# Patient Record
Sex: MALE | Race: OTHER | HISPANIC OR LATINO | Employment: FULL TIME | URBAN - METROPOLITAN AREA
[De-identification: names, ages, dates, MRNs, and addresses within clinical notes are randomized per-mention and may not be internally consistent; named-entity substitution may affect disease eponyms.]

---

## 2017-02-01 ENCOUNTER — LAB REQUISITION (OUTPATIENT)
Dept: LAB | Facility: HOSPITAL | Age: 47
End: 2017-02-01
Payer: COMMERCIAL

## 2017-02-01 DIAGNOSIS — H60.399 OTHER INFECTIVE OTITIS EXTERNA, UNSPECIFIED EAR: ICD-10-CM

## 2017-02-01 PROCEDURE — 87070 CULTURE OTHR SPECIMN AEROBIC: CPT | Performed by: PHYSICIAN ASSISTANT

## 2017-02-01 PROCEDURE — 87102 FUNGUS ISOLATION CULTURE: CPT | Performed by: PHYSICIAN ASSISTANT

## 2017-02-01 PROCEDURE — 87205 SMEAR GRAM STAIN: CPT | Performed by: PHYSICIAN ASSISTANT

## 2017-02-04 LAB
BACTERIA WND AEROBE CULT: NO GROWTH
GRAM STN SPEC: NORMAL

## 2017-03-06 LAB — FUNGUS SPEC CULT: NORMAL

## 2017-03-29 ENCOUNTER — ALLSCRIPTS OFFICE VISIT (OUTPATIENT)
Dept: OTHER | Facility: OTHER | Age: 47
End: 2017-03-29

## 2017-03-29 DIAGNOSIS — N50.819 PAIN IN TESTICLE: ICD-10-CM

## 2017-06-12 ENCOUNTER — HOSPITAL ENCOUNTER (OUTPATIENT)
Dept: RADIOLOGY | Age: 47
Discharge: HOME | End: 2017-06-12
Payer: COMMERCIAL

## 2017-06-12 DIAGNOSIS — N50.819 PAIN IN TESTICLE: ICD-10-CM

## 2017-06-12 PROCEDURE — 76870 US EXAM SCROTUM: CPT

## 2017-06-21 ENCOUNTER — ALLSCRIPTS OFFICE VISIT (OUTPATIENT)
Dept: OTHER | Facility: OTHER | Age: 47
End: 2017-06-21

## 2017-06-21 LAB
CLARITY UR: NORMAL
COLOR UR: YELLOW
GLUCOSE (HISTORICAL): NORMAL
HGB UR QL STRIP.AUTO: NORMAL
KETONES UR STRIP-MCNC: NORMAL MG/DL
LEUKOCYTE ESTERASE UR QL STRIP: NORMAL
NITRITE UR QL STRIP: NORMAL
PH UR STRIP.AUTO: 5 [PH]
PROT UR STRIP-MCNC: NORMAL MG/DL
SP GR UR STRIP.AUTO: 1.03

## 2018-01-13 VITALS
DIASTOLIC BLOOD PRESSURE: 86 MMHG | BODY MASS INDEX: 38.17 KG/M2 | SYSTOLIC BLOOD PRESSURE: 142 MMHG | HEIGHT: 73 IN | WEIGHT: 288 LBS | HEART RATE: 80 BPM

## 2018-01-14 VITALS
HEART RATE: 80 BPM | SYSTOLIC BLOOD PRESSURE: 134 MMHG | HEIGHT: 73 IN | DIASTOLIC BLOOD PRESSURE: 78 MMHG | BODY MASS INDEX: 38.3 KG/M2 | WEIGHT: 289 LBS

## 2019-08-01 ENCOUNTER — OFFICE VISIT (OUTPATIENT)
Dept: URGENT CARE | Age: 49
End: 2019-08-01

## 2019-08-01 VITALS
TEMPERATURE: 98 F | BODY MASS INDEX: 36.45 KG/M2 | WEIGHT: 275 LBS | HEIGHT: 73 IN | SYSTOLIC BLOOD PRESSURE: 180 MMHG | OXYGEN SATURATION: 99 % | DIASTOLIC BLOOD PRESSURE: 90 MMHG | RESPIRATION RATE: 22 BRPM | HEART RATE: 80 BPM

## 2019-08-01 DIAGNOSIS — R10.31 SEVERE RIGHT GROIN PAIN: Primary | ICD-10-CM

## 2019-08-01 DIAGNOSIS — R10.9 RIGHT FLANK PAIN: ICD-10-CM

## 2019-08-01 DIAGNOSIS — R10.31 RLQ ABDOMINAL PAIN: ICD-10-CM

## 2019-08-01 RX ORDER — LISINOPRIL 20 MG/1
20 TABLET ORAL DAILY
COMMUNITY
Start: 2019-05-01 | End: 2021-08-09 | Stop reason: SDUPTHER

## 2019-08-01 RX ORDER — METHOCARBAMOL 750 MG/1
750 TABLET, FILM COATED ORAL 4 TIMES DAILY PRN
COMMUNITY
Start: 2019-07-31 | End: 2020-01-03 | Stop reason: ALTCHOICE

## 2019-08-01 RX ORDER — LIDOCAINE 50 MG/G
1 PATCH TOPICAL EVERY 12 HOURS
COMMUNITY
Start: 2019-07-31 | End: 2020-01-03 | Stop reason: ALTCHOICE

## 2019-08-01 RX ORDER — SIMVASTATIN 20 MG
20 TABLET ORAL DAILY
COMMUNITY
Start: 2019-05-01 | End: 2021-11-29 | Stop reason: SDUPTHER

## 2019-08-01 RX ORDER — SILDENAFIL 50 MG/1
50 TABLET, FILM COATED ORAL AS NEEDED
COMMUNITY
Start: 2019-07-02 | End: 2021-10-07 | Stop reason: SDUPTHER

## 2019-08-01 NOTE — PATIENT INSTRUCTIONS
Patient would like to go via private vehicle to [facility] emergency department per his choice  Please go to the [facility] Emergency Department now for further evaluation and treatment- hospital address verified with the patient  Patient agreed to go immediately to the ED

## 2019-08-01 NOTE — PROGRESS NOTES
[address]        NAME: Samantha Jean-Baptiste is a 51 y o  male  : 1970    MRN: [id number]  DATE: 2019  TIME: 11:28 AM    Assessment and Plan   Severe right groin pain [R10 30]  1  Severe right groin pain  Transfer to other facility   2  RLQ abdominal pain  Transfer to other facility   3  Right flank pain  Transfer to other facility     Worsening right-sided back/flank/right lower quadrant pain radiating down into his groin/scrotal area since yesterday  Was seen at ER yesterday, has been taking muscle relaxers and Motrin with no relief  CT scan done yesterday but no ultrasound  Patient in severe pain and states he needs pain control    Patient Instructions     Patient would like to go via private vehicle to [facility] emergency department per his choice  Please go to the [facility] Emergency Department now for further evaluation and treatment- hospital address verified with the patient  Patient agreed to go immediately to the ED  Chief Complaint     Chief Complaint   Patient presents with    Hip Pain     was seen at [facility] yesterday was was giving pain medication and a ct scan was done  Pt was told it was a muscle strain  pt is here today because he is having little relief  from pain medication that was given in the e r  Pt is requesting a work note for today  History of Present Illness       50-year-old male presents with severe right-sided back, flank pain radiating to his right lower quadrant into his groin/scrotal area since yesterday  Patient states 2 days ago he was lifting something and he felt a pop in his right lower quadrant/hip area  States yesterday he went to [facility] emergency department as the pain started moving to his back and down into his groin/scrotal area  Patient states he did blood work, urine and a CT scan and told him it was likely a muscle    Patient states he has been taking the muscle relaxers and naproxen as prescribed  Patient states it is getting worse he does not feel like it is a muscle at this time as it is not improving with the medications  Patient states the pain continues to radiate down into his scrotal/groin area but denies any specific testicular pain  He denies any urinary complaints  States yesterday had some intermittent nausea but denies any other GI/ symptoms  States he is moving his bowels  Denies any numbness, tingling, weakness, bowel/bladder dysfunction, fevers, chest pain, shortness of breath or other complaints  Denies any other injuries or trauma  Review of Systems   Review of Systems   Constitutional: Negative for fatigue and fever  Respiratory: Negative for cough and shortness of breath  Cardiovascular: Negative for chest pain  Gastrointestinal: Positive for abdominal pain and nausea  Negative for diarrhea and vomiting  Genitourinary: Positive for flank pain and scrotal swelling  Negative for dysuria, hematuria, penile pain, penile swelling and testicular pain  Musculoskeletal: Positive for arthralgias (right hip pain), back pain and joint swelling  Skin: Negative for rash and wound  Neurological: Negative for syncope, weakness, numbness and headaches  All other systems reviewed and are negative          Current Medications       Current Outpatient Medications:     lidocaine (LIDODERM) 5 %, Place 1 patch on the skin every 12 (twelve) hours, Disp: , Rfl:     lisinopril (ZESTRIL) 20 mg tablet, , Disp: , Rfl:     metFORMIN (GLUCOPHAGE) 500 mg tablet, , Disp: , Rfl:     methocarbamol (ROBAXIN-750) 750 mg tablet, Take 750 mg by mouth 4 (four) times a day as needed, Disp: , Rfl:     sildenafil (VIAGRA) 50 MG tablet, , Disp: , Rfl:     simvastatin (ZOCOR) 20 mg tablet, , Disp: , Rfl:     Current Allergies     Allergies as of 08/01/2019    (No Known Allergies)            The following portions of the patient's history were reviewed and updated as appropriate: allergies, current medications, past family history, past medical history, past social history, past surgical history and problem list      Past Medical History:   Diagnosis Date    Diabetes mellitus ([facility] [address] )     Hypertension        History reviewed  No pertinent surgical history  No family history on file  Medications have been verified  Objective   BP (!) 180/90 (BP Location: Left arm, Patient Position: Sitting, Cuff Size: Large)   Pulse 80   Temp 98 °F (36 7 °C)   Resp 22   Ht 6' 1" (1 854 m)   Wt 125 kg (275 lb)   SpO2 99%   BMI 36 28 kg/m²        Physical Exam     Physical Exam   Constitutional: He is oriented to person, place, and time  He appears well-developed and well-nourished  No distress  Nontoxic, no apparent distress but patient states he is in 10/10 severe pain   Neck: Normal range of motion  Neck supple  No spinous process tenderness present  Normal range of motion present  Cardiovascular: Normal rate, regular rhythm and normal heart sounds  Pulmonary/Chest: Effort normal and breath sounds normal  He has no wheezes  Abdominal: There is tenderness (With mild swelling to the right lower quadrant and groin)  There is no guarding  Genitourinary:   Genitourinary Comments: Chaperone was present- slightly swollen in the right groin region, no obvious bulges or masses palpated  Right testicle does seem to be more retracted/higher up compared to the left  No specific testicular tenderness to palpation however mild right scrotal tenderness to palpation   Musculoskeletal:        Right hip: He exhibits decreased range of motion (in all directions due to pain) and tenderness  He exhibits normal strength  Cervical back: Normal  He exhibits normal range of motion, no tenderness and no bony tenderness  Thoracic back: Normal  He exhibits normal range of motion, no tenderness and no bony tenderness          Lumbar back: He exhibits decreased range of motion (in all directions due to pain) and tenderness (Mild, diffuse nonspecific tenderness to the right lateral lower lumbar region through the right flank and into the right lower quadrant of abdomen)  He exhibits no bony tenderness  Negative straight leg raise   Neurological: He is alert and oriented to person, place, and time  He has normal reflexes  No sensory deficit  NV intact with sensation and strong peripheral pulses  5/5 strength of LE bilaterally   Skin: Skin is warm and dry  Psychiatric: He has a normal mood and affect  His behavior is normal    Nursing note and vitals reviewed

## 2019-08-19 ENCOUNTER — TRANSCRIBE ORDERS (OUTPATIENT)
Dept: ADMINISTRATIVE | Facility: HOSPITAL | Age: 49
End: 2019-08-19

## 2019-08-19 DIAGNOSIS — I71.4 ABDOMINAL AORTIC ANEURYSM (AAA) WITHOUT RUPTURE (HCC): Primary | ICD-10-CM

## 2019-08-21 ENCOUNTER — HOSPITAL ENCOUNTER (OUTPATIENT)
Dept: NON INVASIVE DIAGNOSTICS | Facility: HOSPITAL | Age: 49
Discharge: HOME | End: 2019-08-21
Attending: INTERNAL MEDICINE
Payer: COMMERCIAL

## 2019-08-21 DIAGNOSIS — I71.4 ABDOMINAL AORTIC ANEURYSM (AAA) WITHOUT RUPTURE (HCC): ICD-10-CM

## 2019-08-21 PROCEDURE — 93306 TTE W/DOPPLER COMPLETE: CPT | Performed by: INTERNAL MEDICINE

## 2019-08-21 PROCEDURE — 93306 TTE W/DOPPLER COMPLETE: CPT

## 2019-09-12 ENCOUNTER — TRANSCRIBE ORDERS (OUTPATIENT)
Dept: ADMINISTRATIVE | Age: 49
End: 2019-09-12

## 2019-09-12 ENCOUNTER — APPOINTMENT (OUTPATIENT)
Dept: RADIOLOGY | Age: 49
End: 2019-09-12
Payer: COMMERCIAL

## 2019-09-12 DIAGNOSIS — R07.9 RIGHT-SIDED CHEST PAIN: ICD-10-CM

## 2019-09-12 DIAGNOSIS — R07.9 RIGHT-SIDED CHEST PAIN: Primary | ICD-10-CM

## 2019-09-12 PROCEDURE — 72072 X-RAY EXAM THORAC SPINE 3VWS: CPT

## 2019-09-30 ENCOUNTER — APPOINTMENT (EMERGENCY)
Dept: RADIOLOGY | Facility: HOSPITAL | Age: 49
End: 2019-09-30
Payer: COMMERCIAL

## 2019-09-30 ENCOUNTER — HOSPITAL ENCOUNTER (EMERGENCY)
Facility: HOSPITAL | Age: 49
Discharge: HOME | End: 2019-09-30
Attending: EMERGENCY MEDICINE | Admitting: EMERGENCY MEDICINE
Payer: COMMERCIAL

## 2019-09-30 VITALS
HEIGHT: 73 IN | DIASTOLIC BLOOD PRESSURE: 72 MMHG | SYSTOLIC BLOOD PRESSURE: 157 MMHG | WEIGHT: 272 LBS | RESPIRATION RATE: 19 BRPM | BODY MASS INDEX: 36.05 KG/M2 | HEART RATE: 55 BPM | OXYGEN SATURATION: 96 %

## 2019-09-30 DIAGNOSIS — R07.9 CHEST PAIN, UNSPECIFIED: Primary | ICD-10-CM

## 2019-09-30 LAB
ALBUMIN SERPL BCP-MCNC: 4.1 G/DL (ref 3.5–5)
ALP SERPL-CCNC: 75 U/L (ref 46–116)
ALT SERPL W P-5'-P-CCNC: 37 U/L (ref 12–78)
ANION GAP SERPL CALCULATED.3IONS-SCNC: 7 MMOL/L (ref 4–13)
AST SERPL W P-5'-P-CCNC: 23 U/L (ref 5–45)
ATRIAL RATE: 60 BPM
BASOPHILS # BLD AUTO: 0.04 THOUSANDS/ΜL (ref 0–0.1)
BASOPHILS NFR BLD AUTO: 1 % (ref 0–1)
BILIRUB SERPL-MCNC: 0.33 MG/DL (ref 0.2–1)
BUN SERPL-MCNC: 11 MG/DL (ref 5–25)
CALCIUM SERPL-MCNC: 9.1 MG/DL (ref 8.3–10.1)
CHLORIDE SERPL-SCNC: 104 MMOL/L (ref 100–108)
CO2 SERPL-SCNC: 25 MMOL/L (ref 21–32)
CREAT SERPL-MCNC: 0.79 MG/DL (ref 0.6–1.3)
EOSINOPHIL # BLD AUTO: 0.12 THOUSAND/ΜL (ref 0–0.61)
EOSINOPHIL NFR BLD AUTO: 2 % (ref 0–6)
ERYTHROCYTE [DISTWIDTH] IN BLOOD BY AUTOMATED COUNT: 11.9 % (ref 11.6–15.1)
GFR SERPL CREATININE-BSD FRML MDRD: 105 ML/MIN/1.73SQ M
GLUCOSE SERPL-MCNC: 105 MG/DL (ref 65–140)
HCT VFR BLD AUTO: 42.5 % (ref 36.5–49.3)
HGB BLD-MCNC: 13.8 G/DL (ref 12–17)
IMM GRANULOCYTES # BLD AUTO: 0.05 THOUSAND/UL (ref 0–0.2)
IMM GRANULOCYTES NFR BLD AUTO: 1 % (ref 0–2)
LIPASE SERPL-CCNC: 121 U/L (ref 73–393)
LYMPHOCYTES # BLD AUTO: 2.33 THOUSANDS/ΜL (ref 0.6–4.47)
LYMPHOCYTES NFR BLD AUTO: 31 % (ref 14–44)
MCH RBC QN AUTO: 28.9 PG (ref 26.8–34.3)
MCHC RBC AUTO-ENTMCNC: 32.5 G/DL (ref 31.4–37.4)
MCV RBC AUTO: 89 FL (ref 82–98)
MONOCYTES # BLD AUTO: 0.68 THOUSAND/ΜL (ref 0.17–1.22)
MONOCYTES NFR BLD AUTO: 9 % (ref 4–12)
NEUTROPHILS # BLD AUTO: 4.35 THOUSANDS/ΜL (ref 1.85–7.62)
NEUTS SEG NFR BLD AUTO: 56 % (ref 43–75)
NRBC BLD AUTO-RTO: 0 /100 WBCS
P AXIS: 31 DEGREES
PLATELET # BLD AUTO: 218 THOUSANDS/UL (ref 149–390)
PMV BLD AUTO: 10.5 FL (ref 8.9–12.7)
POTASSIUM SERPL-SCNC: 3.8 MMOL/L (ref 3.5–5.3)
PR INTERVAL: 184 MS
PROT SERPL-MCNC: 7.5 G/DL (ref 6.4–8.2)
QRS AXIS: 50 DEGREES
QRSD INTERVAL: 96 MS
QT INTERVAL: 426 MS
QTC INTERVAL: 426 MS
RBC # BLD AUTO: 4.78 MILLION/UL (ref 3.88–5.62)
SODIUM SERPL-SCNC: 136 MMOL/L (ref 136–145)
T WAVE AXIS: 56 DEGREES
TROPONIN I SERPL-MCNC: <0.02 NG/ML
TROPONIN I SERPL-MCNC: <0.02 NG/ML
VENTRICULAR RATE: 60 BPM
WBC # BLD AUTO: 7.57 THOUSAND/UL (ref 4.31–10.16)

## 2019-09-30 PROCEDURE — 71275 CT ANGIOGRAPHY CHEST: CPT

## 2019-09-30 PROCEDURE — 93005 ELECTROCARDIOGRAM TRACING: CPT

## 2019-09-30 PROCEDURE — 93010 ELECTROCARDIOGRAM REPORT: CPT | Performed by: INTERNAL MEDICINE

## 2019-09-30 PROCEDURE — 99285 EMERGENCY DEPT VISIT HI MDM: CPT

## 2019-09-30 PROCEDURE — 36415 COLL VENOUS BLD VENIPUNCTURE: CPT | Performed by: EMERGENCY MEDICINE

## 2019-09-30 PROCEDURE — 80053 COMPREHEN METABOLIC PANEL: CPT | Performed by: EMERGENCY MEDICINE

## 2019-09-30 PROCEDURE — 84484 ASSAY OF TROPONIN QUANT: CPT | Performed by: EMERGENCY MEDICINE

## 2019-09-30 PROCEDURE — 99285 EMERGENCY DEPT VISIT HI MDM: CPT | Performed by: EMERGENCY MEDICINE

## 2019-09-30 PROCEDURE — 83690 ASSAY OF LIPASE: CPT | Performed by: EMERGENCY MEDICINE

## 2019-09-30 PROCEDURE — 71046 X-RAY EXAM CHEST 2 VIEWS: CPT

## 2019-09-30 PROCEDURE — 96374 THER/PROPH/DIAG INJ IV PUSH: CPT

## 2019-09-30 PROCEDURE — 96375 TX/PRO/DX INJ NEW DRUG ADDON: CPT

## 2019-09-30 PROCEDURE — 74174 CTA ABD&PLVS W/CONTRAST: CPT

## 2019-09-30 PROCEDURE — 85025 COMPLETE CBC W/AUTO DIFF WBC: CPT | Performed by: EMERGENCY MEDICINE

## 2019-09-30 RX ORDER — ACETAMINOPHEN 325 MG/1
975 TABLET ORAL ONCE
Status: COMPLETED | OUTPATIENT
Start: 2019-09-30 | End: 2019-09-30

## 2019-09-30 RX ORDER — KETOROLAC TROMETHAMINE 30 MG/ML
15 INJECTION, SOLUTION INTRAMUSCULAR; INTRAVENOUS ONCE
Status: COMPLETED | OUTPATIENT
Start: 2019-09-30 | End: 2019-09-30

## 2019-09-30 RX ORDER — HYDROMORPHONE HCL/PF 1 MG/ML
1 SYRINGE (ML) INJECTION ONCE
Status: COMPLETED | OUTPATIENT
Start: 2019-09-30 | End: 2019-09-30

## 2019-09-30 RX ADMIN — IOHEXOL 100 ML: 350 INJECTION, SOLUTION INTRAVENOUS at 16:24

## 2019-09-30 RX ADMIN — HYDROMORPHONE HYDROCHLORIDE 1 MG: 1 INJECTION, SOLUTION INTRAMUSCULAR; INTRAVENOUS; SUBCUTANEOUS at 18:00

## 2019-09-30 RX ADMIN — ACETAMINOPHEN 975 MG: 325 TABLET ORAL at 16:14

## 2019-09-30 RX ADMIN — KETOROLAC TROMETHAMINE 15 MG: 30 INJECTION, SOLUTION INTRAMUSCULAR at 16:14

## 2019-09-30 NOTE — ED PROVIDER NOTES
History  Chief Complaint   Patient presents with    Chest Pain     c/o right sided chest pain raditing down right arm and into right shoulder blade, intermittent, sharp, c/o nausea     HPI     51yo male presents for evaluation of chest pain  Patient says he has had one week of right sided, dull chest pain that has been constant 3/10 but at times worst in intensity  He says today the pain was the worst it has been and that he felt nauseous and lightheaded intermittently  Pain radiates up his right neck, right shoulder, right scapula and down his right arm  Patient took naproxen without relief  Patient says he has had right shoulder pain/RUQ pain for the past month and has been evaluated by his PCP with recent echo and gallbladder evaluation? Patient also notes he has an aneurysm but is unsure of where it is  Patient denies recent headache, fever, chills, shortness of breath, diarrhea, dysuria, extremity weakness or numbness/tingling  Prior to Admission Medications   Prescriptions Last Dose Informant Patient Reported? Taking?   lidocaine (LIDODERM) 5 %   Yes No   Sig: Place 1 patch on the skin every 12 (twelve) hours   lisinopril (ZESTRIL) 20 mg tablet   Yes No   metFORMIN (GLUCOPHAGE) 500 mg tablet   Yes No   methocarbamol (ROBAXIN-750) 750 mg tablet   Yes No   Sig: Take 750 mg by mouth 4 (four) times a day as needed   sildenafil (VIAGRA) 50 MG tablet   Yes No   simvastatin (ZOCOR) 20 mg tablet   Yes No   Si mg       Facility-Administered Medications: None       Past Medical History:   Diagnosis Date    Diabetes mellitus ([facility] [address] )     Hyperlipidemia     Hypertension        Past Surgical History:   Procedure Laterality Date    APPENDECTOMY      MENISCECTOMY      MYOTOMY HELLER LAPAROSCOPIC         History reviewed  No pertinent family history  I have reviewed and agree with the history as documented      Social History     Tobacco Use    Smoking status: Never Smoker    Smokeless tobacco: Never Used Substance Use Topics    Alcohol use: Not Currently    Drug use: Never        Review of Systems   Constitutional: Negative for chills, diaphoresis, fatigue and fever  HENT: Negative for congestion, rhinorrhea and sore throat  Eyes: Negative for photophobia and visual disturbance  Respiratory: Negative for cough, chest tightness and shortness of breath  Cardiovascular: Positive for chest pain  Negative for palpitations  Gastrointestinal: Positive for nausea  Negative for abdominal pain, blood in stool, constipation, diarrhea and vomiting  Genitourinary: Negative for decreased urine volume, dysuria, frequency and hematuria  Musculoskeletal: Positive for back pain  Negative for gait problem, myalgias, neck pain and neck stiffness  Skin: Negative for pallor and rash  Neurological: Positive for light-headedness  Negative for weakness, numbness and headaches  Hematological: Negative for adenopathy  Does not bruise/bleed easily  All other systems reviewed and are negative  Physical Exam  ED Triage Vitals [09/30/19 1449]   Temp Pulse Respirations Blood Pressure SpO2   -- 60 18 168/82 97 %      Temp src Heart Rate Source Patient Position - Orthostatic VS BP Location FiO2 (%)   -- Monitor Lying Right arm --      Pain Score       8             Orthostatic Vital Signs  Vitals:    09/30/19 1449 09/30/19 1735   BP: 168/82 157/72   Pulse: 60 55   Patient Position - Orthostatic VS: Lying Sitting       Physical Exam   Constitutional: He is oriented to person, place, and time  He appears well-developed and well-nourished  Non-toxic appearance  He does not appear ill  No distress  Patient alert and oriented, appears well and non-toxic, in no acute distress    HENT:   Head: Normocephalic and atraumatic  Eyes: Pupils are equal, round, and reactive to light  Conjunctivae and EOM are normal    Neck: Normal range of motion  Neck supple     Cardiovascular: Normal rate, regular rhythm, normal heart sounds, intact distal pulses and normal pulses  No murmur heard  Pulmonary/Chest: Effort normal and breath sounds normal  No respiratory distress  Abdominal: Soft  Bowel sounds are normal  He exhibits no distension  There is tenderness in the right upper quadrant and epigastric area  There is no rigidity, no guarding and no tenderness at McBurney's point  Musculoskeletal: Normal range of motion  Right lower leg: He exhibits no edema  Left lower leg: He exhibits no edema  Lymphadenopathy:     He has no cervical adenopathy  Neurological: He is alert and oriented to person, place, and time  No facial asymmetry noted, CN 2-12 intact, full ROM of upper and lower extremities, muscle strength 5/5 throughout   Skin: Skin is warm and dry  Capillary refill takes less than 2 seconds  No rash noted  He is not diaphoretic  No erythema  No pallor  Psychiatric: He has a normal mood and affect  His behavior is normal  Judgment and thought content normal    Nursing note and vitals reviewed        ED Medications  Medications   acetaminophen (TYLENOL) tablet 975 mg (975 mg Oral Given 9/30/19 1614)   ketorolac (TORADOL) injection 15 mg (15 mg Intravenous Given 9/30/19 1614)   iohexol (OMNIPAQUE) 350 MG/ML injection (MULTI-DOSE) 100 mL (100 mL Intravenous Given 9/30/19 1624)   HYDROmorphone (DILAUDID) injection 1 mg (1 mg Intravenous Given 9/30/19 1800)       Diagnostic Studies  Results Reviewed     Procedure Component Value Units Date/Time    Troponin I [[id number]]  (Normal) Collected:  09/30/19 1836    Lab Status:  Final result Specimen:  Blood from Arm, Left Updated:  09/30/19 1906     Troponin I <0 02 ng/mL     Comprehensive metabolic panel [[id number]] Collected:  09/30/19 1537    Lab Status:  Final result Specimen:  Blood from Arm, Left Updated:  09/30/19 1607     Sodium 136 mmol/L      Potassium 3 8 mmol/L      Chloride 104 mmol/L      CO2 25 mmol/L      ANION GAP 7 mmol/L      BUN 11 mg/dL      Creatinine 0 79 mg/dL      Glucose 105 mg/dL      Calcium 9 1 mg/dL      AST 23 U/L      ALT 37 U/L      Alkaline Phosphatase 75 U/L      Total Protein 7 5 g/dL      Albumin 4 1 g/dL      Total Bilirubin 0 33 mg/dL      eGFR 105 ml/min/1 73sq m     Narrative:       National Kidney Disease Foundation guidelines for Chronic Kidney Disease (CKD):     Stage 1 with normal or high GFR (GFR > 90 mL/min/1 73 square meters)    Stage 2 Mild CKD (GFR = 60-89 mL/min/1 73 square meters)    Stage 3A Moderate CKD (GFR = 45-59 mL/min/1 73 square meters)    Stage 3B Moderate CKD (GFR = 30-44 mL/min/1 73 square meters)    Stage 4 Severe CKD (GFR = 15-29 mL/min/1 73 square meters)    Stage 5 End Stage CKD (GFR <15 mL/min/1 73 square meters)  Note: GFR calculation is accurate only with a steady state creatinine    Lipase [[id number]]  (Normal) Collected:  09/30/19 1537    Lab Status:  Final result Specimen:  Blood from Arm, Left Updated:  09/30/19 1607     Lipase 121 u/L     Troponin I [[id number]]  (Normal) Collected:  09/30/19 1537    Lab Status:  Final result Specimen:  Blood from Arm, Left Updated:  09/30/19 1607     Troponin I <0 02 ng/mL     CBC and differential [[id number]] Collected:  09/30/19 1537    Lab Status:  Final result Specimen:  Blood from Arm, Left Updated:  09/30/19 1600     WBC 7 57 Thousand/uL      RBC 4 78 Million/uL      Hemoglobin 13 8 g/dL      Hematocrit 42 5 %      MCV 89 fL      MCH 28 9 pg      MCHC 32 5 g/dL      RDW 11 9 %      MPV 10 5 fL      Platelets 366 Thousands/uL      nRBC 0 /100 WBCs      Neutrophils Relative 56 %      Immat GRANS % 1 %      Lymphocytes Relative 31 %      Monocytes Relative 9 %      Eosinophils Relative 2 %      Basophils Relative 1 %      Neutrophils Absolute 4 35 Thousands/µL      Immature Grans Absolute 0 05 Thousand/uL      Lymphocytes Absolute 2 33 Thousands/µL      Monocytes Absolute 0 68 Thousand/µL      Eosinophils Absolute 0 12 Thousand/µL      Basophils Absolute 0 04 Thousands/µL CTA dissection protocol chest abdomen pelvis w wo contrast   Final Result by Mónica Abbott MD (09/30 1700)      41 mm ascending aortic aneurysm  Workstation performed: [id number]         XR chest 2 views   Final Result by Zamzam Tejada MD (09/30 1640)      No acute cardiopulmonary disease              Workstation performed: [id number]               Procedures  ECG 12 Lead Documentation Only  Date/Time: 9/30/2019 7:25 PM  Performed by: Marlene Contreras MD  Authorized by: Marlene Contreras MD     Indications / Diagnosis:   chest pain  ECG reviewed by me, the ED Provider: yes    Patient location:  ED and bedside  Previous ECG:     Previous ECG:  Compared to current    Similarity:  Changes noted    Comparison to cardiac monitor: Yes    Interpretation:     Interpretation: normal    Rate:     ECG rate:  60    ECG rate assessment: normal    Rhythm:     Rhythm: sinus rhythm    Ectopy:     Ectopy: none    QRS:     QRS axis:  Normal    QRS intervals:  Normal  Conduction:     Conduction: normal    ST segments:     ST segments:  Normal  T waves:     T waves: normal              ED Course  ED Course as of Sep 30 1951   Mon Sep 30, 2019   1806 Will obtain delta trop    Troponin I: <0 02   1833 Bedside GB u/s: no pericholecystic free fluid, GB wall thickening, no GB dilation, no Austin's sign            HEART Risk Score      Most Recent Value   History  0 Filed at: 09/30/2019 1806   ECG  0 Filed at: 09/30/2019 1806   Age  1 Filed at: 09/30/2019 1806   Risk Factors  2 Filed at: 09/30/2019 1806   Troponin  0 Filed at: 09/30/2019 1806   Heart Score Risk Calculator   History  0 Filed at: 09/30/2019 1806   ECG  0 Filed at: 09/30/2019 1806   Age  1 Filed at: 09/30/2019 1806   Risk Factors  2 Filed at: 09/30/2019 1806   Troponin  0 Filed at: 09/30/2019 1806   HEART Score  3 Filed at: 09/30/2019 1806   HEART Score  3 Filed at: 09/30/2019 1806                            MDM  Number of Diagnoses or Management Options  Chest pain, unspecified:   Diagnosis management comments: 69-year-old male presents for evaluation chest, back and abdominal pain  Patient is hypertensive but appears clinically well and is hemodynamically stable  Will obtain a CBC, CMP, lipase, troponin, EKG, and dissection study  Disposition  Final diagnoses:   Chest pain, unspecified     Time reflects when diagnosis was documented in both MDM as applicable and the Disposition within this note     Time User Action Codes Description Comment    9/30/2019  7:23 PM Charmayne Pitman Add [R07 9] Chest pain, unspecified       ED Disposition     ED Disposition Condition Date/Time Comment    Discharge Stable Mon Sep 30, 2019  7:23 PM Katerin Ej discharge to home/self care  Follow-up Information     Follow up With Specialties Details Why [address], DO Internal Medicine Go to  As needed, If symptoms worsen [address]  [address] [facility]            Discharge Medication List as of 9/30/2019  7:23 PM      CONTINUE these medications which have NOT CHANGED    Details   lidocaine (LIDODERM) 5 % Place 1 patch on the skin every 12 (twelve) hours, Starting Wed 7/31/2019, Until Wed 8/7/2019, Historical Med      lisinopril (ZESTRIL) 20 mg tablet Starting Wed 5/1/2019, Historical Med      metFORMIN (GLUCOPHAGE) 500 mg tablet Starting Mon 7/15/2019, Historical Med      methocarbamol (ROBAXIN-750) 750 mg tablet Take 750 mg by mouth 4 (four) times a day as needed, Starting Wed 7/31/2019, Until Sat 8/10/2019, Historical Med      sildenafil (VIAGRA) 50 MG tablet Starting Tue 7/2/2019, Historical Med      simvastatin (ZOCOR) 20 mg tablet 20 mg , Starting Wed 5/1/2019, Historical Med           No discharge procedures on file  ED Provider  Attending physically available and evaluated Katerin Ej  I managed the patient along with the ED Attending      Electronically Signed by         Yuri Troy Emily Eastman MD  09/30/19 3769

## 2019-09-30 NOTE — ED ATTENDING ATTESTATION
9/30/2019  I, Margot Teague MD, saw and evaluated the patient  I have discussed the patient with the resident/non-physician practitioner and agree with the resident's/non-physician practitioner's findings, Plan of Care, and MDM as documented in the resident's/non-physician practitioner's note, except where noted  All available labs and Radiology studies were reviewed  I was present for key portions of any procedure(s) performed by the resident/non-physician practitioner and I was immediately available to provide assistance  At this point I agree with the current assessment done in the Emergency Department  I have conducted an independent evaluation of this patient a history and physical is as follows:    ED Course         Critical Care Time  Procedures     50 yo male with right chest pain with radiation to right arm and scapula  Pt with nausea, mild sob  Pt with intermittent pain for one week but more constant today  Pt has had echo and workup for pain  pmh thoracic aneurysm 4 1 cm, dm, htn, hld  Vss, afebrile, lungs cta, rrr, abdomen soft ruq and epigastric tender, no reproducible tenderness  Cardiac workup, cta chest/abdomen, lipase

## 2019-10-01 LAB
ATRIAL RATE: 67 BPM
P AXIS: 42 DEGREES
PR INTERVAL: 182 MS
QRS AXIS: 54 DEGREES
QRSD INTERVAL: 96 MS
QT INTERVAL: 404 MS
QTC INTERVAL: 426 MS
T WAVE AXIS: 61 DEGREES
VENTRICULAR RATE: 67 BPM

## 2019-10-01 PROCEDURE — 93010 ELECTROCARDIOGRAM REPORT: CPT | Performed by: INTERNAL MEDICINE

## 2019-10-13 ENCOUNTER — HOSPITAL ENCOUNTER (OUTPATIENT)
Dept: RADIOLOGY | Facility: HOSPITAL | Age: 49
Discharge: HOME | End: 2019-10-13
Payer: COMMERCIAL

## 2019-10-13 DIAGNOSIS — R07.9 RIGHT-SIDED CHEST PAIN: ICD-10-CM

## 2019-10-13 PROCEDURE — A9585 GADOBUTROL INJECTION: HCPCS | Performed by: NURSE PRACTITIONER

## 2019-10-13 PROCEDURE — 72156 MRI NECK SPINE W/O & W/DYE: CPT

## 2019-10-13 RX ADMIN — GADOBUTROL 12 ML: 604.72 INJECTION INTRAVENOUS at 14:18

## 2019-10-15 ENCOUNTER — TRANSCRIBE ORDERS (OUTPATIENT)
Dept: ADMINISTRATIVE | Facility: HOSPITAL | Age: 49
End: 2019-10-15

## 2019-10-15 DIAGNOSIS — M79.601 RIGHT ARM PAIN: Primary | ICD-10-CM

## 2019-10-15 DIAGNOSIS — R07.9 CHEST PAIN, UNSPECIFIED TYPE: ICD-10-CM

## 2019-10-30 ENCOUNTER — HOSPITAL ENCOUNTER (OUTPATIENT)
Dept: RADIOLOGY | Facility: HOSPITAL | Age: 49
Discharge: HOME | End: 2019-10-30
Payer: COMMERCIAL

## 2019-10-30 DIAGNOSIS — M79.601 RIGHT ARM PAIN: ICD-10-CM

## 2019-10-30 DIAGNOSIS — R07.9 CHEST PAIN, UNSPECIFIED TYPE: ICD-10-CM

## 2019-10-30 PROCEDURE — 72157 MRI CHEST SPINE W/O & W/DYE: CPT

## 2019-10-30 PROCEDURE — A9585 GADOBUTROL INJECTION: HCPCS | Performed by: NURSE PRACTITIONER

## 2019-10-30 RX ADMIN — GADOBUTROL 12 ML: 604.72 INJECTION INTRAVENOUS at 20:50

## 2019-11-18 ENCOUNTER — OFFICE VISIT (OUTPATIENT)
Dept: URGENT CARE | Age: 49
End: 2019-11-18
Payer: COMMERCIAL

## 2019-11-18 VITALS
SYSTOLIC BLOOD PRESSURE: 163 MMHG | DIASTOLIC BLOOD PRESSURE: 72 MMHG | BODY MASS INDEX: 36.05 KG/M2 | OXYGEN SATURATION: 97 % | TEMPERATURE: 97.7 F | WEIGHT: 272 LBS | HEIGHT: 73 IN | RESPIRATION RATE: 16 BRPM | HEART RATE: 78 BPM

## 2019-11-18 DIAGNOSIS — J02.9 SORE THROAT: Primary | ICD-10-CM

## 2019-11-18 DIAGNOSIS — R05.9 COUGH: ICD-10-CM

## 2019-11-18 LAB — S PYO AG THROAT QL: NEGATIVE

## 2019-11-18 PROCEDURE — S9083 URGENT CARE CENTER GLOBAL: HCPCS | Performed by: NURSE PRACTITIONER

## 2019-11-18 PROCEDURE — G0382 LEV 3 HOSP TYPE B ED VISIT: HCPCS | Performed by: NURSE PRACTITIONER

## 2019-11-18 PROCEDURE — 87880 STREP A ASSAY W/OPTIC: CPT | Performed by: NURSE PRACTITIONER

## 2019-11-18 RX ORDER — AMPICILLIN TRIHYDRATE 250 MG
2 CAPSULE ORAL DAILY
COMMUNITY
End: 2021-12-22 | Stop reason: ALTCHOICE

## 2019-11-18 RX ORDER — BENZONATATE 200 MG/1
200 CAPSULE ORAL 3 TIMES DAILY PRN
Qty: 20 CAPSULE | Refills: 0 | Status: SHIPPED | OUTPATIENT
Start: 2019-11-18 | End: 2020-03-09 | Stop reason: SDUPTHER

## 2019-11-18 NOTE — PROGRESS NOTES
[address] [facility]        NAME: Raymonde Rinne is a 51 y o  male  : 1970    MRN: [id number]  DATE: 2019  TIME: 11:40 AM    Assessment and Plan   Sore throat [J02 9]  1  Sore throat  POCT rapid strepA   2  Cough  benzonatate (TESSALON) 200 MG capsule         Patient Instructions     Rapid strep is negative  Take meds as directed  Motrin OTC prn for sore throat  Follow up with PCP in 3-5 days  Proceed to  ER if symptoms worsen  Chief Complaint     Chief Complaint   Patient presents with    Cold Like Symptoms     Pt complaining of cough, sore throat and post nasal drip x3 days  Has tried dayquil/nyquil and musinex dm         History of Present Illness       HPI   Reports cold symptoms x 3 days  Says wife was sick and he is not sure what she was diagnosed with and what treatment she had  He has been coughing, with sore throat  Sore throat getting better, today  Review of Systems   Review of Systems   Constitutional: Negative for chills and fever  HENT: Positive for congestion, rhinorrhea and sore throat  Negative for trouble swallowing  Respiratory: Positive for cough  Negative for choking, shortness of breath and wheezing  Gastrointestinal: Negative for diarrhea and nausea  Musculoskeletal: Positive for myalgias (generalized aches)  Neurological: Negative for dizziness and headaches           Current Medications       Current Outpatient Medications:     Cinnamon 500 MG capsule, Take 2 tablets by mouth daily, Disp: , Rfl:     lisinopril (ZESTRIL) 20 mg tablet, , Disp: , Rfl:     metFORMIN (GLUCOPHAGE) 500 mg tablet, , Disp: , Rfl:     sildenafil (VIAGRA) 50 MG tablet, , Disp: , Rfl:     simvastatin (ZOCOR) 20 mg tablet, 20 mg , Disp: , Rfl:     benzonatate (TESSALON) 200 MG capsule, Take 1 capsule (200 mg total) by mouth 3 (three) times a day as needed for cough, Disp: 20 capsule, Rfl: 0    lidocaine (LIDODERM) 5 %, Place 1 patch on the skin every 12 (twelve) hours, Disp: , Rfl:     methocarbamol (ROBAXIN-750) 750 mg tablet, Take 750 mg by mouth 4 (four) times a day as needed, Disp: , Rfl:     Current Allergies     Allergies as of 11/18/2019    (No Known Allergies)            The following portions of the patient's history were reviewed and updated as appropriate: allergies, current medications, past family history, past medical history, past social history, past surgical history and problem list      Past Medical History:   Diagnosis Date    Diabetes mellitus ([address] )     Hyperlipidemia     Hypertension        Past Surgical History:   Procedure Laterality Date    APPENDECTOMY      MENISCECTOMY      MYOTOMY HELLER LAPAROSCOPIC         Family History   Problem Relation Age of Onset    No Known Problems Mother     No Known Problems Father          Medications have been verified  Objective   /72 (BP Location: Right arm, Patient Position: Sitting)   Pulse 78   Temp 97 7 °F (36 5 °C) (Temporal)   Resp 16   Ht 6' 1" (1 854 m)   Wt 123 kg (272 lb)   SpO2 97%   BMI 35 89 kg/m²        Physical Exam     Physical Exam   Constitutional: He appears well-developed  No distress  HENT:   Right Ear: External ear normal    Left Ear: External ear normal    Nasal discharge, minimal post nasal drip, mild erythema of the general throat area   Cardiovascular: Normal rate and regular rhythm  Pulmonary/Chest: Effort normal and breath sounds normal  He has no wheezes  He exhibits no tenderness  Lymphadenopathy:     He has no cervical adenopathy

## 2019-11-18 NOTE — PATIENT INSTRUCTIONS

## 2019-12-16 ENCOUNTER — TELEPHONE (OUTPATIENT)
Dept: OBGYN CLINIC | Facility: HOSPITAL | Age: 49
End: 2019-12-16

## 2019-12-16 NOTE — TELEPHONE ENCOUNTER
Patient's pcp called to schedule the patient from mri results in patient's chart  Per the mri, patient had a fusion of C6-C7 & they were advised that the patient would need to have office visit notes, op notes & all past testing with reports on discs  PCP was not sure that the patient had sx & will ask patient about the records & they will fax the information to Dr Rosalind Barraza once they have it  PCP was also advised that the patient has also previously seen out Spine & Pain department & they will determine who the patient will need to schedule with

## 2019-12-17 ENCOUNTER — TRANSCRIBE ORDERS (OUTPATIENT)
Dept: NEUROSURGERY | Facility: CLINIC | Age: 49
End: 2019-12-17

## 2019-12-17 DIAGNOSIS — M54.6 THORACIC SPINE PAIN: Primary | ICD-10-CM

## 2020-01-03 ENCOUNTER — CONSULT (OUTPATIENT)
Dept: NEUROSURGERY | Facility: CLINIC | Age: 50
End: 2020-01-03
Payer: COMMERCIAL

## 2020-01-03 VITALS
HEIGHT: 73 IN | BODY MASS INDEX: 37.37 KG/M2 | SYSTOLIC BLOOD PRESSURE: 140 MMHG | TEMPERATURE: 98.6 F | WEIGHT: 282 LBS | DIASTOLIC BLOOD PRESSURE: 98 MMHG | HEART RATE: 65 BPM

## 2020-01-03 DIAGNOSIS — M54.12 CERVICAL RADICULOPATHY: Primary | ICD-10-CM

## 2020-01-03 DIAGNOSIS — Z98.1 S/P CERVICAL SPINAL FUSION: ICD-10-CM

## 2020-01-03 DIAGNOSIS — M51.24 HNP (HERNIATED NUCLEUS PULPOSUS), THORACIC: ICD-10-CM

## 2020-01-03 DIAGNOSIS — M48.02 CERVICAL STENOSIS OF SPINAL CANAL: ICD-10-CM

## 2020-01-03 PROCEDURE — 99244 OFF/OP CNSLTJ NEW/EST MOD 40: CPT | Performed by: PHYSICIAN ASSISTANT

## 2020-01-03 RX ORDER — BACLOFEN 20 MG/1
20 TABLET ORAL 3 TIMES DAILY
Qty: 90 TABLET | Refills: 2 | Status: SHIPPED | OUTPATIENT
Start: 2020-01-03 | End: 2020-11-16 | Stop reason: SDUPTHER

## 2020-01-03 RX ORDER — IBUPROFEN 800 MG/1
800 TABLET ORAL EVERY 8 HOURS SCHEDULED
Qty: 90 TABLET | Refills: 2 | Status: SHIPPED | OUTPATIENT
Start: 2020-01-03 | End: 2021-08-09

## 2020-01-03 NOTE — PROGRESS NOTES
Neurosurgery Office Note  Billie Ricks 47 y o  male MRN: [id number]      Assessment/Plan     Cervical radiculopathy  Neck pain and R>L arm pain to all fingers since July 2019  · Intermittent left hand numbness  · Difficulty with fine motor skills right hand  · H/O C6-7 ACDF 1/2018 Dr Davida Lau for LUE radiculopathy with 50% relief; patient did not continue follow up    Imaging:  · MRI thoracic spine, 10/30/19: Right central disc herniation at T5-T6 with mild right ventral indentation of the thecal sac but no cord deformity or signal abnormality  · MRI cervical spine, 10/30/19: Spondylotic degenerative changes are noted resulting in mild central stenosis at C4-5 and C5-6  No cord compression or cord signal abnormality  The cord is mildly flattened on the right without cord signal abnormality  Plan:  · Xray cervical spine with flexion extension views  · Discontinue Aleve and robaxin  · Ibuprofen 800mg TID scheduled  · Baclofen 20mg TID prn muscle spasms  · Physical therapy   · Return in 4 weeks for appointment with surgeon       Diagnoses and all orders for this visit:    Cervical radiculopathy  -     XR spine cervical complete 4 or 5 vw non injury; Future  -     baclofen 20 mg tablet; Take 1 tablet (20 mg total) by mouth 3 (three) times a day  -     Ambulatory referral to Physical Therapy; Future  -     ibuprofen (MOTRIN) 800 mg tablet; Take 1 tablet (800 mg total) by mouth every 8 (eight) hours    S/P cervical spinal fusion  -     XR spine cervical complete 4 or 5 vw non injury; Future  -     baclofen 20 mg tablet; Take 1 tablet (20 mg total) by mouth 3 (three) times a day  -     Ambulatory referral to Physical Therapy; Future  -     ibuprofen (MOTRIN) 800 mg tablet; Take 1 tablet (800 mg total) by mouth every 8 (eight) hours    Cervical stenosis of spinal canal  -     XR spine cervical complete 4 or 5 vw non injury; Future  -     baclofen 20 mg tablet;  Take 1 tablet (20 mg total) by mouth 3 (three) times a day  -     Ambulatory referral to Physical Therapy; Future  -     ibuprofen (MOTRIN) 800 mg tablet; Take 1 tablet (800 mg total) by mouth every 8 (eight) hours    HNP (herniated nucleus pulposus), thoracic  -     baclofen 20 mg tablet; Take 1 tablet (20 mg total) by mouth 3 (three) times a day  -     Ambulatory referral to Physical Therapy; Future  -     ibuprofen (MOTRIN) 800 mg tablet; Take 1 tablet (800 mg total) by mouth every 8 (eight) hours    Other orders  -     PROAIR  (90 Base) MCG/ACT inhaler            CHIEF COMPLAINT    Chief Complaint   Patient presents with    Consult       HISTORY      This is a 47year old male who is here today for consultation of right-sided chest and upper extremity pain and weakness  He is referred here by his PCP  He states that he began having right-sided chest pain in July of 2019  He was evaluated in the emergency department with a full cardiac workup which was negative  He also underwent a full GI workup which was negative  He had another episode of right-sided chest pain with neck pain and right arm pain to his fingers in September or October after we had lacking  Again, cardiac and GI workup was negative  He has a history of C6 to 7 ACDF in January 2018 by Dr Mary Mazariegos and [facility] for left-sided radiculopathy with 50% improvement of his symptoms  He has not had follow-up since that time  He states that he has a headache every day  His neck pain is worsened arm pain, he has bilateral arm pain but the right side is worse than left side  He rates the 7 8/10  It is keeping him from sleeping  He describes as sharp and stabbing  He also has pain in his right chest that wraps around to his back  Nothing makes it better  Looking up and down makes the symptoms worse  He has tried oral steroids which did not work  He uses Aleve and Tylenol which provided minimal relief  He uses Robaxin which does not help    He has not tried physical therapy recently  He has not seen a pain management physician recently  In addition his intermittent numbness of his left hand in his 4th and 5th fingers  He does describe mild right upper extremity weakness and has been dropping things with his right hand  He states he has difficulty with diapers and buttons with his right hand  He denies any problems with his balance, gait, or urinary incontinence  He denies any falls  He works as a  at [employer] in [employer] and has to look up a lot  He states that his pain is affecting his ability to work well  He had MRI of his cervical and thoracic spine which showed multiple disc protrusions on the right side above the level of his cervical fusion, as well as a disc protrusion on the right side at T 5 in 6  He also has bilateral spinal stenosis in the cervical spine  I would like him to get cervical x-rays with flexion-extension views to look at the level above his fusion to determine if there is any instability  I would also like to change his medication around improvement scheduled ibuprofen to help alleviate the pain  Head like him to try and a course of physical therapy  We will see him back in 1 month, where he will have a consultation with a spine surgeon  REVIEW OF SYSTEMS    Review of Systems   Constitutional: Negative  HENT: Negative  Eyes: Negative  Respiratory: Negative  Cardiovascular: Negative  Gastrointestinal: Negative  Endocrine: Negative  Genitourinary: Negative  Musculoskeletal: Positive for back pain (right thoracic pain into right shoulder and arm) and neck pain  Skin: Negative  Allergic/Immunologic: Negative  Neurological: Positive for weakness (b/l arms), numbness (b/l arms) and headaches (everyday, takes excedrin 60% relief)  Hematological: Negative  Psychiatric/Behavioral: Positive for sleep disturbance (due to pain  Only sleeps about 4hrs a night)     All other systems reviewed and are negative  ROS was personally reviewed and changes made as needed     Meds/Allergies     Current Outpatient Medications   Medication Sig Dispense Refill    benzonatate (TESSALON) 200 MG capsule Take 1 capsule (200 mg total) by mouth 3 (three) times a day as needed for cough 20 capsule 0    Cinnamon 500 MG capsule Take 2 tablets by mouth daily      lidocaine (LIDODERM) 5 % Place 1 patch on the skin every 12 (twelve) hours      lisinopril (ZESTRIL) 20 mg tablet       metFORMIN (GLUCOPHAGE) 500 mg tablet       methocarbamol (ROBAXIN-750) 750 mg tablet Take 750 mg by mouth 4 (four) times a day as needed      sildenafil (VIAGRA) 50 MG tablet       simvastatin (ZOCOR) 20 mg tablet 20 mg        No current facility-administered medications for this visit  No Known Allergies    PAST HISTORY    Past Medical History:   Diagnosis Date    Diabetes mellitus ([address] )     Hyperlipidemia     Hypertension        Past Surgical History:   Procedure Laterality Date    APPENDECTOMY      MENISCECTOMY      MYOTOMY HELLER LAPAROSCOPIC         Social History     Tobacco Use    Smoking status: Never Smoker    Smokeless tobacco: Never Used   Substance Use Topics    Alcohol use: Not Currently    Drug use: Never       Family History   Problem Relation Age of Onset    No Known Problems Mother     No Known Problems Father          Above history personally reviewed  EXAM    Vitals:Blood pressure 140/98, pulse 65, temperature 98 6 °F (37 °C), height 6' 1" (1 854 m), weight 128 kg (282 lb)  ,Body mass index is 37 21 kg/m²  Physical Exam   Constitutional: He is oriented to person, place, and time  He appears well-developed and well-nourished  HENT:   Head: Normocephalic and atraumatic  Eyes: Pupils are equal, round, and reactive to light  EOM are normal    Neck: Normal range of motion  Cardiovascular: Normal rate  Pulmonary/Chest: Effort normal  No respiratory distress  Abdominal: Soft  Musculoskeletal: Normal range of motion  Neurological: He is alert and oriented to person, place, and time  He has normal strength  He has a normal Finger-Nose-Finger Test  Gait normal    Reflex Scores:       Tricep reflexes are 2+ on the right side and 2+ on the left side  Bicep reflexes are 2+ on the right side and 2+ on the left side  Brachioradialis reflexes are 2+ on the right side and 2+ on the left side  Patellar reflexes are 2+ on the right side and 2+ on the left side  Achilles reflexes are 2+ on the right side and 2+ on the left side  Skin: Skin is warm and dry  Psychiatric: He has a normal mood and affect  His speech is normal and behavior is normal  Judgment and thought content normal    Nursing note and vitals reviewed  Neurologic Exam     Mental Status   Oriented to person, place, and time  Recall at 5 minutes: recalls 3 of 3 objects  Follows 2 step commands  Attention: normal  Concentration: normal    Speech: speech is normal   Level of consciousness: alert  Knowledge: good  Able to perform simple calculations  Able to name object  Able to repeat  Normal comprehension  Cranial Nerves   Cranial nerves II through XII intact  CN III, IV, VI   Pupils are equal, round, and reactive to light  Extraocular motions are normal      Motor Exam   Muscle bulk: normal  Overall muscle tone: normal  Right arm pronator drift: absent  Left arm pronator drift: absent    Strength   Strength 5/5 throughout       Sensory Exam   Light touch normal    Pinprick normal    DST and JPS intact bilaterally  Increased sensation right medial forearm      Gait, Coordination, and Reflexes     Gait  Gait: normal    Coordination   Finger to nose coordination: normal    Tremor   Resting tremor: absent    Reflexes   Right brachioradialis: 2+  Left brachioradialis: 2+  Right biceps: 2+  Left biceps: 2+  Right triceps: 2+  Left triceps: 2+  Right patellar: 2+  Left patellar: 2+  Right achilles: 2+  Left achilles: 2+  Right : 2+  Left : 2+  Right Tolbert: absent  Left Tolbert: absent  Right ankle clonus: absent  Left ankle clonus: absent        MEDICAL DECISION MAKING    Imaging Studies:     MRI thoracic spine w/wo, 10/30/19:  Impression:  Right central disc herniation at T5-T6 with mild right ventral indentation of the thecal sac but no cord deformity or signal abnormality  Postoperative changes of anterior cervical discectomy and fusion at C6-C7  Mild distention of the mid and distal thoracic esophagus with retained fluid, correlate for reflux and/or dysphagia  MRI cervical spine w/wo, 10/30/19:   Impression:  Spondylotic degenerative changes are noted resulting in mild central stenosis at C4-5 and C5-6  No cord compression or cord signal abnormality  The cord is mildly flattened on the right without cord signal abnormality  Mild degenerative changes elsewhere as described  I have personally reviewed pertinent reports     and I have personally reviewed pertinent films in PACS

## 2020-01-03 NOTE — PATIENT INSTRUCTIONS
Obtain cervical xrays    Stop aleve and robaxin    Ibuprofen 800mg three times daily around the clock  Baclofen 20mg up to three times daily as needed for muscle spasm    Physical therapy    Return for appointment with surgeon

## 2020-01-03 NOTE — ASSESSMENT & PLAN NOTE
Neck pain and R>L arm pain to all fingers since July 2019  · Intermittent left hand numbness  · Difficulty with fine motor skills right hand  · H/O C6-7 ACDF 1/2018 Dr Jose Casanova for LUE radiculopathy with 50% relief; patient did not continue follow up    Imaging:  · MRI thoracic spine, 10/30/19: Right central disc herniation at T5-T6 with mild right ventral indentation of the thecal sac but no cord deformity or signal abnormality  · MRI cervical spine, 10/30/19: Spondylotic degenerative changes are noted resulting in mild central stenosis at C4-5 and C5-6  No cord compression or cord signal abnormality  The cord is mildly flattened on the right without cord signal abnormality      Plan:  · Xray cervical spine with flexion extension views  · Discontinue Aleve and robaxin  · Ibuprofen 800mg TID scheduled  · Baclofen 20mg TID prn muscle spasms  · Physical therapy   · Return in 4 weeks for appointment with surgeon

## 2020-01-17 ENCOUNTER — APPOINTMENT (OUTPATIENT)
Dept: RADIOLOGY | Age: 50
End: 2020-01-17
Payer: COMMERCIAL

## 2020-01-17 DIAGNOSIS — M54.12 CERVICAL RADICULOPATHY: ICD-10-CM

## 2020-01-17 DIAGNOSIS — Z98.1 S/P CERVICAL SPINAL FUSION: ICD-10-CM

## 2020-01-17 DIAGNOSIS — M48.02 CERVICAL STENOSIS OF SPINAL CANAL: ICD-10-CM

## 2020-01-17 PROCEDURE — 72050 X-RAY EXAM NECK SPINE 4/5VWS: CPT

## 2020-01-23 ENCOUNTER — EVALUATION (OUTPATIENT)
Dept: PHYSICAL THERAPY | Age: 50
End: 2020-01-23
Payer: COMMERCIAL

## 2020-01-23 DIAGNOSIS — M54.12 CERVICAL RADICULOPATHY: Primary | ICD-10-CM

## 2020-01-23 PROCEDURE — 97110 THERAPEUTIC EXERCISES: CPT | Performed by: PHYSICAL THERAPIST

## 2020-01-23 PROCEDURE — 97161 PT EVAL LOW COMPLEX 20 MIN: CPT | Performed by: PHYSICAL THERAPIST

## 2020-01-24 NOTE — PROGRESS NOTES
PT Evaluation     Today's date: 2020  Patient name: Archana Sanford  : 1970  MRN: [id number]  Referring provider: Mulugeta Perez PA-C  Dx:   Encounter Diagnosis     ICD-10-CM    1  Cervical radiculopathy M54 12                   Assessment  Assessment details: Patient presents with cervical radic - decreased AROM, PROM, postural dysfunction, and pain  Patient is a good candidate for PT intervention  Impairments: abnormal or restricted ROM, impaired physical strength and pain with function  Understanding of Dx/Px/POC: good   Prognosis: good    Goals  Short Term goals - 4 weeks  1  Patient will be independent HEP  2   Patient will report a 50% decrease in pain complaints  3   Increase strength 1/2 grade  4   Increase ROM 5-10 degrees  Long Term goals - 8 weeks  1  Patient will report elimination of pain complaints  2   Patient will return to all work related activities without restriction  3   Patient will return to all recreational activities without restriction  4   ROM WFL  5   Strength 5/5  Plan  Planned therapy interventions: joint mobilization, manual therapy, strengthening and stretching  Frequency: 2x week  Duration in weeks: 6        Subjective Evaluation    History of Present Illness  Mechanism of injury: Patient reports a insidious onset of anterior chest, post shld, and R UE pain  Sx's intermittent  Sx's resolved and then re-started in Sept   In Dec - patient had cervical and thoracic MRI performed  Patient with history of C5-6 fusion at [facility]  Patient is employed and performs sitting and standing duties with some lifting involved      Quality of life: good    Pain  Current pain rating: 3  At best pain ratin  At worst pain ratin  Quality: sharp, radiating and dull ache  Progression: worsening    Patient Goals  Patient goals for therapy: decreased pain, increased strength, independence with ADLs/IADLs and increased motion          Objective     Active Range of Motion   Cervical/Thoracic Spine       Cervical    Flexion: 20 degrees  Restriction level: moderate  Extension: 10 degrees     Restriction level: maximal  Left lateral flexion: 10 degrees     with pain Restriction level: moderate  Right lateral flexion: 10 degrees     with pain Restriction level maximal  Left rotation: 45 degrees with pain Restriction level: moderate  Right rotation: 45 degrees    with pain Restriction level: moderate    Strength/Myotome Testing     Left Shoulder     Planes of Motion   Flexion: 5   Abduction: 5   External rotation at 0°: 5   Internal rotation at 0°: 5     Right Shoulder     Planes of Motion   Flexion: 5   Abduction: 5   External rotation at 0°: 5   Internal rotation at 0°: 5     Left Elbow   Flexion: 5  Extension: 5    Right Elbow   Flexion: 5  Extension: 5    Tests   Cervical   Negative repeated extension, repeated flexion, vertical compression, lumbar distraction, alar ligament test and Sharp-Jake test      Left   Negative Spurling's Test A and Spurling's Test B  Right   Negative Spurling's Test A and Spurling's Test B  Left Shoulder   Negative ULTT1, ULTT2, ULTT3 and ULTT4  Right Shoulder   Negative ULTT1, ULTT2, ULTT3 and ULTT4  Lumbar   Negative vertical compression                Precautions: None      Manual                           Nerve glides             CROM             Mechanical traction                              Exercise Diary                                        Cervical retraction             Prone T             Cervical isometrics                                                                                                                                                                                                                    Modalities

## 2020-01-27 ENCOUNTER — OFFICE VISIT (OUTPATIENT)
Dept: PHYSICAL THERAPY | Age: 50
End: 2020-01-27
Payer: COMMERCIAL

## 2020-01-27 DIAGNOSIS — M54.12 CERVICAL RADICULOPATHY: Primary | ICD-10-CM

## 2020-01-27 PROCEDURE — 97110 THERAPEUTIC EXERCISES: CPT | Performed by: PHYSICAL THERAPIST

## 2020-01-27 PROCEDURE — 97140 MANUAL THERAPY 1/> REGIONS: CPT | Performed by: PHYSICAL THERAPIST

## 2020-01-27 PROCEDURE — 97012 MECHANICAL TRACTION THERAPY: CPT | Performed by: PHYSICAL THERAPIST

## 2020-01-27 NOTE — PROGRESS NOTES
Daily Note     Today's date: 2020  Patient name: Janell Ware  : 1970  MRN: [id number]  Referring provider: Kerline Treadwell PA-C  Dx:   Encounter Diagnosis     ICD-10-CM    1  Cervical radiculopathy M54 12                   Subjective: Pt reports muscle soreness in area of R trap       Objective: See treatment diary below      Assessment: Tolerated treatment well  Patient demonstrated fatigue post treatment, would benefit from continued PT and pt had less pain following manual therapy  Pt had decreased muscular pain in UT following session  Plan: Continue per plan of care  Progress treatment as tolerated         Precautions: None      Manual                           Nerve glides LARKIN            CROM LARKIN            Mechanical traction np            SLJ muscle energy LARKIN            UT stretch LARKIN                Exercise Diary              Arm bike 5'                         Cervical retraction np            Prone T np            Cervical isometrics np                                                                                                                                                                                                                   Modalities              Traction 10'

## 2020-01-29 ENCOUNTER — APPOINTMENT (OUTPATIENT)
Dept: PHYSICAL THERAPY | Age: 50
End: 2020-01-29
Payer: COMMERCIAL

## 2020-01-31 ENCOUNTER — OFFICE VISIT (OUTPATIENT)
Dept: NEUROSURGERY | Facility: CLINIC | Age: 50
End: 2020-01-31
Payer: COMMERCIAL

## 2020-01-31 ENCOUNTER — AMB VIDEO VISIT (OUTPATIENT)
Dept: OTHER | Facility: HOSPITAL | Age: 50
End: 2020-01-31

## 2020-01-31 VITALS
DIASTOLIC BLOOD PRESSURE: 78 MMHG | BODY MASS INDEX: 36.87 KG/M2 | WEIGHT: 278.2 LBS | RESPIRATION RATE: 16 BRPM | TEMPERATURE: 98.1 F | HEIGHT: 73 IN | HEART RATE: 62 BPM | SYSTOLIC BLOOD PRESSURE: 124 MMHG

## 2020-01-31 DIAGNOSIS — M54.12 CERVICAL RADICULOPATHY: Primary | ICD-10-CM

## 2020-01-31 DIAGNOSIS — Z98.1 S/P CERVICAL SPINAL FUSION: ICD-10-CM

## 2020-01-31 DIAGNOSIS — M48.02 CERVICAL STENOSIS OF SPINAL CANAL: ICD-10-CM

## 2020-01-31 PROCEDURE — 99213 OFFICE O/P EST LOW 20 MIN: CPT | Performed by: PHYSICIAN ASSISTANT

## 2020-01-31 RX ORDER — ONDANSETRON HYDROCHLORIDE 24 MG/1
24 TABLET, FILM COATED ORAL EVERY 6 HOURS PRN
COMMUNITY
End: 2020-02-11 | Stop reason: ALTCHOICE

## 2020-01-31 NOTE — PROGRESS NOTES
Assessment/Plan:    Cervical radiculopathy  Neck pain and R>L arm pain to all fingers since July 2019  · Intermittent left hand numbness  · Difficulty with fine motor skills right hand  · H/O C6-7 ACDF 1/2018 Dr Laura Alejandro for LUE radiculopathy with 50% relief; patient did not continue follow up  · No improvement in symptoms with PT and medication change    Imaging:  · MRI thoracic spine, 10/30/19: Right central disc herniation at T5-T6 with mild right ventral indentation of the thecal sac but no cord deformity or signal abnormality  · MRI cervical spine, 10/30/19: Spondylotic degenerative changes are noted resulting in mild central stenosis at C4-5 and C5-6  No cord compression or cord signal abnormality  The cord is mildly flattened on the right without cord signal abnormality  · Xray cervical spine with flex/ex: 1/17/20: No instability above or below level of fusion with flexion and extension  Intact fusion C6-7  Plan:  · Ibuprofen 800mg TID scheduled - continue  · Baclofen 20mg TID prn muscle spasms  · Physical therapy - continue  · Referral to pain management for consideration of cervical ROSAURA  · Follow up with Dr John Lozano to discuss possible surgical intervention       Diagnoses and all orders for this visit:    Cervical radiculopathy  -     Ambulatory referral to Pain Management; Future    Cervical stenosis of spinal canal  -     Ambulatory referral to Pain Management; Future    S/P cervical spinal fusion  -     Ambulatory referral to Pain Management; Future    Other orders  -     ondansetron (ZOFRAN) 24 MG tablet; Take 24 mg by mouth every 6 (six) hours as needed for nausea or vomiting  -     Loperamide HCl (IMODIUM PO); Take by mouth every 6 (six) hours as needed          Subjective:      Patient ID: Nichelle Snyder is a 47 y o  male who is here today for a 1 month follow up of neck pain with RUE pain and weakness   He states his symptoms have not changed, but worsened slightly from starting physical therapy  He is taken ibuprofen and baclofen which take the edge off the pain  He continues to get right sided chest wall pain with cervical flexion that takes his breath away  His pain is worse at night  He has tried 4 different pillows with no relief  He is s/p ACDF C6-7 by Dr Beverly Meza at [facility] in 2018 with 50% relief of symptoms  His current symptoms are similar in intensity to pre op  Looking up and down make the pain worse  He drops things with his right hand and complains of difficulty with buttons and zippers  He has no difficulty with a knife and a fork, or with fall/balance/coordination  He denies UI/BI  The following portions of the patient's history were reviewed and updated as appropriate: allergies, current medications, past family history, past medical history, past social history, past surgical history and problem list     Review of Systems   Constitutional: Negative  HENT: Negative  Eyes: Negative  Respiratory: Negative  Cardiovascular: Negative  Gastrointestinal:        GI issue right now    Endocrine: Negative  Genitourinary: Negative  Musculoskeletal: Positive for neck pain  Skin: Negative  Allergic/Immunologic: Negative  Neurological: Positive for headaches  Hematological: Negative  Psychiatric/Behavioral: Negative  All other systems reviewed and are negative  ROS was personally reviewed and changes made as needed     Objective:      /78 (BP Location: Left arm, Patient Position: Sitting, Cuff Size: Large)   Pulse 62   Temp 98 1 °F (36 7 °C) (Tympanic)   Resp 16   Ht 6' 1" (1 854 m)   Wt 126 kg (278 lb 3 2 oz)   BMI 36 70 kg/m²          Physical Exam   Constitutional: He is oriented to person, place, and time  He appears well-developed and well-nourished  HENT:   Head: Normocephalic and atraumatic  Eyes: Pupils are equal, round, and reactive to light  EOM are normal    Neck: Normal range of motion  Cardiovascular: Normal rate  Pulmonary/Chest: Effort normal  No respiratory distress  Abdominal: Soft  Musculoskeletal: Normal range of motion  Neurological: He is alert and oriented to person, place, and time  Formal exam deferred 2/2 patient illness/request   Skin: Skin is warm and dry  Psychiatric: He has a normal mood and affect  His behavior is normal  Judgment and thought content normal    Nursing note and vitals reviewed

## 2020-01-31 NOTE — CARE ANYWHERE EVISITS
Visit Summary for [facility]   Cyndee Hua - Gender: Male - Date of Birth: [id number]  Date: [id number] - Duration: 8 minutes  Patient: [facility]   Cyndee Hua  Provider: Floresita Mojica    Patient Contact Information  Address  [address] [place] [address]; [place] [address]      Visit Topics    Sick Slip  Reason [ILLNESS]  Remarks [SEAN IZAGUIRRE HAD A MEDICAL  EVALUATION ON 1-30-20  HE  WAS  ADVISED OFF  WORK  MEDICALLY  FOR  1-30  AND  1-31-20     Joseph Zapata MD  NPI  [id number]   [employer]  PHONE CONTACT  [phone]     [facility]  ANYWHERE    [address]  Conversation Transcripts  [0A][0A] [Notification] Charlee Rhodes [address] [facility], will help you prepare for your visit  She is assisting [facility] [0A][Milvia Jiménez] [place], and thank you for connecting  While you are waiting for the doctor, are there any questions I   can answer for you about our service? Please contact customer service if you have questions about billing, insurance, or technical issues  Visits work best with a stable WiFi connection, so please make sure you are connected before we   begin [0A][Notification] Ariel Giang has left the room  [0A][Notification] You are connected with [facility] [0A][Notification] Tino Colin is located in [place]  [0A][Notification] Tino Colin has shared health   history  Yangevert Gideon  [0A]    Diagnosis  Infectious gastroenteritis and colitis, unspecified    Procedures  Value: 76209 Code: CPT-4 UNLISTED E&M SERVICE    Medications Prescribed    Zofran (as hydrochloride)      Frequency :   Patient Instructions : 2  tab   at  once  then one  tablet in  4  hr  then  1  tab  every 6  hrs  for  nausea/ vomiting  /   abdominal  cramps  Refills : 1  Instructions to the Pharmacist : Substitutions allowed      Provider Notes  [0A][0A] Mode of Communication:  mobile[0A]History: The patient has had a nausea and vomiting for1 day  The emesis is described as no, Stools are described as   Recent changes include     Current nausea scale (1-10): Carlyon Burows The patient has been able to   eat/drink   [0A][0A]The patient is also experiencing diarrheea  watery  normal  color  which has been present for   days  [0A][0A]History of headache or trauma: no  [0A][0A]Additional pertinent positives: charli  had  gi  viral  this  week  admitted    hospital  as  dehydration [0A][0A]Pertinent negatives: no  fever  has  mid  epigastric  pain  gurgle  fluid  motion  type  squelch  [0A]Past medical history:  spine  surgery  and   appendix[0A][0A]Medications:  ibuprofen  baclofen  for  neck    [0A][0A]Allergies:  n[0A]Exam: [0A][0A]Vitals signs (if available): no  fever [0A][0A]Weight:  average+[0A][0A]General: mod  disrtress  with    mid  abdominal  pain   squeezing  gas   crampy[0A][0A]Eyes:  no  jaundice[0A][0A]Nose/sinuses:   [0A][0A]Neck:suppple[0A][0A]Respiratory:  clear [0A][0A]Other:  hold   off on ibuprofen  and  use  extrs  strength  tyelenools  this  weekend [0A]Assessment:  Gastroenteritis, [0A][0A]Diagnosis code:   A08 39 Other viral enteritis    [0A]    [0A]Plan:    [0A]    Medications:  [0A]    zofran    imodium  fluids   [0A]    Home care:  [0A]    Increase fluids, frequent small sips of a liquid like Gatorade [0A]    Advance diet as tolerated to bland solids, then normal diet [0A]    Referral or follow up:  [0A]      In person follow up as needed for any worsening or if no improvement in 2-3 days [0A]    Medical decision making: prescription med level   zofran [0A]Additional recommendations: [0A]    If you received a prescription at this visit and you have a   question or problem please call [phone] for prescription assistance [0A]    Please print a copy of this note and send it to your regular doctor, or take it to your next visit so it may be included in your medical record [0A]    Please see your   primary care provider on an annual basis or more frequently if directed [0A]The patient voiced understanding and agreement with plan [0A]    Electronically signed by: Dannie Hendrickson(NPI [id number])

## 2020-01-31 NOTE — PATIENT INSTRUCTIONS
Follow up with pain management for cervical injections    Continue current medication regimen    Follow up if injections don't work with Dr Ina Scott

## 2020-01-31 NOTE — ASSESSMENT & PLAN NOTE
Neck pain and R>L arm pain to all fingers since July 2019  · Intermittent left hand numbness  · Difficulty with fine motor skills right hand  · H/O C6-7 ACDF 1/2018 Dr Jazzy Sung for LUE radiculopathy with 50% relief; patient did not continue follow up  · No improvement in symptoms with PT and medication change    Imaging:  · MRI thoracic spine, 10/30/19: Right central disc herniation at T5-T6 with mild right ventral indentation of the thecal sac but no cord deformity or signal abnormality  · MRI cervical spine, 10/30/19: Spondylotic degenerative changes are noted resulting in mild central stenosis at C4-5 and C5-6  No cord compression or cord signal abnormality  The cord is mildly flattened on the right without cord signal abnormality  · Xray cervical spine with flex/ex: 1/17/20: No instability above or below level of fusion with flexion and extension  Intact fusion C6-7      Plan:  · Ibuprofen 800mg TID scheduled - continue  · Baclofen 20mg TID prn muscle spasms  · Physical therapy - continue  · Referral to pain management for consideration of cervical ROSAURA  · Follow up with Dr Howard Ruvalcaba to discuss possible surgical intervention

## 2020-02-05 ENCOUNTER — OFFICE VISIT (OUTPATIENT)
Dept: PHYSICAL THERAPY | Age: 50
End: 2020-02-05
Payer: COMMERCIAL

## 2020-02-05 DIAGNOSIS — M54.12 CERVICAL RADICULOPATHY: Primary | ICD-10-CM

## 2020-02-05 PROCEDURE — 97110 THERAPEUTIC EXERCISES: CPT | Performed by: PHYSICAL THERAPIST

## 2020-02-05 PROCEDURE — 97012 MECHANICAL TRACTION THERAPY: CPT | Performed by: PHYSICAL THERAPIST

## 2020-02-06 ENCOUNTER — OFFICE VISIT (OUTPATIENT)
Dept: PHYSICAL THERAPY | Age: 50
End: 2020-02-06
Payer: COMMERCIAL

## 2020-02-06 DIAGNOSIS — M54.12 CERVICAL RADICULOPATHY: Primary | ICD-10-CM

## 2020-02-06 PROCEDURE — 97110 THERAPEUTIC EXERCISES: CPT | Performed by: PHYSICAL THERAPIST

## 2020-02-06 PROCEDURE — 97140 MANUAL THERAPY 1/> REGIONS: CPT | Performed by: PHYSICAL THERAPIST

## 2020-02-06 NOTE — PROGRESS NOTES
Daily Note     Today's date: 2020  Patient name: Enrique Sun  : 1970  MRN: [id number]  Referring provider: Posey Sacks, PA-C  Dx:   Encounter Diagnosis     ICD-10-CM    1  Cervical radiculopathy M54 12                   Subjective: Increase in sx's after last visit      Objective: See treatment diary below      Assessment: Tolerated treatment well  Patient would benefit from continued PT  Trial of traction and AROM to see if traction was aggravating factor      Plan: Continue per plan of care        Precautions: None      Manual   2                        Nerve glides LARKIN nt           CROM LARKIN nt           Mechanical traction np 19#/5# - 15 minutes intermittent           SLJ muscle energy LARKIN nt           UT stretch LARKIN nt               Exercise Diary              Arm bike 5' 8 minutes                        Cervical retraction np nt           Prone T np 3x10           Cervical isometrics np At home                                                                                                                                                                                                                  Modalities              Traction 10'

## 2020-02-07 NOTE — PROGRESS NOTES
Daily Note     Today's date: 2020  Patient name: Juan Jose Leon  : 1970  MRN: [id number]  Referring provider: Autumn Albarran PA-C  Dx:   Encounter Diagnosis     ICD-10-CM    1  Cervical radiculopathy M54 12                   Subjective: Increased pain after traction for the next day or two      Objective: See treatment diary below      Assessment: Tolerated treatment poor  Patient would benefit from continued PT  Held traction  Just postural and posterior strengthening exercises today  Patient with sharper c/o pain with gentle cervical PROM  Cervical retraction relieves sx's      Plan: Continue per plan of care  Re-start nerve glides and stay with postural and strengthening exercises       Precautions: None      Manual   2                       Nerve glides LARKIN nt  *         CROM LARKIN nt x10 minutes          Mechanical traction np 19#/5# - 15 minutes intermittent nt          SLJ muscle energy LARKIN nt  nt                           Exercise Diary              Arm bike 5' 8 minutes 8 minutes                       Cervical retraction np nt 2x10          Prone T np 3x10 3x10 with elbows bent          Cervical isometrics np At home At home                                                                                                                                                                                                                 Modalities              Traction 10'

## 2020-02-10 ENCOUNTER — OFFICE VISIT (OUTPATIENT)
Dept: PHYSICAL THERAPY | Age: 50
End: 2020-02-10
Payer: COMMERCIAL

## 2020-02-10 DIAGNOSIS — M54.12 CERVICAL RADICULOPATHY: Primary | ICD-10-CM

## 2020-02-10 PROCEDURE — 97112 NEUROMUSCULAR REEDUCATION: CPT

## 2020-02-10 PROCEDURE — 97110 THERAPEUTIC EXERCISES: CPT

## 2020-02-10 PROCEDURE — 97140 MANUAL THERAPY 1/> REGIONS: CPT

## 2020-02-10 NOTE — PROGRESS NOTES
Daily Note     Today's date: 2/10/2020  Patient name: Lowell Denise  : 1970  MRN: [id number]  Referring provider: Favio Castañeda PA-C  Dx:   Encounter Diagnosis     ICD-10-CM    1  Cervical radiculopathy M54 12                   Subjective: Pt states his pain is really bad today  Pt states he has trouble moving his head d/t pain  Pt state she had symptoms down  in to both arms on Saturday  Objective: See treatment diary below      Assessment: Restarted nerve glides today  Pt was very tender and did not allow for much movment with manual therapy  Pt tolerated cervical retraction with extension poor as he had pain with coming back to neutral  Pt ended with heat today to help manage pain  Pt had increased ROM as he turned his head after session today  Pt would continue to benefit from PT  Plan: Continue per plan of care        Precautions: None      Manual  1/27 2/5 2/7 2/10                      Nerve glides LARKIN nt  CF         CROM LARKIN nt x10 minutes 10 mins          Mechanical traction np 19#/5# - 15 minutes intermittent nt np         SLJ muscle energy LARKIN nt  nt np         Cervical retraction with extension     X 2              Exercise Diary  1/27   2/10         Arm bike 5' 8 minutes 8 minutes 8 mins                      Cervical retraction np nt 2x10 supine 2 x10          Prone T np 3x10 3x10 with elbows bent nt         Cervical isometrics np At home At home nt                                                                                                                                                                                                                Modalities  1/27 2/10           Traction 10'            Moist heat   10 mins

## 2020-02-11 ENCOUNTER — CONSULT (OUTPATIENT)
Dept: PAIN MEDICINE | Facility: MEDICAL CENTER | Age: 50
End: 2020-02-11
Payer: COMMERCIAL

## 2020-02-11 VITALS
HEART RATE: 59 BPM | DIASTOLIC BLOOD PRESSURE: 98 MMHG | HEIGHT: 73 IN | BODY MASS INDEX: 36.98 KG/M2 | SYSTOLIC BLOOD PRESSURE: 164 MMHG | RESPIRATION RATE: 14 BRPM | WEIGHT: 279 LBS

## 2020-02-11 DIAGNOSIS — M47.812 CERVICAL SPONDYLOSIS: Primary | ICD-10-CM

## 2020-02-11 DIAGNOSIS — Z98.1 S/P CERVICAL SPINAL FUSION: ICD-10-CM

## 2020-02-11 DIAGNOSIS — M54.12 CERVICAL RADICULOPATHY: ICD-10-CM

## 2020-02-11 DIAGNOSIS — M48.02 CERVICAL STENOSIS OF SPINAL CANAL: ICD-10-CM

## 2020-02-11 PROCEDURE — 99244 OFF/OP CNSLTJ NEW/EST MOD 40: CPT | Performed by: PHYSICAL MEDICINE & REHABILITATION

## 2020-02-11 RX ORDER — AMOXICILLIN 500 MG/1
CAPSULE ORAL
COMMUNITY
Start: 2020-02-03 | End: 2020-03-09 | Stop reason: ALTCHOICE

## 2020-02-11 RX ORDER — GABAPENTIN 300 MG/1
300 CAPSULE ORAL 3 TIMES DAILY
Qty: 90 CAPSULE | Refills: 1 | Status: SHIPPED | OUTPATIENT
Start: 2020-02-11 | End: 2020-11-16 | Stop reason: SDUPTHER

## 2020-02-11 NOTE — PROGRESS NOTES
Assessment  1  Cervical spondylosis    2  Cervical radiculopathy    3  Cervical stenosis of spinal canal    4  S/P cervical spinal fusion        Plan  1  Schedule for cervical epidural steroid injection  2  Initiate gabapentin  3  Discontinue NSAIDs given history of esophageal ulcers  4  May continue baclofen  5  We will schedule the patient for right C4-6 medial branch nerve blocks with intention of moving forward towards radiofrequency ablation if there is an appropriate diagnostic response  The initial blocks will be performed with 2% lidocaine and if an appropriate response is obtained upon review of the patient's pain diary, a confirmatory block will be scheduled with 0 5% bupivacaine  In the office today, we reviewed the nature of facet joint pathology in depth using a spine model  We discussed the approach we would use for the injections and provided literature for home review  The patient understands the risks associated with the procedure including bleeding, infection, tissue injury, and allergic reaction and provided verbal informed consent in the office today  6  We will offer similar treatment on the left side if warranted  7  Follow-up with Dr Adonis Carter for surgical options if failing conservative measures       My impressions and treatment recommendations were discussed in detail with the patient who verbalized understanding and had no further questions  Discharge instructions were provided  I personally saw and examined the patient and I agree with the above discussed plan of care  Orders Placed This Encounter   Procedures    FL spine and pain procedure     Standing Status:   Future     Standing Expiration Date:   2/11/2021     Order Specific Question:   Reason for Exam:     Answer:   JERRY     Order Specific Question:   Anticoagulant hold needed?      Answer:   no    FL spine and pain procedure     Standing Status:   Future     Standing Expiration Date:   2/11/2021     Order Specific Question: Reason for Exam:     Answer:   (R) C4-6 MBB#1     Order Specific Question:   Anticoagulant hold needed? Answer:   no     New Medications Ordered This Visit   Medications    amoxicillin (AMOXIL) 500 mg capsule     Sig: TK ONE C PO  TID FOR 7 DAYS    gabapentin (NEURONTIN) 300 mg capsule     Sig: Take 1 capsule (300 mg total) by mouth 3 (three) times a day     Dispense:  90 capsule     Refill:  1       History of Present Illness    Meagan Salguero is a 47 y o  male [facility] the [facility] and radiating arm pain complaints  The patient does have prior history of C6-7 ACDF with Dr Mauricio Nyhan from [place] [address]  He has been experiencing worsening of symptoms over the past 6 months without any inciting event or trauma  Currently rates his pain as a 4/10 which is nearly constant and worse at nighttime  He describes pain that is shooting, dull, aching in nature  He does have radicular pain into the arms but also complaints significantly of axial neck pain especially with cervical range of motion  He is also noticing some weakness in his hands and dropping objects occasionally  Aggravating factors include lying down  He also notes that cervical flexion extension and rotation exacerbate his pain  He is unable to determine any significant alleviating factors  Diagnostic studies include MRI of the cervical spine  Please see report for full details  I did review the images today  He has tried physical therapy and cervical traction without relief  Currently using ibuprofen and Baclofen with some relief  Not currently taking any neuropathic pain medications  I have personally reviewed and/or updated the patient's past medical history, past surgical history, family history, social history, current medications, allergies, and vital signs today  Review of Systems   Constitutional: Negative for fever and unexpected weight change     HENT: Negative for trouble swallowing  Eyes: Negative for visual disturbance  Respiratory: Negative for shortness of breath and wheezing  Cardiovascular: Negative for chest pain and palpitations  Gastrointestinal: Negative for constipation, diarrhea, nausea and vomiting  Endocrine: Negative for cold intolerance, heat intolerance and polydipsia  Genitourinary: Negative for difficulty urinating and frequency  Musculoskeletal: Positive for myalgias, neck pain (B/L posterior radiating to the shoulders and upper arms) and neck stiffness  Negative for arthralgias, gait problem and joint swelling  Skin: Negative for rash  Neurological: Negative for dizziness, seizures, syncope, weakness and headaches  Hematological: Does not bruise/bleed easily  Psychiatric/Behavioral: Negative for dysphoric mood  All other systems reviewed and are negative        Patient Active Problem List   Diagnosis    S/P cervical spinal fusion    Cervical radiculopathy    Cervical stenosis of spinal canal    HNP (herniated nucleus pulposus), thoracic       Past Medical History:   Diagnosis Date    Achalasia     [facility]neurys[facility] [address] [facility]     Diabetes mellitus ([facility] [address] [facility]     Esophageal ulcer     Hyperlipidemia     Hypertension        Past Surgical History:   Procedure Laterality Date    ANTERIOR CERVICAL DISCECTOMY W/ FUSION      APPENDECTOMY      BICEPS TENDON REPAIR      MENISCECTOMY      MYOTOMY HELLER LAPAROSCOPIC      ORTHOPEDIC SURGERY         Family History   Problem Relation Age of Onset    No Known Problems Mother     No Known Problems Father        Social History     Occupational History    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   Tobacco Use    Smoking status: Never Smoker    Smokeless tobacco: Never Used   Substance and Sexual Activity    Alcohol use: Not Currently    Drug use: Never    Sexual activity: Yes     Partners: Female       Current Outpatient Medications on File Prior to Visit   Medication Sig    amoxicillin (AMOXIL) 500 mg capsule TK ONE C PO  TID FOR 7 DAYS    baclofen 20 mg tablet Take 1 tablet (20 mg total) by mouth 3 (three) times a day    benzonatate (TESSALON) 200 MG capsule Take 1 capsule (200 mg total) by mouth 3 (three) times a day as needed for cough (Patient taking differently: Take 200 mg by mouth as needed for cough )    Cinnamon 500 MG capsule Take 2 tablets by mouth daily    ibuprofen (MOTRIN) 800 mg tablet Take 1 tablet (800 mg total) by mouth every 8 (eight) hours    lisinopril (ZESTRIL) 20 mg tablet     metFORMIN (GLUCOPHAGE) 500 mg tablet     PROAIR  (90 Base) MCG/ACT inhaler     sildenafil (VIAGRA) 50 MG tablet     simvastatin (ZOCOR) 20 mg tablet 20 mg     [DISCONTINUED] Loperamide HCl (IMODIUM PO) Take by mouth every 6 (six) hours as needed    [DISCONTINUED] ondansetron (ZOFRAN) 24 MG tablet Take 24 mg by mouth every 6 (six) hours as needed for nausea or vomiting     No current facility-administered medications on file prior to visit  No Known Allergies    Physical Exam    /98   Pulse 59   Resp 14   Ht 6' 1" (1 854 m)   Wt 127 kg (279 lb)   BMI 36 81 kg/m²     CERVICAL  General: Well-developed, well-nourished individual in no acute distress  Mental: Appropriate mood and affect  Grossly oriented with coherent speech and thought processing   Neuro:  Cranial nerves: Cranial nerve function is grossly intact bilaterally   Strength: Bilateral upper extremity strength is normal and symmetric   No atrophy or tone abnormalities noted   Reflexes: Bilateral upper extremity muscle stretch reflexes are physiologic and symmetric   No Tolbert sign   Sensation: No loss of sensation is noted except for some alteration and pinprick sensation along the right 5th digit      Gait:  Gait/gross motor: Gait is normal  Station is normal      Musculoskeletal:  Palpation: Inspection and palpation of the spine and extremities are unremarkable except for tenderness to palpation along the cervical facet joints reproducing his pain complaint  Spine:  Significantly limited flexion and extension secondary to pain, he also has limited side-bending bilaterally, rotation is relatively normal      No gross axial skeletal deformities   Skin: Skin inspection grossly negative for erythema, breakdown, or concerning lesions in affected area   Lymph: No lymphadenopathy is appreciated in the involved extremity   Vessels: No lower extremity edema   Lungs: Breathing is comfortable and regular  No dyspnea noted during examination   Eyes: Visual field grossly intact to confrontation  No redness appreciated  ENT: No craniofacial deformities or asymmetry  No neck masses appreciated  Imaging  Study Result     MRI CERVICAL SPINE WITH AND WITHOUT CONTRAST     INDICATION: Arm and chest pain        COMPARISON:  None      TECHNIQUE:  Sagittal T1, sagittal T2, sagittal inversion recovery, axial 2D merge and axial T2  Sagittal T1 and axial T1 postcontrast     IV Contrast:  12 mL of gadobutrol injection (MULTI-DOSE)      IMAGE QUALITY:  Diagnostic      FINDINGS:     ALIGNMENT:  Straightening of the normal cervical lordosis  Status post ACDF C6-7      MARROW SIGNAL:  Normal marrow signal is identified within the visualized bony structures  No discrete marrow lesion      CERVICAL AND VISUALIZED UPPER THORACIC CORD:  Normal signal within the visualized cord      PREVERTEBRAL AND PARASPINAL SOFT TISSUES:  Normal      VISUALIZED POSTERIOR FOSSA:  The visualized posterior fossa demonstrates no abnormal signal      CERVICAL DISC SPACES:     C2-C3:  Normal      C3-C4:  Small left paramedian protrusion type disc herniation  No significant central or foraminal narrowing      C4-C5:  Degenerative disc osteophyte complex with marginal osteophytes results in moderate bilateral foraminal narrowing left greater than right and mild central stenosis    Bony bar ridge contacts the ventral cord on the right slightly flattened      C5-C6:  Right paramedian protrusion type disc herniation contacts ventral cord  Mild central stenosis  Foramina patent      C6-C7:  This level has been fused  No significant central or foraminal narrowing      C7-T1:  No central or foraminal narrowing      UPPER THORACIC DISC SPACES:  Normal      POSTCONTRAST IMAGING:  Normal      IMPRESSION:     Spondylotic degenerative changes are noted resulting in mild central stenosis at C4-5 and C5-6  No cord compression or cord signal abnormality  The cord is mildly flattened on the right without cord signal abnormality      Mild degenerative changes elsewhere as described         Workstation performed: [id number]        Study Result     CERVICAL SPINE     INDICATION:   Z98 1: Arthrodesis status  M54 12: Radiculopathy, cervical region  M48 02: Spinal stenosis, cervical region    Bilateral radiating arm pain     COMPARISON:  Cervical spine MR from 10/13/2019      VIEWS:  XR SPINE CERVICAL COMPLETE 4 OR 5 VW NON INJURY         FINDINGS:     No evidence of fracture or subluxation      Mild retrolisthesis of C4 on C5      No evidence of dynamic instability seen on flexion or extension      Intact ACDF C6-C7      Moderate degenerative disc space narrowing at C4-C5 and C5-C6      The prevertebral soft tissues are within normal limits      IMPRESSION:     No plain film evidence of instability      Degenerative changes as above      Intact ACDF C6-C7         Workstation performed: [id number]

## 2020-02-12 ENCOUNTER — APPOINTMENT (OUTPATIENT)
Dept: PHYSICAL THERAPY | Age: 50
End: 2020-02-12
Payer: COMMERCIAL

## 2020-02-17 ENCOUNTER — APPOINTMENT (OUTPATIENT)
Dept: PHYSICAL THERAPY | Age: 50
End: 2020-02-17
Payer: COMMERCIAL

## 2020-02-19 ENCOUNTER — HOSPITAL ENCOUNTER (EMERGENCY)
Facility: HOSPITAL | Age: 50
Discharge: HOME | End: 2020-02-20
Attending: EMERGENCY MEDICINE | Admitting: EMERGENCY MEDICINE
Payer: COMMERCIAL

## 2020-02-19 ENCOUNTER — OFFICE VISIT (OUTPATIENT)
Dept: PHYSICAL THERAPY | Age: 50
End: 2020-02-19
Payer: COMMERCIAL

## 2020-02-19 DIAGNOSIS — M54.12 CERVICAL RADICULOPATHY: Primary | ICD-10-CM

## 2020-02-19 DIAGNOSIS — R07.9 CHEST PAIN: Primary | ICD-10-CM

## 2020-02-19 DIAGNOSIS — R51.9 HEADACHE: ICD-10-CM

## 2020-02-19 PROCEDURE — 80053 COMPREHEN METABOLIC PANEL: CPT | Performed by: EMERGENCY MEDICINE

## 2020-02-19 PROCEDURE — 97140 MANUAL THERAPY 1/> REGIONS: CPT | Performed by: PHYSICAL THERAPIST

## 2020-02-19 PROCEDURE — 99285 EMERGENCY DEPT VISIT HI MDM: CPT

## 2020-02-19 PROCEDURE — 97110 THERAPEUTIC EXERCISES: CPT | Performed by: PHYSICAL THERAPIST

## 2020-02-19 PROCEDURE — 85025 COMPLETE CBC W/AUTO DIFF WBC: CPT | Performed by: EMERGENCY MEDICINE

## 2020-02-19 PROCEDURE — 84484 ASSAY OF TROPONIN QUANT: CPT | Performed by: EMERGENCY MEDICINE

## 2020-02-19 PROCEDURE — 93005 ELECTROCARDIOGRAM TRACING: CPT

## 2020-02-19 PROCEDURE — 36415 COLL VENOUS BLD VENIPUNCTURE: CPT

## 2020-02-20 ENCOUNTER — APPOINTMENT (OUTPATIENT)
Dept: PHYSICAL THERAPY | Age: 50
End: 2020-02-20
Payer: COMMERCIAL

## 2020-02-20 ENCOUNTER — APPOINTMENT (EMERGENCY)
Dept: RADIOLOGY | Facility: HOSPITAL | Age: 50
End: 2020-02-20
Payer: COMMERCIAL

## 2020-02-20 VITALS
DIASTOLIC BLOOD PRESSURE: 76 MMHG | HEIGHT: 73 IN | TEMPERATURE: 98 F | WEIGHT: 278 LBS | OXYGEN SATURATION: 96 % | BODY MASS INDEX: 36.84 KG/M2 | SYSTOLIC BLOOD PRESSURE: 164 MMHG | HEART RATE: 71 BPM | RESPIRATION RATE: 17 BRPM

## 2020-02-20 LAB
ALBUMIN SERPL BCP-MCNC: 4 G/DL (ref 3.5–5)
ALP SERPL-CCNC: 93 U/L (ref 46–116)
ALT SERPL W P-5'-P-CCNC: 39 U/L (ref 12–78)
ANION GAP SERPL CALCULATED.3IONS-SCNC: 5 MMOL/L (ref 4–13)
AST SERPL W P-5'-P-CCNC: 21 U/L (ref 5–45)
ATRIAL RATE: 74 BPM
ATRIAL RATE: 76 BPM
BASOPHILS # BLD AUTO: 0.04 THOUSANDS/ΜL (ref 0–0.1)
BASOPHILS NFR BLD AUTO: 0 % (ref 0–1)
BILIRUB SERPL-MCNC: 0.21 MG/DL (ref 0.2–1)
BUN SERPL-MCNC: 11 MG/DL (ref 5–25)
CALCIUM SERPL-MCNC: 9.3 MG/DL (ref 8.3–10.1)
CHLORIDE SERPL-SCNC: 105 MMOL/L (ref 100–108)
CO2 SERPL-SCNC: 29 MMOL/L (ref 21–32)
CREAT SERPL-MCNC: 0.83 MG/DL (ref 0.6–1.3)
EOSINOPHIL # BLD AUTO: 0.19 THOUSAND/ΜL (ref 0–0.61)
EOSINOPHIL NFR BLD AUTO: 2 % (ref 0–6)
ERYTHROCYTE [DISTWIDTH] IN BLOOD BY AUTOMATED COUNT: 11.9 % (ref 11.6–15.1)
GFR SERPL CREATININE-BSD FRML MDRD: 103 ML/MIN/1.73SQ M
GLUCOSE SERPL-MCNC: 157 MG/DL (ref 65–140)
HCT VFR BLD AUTO: 44 % (ref 36.5–49.3)
HGB BLD-MCNC: 14.7 G/DL (ref 12–17)
IMM GRANULOCYTES # BLD AUTO: 0.05 THOUSAND/UL (ref 0–0.2)
IMM GRANULOCYTES NFR BLD AUTO: 1 % (ref 0–2)
LYMPHOCYTES # BLD AUTO: 2.85 THOUSANDS/ΜL (ref 0.6–4.47)
LYMPHOCYTES NFR BLD AUTO: 31 % (ref 14–44)
MCH RBC QN AUTO: 29.7 PG (ref 26.8–34.3)
MCHC RBC AUTO-ENTMCNC: 33.4 G/DL (ref 31.4–37.4)
MCV RBC AUTO: 89 FL (ref 82–98)
MONOCYTES # BLD AUTO: 0.96 THOUSAND/ΜL (ref 0.17–1.22)
MONOCYTES NFR BLD AUTO: 11 % (ref 4–12)
NEUTROPHILS # BLD AUTO: 5 THOUSANDS/ΜL (ref 1.85–7.62)
NEUTS SEG NFR BLD AUTO: 55 % (ref 43–75)
NRBC BLD AUTO-RTO: 0 /100 WBCS
P AXIS: 47 DEGREES
P AXIS: 52 DEGREES
PLATELET # BLD AUTO: 226 THOUSANDS/UL (ref 149–390)
PMV BLD AUTO: 10.9 FL (ref 8.9–12.7)
POTASSIUM SERPL-SCNC: 4 MMOL/L (ref 3.5–5.3)
PR INTERVAL: 170 MS
PR INTERVAL: 178 MS
PROT SERPL-MCNC: 8 G/DL (ref 6.4–8.2)
QRS AXIS: -69 DEGREES
QRS AXIS: 130 DEGREES
QRSD INTERVAL: 102 MS
QRSD INTERVAL: 102 MS
QT INTERVAL: 396 MS
QT INTERVAL: 410 MS
QTC INTERVAL: 445 MS
QTC INTERVAL: 455 MS
RBC # BLD AUTO: 4.95 MILLION/UL (ref 3.88–5.62)
SODIUM SERPL-SCNC: 139 MMOL/L (ref 136–145)
T WAVE AXIS: 68 DEGREES
T WAVE AXIS: 70 DEGREES
TROPONIN I SERPL-MCNC: <0.02 NG/ML
TROPONIN I SERPL-MCNC: <0.02 NG/ML
VENTRICULAR RATE: 74 BPM
VENTRICULAR RATE: 76 BPM
WBC # BLD AUTO: 9.09 THOUSAND/UL (ref 4.31–10.16)

## 2020-02-20 PROCEDURE — 93010 ELECTROCARDIOGRAM REPORT: CPT | Performed by: INTERNAL MEDICINE

## 2020-02-20 PROCEDURE — 96374 THER/PROPH/DIAG INJ IV PUSH: CPT

## 2020-02-20 PROCEDURE — 36415 COLL VENOUS BLD VENIPUNCTURE: CPT | Performed by: EMERGENCY MEDICINE

## 2020-02-20 PROCEDURE — 84484 ASSAY OF TROPONIN QUANT: CPT | Performed by: EMERGENCY MEDICINE

## 2020-02-20 PROCEDURE — 71046 X-RAY EXAM CHEST 2 VIEWS: CPT

## 2020-02-20 PROCEDURE — 93005 ELECTROCARDIOGRAM TRACING: CPT

## 2020-02-20 PROCEDURE — 99284 EMERGENCY DEPT VISIT MOD MDM: CPT | Performed by: EMERGENCY MEDICINE

## 2020-02-20 RX ORDER — KETOROLAC TROMETHAMINE 30 MG/ML
15 INJECTION, SOLUTION INTRAMUSCULAR; INTRAVENOUS ONCE
Status: COMPLETED | OUTPATIENT
Start: 2020-02-20 | End: 2020-02-20

## 2020-02-20 RX ADMIN — KETOROLAC TROMETHAMINE 15 MG: 30 INJECTION, SOLUTION INTRAMUSCULAR at 01:08

## 2020-02-20 NOTE — ED PROVIDER NOTES
History  Chief Complaint   Patient presents with    Chest Pain     Pt started with a cough earlier today then this evening pt states he started with sharp pain in his left chest that radiated to his neck  pt denies previously having this type of pain in his chest   Pt states he was unsure if the cough and pain were related but got worried when the pain wasn't going away  Patient is a 50-year-old male presenting for chest pain  Patient has a history of hypertension, hyperlipidemia, diabetes, ascending aortic aneurysm last measured on 09/30/2019 at 4 1 cm  Patient states he developed a cough earlier this afternoon and shortly thereafter he began his experience onset of chest pain  Chest pain feels sharp in nature and has been intermittent but never resolved completely, patient states that dinner time he felt to be less than what it was before, after dinner pain seem to ramp up again  After dinner patient started to notice pain radiating into his left arm, this has been ongoing since then  Patient states he has never felt pain like this before  Patient does not associate shortness of breath, vomiting episodes, weakness in any extremity  Patient does endorse 1 episode of nausea earlier today that has remitted  Cough for patient is dry, patient expresses feeling of a tickle in the back of his throat  Patient states he has multiple coworkers who are sick  He denies fevers or chills, body aches, ear pain, rhinorrhea or congestion  Patient denies family history of cardiac events, history of PE or DVT, lower extremity swelling or pain  He denies smoking, drinking, drug use  Prior to Admission Medications   Prescriptions Last Dose Informant Patient Reported? Taking?    Cinnamon 500 MG capsule 2/20/2020 at Unknown time  Yes Yes   Sig: Take 2 tablets by mouth daily   PROAIR  (90 Base) MCG/ACT inhaler 2/20/2020 at Unknown time  Yes Yes   amoxicillin (AMOXIL) 500 mg capsule   Yes No   Sig: TK ONE C PO  TID FOR 7 DAYS   baclofen 20 mg tablet Not Taking at Unknown time  No No   Sig: Take 1 tablet (20 mg total) by mouth 3 (three) times a day   Patient not taking: Reported on 2020   benzonatate (TESSALON) 200 MG capsule 2020 at Unknown time  No Yes   Sig: Take 1 capsule (200 mg total) by mouth 3 (three) times a day as needed for cough   Patient taking differently: Take 200 mg by mouth as needed for cough    gabapentin (NEURONTIN) 300 mg capsule 2020 at Unknown time  No Yes   Sig: Take 1 capsule (300 mg total) by mouth 3 (three) times a day   ibuprofen (MOTRIN) 800 mg tablet Past Week at Unknown time  No Yes   Sig: Take 1 tablet (800 mg total) by mouth every 8 (eight) hours   lisinopril (ZESTRIL) 20 mg tablet 2020 at Unknown time  Yes Yes   metFORMIN (GLUCOPHAGE) 500 mg tablet 2020 at Unknown time  Yes Yes   sildenafil (VIAGRA) 50 MG tablet Past Month at Unknown time  Yes Yes   simvastatin (ZOCOR) 20 mg tablet 2020 at Unknown time  Yes Yes   Si mg       Facility-Administered Medications: None       Past Medical History:   Diagnosis Date    Achalasia     [facility]     [facility]     Esophageal ulcer     Hyperlipidemia     Hypertension        Past Surgical History:   Procedure Laterality Date    ANTERIOR CERVICAL DISCECTOMY W/ FUSION      APPENDECTOMY      BICEPS TENDON REPAIR      MENISCECTOMY      MYOTOMY HELLER LAPAROSCOPIC      ORTHOPEDIC SURGERY         Family History   Problem Relation Age of Onset    No Known Problems Mother     No Known Problems Father      I have reviewed and agree with the history as documented  Social History     Tobacco Use    Smoking status: Never Smoker    Smokeless tobacco: Never Used   Substance Use Topics    Alcohol use: Not Currently    Drug use: Never        Review of Systems   Constitutional: Negative for chills and fever  HENT: Negative for congestion, ear pain, rhinorrhea and sore throat  Respiratory: Positive for cough  Negative for shortness of breath  Cardiovascular: Positive for chest pain  Negative for leg swelling  Gastrointestinal: Negative for abdominal pain, nausea and vomiting  Musculoskeletal: Positive for neck pain (chronic)  Negative for back pain  Neurological: Positive for headaches  Negative for weakness, light-headedness and numbness  All other systems reviewed and are negative  Physical Exam  ED Triage Vitals [02/19/20 2050]   Temperature Pulse Respirations Blood Pressure SpO2   98 °F (36 7 °C) 74 18 (!) 188/86 96 %      Temp Source Heart Rate Source Patient Position - Orthostatic VS BP Location FiO2 (%)   Oral Monitor Sitting Right arm --      Pain Score       6             Orthostatic Vital Signs  Vitals:    02/19/20 2050 02/19/20 2340 02/20/20 0200   BP: (!) 188/86 (!) 178/82 164/76   Pulse: 74 97 71   Patient Position - Orthostatic VS: Sitting Lying Lying       Physical Exam   Constitutional: He appears well-developed and well-nourished  Non-toxic appearance  He does not appear ill  No distress  HENT:   Head: Normocephalic and atraumatic  Right Ear: External ear normal    Left Ear: External ear normal    Mouth/Throat: No oropharyngeal exudate  Eyes: Pupils are equal, round, and reactive to light  Conjunctivae and EOM are normal  Right eye exhibits no discharge  Left eye exhibits no discharge  Neck: Neck supple  Cardiovascular: Normal rate, regular rhythm, intact distal pulses and normal pulses  Pulses:       Radial pulses are 2+ on the right side, and 2+ on the left side  Dorsalis pedis pulses are 2+ on the right side, and 2+ on the left side  Pulmonary/Chest: Effort normal  No accessory muscle usage  No respiratory distress  He has no decreased breath sounds  He has no wheezes  He has no rhonchi  He has no rales  Abdominal: Soft  He exhibits no distension  There is no tenderness  There is no guarding     Musculoskeletal: He exhibits no edema, tenderness or deformity  Lymphadenopathy:     He has no cervical adenopathy  Neurological: He is alert  No cranial nerve deficit or sensory deficit  He exhibits normal muscle tone  Skin: Skin is warm  No rash noted  No pallor  Vitals reviewed        ED Medications  Medications   ketorolac (TORADOL) injection 15 mg (15 mg Intravenous Given 2/20/20 0108)       Diagnostic Studies  Results Reviewed     Procedure Component Value Units Date/Time    Troponin I [[id number]]  (Normal) Collected:  02/20/20 0235    Lab Status:  Final result Specimen:  Blood from Arm, Left Updated:  02/20/20 0309     Troponin I <0 02 ng/mL     Troponin I [[id number]]  (Normal) Collected:  02/19/20 2334    Lab Status:  Final result Specimen:  Blood from Arm, Left Updated:  02/20/20 0016     Troponin I <0 02 ng/mL     Comprehensive metabolic panel [[id number]]  (Abnormal) Collected:  02/19/20 2334    Lab Status:  Final result Specimen:  Blood from Arm, Left Updated:  02/20/20 0015     Sodium 139 mmol/L      Potassium 4 0 mmol/L      Chloride 105 mmol/L      CO2 29 mmol/L      ANION GAP 5 mmol/L      BUN 11 mg/dL      Creatinine 0 83 mg/dL      Glucose 157 mg/dL      Calcium 9 3 mg/dL      AST 21 U/L      ALT 39 U/L      Alkaline Phosphatase 93 U/L      Total Protein 8 0 g/dL      Albumin 4 0 g/dL      Total Bilirubin 0 21 mg/dL      eGFR 103 ml/min/1 73sq m     Narrative:       [place] guidelines for Chronic Kidney Disease (CKD):     Stage 1 with normal or high GFR (GFR > 90 mL/min/1 73 square meters)    Stage 2 Mild CKD (GFR = 60-89 mL/min/1 73 square meters)    Stage 3A Moderate CKD (GFR = 45-59 mL/min/1 73 square meters)    Stage 3B Moderate CKD (GFR = 30-44 mL/min/1 73 square meters)    Stage 4 Severe CKD (GFR = 15-29 mL/min/1 73 square meters)    Stage 5 End Stage CKD (GFR <15 mL/min/1 73 square meters)  Note: GFR calculation is accurate only with a steady state creatinine    CBC and differential [[id number]] Collected:  02/19/20 2334    Lab Status:  Final result Specimen:  Blood from Arm, Left Updated:  02/20/20 0000     WBC 9 09 Thousand/uL      RBC 4 95 Million/uL      Hemoglobin 14 7 g/dL      Hematocrit 44 0 %      MCV 89 fL      MCH 29 7 pg      MCHC 33 4 g/dL      RDW 11 9 %      MPV 10 9 fL      Platelets 023 Thousands/uL      nRBC 0 /100 WBCs      Neutrophils Relative 55 %      Immat GRANS % 1 %      Lymphocytes Relative 31 %      Monocytes Relative 11 %      Eosinophils Relative 2 %      Basophils Relative 0 %      Neutrophils Absolute 5 00 Thousands/µL      Immature Grans Absolute 0 05 Thousand/uL      Lymphocytes Absolute 2 85 Thousands/µL      Monocytes Absolute 0 96 Thousand/µL      Eosinophils Absolute 0 19 Thousand/µL      Basophils Absolute 0 04 Thousands/µL                  XR chest 2 views    (Results Pending)         Procedures  ECG 12 Lead Documentation Only  Date/Time: 2/20/2020 5:14 AM  Performed by: Daisha Avelar DO  Authorized by: Daisha Avelar DO     Indications / Diagnosis:  Chest pain  ECG reviewed by me, the ED Provider: yes    Patient location:  ED  Previous ECG:     Previous ECG:  Unavailable  Interpretation:     Interpretation: normal    Rate:     ECG rate:  76    ECG rate assessment: normal    Rhythm:     Rhythm: sinus rhythm    Ectopy:     Ectopy: none    QRS:     QRS axis:  Normal    QRS intervals:  Normal  Conduction:     Conduction: normal    ST segments:     ST segments:  Normal          ED Course  ED Course as of Feb 20 0521   Thu Feb 20, 2020   0033 Troponin I: <0 02         HEART Risk Score      Most Recent Value   History  0 Filed at: 02/20/2020 0114   ECG  0 Filed at: 02/20/2020 0114   Age  1 Filed at: 02/20/2020 0114   Risk Factors  2 Filed at: 02/20/2020 0114   Troponin  0 Filed at: 02/20/2020 0114   Heart Score Risk Calculator   History  0 Filed at: 02/20/2020 0114   ECG  0 Filed at: 02/20/2020 0114   Age  1 Filed at: 02/20/2020 0114   Risk Factors  2 Filed at: 02/20/2020 0114   Troponin  0 Filed at: 02/20/2020 0114   HEART Score  3 Filed at: 02/20/2020 0114   HEART Score  3 Filed at: 02/20/2020 0114                            MDM  Number of Diagnoses or Management Options  Chest pain:   Headache:   Diagnosis management comments: 58-year-old male presenting for chest pain onset several hours prior to arrival   Patient does have history of hypertension, hyperlipidemia, diabetes, ascending aortic aneurysm  Concern for ACS given patient's medical history, he was evaluated with delta troponin is until to EKGs which were negative and showed no ischemic changes  Patient was given Toradol for headache that he developed well in the emergency department, his chest pain dissipated after this  Patient has history of ascending aortic aneurysm, last measured was 4 1 cm and unlikely the source of the patient's pain  Patient was discharged and advised to follow-up with his PCP  Disposition  Final diagnoses:   Chest pain   Headache     Time reflects when diagnosis was documented in both MDM as applicable and the Disposition within this note     Time User Action Codes Description Comment    2/20/2020  3:12 AM John Pro Add [R07 9] Chest pain     2/20/2020  3:12 AM John Pro Add [R51] Headache       ED Disposition     ED Disposition Condition Date/Time Comment    Discharge Stable u Feb 20, 2020  3:12 AM [place] discharge to home/self care  Follow-up Information     Follow up With Specialties Details Why Contact Info Additional Information    Corbin Montgomery DO Internal Medicine, Family Medicine  Follow-up with your PCP regarding your symptoms today  [address]    [address] [facility] Emergency Department Emergency Medicine  If symptoms worsen [address] [id number] [phone] [address] ED, [address] [address] Discharge Medication List as of 2/20/2020  3:14 AM      CONTINUE these medications which have NOT CHANGED    Details   benzonatate (TESSALON) 200 MG capsule Take 1 capsule (200 mg total) by mouth 3 (three) times a day as needed for cough, Starting Mon 11/18/2019, Normal      Cinnamon 500 MG capsule Take 2 tablets by mouth daily, Historical Med      gabapentin (NEURONTIN) 300 mg capsule Take 1 capsule (300 mg total) by mouth 3 (three) times a day, Starting Tue 2/11/2020, Until Thu 3/12/2020, Normal      ibuprofen (MOTRIN) 800 mg tablet Take 1 tablet (800 mg total) by mouth every 8 (eight) hours, Starting Fri 1/3/2020, Normal      lisinopril (ZESTRIL) 20 mg tablet Starting Wed 5/1/2019, Historical Med      metFORMIN (GLUCOPHAGE) 500 mg tablet Starting Mon 7/15/2019, Historical Med      PROAIR  (90 Base) MCG/ACT inhaler Starting Thu 12/26/2019, Historical Med      sildenafil (VIAGRA) 50 MG tablet Starting Tue 7/2/2019, Historical Med      simvastatin (ZOCOR) 20 mg tablet 20 mg , Starting Wed 5/1/2019, Historical Med      amoxicillin (AMOXIL) 500 mg capsule TK ONE C PO  TID FOR 7 DAYS, Historical Med      baclofen 20 mg tablet Take 1 tablet (20 mg total) by mouth 3 (three) times a day, Starting Fri 1/3/2020, Normal           No discharge procedures on file  ED Provider  Attending physically available and evaluated Selina Mayersotero SOLIS managed the patient along with the ED Attending      Electronically Signed by         Norman Camargo DO  02/20/20 0101

## 2020-02-20 NOTE — ED ATTENDING ATTESTATION
2/19/2020  IAshley MD, saw and evaluated the patient  I have discussed the patient with the resident/non-physician practitioner and agree with the resident's/non-physician practitioner's findings, Plan of Care, and MDM as documented in the resident's/non-physician practitioner's note, except where noted  All available labs and Radiology studies were reviewed  I was present for key portions of any procedure(s) performed by the resident/non-physician practitioner and I was immediately available to provide assistance  At this point I agree with the current assessment done in the Emergency Department  I have conducted an independent evaluation of this patient a history and physical is as follows:    ED Course     Emergency Department Note- Cony Delarosa 47 y o  male MRN: [id number]    Unit/Bed#: ED 22 Encounter: [id number]    Cony Delarosa is a 47 y o  male who presents with   Chief Complaint   Patient presents with    Chest Pain     Pt started with a cough earlier today then this evening pt states he started with sharp pain in his left chest that radiated to his neck  pt denies previously having this type of pain in his chest   Pt states he was unsure if the cough and pain were related but got worried when the pain wasn't going away  History of Present Illness   HPI:  Cony Delarosa is a 47 y o  male who presents for evaluation of:  Cough earlier today then CP at 5 pm  Patient's cough continued  The CP was constant then radiated to left arm  Coughing provoked the CP  Review of Systems   Constitutional: Negative for chills and fever  Gastrointestinal: Positive for nausea (earlier)  Negative for vomiting  Musculoskeletal: Positive for neck pain (chronic)  Negative for back pain  Neurological: Positive for headaches  Negative for light-headedness         Historical Information   Past Medical History:   Diagnosis Date    Achalasia     [facility]     [facility]  Esophageal ulcer     Hyperlipidemia     Hypertension      Past Surgical History:   Procedure Laterality Date    ANTERIOR CERVICAL DISCECTOMY W/ FUSION      APPENDECTOMY      BICEPS TENDON REPAIR      MENISCECTOMY      MYOTOMY HELLER LAPAROSCOPIC      ORTHOPEDIC SURGERY       Social History   Social History     Substance and Sexual Activity   Alcohol Use Not Currently     Social History     Substance and Sexual Activity   Drug Use Never     Social History     Tobacco Use   Smoking Status Never Smoker   Smokeless Tobacco Never Used     Family History: non-contributory    Meds/Allergies   all medications and allergies reviewed  No Known Allergies    Objective   First Vitals:   Blood Pressure: (!) 188/86 (02/19/20 2050)  Pulse: 74 (02/19/20 2050)  Temperature: 98 °F (36 7 °C) (02/19/20 2050)  Temp Source: Oral (02/19/20 2050)  Respirations: 18 (02/19/20 2050)  Height: 6' 1" (185 4 cm) (02/19/20 2050)  Weight - Scale: 126 kg (278 lb) (02/19/20 2050)  SpO2: 96 % (02/19/20 2050)    Current Vitals:   Blood Pressure: (!) 178/82 (02/19/20 2340)  Pulse: 97 (02/19/20 2340)  Temperature: 98 °F (36 7 °C) (02/19/20 2050)  Temp Source: Oral (02/19/20 2050)  Respirations: 17 (02/19/20 2340)  Height: 6' 1" (185 4 cm) (02/19/20 2050)  Weight - Scale: 126 kg (278 lb) (02/19/20 2050)  SpO2: 98 % (02/19/20 2340)    No intake or output data in the 24 hours ending 02/20/20 0117    Invasive Devices     Peripheral Intravenous Line            Peripheral IV 02/19/20 Left Antecubital less than 1 day                Physical Exam   Constitutional: He is oriented to person, place, and time  He appears well-developed and well-nourished  HENT:   Head: Normocephalic and atraumatic  Cardiovascular: Normal rate and regular rhythm  Pulmonary/Chest: Effort normal and breath sounds normal    Musculoskeletal:        Right lower leg: Normal         Left lower leg: Normal    Neurological: He is alert and oriented to person, place, and time  Skin: Skin is warm and dry  Capillary refill takes less than 2 seconds  Psychiatric: He has a normal mood and affect  His behavior is normal    Nursing note and vitals reviewed  Male in NAD    Medical Decision Makin   Chest pain non specific: ECG and Tn    Recent Results (from the past 36 hour(s))   CBC and differential    Collection Time: 20 11:34 PM   Result Value Ref Range    WBC 9 09 4 31 - 10 16 Thousand/uL    RBC 4 95 3 88 - 5 62 Million/uL    Hemoglobin 14 7 12 0 - 17 0 g/dL    Hematocrit 44 0 36 5 - 49 3 %    MCV 89 82 - 98 fL    MCH 29 7 26 8 - 34 3 pg    MCHC 33 4 31 4 - 37 4 g/dL    RDW 11 9 11 6 - 15 1 %    MPV 10 9 8 9 - 12 7 fL    Platelets 923 009 - 397 Thousands/uL    nRBC 0 /100 WBCs    Neutrophils Relative 55 43 - 75 %    Immat GRANS % 1 0 - 2 %    Lymphocytes Relative 31 14 - 44 %    Monocytes Relative 11 4 - 12 %    Eosinophils Relative 2 0 - 6 %    Basophils Relative 0 0 - 1 %    Neutrophils Absolute 5 00 1 85 - 7 62 Thousands/µL    Immature Grans Absolute 0 05 0 00 - 0 20 Thousand/uL    Lymphocytes Absolute 2 85 0 60 - 4 47 Thousands/µL    Monocytes Absolute 0 96 0 17 - 1 22 Thousand/µL    Eosinophils Absolute 0 19 0 00 - 0 61 Thousand/µL    Basophils Absolute 0 04 0 00 - 0 10 Thousands/µL   Comprehensive metabolic panel    Collection Time: 20 11:34 PM   Result Value Ref Range    Sodium 139 136 - 145 mmol/L    Potassium 4 0 3 5 - 5 3 mmol/L    Chloride 105 100 - 108 mmol/L    CO2 29 21 - 32 mmol/L    ANION GAP 5 4 - 13 mmol/L    BUN 11 5 - 25 mg/dL    Creatinine 0 83 0 60 - 1 30 mg/dL    Glucose 157 (H) 65 - 140 mg/dL    Calcium 9 3 8 3 - 10 1 mg/dL    AST 21 5 - 45 U/L    ALT 39 12 - 78 U/L    Alkaline Phosphatase 93 46 - 116 U/L    Total Protein 8 0 6 4 - 8 2 g/dL    Albumin 4 0 3 5 - 5 0 g/dL    Total Bilirubin 0 21 0 20 - 1 00 mg/dL    eGFR 103 ml/min/1 73sq m   Troponin I    Collection Time: 20 11:34 PM   Result Value Ref Range    Troponin I <0 02 <=0 04 ng/mL XR chest 2 views    (Results Pending)         Portions of the record may have been created with voice recognition software  Occasional wrong word or "sound a like" substitutions may have occurred due to the inherent limitations of voice recognition software  Read the chart carefully and recognize, using context, where substitutions have occurred          Critical Care Time  Procedures

## 2020-02-20 NOTE — PROGRESS NOTES
Daily Note     Today's date: 2020  Patient name: Raymonde Rinne  : 1970  MRN: [id number]  Referring provider: Rios Davison PA-C  Dx:   Encounter Diagnosis     ICD-10-CM    1  Cervical radiculopathy M54 12                   Subjective: L shoulder and UE and along with 1st and 2nd numbness noted  Objective: See treatment diary below      Assessment: Tolerated treatment well  Patient would benefit from continued PT      Plan: Continue per plan of care  Precautions: None      Manual   2 2/7 2/10 2/19                     Nerve glides LARKIN nt  CF completed        CROM LARKIN nt x10 minutes 10 mins  Gentle to R only        Mechanical traction np 19#/5# - 15 minutes intermittent nt np nt        SLJ muscle energy LARKIN nt  nt np nt        Cervical retraction with extension     X 2  nt            Exercise Diary  1/27   2/10         Arm bike 5' 8 minutes 8 minutes 8 mins nt                     Cervical retraction np nt 2x10 supine 2 x10  2x10        Prone T np 3x10 3x10 with elbows bent nt         Cervical isometrics np At home At home nt              Supine cane flexion - x10 hold 10 sec                                                                                                                                                                                                    Modalities  1/27 2/10           Traction 10'            Moist heat   10 mins

## 2020-02-24 ENCOUNTER — APPOINTMENT (OUTPATIENT)
Dept: PHYSICAL THERAPY | Age: 50
End: 2020-02-24
Payer: COMMERCIAL

## 2020-02-26 ENCOUNTER — APPOINTMENT (OUTPATIENT)
Dept: PHYSICAL THERAPY | Age: 50
End: 2020-02-26
Payer: COMMERCIAL

## 2020-03-09 ENCOUNTER — HOSPITAL ENCOUNTER (OUTPATIENT)
Dept: RADIOLOGY | Facility: MEDICAL CENTER | Age: 50
Discharge: HOME | End: 2020-03-09
Attending: PHYSICAL MEDICINE & REHABILITATION | Admitting: PHYSICAL MEDICINE & REHABILITATION
Payer: COMMERCIAL

## 2020-03-09 VITALS
DIASTOLIC BLOOD PRESSURE: 87 MMHG | SYSTOLIC BLOOD PRESSURE: 141 MMHG | TEMPERATURE: 97.5 F | RESPIRATION RATE: 18 BRPM | HEART RATE: 74 BPM | OXYGEN SATURATION: 97 %

## 2020-03-09 DIAGNOSIS — M54.12 CERVICAL RADICULOPATHY: ICD-10-CM

## 2020-03-09 DIAGNOSIS — M48.02 CERVICAL STENOSIS OF SPINAL CANAL: ICD-10-CM

## 2020-03-09 PROCEDURE — 62321 NJX INTERLAMINAR CRV/THRC: CPT | Performed by: PHYSICAL MEDICINE & REHABILITATION

## 2020-03-09 RX ORDER — METHYLPREDNISOLONE ACETATE 80 MG/ML
80 INJECTION, SUSPENSION INTRA-ARTICULAR; INTRALESIONAL; INTRAMUSCULAR; PARENTERAL; SOFT TISSUE ONCE
Status: COMPLETED | OUTPATIENT
Start: 2020-03-09 | End: 2020-03-09

## 2020-03-09 RX ORDER — BENZONATATE 100 MG/1
CAPSULE ORAL
COMMUNITY
Start: 2020-02-21 | End: 2021-09-20 | Stop reason: ALTCHOICE

## 2020-03-09 RX ORDER — LIDOCAINE HYDROCHLORIDE 10 MG/ML
5 INJECTION, SOLUTION EPIDURAL; INFILTRATION; INTRACAUDAL; PERINEURAL ONCE
Status: COMPLETED | OUTPATIENT
Start: 2020-03-09 | End: 2020-03-09

## 2020-03-09 RX ADMIN — IOHEXOL 1 ML: 300 INJECTION, SOLUTION INTRAVENOUS at 15:00

## 2020-03-09 RX ADMIN — LIDOCAINE HYDROCHLORIDE 3.5 ML: 10 INJECTION, SOLUTION EPIDURAL; INFILTRATION; INTRACAUDAL; PERINEURAL at 14:57

## 2020-03-09 RX ADMIN — METHYLPREDNISOLONE ACETATE 80 MG: 80 INJECTION, SUSPENSION INTRA-ARTICULAR; INTRALESIONAL; INTRAMUSCULAR; PARENTERAL; SOFT TISSUE at 15:01

## 2020-03-09 NOTE — DISCHARGE INSTRUCTIONS
Epidural Steroid Injection   WHAT YOU NEED TO KNOW:   An epidural steroid injection (ROSAURA) is a procedure to inject steroid medicine into the epidural space  The epidural space is between your spinal cord and vertebrae  Steroids reduce inflammation and fluid buildup in your spine that may be causing pain  You may be given pain medicine along with the steroids  ACTIVITY  · Do not drive or operate machinery today  · No strenuous activity today - bending, lifting, etc   · You may resume normal activites starting tomorrow - start slowly and as tolerated  · You may shower today, but no tub baths or hot tubs  · You may have numbness for several hours from the local anesthetic  Please use caution and common sense, especially with weight-bearing activities  CARE OF THE INJECTION SITE  · If you have soreness or pain, apply ice to the area today (20 minutes on/20 minutes off)  · Starting tomorrow, you may use warm, moist heat or ice if needed  · You may have an increase or change in your discomfort for 36-48 hours after your treatment  · Apply ice and continue with any pain medication you have been prescribed  · Notify the [facility] if you have any of the following: redness, drainage, swelling, headache, stiff neck or fever above 100°F     SPECIAL INSTRUCTIONS  · Our office will contact you in approximately 7 days for a progress report  MEDICATIONS  · Continue to take all routine medications  · Our office may have instructed you to hold some medications  Resume ibuprofen tomorrow 3/10/2020  If you have a problem specifically related to your procedure, please call our office at [phone]  Problems not related to your procedure should be directed to your primary care physician

## 2020-03-09 NOTE — H&P
History of Present Illness:  The patient is a 47 y o  male who presents with complaints of neck and radiating arm pain    Patient Active Problem List   Diagnosis    S/P cervical spinal fusion    Cervical radiculopathy    Cervical stenosis of spinal canal    HNP (herniated nucleus pulposus), thoracic       Past Medical History:   Diagnosis Date    Achalasia     [facility] [address] [facility]     Diabetes mellitus [facility] [address] [facility]     Esophageal ulcer     Hyperlipidemia     Hypertension        Past Surgical History:   Procedure Laterality Date    ANTERIOR CERVICAL DISCECTOMY W/ FUSION      APPENDECTOMY      BICEPS TENDON REPAIR      MENISCECTOMY      MYOTOMY HELLER LAPAROSCOPIC      ORTHOPEDIC SURGERY           Current Outpatient Medications:     baclofen 20 mg tablet, Take 1 tablet (20 mg total) by mouth 3 (three) times a day, Disp: 90 tablet, Rfl: 2    benzonatate (TESSALON PERLES) 100 mg capsule, TK 1 C PO Q 8 H PRN, Disp: , Rfl:     Cinnamon 500 MG capsule, Take 2 tablets by mouth daily, Disp: , Rfl:     ibuprofen (MOTRIN) 800 mg tablet, Take 1 tablet (800 mg total) by mouth every 8 (eight) hours, Disp: 90 tablet, Rfl: 2    lisinopril (ZESTRIL) 20 mg tablet, , Disp: , Rfl:     metFORMIN (GLUCOPHAGE) 500 mg tablet, , Disp: , Rfl:     PROAIR  (90 Base) MCG/ACT inhaler, , Disp: , Rfl:     sildenafil (VIAGRA) 50 MG tablet, , Disp: , Rfl:     simvastatin (ZOCOR) 20 mg tablet, 20 mg , Disp: , Rfl:     gabapentin (NEURONTIN) 300 mg capsule, Take 1 capsule (300 mg total) by mouth 3 (three) times a day, Disp: 90 capsule, Rfl: 1    Current Facility-Administered Medications:     iohexol (OMNIPAQUE) 300 mg/mL injection 50 mL, 50 mL, Epidural, Once, Darcus Douse, DO    lidocaine (PF) (XYLOCAINE-MPF) 1 % injection 5 mL, 5 mL, Infiltration, Once, Darcus Douse, DO    methylPREDNISolone acetate (DEPO-MEDROL) injection 80 mg, 80 mg, Epidural, Once, Darcus Douse, DO    No Known Allergies    Physical Exam:   Vitals: 03/09/20 1445   BP: 149/88   Pulse: 70   Resp: 18   Temp: 97 5 °F (36 4 °C)   SpO2: 96%     General: Awake, Alert, Oriented x 3, Mood and affect appropriate  Respiratory: Respirations even and unlabored  Cardiovascular: Peripheral pulses intact; no edema  Musculoskeletal Exam:  Neck and radiating arm pain    ASA Score:  2  Patient/Chart Verification  Patient ID Verified: Verbal  Consents Confirmed: Procedural, To be obtained in the Pre-Procedure area  H&P( within 30 days) Verified: To be obtained in the Pre-Procedure area  Allergies Reviewed: Yes  Anticoag/NSAID held?: Yes(Ibuprofen last dose 2 weeks ago)  Currently on antibiotics?: No  Pregnancy denied?: NA    Assessment:   1  Cervical radiculopathy    2   Cervical stenosis of spinal canal        Plan: JERRY

## 2020-03-16 ENCOUNTER — TELEPHONE (OUTPATIENT)
Dept: PAIN MEDICINE | Facility: CLINIC | Age: 50
End: 2020-03-16

## 2020-05-07 ENCOUNTER — HOSPITAL ENCOUNTER (EMERGENCY)
Facility: HOSPITAL | Age: 50
Discharge: HOME | End: 2020-05-07
Attending: EMERGENCY MEDICINE | Admitting: EMERGENCY MEDICINE
Payer: COMMERCIAL

## 2020-05-07 VITALS
WEIGHT: 279.54 LBS | RESPIRATION RATE: 18 BRPM | HEART RATE: 80 BPM | TEMPERATURE: 97.9 F | DIASTOLIC BLOOD PRESSURE: 80 MMHG | BODY MASS INDEX: 36.88 KG/M2 | OXYGEN SATURATION: 98 % | SYSTOLIC BLOOD PRESSURE: 162 MMHG

## 2020-05-07 DIAGNOSIS — R51.9 HEADACHE: Primary | ICD-10-CM

## 2020-05-07 PROCEDURE — 99284 EMERGENCY DEPT VISIT MOD MDM: CPT | Performed by: EMERGENCY MEDICINE

## 2020-05-07 PROCEDURE — 99283 EMERGENCY DEPT VISIT LOW MDM: CPT

## 2020-05-07 PROCEDURE — 96361 HYDRATE IV INFUSION ADD-ON: CPT

## 2020-05-07 PROCEDURE — 96375 TX/PRO/DX INJ NEW DRUG ADDON: CPT

## 2020-05-07 PROCEDURE — 96374 THER/PROPH/DIAG INJ IV PUSH: CPT

## 2020-05-07 RX ORDER — KETOROLAC TROMETHAMINE 30 MG/ML
15 INJECTION, SOLUTION INTRAMUSCULAR; INTRAVENOUS ONCE
Status: COMPLETED | OUTPATIENT
Start: 2020-05-07 | End: 2020-05-07

## 2020-05-07 RX ORDER — TETRACAINE HYDROCHLORIDE 5 MG/ML
1 SOLUTION OPHTHALMIC ONCE
Status: COMPLETED | OUTPATIENT
Start: 2020-05-07 | End: 2020-05-07

## 2020-05-07 RX ORDER — ACETAMINOPHEN 325 MG/1
975 TABLET ORAL ONCE
Status: COMPLETED | OUTPATIENT
Start: 2020-05-07 | End: 2020-05-07

## 2020-05-07 RX ORDER — METOCLOPRAMIDE HYDROCHLORIDE 5 MG/ML
10 INJECTION INTRAMUSCULAR; INTRAVENOUS ONCE
Status: COMPLETED | OUTPATIENT
Start: 2020-05-07 | End: 2020-05-07

## 2020-05-07 RX ADMIN — METOCLOPRAMIDE 10 MG: 5 INJECTION, SOLUTION INTRAMUSCULAR; INTRAVENOUS at 12:06

## 2020-05-07 RX ADMIN — FLUORESCEIN SODIUM 1 STRIP: 1 STRIP OPHTHALMIC at 12:07

## 2020-05-07 RX ADMIN — TETRACAINE HYDROCHLORIDE 1 DROP: 5 SOLUTION OPHTHALMIC at 12:07

## 2020-05-07 RX ADMIN — SODIUM CHLORIDE 1000 ML: 0.9 INJECTION, SOLUTION INTRAVENOUS at 12:06

## 2020-05-07 RX ADMIN — KETOROLAC TROMETHAMINE 15 MG: 30 INJECTION, SOLUTION INTRAMUSCULAR at 12:06

## 2020-05-07 RX ADMIN — ACETAMINOPHEN 975 MG: 325 TABLET ORAL at 12:07

## 2020-06-29 ENCOUNTER — TELEPHONE (OUTPATIENT)
Dept: RADIOLOGY | Facility: MEDICAL CENTER | Age: 50
End: 2020-06-29

## 2020-11-16 ENCOUNTER — OFFICE VISIT (OUTPATIENT)
Dept: PAIN MEDICINE | Facility: MEDICAL CENTER | Age: 50
End: 2020-11-16
Payer: COMMERCIAL

## 2020-11-16 VITALS — TEMPERATURE: 97.4 F | HEIGHT: 73 IN | WEIGHT: 279 LBS | BODY MASS INDEX: 36.98 KG/M2

## 2020-11-16 DIAGNOSIS — M54.12 CERVICAL RADICULOPATHY: Primary | ICD-10-CM

## 2020-11-16 DIAGNOSIS — M48.02 CERVICAL STENOSIS OF SPINAL CANAL: ICD-10-CM

## 2020-11-16 DIAGNOSIS — M51.24 HNP (HERNIATED NUCLEUS PULPOSUS), THORACIC: ICD-10-CM

## 2020-11-16 DIAGNOSIS — Z98.1 S/P CERVICAL SPINAL FUSION: ICD-10-CM

## 2020-11-16 DIAGNOSIS — M47.812 CERVICAL SPONDYLOSIS: ICD-10-CM

## 2020-11-16 PROCEDURE — 99214 OFFICE O/P EST MOD 30 MIN: CPT | Performed by: NURSE PRACTITIONER

## 2020-11-16 RX ORDER — BACLOFEN 20 MG/1
20 TABLET ORAL 3 TIMES DAILY
Qty: 90 TABLET | Refills: 2 | Status: SHIPPED | OUTPATIENT
Start: 2020-11-16 | End: 2021-09-13 | Stop reason: SDUPTHER

## 2020-11-16 RX ORDER — GABAPENTIN 300 MG/1
300 CAPSULE ORAL 3 TIMES DAILY
Qty: 90 CAPSULE | Refills: 1 | Status: SHIPPED | OUTPATIENT
Start: 2020-11-16 | End: 2021-11-19 | Stop reason: HOSPADM

## 2020-12-13 ENCOUNTER — OFFICE VISIT (OUTPATIENT)
Dept: URGENT CARE | Age: 50
End: 2020-12-13
Payer: COMMERCIAL

## 2020-12-13 ENCOUNTER — APPOINTMENT (EMERGENCY)
Dept: RADIOLOGY | Facility: HOSPITAL | Age: 50
End: 2020-12-13
Payer: COMMERCIAL

## 2020-12-13 ENCOUNTER — HOSPITAL ENCOUNTER (EMERGENCY)
Facility: HOSPITAL | Age: 50
Discharge: HOME | End: 2020-12-13
Attending: EMERGENCY MEDICINE | Admitting: EMERGENCY MEDICINE
Payer: COMMERCIAL

## 2020-12-13 VITALS
RESPIRATION RATE: 18 BRPM | BODY MASS INDEX: 35.78 KG/M2 | WEIGHT: 270 LBS | HEART RATE: 78 BPM | DIASTOLIC BLOOD PRESSURE: 78 MMHG | OXYGEN SATURATION: 98 % | SYSTOLIC BLOOD PRESSURE: 130 MMHG | HEIGHT: 73 IN | TEMPERATURE: 97.6 F

## 2020-12-13 VITALS
TEMPERATURE: 97.8 F | HEART RATE: 58 BPM | SYSTOLIC BLOOD PRESSURE: 158 MMHG | DIASTOLIC BLOOD PRESSURE: 86 MMHG | RESPIRATION RATE: 18 BRPM | OXYGEN SATURATION: 99 %

## 2020-12-13 DIAGNOSIS — R07.9 CHEST PAIN, UNSPECIFIED TYPE: Primary | ICD-10-CM

## 2020-12-13 DIAGNOSIS — R07.9 CHEST PAIN: Primary | ICD-10-CM

## 2020-12-13 DIAGNOSIS — J06.9 VIRAL URI WITH COUGH: ICD-10-CM

## 2020-12-13 LAB
ALBUMIN SERPL BCP-MCNC: 3.9 G/DL (ref 3.5–5)
ALP SERPL-CCNC: 83 U/L (ref 46–116)
ALT SERPL W P-5'-P-CCNC: 33 U/L (ref 12–78)
ANION GAP SERPL CALCULATED.3IONS-SCNC: 7 MMOL/L (ref 4–13)
AST SERPL W P-5'-P-CCNC: 23 U/L (ref 5–45)
ATRIAL RATE: 64 BPM
ATRIAL RATE: 67 BPM
BASOPHILS # BLD AUTO: 0.05 THOUSANDS/ΜL (ref 0–0.1)
BASOPHILS NFR BLD AUTO: 1 % (ref 0–1)
BILIRUB SERPL-MCNC: 0.5 MG/DL (ref 0.2–1)
BUN SERPL-MCNC: 10 MG/DL (ref 5–25)
CALCIUM SERPL-MCNC: 8.9 MG/DL (ref 8.3–10.1)
CHLORIDE SERPL-SCNC: 108 MMOL/L (ref 100–108)
CO2 SERPL-SCNC: 26 MMOL/L (ref 21–32)
CREAT SERPL-MCNC: 0.77 MG/DL (ref 0.6–1.3)
EOSINOPHIL # BLD AUTO: 0.1 THOUSAND/ΜL (ref 0–0.61)
EOSINOPHIL NFR BLD AUTO: 2 % (ref 0–6)
ERYTHROCYTE [DISTWIDTH] IN BLOOD BY AUTOMATED COUNT: 12 % (ref 11.6–15.1)
GFR SERPL CREATININE-BSD FRML MDRD: 105 ML/MIN/1.73SQ M
GLUCOSE SERPL-MCNC: 123 MG/DL (ref 65–140)
HCT VFR BLD AUTO: 44.3 % (ref 36.5–49.3)
HGB BLD-MCNC: 14.2 G/DL (ref 12–17)
IMM GRANULOCYTES # BLD AUTO: 0.03 THOUSAND/UL (ref 0–0.2)
IMM GRANULOCYTES NFR BLD AUTO: 1 % (ref 0–2)
LYMPHOCYTES # BLD AUTO: 2.28 THOUSANDS/ΜL (ref 0.6–4.47)
LYMPHOCYTES NFR BLD AUTO: 38 % (ref 14–44)
MCH RBC QN AUTO: 28.8 PG (ref 26.8–34.3)
MCHC RBC AUTO-ENTMCNC: 32.1 G/DL (ref 31.4–37.4)
MCV RBC AUTO: 90 FL (ref 82–98)
MONOCYTES # BLD AUTO: 0.66 THOUSAND/ΜL (ref 0.17–1.22)
MONOCYTES NFR BLD AUTO: 11 % (ref 4–12)
NEUTROPHILS # BLD AUTO: 2.84 THOUSANDS/ΜL (ref 1.85–7.62)
NEUTS SEG NFR BLD AUTO: 47 % (ref 43–75)
NRBC BLD AUTO-RTO: 0 /100 WBCS
P AXIS: 37 DEGREES
P AXIS: 38 DEGREES
PLATELET # BLD AUTO: 221 THOUSANDS/UL (ref 149–390)
PMV BLD AUTO: 10.5 FL (ref 8.9–12.7)
POTASSIUM SERPL-SCNC: 4.6 MMOL/L (ref 3.5–5.3)
PR INTERVAL: 164 MS
PR INTERVAL: 174 MS
PROT SERPL-MCNC: 7.6 G/DL (ref 6.4–8.2)
QRS AXIS: 35 DEGREES
QRS AXIS: 39 DEGREES
QRSD INTERVAL: 96 MS
QRSD INTERVAL: 96 MS
QT INTERVAL: 416 MS
QT INTERVAL: 418 MS
QTC INTERVAL: 429 MS
QTC INTERVAL: 441 MS
RBC # BLD AUTO: 4.93 MILLION/UL (ref 3.88–5.62)
SODIUM SERPL-SCNC: 141 MMOL/L (ref 136–145)
T WAVE AXIS: 52 DEGREES
T WAVE AXIS: 57 DEGREES
TROPONIN I SERPL-MCNC: <0.02 NG/ML
VENTRICULAR RATE: 64 BPM
VENTRICULAR RATE: 67 BPM
WBC # BLD AUTO: 5.96 THOUSAND/UL (ref 4.31–10.16)

## 2020-12-13 PROCEDURE — 99213 OFFICE O/P EST LOW 20 MIN: CPT | Performed by: PHYSICIAN ASSISTANT

## 2020-12-13 PROCEDURE — 96374 THER/PROPH/DIAG INJ IV PUSH: CPT

## 2020-12-13 PROCEDURE — 93005 ELECTROCARDIOGRAM TRACING: CPT | Performed by: PHYSICIAN ASSISTANT

## 2020-12-13 PROCEDURE — 99284 EMERGENCY DEPT VISIT MOD MDM: CPT

## 2020-12-13 PROCEDURE — 85025 COMPLETE CBC W/AUTO DIFF WBC: CPT | Performed by: EMERGENCY MEDICINE

## 2020-12-13 PROCEDURE — 71045 X-RAY EXAM CHEST 1 VIEW: CPT

## 2020-12-13 PROCEDURE — 80053 COMPREHEN METABOLIC PANEL: CPT | Performed by: EMERGENCY MEDICINE

## 2020-12-13 PROCEDURE — 93010 ELECTROCARDIOGRAM REPORT: CPT | Performed by: PHYSICIAN ASSISTANT

## 2020-12-13 PROCEDURE — 84484 ASSAY OF TROPONIN QUANT: CPT | Performed by: EMERGENCY MEDICINE

## 2020-12-13 PROCEDURE — 93005 ELECTROCARDIOGRAM TRACING: CPT

## 2020-12-13 PROCEDURE — U0003 INFECTIOUS AGENT DETECTION BY NUCLEIC ACID (DNA OR RNA); SEVERE ACUTE RESPIRATORY SYNDROME CORONAVIRUS 2 (SARS-COV-2) (CORONAVIRUS DISEASE [COVID-19]), AMPLIFIED PROBE TECHNIQUE, MAKING USE OF HIGH THROUGHPUT TECHNOLOGIES AS DESCRIBED BY CMS-2020-01-R: HCPCS | Performed by: EMERGENCY MEDICINE

## 2020-12-13 PROCEDURE — 36415 COLL VENOUS BLD VENIPUNCTURE: CPT

## 2020-12-13 PROCEDURE — 99285 EMERGENCY DEPT VISIT HI MDM: CPT | Performed by: EMERGENCY MEDICINE

## 2020-12-13 RX ORDER — KETOROLAC TROMETHAMINE 30 MG/ML
15 INJECTION, SOLUTION INTRAMUSCULAR; INTRAVENOUS ONCE
Status: COMPLETED | OUTPATIENT
Start: 2020-12-13 | End: 2020-12-13

## 2020-12-13 RX ORDER — ACETAMINOPHEN 325 MG/1
975 TABLET ORAL ONCE
Status: COMPLETED | OUTPATIENT
Start: 2020-12-13 | End: 2020-12-13

## 2020-12-13 RX ADMIN — ACETAMINOPHEN 975 MG: 325 TABLET, FILM COATED ORAL at 12:49

## 2020-12-13 RX ADMIN — KETOROLAC TROMETHAMINE 15 MG: 30 INJECTION, SOLUTION INTRAMUSCULAR at 12:49

## 2020-12-14 ENCOUNTER — TELEPHONE (OUTPATIENT)
Dept: PAIN MEDICINE | Facility: MEDICAL CENTER | Age: 50
End: 2020-12-14

## 2020-12-14 LAB
ATRIAL RATE: 60 BPM
P AXIS: 38 DEGREES
PR INTERVAL: 170 MS
QRS AXIS: -21 DEGREES
QRSD INTERVAL: 96 MS
QT INTERVAL: 408 MS
QTC INTERVAL: 408 MS
SARS-COV-2 RNA SPEC QL NAA+PROBE: NOT DETECTED
T WAVE AXIS: 49 DEGREES
VENTRICULAR RATE: 60 BPM

## 2020-12-14 PROCEDURE — 93010 ELECTROCARDIOGRAM REPORT: CPT | Performed by: INTERNAL MEDICINE

## 2020-12-14 NOTE — TELEPHONE ENCOUNTER
Pt has to cancel and reschedule procedure for tomorrow 12/15/20 @3:30   Pt has bronchitis, and was tested for covid and was negative  Pt wants to reschedule as soon as possible    Pt # [phone]

## 2020-12-23 ENCOUNTER — HOSPITAL ENCOUNTER (OUTPATIENT)
Dept: RADIOLOGY | Facility: MEDICAL CENTER | Age: 50
Discharge: HOME | End: 2020-12-23
Attending: PHYSICAL MEDICINE & REHABILITATION | Admitting: PHYSICAL MEDICINE & REHABILITATION
Payer: COMMERCIAL

## 2020-12-23 VITALS
RESPIRATION RATE: 20 BRPM | HEART RATE: 60 BPM | OXYGEN SATURATION: 97 % | TEMPERATURE: 97.6 F | DIASTOLIC BLOOD PRESSURE: 87 MMHG | SYSTOLIC BLOOD PRESSURE: 148 MMHG

## 2020-12-23 DIAGNOSIS — Z98.1 S/P CERVICAL SPINAL FUSION: ICD-10-CM

## 2020-12-23 DIAGNOSIS — M47.812 CERVICAL SPONDYLOSIS: ICD-10-CM

## 2020-12-23 DIAGNOSIS — M48.02 CERVICAL STENOSIS OF SPINAL CANAL: ICD-10-CM

## 2020-12-23 DIAGNOSIS — M54.12 CERVICAL RADICULOPATHY: ICD-10-CM

## 2020-12-23 PROCEDURE — 62321 NJX INTERLAMINAR CRV/THRC: CPT | Performed by: PHYSICAL MEDICINE & REHABILITATION

## 2020-12-23 RX ORDER — LIDOCAINE HYDROCHLORIDE 10 MG/ML
5 INJECTION, SOLUTION EPIDURAL; INFILTRATION; INTRACAUDAL; PERINEURAL ONCE
Status: DISCONTINUED | OUTPATIENT
Start: 2020-12-23 | End: 2020-12-23

## 2020-12-23 RX ORDER — METHYLPREDNISOLONE ACETATE 40 MG/ML
40 INJECTION, SUSPENSION INTRA-ARTICULAR; INTRALESIONAL; INTRAMUSCULAR; PARENTERAL; SOFT TISSUE ONCE
Status: COMPLETED | OUTPATIENT
Start: 2020-12-23 | End: 2020-12-23

## 2020-12-23 RX ORDER — METHYLPREDNISOLONE ACETATE 80 MG/ML
80 INJECTION, SUSPENSION INTRA-ARTICULAR; INTRALESIONAL; INTRAMUSCULAR; PARENTERAL; SOFT TISSUE ONCE
Status: DISCONTINUED | OUTPATIENT
Start: 2020-12-23 | End: 2020-12-23

## 2020-12-23 RX ORDER — LIDOCAINE HYDROCHLORIDE 10 MG/ML
5 INJECTION, SOLUTION EPIDURAL; INFILTRATION; INTRACAUDAL; PERINEURAL ONCE
Status: COMPLETED | OUTPATIENT
Start: 2020-12-23 | End: 2020-12-23

## 2020-12-23 RX ADMIN — IOHEXOL 2 ML: 300 INJECTION, SOLUTION INTRAVENOUS at 09:20

## 2020-12-23 RX ADMIN — METHYLPREDNISOLONE ACETATE 40 MG: 40 INJECTION, SUSPENSION INTRA-ARTICULAR; INTRALESIONAL; INTRAMUSCULAR; PARENTERAL; SOFT TISSUE at 09:21

## 2020-12-23 RX ADMIN — LIDOCAINE HYDROCHLORIDE 2 ML: 10 INJECTION, SOLUTION EPIDURAL; INFILTRATION; INTRACAUDAL; PERINEURAL at 09:19

## 2020-12-30 ENCOUNTER — TELEPHONE (OUTPATIENT)
Dept: PAIN MEDICINE | Facility: CLINIC | Age: 50
End: 2020-12-30

## 2021-01-04 ENCOUNTER — TELEPHONE (OUTPATIENT)
Dept: RADIOLOGY | Facility: MEDICAL CENTER | Age: 51
End: 2021-01-04

## 2021-01-04 NOTE — TELEPHONE ENCOUNTER
Called to confirm proc for 1/5/21 with Dr José Miguel Anne  Patient [facility]  I asked the patient will he be staying in the car and he said he will be getting out the car but interaction with anyone  Please advise if okay to come in for procedure

## 2021-01-04 NOTE — TELEPHONE ENCOUNTER
--STEVE--    S/W pt  Pt stated he went to [place] to  a package from a mailbox today  He had no interaction with anyone and is back already  Advised pt will see him for his appt tomorrow

## 2021-01-05 ENCOUNTER — HOSPITAL ENCOUNTER (OUTPATIENT)
Dept: RADIOLOGY | Facility: MEDICAL CENTER | Age: 51
Discharge: HOME | End: 2021-01-05
Attending: PHYSICAL MEDICINE & REHABILITATION
Payer: COMMERCIAL

## 2021-01-05 VITALS
HEART RATE: 64 BPM | SYSTOLIC BLOOD PRESSURE: 137 MMHG | DIASTOLIC BLOOD PRESSURE: 85 MMHG | OXYGEN SATURATION: 97 % | RESPIRATION RATE: 20 BRPM | TEMPERATURE: 97.8 F

## 2021-01-05 DIAGNOSIS — M47.812 CERVICAL SPONDYLOSIS WITHOUT MYELOPATHY: ICD-10-CM

## 2021-01-05 PROCEDURE — 64491 INJ PARAVERT F JNT C/T 2 LEV: CPT | Performed by: PHYSICAL MEDICINE & REHABILITATION

## 2021-01-05 PROCEDURE — 64490 INJ PARAVERT F JNT C/T 1 LEV: CPT | Performed by: PHYSICAL MEDICINE & REHABILITATION

## 2021-01-05 RX ORDER — BUPIVACAINE HYDROCHLORIDE 5 MG/ML
10 INJECTION, SOLUTION EPIDURAL; INTRACAUDAL ONCE
Status: DISCONTINUED | OUTPATIENT
Start: 2021-01-05 | End: 2021-01-05

## 2021-01-05 RX ADMIN — Medication 1.5 ML: at 09:11

## 2021-01-05 NOTE — DISCHARGE INSTRUCTIONS

## 2021-01-05 NOTE — H&P
History of Present Illness:  The patient is a 45 y o  male who presents with complaints of Right-sided neck pain    Patient Active Problem List   Diagnosis    S/P cervical spinal fusion    Cervical radiculopathy    Cervical stenosis of spinal canal    HNP (herniated nucleus pulposus), thoracic    Cervical spondylosis       Past Medical History:   Diagnosis Date    Achalasia     [facility] [address]     [facility] mellitus [facility] [address]     Esophageal ulcer     Hyperlipidemia     Hypertension        Past Surgical History:   Procedure Laterality Date    ANTERIOR CERVICAL DISCECTOMY W/ FUSION      APPENDECTOMY      BICEPS TENDON REPAIR      MENISCECTOMY      MYOTOMY HELLER LAPAROSCOPIC      ORTHOPEDIC SURGERY           Current Outpatient Medications:     baclofen 20 mg tablet, Take 1 tablet (20 mg total) by mouth 3 (three) times a day, Disp: 90 tablet, Rfl: 2    benzonatate (TESSALON PERLES) 100 mg capsule, TK 1 C PO Q 8 H PRN, Disp: , Rfl:     Cinnamon 500 MG capsule, Take 2 tablets by mouth daily, Disp: , Rfl:     gabapentin (NEURONTIN) 300 mg capsule, Take 1 capsule (300 mg total) by mouth 3 (three) times a day, Disp: 90 capsule, Rfl: 1    ibuprofen (MOTRIN) 800 mg tablet, Take 1 tablet (800 mg total) by mouth every 8 (eight) hours, Disp: 90 tablet, Rfl: 2    lisinopril (ZESTRIL) 20 mg tablet, , Disp: , Rfl:     metFORMIN (GLUCOPHAGE) 500 mg tablet, , Disp: , Rfl:     PROAIR  (90 Base) MCG/ACT inhaler, , Disp: , Rfl:     sildenafil (VIAGRA) 50 MG tablet, , Disp: , Rfl:     simvastatin (ZOCOR) 20 mg tablet, 20 mg , Disp: , Rfl:     Current Facility-Administered Medications:     bupivacaine (PF) (MARCAINE) 0 5 % injection 10 mL, 10 mL, Injection, Once, Farrukh Durham, DO    No Known Allergies    Physical Exam:   Vitals:    01/05/21 0900   BP: 134/83   Pulse: 61   Resp: 20   Temp: 97 8 °F (36 6 °C)   SpO2: 96%     General: Awake, Alert, Oriented x 3, Mood and affect appropriate  Respiratory: Respirations even and unlabored  Cardiovascular: Peripheral pulses intact; no edema  Musculoskeletal Exam:  Right-sided neck pain    ASA Score:  2    Patient/Chart Verification  Patient ID Verified: Verbal  ID Band Applied: No  Consents Confirmed: Procedural, To be obtained in the Pre-Procedure area  H&P( within 30 days) Verified: To be obtained in the Pre-Procedure area  Interval H&P(within 24 hr) Complete (required for Outpatients and Surgery Admit only): To be obtained in the Pre-Procedure area  Allergies Reviewed: Yes  Anticoag/NSAID held?: NA  Currently on antibiotics?: No    Assessment:   1   Cervical spondylosis without myelopathy        Plan: RIGHT C4-6 MBB#1

## 2021-01-18 ENCOUNTER — TELEPHONE (OUTPATIENT)
Dept: RADIOLOGY | Facility: MEDICAL CENTER | Age: 51
End: 2021-01-18

## 2021-01-18 NOTE — TELEPHONE ENCOUNTER
Pain diary reviewed by Dr Makayla Miller; proceed with MBB #2; I will call and coordinate       Right C4-6

## 2021-01-18 NOTE — TELEPHONE ENCOUNTER
Called pt to schedule MBB#2 and pt stated that the pain got worse after the first MBB  Please advise

## 2021-01-19 NOTE — TELEPHONE ENCOUNTER
RN s/w pt  RN offered a procedure day of 2/3 at 1300  Pt states he will call us back, he needs to check with his son regarding transportation

## 2021-01-19 NOTE — TELEPHONE ENCOUNTER
RN s/w pt  Pt states he is not sure if he should move on to  MBB #2  Per pt he started with pain in both sides but worse on the Left  Pt states he had relief after MBB #1 for about 4-5 hours then his pain became more intense than it was prior to the MBB  Pt verbalized understanding of the procedure and knew that it was temporary relief and his pain would return  Per pt his pain is finally starting to calm down ion the Right side  Pt is asking if he should proceed with MBB #2?     Please advise-

## 2021-01-20 NOTE — TELEPHONE ENCOUNTER
Called and reviewed instructions:     Reviewed instructions: , NPO 1 hour prior, loose-fitting/comfortable clothes, if ill/fever/infx/abx to call and reschedule  Also pain level at leat 5/10 and refrain from PRN, as-needed pain meds 6h prior  Patient stated verbal understanding

## 2021-02-03 ENCOUNTER — HOSPITAL ENCOUNTER (OUTPATIENT)
Dept: RADIOLOGY | Facility: MEDICAL CENTER | Age: 51
Discharge: HOME | End: 2021-02-03
Attending: PHYSICAL MEDICINE & REHABILITATION | Admitting: PHYSICAL MEDICINE & REHABILITATION
Payer: COMMERCIAL

## 2021-02-03 VITALS
OXYGEN SATURATION: 98 % | RESPIRATION RATE: 20 BRPM | HEART RATE: 64 BPM | DIASTOLIC BLOOD PRESSURE: 82 MMHG | SYSTOLIC BLOOD PRESSURE: 142 MMHG | TEMPERATURE: 97.4 F

## 2021-02-03 DIAGNOSIS — M47.812 CERVICAL SPONDYLOSIS WITHOUT MYELOPATHY: ICD-10-CM

## 2021-02-03 DIAGNOSIS — M54.2 NECK PAIN: ICD-10-CM

## 2021-02-03 PROCEDURE — 64490 INJ PARAVERT F JNT C/T 1 LEV: CPT | Performed by: PHYSICAL MEDICINE & REHABILITATION

## 2021-02-03 PROCEDURE — 64491 INJ PARAVERT F JNT C/T 2 LEV: CPT | Performed by: PHYSICAL MEDICINE & REHABILITATION

## 2021-02-03 RX ORDER — BUPIVACAINE HYDROCHLORIDE 5 MG/ML
10 INJECTION, SOLUTION EPIDURAL; INTRACAUDAL ONCE
Status: COMPLETED | OUTPATIENT
Start: 2021-02-03 | End: 2021-02-03

## 2021-02-03 RX ADMIN — BUPIVACAINE HYDROCHLORIDE 1.5 ML: 5 INJECTION, SOLUTION EPIDURAL; INTRACAUDAL at 13:12

## 2021-02-03 NOTE — H&P
History of Present Illness:  The patient is a 45 y o  male who presents with complaints of right neck pain    Patient Active Problem List   Diagnosis    S/P cervical spinal fusion    Cervical radiculopathy    Cervical stenosis of spinal canal    HNP (herniated nucleus pulposus), thoracic    Cervical spondylosis without myelopathy       Past Medical History:   Diagnosis Date    Achalasia     [facility] [address]     Diabetes mellitus [facility] [address] [facility]     Esophageal ulcer     Hyperlipidemia     Hypertension        Past Surgical History:   Procedure Laterality Date    ANTERIOR CERVICAL DISCECTOMY W/ FUSION      APPENDECTOMY      BICEPS TENDON REPAIR      MENISCECTOMY      MYOTOMY HELLER LAPAROSCOPIC      ORTHOPEDIC SURGERY           Current Outpatient Medications:     baclofen 20 mg tablet, Take 1 tablet (20 mg total) by mouth 3 (three) times a day, Disp: 90 tablet, Rfl: 2    benzonatate (TESSALON PERLES) 100 mg capsule, TK 1 C PO Q 8 H PRN, Disp: , Rfl:     Cinnamon 500 MG capsule, Take 2 tablets by mouth daily, Disp: , Rfl:     gabapentin (NEURONTIN) 300 mg capsule, Take 1 capsule (300 mg total) by mouth 3 (three) times a day, Disp: 90 capsule, Rfl: 1    ibuprofen (MOTRIN) 800 mg tablet, Take 1 tablet (800 mg total) by mouth every 8 (eight) hours (Patient not taking: Reported on 2/3/2021), Disp: 90 tablet, Rfl: 2    lisinopril (ZESTRIL) 20 mg tablet, , Disp: , Rfl:     metFORMIN (GLUCOPHAGE) 500 mg tablet, , Disp: , Rfl:     PROAIR  (90 Base) MCG/ACT inhaler, , Disp: , Rfl:     sildenafil (VIAGRA) 50 MG tablet, , Disp: , Rfl:     simvastatin (ZOCOR) 20 mg tablet, 20 mg , Disp: , Rfl:     Current Facility-Administered Medications:     bupivacaine (PF) (MARCAINE) 0 5 % injection 10 mL, 10 mL, Injection, Once, [facility], DO    No Known Allergies    Physical Exam:   Vitals:    02/03/21 1258   BP: 135/83   Pulse: 65   Resp: 20   Temp: (!) 97 4 °F (36 3 °C)   SpO2: 96%     General: Awake, Alert, Oriented x 3, Mood and affect appropriate  Respiratory: Respirations even and unlabored  Cardiovascular: Peripheral pulses intact; no edema  Musculoskeletal Exam: right neck pain    ASA Score: 2    Patient/Chart Verification  Patient ID Verified: Verbal  ID Band Applied: No  Consents Confirmed: Procedural  H&P( within 30 days) Verified: To be obtained in the Pre-Procedure area  Interval H&P(within 24 hr) Complete (required for Outpatients and Surgery Admit only): To be obtained in the Pre-Procedure area  Allergies Reviewed: Yes  Anticoag/NSAID held?: NA  Currently on antibiotics?: No    Assessment:   1  Cervical spondylosis without myelopathy    2   Neck pain        Plan: RT C4-6 MBB #2

## 2021-02-03 NOTE — DISCHARGE INSTRUCTIONS

## 2021-02-12 ENCOUNTER — LAB (OUTPATIENT)
Dept: LAB | Facility: CLINIC | Age: 51
End: 2021-02-12
Payer: COMMERCIAL

## 2021-02-12 ENCOUNTER — TRANSCRIBE ORDERS (OUTPATIENT)
Dept: LAB | Facility: CLINIC | Age: 51
End: 2021-02-12

## 2021-02-12 DIAGNOSIS — I10 ESSENTIAL HYPERTENSION, MALIGNANT: ICD-10-CM

## 2021-02-12 DIAGNOSIS — M19.90 SENILE ARTHRITIS: ICD-10-CM

## 2021-02-12 DIAGNOSIS — D64.9 ANEMIA, UNSPECIFIED TYPE: Primary | ICD-10-CM

## 2021-02-12 DIAGNOSIS — E11.9 TYPE 2 DIABETES MELLITUS WITHOUT COMPLICATION, UNSPECIFIED WHETHER LONG TERM INSULIN USE (HCC): ICD-10-CM

## 2021-02-12 DIAGNOSIS — D64.9 ANEMIA, UNSPECIFIED TYPE: ICD-10-CM

## 2021-02-12 DIAGNOSIS — R35.0 URINARY FREQUENCY: ICD-10-CM

## 2021-02-12 DIAGNOSIS — N40.0 BENIGN PROSTATIC HYPERPLASIA, UNSPECIFIED WHETHER LOWER URINARY TRACT SYMPTOMS PRESENT: ICD-10-CM

## 2021-02-12 DIAGNOSIS — E03.9 HYPOTHYROIDISM, UNSPECIFIED TYPE: ICD-10-CM

## 2021-02-12 LAB
ALBUMIN SERPL BCP-MCNC: 3.9 G/DL (ref 3.5–5)
ALP SERPL-CCNC: 67 U/L (ref 46–116)
ALT SERPL W P-5'-P-CCNC: 31 U/L (ref 12–78)
ANION GAP SERPL CALCULATED.3IONS-SCNC: 3 MMOL/L (ref 4–13)
AST SERPL W P-5'-P-CCNC: 15 U/L (ref 5–45)
BACTERIA UR QL AUTO: NORMAL /HPF
BASOPHILS # BLD AUTO: 0.05 THOUSANDS/ΜL (ref 0–0.1)
BASOPHILS NFR BLD AUTO: 1 % (ref 0–1)
BILIRUB SERPL-MCNC: 0.43 MG/DL (ref 0.2–1)
BILIRUB UR QL STRIP: NEGATIVE
BUN SERPL-MCNC: 13 MG/DL (ref 5–25)
CALCIUM SERPL-MCNC: 9.5 MG/DL (ref 8.3–10.1)
CHLORIDE SERPL-SCNC: 107 MMOL/L (ref 100–108)
CHOLEST SERPL-MCNC: 162 MG/DL (ref 50–200)
CLARITY UR: CLEAR
CO2 SERPL-SCNC: 29 MMOL/L (ref 21–32)
COLOR UR: YELLOW
CREAT SERPL-MCNC: 0.83 MG/DL (ref 0.6–1.3)
EOSINOPHIL # BLD AUTO: 0.11 THOUSAND/ΜL (ref 0–0.61)
EOSINOPHIL NFR BLD AUTO: 2 % (ref 0–6)
ERYTHROCYTE [DISTWIDTH] IN BLOOD BY AUTOMATED COUNT: 12.2 % (ref 11.6–15.1)
EST. AVERAGE GLUCOSE BLD GHB EST-MCNC: 140 MG/DL
GFR SERPL CREATININE-BSD FRML MDRD: 102 ML/MIN/1.73SQ M
GLUCOSE P FAST SERPL-MCNC: 128 MG/DL (ref 65–99)
GLUCOSE UR STRIP-MCNC: NEGATIVE MG/DL
HBA1C MFR BLD: 6.5 %
HCT VFR BLD AUTO: 45.7 % (ref 36.5–49.3)
HDLC SERPL-MCNC: 42 MG/DL
HGB BLD-MCNC: 14.6 G/DL (ref 12–17)
HGB UR QL STRIP.AUTO: NEGATIVE
HYALINE CASTS #/AREA URNS LPF: NORMAL /LPF
IMM GRANULOCYTES # BLD AUTO: 0.04 THOUSAND/UL (ref 0–0.2)
IMM GRANULOCYTES NFR BLD AUTO: 1 % (ref 0–2)
KETONES UR STRIP-MCNC: NEGATIVE MG/DL
LDLC SERPL CALC-MCNC: 91 MG/DL (ref 0–100)
LEUKOCYTE ESTERASE UR QL STRIP: NEGATIVE
LYMPHOCYTES # BLD AUTO: 2.08 THOUSANDS/ΜL (ref 0.6–4.47)
LYMPHOCYTES NFR BLD AUTO: 32 % (ref 14–44)
MCH RBC QN AUTO: 29.2 PG (ref 26.8–34.3)
MCHC RBC AUTO-ENTMCNC: 31.9 G/DL (ref 31.4–37.4)
MCV RBC AUTO: 91 FL (ref 82–98)
MONOCYTES # BLD AUTO: 0.7 THOUSAND/ΜL (ref 0.17–1.22)
MONOCYTES NFR BLD AUTO: 11 % (ref 4–12)
NEUTROPHILS # BLD AUTO: 3.43 THOUSANDS/ΜL (ref 1.85–7.62)
NEUTS SEG NFR BLD AUTO: 53 % (ref 43–75)
NITRITE UR QL STRIP: NEGATIVE
NON-SQ EPI CELLS URNS QL MICRO: NORMAL /HPF
NRBC BLD AUTO-RTO: 0 /100 WBCS
PH UR STRIP.AUTO: 6 [PH]
PLATELET # BLD AUTO: 230 THOUSANDS/UL (ref 149–390)
PMV BLD AUTO: 10.5 FL (ref 8.9–12.7)
POTASSIUM SERPL-SCNC: 4.7 MMOL/L (ref 3.5–5.3)
PROT SERPL-MCNC: 7.6 G/DL (ref 6.4–8.2)
PROT UR STRIP-MCNC: NEGATIVE MG/DL
RBC # BLD AUTO: 5 MILLION/UL (ref 3.88–5.62)
RBC #/AREA URNS AUTO: NORMAL /HPF
SODIUM SERPL-SCNC: 139 MMOL/L (ref 136–145)
SP GR UR STRIP.AUTO: 1.02 (ref 1–1.03)
TRIGL SERPL-MCNC: 147 MG/DL
TSH SERPL DL<=0.05 MIU/L-ACNC: 0.84 UIU/ML (ref 0.36–3.74)
UROBILINOGEN UR QL STRIP.AUTO: 0.2 E.U./DL
WBC # BLD AUTO: 6.41 THOUSAND/UL (ref 4.31–10.16)
WBC #/AREA URNS AUTO: NORMAL /HPF

## 2021-02-12 PROCEDURE — 83036 HEMOGLOBIN GLYCOSYLATED A1C: CPT

## 2021-02-12 PROCEDURE — 84443 ASSAY THYROID STIM HORMONE: CPT

## 2021-02-12 PROCEDURE — 84153 ASSAY OF PSA TOTAL: CPT

## 2021-02-12 PROCEDURE — 36415 COLL VENOUS BLD VENIPUNCTURE: CPT

## 2021-02-12 PROCEDURE — 80053 COMPREHEN METABOLIC PANEL: CPT

## 2021-02-12 PROCEDURE — 85025 COMPLETE CBC W/AUTO DIFF WBC: CPT

## 2021-02-12 PROCEDURE — 81001 URINALYSIS AUTO W/SCOPE: CPT

## 2021-02-12 PROCEDURE — 80061 LIPID PANEL: CPT

## 2021-02-12 PROCEDURE — 84154 ASSAY OF PSA FREE: CPT

## 2021-02-14 LAB
PSA FREE MFR SERPL: 40 %
PSA FREE SERPL-MCNC: 0.12 NG/ML
PSA SERPL-MCNC: 0.3 NG/ML (ref 0–4)

## 2021-03-10 DIAGNOSIS — Z23 ENCOUNTER FOR IMMUNIZATION: ICD-10-CM

## 2021-03-19 ENCOUNTER — IMMUNIZATIONS (OUTPATIENT)
Dept: FAMILY MEDICINE CLINIC | Facility: HOSPITAL | Age: 51
End: 2021-03-19

## 2021-03-19 DIAGNOSIS — Z23 ENCOUNTER FOR IMMUNIZATION: Primary | ICD-10-CM

## 2021-03-19 PROCEDURE — 91301 SARS-COV-2 / COVID-19 MRNA VACCINE (MODERNA) 100 MCG: CPT

## 2021-03-19 PROCEDURE — 0011A SARS-COV-2 / COVID-19 MRNA VACCINE (MODERNA) 100 MCG: CPT

## 2021-03-24 DIAGNOSIS — Z11.59 SPECIAL SCREENING EXAMINATION FOR UNSPECIFIED VIRAL DISEASE: ICD-10-CM

## 2021-03-24 PROCEDURE — U0003 INFECTIOUS AGENT DETECTION BY NUCLEIC ACID (DNA OR RNA); SEVERE ACUTE RESPIRATORY SYNDROME CORONAVIRUS 2 (SARS-COV-2) (CORONAVIRUS DISEASE [COVID-19]), AMPLIFIED PROBE TECHNIQUE, MAKING USE OF HIGH THROUGHPUT TECHNOLOGIES AS DESCRIBED BY CMS-2020-01-R: HCPCS | Performed by: NURSE PRACTITIONER

## 2021-03-24 PROCEDURE — U0005 INFEC AGEN DETEC AMPLI PROBE: HCPCS | Performed by: NURSE PRACTITIONER

## 2021-03-25 LAB — SARS-COV-2 RNA RESP QL NAA+PROBE: NEGATIVE

## 2021-04-13 LAB
LEFT EYE DIABETIC RETINOPATHY: NORMAL
RIGHT EYE DIABETIC RETINOPATHY: NORMAL
SEVERITY (EYE EXAM): NORMAL

## 2021-04-19 ENCOUNTER — IMMUNIZATIONS (OUTPATIENT)
Dept: FAMILY MEDICINE CLINIC | Facility: HOSPITAL | Age: 51
End: 2021-04-19

## 2021-04-19 DIAGNOSIS — Z23 ENCOUNTER FOR IMMUNIZATION: Primary | ICD-10-CM

## 2021-04-19 PROCEDURE — 91301 SARS-COV-2 / COVID-19 MRNA VACCINE (MODERNA) 100 MCG: CPT

## 2021-04-19 PROCEDURE — 0012A SARS-COV-2 / COVID-19 MRNA VACCINE (MODERNA) 100 MCG: CPT

## 2021-04-20 ENCOUNTER — OFFICE VISIT (OUTPATIENT)
Dept: PHYSICAL THERAPY | Facility: OTHER | Age: 51
End: 2021-04-20
Payer: COMMERCIAL

## 2021-04-20 DIAGNOSIS — M47.812 CERVICAL SPONDYLOSIS WITHOUT MYELOPATHY: ICD-10-CM

## 2021-04-20 DIAGNOSIS — M51.24 HNP (HERNIATED NUCLEUS PULPOSUS), THORACIC: ICD-10-CM

## 2021-04-20 DIAGNOSIS — M54.12 CERVICAL RADICULOPATHY: ICD-10-CM

## 2021-04-20 DIAGNOSIS — M48.02 CERVICAL STENOSIS OF SPINAL CANAL: ICD-10-CM

## 2021-04-20 DIAGNOSIS — Z98.1 S/P CERVICAL SPINAL FUSION: ICD-10-CM

## 2021-04-20 DIAGNOSIS — M54.2 NECK PAIN: Primary | ICD-10-CM

## 2021-04-20 PROCEDURE — 97163 PT EVAL HIGH COMPLEX 45 MIN: CPT | Performed by: PHYSICAL THERAPIST

## 2021-04-20 PROCEDURE — 97110 THERAPEUTIC EXERCISES: CPT | Performed by: PHYSICAL THERAPIST

## 2021-04-20 PROCEDURE — 97112 NEUROMUSCULAR REEDUCATION: CPT | Performed by: PHYSICAL THERAPIST

## 2021-04-20 NOTE — PROGRESS NOTES
PT Evaluation     Today's date: 2021  Patient name: Wendy Pardo  : 1970  MRN: [id number]  Referring provider: Fritzi Gitelman, PT  Dx:   Encounter Diagnosis     ICD-10-CM    1  Neck pain  M54 2    2  Cervical spondylosis without myelopathy  M47 812    3  S/P cervical spinal fusion  Z98 1    4  Cervical radiculopathy  M54 12    5  Cervical stenosis of spinal canal  M48 02    6  HNP (herniated nucleus pulposus), thoracic  M51 24        Start Time: 1400  Stop Time: 1500  Total time in clinic (min): 60 minutes    Assessment  Assessment details: Wendy Pardo is a 45 y o  male who presents with complaints of  Neck pain  (primary encounter diagnosis)  No further referral appears necessary at this time based upon examination results  Patient presents to PT with impaired strength, impaired ROM, decreased flexibility and impaired ability to complete IADLs  Prognosis is good given HEP compliance and PT 2-3x/wk  Positive prognostic indicators include positive attitude toward recovery  Please contact me if you have any questions or recommendations  Thank you for the opportunity to share in  [facility]         Impairments: abnormal coordination, abnormal muscle firing, abnormal or restricted ROM, activity intolerance, impaired physical strength, lacks appropriate home exercise program, pain with function and poor body mechanics    Symptom irritability: moderateBarriers to therapy: Complex PMH which includes neck surgery and cervical radiculopathy   Understanding of Dx/Px/POC: good   Prognosis: good    Goals  Short Term:  Pt will report decreased levels of pain by at least 2 subjective ratings in 4 weeks  Pt will demonstrate improved ROM by at least 10 degrees in 4 weeks  Pt will demonstrate improved strength by 1/2 grade  Long Term:   Pt will be independent in their HEP in 8 weeks  Pt will be be pain free with IADL's  Pt will demonstrate improved FOTO, > 80         Plan  Plan details: Prognosis above is given PT services 2x/week tapering to 1x/week over the next 3 months and home program adherence  Patient would benefit from: skilled physical therapy  Planned modality interventions: thermotherapy: hydrocollator packs, cryotherapy, electrical stimulation/Russian stimulation and low level laser therapy  Planned therapy interventions: activity modification, joint mobilization, manual therapy, motor coordination training, neuromuscular re-education, patient education, self care, therapeutic activities, therapeutic exercise, graded activity, home exercise program, abdominal trunk stabilization, balance, flexibility, functional ROM exercises, strengthening and stretching  Frequency: 2x week  Duration in weeks: 4  Plan of Care beginning date: 4/20/2021  Plan of Care expiration date: 5/20/2021  Treatment plan discussed with: patient        Subjective Evaluation    History of Present Illness  Mechanism of injury: Patient reports he has had neck issues for a while  Patient reports he has been dealing with cervical radiculopathy as a result  Patient went into extensive detail about his pain  Numbers were not given in regards to his pain    Patient reports the following complaints:   -chest tightness  -dull ache which radiates into his chest from his back (can be sharp occasionally)   -looking down causes him discomfort in the neck         Objective     General Comments:      Cervical/Thoracic Comments  UQS: WNL     Observation: pec minor tightness noted     Palpation: tenderness noted in the bilateral pec minors    ROM   Cervical AROM  Flexion limited 50%   Rotation limited 50% bilaterally   Lateral Flexion limited 50% bilaterally   Extension limited 50%     Segmental Mobility Testing   Cervical Not Evaluated Yet   Thoracic hypomobile PA throughout   Ribs hypomobile at T4 on R     Precautions: complex PMH     Daily Treatment Log   Manuals 4/20            GISTM              FR              T/S G5 mobs Neuro Re-Ed 4/20            Chin Tucks             3 Finger Rot                                                                               Ther Ex 4/20            T/S Ext              Rib Self Mob                                                                                           Ther Activity                                       Gait Training                                       Modalities

## 2021-04-28 ENCOUNTER — APPOINTMENT (OUTPATIENT)
Dept: PHYSICAL THERAPY | Facility: OTHER | Age: 51
End: 2021-04-28
Payer: COMMERCIAL

## 2021-04-29 ENCOUNTER — OFFICE VISIT (OUTPATIENT)
Dept: PHYSICAL THERAPY | Facility: OTHER | Age: 51
End: 2021-04-29
Payer: COMMERCIAL

## 2021-04-29 DIAGNOSIS — M51.24 HNP (HERNIATED NUCLEUS PULPOSUS), THORACIC: ICD-10-CM

## 2021-04-29 DIAGNOSIS — M54.2 NECK PAIN: Primary | ICD-10-CM

## 2021-04-29 DIAGNOSIS — M54.12 CERVICAL RADICULOPATHY: ICD-10-CM

## 2021-04-29 DIAGNOSIS — M48.02 CERVICAL STENOSIS OF SPINAL CANAL: ICD-10-CM

## 2021-04-29 DIAGNOSIS — M47.812 CERVICAL SPONDYLOSIS WITHOUT MYELOPATHY: ICD-10-CM

## 2021-04-29 DIAGNOSIS — Z98.1 S/P CERVICAL SPINAL FUSION: ICD-10-CM

## 2021-04-29 PROCEDURE — 97110 THERAPEUTIC EXERCISES: CPT | Performed by: PHYSICAL THERAPIST

## 2021-04-29 PROCEDURE — 97112 NEUROMUSCULAR REEDUCATION: CPT | Performed by: PHYSICAL THERAPIST

## 2021-04-29 PROCEDURE — 97140 MANUAL THERAPY 1/> REGIONS: CPT | Performed by: PHYSICAL THERAPIST

## 2021-05-01 NOTE — PROGRESS NOTES
Daily Note     Today's date: 2021  Patient name: Surinder Demarco  : 1970  MRN: [id number]  Referring provider: Rebeca Petty PT  Dx:   Encounter Diagnosis     ICD-10-CM    1  Neck pain  M54 2    2  Cervical spondylosis without myelopathy  M47 812    3  S/P cervical spinal fusion  Z98 1    4  Cervical radiculopathy  M54 12    5  Cervical stenosis of spinal canal  M48 02    6   HNP (herniated nucleus pulposus), thoracic  M51 24      IEP [id number]  1 on 1 [id number]  Start Time: 1630  Stop Time: 1730  Total time in clinic (min): 60 minutes    Subjective:

## 2021-05-04 NOTE — PROGRESS NOTES
Daily Note     Today's date: 2021  Patient name: Nellie Villareal  : 1970  MRN: [id number]  Referring provider: Kim Cmapos, PT  Dx:   Encounter Diagnosis     ICD-10-CM    1  Neck pain  M54 2    2  Cervical spondylosis without myelopathy  M47 812    3  S/P cervical spinal fusion  Z98 1    4  Cervical radiculopathy  M54 12    5  Cervical stenosis of spinal canal  M48 02    6  HNP (herniated nucleus pulposus), thoracic  M51 24      IEP [id number]  1 on 1 [id number]  Start Time: 1630  Stop Time: 1730  Total time in clinic (min): 60 minutes    Subjective: Patient reports no relief after last PT session  PT tried to modify several exercises but to no avail  Continue to progress patient per tolerance  Objective: See treatment diary below    Assessment: Tolerated treatment well  Patient demonstrated fatigue post treatment, exhibited good technique with therapeutic exercises and would benefit from continued PT    Plan: Continue per plan of care        Precautions: None    Daily Treatment Log  Manuals        Gentle Cervical Glides [facility]       [facility]       [facility]               Neuro Re-Ed         Chin Tucks 2 x 10       3 Finger Rotation  2 x 10 ea                                                Ther Ex        Mod FR protocol 7 x 1' ea  (1/2 foam roll)       T/S Ext  15 x 5"                                                        Ther Activity                        Gait Training                        Modalities

## 2021-05-05 ENCOUNTER — OFFICE VISIT (OUTPATIENT)
Dept: PHYSICAL THERAPY | Facility: OTHER | Age: 51
End: 2021-05-05
Payer: COMMERCIAL

## 2021-05-05 DIAGNOSIS — M48.02 CERVICAL STENOSIS OF SPINAL CANAL: ICD-10-CM

## 2021-05-05 DIAGNOSIS — M54.12 CERVICAL RADICULOPATHY: ICD-10-CM

## 2021-05-05 DIAGNOSIS — Z98.1 S/P CERVICAL SPINAL FUSION: ICD-10-CM

## 2021-05-05 DIAGNOSIS — M51.24 HNP (HERNIATED NUCLEUS PULPOSUS), THORACIC: ICD-10-CM

## 2021-05-05 DIAGNOSIS — M47.812 CERVICAL SPONDYLOSIS WITHOUT MYELOPATHY: ICD-10-CM

## 2021-05-05 DIAGNOSIS — M54.2 NECK PAIN: Primary | ICD-10-CM

## 2021-05-05 PROCEDURE — 97112 NEUROMUSCULAR REEDUCATION: CPT | Performed by: PHYSICAL THERAPIST

## 2021-05-05 PROCEDURE — 97140 MANUAL THERAPY 1/> REGIONS: CPT | Performed by: PHYSICAL THERAPIST

## 2021-05-05 NOTE — PROGRESS NOTES
Daily Note     Today's date: 21  Patient name: Randi He  : 1970  MRN: [id number]  Referring provider: Yemi Mclain, PT  Dx:   Encounter Diagnosis     ICD-10-CM    1  Neck pain  M54 2    2  Cervical spondylosis without myelopathy  M47 812    3  S/P cervical spinal fusion  Z98 1    4  Cervical radiculopathy  M54 12    5  Cervical stenosis of spinal canal  M48 02    6  HNP (herniated nucleus pulposus), thoracic  M51 24      P [id number]  1 on 1 [id number]  Start Time: 1600  Stop Time: 1701  Total time in clinic (min): 61 minutes    Subjective: Patient reports no relief after last PT session  PT tried traction today and patient responded favorably  He said that the manual techniques hurt but they also provided him with relief  Objective: See treatment diary below    Assessment: Tolerated treatment well  Patient demonstrated fatigue post treatment, exhibited good technique with therapeutic exercises and would benefit from continued PT    Plan: Continue per plan of care        Precautions: None    Daily Treatment Log  Manuals       Gentle Cervical Glides [facility]       SOR [facility]       GISTM  [facility]       Traction  [facility] 15'                               Neuro Re-Ed         Chin Tucks 2 x 10       3 Finger Rotation  2 x 10 ea        Pec Minor Release   5 x 5" [facility]       UBE w/ Traction  10' (1' on/off)                              Ther Ex  5      Mod FR protocol 7 x 1' ea  (1/2 foam roll)       T/S Ext  15 x 5"        Chin Tucks   On Doubled Pillow 1 x 15       C/S Iso  Flex/Ext/SB  5 x 5" ea                                      Ther Activity                        Gait Training                        Modalities

## 2021-05-12 ENCOUNTER — OFFICE VISIT (OUTPATIENT)
Dept: PHYSICAL THERAPY | Facility: OTHER | Age: 51
End: 2021-05-12
Payer: COMMERCIAL

## 2021-05-12 DIAGNOSIS — M48.02 CERVICAL STENOSIS OF SPINAL CANAL: ICD-10-CM

## 2021-05-12 DIAGNOSIS — M54.12 CERVICAL RADICULOPATHY: ICD-10-CM

## 2021-05-12 DIAGNOSIS — M54.2 NECK PAIN: Primary | ICD-10-CM

## 2021-05-12 DIAGNOSIS — M51.24 HNP (HERNIATED NUCLEUS PULPOSUS), THORACIC: ICD-10-CM

## 2021-05-12 DIAGNOSIS — M47.812 CERVICAL SPONDYLOSIS WITHOUT MYELOPATHY: ICD-10-CM

## 2021-05-12 DIAGNOSIS — Z98.1 S/P CERVICAL SPINAL FUSION: ICD-10-CM

## 2021-05-12 PROCEDURE — 97140 MANUAL THERAPY 1/> REGIONS: CPT | Performed by: PHYSICAL THERAPIST

## 2021-05-12 PROCEDURE — 97110 THERAPEUTIC EXERCISES: CPT | Performed by: PHYSICAL THERAPIST

## 2021-05-12 PROCEDURE — 97112 NEUROMUSCULAR REEDUCATION: CPT | Performed by: PHYSICAL THERAPIST

## 2021-05-13 NOTE — PROGRESS NOTES
Daily Note     Today's date: 21  Patient name: Allan Barker  : 1970  MRN: [id number]  Referring provider: Mirta Perla, PT  Dx:   Encounter Diagnosis     ICD-10-CM    1  Neck pain  M54 2    2  Cervical spondylosis without myelopathy  M47 812    3  S/P cervical spinal fusion  Z98 1    4  Cervical radiculopathy  M54 12    5  Cervical stenosis of spinal canal  M48 02    6  HNP (herniated nucleus pulposus), thoracic  M51 24      [facility] [id number]  1 on 1 [id number]  Start Time: 1630  Stop Time: 1730  Total time in clinic (min): 60 minutes    Subjective: Patient reports short term relief after PT session  Objective: See treatment diary below    Assessment: Tolerated treatment well  Patient demonstrated fatigue post treatment, exhibited good technique with therapeutic exercises and would benefit from continued PT    Plan: Continue per plan of care        Precautions: None    Daily Treatment Log  Manuals      Gentle Cervical Glides [facility]       SOR [facility]       GISTM  [facility]       Traction  [facility] 15'  [facility] 20'      Pec Minor Release   [facility] 5'                      Neuro Re-Ed       Chin Tucks 2 x 10       3 Finger Rotation  2 x 10 ea        Pec Minor Release   5 x 5" [facility]       UBE w/ Traction  10' (1' on/off) NV     UT Wall Slides   15 x 5"      LT Wall Slides   15 x 5"             Ther Ex      Mod FR protocol 7 x 1' ea  (1/2 foam roll)       T/S Ext  15 x 5"        Chin Tucks   On Doubled Pillow 1 x 15       C/S Iso  Flex/Ext/SB  5 x 5" ea      Rhomboid Str   10 x 10"      Pec Minor Stretch    10 x 10"                      Ther Activity                        Gait Training                        Modalities        [facility]    10' to begin

## 2021-05-19 ENCOUNTER — APPOINTMENT (OUTPATIENT)
Dept: PHYSICAL THERAPY | Facility: OTHER | Age: 51
End: 2021-05-19
Payer: COMMERCIAL

## 2021-05-24 ENCOUNTER — OFFICE VISIT (OUTPATIENT)
Dept: PHYSICAL THERAPY | Facility: OTHER | Age: 51
End: 2021-05-24
Payer: COMMERCIAL

## 2021-05-24 DIAGNOSIS — M48.02 CERVICAL STENOSIS OF SPINAL CANAL: ICD-10-CM

## 2021-05-24 DIAGNOSIS — M54.12 CERVICAL RADICULOPATHY: ICD-10-CM

## 2021-05-24 DIAGNOSIS — M47.812 CERVICAL SPONDYLOSIS WITHOUT MYELOPATHY: Primary | ICD-10-CM

## 2021-05-24 DIAGNOSIS — M51.24 HNP (HERNIATED NUCLEUS PULPOSUS), THORACIC: ICD-10-CM

## 2021-05-24 DIAGNOSIS — M54.2 NECK PAIN: ICD-10-CM

## 2021-05-24 DIAGNOSIS — Z98.1 S/P CERVICAL SPINAL FUSION: ICD-10-CM

## 2021-05-24 PROCEDURE — 97140 MANUAL THERAPY 1/> REGIONS: CPT | Performed by: PHYSICAL THERAPIST

## 2021-05-24 PROCEDURE — 97112 NEUROMUSCULAR REEDUCATION: CPT | Performed by: PHYSICAL THERAPIST

## 2021-05-24 PROCEDURE — 97110 THERAPEUTIC EXERCISES: CPT | Performed by: PHYSICAL THERAPIST

## 2021-05-24 NOTE — PROGRESS NOTES
Daily Note     Today's date: 21  Patient name: Flory Santos  : 1970  MRN: [id number]  Referring provider: Cipriano Cogan, PT  Dx:   Encounter Diagnosis     ICD-10-CM    1  Cervical spondylosis without myelopathy  M47 812    2  S/P cervical spinal fusion  Z98 1    3  Neck pain  M54 2    4  Cervical radiculopathy  M54 12    5  Cervical stenosis of spinal canal  M48 02    6  HNP (herniated nucleus pulposus), thoracic  M51 24        Start Time: 1630  Stop Time: 1730  Total time in clinic (min): 60 minutes    Subjective: Patient reports short term relief after PT session  He said that his relief lasts for a few days but it tends to come back  PT will send information about [facility] to him via e-mail  Objective: See treatment diary below    Assessment: Tolerated treatment well  Patient demonstrated fatigue post treatment, exhibited good technique with therapeutic exercises and would benefit from continued PT    Plan: Continue per plan of care        Precautions: None    Daily Treatment Log  Manuals     Gentle Cervical Glides [facility]       SOR [facility]       GISTM  [facility]       Traction  [facility] 15'  [facility] 20'  [facility] 20'     Pec Minor Release   [facility] 5'                      Neuro Re-Ed      Chin Tucks 2 x 10       3 Finger Rotation  2 x 10 ea        Pec Minor Release   5 x 5" [facility]       UBE w/ Traction  10' (1' on/off) NV     UT Wall Slides   15 x 5"  20 x 5"     LT Wall Slides   15 x 5" 20 x 5"             Ther Ex     Mod FR protocol 7 x 1' ea  (1/2 foam roll)       T/S Ext  15 x 5"        Chin Tucks   On Doubled Pillow 1 x 15       C/S Iso  Flex/Ext/SB  5 x 5" ea      Rhomboid Str   10 x 10"  10 x 10"     Pec Minor Stretch    10 x 10"  10 x 10"                     Ther Activity                        Gait Training                        Modalities        [facility]    10' to begin

## 2021-05-26 ENCOUNTER — APPOINTMENT (OUTPATIENT)
Dept: PHYSICAL THERAPY | Facility: OTHER | Age: 51
End: 2021-05-26
Payer: COMMERCIAL

## 2021-08-09 ENCOUNTER — OFFICE VISIT (OUTPATIENT)
Dept: INTERNAL MEDICINE CLINIC | Facility: CLINIC | Age: 51
End: 2021-08-09
Payer: COMMERCIAL

## 2021-08-09 VITALS
SYSTOLIC BLOOD PRESSURE: 150 MMHG | OXYGEN SATURATION: 98 % | WEIGHT: 268.6 LBS | HEART RATE: 68 BPM | TEMPERATURE: 98.2 F | DIASTOLIC BLOOD PRESSURE: 90 MMHG | HEIGHT: 73 IN | BODY MASS INDEX: 35.6 KG/M2

## 2021-08-09 DIAGNOSIS — Z12.11 ENCOUNTER FOR COLORECTAL CANCER SCREENING: Primary | ICD-10-CM

## 2021-08-09 DIAGNOSIS — R51.9 ACUTE NONINTRACTABLE HEADACHE, UNSPECIFIED HEADACHE TYPE: ICD-10-CM

## 2021-08-09 DIAGNOSIS — Z12.12 ENCOUNTER FOR COLORECTAL CANCER SCREENING: Primary | ICD-10-CM

## 2021-08-09 DIAGNOSIS — I10 HYPERTENSION, ESSENTIAL: ICD-10-CM

## 2021-08-09 DIAGNOSIS — M54.12 CERVICAL RADICULOPATHY: ICD-10-CM

## 2021-08-09 DIAGNOSIS — R25.1 SHAKY: ICD-10-CM

## 2021-08-09 DIAGNOSIS — M47.812 CERVICAL SPONDYLOSIS WITHOUT MYELOPATHY: ICD-10-CM

## 2021-08-09 DIAGNOSIS — E11.9 TYPE 2 DIABETES MELLITUS WITHOUT COMPLICATION, WITHOUT LONG-TERM CURRENT USE OF INSULIN (HCC): ICD-10-CM

## 2021-08-09 PROBLEM — E78.49 OTHER HYPERLIPIDEMIA: Status: ACTIVE | Noted: 2021-08-09

## 2021-08-09 PROCEDURE — 99214 OFFICE O/P EST MOD 30 MIN: CPT | Performed by: NURSE PRACTITIONER

## 2021-08-09 RX ORDER — ACETAMINOPHEN,DIPHENHYDRAMINE HCL 500; 25 MG/1; MG/1
1 TABLET, FILM COATED ORAL
COMMUNITY

## 2021-08-09 RX ORDER — LISINOPRIL 30 MG/1
30 TABLET ORAL DAILY
Qty: 30 TABLET | Refills: 0 | Status: SHIPPED | OUTPATIENT
Start: 2021-08-09 | End: 2021-08-25 | Stop reason: SDUPTHER

## 2021-08-09 NOTE — ASSESSMENT & PLAN NOTE
Concern for focal seziure  Will obtain EEG and CT head  Will get fasting blood work  Follow up in two weeks or sooner

## 2021-08-09 NOTE — ASSESSMENT & PLAN NOTE
Lab Results   Component Value Date    HGBA1C 6 5 (H) 02/12/2021   Will increase metoprolol to a 1500 mg in the morning and 1000 mg in the evening  Patient is to continue to work on diet and exercise  Limit sugars and carbohydrate intake  Avoid soda, juice, sweets, cookies, desserts, pasta, bread    Eat more whole grains, exercised 30 min of cardio at least 3 times a week  Also recommended daily foot exams to check for sores, and recommended yearly eye exams

## 2021-08-09 NOTE — ASSESSMENT & PLAN NOTE
Increase lisinopril to 30 mg tablet daily  Continue to monitor blood pressure at home  Goal BP is < 130/80  Contact our office for consistent elevations  Recommend low sodium diet  Exercise 30 minutes 5 times a week as tolerated    Recommend yearly eye exam

## 2021-08-09 NOTE — PROGRESS NOTES
Assessment/Plan:    Laurie  Concern for focal seziure  Will obtain EEG and CT head  Will get fasting blood work  Follow up in two weeks or sooner  Hypertension, essential  Increase lisinopril to 30 mg tablet daily  Continue to monitor blood pressure at home  Goal BP is < 130/80  Contact our office for consistent elevations  Recommend low sodium diet  Exercise 30 minutes 5 times a week as tolerated  Recommend yearly eye exam       Diabetes mellitus ([facility] )    Lab Results   Component Value Date    HGBA1C 6 5 (H) 02/12/2021   Will increase metoprolol to a 1500 mg in the morning and 1000 mg in the evening  Patient is to continue to work on diet and exercise  Limit sugars and carbohydrate intake  Avoid soda, juice, sweets, cookies, desserts, pasta, bread    Eat more whole grains, exercised 30 min of cardio at least 3 times a week  Also recommended daily foot exams to check for sores, and recommended yearly eye exams  Cervical radiculopathy  Patient currently follows pain management, will give referral back to Neurosurgery  Diagnoses and all orders for this visit:    Encounter for colorectal cancer screening  -     Ambulatory referral to Gastroenterology; Future    Type 2 diabetes mellitus without complication, without long-term current use of insulin (HCC)  -     Comprehensive metabolic panel; Future  -     CBC and differential  -     Hemoglobin A1C  -     TSH, 3rd generation with Free T4 reflex  -     metFORMIN (GLUCOPHAGE) 500 mg tablet; Pa take a 1500 mg (3 tablets) by mouth in the morning, and a 1000 mg (2 tablets) in the evening    Hypertension, essential  -     Lipid panel  -     lisinopril (ZESTRIL) 30 mg tablet; Take 1 tablet (30 mg total) by mouth daily    Laurie  -     TSH, 3rd generation with Free T4 reflex  -     CT head w contrast; Future  -     EEG Awake and asleep; Future  -     Ambulatory referral to Neurosurgery;  Future    Acute nonintractable headache, unspecified headache type  -     CT head w contrast; Future  -     EEG Awake and asleep; Future  -     Ambulatory referral to Neurosurgery; Future    Cervical radiculopathy  -     Ambulatory referral to Neurosurgery; Future    Cervical spondylosis without myelopathy  -     Ambulatory referral to Neurosurgery; Future    Other orders  -     diphenhydrAMINE-acetaminophen (TYLENOL PM)  MG TABS; Take 1 tablet by mouth daily at bedtime as needed for sleep          Subjective:      Patient ID: Joe Carter is a 45 y o  male  Patient presents today for on and off shakiness to right hand and right lower extremity  He reports over the past month he has had 2 episodes of my legs jumping and hand jumping , he states that these episodes lasted approximately 30 minutes long, he was driving at that time and had to stop driving  He reports that he did not feel like myself after these episodes"  Denies history of seizure  He also reports increased headaches to the left occipital lobe and pressure behind his left eye for approximately 1 month  He reports that his blood sugars have been around 147-166  Patient reports that in 2018 he had neck surgery and at baseline has a mild tremor to his right hand  The following portions of the patient's history were reviewed and updated as appropriate: allergies, current medications, past family history, past medical history, past social history, past surgical history and problem list     Review of Systems   Constitutional: Negative for activity change, appetite change, chills, diaphoresis and fever  HENT: Negative for congestion, ear discharge, ear pain, postnasal drip, rhinorrhea, sinus pressure, sinus pain and sore throat  Eyes: Negative for pain, discharge, itching and visual disturbance  Respiratory: Negative for cough, chest tightness, shortness of breath and wheezing  Cardiovascular: Negative for chest pain, palpitations and leg swelling     Gastrointestinal: Negative for abdominal pain, constipation, diarrhea, nausea and vomiting  Endocrine: Negative for polydipsia, polyphagia and polyuria  Genitourinary: Negative for difficulty urinating, dysuria and urgency  Musculoskeletal: Positive for arthralgias and neck pain  Negative for back pain  Skin: Negative for rash and wound  Neurological: Positive for headaches (on and off left side of his head, for about 10 months )  Negative for dizziness, weakness and numbness           Past Medical History:   Diagnosis Date    Achalasia     Aneurysm ([facility] )     Diabetes mellitus ([facility] )     Esophageal ulcer     Hyperlipidemia     Hypertension          Current Outpatient Medications:     baclofen 20 mg tablet, Take 1 tablet (20 mg total) by mouth 3 (three) times a day (Patient taking differently: Take 20 mg by mouth daily as needed ), Disp: 90 tablet, Rfl: 2    Cinnamon 500 MG capsule, Take 2 tablets by mouth daily, Disp: , Rfl:     diphenhydrAMINE-acetaminophen (TYLENOL PM)  MG TABS, Take 1 tablet by mouth daily at bedtime as needed for sleep, Disp: , Rfl:     lisinopril (ZESTRIL) 30 mg tablet, Take 1 tablet (30 mg total) by mouth daily, Disp: 30 tablet, Rfl: 0    metFORMIN (GLUCOPHAGE) 500 mg tablet, Pa take a 1500 mg (3 tablets) by mouth in the morning, and a 1000 mg (2 tablets) in the evening, Disp: , Rfl:     sildenafil (VIAGRA) 50 MG tablet, Take 50 mg by mouth as needed , Disp: , Rfl:     simvastatin (ZOCOR) 20 mg tablet, Take 20 mg by mouth daily , Disp: , Rfl:     benzonatate (TESSALON PERLES) 100 mg capsule, TK 1 C PO Q 8 H PRN (Patient not taking: Reported on 8/9/2021), Disp: , Rfl:     gabapentin (NEURONTIN) 300 mg capsule, Take 1 capsule (300 mg total) by mouth 3 (three) times a day (Patient not taking: Reported on 8/9/2021), Disp: 90 capsule, Rfl: 1    No Known Allergies    Social History   Past Surgical History:   Procedure Laterality Date    ANTERIOR CERVICAL DISCECTOMY W/ FUSION      APPENDECTOMY      BICEPS TENDON REPAIR      MENISCECTOMY      MYOTOMY ALLA LAPAROSCOPIC      ORTHOPEDIC SURGERY       Family History   Problem Relation Age of Onset    No Known Problems Mother     No Known Problems Father        Objective:  /90 (BP Location: Left arm, Patient Position: Sitting, Cuff Size: Large)   Pulse 68   Temp 98 2 °F (36 8 °C) (Tympanic)   Ht 6' 1" (1 854 m)   Wt 122 kg (268 lb 9 6 oz)   SpO2 98% Comment: room air  BMI 35 44 kg/m²     No results found for this or any previous visit (from the past 1344 hour(s))  Physical Exam  Constitutional:       General: He is not in acute distress  Appearance: He is well-developed  He is not diaphoretic  HENT:      Head: Normocephalic and atraumatic  Right Ear: External ear normal       Left Ear: External ear normal       Nose: Nose normal       Mouth/Throat:      Pharynx: No oropharyngeal exudate  Eyes:      General:         Right eye: No discharge  Left eye: No discharge  Conjunctiva/sclera: Conjunctivae normal       Pupils: Pupils are equal, round, and reactive to light  Neck:      Thyroid: No thyromegaly  Cardiovascular:      Rate and Rhythm: Normal rate and regular rhythm  Heart sounds: Normal heart sounds  No murmur heard  No friction rub  No gallop  Pulmonary:      Effort: Pulmonary effort is normal  No respiratory distress  Breath sounds: Normal breath sounds  No stridor  No wheezing or rales  Abdominal:      General: Bowel sounds are normal  There is no distension  Palpations: Abdomen is soft  Tenderness: There is no abdominal tenderness  Musculoskeletal:      Cervical back: Normal range of motion and neck supple  Lymphadenopathy:      Cervical: No cervical adenopathy  Skin:     General: Skin is warm and dry  Findings: No erythema or rash  Neurological:      Mental Status: He is alert and oriented to person, place, and time     Psychiatric: Behavior: Behavior normal          Thought Content:  Thought content normal          Judgment: Judgment normal

## 2021-08-13 ENCOUNTER — HOSPITAL ENCOUNTER (OUTPATIENT)
Dept: NEUROLOGY | Facility: HOSPITAL | Age: 51
Discharge: HOME | End: 2021-08-13
Payer: COMMERCIAL

## 2021-08-13 ENCOUNTER — TELEPHONE (OUTPATIENT)
Dept: INTERNAL MEDICINE CLINIC | Facility: CLINIC | Age: 51
End: 2021-08-13

## 2021-08-13 DIAGNOSIS — R51.9 ACUTE NONINTRACTABLE HEADACHE, UNSPECIFIED HEADACHE TYPE: ICD-10-CM

## 2021-08-13 DIAGNOSIS — R25.1 SHAKY: ICD-10-CM

## 2021-08-13 PROCEDURE — 95819 EEG AWAKE AND ASLEEP: CPT

## 2021-08-13 PROCEDURE — 95812 EEG 41-60 MINUTES: CPT | Performed by: PSYCHIATRY & NEUROLOGY

## 2021-08-13 NOTE — TELEPHONE ENCOUNTER
EEG order faxed to [facility] [address]      CT scan of head scheduled 8/20/21 at [facility]

## 2021-08-13 NOTE — TELEPHONE ENCOUNTER
Patient had an EEG done today and they need a script faxed to [address]    CT scan of head was not done and will need a prior authorization through his insurance

## 2021-08-18 ENCOUNTER — RA CDI HCC (OUTPATIENT)
Dept: OTHER | Facility: HOSPITAL | Age: 51
End: 2021-08-18

## 2021-08-18 NOTE — PROGRESS NOTES
[facility]  coding opportunities       Chart reviewed, no opportunity found: CHART REVIEWED, NO OPPORTUNITY FOUND                        Patients insurance company: [employer] (Medicare Advantage and Commercial)

## 2021-08-20 ENCOUNTER — HOSPITAL ENCOUNTER (OUTPATIENT)
Dept: RADIOLOGY | Age: 51
Discharge: HOME | End: 2021-08-20
Payer: COMMERCIAL

## 2021-08-20 DIAGNOSIS — R25.1 SHAKY: ICD-10-CM

## 2021-08-20 DIAGNOSIS — R51.9 ACUTE NONINTRACTABLE HEADACHE, UNSPECIFIED HEADACHE TYPE: ICD-10-CM

## 2021-08-20 PROCEDURE — 70450 CT HEAD/BRAIN W/O DYE: CPT

## 2021-08-20 PROCEDURE — G1004 CDSM NDSC: HCPCS

## 2021-08-24 ENCOUNTER — TELEPHONE (OUTPATIENT)
Dept: INTERNAL MEDICINE CLINIC | Facility: CLINIC | Age: 51
End: 2021-08-24

## 2021-08-24 NOTE — TELEPHONE ENCOUNTER
We will discuss further tomorrow, but based off results I think it is safe to say he is not having seizures, but would like to discuss with him further tomorrow

## 2021-08-24 NOTE — TELEPHONE ENCOUNTER
Patient has an appt tomorrow at the [facility] which he will be coming to  He is currently out of work not driving and is required to work from home because he had spoken with his manager and both agreed it was best to work from home with everything going on health wise  He wants to know after having bw, CT, and eeg that if there is any concern for a seizure or not  He just wanted verbal clarification before his appt tomorrow, not to go over results of any kind  Can this be provided for him?

## 2021-08-25 ENCOUNTER — OFFICE VISIT (OUTPATIENT)
Dept: INTERNAL MEDICINE CLINIC | Facility: CLINIC | Age: 51
End: 2021-08-25
Payer: COMMERCIAL

## 2021-08-25 VITALS
BODY MASS INDEX: 35.43 KG/M2 | WEIGHT: 261.6 LBS | HEIGHT: 72 IN | HEART RATE: 67 BPM | TEMPERATURE: 98.6 F | DIASTOLIC BLOOD PRESSURE: 80 MMHG | SYSTOLIC BLOOD PRESSURE: 140 MMHG | OXYGEN SATURATION: 98 %

## 2021-08-25 DIAGNOSIS — C61 PROSTATE CANCER (HCC): ICD-10-CM

## 2021-08-25 DIAGNOSIS — R25.1 SHAKY: ICD-10-CM

## 2021-08-25 DIAGNOSIS — I10 HYPERTENSION, ESSENTIAL: ICD-10-CM

## 2021-08-25 DIAGNOSIS — M54.12 CERVICAL RADICULOPATHY: ICD-10-CM

## 2021-08-25 DIAGNOSIS — E11.9 TYPE 2 DIABETES MELLITUS WITHOUT COMPLICATION, WITHOUT LONG-TERM CURRENT USE OF INSULIN (HCC): Primary | ICD-10-CM

## 2021-08-25 PROCEDURE — 4010F ACE/ARB THERAPY RXD/TAKEN: CPT | Performed by: FAMILY MEDICINE

## 2021-08-25 PROCEDURE — 3725F SCREEN DEPRESSION PERFORMED: CPT | Performed by: NURSE PRACTITIONER

## 2021-08-25 PROCEDURE — 99214 OFFICE O/P EST MOD 30 MIN: CPT | Performed by: NURSE PRACTITIONER

## 2021-08-25 RX ORDER — LISINOPRIL 40 MG/1
40 TABLET ORAL DAILY
Qty: 90 TABLET | Refills: 1 | Status: SHIPPED | OUTPATIENT
Start: 2021-08-25 | End: 2021-09-10 | Stop reason: SDUPTHER

## 2021-08-25 RX ORDER — MULTIVIT WITH MINERALS/LUTEIN
1000 TABLET ORAL DAILY
COMMUNITY

## 2021-08-25 NOTE — ASSESSMENT & PLAN NOTE
Increase lisinopril to 40 mg tablet daily  Continue to monitor blood pressure at home  Goal BP is < 130/80  Contact our office for consistent elevations  Recommend low sodium diet  Exercise 30 minutes 5 times a week as tolerated    Recommend yearly eye exam

## 2021-08-25 NOTE — ASSESSMENT & PLAN NOTE
Lab Results   Component Value Date    HGBA1C 7 3 08/10/2021   Will increase metformin  to a 1500 mg in the morning and 1000 mg in the evening  Patient is to continue to work on diet and exercise  Limit sugars and carbohydrate intake  Avoid soda, juice, sweets, cookies, desserts, pasta, bread    Eat more whole grains, exercised 30 min of cardio at least 3 times a week  Also recommended daily foot exams to check for sores, and recommended yearly eye exams

## 2021-08-25 NOTE — PROGRESS NOTES
Assessment/Plan:    Diabetes mellitus ([facility] )    Lab Results   Component Value Date    HGBA1C 7 3 08/10/2021   Will increase metformin  to a 1500 mg in the morning and 1000 mg in the evening  Patient is to continue to work on diet and exercise  Limit sugars and carbohydrate intake  Avoid soda, juice, sweets, cookies, desserts, pasta, bread    Eat more whole grains, exercised 30 min of cardio at least 3 times a week  Also recommended daily foot exams to check for sores, and recommended yearly eye exams  Hypertension, essential  Increase lisinopril to 40 mg tablet daily  Continue to monitor blood pressure at home  Goal BP is < 130/80  Contact our office for consistent elevations  Recommend low sodium diet  Exercise 30 minutes 5 times a week as tolerated  Recommend yearly eye exam       Cervical radiculopathy  Patient currently follows pain management, will give referral back to Neurosurgery  Laurie  Reviewed EEG and CT head  Reviewed blood work, referral given for neurosurgery  Diagnoses and all orders for this visit:    Type 2 diabetes mellitus without complication, without long-term current use of insulin ([facility] )  -     Cancel: Ambulatory referral to Ophthalmology; Future  -     Ambulatory referral to Ophthalmology; Future    Hypertension, essential  -     lisinopril (ZESTRIL) 40 mg tablet; Take 1 tablet (40 mg total) by mouth daily  -     Comprehensive metabolic panel; Future  -     CBC and differential  -     Hemoglobin A1C  -     Lipid panel    Prostate cancer (HCC)  -     PSA, Total Screen; Future  -     Ambulatory referral to Urology; Future    Cervical radiculopathy    Laurie    Other orders  -     Ascorbic Acid (vitamin C) 1000 MG tablet; Take 1,000 mg by mouth daily          Subjective:      Patient ID: Ruth Hathaway is a 45 y o  male  Patient presents today to follow up on blood work and Imaging  CT head showed no intercranial abnormalities    EEG:This is a normal 41 minutes awake, drowsy, and asleep EEG  BS 140s-130s, lowest        Reviewed blood work HBA1C 7 3, all other blood work within normal limits  Note from last office visit:  Patient presents today for on and off shakiness to right hand and right lower extremity  He reports over the past month he has had 2 episodes of "my legs jumping and hand jumping ", he states that these episodes lasted approximately 30 minutes long, he was driving at that time and had to stop driving  He reports that he "did not feel like myself after these episodes"  Denies history of seizure  He also reports increased headaches to the left occipital lobe and pressure behind his left eye for approximately 1 month  He reports that his blood sugars have been around 147-166  Patient reports that in 2018 he had neck surgery and at baseline has a mild tremor to his right hand  The following portions of the patient's history were reviewed and updated as appropriate: allergies, current medications, past family history, past medical history, past social history, past surgical history and problem list     Review of Systems   Constitutional: Negative for activity change, appetite change, chills, diaphoresis and fever  HENT: Negative for congestion, ear discharge, ear pain, postnasal drip, rhinorrhea, sinus pressure, sinus pain and sore throat  Eyes: Negative for pain, discharge, itching and visual disturbance  Respiratory: Negative for cough, chest tightness, shortness of breath and wheezing  Cardiovascular: Negative for chest pain, palpitations and leg swelling  Gastrointestinal: Negative for abdominal pain, constipation, diarrhea, nausea and vomiting  Endocrine: Negative for polydipsia, polyphagia and polyuria  Genitourinary: Negative for difficulty urinating, dysuria and urgency  Musculoskeletal: Negative for arthralgias, back pain and neck pain  Skin: Negative for rash and wound     Neurological: Negative for dizziness, weakness, numbness and headaches           Past Medical History:   Diagnosis Date    Achalasia     [facility]     [facility] mellitus ([facility] )     Esophageal ulcer     Hyperlipidemia     Hypertension          Current Outpatient Medications:     Ascorbic Acid (vitamin C) 1000 MG tablet, Take 1,000 mg by mouth daily, Disp: , Rfl:     baclofen 20 mg tablet, Take 1 tablet (20 mg total) by mouth 3 (three) times a day (Patient taking differently: Take 20 mg by mouth daily as needed ), Disp: 90 tablet, Rfl: 2    Cinnamon 500 MG capsule, Take 2 tablets by mouth daily, Disp: , Rfl:     diphenhydrAMINE-acetaminophen (TYLENOL PM)  MG TABS, Take 1 tablet by mouth daily at bedtime as needed for sleep, Disp: , Rfl:     lisinopril (ZESTRIL) 40 mg tablet, Take 1 tablet (40 mg total) by mouth daily, Disp: 90 tablet, Rfl: 1    metFORMIN (GLUCOPHAGE) 500 mg tablet, Pa take a 1500 mg (3 tablets) by mouth in the morning, and a 1000 mg (2 tablets) in the evening (Patient taking differently: Take 1,000 mg by mouth 2 (two) times a day with meals Pa take a 1500 mg (3 tablets) by mouth in the morning, and a 1000 mg (2 tablets) in the evening), Disp: , Rfl:     sildenafil (VIAGRA) 50 MG tablet, Take 50 mg by mouth as needed , Disp: , Rfl:     simvastatin (ZOCOR) 20 mg tablet, Take 20 mg by mouth daily , Disp: , Rfl:     benzonatate (TESSALON PERLES) 100 mg capsule, TK 1 C PO Q 8 H PRN (Patient not taking: Reported on 8/9/2021), Disp: , Rfl:     gabapentin (NEURONTIN) 300 mg capsule, Take 1 capsule (300 mg total) by mouth 3 (three) times a day (Patient not taking: Reported on 8/9/2021), Disp: 90 capsule, Rfl: 1    No Known Allergies    Social History   Past Surgical History:   Procedure Laterality Date    ANTERIOR CERVICAL DISCECTOMY W/ FUSION      APPENDECTOMY      BICEPS TENDON REPAIR      MENISCECTOMY      MYOTOMY HELLER LAPAROSCOPIC      ORTHOPEDIC SURGERY       Family History   Problem Relation Age of Onset    No Known Problems Mother     No Known Problems Father        Objective:  /80   Pulse 67   Temp 98 6 °F (37 °C) (Tympanic)   Ht 6' 0 17" (1 833 m)   Wt 119 kg (261 lb 9 6 oz)   SpO2 98%   BMI 35 32 kg/m²     Recent Results (from the past 1344 hour(s))   Hemoglobin A1C    Collection Time: 08/10/21 12:00 AM   Result Value Ref Range    Hemoglobin A1C 7 3             Physical Exam  Constitutional:       General: He is not in acute distress  Appearance: He is well-developed  He is not diaphoretic  HENT:      Head: Normocephalic and atraumatic  Right Ear: External ear normal       Left Ear: External ear normal       Nose: Nose normal       Mouth/Throat:      Pharynx: No oropharyngeal exudate  Eyes:      General:         Right eye: No discharge  Left eye: No discharge  Conjunctiva/sclera: Conjunctivae normal       Pupils: Pupils are equal, round, and reactive to light  Neck:      Thyroid: No thyromegaly  Cardiovascular:      Rate and Rhythm: Normal rate and regular rhythm  Heart sounds: Normal heart sounds  No murmur heard  No friction rub  No gallop  Pulmonary:      Effort: Pulmonary effort is normal  No respiratory distress  Breath sounds: Normal breath sounds  No stridor  No wheezing or rales  Abdominal:      General: Bowel sounds are normal  There is no distension  Palpations: Abdomen is soft  Tenderness: There is no abdominal tenderness  Musculoskeletal:      Cervical back: Normal range of motion and neck supple  Lymphadenopathy:      Cervical: No cervical adenopathy  Skin:     General: Skin is warm and dry  Findings: No erythema or rash  Neurological:      Mental Status: He is alert and oriented to person, place, and time  Psychiatric:         Behavior: Behavior normal          Thought Content:  Thought content normal          Judgment: Judgment normal

## 2021-08-25 NOTE — PROGRESS NOTES
Diabetic Foot Exam    Patient's shoes and socks removed  Right Foot/Ankle   Right Foot Inspection  Skin Exam: skin normal and skin intact no dry skin, no warmth, no callus, no erythema, no maceration, no abnormal color, no pre-ulcer, no ulcer and no callus                          Toe Exam: ROM and strength within normal limits  Sensory       Monofilament testing: intact  Vascular  Capillary refills: < 3 seconds  The right DP pulse is 2+  The right PT pulse is 2+  Left Foot/Ankle  Left Foot Inspection  Skin Exam: skin normal and skin intactno dry skin, no warmth, no erythema, no maceration, normal color, no pre-ulcer, no ulcer and no callus                         Toe Exam: ROM and strength within normal limits                   Sensory       Monofilament: intact  Vascular  Capillary refills: < 3 seconds  The left DP pulse is 2+  The left PT pulse is 2+  Assign Risk Category:  No deformity present; No loss of protective sensation; No weak pulses       Risk: 0  BMI Counseling: Body mass index is 35 32 kg/m²  The BMI is above normal  Nutrition recommendations include decreasing portion sizes, encouraging healthy choices of fruits and vegetables, decreasing fast food intake, consuming healthier snacks, limiting drinks that contain sugar, moderation in carbohydrate intake, increasing intake of lean protein, reducing intake of saturated and trans fat and reducing intake of cholesterol  Exercise recommendations include moderate physical activity 150 minutes/week and exercising 3-5 times per week

## 2021-09-01 ENCOUNTER — TELEPHONE (OUTPATIENT)
Dept: ADMINISTRATIVE | Facility: OTHER | Age: 51
End: 2021-09-01

## 2021-09-01 ENCOUNTER — TELEPHONE (OUTPATIENT)
Dept: GASTROENTEROLOGY | Facility: CLINIC | Age: 51
End: 2021-09-01

## 2021-09-01 NOTE — TELEPHONE ENCOUNTER
----- Message from Oziel Lance sent at 8/31/2021  6:24 PM EDT -----  Regarding: diabetic eye exam - [facility]  08/31/21 6:24 PM    Hello, our patient No patient name on file  has had Diabetic Eye Exam completed/performed  Please assist in updating the patient chart by pulling the document from the Media Tab  The date of service is 04/13/2021       Thank you,  Oziel Lance  [facility]

## 2021-09-01 NOTE — TELEPHONE ENCOUNTER
Upon review of the In Basket request we were able to locate, review, and update the patient chart as requested for Diabetic Eye Exam     Any additional questions or concerns should be emailed to the Practice Liaisons via [email or web address] email, please do not reply via In Basket      Thank you  Breann Bell

## 2021-09-01 NOTE — TELEPHONE ENCOUNTER
Passed oa  Colonoscopy scheduled 09/20/21 with Dr Del Snyder  Dulcolax/miralax instructions given to pt verbally/mailed  09/01/21  Screened by: Melany Montgomery    Referring Provider Aimee Clarke    Pre- Screening: Body mass index is 35 32 kg/m²  Has patient been referred for a routine screening Colonoscopy? yes  Is the patient between 39-70 years old? yes      Previous Colonoscopy yes   If yes:    Date: 2011    Facility:     Reason:       SCHEDULING STAFF: If the patient is between 45yrs-49yrs, please advise patient to confirm benefits/coverage with their insurance company for a routine screening colonoscopy, some insurance carriers will only cover at [facility] or older  If the patient is over 66years old, please schedule an office visit  Does the patient want to see a Gastroenterologist prior to their procedure OR are they having any GI symptoms? no    Has the patient been hospitalized or had abdominal surgery in the past 6 months? no    Does the patient use supplemental oxygen? no    Does the patient take Coumadin, Lovenox, Plavix, Elliquis, Xarelto, or other blood thinning medication? no    Has the patient had a stroke, cardiac event, or stent placed in the past year? no    SCHEDULING STAFF: If patient answers NO to above questions, then schedule procedure  If patient answers YES to above questions, then schedule office appointment  If patient is between 45yrs - 49yrs, please advise patient that we will have to confirm benefits & coverage with their insurance company for a routine screening colonoscopy

## 2021-09-03 ENCOUNTER — APPOINTMENT (OUTPATIENT)
Dept: RADIOLOGY | Facility: OTHER | Age: 51
End: 2021-09-03
Payer: COMMERCIAL

## 2021-09-03 ENCOUNTER — OFFICE VISIT (OUTPATIENT)
Dept: OBGYN CLINIC | Facility: OTHER | Age: 51
End: 2021-09-03
Payer: COMMERCIAL

## 2021-09-03 VITALS
SYSTOLIC BLOOD PRESSURE: 137 MMHG | HEART RATE: 58 BPM | HEIGHT: 73 IN | WEIGHT: 261 LBS | DIASTOLIC BLOOD PRESSURE: 81 MMHG | BODY MASS INDEX: 34.59 KG/M2

## 2021-09-03 DIAGNOSIS — S69.92XA INJURY OF LEFT WRIST, INITIAL ENCOUNTER: Primary | ICD-10-CM

## 2021-09-03 DIAGNOSIS — S66.912A WRIST STRAIN, LEFT, INITIAL ENCOUNTER: ICD-10-CM

## 2021-09-03 DIAGNOSIS — M79.641 RIGHT HAND PAIN: ICD-10-CM

## 2021-09-03 PROCEDURE — 99203 OFFICE O/P NEW LOW 30 MIN: CPT | Performed by: FAMILY MEDICINE

## 2021-09-03 PROCEDURE — 3075F SYST BP GE 130 - 139MM HG: CPT | Performed by: FAMILY MEDICINE

## 2021-09-03 PROCEDURE — 3008F BODY MASS INDEX DOCD: CPT | Performed by: FAMILY MEDICINE

## 2021-09-03 PROCEDURE — 1036F TOBACCO NON-USER: CPT | Performed by: FAMILY MEDICINE

## 2021-09-03 PROCEDURE — 3079F DIAST BP 80-89 MM HG: CPT | Performed by: FAMILY MEDICINE

## 2021-09-03 PROCEDURE — 73130 X-RAY EXAM OF HAND: CPT

## 2021-09-03 NOTE — PROGRESS NOTES
1  Injury of left wrist, initial encounter  XR hand 3+ vw left   2  Wrist strain, left, initial encounter       Orders Placed This Encounter   Procedures    XR hand 3+ vw left        Imaging Studies (I personally reviewed images in PACS and report):  Xray 9/3/21 left hand:  No acute abnormality    IMPRESSION:  Left ECU strain    DDX  TFCC tear    Repeat X-ray next visit: None    Return in about 6 weeks (around 10/15/2021)  Patient Instructions   Recommend wrist brace during activity  Start physical therapy including home range of motion exercises            CHIEF COMPLAINT:  Left wrist injury    HPI:  Jah Rinaldi is a 45 y o  male  who presents for       Visit 9/3/2021 :  Occurred on 8/12/21 when patient was attempting to fix hot water heater and twisted his wrist while wrenching  Points to ulnar aspect of wrist as source of pain  Seen at urgent care where had xray told was normal and placed in a brace  Today, states no improvement in pain and has sharp pain when moving or twisting his wrist ulnar deviation with moderate pain  Review of Systems   Constitutional: Negative for chills, fever and unexpected weight change  HENT: Negative for hearing loss, nosebleeds and sore throat  Eyes: Negative for pain, redness and visual disturbance  Respiratory: Negative for cough, shortness of breath and wheezing  Cardiovascular: Negative for chest pain, palpitations and leg swelling  Gastrointestinal: Negative for abdominal distention, nausea and vomiting  Endocrine: Negative for polydipsia and polyuria  Genitourinary: Negative for dysuria and hematuria  Skin: Negative for rash and wound  Neurological: Negative for dizziness, numbness and headaches  Psychiatric/Behavioral: Negative for decreased concentration and suicidal ideas           Following history reviewed and update:    Past Medical History:   Diagnosis Date    Achalasia     [facility] )     [facility] mellitus ([facility] )     Esophageal ulcer     Hyperlipidemia     Hypertension      Past Surgical History:   Procedure Laterality Date    ANTERIOR CERVICAL DISCECTOMY W/ FUSION      APPENDECTOMY      BICEPS TENDON REPAIR      MENISCECTOMY      MYOTOMY HELLER LAPAROSCOPIC      ORTHOPEDIC SURGERY       Social History   Social History     Substance and Sexual Activity   Alcohol Use Not Currently     Social History     Substance and Sexual Activity   Drug Use Never     Social History     Tobacco Use   Smoking Status Never Smoker   Smokeless Tobacco Never Used     Family History   Problem Relation Age of Onset    No Known Problems Mother     No Known Problems Father      No Known Allergies       Physical Exam  /81 (BP Location: Right arm, Patient Position: Sitting, Cuff Size: Adult)   Pulse 58   Ht 6' 1" (1 854 m)   Wt 118 kg (261 lb)   BMI 34 43 kg/m²     Constitutional:  see vital signs  Gen: well-developed, normocephalic/atraumatic, well-groomed  Eyes: No inflammation or discharge of conjunctiva or lids; sclera clear   Pharynx: no inflammation, lesion, or mass of lips  Neck: supple, no masses, non-distended  MSK: no inflammation, lesion, mass, or clubbing of nails and digits except for other than mentioned below  SKIN: no visible rashes or skin lesions  Pulmonary/Chest: Effort normal  No respiratory distress     NEURO: cranial nerves grossly intact  PSYCH:  Alert and oriented to person, place, and time; recent and remote memory intact; mood normal, no depression, anxiety, or agitation, judgment and insight good and intact     Ortho Exam    Left Elbow:  no swelling, erythema, or increased warmth  rom full  nontender  no laxity of joint  Cubital tunnel Tinel's test:  Distal Biceps Hook test:    Left Wrist  no swelling, erythema, or increased warmth  rom full  +tenderness ECU reproduces chief complaint of pain  +pain with resisted ECU isometric contraction  no laxity of joint; druj stable  Carpal tunnel compression test:  Phalen's test:  Tinel's carpal tunnel test:    Left Hand  no erythema  swelling:  tenderness:  rom fingers mcp, pip, dip intact without pain  No digital scissoring or deviation of fingers  no extensor lag  no rotation of fingers  no joint laxity  strenght flexion and extension mcp, pip, dip 5/5  sensation intact  capillary refill intact   Froment sign:  normal  OK sign:  Normal  Thumb extension:  5/5    Procedures

## 2021-09-03 NOTE — PATIENT INSTRUCTIONS
Recommend wrist brace during activity  Start physical therapy including home range of motion exercises

## 2021-09-06 ENCOUNTER — NURSE TRIAGE (OUTPATIENT)
Dept: OTHER | Facility: OTHER | Age: 51
End: 2021-09-06

## 2021-09-06 DIAGNOSIS — Z20.828 SARS-ASSOCIATED CORONAVIRUS EXPOSURE: Primary | ICD-10-CM

## 2021-09-06 NOTE — TELEPHONE ENCOUNTER
Regarding: Covid/ Direct exposure/ muscle aches  ----- Message from Celesta Opitz, [place] sent at 9/6/2021  2:15 PM EDT -----  "My son tested positive for covid and I have been around him   This morning I started to get muscle aches, sore throat and headache "

## 2021-09-07 PROCEDURE — U0005 INFEC AGEN DETEC AMPLI PROBE: HCPCS | Performed by: NURSE PRACTITIONER

## 2021-09-07 PROCEDURE — U0003 INFECTIOUS AGENT DETECTION BY NUCLEIC ACID (DNA OR RNA); SEVERE ACUTE RESPIRATORY SYNDROME CORONAVIRUS 2 (SARS-COV-2) (CORONAVIRUS DISEASE [COVID-19]), AMPLIFIED PROBE TECHNIQUE, MAKING USE OF HIGH THROUGHPUT TECHNOLOGIES AS DESCRIBED BY CMS-2020-01-R: HCPCS | Performed by: NURSE PRACTITIONER

## 2021-09-07 NOTE — TELEPHONE ENCOUNTER
Reason for Disposition   [1] COVID-19 infection suspected by caller or triager AND [2] mild symptoms (cough, fever, or others) AND [4] no complications or SOB    Answer Assessment - Initial Assessment Questions  1  Were you within 6 feet or less, for up to 15 minutes or more with a person that has a confirmed COVID-19 test?   Yes     2  What was the date of your exposure? 9/3    3  Are you experiencing any symptoms attributed to the virus?  (Assess for SOB, cough, fever, difficulty breathing)   Sore throat, stuffy nose, muscle aches, headache     4   HIGH RISK: Do you have any history heart or lung conditions, weakened immune system, diabetes, Asthma, CHF, HIV, COPD, Chemo, renal failure, sickle cell, etc?   DM    Protocols used: CORONAVIRUS (COVID-19) DIAGNOSED OR SUSPECTED-ADULT-

## 2021-09-08 ENCOUNTER — TELEMEDICINE (OUTPATIENT)
Dept: INTERNAL MEDICINE CLINIC | Facility: CLINIC | Age: 51
End: 2021-09-08
Payer: COMMERCIAL

## 2021-09-08 ENCOUNTER — TELEPHONE (OUTPATIENT)
Dept: OTHER | Facility: OTHER | Age: 51
End: 2021-09-08

## 2021-09-08 VITALS
SYSTOLIC BLOOD PRESSURE: 159 MMHG | BODY MASS INDEX: 34.59 KG/M2 | HEART RATE: 68 BPM | HEIGHT: 73 IN | OXYGEN SATURATION: 97 % | DIASTOLIC BLOOD PRESSURE: 89 MMHG | TEMPERATURE: 96.9 F | WEIGHT: 261 LBS

## 2021-09-08 DIAGNOSIS — J06.9 VIRAL UPPER RESPIRATORY TRACT INFECTION: Primary | ICD-10-CM

## 2021-09-08 PROCEDURE — 3077F SYST BP >= 140 MM HG: CPT | Performed by: STUDENT IN AN ORGANIZED HEALTH CARE EDUCATION/TRAINING PROGRAM

## 2021-09-08 PROCEDURE — 99213 OFFICE O/P EST LOW 20 MIN: CPT | Performed by: STUDENT IN AN ORGANIZED HEALTH CARE EDUCATION/TRAINING PROGRAM

## 2021-09-08 PROCEDURE — 3079F DIAST BP 80-89 MM HG: CPT | Performed by: STUDENT IN AN ORGANIZED HEALTH CARE EDUCATION/TRAINING PROGRAM

## 2021-09-08 NOTE — TELEPHONE ENCOUNTER
Patient called [facility] to schedule a follow up visit for his negative covid 19 test result  No virtual appointments are available today  Patient agreeable to call back when office is open today to schedule a follow up appointment

## 2021-09-08 NOTE — LETTER
September 8, 2021     Patient: Chacho Pradhan   YOB: 1970   Date of Visit: 9/8/2021       To Whom it May Concern:    Chacho Pradhan is under my professional care  He was seen in my office on 9/8/2021  He tested negative for COVID-19 on 09/07/2021  He is cleared to return to work without restrictions  If you have any questions or concerns, please don't hesitate to call           Sincerely,          Jonas Almeida MD        CC: No Recipients

## 2021-09-08 NOTE — PROGRESS NOTES
Virtual Regular Visit    Verification of patient location:    Patient is located in the following state in which I hold an active license [place]      Assessment/Plan:    Problem List Items Addressed This Visit     None      Visit Diagnoses     Viral upper respiratory tract infection    -  Primary  Upper respiratory symptoms started on 09/03 including sore throat, stuffy nose, occasional cough and muscle aches  Tested negative for COVID-19 on 9/7  Continued to experience throat discomfort, no issues with swallowing, mild occasional cough and sputum production in the morning  Has been taking Mucinex  Needs letter to go back to work  Reason for visit is   Chief Complaint   Patient presents with    Cold Like Symptoms     pt tested neg for covid yesterday  son test came back positive monday  pt having sore throat  stuufy nose and coughing  no fever    health Screening     colonoscopy ordered on 9/20    Virtual Regular Visit        Encounter provider Samira Stauffer MD    Provider located at [address] [id number]      Recent Visits  No visits were found meeting these conditions  Showing recent visits within past 7 days and meeting all other requirements  Today's Visits  Date Type Provider Dept   09/08/21 Telemedicine Samira Stauffer, [address] today's visits and meeting all other requirements  Future Appointments  No visits were found meeting these conditions  Showing future appointments within next 150 days and meeting all other requirements       The patient was identified by name and date of birth  Tato Ortiz was informed that this is a telemedicine visit and that the visit is being conducted through [address] [place] [address] [facility] and patient was informed that this is a secure, HIPAA-compliant platform  He agrees to proceed     My office door was closed   No one else was in the room  He acknowledged consent and understanding of privacy and security of the video platform  The patient has agreed to participate and understands they can discontinue the visit at any time  Patient is aware this is a billable service  Subjective  Dylan Tamayo is a 45 y o  male   HPI     Patient evaluated today for upper respiratory symptoms  His symptoms started on 09/03  This includes sore throat, muscle aches, cough and stuffy nose  No fevers, chills or shortness of breath  He tested negative for COVID-19 on 09/07  Majority of symptoms has much improved  He continues to have occasional mild cough and production of sputum in the morning when he wakes up  Reported that his throat is mildly sore and red  No difficulty of swallowing  Continued to experience no fever or shortness of breath  He works as a  for [employer]  He has been working from home  Needs letter to go back to work      Past Medical History:   Diagnosis Date    Achalasia     [facility]     Diabetes mellitus [facility] )     Esophageal ulcer     Hyperlipidemia     Hypertension        Past Surgical History:   Procedure Laterality Date    ANTERIOR CERVICAL DISCECTOMY W/ FUSION      APPENDECTOMY      BICEPS TENDON REPAIR      MENISCECTOMY      MYOTOMY HELLER LAPAROSCOPIC      ORTHOPEDIC SURGERY         Current Outpatient Medications   Medication Sig Dispense Refill    Ascorbic Acid (vitamin C) 1000 MG tablet Take 1,000 mg by mouth daily      baclofen 20 mg tablet Take 1 tablet (20 mg total) by mouth 3 (three) times a day (Patient taking differently: Take 20 mg by mouth daily as needed ) 90 tablet 2    diphenhydrAMINE-acetaminophen (TYLENOL PM)  MG TABS Take 1 tablet by mouth daily at bedtime as needed for sleep      lisinopril (ZESTRIL) 40 mg tablet Take 1 tablet (40 mg total) by mouth daily 90 tablet 1    metFORMIN (GLUCOPHAGE) 500 mg tablet Pa take a 1500 mg (3 tablets) by mouth in the morning, and a 1000 mg (2 tablets) in the evening (Patient taking differently: Take 1,000 mg by mouth 2 (two) times a day with meals Pa take a 1500 mg (3 tablets) by mouth in the morning, and a 1000 mg (2 tablets) in the evening)      sildenafil (VIAGRA) 50 MG tablet Take 50 mg by mouth as needed       simvastatin (ZOCOR) 20 mg tablet Take 20 mg by mouth daily       benzonatate (TESSALON PERLES) 100 mg capsule TK 1 C PO Q 8 H PRN (Patient not taking: Reported on 9/8/2021)      Cinnamon 500 MG capsule Take 2 tablets by mouth daily (Patient not taking: Reported on 9/8/2021)      gabapentin (NEURONTIN) 300 mg capsule Take 1 capsule (300 mg total) by mouth 3 (three) times a day (Patient not taking: Reported on 8/9/2021) 90 capsule 1     No current facility-administered medications for this visit  No Known Allergies    Review of Systems   HENT: Positive for congestion and sore throat  Negative for trouble swallowing  Respiratory: Positive for cough  Negative for shortness of breath and wheezing  Musculoskeletal: Negative for arthralgias and myalgias  Video Exam    Vitals:    09/08/21 1241   BP: 159/89   BP Location: Left arm   Patient Position: Sitting   Cuff Size: Large   Pulse: 68   Temp: (!) 96 9 °F (36 1 °C)   TempSrc: Temporal   SpO2: 97%   Weight: 118 kg (261 lb)   Height: 6' 1" (1 854 m)       Physical Exam  Constitutional:       General: He is not in acute distress  Appearance: Normal appearance  He is not ill-appearing  Pulmonary:      Effort: No respiratory distress  Neurological:      Mental Status: He is alert and oriented to person, place, and time  I spent 14 minutes directly with the patient during this visit    VIRTUAL VISIT DISCLAIMER      Cristiane Carlson verbally agrees to participate in [facility]   Pt is aware that [facility] could be limited without vital signs or the ability to perform a full hands-on physical Hector Duvall understands he or the provider may request at any time to terminate the video visit and request the patient to seek care or treatment in person

## 2021-09-10 PROBLEM — J06.9 VIRAL UPPER RESPIRATORY TRACT INFECTION: Status: ACTIVE | Noted: 2021-09-10

## 2021-09-10 PROCEDURE — 4010F ACE/ARB THERAPY RXD/TAKEN: CPT | Performed by: PHYSICIAN ASSISTANT

## 2021-09-10 NOTE — ASSESSMENT & PLAN NOTE
Upper respiratory symptoms started on 09/03 including sore throat, stuffy nose, occasional cough and muscle aches  Tested negative for COVID-19 on 9/7  Continued to experience throat discomfort, no issues with swallowing, mild occasional cough and sputum production in the morning  Has been taking Mucinex  Needs letter to go back to work

## 2021-09-15 ENCOUNTER — CONSULT (OUTPATIENT)
Dept: NEUROSURGERY | Facility: CLINIC | Age: 51
End: 2021-09-15
Payer: COMMERCIAL

## 2021-09-15 VITALS
RESPIRATION RATE: 16 BRPM | BODY MASS INDEX: 34.59 KG/M2 | HEIGHT: 73 IN | WEIGHT: 261 LBS | DIASTOLIC BLOOD PRESSURE: 78 MMHG | SYSTOLIC BLOOD PRESSURE: 126 MMHG | TEMPERATURE: 98.1 F | HEART RATE: 67 BPM

## 2021-09-15 DIAGNOSIS — M47.812 CERVICAL SPONDYLOSIS WITHOUT MYELOPATHY: ICD-10-CM

## 2021-09-15 DIAGNOSIS — M54.12 CERVICAL RADICULOPATHY: ICD-10-CM

## 2021-09-15 DIAGNOSIS — R51.9 ACUTE NONINTRACTABLE HEADACHE, UNSPECIFIED HEADACHE TYPE: ICD-10-CM

## 2021-09-15 DIAGNOSIS — R25.1 SHAKY: ICD-10-CM

## 2021-09-15 PROCEDURE — 3008F BODY MASS INDEX DOCD: CPT | Performed by: PHYSICIAN ASSISTANT

## 2021-09-15 PROCEDURE — 1036F TOBACCO NON-USER: CPT | Performed by: PHYSICIAN ASSISTANT

## 2021-09-15 PROCEDURE — 99213 OFFICE O/P EST LOW 20 MIN: CPT | Performed by: PHYSICIAN ASSISTANT

## 2021-09-15 NOTE — PROGRESS NOTES
Office Note - Neurosurgery   Lily Saenz 45 y o  male MRN: [id number]      Assessment:  Patient is a 45years old gentleman with known to our service for his continues neck pain with right arm radiculopathy  His status post C6-7 ACDF by Dr Tay Ramirez at [facility] in 2018  The surgery was done because of neck pain with left arm radiculopathy  Noticed improvement with his left arm radicular symptoms, but his he started having shaky/tremor in the right upper extremity after the procedure and posterior neck pain on the right side down to the his right upper extremity  Subjective numbness and paresthesia along the dorsal ring and pinky finger on the right side  He noticed some weakness with dropping objects  Had also of fine motor dysfunction with difficulty opening a jar and buttoning his shirts  Difficulty going and writing  Occasional wobbling gait otherwise denies history of tendency to fall  Patient also noticed shaky right lower extremity, and sometimes should up to pull over the vehicle when symptoms starts  Has family history of Parkinson's disease, but denies history of multiple sclerosis  Denies bowel/bladder dysfunction  History of diabetes mellitus with A1c of 7 5%  Patient tried epidural steroid injections but without any improvement  Exam-Patient alert and oriented x3  Communicates well  Moves all extremities  Right  4+/5  Elbow flexion-extension 5/5 bilaterally  Sensation to light touch intact throughout  DTR 2+ without clonus bilaterally  Positive Spurling's test on the right side  Positive Tolbert's test bilaterally  Stable gait on heel-to-toe walking  Tenderness on palpation of right posterolateral  cervical spine on palpation  MRI of cervical spine in 2019 demonstrates spondylitic degenerative changes are noted resulting in mild central canal stenosis at L2 C4-5 and C5-6  No cord compression or cord signal abnormality    The cord mildly flattened on the right without cord signal abnormality  CT done on 08/20/2021 demonstrates no acute intracranial abnormality  EEG done on 08/09/2021 demonstrates normal 41 minute awake, drowsy, and asleep EEG  However, the lack of epileptiform discharges on routine EEG does not preclude the diagnosis of seizure or epilepsy  Plan:  1  MRI of cervical spine with and without contrast  2  MRI of brain with and without contrast  3  Flexion-extension cervical spine x-rays  4  Follow-up after images results  5  Fall precaution avoid excessive flexion or extension of the neck      Subjective/Objective     C/C; " neck pain with right arm radiculopathy"/progressive over 1-2 yrs        HPI  Patient is a 45years old gentleman with known to our service for his continuous neck pain with right arm radiculopathy  He is  status post C6-7 ACDF by Dr Michele Benavides at [facility] in 2018  The surgery was done because of neck pain with left arm radiculopathy  Noticed improvement with his left arm radicular symptoms, but his he started having shaky/tremor in the right upper extremity after the procedure and posterior neck pain on the right side down to the his right upper extremity  Subjective numbness and paresthesia along the dorsal ring and pinky finger on the right side  He noticed some weakness with dropping objects  Had also fine motor dysfunction with difficulty opening a jar and buttoning his shirts  Difficulty drawing and writing  Occasional wobbling gait, otherwise denies falls  Patient also noticed shaky right lower extremity,sometimes forcing him to pull over and stop when driving a vehicle  Has family history of Parkinson's disease, but denies history of multiple sclerosis  Denies bowel/bladder dysfunction  History of ongoing headache but denies fever, chills, rigors, cough or chest pain      Patient has History of diabetes mellitus with A1c of 7 5%, hypertension, headache  Patient tried epidural steroid injections but without any improvement  Denies history of congestive heart failure, stroke, seizure, bleeding disorder or taking anticoagulant medications  History of smoking cigarettes or drinking alcohol  ROS   Review of system personally reviewed and updated  Review of Systems   Constitutional: Negative  HENT: Negative  Eyes: Negative  Respiratory: Negative  Cardiovascular: Negative  Gastrointestinal: Negative  Endocrine: Negative  Genitourinary: Negative  Musculoskeletal: Positive for neck pain (and right arm pain)  Negative for gait problem  Prior neck surgery with Dr Brannon Acosta Jan 2018 for neck and left arm pain  PT/ROSAURA: YES    Increased pain since last year  Started seeing PM Dr Scotty Rizzo last [address] on March with no relief, caused increased       Skin: Negative  Allergic/Immunologic: Negative  Neurological: Positive for tremors (b/l hands shakes, when writing  x2 incidence of shaking in right arm and leg), numbness (intermittent numbness in right hand) and headaches (left side of headach started in July which comes and goes)  Hematological: Negative  Psychiatric/Behavioral: Negative          Family History    Family History   Problem Relation Age of Onset    No Known Problems Mother     No Known Problems Father        Social History    Social History     Socioeconomic History    Marital status: /Civil Union     Spouse name: Not on file    Number of children: Not on file    Years of education: Not on file    Highest education level: Not on file   Occupational History    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   Tobacco Use    Smoking status: Never Smoker    Smokeless tobacco: Never Used   Vaping Use    Vaping Use: Never used   Substance and Sexual Activity    Alcohol use: Not Currently    Drug use: Never    Sexual activity: Yes     Partners: Female   Other Topics Concern    Not on file   Social History Narrative    Lives with spouse     Social Determinants of Health     Financial Resource Strain:     Difficulty of Paying Living Expenses:    Food Insecurity:     Worried About Running Out of Food in the Last Year:     [address] in the Last Year:    Transportation Needs:     Lack of Transportation (Medical):      Lack of Transportation (Non-Medical):    Physical Activity:     Days of Exercise per Week:     Minutes of Exercise per Session:    Stress:     Feeling of Stress :    Social Connections:     Frequency of Communication with Friends and Family:     Frequency of Social Gatherings with Friends and Family:     Attends Oriental orthodox Services:     Active Member of Clubs or Organizations:     Attends Club or Organization Meetings:     Marital Status:    Intimate Partner Violence:     Fear of Current or Ex-Partner:     Emotionally Abused:     Physically Abused:     Sexually Abused:        Past Medical History    Past Medical History:   Diagnosis Date    Achalasia     [facility]m ([facility] )     Diabetes mellitus ([facility] )     Esophageal ulcer     Hyperlipidemia     Hypertension        Surgical History    Past Surgical History:   Procedure Laterality Date    ANTERIOR CERVICAL DISCECTOMY W/ FUSION      APPENDECTOMY      BICEPS TENDON REPAIR      MENISCECTOMY      MYOTOMY HELLER LAPAROSCOPIC      ORTHOPEDIC SURGERY         Medications      Current Outpatient Medications:     Ascorbic Acid (vitamin C) 1000 MG tablet, Take 1,000 mg by mouth daily, Disp: , Rfl:     baclofen 20 mg tablet, Take 1 tablet (20 mg total) by mouth 3 (three) times a day, Disp: 90 tablet, Rfl: 2    benzonatate (TESSALON PERLES) 100 mg capsule, TK 1 C PO Q 8 H PRN (Patient not taking: Reported on 9/8/2021), Disp: , Rfl:     Cinnamon 500 MG capsule, Take 2 tablets by mouth daily (Patient not taking: Reported on 9/8/2021), Disp: , Rfl:     diphenhydrAMINE-acetaminophen (TYLENOL PM)  MG TABS, Take 1 tablet by mouth daily at bedtime as needed for sleep, Disp: , Rfl:     gabapentin (NEURONTIN) 300 mg capsule, Take 1 capsule (300 mg total) by mouth 3 (three) times a day (Patient not taking: Reported on 8/9/2021), Disp: 90 capsule, Rfl: 1    lisinopril (ZESTRIL) 40 mg tablet, Take 1 tablet (40 mg total) by mouth daily, Disp: 90 tablet, Rfl: 1    metFORMIN (GLUCOPHAGE) 500 mg tablet, Pt takes 1500 mg (3 tablets) by mouth in the morning, and a 1000 mg (2 tablets) in the evening, Disp: 450 tablet, Rfl: 1    sildenafil (VIAGRA) 50 MG tablet, Take 50 mg by mouth as needed , Disp: , Rfl:     simvastatin (ZOCOR) 20 mg tablet, Take 20 mg by mouth daily , Disp: , Rfl:     Allergies    No Known Allergies    The following portions of the patient's history were reviewed and updated as appropriate: allergies, current medications, past family history, past medical history, past social history, past surgical history and problem list     Investigations    No current images reviewed    Physical Exam    There were no vitals filed for this visit  Physical Exam  Constitutional:       Appearance: Normal appearance  HENT:      Head: Normocephalic and atraumatic  Eyes:      Extraocular Movements: Extraocular movements intact  Cardiovascular:      Rate and Rhythm: Normal rate and regular rhythm  Pulses: Normal pulses  Pulmonary:      Effort: Pulmonary effort is normal    Musculoskeletal:         General: Tenderness present  Cervical back: Normal range of motion  Neurological:      General: No focal deficit present  Mental Status: He is alert and oriented to person, place, and time  GCS: GCS eye subscore is 4  GCS verbal subscore is 5  GCS motor subscore is 6  Cranial Nerves: Cranial nerves are intact  Motor: Weakness present  Deep Tendon Reflexes: Reflexes are normal and symmetric  Reflex Scores:       Tricep reflexes are 2+ on the right side and 2+ on the left side  Bicep reflexes are 2+ on the right side and 2+ on the left side         Brachioradialis reflexes are 2+ on the right side and 2+ on the left side  Patellar reflexes are 2+ on the right side and 2+ on the left side  Achilles reflexes are 2+ on the right side and 2+ on the left side  Comments: Right  weakness 4+/5  Positive Spurling's test on the right and also positive Tolbert's test bilat   Psychiatric:         Speech: Speech normal        Neurologic Exam     Mental Status   Oriented to person, place, and time  Speech: speech is normal   Level of consciousness: alert    Cranial Nerves     CN III, IV, VI   Right pupil: Size: 2 mm  Left pupil: Size: 2 mm       CN XI   CN XI normal      Motor Exam   Muscle bulk: normal  Overall muscle tone: normal  Right arm tone: normal  Left arm tone: normal  Right arm pronator drift: absent  Left arm pronator drift: absent  Right leg tone: normal  Left leg tone: normal    Sensory Exam   Light touch normal      Gait, Coordination, and Reflexes     Reflexes   Right brachioradialis: 2+  Left brachioradialis: 2+  Right biceps: 2+  Left biceps: 2+  Right triceps: 2+  Left triceps: 2+  Right patellar: 2+  Left patellar: 2+  Right achilles: 2+  Left achilles: 2+  Right : 2+  Left : 2+  Right Tolbert: absent  Left Tolbert: absent  Right ankle clonus: absent  Left pendular knee jerk: absent

## 2021-09-17 ENCOUNTER — TELEPHONE (OUTPATIENT)
Dept: GASTROENTEROLOGY | Facility: AMBULARY SURGERY CENTER | Age: 51
End: 2021-09-17

## 2021-09-19 ENCOUNTER — APPOINTMENT (OUTPATIENT)
Dept: RADIOLOGY | Age: 51
End: 2021-09-19
Payer: COMMERCIAL

## 2021-09-19 DIAGNOSIS — R25.1 SHAKY: ICD-10-CM

## 2021-09-19 DIAGNOSIS — M47.812 CERVICAL SPONDYLOSIS WITHOUT MYELOPATHY: ICD-10-CM

## 2021-09-19 DIAGNOSIS — M54.12 CERVICAL RADICULOPATHY: ICD-10-CM

## 2021-09-19 DIAGNOSIS — R51.9 ACUTE NONINTRACTABLE HEADACHE, UNSPECIFIED HEADACHE TYPE: ICD-10-CM

## 2021-09-19 PROCEDURE — 72052 X-RAY EXAM NECK SPINE 6/>VWS: CPT

## 2021-09-20 ENCOUNTER — HOSPITAL ENCOUNTER (OUTPATIENT)
Dept: GASTROENTEROLOGY | Facility: AMBULARY SURGERY CENTER | Age: 51
Setting detail: OUTPATIENT SURGERY
Discharge: HOME | End: 2021-09-20
Attending: INTERNAL MEDICINE | Admitting: INTERNAL MEDICINE
Payer: COMMERCIAL

## 2021-09-20 ENCOUNTER — ANESTHESIA (OUTPATIENT)
Dept: GASTROENTEROLOGY | Facility: AMBULARY SURGERY CENTER | Age: 51
End: 2021-09-20

## 2021-09-20 ENCOUNTER — ANESTHESIA EVENT (OUTPATIENT)
Dept: GASTROENTEROLOGY | Facility: AMBULARY SURGERY CENTER | Age: 51
End: 2021-09-20

## 2021-09-20 VITALS
SYSTOLIC BLOOD PRESSURE: 124 MMHG | OXYGEN SATURATION: 97 % | RESPIRATION RATE: 18 BRPM | DIASTOLIC BLOOD PRESSURE: 78 MMHG | HEIGHT: 73 IN | BODY MASS INDEX: 33.53 KG/M2 | HEART RATE: 59 BPM | WEIGHT: 253 LBS | TEMPERATURE: 96.5 F

## 2021-09-20 DIAGNOSIS — Z12.11 SPECIAL SCREENING FOR MALIGNANT NEOPLASMS, COLON: ICD-10-CM

## 2021-09-20 LAB — GLUCOSE SERPL-MCNC: 134 MG/DL (ref 65–140)

## 2021-09-20 PROCEDURE — 88305 TISSUE EXAM BY PATHOLOGIST: CPT | Performed by: PATHOLOGY

## 2021-09-20 PROCEDURE — 45385 COLONOSCOPY W/LESION REMOVAL: CPT | Performed by: INTERNAL MEDICINE

## 2021-09-20 PROCEDURE — 82948 REAGENT STRIP/BLOOD GLUCOSE: CPT

## 2021-09-20 RX ORDER — SODIUM CHLORIDE, SODIUM LACTATE, POTASSIUM CHLORIDE, CALCIUM CHLORIDE 600; 310; 30; 20 MG/100ML; MG/100ML; MG/100ML; MG/100ML
50 INJECTION, SOLUTION INTRAVENOUS CONTINUOUS
Status: CANCELLED | OUTPATIENT
Start: 2021-09-20

## 2021-09-20 RX ORDER — SODIUM CHLORIDE, SODIUM LACTATE, POTASSIUM CHLORIDE, CALCIUM CHLORIDE 600; 310; 30; 20 MG/100ML; MG/100ML; MG/100ML; MG/100ML
INJECTION, SOLUTION INTRAVENOUS CONTINUOUS PRN
Status: DISCONTINUED | OUTPATIENT
Start: 2021-09-20 | End: 2021-09-20

## 2021-09-20 RX ORDER — LIDOCAINE HYDROCHLORIDE 10 MG/ML
INJECTION, SOLUTION EPIDURAL; INFILTRATION; INTRACAUDAL; PERINEURAL AS NEEDED
Status: DISCONTINUED | OUTPATIENT
Start: 2021-09-20 | End: 2021-09-20

## 2021-09-20 RX ORDER — PROPOFOL 10 MG/ML
INJECTION, EMULSION INTRAVENOUS AS NEEDED
Status: DISCONTINUED | OUTPATIENT
Start: 2021-09-20 | End: 2021-09-20

## 2021-09-20 RX ADMIN — PROPOFOL 50 MG: 10 INJECTION, EMULSION INTRAVENOUS at 09:18

## 2021-09-20 RX ADMIN — SODIUM CHLORIDE, SODIUM LACTATE, POTASSIUM CHLORIDE, AND CALCIUM CHLORIDE: .6; .31; .03; .02 INJECTION, SOLUTION INTRAVENOUS at 09:23

## 2021-09-20 RX ADMIN — PROPOFOL 100 MG: 10 INJECTION, EMULSION INTRAVENOUS at 09:10

## 2021-09-20 RX ADMIN — PROPOFOL 50 MG: 10 INJECTION, EMULSION INTRAVENOUS at 09:12

## 2021-09-20 RX ADMIN — PROPOFOL 50 MG: 10 INJECTION, EMULSION INTRAVENOUS at 09:16

## 2021-09-20 RX ADMIN — PROPOFOL 50 MG: 10 INJECTION, EMULSION INTRAVENOUS at 09:22

## 2021-09-20 RX ADMIN — LIDOCAINE HYDROCHLORIDE 50 MG: 10 INJECTION, SOLUTION EPIDURAL; INFILTRATION; INTRACAUDAL at 09:10

## 2021-09-20 RX ADMIN — SODIUM CHLORIDE, SODIUM LACTATE, POTASSIUM CHLORIDE, AND CALCIUM CHLORIDE: .6; .31; .03; .02 INJECTION, SOLUTION INTRAVENOUS at 08:33

## 2021-09-20 NOTE — H&P
History and Physical -  [facility]  Caren Blair 45 y o  male MRN: [id number]                  HPI: Caren Blair is a 45y o  year old male who presents for colonoscopy for CRC screening  No family history of CRC  Denies GI complaints      REVIEW OF SYSTEMS: Per the HPI, and otherwise unremarkable      Historical Information   Past Medical History:   Diagnosis Date    Achalasia     Aneurysm ([facility] [address] )     Diabetes mellitus ([facility] )     Esophageal ulcer     Hyperlipidemia     Hypertension      Past Surgical History:   Procedure Laterality Date    ANTERIOR CERVICAL DISCECTOMY W/ FUSION      APPENDECTOMY      BICEPS TENDON REPAIR      MENISCECTOMY      MYOTOMY HELLER LAPAROSCOPIC      ORTHOPEDIC SURGERY       Social History   Social History     Substance and Sexual Activity   Alcohol Use Not Currently     Social History     Substance and Sexual Activity   Drug Use Never     Social History     Tobacco Use   Smoking Status Never Smoker   Smokeless Tobacco Never Used     Family History   Problem Relation Age of Onset    No Known Problems Mother     No Known Problems Father        Meds/Allergies       Current Outpatient Medications:     Ascorbic Acid (vitamin C) 1000 MG tablet    baclofen 20 mg tablet    diphenhydrAMINE-acetaminophen (TYLENOL PM)  MG TABS    lisinopril (ZESTRIL) 40 mg tablet    metFORMIN (GLUCOPHAGE) 500 mg tablet    simvastatin (ZOCOR) 20 mg tablet    Cinnamon 500 MG capsule    gabapentin (NEURONTIN) 300 mg capsule    sildenafil (VIAGRA) 50 MG tablet    No Known Allergies    Objective     /85   Pulse 62   Temp (!) 97 °F (36 1 °C) (Temporal)   Resp 18   Ht 6' 1" (1 854 m)   Wt 115 kg (253 lb)   SpO2 98%   BMI 33 38 kg/m²       PHYSICAL EXAM    Gen: NAD  Head: NCAT  CV: RRR  CHEST: Clear  ABD: soft, NT/ND  EXT: no edema      ASSESSMENT/PLAN:  This is a 45y o  year old male here for colonoscopy for CRC screening, and he is stable and optimized for his procedure    Proceed with colonoscopy

## 2021-09-20 NOTE — ANESTHESIA POSTPROCEDURE EVALUATION
Post-Op Assessment Note    CV Status:  Stable  Pain Score: 0    Pain management: adequate     Mental Status:  Alert and awake   Hydration Status:  Euvolemic   PONV Controlled:  Controlled   Airway Patency:  Patent      Post Op Vitals Reviewed: Yes      Staff: CRNA         No complications documented      BP   114/66   Temp     Pulse 59   Resp 16   SpO2 99

## 2021-09-20 NOTE — ANESTHESIA PREPROCEDURE EVALUATION
Procedure:  COLONOSCOPY    Relevant Problems   CARDIO   (+) Hypertension, essential   (+) Other hyperlipidemia      MUSCULOSKELETAL   (+) Cervical spondylosis without myelopathy      NEURO/PSYCH   (+) Acute nonintractable headache      PULMONARY   (+) Viral upper respiratory tract infection      Other   (+) Cervical radiculopathy   (+) Cervical stenosis of spinal canal   (+) Diabetes mellitus (HCC)   (+) S/P cervical spinal fusion      Adequately NPO for procedure  Good functional capacity  No recent illnesses  Hx of cervical radiculopathy and C6-7 fusion, good residual extension  Current headache  Hx of awareness with a previous EGD in the distant past        Physical Exam    Airway  Comment: Mildly limited extension  Mallampati score: III  TM Distance: >3 FB  Neck ROM: limited     Dental   No notable dental hx     Cardiovascular  Rhythm: regular, Rate: normal,     Pulmonary  Pulmonary exam normal     Other Findings        Anesthesia Plan  ASA Score- 2     Anesthesia Type- IV sedation with anesthesia with ASA Monitors  Additional Monitors:   Airway Plan:     Comment: Spontaneous with supplemental O2  Plan Factors-Exercise tolerance (METS): >4 METS  Chart reviewed  EKG reviewed  Existing labs reviewed  Patient summary reviewed  Induction- intravenous  Postoperative Plan-     Informed Consent- Anesthetic plan and risks discussed with patient  I personally reviewed this patient with the CRNA  Discussed and agreed on the Anesthesia Plan with the CRNA  Katelynn Martinez

## 2021-09-23 ENCOUNTER — TELEPHONE (OUTPATIENT)
Dept: GASTROENTEROLOGY | Facility: CLINIC | Age: 51
End: 2021-09-23

## 2021-09-23 NOTE — TELEPHONE ENCOUNTER
Patients GI provider:  Dr Della Hollingsworth    Number to return call: [phone]    Reason for call: Pt calling stating since his colonoscopy on 9/20 he has been experiencing inflammation, itching and pain when wiping      Scheduled procedure/appointment date if applicable: N/A

## 2021-09-24 NOTE — TELEPHONE ENCOUNTER
STEVE  PMH: DM  Last seen Provider: Dr Anne Sanchez   Last office visit: none/OA colon  Procedure:colonoscopy date:9/20  Symptoms: Since colonoscopy, patient has been complaining of rectal itching, pain, irritation, and occasional spots of blood on toilet paper when wiping  Also complaining of loose but not liquid stools since colonoscopy, however per patient they are slowing down  He reports they were about 3-4 times per day but he has not had to go yet today  Recommendations: Advised patient this is likely due to hemorrhoids, and he can use preparation H and/or witch hazel  Advised him to avoid straining with bowel movements and ensure enough fiber in diet  Advised patient to call if symptoms persist or worsen   Patient verbalized understanding

## 2021-09-27 ENCOUNTER — TELEPHONE (OUTPATIENT)
Dept: UROLOGY | Facility: AMBULATORY SURGERY CENTER | Age: 51
End: 2021-09-27

## 2021-09-27 NOTE — TELEPHONE ENCOUNTER
----- Message from MIKHAIL Bello sent at 9/27/2021  2:46 PM EDT -----  Patient last seen in our office in 2017 with testicular pain  He is scheduled for appointment tomorrow with me as new patient with documented reason of prostate cancer  Unsure if this was diagnosed by an outside urologist   Willie Lanes find any documentation    If so, please reschedule patient with MD

## 2021-09-28 NOTE — TELEPHONE ENCOUNTER
Spoke with patient who confirms he does not have prostate cancer  He scheduled appt with urology due to L testicular pain  He was seen by urology in the past for this reason  He was advised to f/u every 3 years, and patient is overdue  He confirms he is still experiencing L testicular pain  No recent imaging

## 2021-10-07 ENCOUNTER — OFFICE VISIT (OUTPATIENT)
Dept: UROLOGY | Facility: AMBULATORY SURGERY CENTER | Age: 51
End: 2021-10-07
Payer: COMMERCIAL

## 2021-10-07 VITALS
DIASTOLIC BLOOD PRESSURE: 80 MMHG | WEIGHT: 257 LBS | HEART RATE: 72 BPM | SYSTOLIC BLOOD PRESSURE: 148 MMHG | HEIGHT: 73 IN | BODY MASS INDEX: 34.06 KG/M2

## 2021-10-07 DIAGNOSIS — R97.20 ELEVATED PSA: ICD-10-CM

## 2021-10-07 DIAGNOSIS — N50.812 PAIN IN LEFT TESTICLE: Primary | ICD-10-CM

## 2021-10-07 DIAGNOSIS — N52.9 ERECTILE DYSFUNCTION, UNSPECIFIED ERECTILE DYSFUNCTION TYPE: ICD-10-CM

## 2021-10-07 LAB
SL AMB  POCT GLUCOSE, UA: NORMAL
SL AMB LEUKOCYTE ESTERASE,UA: NORMAL
SL AMB POCT BILIRUBIN,UA: NORMAL
SL AMB POCT BLOOD,UA: NORMAL
SL AMB POCT CLARITY,UA: CLEAR
SL AMB POCT COLOR,UA: YELLOW
SL AMB POCT KETONES,UA: 5
SL AMB POCT NITRITE,UA: NORMAL
SL AMB POCT PH,UA: 5
SL AMB POCT SPECIFIC GRAVITY,UA: 1.02
SL AMB POCT URINE PROTEIN: NORMAL
SL AMB POCT UROBILINOGEN: 0.2

## 2021-10-07 PROCEDURE — 3061F NEG MICROALBUMINURIA REV: CPT | Performed by: PHYSICIAN ASSISTANT

## 2021-10-07 PROCEDURE — 81002 URINALYSIS NONAUTO W/O SCOPE: CPT | Performed by: NURSE PRACTITIONER

## 2021-10-07 PROCEDURE — 3079F DIAST BP 80-89 MM HG: CPT | Performed by: NURSE PRACTITIONER

## 2021-10-07 PROCEDURE — 3077F SYST BP >= 140 MM HG: CPT | Performed by: NURSE PRACTITIONER

## 2021-10-07 PROCEDURE — 99244 OFF/OP CNSLTJ NEW/EST MOD 40: CPT | Performed by: NURSE PRACTITIONER

## 2021-10-07 RX ORDER — SILDENAFIL 50 MG/1
100 TABLET, FILM COATED ORAL AS NEEDED
Qty: 10 TABLET | Refills: 6 | Status: SHIPPED | OUTPATIENT
Start: 2021-10-07 | End: 2021-12-22 | Stop reason: ALTCHOICE

## 2021-10-10 ENCOUNTER — HOSPITAL ENCOUNTER (OUTPATIENT)
Dept: RADIOLOGY | Facility: HOSPITAL | Age: 51
Discharge: HOME | End: 2021-10-10
Payer: COMMERCIAL

## 2021-10-10 DIAGNOSIS — R25.1 SHAKY: ICD-10-CM

## 2021-10-10 DIAGNOSIS — M54.12 CERVICAL RADICULOPATHY: ICD-10-CM

## 2021-10-10 DIAGNOSIS — R51.9 ACUTE NONINTRACTABLE HEADACHE, UNSPECIFIED HEADACHE TYPE: ICD-10-CM

## 2021-10-10 DIAGNOSIS — M47.812 CERVICAL SPONDYLOSIS WITHOUT MYELOPATHY: ICD-10-CM

## 2021-10-10 PROCEDURE — 70553 MRI BRAIN STEM W/O & W/DYE: CPT

## 2021-10-10 PROCEDURE — 72156 MRI NECK SPINE W/O & W/DYE: CPT

## 2021-10-10 PROCEDURE — A9585 GADOBUTROL INJECTION: HCPCS | Performed by: PHYSICIAN ASSISTANT

## 2021-10-10 RX ADMIN — GADOBUTROL 12 ML: 604.72 INJECTION INTRAVENOUS at 13:05

## 2021-10-13 ENCOUNTER — HOSPITAL ENCOUNTER (OUTPATIENT)
Dept: RADIOLOGY | Age: 51
Discharge: HOME | End: 2021-10-13
Payer: COMMERCIAL

## 2021-10-13 DIAGNOSIS — N50.812 PAIN IN LEFT TESTICLE: ICD-10-CM

## 2021-10-13 PROCEDURE — 76870 US EXAM SCROTUM: CPT

## 2021-10-15 ENCOUNTER — OFFICE VISIT (OUTPATIENT)
Dept: OBGYN CLINIC | Facility: OTHER | Age: 51
End: 2021-10-15
Payer: COMMERCIAL

## 2021-10-15 VITALS
HEART RATE: 77 BPM | BODY MASS INDEX: 34.06 KG/M2 | HEIGHT: 73 IN | WEIGHT: 257 LBS | DIASTOLIC BLOOD PRESSURE: 84 MMHG | SYSTOLIC BLOOD PRESSURE: 136 MMHG

## 2021-10-15 DIAGNOSIS — S69.92XA INJURY OF LEFT WRIST, INITIAL ENCOUNTER: Primary | ICD-10-CM

## 2021-10-15 PROCEDURE — 99213 OFFICE O/P EST LOW 20 MIN: CPT | Performed by: FAMILY MEDICINE

## 2021-10-19 ENCOUNTER — TELEPHONE (OUTPATIENT)
Dept: UROLOGY | Facility: HOSPITAL | Age: 51
End: 2021-10-19

## 2021-10-22 ENCOUNTER — OFFICE VISIT (OUTPATIENT)
Dept: NEUROSURGERY | Facility: CLINIC | Age: 51
End: 2021-10-22
Payer: COMMERCIAL

## 2021-10-22 ENCOUNTER — TELEPHONE (OUTPATIENT)
Dept: NEUROLOGY | Facility: CLINIC | Age: 51
End: 2021-10-22

## 2021-10-22 VITALS
SYSTOLIC BLOOD PRESSURE: 130 MMHG | WEIGHT: 257 LBS | HEIGHT: 73 IN | DIASTOLIC BLOOD PRESSURE: 76 MMHG | RESPIRATION RATE: 18 BRPM | TEMPERATURE: 98 F | BODY MASS INDEX: 34.06 KG/M2 | HEART RATE: 68 BPM

## 2021-10-22 DIAGNOSIS — M79.2 RADICULAR PAIN IN RIGHT ARM: ICD-10-CM

## 2021-10-22 DIAGNOSIS — R25.1 TREMOR: Primary | ICD-10-CM

## 2021-10-22 DIAGNOSIS — R27.0 ATAXIA: ICD-10-CM

## 2021-10-22 PROCEDURE — 3008F BODY MASS INDEX DOCD: CPT | Performed by: PHYSICIAN ASSISTANT

## 2021-10-22 PROCEDURE — 99213 OFFICE O/P EST LOW 20 MIN: CPT | Performed by: PHYSICIAN ASSISTANT

## 2021-10-22 PROCEDURE — 1036F TOBACCO NON-USER: CPT | Performed by: PHYSICIAN ASSISTANT

## 2021-11-01 DIAGNOSIS — E11.9 TYPE 2 DIABETES MELLITUS WITHOUT COMPLICATION, WITHOUT LONG-TERM CURRENT USE OF INSULIN (HCC): ICD-10-CM

## 2021-11-09 ENCOUNTER — HOSPITAL ENCOUNTER (OUTPATIENT)
Dept: RADIOLOGY | Facility: HOSPITAL | Age: 51
Discharge: HOME | End: 2021-11-09
Attending: FAMILY MEDICINE
Payer: COMMERCIAL

## 2021-11-09 DIAGNOSIS — S69.92XA INJURY OF LEFT WRIST, INITIAL ENCOUNTER: ICD-10-CM

## 2021-11-09 PROCEDURE — A9585 GADOBUTROL INJECTION: HCPCS | Performed by: FAMILY MEDICINE

## 2021-11-09 PROCEDURE — 25246 INJECTION FOR WRIST X-RAY: CPT

## 2021-11-09 PROCEDURE — 77002 NEEDLE LOCALIZATION BY XRAY: CPT

## 2021-11-09 PROCEDURE — 73222 MRI JOINT UPR EXTREM W/DYE: CPT

## 2021-11-09 RX ORDER — SODIUM CHLORIDE 9 MG/ML
50 INJECTION INTRAVENOUS
Status: COMPLETED | OUTPATIENT
Start: 2021-11-09 | End: 2021-11-09

## 2021-11-09 RX ORDER — LIDOCAINE HYDROCHLORIDE 10 MG/ML
10 INJECTION, SOLUTION EPIDURAL; INFILTRATION; INTRACAUDAL; PERINEURAL
Status: COMPLETED | OUTPATIENT
Start: 2021-11-09 | End: 2021-11-09

## 2021-11-09 RX ADMIN — IOHEXOL 10 ML: 300 INJECTION, SOLUTION INTRAVENOUS at 13:20

## 2021-11-09 RX ADMIN — LIDOCAINE HYDROCHLORIDE 7 ML: 10 INJECTION, SOLUTION EPIDURAL; INFILTRATION; INTRACAUDAL; PERINEURAL at 13:20

## 2021-11-09 RX ADMIN — GADOBUTROL 2 ML: 604.72 INJECTION INTRAVENOUS at 13:20

## 2021-11-09 RX ADMIN — SODIUM CHLORIDE 10 ML: 9 INJECTION, SOLUTION INTRAMUSCULAR; INTRAVENOUS; SUBCUTANEOUS at 13:20

## 2021-11-15 ENCOUNTER — TELEPHONE (OUTPATIENT)
Dept: OBGYN CLINIC | Facility: OTHER | Age: 51
End: 2021-11-15

## 2021-11-19 ENCOUNTER — OFFICE VISIT (OUTPATIENT)
Dept: OBGYN CLINIC | Facility: OTHER | Age: 51
End: 2021-11-19
Payer: COMMERCIAL

## 2021-11-19 VITALS
WEIGHT: 265 LBS | DIASTOLIC BLOOD PRESSURE: 99 MMHG | SYSTOLIC BLOOD PRESSURE: 155 MMHG | HEART RATE: 55 BPM | BODY MASS INDEX: 35.12 KG/M2 | HEIGHT: 73 IN

## 2021-11-19 DIAGNOSIS — S63.592A COMPLEX TEAR OF TRIANGULAR FIBROCARTILAGE OF LEFT WRIST, INITIAL ENCOUNTER: Primary | ICD-10-CM

## 2021-11-19 PROCEDURE — 3080F DIAST BP >= 90 MM HG: CPT | Performed by: FAMILY MEDICINE

## 2021-11-19 PROCEDURE — 99213 OFFICE O/P EST LOW 20 MIN: CPT | Performed by: FAMILY MEDICINE

## 2021-11-19 PROCEDURE — 3008F BODY MASS INDEX DOCD: CPT | Performed by: FAMILY MEDICINE

## 2021-11-19 PROCEDURE — 3077F SYST BP >= 140 MM HG: CPT | Performed by: FAMILY MEDICINE

## 2021-11-19 PROCEDURE — 1036F TOBACCO NON-USER: CPT | Performed by: FAMILY MEDICINE

## 2021-11-29 DIAGNOSIS — E78.49 OTHER HYPERLIPIDEMIA: Primary | ICD-10-CM

## 2021-11-30 RX ORDER — SIMVASTATIN 20 MG
20 TABLET ORAL DAILY
Qty: 90 TABLET | Refills: 1 | Status: SHIPPED | OUTPATIENT
Start: 2021-11-30 | End: 2022-02-21 | Stop reason: SDUPTHER

## 2021-12-13 ENCOUNTER — OFFICE VISIT (OUTPATIENT)
Dept: INTERNAL MEDICINE CLINIC | Age: 51
End: 2021-12-13
Payer: COMMERCIAL

## 2021-12-13 ENCOUNTER — TELEPHONE (OUTPATIENT)
Dept: INTERNAL MEDICINE CLINIC | Age: 51
End: 2021-12-13

## 2021-12-13 ENCOUNTER — HOSPITAL ENCOUNTER (OUTPATIENT)
Dept: RADIOLOGY | Age: 51
Discharge: HOME | End: 2021-12-13
Payer: COMMERCIAL

## 2021-12-13 ENCOUNTER — APPOINTMENT (OUTPATIENT)
Dept: LAB | Facility: HOSPITAL | Age: 51
End: 2021-12-13
Attending: INTERNAL MEDICINE
Payer: COMMERCIAL

## 2021-12-13 VITALS
OXYGEN SATURATION: 96 % | TEMPERATURE: 98.3 F | BODY MASS INDEX: 35.78 KG/M2 | WEIGHT: 270 LBS | DIASTOLIC BLOOD PRESSURE: 78 MMHG | SYSTOLIC BLOOD PRESSURE: 136 MMHG | HEIGHT: 73 IN | HEART RATE: 75 BPM

## 2021-12-13 DIAGNOSIS — R10.9 RIGHT FLANK PAIN: ICD-10-CM

## 2021-12-13 DIAGNOSIS — M48.02 CERVICAL STENOSIS OF SPINAL CANAL: ICD-10-CM

## 2021-12-13 DIAGNOSIS — R10.32 LEFT LOWER QUADRANT ABDOMINAL PAIN: ICD-10-CM

## 2021-12-13 DIAGNOSIS — R31.0 GROSS HEMATURIA: ICD-10-CM

## 2021-12-13 DIAGNOSIS — N48.89 PENILE BLEEDING: ICD-10-CM

## 2021-12-13 DIAGNOSIS — N48.89 PENILE BLEEDING: Primary | ICD-10-CM

## 2021-12-13 DIAGNOSIS — N50.82 SCROTAL PAIN: ICD-10-CM

## 2021-12-13 LAB
ALBUMIN SERPL BCP-MCNC: 4.1 G/DL (ref 3.5–5)
ALP SERPL-CCNC: 75 U/L (ref 46–116)
ALT SERPL W P-5'-P-CCNC: 32 U/L (ref 12–78)
ANION GAP SERPL CALCULATED.3IONS-SCNC: 4 MMOL/L (ref 4–13)
AST SERPL W P-5'-P-CCNC: 17 U/L (ref 5–45)
BACTERIA UR QL AUTO: NORMAL /HPF
BASOPHILS # BLD AUTO: 0.05 THOUSANDS/ΜL (ref 0–0.1)
BASOPHILS NFR BLD AUTO: 1 % (ref 0–1)
BILIRUB SERPL-MCNC: 0.29 MG/DL (ref 0.2–1)
BILIRUB UR QL STRIP: NEGATIVE
BUN SERPL-MCNC: 12 MG/DL (ref 5–25)
CALCIUM SERPL-MCNC: 9.4 MG/DL (ref 8.3–10.1)
CHLORIDE SERPL-SCNC: 104 MMOL/L (ref 100–108)
CLARITY UR: CLEAR
CO2 SERPL-SCNC: 28 MMOL/L (ref 21–32)
COLOR UR: YELLOW
CREAT SERPL-MCNC: 0.8 MG/DL (ref 0.6–1.3)
EOSINOPHIL # BLD AUTO: 0.16 THOUSAND/ΜL (ref 0–0.61)
EOSINOPHIL NFR BLD AUTO: 2 % (ref 0–6)
ERYTHROCYTE [DISTWIDTH] IN BLOOD BY AUTOMATED COUNT: 12 % (ref 11.6–15.1)
GFR SERPL CREATININE-BSD FRML MDRD: 103 ML/MIN/1.73SQ M
GLUCOSE SERPL-MCNC: 98 MG/DL (ref 65–140)
GLUCOSE UR STRIP-MCNC: ABNORMAL MG/DL
HCT VFR BLD AUTO: 44.1 % (ref 36.5–49.3)
HGB BLD-MCNC: 14.2 G/DL (ref 12–17)
HGB UR QL STRIP.AUTO: NEGATIVE
IMM GRANULOCYTES # BLD AUTO: 0.03 THOUSAND/UL (ref 0–0.2)
IMM GRANULOCYTES NFR BLD AUTO: 0 % (ref 0–2)
KETONES UR STRIP-MCNC: ABNORMAL MG/DL
LEUKOCYTE ESTERASE UR QL STRIP: NEGATIVE
LYMPHOCYTES # BLD AUTO: 2.56 THOUSANDS/ΜL (ref 0.6–4.47)
LYMPHOCYTES NFR BLD AUTO: 35 % (ref 14–44)
MCH RBC QN AUTO: 29.5 PG (ref 26.8–34.3)
MCHC RBC AUTO-ENTMCNC: 32.2 G/DL (ref 31.4–37.4)
MCV RBC AUTO: 92 FL (ref 82–98)
MONOCYTES # BLD AUTO: 0.86 THOUSAND/ΜL (ref 0.17–1.22)
MONOCYTES NFR BLD AUTO: 12 % (ref 4–12)
NEUTROPHILS # BLD AUTO: 3.66 THOUSANDS/ΜL (ref 1.85–7.62)
NEUTS SEG NFR BLD AUTO: 50 % (ref 43–75)
NITRITE UR QL STRIP: NEGATIVE
NON-SQ EPI CELLS URNS QL MICRO: NORMAL /HPF
NRBC BLD AUTO-RTO: 0 /100 WBCS
PH UR STRIP.AUTO: 8 [PH]
PLATELET # BLD AUTO: 238 THOUSANDS/UL (ref 149–390)
PMV BLD AUTO: 10.3 FL (ref 8.9–12.7)
POTASSIUM SERPL-SCNC: 4.3 MMOL/L (ref 3.5–5.3)
PROT SERPL-MCNC: 7.8 G/DL (ref 6.4–8.2)
PROT UR STRIP-MCNC: ABNORMAL MG/DL
RBC # BLD AUTO: 4.81 MILLION/UL (ref 3.88–5.62)
RBC #/AREA URNS AUTO: NORMAL /HPF
SL AMB  POCT GLUCOSE, UA: ABNORMAL
SL AMB LEUKOCYTE ESTERASE,UA: ABNORMAL
SL AMB POCT BILIRUBIN,UA: ABNORMAL
SL AMB POCT BLOOD,UA: ABNORMAL
SL AMB POCT CLARITY,UA: CLEAR
SL AMB POCT COLOR,UA: YELLOW
SL AMB POCT KETONES,UA: ABNORMAL
SL AMB POCT NITRITE,UA: ABNORMAL
SL AMB POCT PH,UA: 8.5
SL AMB POCT SPECIFIC GRAVITY,UA: 1.01
SL AMB POCT URINE PROTEIN: 30
SL AMB POCT UROBILINOGEN: 0.2
SODIUM SERPL-SCNC: 136 MMOL/L (ref 136–145)
SP GR UR STRIP.AUTO: 1.02 (ref 1–1.03)
UROBILINOGEN UR QL STRIP.AUTO: 1 E.U./DL
WBC # BLD AUTO: 7.32 THOUSAND/UL (ref 4.31–10.16)
WBC #/AREA URNS AUTO: NORMAL /HPF

## 2021-12-13 PROCEDURE — 81003 URINALYSIS AUTO W/O SCOPE: CPT | Performed by: INTERNAL MEDICINE

## 2021-12-13 PROCEDURE — 99214 OFFICE O/P EST MOD 30 MIN: CPT | Performed by: INTERNAL MEDICINE

## 2021-12-13 PROCEDURE — 80053 COMPREHEN METABOLIC PANEL: CPT

## 2021-12-13 PROCEDURE — 81001 URINALYSIS AUTO W/SCOPE: CPT | Performed by: INTERNAL MEDICINE

## 2021-12-13 PROCEDURE — 74178 CT ABD&PLV WO CNTR FLWD CNTR: CPT

## 2021-12-13 PROCEDURE — 36415 COLL VENOUS BLD VENIPUNCTURE: CPT

## 2021-12-13 PROCEDURE — 3061F NEG MICROALBUMINURIA REV: CPT | Performed by: NURSE PRACTITIONER

## 2021-12-13 PROCEDURE — G1004 CDSM NDSC: HCPCS

## 2021-12-13 PROCEDURE — 85025 COMPLETE CBC W/AUTO DIFF WBC: CPT

## 2021-12-13 RX ADMIN — IOHEXOL 100 ML: 350 INJECTION, SOLUTION INTRAVENOUS at 16:06

## 2021-12-13 RX ADMIN — IOHEXOL 50 ML: 240 INJECTION, SOLUTION INTRATHECAL; INTRAVASCULAR; INTRAVENOUS; ORAL at 16:05

## 2021-12-14 ENCOUNTER — TELEPHONE (OUTPATIENT)
Dept: INTERNAL MEDICINE CLINIC | Age: 51
End: 2021-12-14

## 2021-12-14 ENCOUNTER — TELEPHONE (OUTPATIENT)
Dept: UROLOGY | Facility: CLINIC | Age: 51
End: 2021-12-14

## 2021-12-15 ENCOUNTER — HOSPITAL ENCOUNTER (OUTPATIENT)
Dept: RADIOLOGY | Age: 51
Discharge: HOME | End: 2021-12-15
Payer: COMMERCIAL

## 2021-12-15 DIAGNOSIS — N50.82 SCROTAL PAIN: ICD-10-CM

## 2021-12-15 DIAGNOSIS — N48.89 PENILE BLEEDING: ICD-10-CM

## 2021-12-15 PROCEDURE — 76870 US EXAM SCROTUM: CPT

## 2021-12-22 ENCOUNTER — OFFICE VISIT (OUTPATIENT)
Dept: UROLOGY | Facility: AMBULATORY SURGERY CENTER | Age: 51
End: 2021-12-22
Payer: COMMERCIAL

## 2021-12-22 VITALS
WEIGHT: 255 LBS | DIASTOLIC BLOOD PRESSURE: 78 MMHG | HEIGHT: 73 IN | BODY MASS INDEX: 33.8 KG/M2 | HEART RATE: 71 BPM | SYSTOLIC BLOOD PRESSURE: 128 MMHG

## 2021-12-22 DIAGNOSIS — N52.9 ERECTILE DYSFUNCTION, UNSPECIFIED ERECTILE DYSFUNCTION TYPE: ICD-10-CM

## 2021-12-22 DIAGNOSIS — Z12.5 SCREENING FOR PROSTATE CANCER: Primary | ICD-10-CM

## 2021-12-22 DIAGNOSIS — N48.6 PEYRONIE'S SYNDROME: ICD-10-CM

## 2021-12-22 DIAGNOSIS — R35.0 BENIGN PROSTATIC HYPERPLASIA WITH URINARY FREQUENCY: ICD-10-CM

## 2021-12-22 DIAGNOSIS — N40.1 BENIGN PROSTATIC HYPERPLASIA WITH URINARY FREQUENCY: ICD-10-CM

## 2021-12-22 PROCEDURE — 3078F DIAST BP <80 MM HG: CPT | Performed by: NURSE PRACTITIONER

## 2021-12-22 PROCEDURE — 1036F TOBACCO NON-USER: CPT | Performed by: NURSE PRACTITIONER

## 2021-12-22 PROCEDURE — 3008F BODY MASS INDEX DOCD: CPT | Performed by: NURSE PRACTITIONER

## 2021-12-22 PROCEDURE — 99214 OFFICE O/P EST MOD 30 MIN: CPT | Performed by: NURSE PRACTITIONER

## 2021-12-22 PROCEDURE — 3074F SYST BP LT 130 MM HG: CPT | Performed by: NURSE PRACTITIONER

## 2021-12-22 RX ORDER — TAMSULOSIN HYDROCHLORIDE 0.4 MG/1
0.4 CAPSULE ORAL
Qty: 30 CAPSULE | Refills: 3 | Status: SHIPPED | OUTPATIENT
Start: 2021-12-22 | End: 2022-03-24 | Stop reason: ALTCHOICE

## 2021-12-22 RX ORDER — TADALAFIL 20 MG/1
20 TABLET ORAL AS NEEDED
Qty: 10 TABLET | Refills: 0 | Status: SHIPPED | OUTPATIENT
Start: 2021-12-22 | End: 2022-06-08 | Stop reason: SDUPTHER

## 2022-01-10 ENCOUNTER — AMB VIDEO VISIT (OUTPATIENT)
Dept: OTHER | Facility: HOSPITAL | Age: 52
End: 2022-01-10

## 2022-01-10 ENCOUNTER — TELEPHONE (OUTPATIENT)
Dept: OBGYN CLINIC | Facility: HOSPITAL | Age: 52
End: 2022-01-10

## 2022-01-10 DIAGNOSIS — R06.2 WHEEZING: ICD-10-CM

## 2022-01-10 DIAGNOSIS — J01.90 ACUTE SINUSITIS, RECURRENCE NOT SPECIFIED, UNSPECIFIED LOCATION: Primary | ICD-10-CM

## 2022-01-10 DIAGNOSIS — U07.1 COVID: ICD-10-CM

## 2022-01-10 PROCEDURE — ECARE PR SL URGENT CARE VIRTUAL VISIT: Performed by: NURSE PRACTITIONER

## 2022-01-10 RX ORDER — AMOXICILLIN AND CLAVULANATE POTASSIUM 875; 125 MG/1; MG/1
1 TABLET, FILM COATED ORAL EVERY 12 HOURS SCHEDULED
Qty: 20 TABLET | Refills: 0 | Status: SHIPPED | OUTPATIENT
Start: 2022-01-10 | End: 2022-01-20

## 2022-01-10 RX ORDER — ALBUTEROL SULFATE 90 UG/1
2 AEROSOL, METERED RESPIRATORY (INHALATION) EVERY 6 HOURS PRN
Qty: 8.5 G | Refills: 0 | Status: SHIPPED | OUTPATIENT
Start: 2022-01-10 | End: 2022-01-17

## 2022-01-10 NOTE — PATIENT INSTRUCTIONS
Symptoms seem to be related to sinuses  Will start antibiotic for a sinus infection  Will also given inhaler to see if that help with wheezing  You state you feel well enough to stay home and try treatment at home  As we discussed if you develop any chest pain, shortness breath or difficulty breathing you need to go directly to the emergency room  If you have any worsening or persistent symptoms I would recommend follow-up your family doctor go to the nearest urgent care center  If cough is persistent I would recommend you have a chest x-ray done  Again at this time you prefer to try antibiotic and inhaler to see if there is improvement  You need to continue to isolate until symptoms are improving and fever free for 24 hours  Will give you a note to return on Thursday if symptoms are improving and fever free  You verbalized understanding of all these instructions  You will go to the ER if he develops any worsening symptoms, chest pain, shortness of breath, difficulty breathing, lethargy, increased low back pain or any worsening symptoms  You continue to isolate into your feeling better  Also recommended Mucinex and Flonase to see if this helps with congestion  COVID-19 (Coronavirus Disease 2019)   WHAT YOU NEED TO KNOW:   Coronavirus disease 2019 (COVID-19) is the disease caused by a coronavirus first discovered in December 2019  Coronaviruses generally cause upper respiratory (nose, throat, and lung) infections, such as a cold  The new virus spreads quickly and easily  The virus can be spread starting 2 days before symptoms even begin  The virus has also changed into several new forms (called variants) since it was discovered  The variants may be more contagious (easily spread) than the original form  Some may also cause more severe illness than others  It is important to follow local, national, and worldwide measures to protect yourself and others from infection    DISCHARGE INSTRUCTIONS:   If you think you or someone you know may be infected:  Do the following to protect others:  · If emergency care is needed,  tell the  about the possible infection, or call ahead and tell the emergency department  · Call a healthcare provider  for instructions if symptoms are mild  Anyone who may be infected should not  arrive without calling first  The provider will need to protect staff members and other patients  · The person who may be infected needs to wear a face covering  while getting medical care  This will help lower the risk of infecting others  Coverings are not used for anyone who is younger than 2 years, has breathing problems, or cannot remove it  The provider can give you instructions for anyone who cannot wear a covering  Call your local emergency number (911 in the [address] [facility]) or an emergency department if:   · You have trouble breathing or shortness of breath at rest     · You have chest pain or pressure that lasts longer than 5 minutes  · You become confused or hard to wake  · Your lips or face are blue  · You have a fever of 104°F (40°C) or higher  Call your doctor if:   · You do not  have symptoms of COVID-19 but had close physical contact within 14 days with someone who tested positive  · You have questions or concerns about your condition or care  Medicines: You may need any of the following for mild symptoms:  · Decongestants  help reduce nasal congestion and help you breathe more easily  If you take decongestant pills, they may make you feel restless or cause problems with your sleep  Do not use decongestant sprays for more than a few days  · Cough suppressants  help reduce coughing  Ask your healthcare provider which type of cough medicine is best for you  · Acetaminophen  decreases pain and fever  It is available without a doctor's order  Ask how much to take and how often to take it  Follow directions   Read the labels of all other medicines you are using to see if they also contain acetaminophen, or ask your doctor or pharmacist  Acetaminophen can cause liver damage if not taken correctly  Do not use more than 4 grams (4,000 milligrams) total of acetaminophen in one day  · NSAIDs , such as ibuprofen, help decrease swelling, pain, and fever  NSAIDs can cause stomach bleeding or kidney problems in certain people  If you take blood thinner medicine, always ask your healthcare provider if NSAIDs are safe for you  Always read the medicine label and follow directions  · Take your medicine as directed  Contact your healthcare provider if you think your medicine is not helping or if you have side effects  Tell him or her if you are allergic to any medicine  Keep a list of the medicines, vitamins, and herbs you take  Include the amounts, and when and why you take them  Bring the list or the pill bottles to follow-up visits  Carry your medicine list with you in case of an emergency  What you need to know about COVID-19 vaccines:  Get a vaccine even if you already had COVID-19  · COVID-19 vaccines are given as a shot in 1 or 2 doses  Some vaccines have emergency use authorization (EUA)  An EUA means the vaccine is not approved but is given because the benefits outweigh the risks  A 2-dose vaccine is fully approved for use in those 16 years or older  This vaccine also has an EUA for adolescents 12 to 15 years  Your healthcare provider can help you understand the benefits and risks  · A third dose is recommended for adults with a weakened immune system who get a 2-dose vaccine  The third dose is given at least 28 days after the second  · Even after you get the vaccine, continue social distancing and other measures  Experts are still learning how well the vaccines work to prevent infection, transmission, and severe illness  Although rare, you can become infected after you get the vaccine   You may also be able to pass the virus to others without knowing you are infected  · After you get the vaccine, check local, national, and international travel rules  Check to see if you need to be tested before you travel  You may also need to quarantine after you return  Some countries require proof of a negative test before you leave  You should also be tested 3 to 5 days after you return from another country  How the 2019 coronavirus spreads: The following are ways the virus is thought to spread, but more information may be coming:  · Droplets are the main way all coronaviruses spread  The virus travels in droplets that form when a person talks, coughs, or sneezes  The droplets can also float in the air for minutes or hours  Infection happens when you breathe in the droplets or get them in your eyes or nose  Close personal contact with an infected person increases your risk for infection  This means being within 6 feet (2 meters) of the person for at least 15 minutes over 24 hours  · Person-to-person contact can spread the virus  For example, a person with the virus on his or her hands can spread it by shaking hands with someone  · The virus can stay on objects and surfaces for a short time  You may become infected by touching the object or surface and then touching your eyes or mouth  · An infected animal may be able to infect a person who touches it  This may happen at live markets or on a farm  Help lower the risk for COVID-19:  The best way to prevent infection is to avoid anyone who is infected, but this can be hard to do  An infected person can spread the virus before signs or symptoms begin, or even if signs or symptoms never develop  The following can help lower the risk for infection:      · Wash your hands often throughout the day  Use soap and water  Rub your soapy hands together, lacing your fingers, for at least 20 seconds  Rinse with warm, running water  Dry your hands with a clean towel or paper towel   Use hand  that contains alcohol if soap and water are not available  Teach children how to wash their hands and use hand   · Cover sneezes and coughs  Turn your face away and cover your mouth and nose with a tissue  Throw the tissue away  Use the bend of your arm if a tissue is not available  Then wash your hands well with soap and water or use hand   Teach children how to cover a cough or sneeze  · Wear a face covering (mask) around anyone who does not live in your home  Use a cloth covering with at least 2 layers  You can also create layers by putting a cloth covering over a disposable non-medical mask  Cover your mouth and your nose  The covering should fit snugly against the bridge of your nose  Securely fasten it under your chin and on the sides of your face  Do not  wear a plastic face shield instead of a covering  Continue social distancing and washing your hands often  A face covering is not a substitute for social distancing safety measures  · Follow worldwide, national, and local social distancing guidelines  Keep at least 6 feet (2 meters) between you and others  Also keep this distance from anyone who comes to your home, such as someone making a delivery  Wear a face covering while you are around others  You will need to wear a covering in restaurants, stores, and other public buildings  You will also need a covering on mass transit, such as a bus, subway, or airplane  Remember to use a covering made from thick material or wear 2 coverings together  · Make a habit of not touching your face  If you get the virus on your hands, you can transfer it to your eyes, nose, or mouth and become infected  You can also transfer it to objects, surfaces, or people  Do not touch your eyes, nose, or mouth without washing your hands first     · Clean and disinfect high-touch surfaces and objects often  Use disinfecting wipes, or make a solution of 4 teaspoons of bleach in 1 quart (4 cups) of water   Clean and disinfect even if you think no one living in or coming to your home is infected with the virus  · Ask about other vaccines you may need  Get the influenza (flu) vaccine as soon as recommended each year, usually starting in September or October  Get the pneumonia vaccine if recommended  Your healthcare provider can tell you if you should also get other vaccines, and when to get them  Follow social distancing guidelines:  National and local social distancing rules vary  Rules may change over time as restrictions are lifted  Restrictions may return if an outbreak happens where you live  It is important to know and follow all current social distancing rules in your area  The following are general guidelines:  · Stay home if you are sick or think you may have COVID-19  It is important to stay home if you are waiting for a testing appointment or for test results  Even if you do not have symptoms, you can pass the virus to others  · Limit trips out of your home  Have food, medicines, and other supplies delivered and left at your door or other area, if possible  Plan trips out of your home so you make the fewest stops possible to limit close personal contact  Keep track of places you go  This will help contact tracers notify others if you become infected  · Avoid close physical contact with anyone who does not live in your home  Do not shake hands with, hug, or kiss a person as a greeting  If you must use public transportation (such as a bus or subway), try to sit or stand away from others  Only allow necessary people into your home  Wear your face covering, and remind others to wear a face covering  Remind them to wash their hands when they arrive and before they leave  Do not  let someone into your home or go to someone's home just to visit  Even if you both do not feel sick, the virus can pass from one of you to the other  · Avoid in-person gatherings and crowds    Gatherings or crowds of 10 or more individuals can cause the virus to spread  Avoid places such as escobar, beaches, sporting events, and tourist attractions  For events such as parties, holiday meals, Scientologist services, and conferences, attend virtually (on a computer), if possible  · Ask your healthcare provider for other ways to have appointments  Some providers offer phone, video, or other types of appointments  You may also be able to get prescriptions for a few months of your medicines at a time  · Stay safe if you must go out to work  Keep physical distance between you and other workers as much as possible  Follow your employer's rules so everyone stays safe  If you have COVID-19 and are recovering at home,  healthcare providers will give you specific instructions to follow  The following are general guidelines to remind you how to keep others safe until you are well:  · Wash your hands often  Use soap and water as much as possible  Use hand  that contains alcohol if soap and water are not available  Dry your hands with a clean towel or paper towel  Do not share towels with anyone  If you use paper towels, throw them away in a lined trash can kept in your room or area  Use a covered trash can, if possible  · Do not go out of your home unless it is necessary  Ask someone who is not infected to go out for groceries or supplies, or have them delivered  Do not go to your healthcare provider's office without an appointment  · Only have close physical contact with a person giving direct care, or a baby or child you must care for  Family members and friends should not visit you  If possible, stay in a separate area or room of your home if you live with others  No one should go into the area or room except to give you care  You can visit with others by phone, video chat, e-mail, or similar systems  · Wear a face covering while others are near you    This can help prevent droplets from spreading the virus when you talk, sneeze, or cough  Put the covering on before anyone comes into your room or area  Remind the person to cover his or her nose and mouth before coming in to provide care for you  · Do not share items  Do not share dishes, towels, or other items with anyone  Items need to be washed after you use them  · Protect your baby  Some newborns have tested positive for the virus  It is not known if they became infected before or after birth  The highest risk is when a  has close contact with an infected person  If you are pregnant or breastfeeding, talk to your healthcare provider or obstetrician about any concerns you have  He or she will tell you when to bring your baby in for check-ups and vaccines  He or she will also tell you what to do if you think your baby was infected with the coronavirus  Wash your hands and put on a clean face covering before you breastfeed or care for your baby  · Do not handle live animals unless it is necessary  Some animals, including pets, have been infected with the new coronavirus  Ask someone who is not infected to take care of your pet until you are well  If you must care for a pet, wear a face covering  Wash your hands before and after you give care  Talk to your healthcare provider about how to keep a service animal safe, if needed  · Follow directions from your healthcare provider for being around others after you recover  It is not known if or for how long a recovered person can pass the virus to others  Your provider may give you instructions, such as continuing social distancing and wearing a face covering  He or she will tell you when it is okay to be around others again  This may be 10 to 20 days after symptoms started or you had a positive test  Most symptoms will also need to be gone  Your provider will give you more information  Follow up with your doctor as directed:  Write down your questions so you remember to ask them during your visits    For more information:   · Centers for Disease Control and Prevention  [address] , [address]  Phone: 7- 014 - 687-7744  Web Address: [employer]    © [facility] 2021 Information is for End User's use only and may not be sold, redistributed or otherwise used for commercial purposes  All illustrations and images included in CareNotes® are the copyrighted property of A D A M , Inc  or [facility]   The above information is an  only  It is not intended as medical advice for individual conditions or treatments  Talk to your doctor, nurse or pharmacist before following any medical regimen to see if it is safe and effective for you

## 2022-01-10 NOTE — LETTER
January 10, 2022    Patient: Randi He  YOB: 1970  Date of Last Encounter: Visit date not found      To whom it may concern:     Randi He has tested positive for COVID-19 (Coronavirus)  He may return to work on 01/13/2022, which is 10 days from illness onset (provided symptoms are improving) and 24 hours without fever without taking any fever reducing medications       Sincerely,         [address] [facility], CRNP

## 2022-01-10 NOTE — TELEPHONE ENCOUNTER
Patient needs to cancel the appointment 01- for an USG injection due to being positive for COVID      Please call to reschedule      [phone]

## 2022-01-10 NOTE — PROGRESS NOTES
Video Visit - Noah Dc 45 y o  male MRN: [id number]    REQUIRED DOCUMENTATION:         1  [facility]  2  Provider located at [address] [place] [id number]  3  [facility] provider: MIKHAIL Alston  4  Identify all parties in room with patient during [facility] visit:  Patient   5  After connecting through [employer], patient was identified by name and date of birth  Patient was then informed that this was a Telemedicine visit and that the exam was being conducted confidentially over secure lines  My office door was closed  No one else was in the room  Patient acknowledged consent and understanding of privacy and security of the Telemedicine visit  I informed the patient that I have reviewed their record in Epic and presented the opportunity for them to ask any questions regarding the visit today  The patient agreed to participate  This is a 54-year-old male here today with complaints COVID  He is here today for video visit  He states symptoms started on 01/02  He was then tested on 01/04 while in [place]  He states symptoms started with cough, congestion, sore throat headache  He states he has had mild fevers  He denies any loss of taste or smell  He states he was getting better but now feels as though symptoms are returning  He is having increased sinus pressure  He states sinus pressure is primarily on the left side  He states he did have some blood-tinged nasal discharge  He denies any yellow or greenish color  He denies any shortness of breath or chest pain  He does note that he has a slight wheeze  He has not been checking his blood sugars  He has COVID-19 vaccinated  He is also requesting a note back to work  Physical Exam  Vitals and nursing note reviewed  Constitutional:       Appearance: Normal appearance  He is ill-appearing (Mild)     Pulmonary:      Effort: Pulmonary effort is normal       Comments: Slight wheezing cough  No distress on exam  Respiratory rate is normal    Neurological:      Mental Status: He is alert and oriented to person, place, and time  Psychiatric:         Mood and Affect: Mood normal          Behavior: Behavior normal          Thought Content: Thought content normal          Judgment: Judgment normal        Diagnoses and all orders for this visit:    Acute sinusitis, recurrence not specified, unspecified location  -     amoxicillin-clavulanate (Augmentin) 875-125 mg per tablet; Take 1 tablet by mouth every 12 (twelve) hours for 10 days    Wheezing  -     albuterol (ProAir HFA) 90 mcg/act inhaler; Inhale 2 puffs every 6 (six) hours as needed for wheezing for up to 7 days    COVID      Patient Instructions     Symptoms seem to be related to sinuses  Will start antibiotic for a sinus infection  Will also given inhaler to see if that help with wheezing  You state you feel well enough to stay home and try treatment at home  As we discussed if you develop any chest pain, shortness breath or difficulty breathing you need to go directly to the emergency room  If you have any worsening or persistent symptoms I would recommend follow-up your family doctor go to the nearest urgent care center  If cough is persistent I would recommend you have a chest x-ray done  Again at this time you prefer to try antibiotic and inhaler to see if there is improvement  You need to continue to isolate until symptoms are improving and fever free for 24 hours  Will give you a note to return on Thursday if symptoms are improving and fever free  You verbalized understanding of all these instructions  You will go to the ER if he develops any worsening symptoms, chest pain, shortness of breath, difficulty breathing, lethargy, increased low back pain or any worsening symptoms  You continue to isolate into your feeling better  Also recommended Mucinex and Flonase to see if this helps with congestion  COVID-19 (Coronavirus Disease 2019)   WHAT YOU NEED TO KNOW:   Coronavirus disease 2019 (COVID-19) is the disease caused by a coronavirus first discovered in December 2019  Coronaviruses generally cause upper respiratory (nose, throat, and lung) infections, such as a cold  The new virus spreads quickly and easily  The virus can be spread starting 2 days before symptoms even begin  The virus has also changed into several new forms (called variants) since it was discovered  The variants may be more contagious (easily spread) than the original form  Some may also cause more severe illness than others  It is important to follow local, national, and worldwide measures to protect yourself and others from infection  DISCHARGE INSTRUCTIONS:   If you think you or someone you know may be infected:  Do the following to protect others:  · If emergency care is needed,  tell the  about the possible infection, or call ahead and tell the emergency department  · Call a healthcare provider  for instructions if symptoms are mild  Anyone who may be infected should not  arrive without calling first  The provider will need to protect staff members and other patients  · The person who may be infected needs to wear a face covering  while getting medical care  This will help lower the risk of infecting others  Coverings are not used for anyone who is younger than 2 years, has breathing problems, or cannot remove it  The provider can give you instructions for anyone who cannot wear a covering  Call your local emergency number (911 in the [address] [place] [address],[address]) or an emergency department if:   · You have trouble breathing or shortness of breath at rest     · You have chest pain or pressure that lasts longer than 5 minutes  · You become confused or hard to wake  · Your lips or face are blue  · You have a fever of 104°F (40°C) or higher      Call your doctor if:   · You do not  have symptoms of COVID-19 but had close physical contact within 14 days with someone who tested positive  · You have questions or concerns about your condition or care  Medicines: You may need any of the following for mild symptoms:  · Decongestants  help reduce nasal congestion and help you breathe more easily  If you take decongestant pills, they may make you feel restless or cause problems with your sleep  Do not use decongestant sprays for more than a few days  · Cough suppressants  help reduce coughing  Ask your healthcare provider which type of cough medicine is best for you  · Acetaminophen  decreases pain and fever  It is available without a doctor's order  Ask how much to take and how often to take it  Follow directions  Read the labels of all other medicines you are using to see if they also contain acetaminophen, or ask your doctor or pharmacist  Acetaminophen can cause liver damage if not taken correctly  Do not use more than 4 grams (4,000 milligrams) total of acetaminophen in one day  · NSAIDs , such as ibuprofen, help decrease swelling, pain, and fever  NSAIDs can cause stomach bleeding or kidney problems in certain people  If you take blood thinner medicine, always ask your healthcare provider if NSAIDs are safe for you  Always read the medicine label and follow directions  · Take your medicine as directed  Contact your healthcare provider if you think your medicine is not helping or if you have side effects  Tell him or her if you are allergic to any medicine  Keep a list of the medicines, vitamins, and herbs you take  Include the amounts, and when and why you take them  Bring the list or the pill bottles to follow-up visits  Carry your medicine list with you in case of an emergency  What you need to know about COVID-19 vaccines:  Get a vaccine even if you already had COVID-19  · COVID-19 vaccines are given as a shot in 1 or 2 doses  Some vaccines have emergency use authorization (EUA)   An EUA means the vaccine is not approved but is given because the benefits outweigh the risks  A 2-dose vaccine is fully approved for use in those 16 years or older  This vaccine also has an EUA for adolescents 12 to 15 years  Your healthcare provider can help you understand the benefits and risks  · A third dose is recommended for adults with a weakened immune system who get a 2-dose vaccine  The third dose is given at least 28 days after the second  · Even after you get the vaccine, continue social distancing and other measures  Experts are still learning how well the vaccines work to prevent infection, transmission, and severe illness  Although rare, you can become infected after you get the vaccine  You may also be able to pass the virus to others without knowing you are infected  · After you get the vaccine, check local, national, and international travel rules  Check to see if you need to be tested before you travel  You may also need to quarantine after you return  Some countries require proof of a negative test before you leave  You should also be tested 3 to 5 days after you return from another country  How the 2019 coronavirus spreads: The following are ways the virus is thought to spread, but more information may be coming:  · Droplets are the main way all coronaviruses spread  The virus travels in droplets that form when a person talks, coughs, or sneezes  The droplets can also float in the air for minutes or hours  Infection happens when you breathe in the droplets or get them in your eyes or nose  Close personal contact with an infected person increases your risk for infection  This means being within 6 feet (2 meters) of the person for at least 15 minutes over 24 hours  · Person-to-person contact can spread the virus  For example, a person with the virus on his or her hands can spread it by shaking hands with someone  · The virus can stay on objects and surfaces for a short time    You may become infected by touching the object or surface and then touching your eyes or mouth  · An infected animal may be able to infect a person who touches it  This may happen at live markets or on a farm  Help lower the risk for COVID-19:  The best way to prevent infection is to avoid anyone who is infected, but this can be hard to do  An infected person can spread the virus before signs or symptoms begin, or even if signs or symptoms never develop  The following can help lower the risk for infection:      · Wash your hands often throughout the day  Use soap and water  Rub your soapy hands together, lacing your fingers, for at least 20 seconds  Rinse with warm, running water  Dry your hands with a clean towel or paper towel  Use hand  that contains alcohol if soap and water are not available  Teach children how to wash their hands and use hand   · Cover sneezes and coughs  Turn your face away and cover your mouth and nose with a tissue  Throw the tissue away  Use the bend of your arm if a tissue is not available  Then wash your hands well with soap and water or use hand   Teach children how to cover a cough or sneeze  · Wear a face covering (mask) around anyone who does not live in your home  Use a cloth covering with at least 2 layers  You can also create layers by putting a cloth covering over a disposable non-medical mask  Cover your mouth and your nose  The covering should fit snugly against the bridge of your nose  Securely fasten it under your chin and on the sides of your face  Do not  wear a plastic face shield instead of a covering  Continue social distancing and washing your hands often  A face covering is not a substitute for social distancing safety measures  · Follow worldwide, national, and local social distancing guidelines  Keep at least 6 feet (2 meters) between you and others  Also keep this distance from anyone who comes to your home, such as someone making a delivery  Wear a face covering while you are around others  You will need to wear a covering in restaurants, stores, and other public buildings  You will also need a covering on mass transit, such as a bus, subway, or airplane  Remember to use a covering made from thick material or wear 2 coverings together  · Make a habit of not touching your face  If you get the virus on your hands, you can transfer it to your eyes, nose, or mouth and become infected  You can also transfer it to objects, surfaces, or people  Do not touch your eyes, nose, or mouth without washing your hands first     · Clean and disinfect high-touch surfaces and objects often  Use disinfecting wipes, or make a solution of 4 teaspoons of bleach in 1 quart (4 cups) of water  Clean and disinfect even if you think no one living in or coming to your home is infected with the virus  · Ask about other vaccines you may need  Get the influenza (flu) vaccine as soon as recommended each year, usually starting in September or October  Get the pneumonia vaccine if recommended  Your healthcare provider can tell you if you should also get other vaccines, and when to get them  Follow social distancing guidelines:  National and local social distancing rules vary  Rules may change over time as restrictions are lifted  Restrictions may return if an outbreak happens where you live  It is important to know and follow all current social distancing rules in your area  The following are general guidelines:  · Stay home if you are sick or think you may have COVID-19  It is important to stay home if you are waiting for a testing appointment or for test results  Even if you do not have symptoms, you can pass the virus to others  · Limit trips out of your home  Have food, medicines, and other supplies delivered and left at your door or other area, if possible  Plan trips out of your home so you make the fewest stops possible to limit close personal contact   Keep track of places you go  This will help contact tracers notify others if you become infected  · Avoid close physical contact with anyone who does not live in your home  Do not shake hands with, hug, or kiss a person as a greeting  If you must use public transportation (such as a bus or subway), try to sit or stand away from others  Only allow necessary people into your home  Wear your face covering, and remind others to wear a face covering  Remind them to wash their hands when they arrive and before they leave  Do not  let someone into your home or go to someone's home just to visit  Even if you both do not feel sick, the virus can pass from one of you to the other  · Avoid in-person gatherings and crowds  Gatherings or crowds of 10 or more individuals can cause the virus to spread  Avoid places such as escobar, beaches, sporting events, and tourist attractions  For events such as parties, holiday meals, Buddhist services, and conferences, attend virtually (on a computer), if possible  · Ask your healthcare provider for other ways to have appointments  Some providers offer phone, video, or other types of appointments  You may also be able to get prescriptions for a few months of your medicines at a time  · Stay safe if you must go out to work  Keep physical distance between you and other workers as much as possible  Follow your employer's rules so everyone stays safe  If you have COVID-19 and are recovering at home,  healthcare providers will give you specific instructions to follow  The following are general guidelines to remind you how to keep others safe until you are well:  · Wash your hands often  Use soap and water as much as possible  Use hand  that contains alcohol if soap and water are not available  Dry your hands with a clean towel or paper towel  Do not share towels with anyone  If you use paper towels, throw them away in a lined trash can kept in your room or area   Use a covered trash can, if possible  · Do not go out of your home unless it is necessary  Ask someone who is not infected to go out for groceries or supplies, or have them delivered  Do not go to your healthcare provider's office without an appointment  · Only have close physical contact with a person giving direct care, or a baby or child you must care for  Family members and friends should not visit you  If possible, stay in a separate area or room of your home if you live with others  No one should go into the area or room except to give you care  You can visit with others by phone, video chat, e-mail, or similar systems  · Wear a face covering while others are near you  This can help prevent droplets from spreading the virus when you talk, sneeze, or cough  Put the covering on before anyone comes into your room or area  Remind the person to cover his or her nose and mouth before coming in to provide care for you  · Do not share items  Do not share dishes, towels, or other items with anyone  Items need to be washed after you use them  · Protect your baby  Some newborns have tested positive for the virus  It is not known if they became infected before or after birth  The highest risk is when a  has close contact with an infected person  If you are pregnant or breastfeeding, talk to your healthcare provider or obstetrician about any concerns you have  He or she will tell you when to bring your baby in for check-ups and vaccines  He or she will also tell you what to do if you think your baby was infected with the coronavirus  Wash your hands and put on a clean face covering before you breastfeed or care for your baby  · Do not handle live animals unless it is necessary  Some animals, including pets, have been infected with the new coronavirus  Ask someone who is not infected to take care of your pet until you are well  If you must care for a pet, wear a face covering   Wash your hands before and after you give care  Talk to your healthcare provider about how to keep a service animal safe, if needed  · Follow directions from your healthcare provider for being around others after you recover  It is not known if or for how long a recovered person can pass the virus to others  Your provider may give you instructions, such as continuing social distancing and wearing a face covering  He or she will tell you when it is okay to be around others again  This may be 10 to 20 days after symptoms started or you had a positive test  Most symptoms will also need to be gone  Your provider will give you more information  Follow up with your doctor as directed:  Write down your questions so you remember to ask them during your visits  For more information:   · Centers for Disease Control and Prevention  [address] [place] , [address]  Phone: 6- 924 - 663-2466  Web Address: [employer]    © [address] [place] [address] 2021 Information is for End User's use only and may not be sold, redistributed or otherwise used for commercial purposes  All illustrations and images included in CareNotes® are the copyrighted property of [employer]  or [address] [place], [place] [address]  The above information is an  only  It is not intended as medical advice for individual conditions or treatments  Talk to your doctor, nurse or pharmacist before following any medical regimen to see if it is safe and effective for you  Follow up with PCP if not improved, if symptoms are worse, go to the ER

## 2022-01-10 NOTE — CARE ANYWHERE EVISITS
Visit Summary for [facility]   Boone Vu - Gender: Male - Date of Birth: [id number]  Date: [id number] - Duration: 13 minutes  Patient: [facility]   Boone Vu  Provider: Lizet ELIZONDO    Patient Contact Information  Address  [address] [place] [address]  [place]; [place] [address]      Visit Topics  Marylene Silos been sick January 2  I tested positive for COVID January 4  I was told that I should feel better by the 5th or 6th day  I am still feeling Iâll and symptoms getting stronger  [Added By: Self - 2022-01-10]    Triage Questions   What is your current physical address in the event of a medical emergency? Answer []  Are you allergic to any medications? Answer []  Are you now or could you be pregnant? Answer []  Do you have any immune system compromise or chronic lung   disease? Answer []  Do you have any vulnerable family members in the home (infant, pregnant, cancer, elderly)? Answer []     Conversation Transcripts  [0A][0A] [Notification] You are connected with Hellen Mosqueda, Urgent Care Specialist [0A][Notification] Shirley Chavez is located in [place]  [0A][Notification] Shirley Chavez has shared health history  [facility]  [0A]    Diagnosis  COVID-19  Acute sinusitis, unspecified    Procedures  Value: [id number] Code: CPT-4 UNLISTED E&M SERVICE    Medications Prescribed    No prescriptions ordered    Electronically signed by: Hellen Gomez(NPI [id number])

## 2022-01-11 ENCOUNTER — TELEPHONE (OUTPATIENT)
Dept: INTERNAL MEDICINE CLINIC | Facility: OTHER | Age: 52
End: 2022-01-11

## 2022-01-11 ENCOUNTER — APPOINTMENT (OUTPATIENT)
Dept: RADIOLOGY | Age: 52
End: 2022-01-11
Payer: COMMERCIAL

## 2022-01-11 ENCOUNTER — TELEMEDICINE (OUTPATIENT)
Dept: INTERNAL MEDICINE CLINIC | Facility: OTHER | Age: 52
End: 2022-01-11
Payer: COMMERCIAL

## 2022-01-11 VITALS — OXYGEN SATURATION: 95 % | BODY MASS INDEX: 33.8 KG/M2 | HEIGHT: 73 IN | WEIGHT: 255 LBS

## 2022-01-11 DIAGNOSIS — J20.9 ACUTE BRONCHITIS, UNSPECIFIED ORGANISM: Primary | ICD-10-CM

## 2022-01-11 DIAGNOSIS — U07.1 COVID-19 VIRUS INFECTION: ICD-10-CM

## 2022-01-11 DIAGNOSIS — J20.9 ACUTE BRONCHITIS, UNSPECIFIED ORGANISM: ICD-10-CM

## 2022-01-11 PROCEDURE — 3725F SCREEN DEPRESSION PERFORMED: CPT | Performed by: INTERNAL MEDICINE

## 2022-01-11 PROCEDURE — 99213 OFFICE O/P EST LOW 20 MIN: CPT | Performed by: INTERNAL MEDICINE

## 2022-01-11 PROCEDURE — 71046 X-RAY EXAM CHEST 2 VIEWS: CPT

## 2022-01-11 RX ORDER — DEXTROMETHORPHAN HYDROBROMIDE AND PROMETHAZINE HYDROCHLORIDE 15; 6.25 MG/5ML; MG/5ML
5 SOLUTION ORAL 4 TIMES DAILY PRN
Qty: 180 ML | Refills: 0 | Status: SHIPPED | OUTPATIENT
Start: 2022-01-11 | End: 2022-03-11 | Stop reason: ALTCHOICE

## 2022-01-11 NOTE — TELEPHONE ENCOUNTER
Called and spoke to patient    Patient has been scheduled     Tuesday Feb 8th @ 7:30am   Patient aware of date, time and location    Patient was rescheduled from 01/11/2022 due to being  COVID+

## 2022-01-11 NOTE — PROGRESS NOTES
Virtual Regular Visit    Verification of patient location:    Patient is located in the following state in which I hold an active license [place]      Assessment/Plan:  1  Acute bronchitis rule out pneumonia in COVID-19 infection patient  Will request stat chest x-ray  Continue with the Augmentin and albuterol inhaler  Will also prescribe promethazine with dextromethorphan  Continue with other supportive care    Follow-up visit on January 14, 2022 and will decide at that time when he can return to work  Problem List Items Addressed This Visit        Respiratory    Acute bronchitis - Primary    Relevant Medications    Promethazine-DM (PHENERGAN-DM) 6 25-15 mg/5 mL oral syrup    Other Relevant Orders    XR chest pa & lateral       Other    COVID-19 virus infection    Relevant Orders    XR chest pa & lateral               Reason for visit is   Chief Complaint   Patient presents with    COVID-19     text [id number]---[place] with covid-- back pain in [place] area- right side, coughing, congestion , runny nose, headach        Encounter provider Pawel Crouch MD    Provider located at [address] [place] [address] Road [id number]      Recent Visits  No visits were found meeting these conditions  Showing recent visits within past 7 days and meeting all other requirements  Today's Visits  Date Type Provider Dept   01/11/22 Telemedicine Pawel Crouch MD [facility]   Showing today's visits and meeting all other requirements  Future Appointments  No visits were found meeting these conditions  Showing future appointments within next 150 days and meeting all other requirements       The patient was identified by name and date of birth   Panda Garber was informed that this is a telemedicine visit and that the visit is being conducted through [facility] and patient was informed this is a secure, HIPAA-complaint platform  He agrees to proceed     My office door was closed  No one else was in the room  He acknowledged consent and understanding of privacy and security of the video platform  The patient has agreed to participate and understands they can discontinue the visit at any time  Patient is aware this is a billable service  Subjective  Fordland Rica is a 45 y o  male complaining of cough and right flanks/upper chest pain increase with cough  Also complaining of generalized fatigue and sinus congestion  He was test is positive for COVID-19 infection on January 3, 2022 his symptoms started a day before  He was tested while he was in [place]  Yesterday he will virtual visit with Care everywhere and was started on Augmentin and albuterol inhaler         Still complaining of cough and also noticed right flank/right lower chest pain increased with breathing and cough  Denied any fever  A poor appetite  He is fully vaccinated with COVID-19 vaccine and also vaccinated for flu         Past Medical History:   Diagnosis Date    Achalasia     [facility]     Diabetes mellitus ([facility] )     Esophageal ulcer     Hyperlipidemia     Hypertension        Past Surgical History:   Procedure Laterality Date    ANTERIOR CERVICAL DISCECTOMY W/ FUSION      APPENDECTOMY      BICEPS TENDON REPAIR      FL INJECTION LEFT WRIST (ARTHROGRAM)  11/9/2021    MENISCECTOMY      MYOTOMY HELLER LAPAROSCOPIC      ORTHOPEDIC SURGERY         Current Outpatient Medications   Medication Sig Dispense Refill    albuterol (ProAir HFA) 90 mcg/act inhaler Inhale 2 puffs every 6 (six) hours as needed for wheezing for up to 7 days 8 5 g 0    amoxicillin-clavulanate (Augmentin) 875-125 mg per tablet Take 1 tablet by mouth every 12 (twelve) hours for 10 days 20 tablet 0    Ascorbic Acid (vitamin C) 1000 MG tablet Take 1,000 mg by mouth daily        baclofen 20 mg tablet Take 1 tablet (20 mg total) by mouth 3 (three) times a day 90 tablet 2    diphenhydrAMINE-acetaminophen (TYLENOL PM)  MG TABS Take 1 tablet by mouth daily at bedtime as needed for sleep       lisinopril (ZESTRIL) 40 mg tablet Take 1 tablet (40 mg total) by mouth daily 90 tablet 1    metFORMIN (GLUCOPHAGE) 500 mg tablet Pt takes 1500 mg (3 tablets) by mouth in the morning, and a 1000 mg (2 tablets) in the evening 450 tablet 1    simvastatin (ZOCOR) 20 mg tablet Take 1 tablet (20 mg total) by mouth daily 90 tablet 1    tadalafil (CIALIS) 20 MG tablet Take 1 tablet (20 mg total) by mouth as needed for erectile dysfunction 10 tablet 0    tamsulosin (FLOMAX) 0 4 mg Take 1 capsule (0 4 mg total) by mouth daily with dinner 30 capsule 3    Promethazine-DM (PHENERGAN-DM) 6 25-15 mg/5 mL oral syrup Take 5 mL by mouth 4 (four) times a day as needed for cough 180 mL 0     No current facility-administered medications for this visit  No Known Allergies    Review of Systems   Constitutional: Negative for fatigue and fever  HENT: Positive for congestion, postnasal drip and sinus pressure  Negative for ear discharge, ear pain, sore throat, tinnitus and trouble swallowing  Eyes: Negative for discharge, itching and visual disturbance  Respiratory: Positive for cough  Negative for shortness of breath  Cardiovascular: Negative for chest pain and palpitations  Right flank  And right lower chest pain  Increase with cough and deep breath   Gastrointestinal: Negative for abdominal pain, diarrhea, nausea and vomiting  Endocrine: Negative for cold intolerance and polyuria  Genitourinary: Negative for difficulty urinating, dysuria and urgency  Musculoskeletal: Negative for arthralgias and neck pain  Skin: Negative for rash  Allergic/Immunologic: Negative for environmental allergies  Neurological: Negative for dizziness, weakness and headaches  Psychiatric/Behavioral: Negative for behavioral problems  The patient is not nervous/anxious  Video Exam    Vitals:    01/11/22 1425   SpO2: 95%   Weight: 116 kg (255 lb)   Height: 6' 1" (1 854 m)       Physical Exam  Constitutional:       General: He is not in acute distress  Appearance: He is obese  He is ill-appearing  He is not diaphoretic  HENT:      Right Ear: External ear normal       Left Ear: External ear normal       Nose: Congestion present  No rhinorrhea  Mouth/Throat:      Pharynx: Posterior oropharyngeal erythema present  No oropharyngeal exudate  Eyes:      Conjunctiva/sclera: Conjunctivae normal    Pulmonary:      Effort: Pulmonary effort is normal  No respiratory distress  Abdominal:      General: There is no distension  Musculoskeletal:      Right lower leg: No edema  Left lower leg: No edema  Skin:     Findings: No lesion or rash  Neurological:      Mental Status: He is oriented to person, place, and time  Psychiatric:         Mood and Affect: Mood normal          Behavior: Behavior normal           I spent 15 minutes directly with the patient during this visit    VIRTUAL VISIT DISCLAIMER      Ollie Perez verbally agrees to participate in [employer]  Pt is aware that [employer] could be limited without vital signs or the ability to perform a full hands-on physical Pawel Burns understands he or the provider may request at any time to terminate the video visit and request the patient to seek care or treatment in person

## 2022-01-13 ENCOUNTER — RA CDI HCC (OUTPATIENT)
Dept: OTHER | Facility: HOSPITAL | Age: 52
End: 2022-01-13

## 2022-01-14 ENCOUNTER — TELEMEDICINE (OUTPATIENT)
Dept: INTERNAL MEDICINE CLINIC | Facility: OTHER | Age: 52
End: 2022-01-14
Payer: COMMERCIAL

## 2022-01-14 VITALS — BODY MASS INDEX: 33.8 KG/M2 | WEIGHT: 255 LBS | HEIGHT: 73 IN

## 2022-01-14 DIAGNOSIS — J20.8 ACUTE BRONCHITIS DUE TO OTHER SPECIFIED ORGANISMS: ICD-10-CM

## 2022-01-14 DIAGNOSIS — U07.1 COVID-19 VIRUS INFECTION: Primary | ICD-10-CM

## 2022-01-14 DIAGNOSIS — E11.9 TYPE 2 DIABETES MELLITUS WITHOUT COMPLICATION, WITHOUT LONG-TERM CURRENT USE OF INSULIN (HCC): ICD-10-CM

## 2022-01-14 DIAGNOSIS — I10 HYPERTENSION, ESSENTIAL: ICD-10-CM

## 2022-01-14 PROCEDURE — 99213 OFFICE O/P EST LOW 20 MIN: CPT | Performed by: INTERNAL MEDICINE

## 2022-01-14 PROCEDURE — 3008F BODY MASS INDEX DOCD: CPT | Performed by: NURSE PRACTITIONER

## 2022-01-14 RX ORDER — PREDNISONE 20 MG/1
40 TABLET ORAL DAILY
Qty: 10 TABLET | Refills: 0 | Status: SHIPPED | OUTPATIENT
Start: 2022-01-14 | End: 2022-01-19

## 2022-01-14 NOTE — PROGRESS NOTES
Virtual Regular Visit    Verification of patient location:    Patient is located in the following state in which I hold an active license [place]      Assessment/Plan:  1  Acute bronchitis secondary to COVID-19 infection  Will add prednisone 40 mg daily for 5 days  Continue with present cough medicine and albuterol inhaler  Medically not stable for return to work yet  Will be re-evaluated on January 17, 2022     2  Type 2 diabetes mellitus  Continue with present regimen    3  Essential hypertension  Continue with lisinopril 40 mg daily    Problem List Items Addressed This Visit        Endocrine    Diabetes mellitus ([facility] )       Respiratory    Acute bronchitis    Relevant Medications    predniSONE 20 mg tablet       Cardiovascular and Mediastinum    Hypertension, essential       Other    COVID-19 virus infection - Primary               Reason for visit is   Chief Complaint   Patient presents with    Follow-up    Diabetes    Cough     still giving pain         Encounter provider Ange Belcher MD    Provider located at [address] [place] [id number]      Recent Visits  Date Type Provider Dept   01/11/22 Telephone Candi Aragon MA [facility]   01/11/22 [address] [place] [address] recent visits within past 7 days and meeting all other requirements  Today's Visits  Date Type Provider Dept   01/14/22 Telemedicine [facility]   Showing today's visits and meeting all other requirements  Future Appointments  No visits were found meeting these conditions  Showing future appointments within next 150 days and meeting all other requirements       The patient was identified by name and date of birth   Latoya Arana was informed that this is a telemedicine visit and that the visit is being conducted through [facility] and patient was informed this is a secure, HIPAA-complaint platform  He agrees to proceed     My office door was closed  No one else was in the room  He acknowledged consent and understanding of privacy and security of the video platform  The patient has agreed to participate and understands they can discontinue the visit at any time  Patient is aware this is a billable service  Subjective  Charles Copeland is a 45 y o  male still complaining of cough generalized fatigue and wheezing  But better than previous visit         Still complaining of cough and occasional wheezing and chest tightness  No fever chills  Still with fatigue         Past Medical History:   Diagnosis Date    Achalasia     [facility]     Diabetes mellitus ([facility]     Esophageal ulcer     Hyperlipidemia     Hypertension        Past Surgical History:   Procedure Laterality Date    ANTERIOR CERVICAL DISCECTOMY W/ FUSION      APPENDECTOMY      BICEPS TENDON REPAIR      FL INJECTION LEFT WRIST (ARTHROGRAM)  11/9/2021    MENISCECTOMY      MYOTOMY HELLER LAPAROSCOPIC      ORTHOPEDIC SURGERY         Current Outpatient Medications   Medication Sig Dispense Refill    albuterol (ProAir HFA) 90 mcg/act inhaler Inhale 2 puffs every 6 (six) hours as needed for wheezing for up to 7 days 8 5 g 0    amoxicillin-clavulanate (Augmentin) 875-125 mg per tablet Take 1 tablet by mouth every 12 (twelve) hours for 10 days 20 tablet 0    Ascorbic Acid (vitamin C) 1000 MG tablet Take 1,000 mg by mouth daily        baclofen 20 mg tablet Take 1 tablet (20 mg total) by mouth 3 (three) times a day 90 tablet 2    diphenhydrAMINE-acetaminophen (TYLENOL PM)  MG TABS Take 1 tablet by mouth daily at bedtime as needed for sleep       lisinopril (ZESTRIL) 40 mg tablet Take 1 tablet (40 mg total) by mouth daily 90 tablet 1    metFORMIN (GLUCOPHAGE) 500 mg tablet Pt takes 1500 mg (3 tablets) by mouth in the morning, and a 1000 mg (2 tablets) in the evening 450 tablet 1    Promethazine-DM (PHENERGAN-DM) 6 25-15 mg/5 mL oral syrup Take 5 mL by mouth 4 (four) times a day as needed for cough 180 mL 0    simvastatin (ZOCOR) 20 mg tablet Take 1 tablet (20 mg total) by mouth daily 90 tablet 1    tadalafil (CIALIS) 20 MG tablet Take 1 tablet (20 mg total) by mouth as needed for erectile dysfunction 10 tablet 0    tamsulosin (FLOMAX) 0 4 mg Take 1 capsule (0 4 mg total) by mouth daily with dinner 30 capsule 3    predniSONE 20 mg tablet Take 2 tablets (40 mg total) by mouth daily for 5 days 10 tablet 0     No current facility-administered medications for this visit  No Known Allergies    Review of Systems   Constitutional: Positive for fatigue  Negative for fever  HENT: Positive for congestion  Negative for ear discharge, ear pain, postnasal drip, sinus pressure, sore throat, tinnitus and trouble swallowing  Eyes: Negative for discharge, itching and visual disturbance  Respiratory: Positive for cough  Negative for shortness of breath  Cardiovascular: Negative for chest pain and palpitations  Gastrointestinal: Negative for abdominal pain, diarrhea, nausea and vomiting  Endocrine: Negative for cold intolerance and polyuria  Genitourinary: Negative for difficulty urinating, dysuria and urgency  Musculoskeletal: Negative for arthralgias and neck pain  Skin: Negative for rash  Allergic/Immunologic: Negative for environmental allergies  Neurological: Negative for dizziness, weakness and headaches  Psychiatric/Behavioral: The patient is not nervous/anxious  Video Exam    Vitals:    01/14/22 1324   Weight: 116 kg (255 lb)   Height: 6' 1" (1 854 m)       Physical Exam  Nursing note reviewed  Constitutional:       Appearance: He is obese  He is ill-appearing  HENT:      Right Ear: External ear normal       Left Ear: External ear normal       Nose: Congestion present  No rhinorrhea  Mouth/Throat:      Pharynx: No posterior oropharyngeal erythema  Eyes:      Conjunctiva/sclera: Conjunctivae normal    Pulmonary:      Effort: Pulmonary effort is normal  No respiratory distress  Abdominal:      General: There is no distension  Musculoskeletal:      Cervical back: Normal range of motion  Right lower leg: No edema  Left lower leg: No edema  Neurological:      Mental Status: He is oriented to person, place, and time  Psychiatric:         Mood and Affect: Mood normal          Behavior: Behavior normal           I spent 15 minutes directly with the patient during this visit    VIRTUAL VISIT DISCLAIMER      Cheikh Poon verbally agrees to participate in [employer]  Pt is aware that [employer] could be limited without vital signs or the ability to perform a full hands-on physical Alden Beau understands he or the provider may request at any time to terminate the video visit and request the patient to seek care or treatment in person

## 2022-01-17 ENCOUNTER — TELEMEDICINE (OUTPATIENT)
Dept: INTERNAL MEDICINE CLINIC | Facility: CLINIC | Age: 52
End: 2022-01-17
Payer: COMMERCIAL

## 2022-01-17 VITALS — OXYGEN SATURATION: 95 % | HEART RATE: 81 BPM | TEMPERATURE: 97.5 F

## 2022-01-17 DIAGNOSIS — U07.1 COVID-19 VIRUS INFECTION: Primary | ICD-10-CM

## 2022-01-17 PROCEDURE — 99213 OFFICE O/P EST LOW 20 MIN: CPT | Performed by: NURSE PRACTITIONER

## 2022-01-17 PROCEDURE — 1036F TOBACCO NON-USER: CPT | Performed by: NURSE PRACTITIONER

## 2022-01-17 NOTE — PROGRESS NOTES
Virtual Regular Visit    Verification of patient location:    Patient is located in the following state in which I hold an active license [place]      Assessment/Plan:    Problem List Items Addressed This Visit        Other    COVID-19 virus infection - Primary     Patient may return to work on 1/19/22, recommend that he works virtually for the rest of this week, and then return to work on 1/24/22 without restrictions  Advised him to continue with prednisone, vitamin C, Zinc and Vitamin D                      Reason for visit is   Chief Complaint   Patient presents with    Virtual Regular Visit     f/u from last week pt tested positive for covid on jan 4th  Pt still coughing  no fevers    Health Screening     [facility] in [place]        Encounter provider MIKHAIL Hernández    Provider located at [place] [address]  [address] [place] [id number]      Recent Visits  No visits were found meeting these conditions  Showing recent visits within past 7 days and meeting all other requirements  Today's Visits  Date Type Provider Dept   01/17/22 Maria Victoria Bob [address]  MIKHAIL [facility]   Showing today's visits and meeting all other requirements  Future Appointments  No visits were found meeting these conditions  Showing future appointments within next 150 days and meeting all other requirements       The patient was identified by name and date of birth  Fairy Spurling was informed that this is a telemedicine visit and that the visit is being conducted through Telephone  My office door was closed  No one else was in the room  He acknowledged consent and understanding of privacy and security of the video platform  The patient has agreed to participate and understands they can discontinue the visit at any time  Patient is aware this is a billable service  Subjective  Mary Ugarte is a 45 y o  male   1/11/21 had appointment with Dr Aime Booth  "He was test is positive for COVID-19 infection on January 3, 2022 his symptoms started a day before  He was tested while he was in [place]  Yesterday he will virtual visit with Care everywhere and was started on Augmentin and albuterol inhaler "    He was advised to get chest x-ray at that time due to ongoing symptoms  Chest x-ray showed no acute cardiopulmonary disease  He was advised to follow up on 1/14/21, for return to work  1/14/21:  Had follow up with Dr Aime Booth  It was determined at that time patient was not ready to return to work based off his symptoms and he was started on prednisone  Patient has follow up today to determine if he may return to work  He reports since starting the prednisone, how cough has improved, however he feels he is not ready to return to work yet          Past Medical History:   Diagnosis Date    Achalasia     [facility]     Diabetes [facility] ([facility]     Esophageal ulcer     Hyperlipidemia     Hypertension        Past Surgical History:   Procedure Laterality Date    ANTERIOR CERVICAL DISCECTOMY W/ FUSION      APPENDECTOMY      BICEPS TENDON REPAIR      FL INJECTION LEFT WRIST (ARTHROGRAM)  11/9/2021    MENISCECTOMY      MYOTOMY HELLER LAPAROSCOPIC      ORTHOPEDIC SURGERY         Current Outpatient Medications   Medication Sig Dispense Refill    albuterol (ProAir HFA) 90 mcg/act inhaler Inhale 2 puffs every 6 (six) hours as needed for wheezing for up to 7 days 8 5 g 0    amoxicillin-clavulanate (Augmentin) 875-125 mg per tablet Take 1 tablet by mouth every 12 (twelve) hours for 10 days 20 tablet 0    Ascorbic Acid (vitamin C) 1000 MG tablet Take 1,000 mg by mouth daily        baclofen 20 mg tablet Take 1 tablet (20 mg total) by mouth 3 (three) times a day 90 tablet 2    diphenhydrAMINE-acetaminophen (TYLENOL PM)  MG TABS Take 1 tablet by mouth daily at bedtime as needed for sleep       lisinopril (ZESTRIL) 40 mg tablet Take 1 tablet (40 mg total) by mouth daily 90 tablet 1    metFORMIN (GLUCOPHAGE) 500 mg tablet Pt takes 1500 mg (3 tablets) by mouth in the morning, and a 1000 mg (2 tablets) in the evening 450 tablet 1    predniSONE 20 mg tablet Take 2 tablets (40 mg total) by mouth daily for 5 days 10 tablet 0    Promethazine-DM (PHENERGAN-DM) 6 25-15 mg/5 mL oral syrup Take 5 mL by mouth 4 (four) times a day as needed for cough 180 mL 0    simvastatin (ZOCOR) 20 mg tablet Take 1 tablet (20 mg total) by mouth daily 90 tablet 1    tadalafil (CIALIS) 20 MG tablet Take 1 tablet (20 mg total) by mouth as needed for erectile dysfunction 10 tablet 0    tamsulosin (FLOMAX) 0 4 mg Take 1 capsule (0 4 mg total) by mouth daily with dinner 30 capsule 3     No current facility-administered medications for this visit  No Known Allergies    Review of Systems   Constitutional: Positive for fatigue  Negative for activity change, appetite change, chills, diaphoresis and fever  HENT: Negative for congestion, ear discharge, ear pain, postnasal drip, rhinorrhea, sinus pressure, sinus pain and sore throat  Eyes: Negative for pain, discharge, itching and visual disturbance  Respiratory: Positive for cough  Negative for chest tightness, shortness of breath and wheezing  Cardiovascular: Negative for chest pain, palpitations and leg swelling  Gastrointestinal: Negative for abdominal pain, constipation, diarrhea, nausea and vomiting  Endocrine: Negative for polydipsia, polyphagia and polyuria  Genitourinary: Negative for difficulty urinating, dysuria and urgency  Musculoskeletal: Negative for arthralgias, back pain and neck pain  Skin: Negative for rash and wound  Neurological: Negative for dizziness, weakness, numbness and headaches         Video Exam    Vitals:    01/17/22 1146   Pulse: 81   Temp: 97 5 °F (36 4 °C)   SpO2: 95%       Physical Exam  Vitals reviewed  HENT:      Head: Normocephalic and atraumatic  Eyes:      General: No scleral icterus  Pulmonary:      Effort: No respiratory distress  Neurological:      Mental Status: He is oriented to person, place, and time  Psychiatric:         Mood and Affect: Mood normal          Behavior: Behavior normal          Thought Content: Thought content normal          Judgment: Judgment normal           I spent 15 minutes directly with the patient during this visit    VIRTUAL VISIT DISCLAIMER      Piedad Serge verbally agrees to participate in [employer]  Pt is aware that [employer] could be limited without vital signs or the ability to perform a full hands-on physical Clephillip Hernandes understands he or the provider may request at any time to terminate the video visit and request the patient to seek care or treatment in person

## 2022-01-17 NOTE — ASSESSMENT & PLAN NOTE
Patient may return to work on 1/19/22, recommend that he works virtually for the rest of this week, and then return to work on 1/24/22 without restrictions   Advised him to continue with prednisone, vitamin C, Zinc and Vitamin D

## 2022-01-17 NOTE — LETTER
January 17, 2022     Patient: Cheikh Poon   YOB: 1970   Date of Visit: 1/17/2022       To Whom it May Concern:    Cheikh Poon is under my professional care  He was seen in my office on 1/17/2022  He may return to work on 1/19/21  I would recommend that he works virtually until 1/24/21 at which time he can return to work with no restrictions  If you have any questions or concerns, please don't hesitate to call           Sincerely,          MIKHAIL Ross        CC: No Recipients

## 2022-02-08 ENCOUNTER — APPOINTMENT (OUTPATIENT)
Dept: LAB | Facility: CLINIC | Age: 52
End: 2022-02-08
Payer: COMMERCIAL

## 2022-02-08 ENCOUNTER — OFFICE VISIT (OUTPATIENT)
Dept: OBGYN CLINIC | Facility: OTHER | Age: 52
End: 2022-02-08
Payer: COMMERCIAL

## 2022-02-08 VITALS
WEIGHT: 255 LBS | DIASTOLIC BLOOD PRESSURE: 87 MMHG | HEIGHT: 73 IN | SYSTOLIC BLOOD PRESSURE: 135 MMHG | BODY MASS INDEX: 33.8 KG/M2 | HEART RATE: 64 BPM

## 2022-02-08 DIAGNOSIS — C61 PROSTATE CANCER (HCC): ICD-10-CM

## 2022-02-08 DIAGNOSIS — I10 HYPERTENSION, ESSENTIAL: ICD-10-CM

## 2022-02-08 DIAGNOSIS — S69.92XD INJURY OF LEFT WRIST, SUBSEQUENT ENCOUNTER: ICD-10-CM

## 2022-02-08 DIAGNOSIS — S66.912A WRIST STRAIN, LEFT, INITIAL ENCOUNTER: ICD-10-CM

## 2022-02-08 DIAGNOSIS — S63.592D COMPLEX TEAR OF TRIANGULAR FIBROCARTILAGE OF LEFT WRIST, SUBSEQUENT ENCOUNTER: Primary | ICD-10-CM

## 2022-02-08 DIAGNOSIS — Z12.5 SCREENING FOR PROSTATE CANCER: ICD-10-CM

## 2022-02-08 DIAGNOSIS — E11.9 TYPE 2 DIABETES MELLITUS WITHOUT COMPLICATION, WITHOUT LONG-TERM CURRENT USE OF INSULIN (HCC): ICD-10-CM

## 2022-02-08 LAB
ALBUMIN SERPL BCP-MCNC: 4 G/DL (ref 3.5–5)
ALP SERPL-CCNC: 67 U/L (ref 46–116)
ALT SERPL W P-5'-P-CCNC: 35 U/L (ref 12–78)
ANION GAP SERPL CALCULATED.3IONS-SCNC: 3 MMOL/L (ref 4–13)
AST SERPL W P-5'-P-CCNC: 19 U/L (ref 5–45)
BASOPHILS # BLD AUTO: 0.04 THOUSANDS/ΜL (ref 0–0.1)
BASOPHILS NFR BLD AUTO: 1 % (ref 0–1)
BILIRUB SERPL-MCNC: 0.55 MG/DL (ref 0.2–1)
BUN SERPL-MCNC: 13 MG/DL (ref 5–25)
CALCIUM SERPL-MCNC: 9.2 MG/DL (ref 8.3–10.1)
CHLORIDE SERPL-SCNC: 106 MMOL/L (ref 100–108)
CHOLEST SERPL-MCNC: 157 MG/DL
CO2 SERPL-SCNC: 27 MMOL/L (ref 21–32)
CREAT SERPL-MCNC: 0.85 MG/DL (ref 0.6–1.3)
EOSINOPHIL # BLD AUTO: 0.11 THOUSAND/ΜL (ref 0–0.61)
EOSINOPHIL NFR BLD AUTO: 2 % (ref 0–6)
ERYTHROCYTE [DISTWIDTH] IN BLOOD BY AUTOMATED COUNT: 12 % (ref 11.6–15.1)
GFR SERPL CREATININE-BSD FRML MDRD: 100 ML/MIN/1.73SQ M
GLUCOSE P FAST SERPL-MCNC: 132 MG/DL (ref 65–99)
HCT VFR BLD AUTO: 43.6 % (ref 36.5–49.3)
HDLC SERPL-MCNC: 42 MG/DL
HGB BLD-MCNC: 14.4 G/DL (ref 12–17)
IMM GRANULOCYTES # BLD AUTO: 0.04 THOUSAND/UL (ref 0–0.2)
IMM GRANULOCYTES NFR BLD AUTO: 1 % (ref 0–2)
LDLC SERPL CALC-MCNC: 90 MG/DL (ref 0–100)
LYMPHOCYTES # BLD AUTO: 1.98 THOUSANDS/ΜL (ref 0.6–4.47)
LYMPHOCYTES NFR BLD AUTO: 33 % (ref 14–44)
MCH RBC QN AUTO: 29.4 PG (ref 26.8–34.3)
MCHC RBC AUTO-ENTMCNC: 33 G/DL (ref 31.4–37.4)
MCV RBC AUTO: 89 FL (ref 82–98)
MONOCYTES # BLD AUTO: 0.7 THOUSAND/ΜL (ref 0.17–1.22)
MONOCYTES NFR BLD AUTO: 12 % (ref 4–12)
NEUTROPHILS # BLD AUTO: 3.15 THOUSANDS/ΜL (ref 1.85–7.62)
NEUTS SEG NFR BLD AUTO: 51 % (ref 43–75)
NONHDLC SERPL-MCNC: 115 MG/DL
NRBC BLD AUTO-RTO: 0 /100 WBCS
PLATELET # BLD AUTO: 223 THOUSANDS/UL (ref 149–390)
PMV BLD AUTO: 10.1 FL (ref 8.9–12.7)
POTASSIUM SERPL-SCNC: 4.2 MMOL/L (ref 3.5–5.3)
PROT SERPL-MCNC: 7.6 G/DL (ref 6.4–8.2)
PSA SERPL-MCNC: 0.4 NG/ML (ref 0–4)
RBC # BLD AUTO: 4.89 MILLION/UL (ref 3.88–5.62)
SODIUM SERPL-SCNC: 136 MMOL/L (ref 136–145)
TRIGL SERPL-MCNC: 124 MG/DL
WBC # BLD AUTO: 6.02 THOUSAND/UL (ref 4.31–10.16)

## 2022-02-08 PROCEDURE — 83036 HEMOGLOBIN GLYCOSYLATED A1C: CPT | Performed by: NURSE PRACTITIONER

## 2022-02-08 PROCEDURE — 99213 OFFICE O/P EST LOW 20 MIN: CPT | Performed by: STUDENT IN AN ORGANIZED HEALTH CARE EDUCATION/TRAINING PROGRAM

## 2022-02-08 PROCEDURE — 80061 LIPID PANEL: CPT | Performed by: NURSE PRACTITIONER

## 2022-02-08 PROCEDURE — G0103 PSA SCREENING: HCPCS

## 2022-02-08 PROCEDURE — 85025 COMPLETE CBC W/AUTO DIFF WBC: CPT | Performed by: NURSE PRACTITIONER

## 2022-02-08 PROCEDURE — 20611 DRAIN/INJ JOINT/BURSA W/US: CPT | Performed by: STUDENT IN AN ORGANIZED HEALTH CARE EDUCATION/TRAINING PROGRAM

## 2022-02-08 PROCEDURE — 3079F DIAST BP 80-89 MM HG: CPT | Performed by: STUDENT IN AN ORGANIZED HEALTH CARE EDUCATION/TRAINING PROGRAM

## 2022-02-08 PROCEDURE — 1036F TOBACCO NON-USER: CPT | Performed by: STUDENT IN AN ORGANIZED HEALTH CARE EDUCATION/TRAINING PROGRAM

## 2022-02-08 PROCEDURE — 80053 COMPREHEN METABOLIC PANEL: CPT

## 2022-02-08 PROCEDURE — 3075F SYST BP GE 130 - 139MM HG: CPT | Performed by: STUDENT IN AN ORGANIZED HEALTH CARE EDUCATION/TRAINING PROGRAM

## 2022-02-08 PROCEDURE — 36415 COLL VENOUS BLD VENIPUNCTURE: CPT | Performed by: NURSE PRACTITIONER

## 2022-02-08 PROCEDURE — 3008F BODY MASS INDEX DOCD: CPT | Performed by: STUDENT IN AN ORGANIZED HEALTH CARE EDUCATION/TRAINING PROGRAM

## 2022-02-08 RX ADMIN — TRIAMCINOLONE ACETONIDE 40 MG: 40 INJECTION, SUSPENSION INTRA-ARTICULAR; INTRAMUSCULAR at 08:34

## 2022-02-08 RX ADMIN — LIDOCAINE HYDROCHLORIDE 2 ML: 10 INJECTION, SOLUTION INFILTRATION; PERINEURAL at 08:34

## 2022-02-08 NOTE — PROGRESS NOTES
1  Complex tear of triangular fibrocartilage of left wrist, subsequent encounter  Small joint arthrocentesis   2  Injury of left wrist, subsequent encounter  Small joint arthrocentesis   3  Wrist strain, left, initial encounter       Orders Placed This Encounter   Procedures    Small joint arthrocentesis        Imaging Studies (I personally reviewed images in PACS and report):    MRI arthrogram left wrist 11/9/2021:Small tear/perforation of central disc of TFCC, with extension of administered intra-articular contrast into the DRUJ  IMPRESSION:  Left wrist TFCC tear  DOI: August 2021  PMH Diabetes Mellitus last A1c 08/2021 7 3   Last POC glucose 135 2/7/2022    Repeat X-ray next visit: None    Return if symptoms worsen or fail to improve  Patient Instructions   red flags and risks of injection include but are not limited to infection <0 072% as referenced in some sources, nerve or artery penetration, and if steroids are used-skin dimpling <1%, hypo-pigmentation <1%  Recommended no submerging underwater in a tub, pool, ocean, lake, jacuzzi, hot tub, or any other body of water for 1 week until needle wound closes due to risk of infection  May take showers  Clean needle site with soap and water and keep covered at all times with sterile bandage such as a band-aid until fully healed  Educated if any symptoms including fevers, chills, swelling, or worsening symptoms occur then to call office or go to hospital for immediate care if physician unavailable due to possible infection or other complication which is a serious medical problem  Patient expressed understanding and agreed to proceed with procedure  CHIEF COMPLAINT:  Follow up left wrist inury    HPI:  Bonney Landau is a 45 y o  male  who presents for       Visit 2/8/2022 :  Patient here for follow up for left wrist injury and US guided cortisone injection to TFCC  He was last examined on 11/9/2021    Today he reports still having pain to his left wrist   He has not had much improvement since last visit  He denies any numbness and tingling down to his fingers  He has had no new injury  Review of Systems   Constitutional: Negative for chills and fever  HENT: Negative for ear pain and sore throat  Eyes: Negative for pain and visual disturbance  Respiratory: Negative for cough and shortness of breath  Cardiovascular: Negative for chest pain and palpitations  Gastrointestinal: Negative for abdominal pain and vomiting  Genitourinary: Negative for dysuria and hematuria  Musculoskeletal: Negative for arthralgias and back pain  Skin: Negative for color change and rash  Neurological: Negative for seizures and syncope  All other systems reviewed and are negative          Following history reviewed and update:    Past Medical History:   Diagnosis Date    Achalasia     [facility]m ([facility] )     Diabetes mellitus ([facility] )     Esophageal ulcer     Hyperlipidemia     Hypertension      Past Surgical History:   Procedure Laterality Date    ANTERIOR CERVICAL DISCECTOMY W/ FUSION      APPENDECTOMY      BICEPS TENDON REPAIR      FL INJECTION LEFT WRIST (ARTHROGRAM)  11/9/2021    MENISCECTOMY      MYOTOMY HELLER LAPAROSCOPIC      ORTHOPEDIC SURGERY       Social History   Social History     Substance and Sexual Activity   Alcohol Use Not Currently     Social History     Substance and Sexual Activity   Drug Use Never     Social History     Tobacco Use   Smoking Status Never Smoker   Smokeless Tobacco Never Used     Family History   Problem Relation Age of Onset    No Known Problems Mother     No Known Problems Father      No Known Allergies       Physical Exam  /87 (BP Location: Left arm, Patient Position: Sitting, Cuff Size: Adult)   Pulse 64   Ht 6' 1" (1 854 m)   Wt 116 kg (255 lb)   BMI 33 64 kg/m²     Constitutional:  see vital signs  Gen: well-developed, normocephalic/atraumatic, well-groomed  Eyes: No inflammation or discharge of conjunctiva or lids; sclera clear   Pharynx: no inflammation, lesion, or mass of lips  Neck: supple, no masses, non-distended  MSK: no inflammation, lesion, mass, or clubbing of nails and digits except for other than mentioned below  SKIN: no visible rashes or skin lesions  Pulmonary/Chest: Effort normal  No respiratory distress  NEURO: cranial nerves grossly intact  PSYCH:  Alert and oriented to person, place, and time; recent and remote memory intact; mood normal, no depression, anxiety, or agitation, judgment and insight good and intact     Ortho Exam    Left Elbow:  no swelling, erythema, or increased warmth  rom full  nontender  no laxity of joint  Cubital tunnel Tinel's test:  Distal Biceps Hook test:    Left Wrist  no swelling, erythema, or increased warmth  rom full  Tenderness at TFCC  no laxity of joint; druj stable  Carpal tunnel compression test:  Phalen's test:  Tinel's carpal tunnel test:  TFCC grind and load: Positive    Left Hand  no erythema  swelling:  tenderness:  rom fingers mcp, pip, dip intact without pain  No digital scissoring or deviation of fingers  no extensor lag  no rotation of fingers  no joint laxity  strenght flexion and extension mcp, pip, dip 5/5  sensation intact  capillary refill intact   Froment sign:  normal  OK sign:  Normal  Thumb extension:  5/5  Small joint arthrocentesis  Universal Protocol:  Procedure performed by:  Consent: Verbal consent obtained  Risks and benefits: risks, benefits and alternatives were discussed  Time out: Immediately prior to procedure a "time out" was called to verify the correct patient, procedure, equipment, support staff and site/side marked as required  Patient understanding: patient states understanding of the procedure being performed  Patient consent: the patient's understanding of the procedure matches consent given  Site marked: the operative site was marked  Radiology Images displayed and confirmed   If images not available, report reviewed: imaging studies available  Required items: required blood products, implants, devices, and special equipment available  Patient identity confirmed: verbally with patient    Supporting Documentation  Indications: pain   Procedure Details  Location: left TFCC    Preparation: Patient was prepped and draped in the usual sterile fashion  Needle size: 22 G  Ultrasound guidance: yes  Approach: Dorsal ulnar   Medications administered: 2 mL lidocaine 1 %; 40 mg triamcinolone acetonide 40 mg/mL    Patient tolerance: patient tolerated the procedure well with no immediate complications  Dressing:  Sterile dressing applied

## 2022-02-09 LAB
EST. AVERAGE GLUCOSE BLD GHB EST-MCNC: 146 MG/DL
HBA1C MFR BLD: 6.7 %

## 2022-02-09 PROCEDURE — 3044F HG A1C LEVEL LT 7.0%: CPT | Performed by: STUDENT IN AN ORGANIZED HEALTH CARE EDUCATION/TRAINING PROGRAM

## 2022-02-09 RX ORDER — TRIAMCINOLONE ACETONIDE 40 MG/ML
40 INJECTION, SUSPENSION INTRA-ARTICULAR; INTRAMUSCULAR
Status: COMPLETED | OUTPATIENT
Start: 2022-02-08 | End: 2022-02-08

## 2022-02-09 RX ORDER — LIDOCAINE HYDROCHLORIDE 10 MG/ML
2 INJECTION, SOLUTION INFILTRATION; PERINEURAL
Status: COMPLETED | OUTPATIENT
Start: 2022-02-08 | End: 2022-02-08

## 2022-02-15 ENCOUNTER — RA CDI HCC (OUTPATIENT)
Dept: OTHER | Facility: HOSPITAL | Age: 52
End: 2022-02-15

## 2022-02-15 NOTE — PROGRESS NOTES
[facility]  coding opportunities       Chart reviewed, no opportunity found: CHART REVIEWED, NO OPPORTUNITY FOUND                        Patients insurance company: [employer] (Medicare Advantage and Commercial)

## 2022-02-21 ENCOUNTER — TELEPHONE (OUTPATIENT)
Dept: INTERNAL MEDICINE CLINIC | Facility: CLINIC | Age: 52
End: 2022-02-21

## 2022-02-21 DIAGNOSIS — E78.49 OTHER HYPERLIPIDEMIA: ICD-10-CM

## 2022-02-21 RX ORDER — SIMVASTATIN 20 MG
20 TABLET ORAL DAILY
Qty: 90 TABLET | Refills: 1 | Status: SHIPPED | OUTPATIENT
Start: 2022-02-21 | End: 2022-03-11 | Stop reason: SDUPTHER

## 2022-03-07 ENCOUNTER — RA CDI HCC (OUTPATIENT)
Dept: OTHER | Facility: HOSPITAL | Age: 52
End: 2022-03-07

## 2022-03-07 NOTE — PROGRESS NOTES
[facility] 75  coding opportunities       Chart reviewed, no opportunity found: CHART REVIEWED, NO OPPORTUNITY FOUND                        Patients insurance company: [employer] (Medicare Advantage and Commercial)

## 2022-03-11 ENCOUNTER — OFFICE VISIT (OUTPATIENT)
Dept: INTERNAL MEDICINE CLINIC | Facility: CLINIC | Age: 52
End: 2022-03-11
Payer: COMMERCIAL

## 2022-03-11 VITALS
TEMPERATURE: 97.4 F | HEIGHT: 72 IN | BODY MASS INDEX: 36.16 KG/M2 | WEIGHT: 267 LBS | HEART RATE: 76 BPM | SYSTOLIC BLOOD PRESSURE: 124 MMHG | DIASTOLIC BLOOD PRESSURE: 84 MMHG | OXYGEN SATURATION: 98 %

## 2022-03-11 DIAGNOSIS — I10 HYPERTENSION, ESSENTIAL: ICD-10-CM

## 2022-03-11 DIAGNOSIS — E78.49 OTHER HYPERLIPIDEMIA: ICD-10-CM

## 2022-03-11 DIAGNOSIS — M54.12 CERVICAL RADICULOPATHY: Primary | ICD-10-CM

## 2022-03-11 DIAGNOSIS — E11.9 TYPE 2 DIABETES MELLITUS WITHOUT COMPLICATION, WITHOUT LONG-TERM CURRENT USE OF INSULIN (HCC): ICD-10-CM

## 2022-03-11 PROBLEM — J06.9 VIRAL UPPER RESPIRATORY TRACT INFECTION: Status: RESOLVED | Noted: 2021-09-10 | Resolved: 2022-03-11

## 2022-03-11 PROBLEM — J20.9 ACUTE BRONCHITIS: Status: RESOLVED | Noted: 2022-01-11 | Resolved: 2022-03-11

## 2022-03-11 PROCEDURE — 99214 OFFICE O/P EST MOD 30 MIN: CPT | Performed by: NURSE PRACTITIONER

## 2022-03-11 RX ORDER — SIMVASTATIN 20 MG
20 TABLET ORAL DAILY
Qty: 90 TABLET | Refills: 1 | Status: SHIPPED | OUTPATIENT
Start: 2022-03-11

## 2022-03-11 RX ORDER — LISINOPRIL 40 MG/1
40 TABLET ORAL DAILY
Qty: 90 TABLET | Refills: 1 | Status: SHIPPED | OUTPATIENT
Start: 2022-03-11 | End: 2022-03-16 | Stop reason: SDUPTHER

## 2022-03-11 NOTE — ASSESSMENT & PLAN NOTE
Well controlled on lisinopril 40 mg tablet daily  Continue current regimen -   Continue to monitor blood pressure at home  Goal BP is < 130/80  Contact our office for consistent elevations  Recommend low sodium diet  Exercise 30 minutes 5 times a week as tolerated    Recommend yearly eye exam

## 2022-03-11 NOTE — PROGRESS NOTES
Diabetic Foot Exam    Patient's shoes and socks removed  Right Foot/Ankle   Right Foot Inspection  Skin Exam: skin normal and skin intact  No dry skin, no warmth, no callus, no erythema, no maceration, no abnormal color, no pre-ulcer, no ulcer and no callus  Sensory   Vibration: intact      Vascular  Capillary refills: < 3 seconds  The right DP pulse is 2+  The right PT pulse is 2+  Left Foot/Ankle  Left Foot Inspection  Skin Exam: skin normal and skin intact  No dry skin, no warmth, no erythema, no maceration, normal color, no pre-ulcer, no ulcer and no callus  Sensory   Vibration: intact      Vascular  Capillary refills: < 3 seconds  The left DP pulse is 2+  The left PT pulse is 2+       Assign Risk Category  No deformity present  No loss of protective sensation  No weak pulses  Risk: 0

## 2022-03-11 NOTE — ASSESSMENT & PLAN NOTE
Improvement of ASCVD, ASCVD score 7 7%, continue on statin, recommended ASA, patient would like to take fish oil instead  Recommend healthy lifestyle choices for your cholesterol  Low fat/low cholesterol diet  Limit/avoid red meat  Eat more lean meat - chicken breast, ground turkey, fish  Exercise 30 mins at least 5 times a week as tolerated

## 2022-03-11 NOTE — PROGRESS NOTES
Assessment/Plan:    Diabetes mellitus ([facility] )    Lab Results   Component Value Date    HGBA1C 6 7 (H) 02/08/2022   Improved from last office visit, continue on metformin  to a 1500 mg in the morning and 1000 mg in the evening  Patient is to continue to work on diet and exercise  Limit sugars and carbohydrate intake  Avoid soda, juice, sweets, cookies, desserts, pasta, bread    Eat more whole grains, exercised 30 min of cardio at least 3 times a week  Also recommended daily foot exams to check for sores, and recommended yearly eye exams  Hypertension, essential  Well controlled on lisinopril 40 mg tablet daily  Continue current regimen -   Continue to monitor blood pressure at home  Goal BP is < 130/80  Contact our office for consistent elevations  Recommend low sodium diet  Exercise 30 minutes 5 times a week as tolerated  Recommend yearly eye exam       Cervical radiculopathy  Continue to follow with Pain Management and Neurosurgery  Other hyperlipidemia  Improvement of ASCVD, ASCVD score 7 7%, continue on statin, recommended ASA, patient would like to take fish oil instead  Recommend healthy lifestyle choices for your cholesterol  Low fat/low cholesterol diet  Limit/avoid red meat  Eat more lean meat - chicken breast, ground turkey, fish  Exercise 30 mins at least 5 times a week as tolerated  BMI Counseling: Body mass index is 36 21 kg/m²  The BMI is above normal  Nutrition recommendations include decreasing portion sizes, encouraging healthy choices of fruits and vegetables, decreasing fast food intake, consuming healthier snacks, limiting drinks that contain sugar, increasing intake of lean protein, reducing intake of saturated and trans fat and reducing intake of cholesterol  Exercise recommendations include moderate physical activity 150 minutes/week and exercising 3-5 times per week  Rationale for BMI follow-up plan is due to patient being overweight or obese              Diagnoses and all orders for this visit:    Cervical radiculopathy    Type 2 diabetes mellitus without complication, without long-term current use of insulin (HCC)  -     metFORMIN (GLUCOPHAGE) 500 mg tablet; Pt takes 1500 mg (3 tablets) by mouth in the morning, and a 1000 mg (2 tablets) in the evening  -     CBC and differential  -     Comprehensive metabolic panel; Future  -     Hemoglobin A1C    Other hyperlipidemia  -     simvastatin (ZOCOR) 20 mg tablet; Take 1 tablet (20 mg total) by mouth daily  -     Lipid panel    Hypertension, essential  -     lisinopril (ZESTRIL) 40 mg tablet; Take 1 tablet (40 mg total) by mouth daily          Subjective:      Patient ID: Sapphire Bello is a 45 y o  male  Patient presents today for follow up and to review blood work:    S/P cervical spinal fusion- currently follows neurosurgery     Diabetes mellitus ([facility] )- HBA1C improved, HBA1C 6 7, continues on metformin without side effects, denies hypo or hyperglycemic events       Hypertension, essential-currently well controlled on lisinopril, lisinopril increased at last office visit, denies lightheadedness or dizziness, denies side effects with this medication    Hyperlipidemia- controlled on statin, denies side effects with medication   The 10-year ASCVD risk score (Tima Fenton et al , 2013) is: 7 7%    Values used to calculate the score:      Age: 45 years      Sex: Male      Is Non- : No      Diabetic: Yes      Tobacco smoker: No      Systolic Blood Pressure: 838 mmHg      Is BP treated: Yes      HDL Cholesterol: 42 mg/dL      Total Cholesterol: 157 mg/dL           The following portions of the patient's history were reviewed and updated as appropriate: allergies, current medications, past family history, past medical history, past social history, past surgical history and problem list     Review of Systems   Constitutional: Negative for activity change, appetite change, chills, diaphoresis and fever     HENT: Negative for congestion, ear discharge, ear pain, postnasal drip, rhinorrhea, sinus pressure, sinus pain and sore throat  Eyes: Negative for pain, discharge, itching and visual disturbance  Respiratory: Negative for cough, chest tightness, shortness of breath and wheezing  Cardiovascular: Negative for chest pain, palpitations and leg swelling  Gastrointestinal: Negative for abdominal pain, constipation, diarrhea, nausea and vomiting  Endocrine: Negative for polydipsia, polyphagia and polyuria  Genitourinary: Negative for difficulty urinating, dysuria and urgency  Musculoskeletal: Negative for arthralgias, back pain and neck pain  Skin: Negative for rash and wound  Neurological: Negative for dizziness, weakness, numbness and headaches           Past Medical History:   Diagnosis Date    Achalasia     [facility]neurysm ([facility] )     Diabetes mellitus ([place] [address] )     Esophageal ulcer     Hyperlipidemia     Hypertension          Current Outpatient Medications:     Ascorbic Acid (vitamin C) 1000 MG tablet, Take 1,000 mg by mouth daily  , Disp: , Rfl:     baclofen 20 mg tablet, Take 1 tablet (20 mg total) by mouth 3 (three) times a day, Disp: 90 tablet, Rfl: 2    diphenhydrAMINE-acetaminophen (TYLENOL PM)  MG TABS, Take 1 tablet by mouth daily at bedtime as needed for sleep , Disp: , Rfl:     lisinopril (ZESTRIL) 40 mg tablet, Take 1 tablet (40 mg total) by mouth daily, Disp: 90 tablet, Rfl: 1    metFORMIN (GLUCOPHAGE) 500 mg tablet, Pt takes 1500 mg (3 tablets) by mouth in the morning, and a 1000 mg (2 tablets) in the evening, Disp: 450 tablet, Rfl: 1    simvastatin (ZOCOR) 20 mg tablet, Take 1 tablet (20 mg total) by mouth daily, Disp: 90 tablet, Rfl: 1    tadalafil (CIALIS) 20 MG tablet, Take 1 tablet (20 mg total) by mouth as needed for erectile dysfunction, Disp: 10 tablet, Rfl: 0    tamsulosin (FLOMAX) 0 4 mg, Take 1 capsule (0 4 mg total) by mouth daily with dinner, Disp: 30 capsule, Rfl: 3    No Known Allergies    Social History   Past Surgical History:   Procedure Laterality Date    ANTERIOR CERVICAL DISCECTOMY W/ FUSION      APPENDECTOMY      BICEPS TENDON REPAIR      FL INJECTION LEFT WRIST (ARTHROGRAM)  11/9/2021    MENISCECTOMY      MYOTOMY HELLER LAPAROSCOPIC      ORTHOPEDIC SURGERY       Family History   Problem Relation Age of Onset    No Known Problems Mother     No Known Problems Father        Objective:  /84 (BP Location: Left arm, Patient Position: Sitting, Cuff Size: Large)   Pulse 76   Temp (!) 97 4 °F (36 3 °C) (Tympanic)   Ht 6' (1 829 m)   Wt 121 kg (267 lb)   SpO2 98% Comment: room air  BMI 36 21 kg/m²     Recent Results (from the past 1344 hour(s))   CBC and differential    Collection Time: 02/08/22  9:12 AM   Result Value Ref Range    WBC 6 02 4 31 - 10 16 Thousand/uL    RBC 4 89 3 88 - 5 62 Million/uL    Hemoglobin 14 4 12 0 - 17 0 g/dL    Hematocrit 43 6 36 5 - 49 3 %    MCV 89 82 - 98 fL    MCH 29 4 26 8 - 34 3 pg    MCHC 33 0 31 4 - 37 4 g/dL    RDW 12 0 11 6 - 15 1 %    MPV 10 1 8 9 - 12 7 fL    Platelets 084 337 - 033 Thousands/uL    nRBC 0 /100 WBCs    Neutrophils Relative 51 43 - 75 %    Immat GRANS % 1 0 - 2 %    Lymphocytes Relative 33 14 - 44 %    Monocytes Relative 12 4 - 12 %    Eosinophils Relative 2 0 - 6 %    Basophils Relative 1 0 - 1 %    Neutrophils Absolute 3 15 1 85 - 7 62 Thousands/µL    Immature Grans Absolute 0 04 0 00 - 0 20 Thousand/uL    Lymphocytes Absolute 1 98 0 60 - 4 47 Thousands/µL    Monocytes Absolute 0 70 0 17 - 1 22 Thousand/µL    Eosinophils Absolute 0 11 0 00 - 0 61 Thousand/µL    Basophils Absolute 0 04 0 00 - 0 10 Thousands/µL   Hemoglobin A1C    Collection Time: 02/08/22  9:12 AM   Result Value Ref Range    Hemoglobin A1C 6 7 (H) Normal 3 8-5 6%; PreDiabetic 5 7-6 4%;  Diabetic >=6 5%; Glycemic control for adults with diabetes <7 0% %     mg/dl   Lipid panel    Collection Time: 02/08/22  9:12 AM   Result Value Ref Range Cholesterol 157 See Comment mg/dL    Triglycerides 124 See Comment mg/dL    HDL, Direct 42 >=40 mg/dL    LDL Calculated 90 0 - 100 mg/dL    Non-HDL-Chol (CHOL-HDL) 115 mg/dl   Comprehensive metabolic panel    Collection Time: 02/08/22  9:12 AM   Result Value Ref Range    Sodium 136 136 - 145 mmol/L    Potassium 4 2 3 5 - 5 3 mmol/L    Chloride 106 100 - 108 mmol/L    CO2 27 21 - 32 mmol/L    ANION GAP 3 (L) 4 - 13 mmol/L    BUN 13 5 - 25 mg/dL    Creatinine 0 85 0 60 - 1 30 mg/dL    Glucose, Fasting 132 (H) 65 - 99 mg/dL    Calcium 9 2 8 3 - 10 1 mg/dL    AST 19 5 - 45 U/L    ALT 35 12 - 78 U/L    Alkaline Phosphatase 67 46 - 116 U/L    Total Protein 7 6 6 4 - 8 2 g/dL    Albumin 4 0 3 5 - 5 0 g/dL    Total Bilirubin 0 55 0 20 - 1 00 mg/dL    eGFR 100 ml/min/1 73sq m   PSA, Total Screen    Collection Time: 02/08/22  9:12 AM   Result Value Ref Range    PSA 0 4 0 0 - 4 0 ng/mL            Physical Exam  Constitutional:       General: He is not in acute distress  Appearance: He is well-developed  He is not diaphoretic  HENT:      Head: Normocephalic and atraumatic  Right Ear: External ear normal       Left Ear: External ear normal       Nose: Nose normal       Mouth/Throat:      Pharynx: No oropharyngeal exudate  Eyes:      General:         Right eye: No discharge  Left eye: No discharge  Conjunctiva/sclera: Conjunctivae normal       Pupils: Pupils are equal, round, and reactive to light  Neck:      Thyroid: No thyromegaly  Cardiovascular:      Rate and Rhythm: Normal rate and regular rhythm  Heart sounds: Normal heart sounds  No murmur heard  No friction rub  No gallop  Pulmonary:      Effort: Pulmonary effort is normal  No respiratory distress  Breath sounds: Normal breath sounds  No stridor  No wheezing or rales  Abdominal:      General: Bowel sounds are normal  There is no distension  Palpations: Abdomen is soft  Tenderness: There is no abdominal tenderness  Musculoskeletal:      Cervical back: Normal range of motion and neck supple  Lymphadenopathy:      Cervical: No cervical adenopathy  Skin:     General: Skin is warm and dry  Findings: No erythema or rash  Neurological:      Mental Status: He is alert and oriented to person, place, and time  Psychiatric:         Behavior: Behavior normal          Thought Content:  Thought content normal          Judgment: Judgment normal

## 2022-03-11 NOTE — ASSESSMENT & PLAN NOTE
Lab Results   Component Value Date    HGBA1C 6 7 (H) 02/08/2022   Improved from last office visit, continue on metformin  to a 1500 mg in the morning and 1000 mg in the evening  Patient is to continue to work on diet and exercise  Limit sugars and carbohydrate intake  Avoid soda, juice, sweets, cookies, desserts, pasta, bread    Eat more whole grains, exercised 30 min of cardio at least 3 times a week  Also recommended daily foot exams to check for sores, and recommended yearly eye exams

## 2022-03-14 ENCOUNTER — APPOINTMENT (EMERGENCY)
Dept: RADIOLOGY | Facility: HOSPITAL | Age: 52
End: 2022-03-14
Payer: COMMERCIAL

## 2022-03-14 ENCOUNTER — HOSPITAL ENCOUNTER (EMERGENCY)
Facility: HOSPITAL | Age: 52
Discharge: HOME | End: 2022-03-14
Attending: EMERGENCY MEDICINE | Admitting: EMERGENCY MEDICINE
Payer: COMMERCIAL

## 2022-03-14 VITALS
SYSTOLIC BLOOD PRESSURE: 151 MMHG | OXYGEN SATURATION: 99 % | DIASTOLIC BLOOD PRESSURE: 74 MMHG | WEIGHT: 268.4 LBS | HEART RATE: 64 BPM | RESPIRATION RATE: 18 BRPM | BODY MASS INDEX: 36.4 KG/M2 | TEMPERATURE: 98 F

## 2022-03-14 DIAGNOSIS — R07.9 CHEST PAIN: Primary | ICD-10-CM

## 2022-03-14 DIAGNOSIS — M79.603 ARM PAIN: ICD-10-CM

## 2022-03-14 LAB
2HR DELTA HS TROPONIN: -1 NG/L
ALBUMIN SERPL BCP-MCNC: 3.7 G/DL (ref 3.5–5)
ALP SERPL-CCNC: 65 U/L (ref 46–116)
ALT SERPL W P-5'-P-CCNC: 34 U/L (ref 12–78)
ANION GAP SERPL CALCULATED.3IONS-SCNC: 6 MMOL/L (ref 4–13)
AST SERPL W P-5'-P-CCNC: 16 U/L (ref 5–45)
ATRIAL RATE: 62 BPM
ATRIAL RATE: 64 BPM
ATRIAL RATE: 65 BPM
BASOPHILS # BLD AUTO: 0.06 THOUSANDS/ΜL (ref 0–0.1)
BASOPHILS NFR BLD AUTO: 1 % (ref 0–1)
BILIRUB SERPL-MCNC: 0.44 MG/DL (ref 0.2–1)
BUN SERPL-MCNC: 13 MG/DL (ref 5–25)
CALCIUM SERPL-MCNC: 9.1 MG/DL (ref 8.3–10.1)
CARDIAC TROPONIN I PNL SERPL HS: 4 NG/L
CARDIAC TROPONIN I PNL SERPL HS: 5 NG/L
CHLORIDE SERPL-SCNC: 106 MMOL/L (ref 100–108)
CO2 SERPL-SCNC: 24 MMOL/L (ref 21–32)
CREAT SERPL-MCNC: 0.8 MG/DL (ref 0.6–1.3)
EOSINOPHIL # BLD AUTO: 0.16 THOUSAND/ΜL (ref 0–0.61)
EOSINOPHIL NFR BLD AUTO: 2 % (ref 0–6)
ERYTHROCYTE [DISTWIDTH] IN BLOOD BY AUTOMATED COUNT: 12.3 % (ref 11.6–15.1)
GFR SERPL CREATININE-BSD FRML MDRD: 102 ML/MIN/1.73SQ M
GLUCOSE SERPL-MCNC: 128 MG/DL (ref 65–140)
HCT VFR BLD AUTO: 44.6 % (ref 36.5–49.3)
HGB BLD-MCNC: 14.3 G/DL (ref 12–17)
IMM GRANULOCYTES # BLD AUTO: 0.09 THOUSAND/UL (ref 0–0.2)
IMM GRANULOCYTES NFR BLD AUTO: 1 % (ref 0–2)
LYMPHOCYTES # BLD AUTO: 2.22 THOUSANDS/ΜL (ref 0.6–4.47)
LYMPHOCYTES NFR BLD AUTO: 23 % (ref 14–44)
MCH RBC QN AUTO: 29.5 PG (ref 26.8–34.3)
MCHC RBC AUTO-ENTMCNC: 32.1 G/DL (ref 31.4–37.4)
MCV RBC AUTO: 92 FL (ref 82–98)
MONOCYTES # BLD AUTO: 1.01 THOUSAND/ΜL (ref 0.17–1.22)
MONOCYTES NFR BLD AUTO: 11 % (ref 4–12)
NEUTROPHILS # BLD AUTO: 5.98 THOUSANDS/ΜL (ref 1.85–7.62)
NEUTS SEG NFR BLD AUTO: 62 % (ref 43–75)
NRBC BLD AUTO-RTO: 0 /100 WBCS
P AXIS: 0 DEGREES
P AXIS: 20 DEGREES
P AXIS: 55 DEGREES
PLATELET # BLD AUTO: 231 THOUSANDS/UL (ref 149–390)
PMV BLD AUTO: 9.9 FL (ref 8.9–12.7)
POTASSIUM SERPL-SCNC: 4.1 MMOL/L (ref 3.5–5.3)
PR INTERVAL: 164 MS
PR INTERVAL: 168 MS
PR INTERVAL: 192 MS
PROT SERPL-MCNC: 7.2 G/DL (ref 6.4–8.2)
QRS AXIS: -13 DEGREES
QRS AXIS: -15 DEGREES
QRS AXIS: -7 DEGREES
QRSD INTERVAL: 96 MS
QRSD INTERVAL: 98 MS
QRSD INTERVAL: 98 MS
QT INTERVAL: 410 MS
QT INTERVAL: 410 MS
QT INTERVAL: 422 MS
QTC INTERVAL: 416 MS
QTC INTERVAL: 426 MS
QTC INTERVAL: 435 MS
RBC # BLD AUTO: 4.84 MILLION/UL (ref 3.88–5.62)
SODIUM SERPL-SCNC: 136 MMOL/L (ref 136–145)
T WAVE AXIS: 51 DEGREES
T WAVE AXIS: 53 DEGREES
T WAVE AXIS: 64 DEGREES
VENTRICULAR RATE: 62 BPM
VENTRICULAR RATE: 64 BPM
VENTRICULAR RATE: 65 BPM
WBC # BLD AUTO: 9.52 THOUSAND/UL (ref 4.31–10.16)

## 2022-03-14 PROCEDURE — 96375 TX/PRO/DX INJ NEW DRUG ADDON: CPT

## 2022-03-14 PROCEDURE — 74174 CTA ABD&PLVS W/CONTRAST: CPT

## 2022-03-14 PROCEDURE — 71275 CT ANGIOGRAPHY CHEST: CPT

## 2022-03-14 PROCEDURE — 99285 EMERGENCY DEPT VISIT HI MDM: CPT

## 2022-03-14 PROCEDURE — 84484 ASSAY OF TROPONIN QUANT: CPT | Performed by: EMERGENCY MEDICINE

## 2022-03-14 PROCEDURE — G1004 CDSM NDSC: HCPCS

## 2022-03-14 PROCEDURE — 36415 COLL VENOUS BLD VENIPUNCTURE: CPT | Performed by: EMERGENCY MEDICINE

## 2022-03-14 PROCEDURE — 73030 X-RAY EXAM OF SHOULDER: CPT

## 2022-03-14 PROCEDURE — 93005 ELECTROCARDIOGRAM TRACING: CPT

## 2022-03-14 PROCEDURE — 93010 ELECTROCARDIOGRAM REPORT: CPT | Performed by: INTERNAL MEDICINE

## 2022-03-14 PROCEDURE — 85025 COMPLETE CBC W/AUTO DIFF WBC: CPT | Performed by: EMERGENCY MEDICINE

## 2022-03-14 PROCEDURE — 99285 EMERGENCY DEPT VISIT HI MDM: CPT | Performed by: EMERGENCY MEDICINE

## 2022-03-14 PROCEDURE — 96374 THER/PROPH/DIAG INJ IV PUSH: CPT

## 2022-03-14 PROCEDURE — 80053 COMPREHEN METABOLIC PANEL: CPT | Performed by: EMERGENCY MEDICINE

## 2022-03-14 PROCEDURE — 76706 US ABDL AORTA SCREEN AAA: CPT | Performed by: EMERGENCY MEDICINE

## 2022-03-14 RX ORDER — ONDANSETRON 2 MG/ML
4 INJECTION INTRAMUSCULAR; INTRAVENOUS ONCE
Status: COMPLETED | OUTPATIENT
Start: 2022-03-14 | End: 2022-03-14

## 2022-03-14 RX ORDER — KETOROLAC TROMETHAMINE 30 MG/ML
15 INJECTION, SOLUTION INTRAMUSCULAR; INTRAVENOUS ONCE
Status: COMPLETED | OUTPATIENT
Start: 2022-03-14 | End: 2022-03-14

## 2022-03-14 RX ORDER — FENTANYL CITRATE 50 UG/ML
100 INJECTION, SOLUTION INTRAMUSCULAR; INTRAVENOUS ONCE
Status: COMPLETED | OUTPATIENT
Start: 2022-03-14 | End: 2022-03-14

## 2022-03-14 RX ORDER — MAGNESIUM HYDROXIDE/ALUMINUM HYDROXICE/SIMETHICONE 120; 1200; 1200 MG/30ML; MG/30ML; MG/30ML
30 SUSPENSION ORAL ONCE
Status: COMPLETED | OUTPATIENT
Start: 2022-03-14 | End: 2022-03-14

## 2022-03-14 RX ADMIN — KETOROLAC TROMETHAMINE 15 MG: 30 INJECTION, SOLUTION INTRAMUSCULAR at 10:01

## 2022-03-14 RX ADMIN — FENTANYL CITRATE 100 MCG: 50 INJECTION INTRAMUSCULAR; INTRAVENOUS at 07:55

## 2022-03-14 RX ADMIN — ONDANSETRON 4 MG: 2 INJECTION INTRAMUSCULAR; INTRAVENOUS at 08:01

## 2022-03-14 RX ADMIN — ALUMINA, MAGNESIA, AND SIMETHICONE ORAL SUSPENSION REGULAR STRENGTH 30 ML: 1200; 1200; 120 SUSPENSION ORAL at 10:01

## 2022-03-14 RX ADMIN — IOHEXOL 100 ML: 350 INJECTION, SOLUTION INTRAVENOUS at 09:01

## 2022-03-14 NOTE — DISCHARGE INSTRUCTIONS
Please schedule an outpatient appointment with cardiology  You may continue to use tylenol as recommended for pain  You were provided contact information for orthopedics as well if your shoulder pain does not improve  CT Findings: No aortic dissection  Stable ectasia of the ascending thoracic aorta measuring 4 0 cm  Severe thickening of the esophagus in the mid to distal portions  Evaluate for possible esophagitis  Stable 8 mm nodule in the right lower lobe can be considered benign  Fatty liver

## 2022-03-14 NOTE — ED PROVIDER NOTES
History  Chief Complaint   Patient presents with    Chest Pain     Pt c/o left sided stabbing chest pain that radiates to left shoulder and neck since 2300 last night  Pain is worse with movement, denies numbness and tingling in arms  Rt sided HA     58-year-old male history of ascending aortic aneurysm, hypertension, diabetes, cervical radiculopathy status post ACDF in 2016, presenting due to chest pain and arm pain  Patient states he has severe 9/10 chest pain which he says left-sided stabbing sensation that radiates to his back as well as left arm  Says the pain started at 11:00 p m  Last night and prevented him from sleeping  No obvious aggravating or alleviating factors  Denies any similar sensation in the past   Denies any fever, chills, syncope, neck pain, dyspnea, nausea vomiting, abdominal pain, swelling numbness or tingling in extremities  Prior to Admission Medications   Prescriptions Last Dose Informant Patient Reported? Taking?    Ascorbic Acid (vitamin C) 1000 MG tablet  Self Yes No   Sig: Take 1,000 mg by mouth daily     baclofen 20 mg tablet  Self No No   Sig: Take 1 tablet (20 mg total) by mouth 3 (three) times a day   diphenhydrAMINE-acetaminophen (TYLENOL PM)  MG TABS  Self Yes No   Sig: Take 1 tablet by mouth daily at bedtime as needed for sleep    lisinopril (ZESTRIL) 40 mg tablet   No No   Sig: Take 1 tablet (40 mg total) by mouth daily   metFORMIN (GLUCOPHAGE) 500 mg tablet   No No   Sig: Pt takes 1500 mg (3 tablets) by mouth in the morning, and a 1000 mg (2 tablets) in the evening   simvastatin (ZOCOR) 20 mg tablet   No No   Sig: Take 1 tablet (20 mg total) by mouth daily   tadalafil (CIALIS) 20 MG tablet  Self No No   Sig: Take 1 tablet (20 mg total) by mouth as needed for erectile dysfunction   tamsulosin (FLOMAX) 0 4 mg  Self No No   Sig: Take 1 capsule (0 4 mg total) by mouth daily with dinner      Facility-Administered Medications: None       Past Medical History: Diagnosis Date    Achalasia     [facility]     [facility]     Esophageal ulcer     Hyperlipidemia     Hypertension        Past Surgical History:   Procedure Laterality Date    ANTERIOR CERVICAL DISCECTOMY W/ FUSION      APPENDECTOMY      BICEPS TENDON REPAIR      FL INJECTION LEFT WRIST (ARTHROGRAM)  11/9/2021    MENISCECTOMY      MYOTOMY HELLER LAPAROSCOPIC      ORTHOPEDIC SURGERY         Family History   Problem Relation Age of Onset    No Known Problems Mother     No Known Problems Father      I have reviewed and agree with the history as documented  E-Cigarette/Vaping    E-Cigarette Use Never User      E-Cigarette/Vaping Substances    Nicotine No     THC No     CBD No     Flavoring No     Other No     Unknown No      Social History     Tobacco Use    Smoking status: Never Smoker    Smokeless tobacco: Never Used   Vaping Use    Vaping Use: Never used   Substance Use Topics    Alcohol use: Not Currently    Drug use: Never        Review of Systems   Constitutional: Negative for fatigue and fever  HENT: Negative for congestion and trouble swallowing  Eyes: Negative for visual disturbance  Respiratory: Negative for cough, chest tightness and shortness of breath  Cardiovascular: Positive for chest pain  Gastrointestinal: Negative for abdominal pain, diarrhea, nausea and vomiting  Genitourinary: Negative for flank pain  Musculoskeletal: Negative for back pain and gait problem  Skin: Negative for rash and wound  Neurological: Negative for syncope and headaches  Psychiatric/Behavioral: Negative for agitation  All other systems reviewed and are negative        Physical Exam  ED Triage Vitals [03/14/22 0732]   Temperature Pulse Respirations Blood Pressure SpO2   98 °F (36 7 °C) 78 18 (!) 187/87 100 %      Temp Source Heart Rate Source Patient Position - Orthostatic VS BP Location FiO2 (%)   Oral Monitor Lying Right arm --      Pain Score       10 - Worst Possible Pain             Orthostatic Vital Signs  Vitals:    03/14/22 0734 03/14/22 0800 03/14/22 0915 03/14/22 0930   BP: (!) 199/103 (!) 173/84 169/78 151/74   Pulse:  64 60 64   Patient Position - Orthostatic VS: Lying Lying Lying Lying       Physical Exam  Vitals and nursing note reviewed  Constitutional:       Appearance: He is well-developed  He is not diaphoretic  HENT:      Head: Normocephalic and atraumatic  Right Ear: External ear normal       Left Ear: External ear normal    Eyes:      General:         Right eye: No discharge  Left eye: No discharge  Conjunctiva/sclera: Conjunctivae normal    Neck:      Vascular: No JVD  Trachea: No tracheal deviation  Cardiovascular:      Rate and Rhythm: Normal rate and regular rhythm  Heart sounds: Normal heart sounds  Pulmonary:      Effort: Pulmonary effort is normal       Breath sounds: Normal breath sounds  No wheezing  Abdominal:      General: There is no distension  Musculoskeletal:         General: Normal range of motion  Cervical back: Normal range of motion  Skin:     General: Skin is warm and dry  Neurological:      Mental Status: He is alert and oriented to person, place, and time     Psychiatric:         Mood and Affect: Mood normal          Speech: Speech normal          Behavior: Behavior normal          ED Medications  Medications   fentanyl citrate (PF) 100 MCG/2ML 100 mcg (100 mcg Intravenous Given 3/14/22 0755)   ondansetron (ZOFRAN) injection 4 mg (4 mg Intravenous Given 3/14/22 0801)   iohexol (OMNIPAQUE) 350 MG/ML injection (SINGLE-DOSE) 100 mL (100 mL Intravenous Given 3/14/22 0901)   ketorolac (TORADOL) injection 15 mg (15 mg Intravenous Given 3/14/22 1001)   aluminum-magnesium hydroxide-simethicone (MYLANTA) oral suspension 30 mL (30 mL Oral Given 3/14/22 1001)       Diagnostic Studies  Results Reviewed     Procedure Component Value Units Date/Time    HS Troponin I 2hr [[id number]]  (Normal) Collected: 03/14/22 0929    Lab Status: Final result Specimen: Blood from Arm, Right Updated: 03/14/22 1015     hs TnI 2hr 4 ng/L      Delta 2hr hsTnI -1 ng/L     HS Troponin 0hr (reflex protocol) [[id number]]  (Normal) Collected: 03/14/22 0746    Lab Status: Final result Specimen: Blood from Arm, Right Updated: 03/14/22 0839     hs TnI 0hr 5 ng/L     Comprehensive metabolic panel [[id number]] Collected: 03/14/22 0746    Lab Status: Final result Specimen: Blood from Arm, Right Updated: 03/14/22 0827     Sodium 136 mmol/L      Potassium 4 1 mmol/L      Chloride 106 mmol/L      CO2 24 mmol/L      ANION GAP 6 mmol/L      BUN 13 mg/dL      Creatinine 0 80 mg/dL      Glucose 128 mg/dL      Calcium 9 1 mg/dL      AST 16 U/L      ALT 34 U/L      Alkaline Phosphatase 65 U/L      Total Protein 7 2 g/dL      Albumin 3 7 g/dL      Total Bilirubin 0 44 mg/dL      eGFR 102 ml/min/1 73sq m     Narrative:      [place] guidelines for Chronic Kidney Disease (CKD):     Stage 1 with normal or high GFR (GFR > 90 mL/min/1 73 square meters)    Stage 2 Mild CKD (GFR = 60-89 mL/min/1 73 square meters)    Stage 3A Moderate CKD (GFR = 45-59 mL/min/1 73 square meters)    Stage 3B Moderate CKD (GFR = 30-44 mL/min/1 73 square meters)    Stage 4 Severe CKD (GFR = 15-29 mL/min/1 73 square meters)    Stage 5 End Stage CKD (GFR <15 mL/min/1 73 square meters)  Note: GFR calculation is accurate only with a steady state creatinine    CBC and differential [[id number]] Collected: 03/14/22 0746    Lab Status: Final result Specimen: Blood from Arm, Right Updated: 03/14/22 0809     WBC 9 52 Thousand/uL      RBC 4 84 Million/uL      Hemoglobin 14 3 g/dL      Hematocrit 44 6 %      MCV 92 fL      MCH 29 5 pg      MCHC 32 1 g/dL      RDW 12 3 %      MPV 9 9 fL      Platelets 691 Thousands/uL      nRBC 0 /100 WBCs      Neutrophils Relative 62 %      Immat GRANS % 1 %      Lymphocytes Relative 23 %      Monocytes Relative 11 % Eosinophils Relative 2 %      Basophils Relative 1 %      Neutrophils Absolute 5 98 Thousands/µL      Immature Grans Absolute 0 09 Thousand/uL      Lymphocytes Absolute 2 22 Thousands/µL      Monocytes Absolute 1 01 Thousand/µL      Eosinophils Absolute 0 16 Thousand/µL      Basophils Absolute 0 06 Thousands/µL                  CTA dissection protocol chest/abdomen/pelvis   Final Result by Willy Osorio MD (03/14 3189)      No aortic dissection  Stable ectasia of the ascending thoracic aorta measuring 4 0 cm  Severe thickening of the esophagus in the mid to distal portions  Evaluate for possible esophagitis  Stable 8 mm nodule in the right lower lobe can be considered benign  Fatty liver  Workstation performed: [id number]         XR shoulder 2+ views LEFT   ED Interpretation by Jennifer Shaffer DO (03/14 0908)   Left shoulder x-ray interpreted me shows no fracture, no dislocation, no acute pathology      Final Result by Shalonda Woodall MD (03/14 1012)      No acute osseous abnormality  Workstation performed: [id number]               Procedures  POC AAA US    Date/Time: 3/14/2022 7:53 AM  Performed by: Haylie Wolfe DO  Authorized by: Haylie Wolfe DO     Patient location:  ED  Performing Provider:  Resident  Other Assisting Provider: No    Procedure details:     Exam Type:  Diagnostic    Indications: chest pain      Views Obtained:  Transverse proximal, transverse mid view, transverse distal view and sagittal (longitudinal) view    Image quality: limited diagnostic      Image availability:  Images available in PACS  Findings:     Abdominal Aorta Findings: normal      Intra-abdominal fluid: not identified    Interpretation:     Aortic ultrasound impression: aorta normal    Comments:      No sign of dissection or aneurysm in descending aorta          ED Course      This EKG was interpreted by me   The EKG demonstrates Normal sinus rhythm, normal intervals and axis, normal QRS, non specific acute ST-T changes        HEART Risk Score      Most Recent Value   Heart Score Risk Calculator    History 1 Filed at: 03/14/2022 0914   ECG 0 Filed at: 03/14/2022 0914   Age 1 Filed at: 03/14/2022 0914   Risk Factors 2 Filed at: 03/14/2022 0914   Troponin 0 Filed at: 03/14/2022 0722   HEART Score 4 Filed at: 03/14/2022 1446                      SBIRT 22yo+      Most Recent Value   SBIRT (23 yo +)    In order to provide better care to our patients, we are screening all of our patients for alcohol and drug use  Would it be okay to ask you these screening questions? Unable to answer at this time Filed at: 03/14/2022 0759                MDM  Number of Diagnoses or Management Options  Arm pain  Chest pain  Diagnosis management comments: No acute finding also dissection study, ACS workup  Pain was reproducible  Patient will follow-up with orthopedics as well as Cardiology as outpatient discharged with return precautions      Disposition  Final diagnoses:   Chest pain   Arm pain     Time reflects when diagnosis was documented in both MDM as applicable and the Disposition within this note     Time User Action Codes Description Comment    3/14/2022 10:18 AM Yue Dougherty Add [R07 9] Chest pain     3/14/2022 10:19 AM Yue Dougherty Add [M79 603] Arm pain       ED Disposition     ED Disposition Condition Date/Time Comment    Discharge Stable Mon Mar 14, 2022 10:20 AM Latoya Arana discharge to home/self care              Follow-up Information     Follow up With Specialties Details Why Contact Info Additional [address] [facility], [address] [place] [address], Nurse Practitioner   [place] [address]  [address]  [place] [address] [facility]       [address] [facility]   [address] [facility] [address] [facility] [address] [facility] [place], [address], [place], [place], [place] [address]    [facility] [facility]   [address] [facility],[address] Floor [id number] [address], [address] [place], [place], [address]  [facility]          Discharge Medication List as of 3/14/2022 10:20 AM      CONTINUE these medications which have NOT CHANGED    Details   Ascorbic Acid (vitamin C) 1000 MG tablet Take 1,000 mg by mouth daily  , Historical Med      baclofen 20 mg tablet Take 1 tablet (20 mg total) by mouth 3 (three) times a day, Starting Mon 9/13/2021, Normal      diphenhydrAMINE-acetaminophen (TYLENOL PM)  MG TABS Take 1 tablet by mouth daily at bedtime as needed for sleep , Historical Med      lisinopril (ZESTRIL) 40 mg tablet Take 1 tablet (40 mg total) by mouth daily, Starting Fri 3/11/2022, Normal      metFORMIN (GLUCOPHAGE) 500 mg tablet Pt takes 1500 mg (3 tablets) by mouth in the morning, and a 1000 mg (2 tablets) in the evening, Normal      simvastatin (ZOCOR) 20 mg tablet Take 1 tablet (20 mg total) by mouth daily, Starting Fri 3/11/2022, Normal      tadalafil (CIALIS) 20 MG tablet Take 1 tablet (20 mg total) by mouth as needed for erectile dysfunction, Starting Wed 12/22/2021, Normal      tamsulosin (FLOMAX) 0 4 mg Take 1 capsule (0 4 mg total) by mouth daily with dinner, Starting Wed 12/22/2021, Normal           No discharge procedures on file  PDMP Review     None           ED Provider  Attending physically available and evaluated Charles Batannie SOLIS managed the patient along with the ED Attending      Electronically Signed by         Clayton Mario DO  03/14/22 0322

## 2022-03-14 NOTE — Clinical Note
Piedad Patel was seen and treated in our emergency department on 3/14/2022  Diagnosis:     Marnie Hale  is off the rest of the shift today  He may return on this date: If you have any questions or concerns, please don't hesitate to call        Polo Hashimoto,     ______________________________           _______________          _______________  Hospital Representative                              Date                                Time

## 2022-03-15 ENCOUNTER — TELEPHONE (OUTPATIENT)
Dept: GASTROENTEROLOGY | Facility: CLINIC | Age: 52
End: 2022-03-15

## 2022-03-15 ENCOUNTER — TELEPHONE (OUTPATIENT)
Dept: OTHER | Facility: OTHER | Age: 52
End: 2022-03-15

## 2022-03-15 ENCOUNTER — OFFICE VISIT (OUTPATIENT)
Dept: INTERNAL MEDICINE CLINIC | Facility: CLINIC | Age: 52
End: 2022-03-15
Payer: COMMERCIAL

## 2022-03-15 ENCOUNTER — OFFICE VISIT (OUTPATIENT)
Dept: OBGYN CLINIC | Facility: HOSPITAL | Age: 52
End: 2022-03-15
Payer: COMMERCIAL

## 2022-03-15 VITALS
HEIGHT: 72 IN | SYSTOLIC BLOOD PRESSURE: 156 MMHG | HEART RATE: 91 BPM | DIASTOLIC BLOOD PRESSURE: 92 MMHG | WEIGHT: 268 LBS | BODY MASS INDEX: 36.3 KG/M2

## 2022-03-15 VITALS
DIASTOLIC BLOOD PRESSURE: 60 MMHG | BODY MASS INDEX: 35.89 KG/M2 | HEART RATE: 75 BPM | OXYGEN SATURATION: 98 % | HEIGHT: 72 IN | SYSTOLIC BLOOD PRESSURE: 120 MMHG | WEIGHT: 265 LBS | TEMPERATURE: 97.9 F

## 2022-03-15 DIAGNOSIS — K20.90 ESOPHAGITIS: ICD-10-CM

## 2022-03-15 DIAGNOSIS — K20.90 ESOPHAGITIS: Primary | ICD-10-CM

## 2022-03-15 DIAGNOSIS — M94.0 COSTOCHONDRITIS: Primary | ICD-10-CM

## 2022-03-15 LAB
ATRIAL RATE: 87 BPM
QRS AXIS: -69 DEGREES
QRSD INTERVAL: 154 MS
QT INTERVAL: 408 MS
QTC INTERVAL: 504 MS
T WAVE AXIS: 77 DEGREES
VENTRICULAR RATE: 92 BPM

## 2022-03-15 PROCEDURE — 99214 OFFICE O/P EST MOD 30 MIN: CPT

## 2022-03-15 PROCEDURE — 99205 OFFICE O/P NEW HI 60 MIN: CPT | Performed by: PHYSICIAN ASSISTANT

## 2022-03-15 PROCEDURE — 93010 ELECTROCARDIOGRAM REPORT: CPT | Performed by: INTERNAL MEDICINE

## 2022-03-15 RX ORDER — METHYLPREDNISOLONE 4 MG/1
TABLET ORAL
Qty: 21 TABLET | Refills: 0 | Status: SHIPPED | OUTPATIENT
Start: 2022-03-15 | End: 2022-03-24 | Stop reason: ALTCHOICE

## 2022-03-15 RX ORDER — LIDOCAINE HYDROCHLORIDE 20 MG/ML
15 SOLUTION OROPHARYNGEAL 4 TIMES DAILY PRN
Qty: 100 ML | Refills: 0 | Status: SHIPPED | OUTPATIENT
Start: 2022-03-15 | End: 2022-04-01

## 2022-03-15 RX ORDER — ALUMINA, MAGNESIA, AND SIMETHICONE 2400; 2400; 240 MG/30ML; MG/30ML; MG/30ML
10 SUSPENSION ORAL EVERY 6 HOURS PRN
Qty: 355 ML | Refills: 0 | Status: SHIPPED | OUTPATIENT
Start: 2022-03-15 | End: 2022-04-01 | Stop reason: SDUPTHER

## 2022-03-15 RX ORDER — FAMOTIDINE 20 MG/1
20 TABLET, FILM COATED ORAL 2 TIMES DAILY
Qty: 30 TABLET | Refills: 1 | Status: SHIPPED | OUTPATIENT
Start: 2022-03-15 | End: 2022-05-06 | Stop reason: SDUPTHER

## 2022-03-15 RX ORDER — PANTOPRAZOLE SODIUM 40 MG/1
40 TABLET, DELAYED RELEASE ORAL DAILY
Qty: 30 TABLET | Refills: 1 | Status: SHIPPED | OUTPATIENT
Start: 2022-03-15 | End: 2022-03-16 | Stop reason: SDUPTHER

## 2022-03-15 NOTE — PATIENT INSTRUCTIONS
Please give patient note for work excusing him from Monday , 3/14 through Sunday this week    Okay to return to work on Monday ,3/ 21

## 2022-03-15 NOTE — PROGRESS NOTES
Assessment/Plan:    Esophagitis  Patient complains of pain in his left chest area  The patient had a CTA in the ED yesterday that demonstrated severe thickening of the mid to distal esophagus consistent with esophagitis  The patient was prescribed Protonix, Pepcid, viscous lidocaine and Maalox  He is scheduled for follow up in 2 weeks  He was instructed to not take his methylprednisolone pack because that can exacerbate esophagitis  Problem List Items Addressed This Visit        Digestive    Esophagitis - Primary     Patient complains of pain in his left chest area  The patient had a CTA in the ED yesterday that demonstrated severe thickening of the mid to distal esophagus consistent with esophagitis  The patient was prescribed Protonix, Pepcid, viscous lidocaine and Maalox  He is scheduled for follow up in 2 weeks  He was instructed to not take his methylprednisolone pack because that can exacerbate esophagitis  Relevant Medications    aluminum-magnesium hydroxide-simethicone (MAALOX MAX) 400-400-40 MG/5ML suspension    pantoprazole (PROTONIX) 40 mg tablet    famotidine (PEPCID) 20 mg tablet    Lidocaine Viscous HCl (XYLOCAINE) 2 % mucosal solution            Subjective:   Chief Complaint   Patient presents with    Follow-up     pt was in er yesterday for chest maged, neck and arm feels needles and pins in left thumb Started on prednisone pack  Needs note for work    Health Screening     dm eye scheduled in April with [facility]      Patient ID: Sofie Rinaldi is a 45 y o  male  Patient comes into the office after seeing orthopedics this morning  Orthopedics does not believe the pain stems from his neck they believe it is esophagitis  The physician assistant started the patient on a methylprednisolone pack for esophagitis and recommended he get TUMS  He comes in today for ER follow up  He states the pain started Sunday night   He has similar episodes to this in the past but never this sever as this was an 8/10 pain that is constant and stabbing in nature  It is made worse when deep inspiration, cough and laying flat  He has a history of achalasia in the past and had myometry to help with that, and that resolve his GI tract issues in the past  The patient mentions eating rice and chicken last night and noticing a burning sensation when the food went down  He also noticed when he had breakfast this morning and orange juice there was a burning sensation present  The following portions of the patient's history were reviewed and updated as appropriate: allergies, current medications, past family history, past medical history, past social history, past surgical history and problem list     Review of Systems   Constitutional: Positive for chills  Negative for appetite change, fever and unexpected weight change  HENT: Negative for ear pain, sore throat, trouble swallowing and voice change  Eyes: Negative for pain and visual disturbance  Respiratory: Positive for cough  Negative for choking and shortness of breath  Cardiovascular: Positive for chest pain  Negative for palpitations  Gastrointestinal: Positive for abdominal pain, constipation and nausea  Negative for vomiting  Genitourinary: Negative for dysuria and hematuria  Musculoskeletal: Negative for arthralgias and back pain  Skin: Negative for color change and rash  Neurological: Negative for seizures and syncope  All other systems reviewed and are negative  Objective:  /60 (BP Location: Right arm, Patient Position: Sitting, Cuff Size: Large)   Pulse 75   Temp 97 9 °F (36 6 °C) (Tympanic)   Ht 6' (1 829 m)   Wt 120 kg (265 lb)   SpO2 98% Comment: room air  BMI 35 94 kg/m²          Physical Exam  Constitutional:       General: He is not in acute distress  Appearance: He is obese  He is not ill-appearing        Comments: Visibly uncomfortably     HENT:      Head: Normocephalic and atraumatic  Mouth/Throat:      Mouth: Mucous membranes are moist    Eyes:      Extraocular Movements: Extraocular movements intact  Pupils: Pupils are equal, round, and reactive to light  Cardiovascular:      Rate and Rhythm: Normal rate and regular rhythm  Pulses: Normal pulses  Heart sounds: Normal heart sounds  Pulmonary:      Effort: Pulmonary effort is normal       Breath sounds: Normal breath sounds  Abdominal:      General: Bowel sounds are normal  There is no distension  Tenderness: There is no abdominal tenderness  There is no guarding  Musculoskeletal:         General: Tenderness (upper left chest) present  Skin:     General: Skin is warm and dry  Neurological:      Mental Status: He is oriented to person, place, and time

## 2022-03-15 NOTE — TELEPHONE ENCOUNTER
Patient was in the emergency room for a swollen esophagus and he needs an appointment with the office as soon as possible

## 2022-03-15 NOTE — ASSESSMENT & PLAN NOTE
Patient complains of pain in his left chest area  The patient had a CTA in the ED yesterday that demonstrated severe thickening of the mid to distal esophagus consistent with esophagitis  The patient was prescribed Protonix, Pepcid, viscous lidocaine and Maalox  He is scheduled for follow up in 2 weeks  He was instructed to not take his methylprednisolone pack because that can exacerbate esophagitis

## 2022-03-15 NOTE — PROGRESS NOTES
Assessment:    Complex picture of left chest wall burning pain, worse with swallowing that came on without injury 2 days ago  ACS was ruled out in the ER however his CTA scan did show severe esophagitis which I believe  is contributing to his symptoms based on history  There is also component of costochondritis as his left upper rib cage is TTP  Left Cervical radiculopathy is in the differential diagnosis given his history of ACDF, however I do not think this is the predominating contributing factor      Plan:    Recommend obtaining OTC Tums for his esophagitis and call PCP for consideration of PPI  Will Rx Medrol dose pack to help symptoms of costochondritis/cervical radiculopathy  Can follow-up with ortho PRN, call with any issues  All questions answered          Problem List Items Addressed This Visit        Digestive    Esophagitis       Musculoskeletal and Integument    Costochondritis - Primary    Relevant Medications    methylPREDNISolone 4 MG tablet therapy pack                   Subjective:     Patient ID:  Ollie Perez is a 45 y o  male    HPI    48-year-old male presenting for evaluation of his left chest wall  According to the patient, about two days ago he noticed severe sharp burning pain located left side of his chest wall with radiation to the rib cage/shoulder region  He states his symptoms are made worse when he swallows and when he ate rice yesterday, he had a severe burning sensation  Occasionally the pain goes down to his thumb and feels tingling however this is not his primary concern  He did present to the ER yesterday where he had a full workup for ACS which was negative  CT scan did reveal severe esophagitis  He was referred to orthopedics for evaluation  He does have a history of ACDF at C6-7 done by Dr Jesus Hyatt in 2018 and has done fairly well with this but he has chronic pain which is usually posterior neck with radiation to the scapula    He denies having no symptoms today   He denies any lucita weakness of his upper extremities, no bowel or bladder incontinence, no saddle anesthesia  No gait instability  He denies any recent fever or chills, no recent illness  The following portions of the patient's history were reviewed and updated as appropriate: allergies, current medications, past family history, past medical history, past social history, past surgical history and problem list     Review of Systems     Objective:    Imaging:  CTA Dissection 3/14  IMPRESSION:     No aortic dissection  Stable ectasia of the ascending thoracic aorta measuring 4 0 cm      Severe thickening of the esophagus in the mid to distal portions  Evaluate for possible esophagitis      Stable 8 mm nodule in the right lower lobe can be considered benign      Fatty liver  Left shoulder x-rays 3/14/2022 no acute findings      Vitals:    03/15/22 0858   BP: 156/92   Pulse: 91           Physical Exam     Orthopedic Examination:  Left shoulder   Cervical spine    Inspection:  No open wounds or erythema  No effusion  Prior anterior cervical incision is well healed      Palpation:  There is tenderness to palpation left chest wall/rib cage consistent with costochondritis    Range-of-motion:  Normal range of motion of the left shoulder joint in all planes    Strength:  5/5 gross strength C5-T1 bilaterally    Sensation:  Intact C5-T1    Special Tests:  Negative Spurling's to the left   Negative Tolbert's   Absent Babinski   No sustained clonus   No hyperreflexia  Negative Riggs/Neer's impingement

## 2022-03-15 NOTE — TELEPHONE ENCOUNTER
Called pt to make an appt because he was seen in the ER for a swollen esophagus  I was able to make an appt for 3/16/2022 to see Dr Yesenia Carias

## 2022-03-16 ENCOUNTER — OFFICE VISIT (OUTPATIENT)
Dept: GASTROENTEROLOGY | Facility: CLINIC | Age: 52
End: 2022-03-16
Payer: COMMERCIAL

## 2022-03-16 ENCOUNTER — TELEPHONE (OUTPATIENT)
Dept: BARIATRICS | Facility: CLINIC | Age: 52
End: 2022-03-16

## 2022-03-16 VITALS
HEART RATE: 71 BPM | OXYGEN SATURATION: 99 % | SYSTOLIC BLOOD PRESSURE: 137 MMHG | HEIGHT: 72 IN | BODY MASS INDEX: 34.13 KG/M2 | TEMPERATURE: 98.9 F | DIASTOLIC BLOOD PRESSURE: 87 MMHG | WEIGHT: 252 LBS

## 2022-03-16 DIAGNOSIS — K22.0 ACHALASIA: Primary | ICD-10-CM

## 2022-03-16 DIAGNOSIS — K20.90 ESOPHAGITIS: ICD-10-CM

## 2022-03-16 DIAGNOSIS — I10 HYPERTENSION, ESSENTIAL: ICD-10-CM

## 2022-03-16 PROCEDURE — 4010F ACE/ARB THERAPY RXD/TAKEN: CPT | Performed by: UROLOGY

## 2022-03-16 PROCEDURE — 3079F DIAST BP 80-89 MM HG: CPT | Performed by: INTERNAL MEDICINE

## 2022-03-16 PROCEDURE — 3075F SYST BP GE 130 - 139MM HG: CPT | Performed by: INTERNAL MEDICINE

## 2022-03-16 PROCEDURE — 99204 OFFICE O/P NEW MOD 45 MIN: CPT | Performed by: INTERNAL MEDICINE

## 2022-03-16 RX ORDER — LISINOPRIL 40 MG/1
40 TABLET ORAL DAILY
Qty: 90 TABLET | Refills: 1 | Status: SHIPPED | OUTPATIENT
Start: 2022-03-16

## 2022-03-16 RX ORDER — PANTOPRAZOLE SODIUM 40 MG/1
40 TABLET, DELAYED RELEASE ORAL DAILY
Qty: 30 TABLET | Refills: 3 | Status: SHIPPED | OUTPATIENT
Start: 2022-03-16

## 2022-03-16 NOTE — TELEPHONE ENCOUNTER
Referral received  Spoke w/pt and he doesn't need surgical  He may be interested on the non surgical side  I transferred the call to them  He wants to see a provider and a nutritionist  He h as lost a lot of weight on his own

## 2022-03-16 NOTE — H&P (VIEW-ONLY)
[facility]  Wendy Pardo 45 y o  male MRN: [id number]  Encounter: [id number]    ASSESSMENT AND PLAN:    Wendy Pardo is a 45 y o  old male with PMH: ascending aortic aneurysm, HTN, DM, cervical radiculopathy status post ACDF in 2016, achalasia s/p Heller myotomy in 2011 who presents for:    #Severe Esophagitis  #Hx of Achalsia s/p Heller Myotomy in 2011  Patient presenting with signs suggestive severe esophagitis likely in the setting of reflux mediated processes  Given the patient had previous Heller myotomy it is possible he could have had a slipped wrap or progression of reflux symptoms over time  Esophagitis likely is in the setting of increased acid load and inability to clear appropriately given history of achalasia  Will need to evaluate with endoscopy given it has been many years since he has had 1 as well as barium esophagram to get better architecture of the esophagus  Plan:  -Barium esophagram ordered  -EGD ordered   -please get biopsies  -continue protonix 40mg qdaily  -continue famotidine 20mg prior to bedtime  -GERD precautions  -bariatric surgery referral    #Chest Pain  Given patient presented with chest discomfort and pain radiating down his left arm, cardiac workup should be initiated  -close follow-up with cardiology, appt set up for this Friday    #Colon cancer screening  Had 3 tubular adenomas removed in September 2021     ______________________________________________________________________    HPI:    Patient reffered by MIKHAIL Brady  Patient presenting with chest pain and left-sided arm pain  Was seen in the emergency department 2 days ago with 9 and 10 chest pain which was stabbing in nature and radiating to his back as well as down his left arm  No obvious aggravating or relieving factors      During workup in the ED underwent a CTA dissection protocol and was noted to have severe thickening of the esophagus in the mid and distal portions  Of note, Hx of achalasia dx years ago s/p Heller myotomy Dr Conrad Mckinney  Patient states prior to his Heller myotomy he was having significant regurgitation and dysphagia symptoms with and inability to tolerate even thick liquids  States since he underwent the Heller myotomy his symptoms had improved significantly  More recently he had endorse burning sensation  States that he did not have significant reflux symptoms but did notice that after drinking orange juice he would have some reflux symptoms  Denies any a significant alarm symptoms at this time  No significant weight loss endorse  CMP and hemoglobin levels are stable on check in the ED  Family history:  Distant family member with esophageal cancer    Last EGD: Many years ago, prior to [facility]  Last colonoscopy: 3 polyps removed in Sep 2021    REVIEW OF SYSTEMS:    CONSTITUTIONAL: Denies any fever, chills, rigors, and weight loss  HEENT: No earache or tinnitus  Denies hearing loss or visual disturbances  CARDIOVASCULAR: No chest pain or palpitations  RESPIRATORY: Denies any cough, hemoptysis, shortness of breath or dyspnea on exertion  GASTROINTESTINAL: As noted in the History of Present Illness  GENITOURINARY: No problems with urination  Denies any hematuria or dysuria  NEUROLOGIC: No dizziness or vertigo, denies headaches  MUSCULOSKELETAL: Denies any muscle or joint pain  SKIN: Denies skin rashes or itching  ENDOCRINE: Denies excessive thirst  Denies intolerance to heat or cold  PSYCHOSOCIAL: Denies depression or anxiety  Denies any recent memory loss       Historical Information   Past Medical History:   Diagnosis Date    Achalasia     [facility]     [facility]     Esophageal ulcer     Hyperlipidemia     Hypertension      Past Surgical History:   Procedure Laterality Date    ANTERIOR CERVICAL DISCECTOMY W/ FUSION      APPENDECTOMY      BICEPS TENDON REPAIR      FL INJECTION LEFT WRIST (ARTHROGRAM)  11/9/2021    MENISCECTOMY      MYOTOMY HELLER LAPAROSCOPIC      ORTHOPEDIC SURGERY       Social History   Social History     Substance and Sexual Activity   Alcohol Use Not Currently     Social History     Substance and Sexual Activity   Drug Use Never     Social History     Tobacco Use   Smoking Status Never Smoker   Smokeless Tobacco Never Used     Family History   Problem Relation Age of Onset    No Known Problems Mother     No Known Problems Father        Meds/Allergies       Current Outpatient Medications:     aluminum-magnesium hydroxide-simethicone (MAALOX MAX) 400-400-40 MG/5ML suspension    Ascorbic Acid (vitamin C) 1000 MG tablet    baclofen 20 mg tablet    diphenhydrAMINE-acetaminophen (TYLENOL PM)  MG TABS    famotidine (PEPCID) 20 mg tablet    Lidocaine Viscous HCl (XYLOCAINE) 2 % mucosal solution    lisinopril (ZESTRIL) 40 mg tablet    metFORMIN (GLUCOPHAGE) 500 mg tablet    methylPREDNISolone 4 MG tablet therapy pack    pantoprazole (PROTONIX) 40 mg tablet    simvastatin (ZOCOR) 20 mg tablet    tadalafil (CIALIS) 20 MG tablet    tamsulosin (FLOMAX) 0 4 mg  No Known Allergies    Objective     Blood pressure 137/87, pulse 71, temperature 98 9 °F (37 2 °C), temperature source Tympanic, height 6' (1 829 m), weight 114 kg (252 lb), SpO2 99 %  Body mass index is 34 18 kg/m²  PHYSICAL EXAM:      General Appearance:   Well-appearing obese male who is alert, cooperative, no distress   HEENT:   Normocephalic, atraumatic, anicteric   Neck:  Supple, symmetrical, trachea midline   Lungs:   Clear to auscultation bilaterally; no rales, rhonchi or wheezing; respirations unlabored    Heart:   Regular rate and rhythm; no murmur, rub, or gallop     Abdomen:   Soft, nontender on palpation   Genitalia:   Deferred    Rectal:   Deferred    Extremities:  No cyanosis, clubbing or edema    Pulses:  2+ and symmetric    Skin:  No jaundice, rashes, or lesions    Lymph nodes:  No palpable cervical lymphadenopathy        Lab Results:   No visits with results within 1 Day(s) from this visit     Latest known visit with results is:   Admission on 03/14/2022, Discharged on 03/14/2022   Component Date Value    WBC 03/14/2022 9 52     RBC 03/14/2022 4 84     Hemoglobin 03/14/2022 14 3     Hematocrit 03/14/2022 44 6     MCV 03/14/2022 92     MCH 03/14/2022 29 5     MCHC 03/14/2022 32 1     RDW 03/14/2022 12 3     MPV 03/14/2022 9 9     Platelets 12/19/4789 231     nRBC 03/14/2022 0     Neutrophils Relative 03/14/2022 62     Immat GRANS % 03/14/2022 1     Lymphocytes Relative 03/14/2022 23     Monocytes Relative 03/14/2022 11     Eosinophils Relative 03/14/2022 2     Basophils Relative 03/14/2022 1     Neutrophils Absolute 03/14/2022 5 98     Immature Grans Absolute 03/14/2022 0 09     Lymphocytes Absolute 03/14/2022 2 22     Monocytes Absolute 03/14/2022 1 01     Eosinophils Absolute 03/14/2022 0 16     Basophils Absolute 03/14/2022 0 06     Sodium 03/14/2022 136     Potassium 03/14/2022 4 1     Chloride 03/14/2022 106     CO2 03/14/2022 24     ANION GAP 03/14/2022 6     BUN 03/14/2022 13     Creatinine 03/14/2022 0 80     Glucose 03/14/2022 128     Calcium 03/14/2022 9 1     AST 03/14/2022 16     ALT 03/14/2022 34     Alkaline Phosphatase 03/14/2022 65     Total Protein 03/14/2022 7 2     Albumin 03/14/2022 3 7     Total Bilirubin 03/14/2022 0 44     eGFR 03/14/2022 102     hs TnI 0hr 03/14/2022 5     hs TnI 2hr 03/14/2022 4     Delta 2hr hsTnI 03/14/2022 -1     Ventricular Rate 03/14/2022 65     Atrial Rate 03/14/2022 65     ID Interval 03/14/2022 164     QRSD Interval 03/14/2022 98     QT Interval 03/14/2022 410     QTC Interval 03/14/2022 426     P Axis 03/14/2022 20     QRS Axis 03/14/2022 -7     T Wave Axis 03/14/2022 51     Ventricular Rate 03/14/2022 64     Atrial Rate 03/14/2022 64     ID Interval 03/14/2022 168     QRSD Interval 03/14/2022 98     QT Interval 03/14/2022 422     QTC Interval 03/14/2022 435     P Axis 03/14/2022 0     QRS Axis 03/14/2022 -15     T Wave Axis 03/14/2022 53     Ventricular Rate 03/14/2022 62     Atrial Rate 03/14/2022 62     AK Interval 03/14/2022 192     QRSD Interval 03/14/2022 96     QT Interval 03/14/2022 410     QTC Interval 03/14/2022 416     P Axis 03/14/2022 55     QRS Axis 03/14/2022 -13     T Wave Axis 03/14/2022 64     Ventricular Rate 03/14/2022 92     Atrial Rate 03/14/2022 87     QRSD Interval 03/14/2022 154     QT Interval 03/14/2022 408     QTC Interval 03/14/2022 504     QRS Axis 03/14/2022 -71     T Wave Axis 03/14/2022 77        Radiology Results:   XR shoulder 2+ views LEFT    Result Date: 3/14/2022  Narrative: LEFT SHOULDER INDICATION:   severe left shoulder pain  COMPARISON:  None VIEWS:  XR SHOULDER 2+ VW LEFT Images: 3 FINDINGS: There is no acute fracture or dislocation  No significant degenerative changes  No lytic or blastic osseous lesion  Soft tissues are unremarkable  Impression: No acute osseous abnormality  Workstation performed: [id number]     CTA dissection protocol chest/abdomen/pelvis    Result Date: 3/14/2022  Narrative: CTA - CHEST, ABDOMEN AND PELVIS - WITHOUT AND WITH IV CONTRAST INDICATION:   aortic aneurysm hx, severe tearing chest pain going to back  COMPARISON:  CT chest/abdomen/pelvis 9/30/2019 and CT abdomen/pelvis 12/13/2021  TECHNIQUE: CT examination of the chest, abdomen and pelvis was performed both prior to and after the administration of intravenous contrast   The noncontrast portion of this examination was performed utilizing low radiation dose technique  Thin section angiographic arterial phase post contrast technique was used in order to evaluate for aortic dissection  3D reformatted images and volume rendering were performed on an independent workstation    Additionally, axial, sagittal, and coronal 2D reformatted images were created from the source data and submitted for interpretation  Radiation dose length product (DLP) for this visit:  2957 18 mGy-cm   This examination, like all CT scans performed in the [facility], was performed utilizing techniques to minimize radiation dose exposure, including the use of iterative reconstruction and automated exposure control  IV Contrast:  100 mL of iohexol (OMNIPAQUE) Enteric Contrast:  Enteric contrast was not administered  FINDINGS: AORTA:  There is no aortic dissection or intramural hematoma  Ectasia of the ascending thoracic aorta measuring 4 0 cm unchanged  CHEST LUNGS:  Stable right lower lobe nodule along the fissure measuring 8 mm on image 4/80  There is no tracheal or endobronchial lesion  PLEURA:  Unremarkable  HEART/PULMONARY ARTERIAL TREE:  Unremarkable for patient's age  MEDIASTINUM AND SHERRY:  No adenopathy  Severe mid to distal esophageal wall thickening noted  CHEST WALL AND LOWER NECK:   Unremarkable  ABDOMEN LIVER/BILIARY TREE:  Liver is diffusely decreased in density consistent with fatty change  No CT evidence of suspicious hepatic mass  Normal hepatic contours  No biliary dilatation  GALLBLADDER:  No calcified gallstones  No pericholecystic inflammatory change  SPLEEN:  Unremarkable  PANCREAS:  Unremarkable  ADRENAL GLANDS:  Unremarkable  KIDNEYS/URETERS:  Unremarkable  No hydronephrosis  STOMACH AND BOWEL:  Unremarkable  APPENDIX:  Removed  ABDOMINOPELVIC CAVITY:  No ascites or free intraperitoneal air  No lymphadenopathy  PELVIS REPRODUCTIVE ORGANS:  Unremarkable for patient's age  URINARY BLADDER:  Unremarkable  ABDOMINAL WALL/INGUINAL REGIONS:  Fat-containing left inguinal hernia unchanged  OSSEOUS STRUCTURES:  No acute fracture or destructive osseous lesion  Stable bilateral L5 spondylolysis without spondylolisthesis  Impression: No aortic dissection  Stable ectasia of the ascending thoracic aorta measuring 4 0 cm   Severe thickening of the esophagus in the mid to distal portions  Evaluate for possible esophagitis  Stable 8 mm nodule in the right lower lobe can be considered benign  Fatty liver   Workstation performed: [id number]       ---------------------------------------------------  Note Electronically Signed By:    MD Cheryl Queen 72 Gastroenterology Fellow PGY-6  PI #: [id number]

## 2022-03-16 NOTE — PROGRESS NOTES
Cardiology Consultation     Alfredo Conte  [id number]  1970  HEART & VASCULAR [facility] [address] [address]  1  Hypertension, essential  Echo complete w/ contrast if indicated    POCT ECG    Echo stress test, exercise   2  Chest pain, unspecified type  Echo complete w/ contrast if indicated    POCT ECG    Echo stress test, exercise     Patient Active Problem List   Diagnosis    S/P cervical spinal fusion    Cervical radiculopathy    Cervical stenosis of spinal canal    HNP (herniated nucleus pulposus), thoracic    Cervical spondylosis without myelopathy    Neck pain    Diabetes mellitus ([address] )    Hypertension, essential    Other hyperlipidemia    Shaky    Acute nonintractable headache    Left lower quadrant abdominal pain    Right flank pain    Penile bleeding    Scrotal pain    COVID-19 virus infection    Costochondritis    Esophagitis       HPI patient is here for cardiology evaluation  He is referred by his PCP in reference to concern about chest discomfort  Patient presented to the ED March 14, 2022 with complaint of left-sided stabbing chest discomfort  Pain was worse with movement  Patient has HTN, DM and ascending aortic aneurysm  EKG demonstrated sinus rhythm with incomplete right bundle branch block and nonspecific ST segment changes  Troponin was negative  CTA of the chest and abdomen demonstrated stable ectasia of the ascending aorta at 4 0 cm  Severe thickening of the esophagus in the mid to distal portion suggesting esophagitis was noted  Fatty liver was noted  Patient was seen by GI yesterday patient with achalsia s/p Heller myotomy  EGD and barium esophagram were ordered  He was placed on medical therapy which substantially has improved his chest discomfort  Echocardiogram done August 2019 demonstrated preserved LV systolic function with LVEF of 60%    There was mild NURIA  There was no significant valvular heart disease  The gastroenterologist could not explain left arm discomfort and hand discomfort in reference to his GI syndrome  There is concern about cardiac disease  Patient does get chest discomfort with intermittent left upper extremity discomfort  He did have a pharmacologic nuclear stress test while an inpatient at 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  EKG today demonstrates sinus rhythm with inferior Q-waves  EKG was comparable to one done 3/14/2022  In reference to family history his mother had an aneurysm and  at the age of 66 from other causes  His father is alive and well and has hypertension      PMH-  Past Medical History:   Diagnosis Date    Achalasia     [facility]     Diabetes mellitus [facility]     Esophageal ulcer     Hyperlipidemia     Hypertension         SOCIAL HISTORY-  Social History     Socioeconomic History    Marital status: /Civil Union     Spouse name: Not on file    Number of children: Not on file    Years of education: Not on file    Highest education level: Not on file   Occupational History    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   Tobacco Use    Smoking status: Never Smoker    Smokeless tobacco: Never Used   Vaping Use    Vaping Use: Never used   Substance and Sexual Activity    Alcohol use: Not Currently    Drug use: Never    Sexual activity: Yes     Partners: Female   Other Topics Concern    Not on file   Social History Narrative    Lives with spouse     Social Determinants of Health     Financial Resource Strain: Not on file Food Insecurity: Not on file   Transportation Needs: Not on file   Physical Activity: Not on file   Stress: Not on file   Social Connections: Not on file   Intimate Partner Violence: Not on file   Housing Stability: Not on file        FAMILY HISTORY-  Family History   Problem Relation Age of Onset    No Known Problems Mother     No Known Problems Father        SURGICAL HISTORY-  Past Surgical History:   Procedure Laterality Date    ANTERIOR CERVICAL DISCECTOMY W/ FUSION      APPENDECTOMY      BICEPS TENDON REPAIR      FL INJECTION LEFT WRIST (ARTHROGRAM)  11/9/2021    MENISCECTOMY      MYOTOMY HELLER LAPAROSCOPIC      ORTHOPEDIC SURGERY           Current Outpatient Medications:     aluminum-magnesium hydroxide-simethicone (MAALOX MAX) 400-400-40 MG/5ML suspension, Take 10 mL by mouth every 6 (six) hours as needed for indigestion or heartburn, Disp: 355 mL, Rfl: 0    Ascorbic Acid (vitamin C) 1000 MG tablet, Take 1,000 mg by mouth daily  , Disp: , Rfl:     baclofen 20 mg tablet, Take 1 tablet (20 mg total) by mouth 3 (three) times a day (Patient taking differently: Take 20 mg by mouth in the morning Taking 1 before sleep ), Disp: 90 tablet, Rfl: 2    diphenhydrAMINE-acetaminophen (TYLENOL PM)  MG TABS, Take 1 tablet by mouth daily at bedtime as needed for sleep , Disp: , Rfl:     famotidine (PEPCID) 20 mg tablet, Take 1 tablet (20 mg total) by mouth 2 (two) times a day, Disp: 30 tablet, Rfl: 1    Lidocaine Viscous HCl (XYLOCAINE) 2 % mucosal solution, Take 15 mL by mouth 4 (four) times a day as needed (Chest discomfort), Disp: 100 mL, Rfl: 0    lisinopril (ZESTRIL) 40 mg tablet, Take 1 tablet (40 mg total) by mouth daily, Disp: 90 tablet, Rfl: 1    metFORMIN (GLUCOPHAGE) 500 mg tablet, Pt takes 1500 mg (3 tablets) by mouth in the morning, and a 1000 mg (2 tablets) in the evening, Disp: 450 tablet, Rfl: 1    pantoprazole (PROTONIX) 40 mg tablet, Take 1 tablet (40 mg total) by mouth daily, Disp: 30 tablet, Rfl: 3    simvastatin (ZOCOR) 20 mg tablet, Take 1 tablet (20 mg total) by mouth daily, Disp: 90 tablet, Rfl: 1    tadalafil (CIALIS) 20 MG tablet, Take 1 tablet (20 mg total) by mouth as needed for erectile dysfunction, Disp: 10 tablet, Rfl: 0    methylPREDNISolone 4 MG tablet therapy pack, 24mg PO on day 1, then decrease by 4mg/day x 5 days per dose pack instructions  (Patient not taking: Reported on 3/18/2022 ), Disp: 21 tablet, Rfl: 0    tamsulosin (FLOMAX) 0 4 mg, Take 1 capsule (0 4 mg total) by mouth daily with dinner (Patient not taking: Reported on 3/18/2022 ), Disp: 30 capsule, Rfl: 3  No Known Allergies  Vitals:    03/18/22 1007   BP: 124/82   BP Location: Right arm   Patient Position: Sitting   Cuff Size: Standard   Pulse: 65   Weight: 117 kg (258 lb 12 8 oz)         Review of Systems:  Review of Systems   Cardiovascular: Positive for chest pain  All other systems reviewed and are negative  Physical Exam:  Physical Exam  Vitals reviewed  Constitutional:       Appearance: He is well-developed  HENT:      Head: Normocephalic and atraumatic  Eyes:      Conjunctiva/sclera: Conjunctivae normal       Pupils: Pupils are equal, round, and reactive to light  Cardiovascular:      Rate and Rhythm: Normal rate  Heart sounds: Normal heart sounds  Pulmonary:      Effort: Pulmonary effort is normal       Breath sounds: Normal breath sounds  Musculoskeletal:      Cervical back: Normal range of motion and neck supple  Skin:     General: Skin is warm and dry  Neurological:      Mental Status: He is alert and oriented to person, place, and time  Discussion/Summary:  Will continue his present medical regimen  His EKG is abnormal but stable compared to a prior tracing  There is concern about angina  Will check a stress echo to exclude severe obstructive coronary disease  Will also check a standard echo and will visualized the ascending aorta    He is undergoing GI investigation in reference to his reflux with prior history of esophageal surgery performed here about 10 years ago  He does feel better with GI medication  I have asked him to call if there is a problem in the interim otherwise I will see him in follow-up in three months time

## 2022-03-16 NOTE — PROGRESS NOTES
[facility]  Wendy Pardo 45 y o  male MRN: [id number]  Encounter: [id number]    ASSESSMENT AND PLAN:    Wendy Pardo is a 45 y o  old male with PMH: ascending aortic aneurysm, HTN, DM, cervical radiculopathy status post ACDF in 2016, achalasia s/p Heller myotomy in 2011 who presents for:    #Severe Esophagitis  #Hx of Achalsia s/p Heller Myotomy in 2011  Patient presenting with signs suggestive severe esophagitis likely in the setting of reflux mediated processes  Given the patient had previous Heller myotomy it is possible he could have had a slipped wrap or progression of reflux symptoms over time  Esophagitis likely is in the setting of increased acid load and inability to clear appropriately given history of achalasia  Will need to evaluate with endoscopy given it has been many years since he has had 1 as well as barium esophagram to get better architecture of the esophagus  Plan:  -Barium esophagram ordered  -EGD ordered   -please get biopsies  -continue protonix 40mg qdaily  -continue famotidine 20mg prior to bedtime  -GERD precautions  -bariatric surgery referral    #Chest Pain  Given patient presented with chest discomfort and pain radiating down his left arm, cardiac workup should be initiated  -close follow-up with cardiology, appt set up for this Friday    #Colon cancer screening  Had 3 tubular adenomas removed in September 2021     ______________________________________________________________________    HPI:    Patient reffered by MIKHAIL Brady  Patient presenting with chest pain and left-sided arm pain  Was seen in the emergency department 2 days ago with 9 and 10 chest pain which was stabbing in nature and radiating to his back as well as down his left arm  No obvious aggravating or relieving factors      During workup in the ED underwent a CTA dissection protocol and was noted to have severe thickening of the esophagus in the mid and distal portions  Of note, Hx of achalasia dx years ago s/p Heller myotomy Dr King Gonzales  Patient states prior to his Heller myotomy he was having significant regurgitation and dysphagia symptoms with and inability to tolerate even thick liquids  States since he underwent the Heller myotomy his symptoms had improved significantly  More recently he had endorse burning sensation  States that he did not have significant reflux symptoms but did notice that after drinking orange juice he would have some reflux symptoms  Denies any a significant alarm symptoms at this time  No significant weight loss endorse  CMP and hemoglobin levels are stable on check in the ED  Family history:  Distant family member with esophageal cancer    Last EGD: Many years ago, prior to [facility]  Last colonoscopy: 3 polyps removed in Sep 2021    REVIEW OF SYSTEMS:    CONSTITUTIONAL: Denies any fever, chills, rigors, and weight loss  HEENT: No earache or tinnitus  Denies hearing loss or visual disturbances  CARDIOVASCULAR: No chest pain or palpitations  RESPIRATORY: Denies any cough, hemoptysis, shortness of breath or dyspnea on exertion  GASTROINTESTINAL: As noted in the History of Present Illness  GENITOURINARY: No problems with urination  Denies any hematuria or dysuria  NEUROLOGIC: No dizziness or vertigo, denies headaches  MUSCULOSKELETAL: Denies any muscle or joint pain  SKIN: Denies skin rashes or itching  ENDOCRINE: Denies excessive thirst  Denies intolerance to heat or cold  PSYCHOSOCIAL: Denies depression or anxiety  Denies any recent memory loss       Historical Information   Past Medical History:   Diagnosis Date    Achalasia     [facility] [address] [facility]     [facility]     Esophageal ulcer     Hyperlipidemia     Hypertension      Past Surgical History:   Procedure Laterality Date    ANTERIOR CERVICAL DISCECTOMY W/ FUSION      APPENDECTOMY      BICEPS TENDON REPAIR      FL INJECTION LEFT WRIST (ARTHROGRAM)  11/9/2021    MENISCECTOMY      MYOTOMY HELLER LAPAROSCOPIC      ORTHOPEDIC SURGERY       Social History   Social History     Substance and Sexual Activity   Alcohol Use Not Currently     Social History     Substance and Sexual Activity   Drug Use Never     Social History     Tobacco Use   Smoking Status Never Smoker   Smokeless Tobacco Never Used     Family History   Problem Relation Age of Onset    No Known Problems Mother     No Known Problems Father        Meds/Allergies       Current Outpatient Medications:     aluminum-magnesium hydroxide-simethicone (MAALOX MAX) 400-400-40 MG/5ML suspension    Ascorbic Acid (vitamin C) 1000 MG tablet    baclofen 20 mg tablet    diphenhydrAMINE-acetaminophen (TYLENOL PM)  MG TABS    famotidine (PEPCID) 20 mg tablet    Lidocaine Viscous HCl (XYLOCAINE) 2 % mucosal solution    lisinopril (ZESTRIL) 40 mg tablet    metFORMIN (GLUCOPHAGE) 500 mg tablet    methylPREDNISolone 4 MG tablet therapy pack    pantoprazole (PROTONIX) 40 mg tablet    simvastatin (ZOCOR) 20 mg tablet    tadalafil (CIALIS) 20 MG tablet    tamsulosin (FLOMAX) 0 4 mg  No Known Allergies    Objective     Blood pressure 137/87, pulse 71, temperature 98 9 °F (37 2 °C), temperature source Tympanic, height 6' (1 829 m), weight 114 kg (252 lb), SpO2 99 %  Body mass index is 34 18 kg/m²  PHYSICAL EXAM:      General Appearance:   Well-appearing obese male who is alert, cooperative, no distress   HEENT:   Normocephalic, atraumatic, anicteric   Neck:  Supple, symmetrical, trachea midline   Lungs:   Clear to auscultation bilaterally; no rales, rhonchi or wheezing; respirations unlabored    Heart:   Regular rate and rhythm; no murmur, rub, or gallop     Abdomen:   Soft, nontender on palpation   Genitalia:   Deferred    Rectal:   Deferred    Extremities:  No cyanosis, clubbing or edema    Pulses:  2+ and symmetric    Skin:  No jaundice, rashes, or lesions    Lymph nodes:  No palpable cervical lymphadenopathy        Lab Results:   No visits with results within 1 Day(s) from this visit     Latest known visit with results is:   Admission on 03/14/2022, Discharged on 03/14/2022   Component Date Value    WBC 03/14/2022 9 52     RBC 03/14/2022 4 84     Hemoglobin 03/14/2022 14 3     Hematocrit 03/14/2022 44 6     MCV 03/14/2022 92     MCH 03/14/2022 29 5     MCHC 03/14/2022 32 1     RDW 03/14/2022 12 3     MPV 03/14/2022 9 9     Platelets 42/80/3170 231     nRBC 03/14/2022 0     Neutrophils Relative 03/14/2022 62     Immat GRANS % 03/14/2022 1     Lymphocytes Relative 03/14/2022 23     Monocytes Relative 03/14/2022 11     Eosinophils Relative 03/14/2022 2     Basophils Relative 03/14/2022 1     Neutrophils Absolute 03/14/2022 5 98     Immature Grans Absolute 03/14/2022 0 09     Lymphocytes Absolute 03/14/2022 2 22     Monocytes Absolute 03/14/2022 1 01     Eosinophils Absolute 03/14/2022 0 16     Basophils Absolute 03/14/2022 0 06     Sodium 03/14/2022 136     Potassium 03/14/2022 4 1     Chloride 03/14/2022 106     CO2 03/14/2022 24     ANION GAP 03/14/2022 6     BUN 03/14/2022 13     Creatinine 03/14/2022 0 80     Glucose 03/14/2022 128     Calcium 03/14/2022 9 1     AST 03/14/2022 16     ALT 03/14/2022 34     Alkaline Phosphatase 03/14/2022 65     Total Protein 03/14/2022 7 2     Albumin 03/14/2022 3 7     Total Bilirubin 03/14/2022 0 44     eGFR 03/14/2022 102     hs TnI 0hr 03/14/2022 5     hs TnI 2hr 03/14/2022 4     Delta 2hr hsTnI 03/14/2022 -1     Ventricular Rate 03/14/2022 65     Atrial Rate 03/14/2022 65     DE Interval 03/14/2022 164     QRSD Interval 03/14/2022 98     QT Interval 03/14/2022 410     QTC Interval 03/14/2022 426     P Axis 03/14/2022 20     QRS Axis 03/14/2022 -7     T Wave Axis 03/14/2022 51     Ventricular Rate 03/14/2022 64     Atrial Rate 03/14/2022 64     DE Interval 03/14/2022 168     QRSD Interval 03/14/2022 98     QT Interval 03/14/2022 422     QTC Interval 03/14/2022 435     P Axis 03/14/2022 0     QRS Axis 03/14/2022 -15     T Wave Axis 03/14/2022 53     Ventricular Rate 03/14/2022 62     Atrial Rate 03/14/2022 62     IL Interval 03/14/2022 192     QRSD Interval 03/14/2022 96     QT Interval 03/14/2022 410     QTC Interval 03/14/2022 416     P Axis 03/14/2022 55     QRS Axis 03/14/2022 -13     T Wave Axis 03/14/2022 64     Ventricular Rate 03/14/2022 92     Atrial Rate 03/14/2022 87     QRSD Interval 03/14/2022 154     QT Interval 03/14/2022 408     QTC Interval 03/14/2022 504     QRS Axis 03/14/2022 -71     T Wave Axis 03/14/2022 77        Radiology Results:   XR shoulder 2+ views LEFT    Result Date: 3/14/2022  Narrative: LEFT SHOULDER INDICATION:   severe left shoulder pain  COMPARISON:  None VIEWS:  XR SHOULDER 2+ VW LEFT Images: 3 FINDINGS: There is no acute fracture or dislocation  No significant degenerative changes  No lytic or blastic osseous lesion  Soft tissues are unremarkable  Impression: No acute osseous abnormality  Workstation performed: [id number]     CTA dissection protocol chest/abdomen/pelvis    Result Date: 3/14/2022  Narrative: CTA - CHEST, ABDOMEN AND PELVIS - WITHOUT AND WITH IV CONTRAST INDICATION:   aortic aneurysm hx, severe tearing chest pain going to back  COMPARISON:  CT chest/abdomen/pelvis 9/30/2019 and CT abdomen/pelvis 12/13/2021  TECHNIQUE: CT examination of the chest, abdomen and pelvis was performed both prior to and after the administration of intravenous contrast   The noncontrast portion of this examination was performed utilizing low radiation dose technique  Thin section angiographic arterial phase post contrast technique was used in order to evaluate for aortic dissection  3D reformatted images and volume rendering were performed on an independent workstation    Additionally, axial, sagittal, and coronal 2D reformatted images were created from the source data and submitted for interpretation  Radiation dose length product (DLP) for this visit:  2957 18 mGy-cm   This examination, like all CT scans performed in the [facility], was performed utilizing techniques to minimize radiation dose exposure, including the use of iterative reconstruction and automated exposure control  IV Contrast:  100 mL of iohexol (OMNIPAQUE) Enteric Contrast:  Enteric contrast was not administered  FINDINGS: AORTA:  There is no aortic dissection or intramural hematoma  Ectasia of the ascending thoracic aorta measuring 4 0 cm unchanged  CHEST LUNGS:  Stable right lower lobe nodule along the fissure measuring 8 mm on image 4/80  There is no tracheal or endobronchial lesion  PLEURA:  Unremarkable  HEART/PULMONARY ARTERIAL TREE:  Unremarkable for patient's age  MEDIASTINUM AND SHERRY:  No adenopathy  Severe mid to distal esophageal wall thickening noted  CHEST WALL AND LOWER NECK:   Unremarkable  ABDOMEN LIVER/BILIARY TREE:  Liver is diffusely decreased in density consistent with fatty change  No CT evidence of suspicious hepatic mass  Normal hepatic contours  No biliary dilatation  GALLBLADDER:  No calcified gallstones  No pericholecystic inflammatory change  SPLEEN:  Unremarkable  PANCREAS:  Unremarkable  ADRENAL GLANDS:  Unremarkable  KIDNEYS/URETERS:  Unremarkable  No hydronephrosis  STOMACH AND BOWEL:  Unremarkable  APPENDIX:  Removed  ABDOMINOPELVIC CAVITY:  No ascites or free intraperitoneal air  No lymphadenopathy  PELVIS REPRODUCTIVE ORGANS:  Unremarkable for patient's age  URINARY BLADDER:  Unremarkable  ABDOMINAL WALL/INGUINAL REGIONS:  Fat-containing left inguinal hernia unchanged  OSSEOUS STRUCTURES:  No acute fracture or destructive osseous lesion  Stable bilateral L5 spondylolysis without spondylolisthesis  Impression: No aortic dissection  Stable ectasia of the ascending thoracic aorta measuring 4 0 cm   Severe thickening of the esophagus in the mid to distal portions  Evaluate for possible esophagitis  Stable 8 mm nodule in the right lower lobe can be considered benign  Fatty liver   Workstation performed: [id number]       ---------------------------------------------------  Note Electronically Signed By:    MD Cheryl Mckoy 72 Gastroenterology Fellow PGY-6  PI #: [id number]

## 2022-03-16 NOTE — PATIENT INSTRUCTIONS
Scheduled date of EGD(as of today):  3/24/22  Physician performing EGD:  Dr Ofe Quinn  Location of EGD:  [facility]  Instructions reviewed with patient by:  Mike Hyde  Clearances: n/a

## 2022-03-18 ENCOUNTER — CONSULT (OUTPATIENT)
Dept: CARDIOLOGY CLINIC | Facility: CLINIC | Age: 52
End: 2022-03-18
Payer: COMMERCIAL

## 2022-03-18 VITALS
BODY MASS INDEX: 35.1 KG/M2 | WEIGHT: 258.8 LBS | HEART RATE: 65 BPM | DIASTOLIC BLOOD PRESSURE: 82 MMHG | SYSTOLIC BLOOD PRESSURE: 124 MMHG

## 2022-03-18 DIAGNOSIS — I10 HYPERTENSION, ESSENTIAL: Primary | ICD-10-CM

## 2022-03-18 DIAGNOSIS — R07.9 CHEST PAIN, UNSPECIFIED TYPE: ICD-10-CM

## 2022-03-18 PROCEDURE — 99204 OFFICE O/P NEW MOD 45 MIN: CPT | Performed by: INTERNAL MEDICINE

## 2022-03-18 PROCEDURE — 93000 ELECTROCARDIOGRAM COMPLETE: CPT | Performed by: INTERNAL MEDICINE

## 2022-03-23 ENCOUNTER — HOSPITAL ENCOUNTER (OUTPATIENT)
Dept: RADIOLOGY | Facility: HOSPITAL | Age: 52
Discharge: HOME | End: 2022-03-23
Payer: COMMERCIAL

## 2022-03-23 ENCOUNTER — ANESTHESIA (OUTPATIENT)
Dept: ANESTHESIOLOGY | Facility: HOSPITAL | Age: 52
End: 2022-03-23

## 2022-03-23 ENCOUNTER — ANESTHESIA EVENT (OUTPATIENT)
Dept: ANESTHESIOLOGY | Facility: HOSPITAL | Age: 52
End: 2022-03-23

## 2022-03-23 DIAGNOSIS — K22.0 ACHALASIA: ICD-10-CM

## 2022-03-23 DIAGNOSIS — K20.90 ESOPHAGITIS: ICD-10-CM

## 2022-03-23 PROCEDURE — 74220 X-RAY XM ESOPHAGUS 1CNTRST: CPT

## 2022-03-23 RX ORDER — LIDOCAINE HYDROCHLORIDE 10 MG/ML
0.5 INJECTION, SOLUTION EPIDURAL; INFILTRATION; INTRACAUDAL; PERINEURAL ONCE AS NEEDED
Status: CANCELLED | OUTPATIENT
Start: 2022-03-23

## 2022-03-23 RX ORDER — SODIUM CHLORIDE, SODIUM LACTATE, POTASSIUM CHLORIDE, CALCIUM CHLORIDE 600; 310; 30; 20 MG/100ML; MG/100ML; MG/100ML; MG/100ML
125 INJECTION, SOLUTION INTRAVENOUS CONTINUOUS
Status: CANCELLED | OUTPATIENT
Start: 2022-03-23

## 2022-03-23 NOTE — ANESTHESIA PREPROCEDURE EVALUATION
Procedure:  PRE-OP ONLY    Relevant Problems   CARDIO   (+) Hypertension, essential   (+) Other hyperlipidemia      MUSCULOSKELETAL   (+) Cervical spondylosis without myelopathy      NEURO/PSYCH   (+) Acute nonintractable headache      Other   (+) COVID-19 virus infection   (+) Diabetes mellitus (HCC)   (+) Esophagitis   (+) S/P cervical spinal fusion   S/P Heller Myotomy with Achalasia Hx  Ascending Aortic Aneurysm  3/18/22  As per Dr Crys Umana  Will continue his present medical regimen  His EKG is abnormal but stable compared to a prior tracing  There is concern about angina  Will check a stress echo to exclude severe obstructive coronary disease  Will also check a standard echo and will visualized the ascending aorta  He is undergoing GI investigation in reference to his reflux with prior history of esophageal surgery performed here about 10 years ago  He does feel better with GI medication  I have asked him to call if there is a problem in the interim otherwise I will see him in follow-up in three months time  Anesthesia Plan  ASA Score- 3     Anesthesia Type- IV sedation with anesthesia with ASA Monitors  Additional Monitors:   Airway Plan:           Plan Factors-    Chart reviewed  EKG reviewed  Imaging results reviewed  Existing labs reviewed  Patient summary reviewed  Patient is not a current smoker  Patient did not smoke on day of surgery  Induction- intravenous  Postoperative Plan-     Informed Consent- Anesthetic plan and risks discussed with patient  I personally reviewed this patient with the CRNA  Discussed and agreed on the Anesthesia Plan with the CRNA  Riki Fong

## 2022-03-24 ENCOUNTER — RA CDI HCC (OUTPATIENT)
Dept: OTHER | Facility: HOSPITAL | Age: 52
End: 2022-03-24

## 2022-03-24 ENCOUNTER — ANESTHESIA (OUTPATIENT)
Dept: GASTROENTEROLOGY | Facility: AMBULARY SURGERY CENTER | Age: 52
End: 2022-03-24

## 2022-03-24 ENCOUNTER — ANESTHESIA EVENT (OUTPATIENT)
Dept: GASTROENTEROLOGY | Facility: AMBULARY SURGERY CENTER | Age: 52
End: 2022-03-24

## 2022-03-24 ENCOUNTER — HOSPITAL ENCOUNTER (OUTPATIENT)
Dept: GASTROENTEROLOGY | Facility: AMBULARY SURGERY CENTER | Age: 52
Setting detail: OUTPATIENT SURGERY
Discharge: HOME | End: 2022-03-24
Admitting: INTERNAL MEDICINE
Payer: COMMERCIAL

## 2022-03-24 VITALS
WEIGHT: 259 LBS | OXYGEN SATURATION: 97 % | SYSTOLIC BLOOD PRESSURE: 116 MMHG | DIASTOLIC BLOOD PRESSURE: 70 MMHG | TEMPERATURE: 98 F | HEART RATE: 63 BPM | BODY MASS INDEX: 34.33 KG/M2 | HEIGHT: 73 IN | RESPIRATION RATE: 18 BRPM

## 2022-03-24 DIAGNOSIS — K22.0 ACHALASIA: ICD-10-CM

## 2022-03-24 DIAGNOSIS — K20.90 ESOPHAGITIS: ICD-10-CM

## 2022-03-24 LAB — GLUCOSE SERPL-MCNC: 150 MG/DL (ref 65–140)

## 2022-03-24 PROCEDURE — 43239 EGD BIOPSY SINGLE/MULTIPLE: CPT | Performed by: INTERNAL MEDICINE

## 2022-03-24 PROCEDURE — 82948 REAGENT STRIP/BLOOD GLUCOSE: CPT

## 2022-03-24 PROCEDURE — 88305 TISSUE EXAM BY PATHOLOGIST: CPT | Performed by: PATHOLOGY

## 2022-03-24 RX ORDER — OMEGA-3-ACID ETHYL ESTERS 1 G/1
1 CAPSULE, LIQUID FILLED ORAL DAILY
COMMUNITY

## 2022-03-24 RX ORDER — LIDOCAINE HYDROCHLORIDE 10 MG/ML
0.5 INJECTION, SOLUTION EPIDURAL; INFILTRATION; INTRACAUDAL; PERINEURAL ONCE AS NEEDED
Status: DISCONTINUED | OUTPATIENT
Start: 2022-03-24 | End: 2022-03-28 | Stop reason: HOSPADM

## 2022-03-24 RX ORDER — LIDOCAINE HYDROCHLORIDE 20 MG/ML
INJECTION, SOLUTION EPIDURAL; INFILTRATION; INTRACAUDAL; PERINEURAL AS NEEDED
Status: DISCONTINUED | OUTPATIENT
Start: 2022-03-24 | End: 2022-03-24

## 2022-03-24 RX ORDER — ONDANSETRON 2 MG/ML
INJECTION INTRAMUSCULAR; INTRAVENOUS AS NEEDED
Status: DISCONTINUED | OUTPATIENT
Start: 2022-03-24 | End: 2022-03-24

## 2022-03-24 RX ORDER — SODIUM CHLORIDE, SODIUM LACTATE, POTASSIUM CHLORIDE, CALCIUM CHLORIDE 600; 310; 30; 20 MG/100ML; MG/100ML; MG/100ML; MG/100ML
125 INJECTION, SOLUTION INTRAVENOUS CONTINUOUS
Status: DISCONTINUED | OUTPATIENT
Start: 2022-03-24 | End: 2022-03-28 | Stop reason: HOSPADM

## 2022-03-24 RX ORDER — PROPOFOL 10 MG/ML
INJECTION, EMULSION INTRAVENOUS AS NEEDED
Status: DISCONTINUED | OUTPATIENT
Start: 2022-03-24 | End: 2022-03-24

## 2022-03-24 RX ADMIN — PROPOFOL 50 MG: 10 INJECTION, EMULSION INTRAVENOUS at 10:13

## 2022-03-24 RX ADMIN — PROPOFOL 50 MG: 10 INJECTION, EMULSION INTRAVENOUS at 10:10

## 2022-03-24 RX ADMIN — PROPOFOL 50 MG: 10 INJECTION, EMULSION INTRAVENOUS at 10:05

## 2022-03-24 RX ADMIN — SODIUM CHLORIDE, POTASSIUM CHLORIDE, SODIUM LACTATE AND CALCIUM CHLORIDE 125 ML/HR: 600; 310; 30; 20 INJECTION, SOLUTION INTRAVENOUS at 09:18

## 2022-03-24 RX ADMIN — PROPOFOL 30 MG: 10 INJECTION, EMULSION INTRAVENOUS at 10:08

## 2022-03-24 RX ADMIN — PROPOFOL 20 MG: 10 INJECTION, EMULSION INTRAVENOUS at 10:06

## 2022-03-24 RX ADMIN — PROPOFOL 100 MG: 10 INJECTION, EMULSION INTRAVENOUS at 10:03

## 2022-03-24 RX ADMIN — LIDOCAINE HYDROCHLORIDE 100 MG: 20 INJECTION, SOLUTION EPIDURAL; INFILTRATION; INTRACAUDAL at 10:03

## 2022-03-24 RX ADMIN — ONDANSETRON 4 MG: 2 INJECTION INTRAMUSCULAR; INTRAVENOUS at 10:02

## 2022-03-24 NOTE — DISCHARGE INSTRUCTIONS
Upper Endoscopy   WHAT YOU NEED TO KNOW:   An upper endoscopy is also called an upper gastrointestinal (GI) endoscopy, or an esophagogastroduodenoscopy (EGD)  It is a procedure to examine the inside of your esophagus, stomach, and duodenum (first part of the small intestine) with a scope  You may feel bloated, gassy, or have some abdominal discomfort after your procedure  Your throat may be sore for 24 to 36 hours  You may burp or pass gas from air that is still inside your body  DISCHARGE INSTRUCTIONS:   Seek care immediately if:    You have sudden, severe abdominal pain   You have problems swallowing   You have a large amount of black, sticky bowel movements or blood in your bowel movements   You have sudden trouble breathing   You feel weak, lightheaded, or faint or your heart beats faster than normal for you  Contact your healthcare provider if:    You have a fever and chills   You have nausea or are vomiting   Your abdomen is bloated or feels full and hard   You have abdominal pain   You have black, sticky bowel movements or blood in your bowel movements   You have not had a bowel movement for 3 days after your procedure   You have rash or hives   You have questions or concerns about your procedure  Activity:    Do not lift, strain, or run for 24 hours after your procedure   Rest after your procedure  You have been given medicine to relax you  Do not drive or make important decisions until the day after your procedure  Return to your normal activity as directed   Relieve gas and discomfort from bloating by lying on your right side with a heating pad on your abdomen  You may need to take short walks to help the gas move out  Eat small meals until bloating is relieved  Follow up with your healthcare provider as directed: Write down your questions so you remember to ask them during your visits       If you take a blood thinner, please review the specific instructions from your endoscopist about when you should resume it  These can be found in the Recommendation and Your Medication list sections of this After Visit Summary  Gastric Polyps   WHAT YOU NEED TO KNOW:   What are gastric polyps? Gastric polyps are growths that form in the lining of your stomach  They are not cancer, but certain types of polyps can change into cancer  What increases my risk for gastric polyps? · Chronic gastritis caused by NSAIDs use or ulcers    · Long-term use of proton pump inhibitor medicines (used to decrease stomach acid)    · An infection in your stomach caused by H  pylori bacteria    What are the symptoms of gastric polyps? You may have no symptoms  Large polyps may cause any of the following:  · Abdominal pain    · Indigestion    · Vomiting after meals or vomiting blood    · Dark or bloody bowel movements    How are gastric polyps diagnosed? Gastric polyps are usually found during an endoscopy for another reason  All or part of the polyp will be removed  Your healthcare provider may also remove tissue from your stomach  The polyps and tissue are sent to a lab for testing  How are gastric polyps treated? Some types of polyps go away on their own  You may need any of the following for polyps that do not go away:  · Antibiotics  may be given if you have an infection caused by H  pylori bacteria  · Polyp removal  may be needed if they are large, you have symptoms, or abnormal cells are found  Large polyps and abnormal cells increase your risk for cancer  · Surgery  may be used to remove part of your stomach if the polyps cannot be removed and abnormal cells are found  What can I do to manage or prevent gastric polyps? · Wash your hands often to prevent an H  pylori infection  Use soap and warm water  Use an alcohol-based gel if soap and water are not available   Clean your hands before you eat and after you use the bathroom  Clean your hands after you change a baby's diaper  · Ask your healthcare provider about medicines before you take them  NSAIDs, such as ibuprofen, can cause stomach bleeding  Your provider may tell you not to use proton pump inhibitors if you have polyps called fundic polyps  He or she can tell you about other medicines you should not take to prevent new polyps  · Do not smoke  Nicotine and other chemicals in cigarettes and cigars can worsen your symptoms  Ask your provider for information if you currently smoke and need help to quit  E-cigarettes or smokeless tobacco still contain nicotine  Talk to your provider before you use these products  · Do not drink alcohol  Alcohol may worsen your symptoms  Alcohol also increases the risk for cancer of the esophagus or stomach  Ask your provider for information if you currently drink alcohol and need help to quit  When should I seek immediate care? · You have blood in your vomit  · You have dark or bloody bowel movements  · You have severe pain in your abdomen that does not go away after you take medicine  When should I call my doctor? · You have indigestion that does not go away with treatment  · You vomit after meals  · You have questions or concerns about your condition or care  CARE AGREEMENT:   You have the right to help plan your care  Learn about your health condition and how it may be treated  Discuss treatment options with your healthcare providers to decide what care you want to receive  You always have the right to refuse treatment  The above information is an  only  It is not intended as medical advice for individual conditions or treatments  Talk to your doctor, nurse or pharmacist before following any medical regimen to see if it is safe and effective for you    © Copyright [employer] 2022 Information is for End User's use only and may not be sold, redistributed or otherwise used for commercial purposes  All illustrations and images included in CareNotes® are the copyrighted property of [employer] M [employer] Inc  or Alexi Rubio      Gastritis   WHAT YOU NEED TO KNOW:   What is gastritis? Gastritis is inflammation or irritation of the lining of your stomach  What increases my risk for gastritis? · Infection with bacteria, a virus, or a parasite    · NSAIDs, aspirin, or steroid medicine    · Use of tobacco products or alcohol    · Trauma such as an injury to your stomach or intestine    · Autoimmune disorders such as diabetes, thyroid disease, or Crohn disease    · Stress    · Age older than 60 years    · Illegal drugs, such as cocaine    What are the signs and symptoms of gastritis? · Stomach pain, burning, or tenderness when you press on it    · Stomach fullness or tightness    · Nausea or vomiting    · Loss of appetite, or feeling full quickly when you eat    · Bad breath    · Fatigue or feeling more tired than usual    · Heartburn    How is gastritis diagnosed? Your healthcare provider will ask about your signs and symptoms and examine you  You may need any of the following:  · Blood tests  may be used to show an infection, dehydration, or anemia (low red blood cell levels)  · A bowel movement sample  may be tested for blood or the germ that may be causing your gastritis  · A breath test  may show if H pylori is causing your gastritis  You will be given a liquid to drink  Then you will breathe into a bag  Your healthcare provider will measure the amount of carbon dioxide in your breath  Extra amounts of carbon dioxide may mean you have an H pylori infection  · An endoscopy  may be used to look for irritation or bleeding in your stomach  Your healthcare provider will use an endoscope (tube with a light and camera on the end) during the procedure  He or she may take a sample from your stomach to be tested  How is gastritis treated? Your symptoms may go away without treatment  Treatment will depend on what is causing your gastritis  Your healthcare provider may recommend changes to the medicines you take  Medicines may be given to help treat a bacterial infection or decrease stomach acid  How can I manage or prevent gastritis? · Do not smoke  Nicotine and other chemicals in cigarettes and cigars can make your symptoms worse and cause lung damage  Ask your healthcare provider for information if you currently smoke and need help to quit  E-cigarettes or smokeless tobacco still contain nicotine  Talk to your healthcare provider before you use these products  · Do not drink alcohol  Alcohol can prevent healing and make your gastritis worse  Talk to your healthcare provider if you need help to stop drinking  · Do not take NSAIDs or aspirin unless directed  These and similar medicines can cause irritation of your stomach lining  If your healthcare provider says it is okay to take NSAIDs, take them with food  · Do not eat foods that cause irritation  Foods such as oranges and salsa can cause burning or pain  Eat a variety of healthy foods  Examples include fruits (not citrus), vegetables, low-fat dairy products, beans, whole-grain breads, and lean meats and fish  Try to eat small meals, and drink water with your meals  Do not eat for at least 3 hours before you go to bed  · Find ways to relax and decrease stress  Stress can increase stomach acid and make gastritis worse  Activities such as yoga, meditation, or listening to music can help you relax  Spend time with friends, or do things you enjoy  Call 911 for any of the following:   · You develop chest pain or shortness of breath  When should I seek immediate care? · You vomit blood  · You have black or bloody bowel movements  · You have severe stomach or back pain  When should I contact my healthcare provider? · You have a fever  · You have new or worsening symptoms, even after treatment      · You have questions or concerns about your condition or care  CARE AGREEMENT:   You have the right to help plan your care  Learn about your health condition and how it may be treated  Discuss treatment options with your healthcare providers to decide what care you want to receive  You always have the right to refuse treatment  The above information is an  only  It is not intended as medical advice for individual conditions or treatments  Talk to your doctor, nurse or pharmacist before following any medical regimen to see if it is safe and effective for you  © Copyright [employer] 2022 Information is for End User's use only and may not be sold, redistributed or otherwise used for commercial purposes   All illustrations and images included in CareNotes® are the copyrighted property of A D A M , Inc  or [address] [place], [place] [id number] a shelli

## 2022-03-24 NOTE — ANESTHESIA POSTPROCEDURE EVALUATION
Post-Op Assessment Note    CV Status:  Stable    Pain management: adequate     Mental Status:  Alert and awake   Hydration Status:  Euvolemic   PONV Controlled:  Controlled   Airway Patency:  Patent      Post Op Vitals Reviewed: Yes      Staff: CRNA         No complications documented      /67 (03/24/22 1019)    Temp 98 °F (36 7 °C) (03/24/22 1019)    Pulse 72 (03/24/22 1019)   Resp 18 (03/24/22 1019)    SpO2 99 % (03/24/22 1019)

## 2022-03-24 NOTE — INTERVAL H&P NOTE
H&P reviewed  After examining the patient I find no changes in the patients condition since the H&P had been written      Vitals:    03/24/22 0906   BP: 128/84   Pulse: 62   Resp: 18   Temp: (!) 96 6 °F (35 9 °C)   SpO2: 96%

## 2022-03-24 NOTE — PROGRESS NOTES
Please review if the following dx  is applicable to the patient's condition and assess and document, if applicable in next visit on 04/01/2022    E66 01: Morbid (severe) obesity due to excess calories ([address] )     Per CMS/ICD 10 coding guidelines, to be used when BMI > 35 & <40 with one or more comorbidity (DM, HTN, or SIVA)    [address]  coding opportunities          Chart Reviewed number of suggestions sent to Provider: 1     Patients Insurance        Commercial Insurance: [employer]

## 2022-03-24 NOTE — ANESTHESIA PREPROCEDURE EVALUATION
Procedure:  EGD  FBS-150  Relevant Problems   CARDIO   (+) Hypertension, essential   (+) Other hyperlipidemia      MUSCULOSKELETAL   (+) Cervical spondylosis without myelopathy      NEURO/PSYCH   (+) Acute nonintractable headache   S/P Heller Myotomy with Achalasia Hx  Ascending Aortic Aneurysm  3/18/22  As per Dr Guerrero Chimera  Will continue his present medical regimen  His EKG is abnormal but stable compared to a prior tracing  There is concern about angina  Will check a stress echo to exclude severe obstructive coronary disease  Will also check a standard echo and will visualized the ascending aorta  He is undergoing GI investigation in reference to his reflux with prior history of esophageal surgery performed here about 10 years ago  He does feel better with GI medication  I have asked him to call if there is a problem in the interim otherwise I will see him in follow-up in three months time  Anesthesia Plan  ASA Score- 3     Anesthesia Type- IV sedation with anesthesia with ASA Monitors  Additional Monitors:   Airway Plan:           Plan Factors-Exercise tolerance (METS): >4 METS  Chart reviewed  EKG reviewed  Imaging results reviewed  Existing labs reviewed  Patient summary reviewed  Patient is not a current smoker  Patient not instructed to abstain from smoking on day of procedure  Patient did not smoke on day of surgery  Induction- intravenous  Postoperative Plan-     Informed Consent- Anesthetic plan and risks discussed with patient  I personally reviewed this patient with the CRNA  Discussed and agreed on the Anesthesia Plan with the CRNA  Karrie Nissen

## 2022-03-28 ENCOUNTER — PROCEDURE VISIT (OUTPATIENT)
Dept: UROLOGY | Facility: AMBULATORY SURGERY CENTER | Age: 52
End: 2022-03-28
Payer: COMMERCIAL

## 2022-03-28 VITALS
SYSTOLIC BLOOD PRESSURE: 132 MMHG | OXYGEN SATURATION: 97 % | DIASTOLIC BLOOD PRESSURE: 80 MMHG | HEART RATE: 67 BPM | BODY MASS INDEX: 33.8 KG/M2 | WEIGHT: 255 LBS | HEIGHT: 73 IN

## 2022-03-28 DIAGNOSIS — R35.0 BENIGN PROSTATIC HYPERPLASIA WITH URINARY FREQUENCY: Primary | ICD-10-CM

## 2022-03-28 DIAGNOSIS — R35.0 URINARY FREQUENCY: ICD-10-CM

## 2022-03-28 DIAGNOSIS — N40.1 BENIGN PROSTATIC HYPERPLASIA WITH URINARY FREQUENCY: Primary | ICD-10-CM

## 2022-03-28 LAB
POST-VOID RESIDUAL VOLUME, ML POC: 194 ML
SL AMB  POCT GLUCOSE, UA: NORMAL
SL AMB LEUKOCYTE ESTERASE,UA: NORMAL
SL AMB POCT BILIRUBIN,UA: NORMAL
SL AMB POCT BLOOD,UA: NORMAL
SL AMB POCT CLARITY,UA: CLEAR
SL AMB POCT COLOR,UA: YELLOW
SL AMB POCT KETONES,UA: NORMAL
SL AMB POCT NITRITE,UA: NORMAL
SL AMB POCT PH,UA: 6.5
SL AMB POCT SPECIFIC GRAVITY,UA: 1.01
SL AMB POCT URINE PROTEIN: NORMAL
SL AMB POCT UROBILINOGEN: 0.2

## 2022-03-28 PROCEDURE — 51798 US URINE CAPACITY MEASURE: CPT | Performed by: UROLOGY

## 2022-03-28 PROCEDURE — 81002 URINALYSIS NONAUTO W/O SCOPE: CPT | Performed by: UROLOGY

## 2022-03-28 PROCEDURE — 3061F NEG MICROALBUMINURIA REV: CPT | Performed by: UROLOGY

## 2022-03-28 PROCEDURE — 1036F TOBACCO NON-USER: CPT | Performed by: UROLOGY

## 2022-03-28 PROCEDURE — 99214 OFFICE O/P EST MOD 30 MIN: CPT | Performed by: UROLOGY

## 2022-03-28 PROCEDURE — 3008F BODY MASS INDEX DOCD: CPT | Performed by: UROLOGY

## 2022-03-28 PROCEDURE — 52000 CYSTOURETHROSCOPY: CPT | Performed by: UROLOGY

## 2022-03-28 PROCEDURE — 76872 US TRANSRECTAL: CPT | Performed by: UROLOGY

## 2022-03-28 NOTE — PROGRESS NOTES
3/28/2022    Cheikh Poon  1970  [id number]        Assessment  Lower urinary tract symptoms  ED on Cialis  Gross blood from penis    Plan  Discussed findings with the patient  Prostate is not overly enlarged  He does have symptoms  Unclear if they were well managed on tamsulosin therapy  He has a little bit confused about his progress with this  We discussed potential treatments for him based on his prostate size and symptoms  Discussed behavior modification with timed voiding and double voiding  We also discussed tamsulosin therapy  Discussed potential uro lift to provide continually patent channel for voiding  Risks and benefits discussed  After discussion of options, he would like to resume tamsulosin and continue with double voiding  He says he has plenty of tamsulosin left at home  He will follow up for evaluation  Total visit time was 30 minutes of which over 50% was spent on counseling  History of Present Illness  Kara Goldmann is a 45 y o  male who is using tamsulosin intermittently since December 2021  He is unsure if they are good results  He has not taken it recently  He reports nocturia, frequency, feeling of incomplete emptying, hesitancy  Did have 1 occasion of gross blood from the penis  CT scan was normal     Bladder scan  mL  Cystoscopy     Date/Time 3/28/2022 1:48 PM     Performed by  Michael Tolbert MD     Authorized by Michael Tolbert MD          Procedure Details:  Procedure type: cystoscopy    Additional Procedure Details: Patient was prepped with Betadine  2% lidocaine jelly was instilled per urethra  The 16 [place] flexible cystoscope was placed  There is no urethral abnormality or stricture  Prostate is noted to be normal in appearance without significant occlusion  There is no median lobe  Evaluation of bladder revealed normal mucosa  Both ureteral orifices are normal   Retroflexion confirms no median lobe    There is no suspicious mass or lesion  TRANSRECTAL ULTRASOUND   Patient was turned to the left lateral decubitus position  Transrectal ultrasound probe was placed  There was no obvious abnormality noted  Prostate is homogeneous  Approximate prostate volume 23 mL  Review of Systems  Review of Systems   Constitutional: Negative  HENT: Negative  Respiratory: Negative  Cardiovascular: Negative  Gastrointestinal: Negative  Genitourinary:        As per HPI   Musculoskeletal: Negative  Skin: Negative  Neurological: Negative  Hematological: Negative            Past Medical History  Past Medical History:   Diagnosis Date    Achalasia     Aneurysm ([facility] [address] )     aortic    Colon polyp     Diabetes mellitus ([facility] [address] )     Esophageal ulcer     Groin abscess     with I &D    Hyperlipidemia     Hypertension        Past Social History  Past Surgical History:   Procedure Laterality Date    ANTERIOR CERVICAL DISCECTOMY W/ FUSION      APPENDECTOMY      BICEPS TENDON REPAIR      EGD AND COLONOSCOPY      FL INJECTION LEFT WRIST (ARTHROGRAM)  11/9/2021    MENISCECTOMY      MYOTOMY HELLER LAPAROSCOPIC      ORTHOPEDIC SURGERY         Past Family History  Family History   Problem Relation Age of Onset    No Known Problems Mother     No Known Problems Father        Past Social history  Social History     Socioeconomic History    Marital status: /Civil Union     Spouse name: Not on file    Number of children: Not on file    Years of education: Not on file    Highest education level: Not on file   Occupational History    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   Tobacco Use    Smoking status: Never Smoker    Smokeless tobacco: Never Used   Vaping Use    Vaping Use: Never used   Substance and Sexual Activity    Alcohol use: Not Currently    Drug use: Never    Sexual activity: Yes     Partners: Female   Other Topics Concern    Not on file   Social History Narrative    Lives with spouse     Social Determinants of Health     Financial Resource Strain: Not on file   Food Insecurity: Not on file   Transportation Needs: Not on file   Physical Activity: Not on file   Stress: Not on file   Social Connections: Not on file   Intimate Partner Violence: Not on file   Housing Stability: Not on file     Social History     Tobacco Use   Smoking Status Never Smoker   Smokeless Tobacco Never Used       Current Medications  Current Outpatient Medications   Medication Sig Dispense Refill    aluminum-magnesium hydroxide-simethicone (MAALOX MAX) 400-400-40 MG/5ML suspension Take 10 mL by mouth every 6 (six) hours as needed for indigestion or heartburn 355 mL 0    Ascorbic Acid (vitamin C) 1000 MG tablet Take 1,000 mg by mouth daily        baclofen 20 mg tablet Take 1 tablet (20 mg total) by mouth 3 (three) times a day (Patient taking differently: Take 20 mg by mouth in the morning Taking 1 before sleep ) 90 tablet 2    diphenhydrAMINE-acetaminophen (TYLENOL PM)  MG TABS Take 1 tablet by mouth daily at bedtime as needed for sleep       famotidine (PEPCID) 20 mg tablet Take 1 tablet (20 mg total) by mouth 2 (two) times a day 30 tablet 1    Lidocaine Viscous HCl (XYLOCAINE) 2 % mucosal solution Take 15 mL by mouth 4 (four) times a day as needed (Chest discomfort) 100 mL 0    lisinopril (ZESTRIL) 40 mg tablet Take 1 tablet (40 mg total) by mouth daily 90 tablet 1    metFORMIN (GLUCOPHAGE) 500 mg tablet Pt takes 1500 mg (3 tablets) by mouth in the morning, and a 1000 mg (2 tablets) in the evening 450 tablet 1    omega-3-acid ethyl esters (LOVAZA) 1 g capsule Take 1 g by mouth daily      pantoprazole (PROTONIX) 40 mg tablet Take 1 tablet (40 mg total) by mouth daily 30 tablet 3    simvastatin (ZOCOR) 20 mg tablet Take 1 tablet (20 mg total) by mouth daily 90 tablet 1    tadalafil (CIALIS) 20 MG tablet Take 1 tablet (20 mg total) by mouth as needed for erectile dysfunction 10 tablet 0     No current facility-administered medications for this visit  Allergies  No Known Allergies    Past Medical History, Social History, Family History, medications and allergies were reviewed  Vitals  Vitals:    03/28/22 1312   BP: 132/80   Pulse: 67   SpO2: 97%   Weight: 116 kg (255 lb)   Height: 6' 1" (1 854 m)       Physical Exam  Physical Exam  Vitals reviewed  Constitutional:       Appearance: He is well-developed  HENT:      Head: Normocephalic and atraumatic  Eyes:      Conjunctiva/sclera: Conjunctivae normal    Cardiovascular:      Rate and Rhythm: Normal rate  Pulmonary:      Effort: Pulmonary effort is normal    Abdominal:      Palpations: Abdomen is soft  Genitourinary:     Penis: Normal        Comments: Prostate smooth, about 30 gm  Musculoskeletal:         General: Normal range of motion  Skin:     General: Skin is warm and dry  Neurological:      Mental Status: He is alert and oriented to person, place, and time     Psychiatric:         Mood and Affect: Mood normal            Results  Lab Results   Component Value Date    PSA 0 4 02/08/2022    PSA 0 3 02/12/2021    PSA 0 3 01/16/2015     Lab Results   Component Value Date    GLUCOSE 113 01/16/2015    CALCIUM 9 1 03/14/2022     01/16/2015    K 4 1 03/14/2022    CO2 24 03/14/2022     03/14/2022    BUN 13 03/14/2022    CREATININE 0 80 03/14/2022     Lab Results   Component Value Date    WBC 9 52 03/14/2022    HGB 14 3 03/14/2022    HCT 44 6 03/14/2022    MCV 92 03/14/2022     03/14/2022

## 2022-04-01 ENCOUNTER — OFFICE VISIT (OUTPATIENT)
Dept: INTERNAL MEDICINE CLINIC | Facility: CLINIC | Age: 52
End: 2022-04-01
Payer: COMMERCIAL

## 2022-04-01 VITALS
HEART RATE: 73 BPM | BODY MASS INDEX: 36.21 KG/M2 | TEMPERATURE: 98 F | WEIGHT: 273.2 LBS | DIASTOLIC BLOOD PRESSURE: 74 MMHG | SYSTOLIC BLOOD PRESSURE: 128 MMHG | HEIGHT: 73 IN | OXYGEN SATURATION: 96 %

## 2022-04-01 DIAGNOSIS — K20.90 ESOPHAGITIS: ICD-10-CM

## 2022-04-01 PROCEDURE — 3008F BODY MASS INDEX DOCD: CPT | Performed by: NURSE PRACTITIONER

## 2022-04-01 PROCEDURE — 3074F SYST BP LT 130 MM HG: CPT | Performed by: NURSE PRACTITIONER

## 2022-04-01 PROCEDURE — 1036F TOBACCO NON-USER: CPT | Performed by: NURSE PRACTITIONER

## 2022-04-01 PROCEDURE — 99213 OFFICE O/P EST LOW 20 MIN: CPT | Performed by: NURSE PRACTITIONER

## 2022-04-01 PROCEDURE — 3078F DIAST BP <80 MM HG: CPT | Performed by: NURSE PRACTITIONER

## 2022-04-01 RX ORDER — ALUMINA, MAGNESIA, AND SIMETHICONE 2400; 2400; 240 MG/30ML; MG/30ML; MG/30ML
10 SUSPENSION ORAL EVERY 6 HOURS PRN
Qty: 355 ML | Refills: 1 | Status: SHIPPED | OUTPATIENT
Start: 2022-04-01

## 2022-04-01 NOTE — PROGRESS NOTES
Assessment/Plan:    Esophagitis  Continue to follow up with GI  Continue current medications  Keep follow-up with Cardiology  Diagnoses and all orders for this visit:    Esophagitis  -     aluminum-magnesium hydroxide-simethicone (MAALOX MAX) 400-400-40 MG/5ML suspension; Take 10 mL by mouth every 6 (six) hours as needed for indigestion or heartburn          Subjective:      Patient ID: Wendi Castro is a 45 y o  male  Patient presents today for 2 week follow-up on Esophagitis  Patient had been seen in the emergency department for pain to his left chest wall, patient had a CTA which had demonstrated severe thickening of the mid to distal esophagus consistent with esophagitis  The patient was prescribed Protonix, Pepcid, viscous lidocaine and Maalox  Patient reports significant relief since starting these medications          Patient had consult with both GI and Cardiology for his symptoms  Both consult notes were reviewed  The following portions of the patient's history were reviewed and updated as appropriate: allergies, current medications, past family history, past medical history, past social history, past surgical history and problem list     Review of Systems   Constitutional: Negative for activity change, appetite change, chills, diaphoresis and fever  HENT: Negative for congestion, ear discharge, ear pain, postnasal drip, rhinorrhea, sinus pressure, sinus pain and sore throat  Eyes: Negative for pain, discharge, itching and visual disturbance  Respiratory: Negative for cough, chest tightness, shortness of breath and wheezing  Cardiovascular: Negative for chest pain, palpitations and leg swelling  Gastrointestinal: Negative for abdominal pain, constipation, diarrhea, nausea and vomiting  Endocrine: Negative for polydipsia, polyphagia and polyuria  Genitourinary: Negative for difficulty urinating, dysuria and urgency     Musculoskeletal: Negative for arthralgias, back pain and neck pain  Skin: Negative for rash and wound  Neurological: Negative for dizziness, weakness, numbness and headaches           Past Medical History:   Diagnosis Date    Achalasia     [facility]neurysm [facility] [address] )     aortic    Colon polyp     Diabetes mellitus ([facility] )     Esophageal ulcer     Groin abscess     with I &D    Hyperlipidemia     Hypertension          Current Outpatient Medications:     aluminum-magnesium hydroxide-simethicone (MAALOX MAX) 400-400-40 MG/5ML suspension, Take 10 mL by mouth every 6 (six) hours as needed for indigestion or heartburn, Disp: 355 mL, Rfl: 1    Ascorbic Acid (vitamin C) 1000 MG tablet, Take 1,000 mg by mouth daily  , Disp: , Rfl:     baclofen 20 mg tablet, Take 1 tablet (20 mg total) by mouth 3 (three) times a day (Patient taking differently: Take 20 mg by mouth in the morning Taking 1 before sleep ), Disp: 90 tablet, Rfl: 2    diphenhydrAMINE-acetaminophen (TYLENOL PM)  MG TABS, Take 1 tablet by mouth daily at bedtime as needed for sleep , Disp: , Rfl:     famotidine (PEPCID) 20 mg tablet, Take 1 tablet (20 mg total) by mouth 2 (two) times a day, Disp: 30 tablet, Rfl: 1    lisinopril (ZESTRIL) 40 mg tablet, Take 1 tablet (40 mg total) by mouth daily, Disp: 90 tablet, Rfl: 1    metFORMIN (GLUCOPHAGE) 500 mg tablet, Pt takes 1500 mg (3 tablets) by mouth in the morning, and a 1000 mg (2 tablets) in the evening, Disp: 450 tablet, Rfl: 1    omega-3-acid ethyl esters (LOVAZA) 1 g capsule, Take 1 g by mouth daily, Disp: , Rfl:     pantoprazole (PROTONIX) 40 mg tablet, Take 1 tablet (40 mg total) by mouth daily, Disp: 30 tablet, Rfl: 3    simvastatin (ZOCOR) 20 mg tablet, Take 1 tablet (20 mg total) by mouth daily, Disp: 90 tablet, Rfl: 1    tadalafil (CIALIS) 20 MG tablet, Take 1 tablet (20 mg total) by mouth as needed for erectile dysfunction, Disp: 10 tablet, Rfl: 0    No Known Allergies    Social History   Past Surgical History:   Procedure Laterality Date  ANTERIOR CERVICAL DISCECTOMY W/ FUSION      APPENDECTOMY      BICEPS TENDON REPAIR      EGD AND COLONOSCOPY      FL INJECTION LEFT WRIST (ARTHROGRAM)  11/9/2021    MENISCECTOMY      MYOTOMY HELLER LAPAROSCOPIC      ORTHOPEDIC SURGERY       Family History   Problem Relation Age of Onset    No Known Problems Mother     No Known Problems Father        Objective:  /74 (BP Location: Left arm, Patient Position: Sitting, Cuff Size: Large)   Pulse 73   Temp 98 °F (36 7 °C) (Temporal)   Ht 6' 1" (1 854 m)   Wt 124 kg (273 lb 3 2 oz) Comment: w coat & work boots  SpO2 96% Comment: ra  BMI 36 04 kg/m²     Recent Results (from the past 1344 hour(s))   CBC and differential    Collection Time: 02/08/22  9:12 AM   Result Value Ref Range    WBC 6 02 4 31 - 10 16 Thousand/uL    RBC 4 89 3 88 - 5 62 Million/uL    Hemoglobin 14 4 12 0 - 17 0 g/dL    Hematocrit 43 6 36 5 - 49 3 %    MCV 89 82 - 98 fL    MCH 29 4 26 8 - 34 3 pg    MCHC 33 0 31 4 - 37 4 g/dL    RDW 12 0 11 6 - 15 1 %    MPV 10 1 8 9 - 12 7 fL    Platelets 471 143 - 192 Thousands/uL    nRBC 0 /100 WBCs    Neutrophils Relative 51 43 - 75 %    Immat GRANS % 1 0 - 2 %    Lymphocytes Relative 33 14 - 44 %    Monocytes Relative 12 4 - 12 %    Eosinophils Relative 2 0 - 6 %    Basophils Relative 1 0 - 1 %    Neutrophils Absolute 3 15 1 85 - 7 62 Thousands/µL    Immature Grans Absolute 0 04 0 00 - 0 20 Thousand/uL    Lymphocytes Absolute 1 98 0 60 - 4 47 Thousands/µL    Monocytes Absolute 0 70 0 17 - 1 22 Thousand/µL    Eosinophils Absolute 0 11 0 00 - 0 61 Thousand/µL    Basophils Absolute 0 04 0 00 - 0 10 Thousands/µL   Hemoglobin A1C    Collection Time: 02/08/22  9:12 AM   Result Value Ref Range    Hemoglobin A1C 6 7 (H) Normal 3 8-5 6%; PreDiabetic 5 7-6 4%;  Diabetic >=6 5%; Glycemic control for adults with diabetes <7 0% %     mg/dl   Lipid panel    Collection Time: 02/08/22  9:12 AM   Result Value Ref Range    Cholesterol 157 See Comment mg/dL    Triglycerides 124 See Comment mg/dL    HDL, Direct 42 >=40 mg/dL    LDL Calculated 90 0 - 100 mg/dL    Non-HDL-Chol (CHOL-HDL) 115 mg/dl   Comprehensive metabolic panel    Collection Time: 02/08/22  9:12 AM   Result Value Ref Range    Sodium 136 136 - 145 mmol/L    Potassium 4 2 3 5 - 5 3 mmol/L    Chloride 106 100 - 108 mmol/L    CO2 27 21 - 32 mmol/L    ANION GAP 3 (L) 4 - 13 mmol/L    BUN 13 5 - 25 mg/dL    Creatinine 0 85 0 60 - 1 30 mg/dL    Glucose, Fasting 132 (H) 65 - 99 mg/dL    Calcium 9 2 8 3 - 10 1 mg/dL    AST 19 5 - 45 U/L    ALT 35 12 - 78 U/L    Alkaline Phosphatase 67 46 - 116 U/L    Total Protein 7 6 6 4 - 8 2 g/dL    Albumin 4 0 3 5 - 5 0 g/dL    Total Bilirubin 0 55 0 20 - 1 00 mg/dL    eGFR 100 ml/min/1 73sq m   PSA, Total Screen    Collection Time: 02/08/22  9:12 AM   Result Value Ref Range    PSA 0 4 0 0 - 4 0 ng/mL   CBC and differential    Collection Time: 03/14/22  7:46 AM   Result Value Ref Range    WBC 9 52 4 31 - 10 16 Thousand/uL    RBC 4 84 3 88 - 5 62 Million/uL    Hemoglobin 14 3 12 0 - 17 0 g/dL    Hematocrit 44 6 36 5 - 49 3 %    MCV 92 82 - 98 fL    MCH 29 5 26 8 - 34 3 pg    MCHC 32 1 31 4 - 37 4 g/dL    RDW 12 3 11 6 - 15 1 %    MPV 9 9 8 9 - 12 7 fL    Platelets 487 651 - 585 Thousands/uL    nRBC 0 /100 WBCs    Neutrophils Relative 62 43 - 75 %    Immat GRANS % 1 0 - 2 %    Lymphocytes Relative 23 14 - 44 %    Monocytes Relative 11 4 - 12 %    Eosinophils Relative 2 0 - 6 %    Basophils Relative 1 0 - 1 %    Neutrophils Absolute 5 98 1 85 - 7 62 Thousands/µL    Immature Grans Absolute 0 09 0 00 - 0 20 Thousand/uL    Lymphocytes Absolute 2 22 0 60 - 4 47 Thousands/µL    Monocytes Absolute 1 01 0 17 - 1 22 Thousand/µL    Eosinophils Absolute 0 16 0 00 - 0 61 Thousand/µL    Basophils Absolute 0 06 0 00 - 0 10 Thousands/µL   Comprehensive metabolic panel    Collection Time: 03/14/22  7:46 AM   Result Value Ref Range    Sodium 136 136 - 145 mmol/L    Potassium 4 1 3 5 - 5 3 mmol/L    Chloride 106 100 - 108 mmol/L    CO2 24 21 - 32 mmol/L    ANION GAP 6 4 - 13 mmol/L    BUN 13 5 - 25 mg/dL    Creatinine 0 80 0 60 - 1 30 mg/dL    Glucose 128 65 - 140 mg/dL    Calcium 9 1 8 3 - 10 1 mg/dL    AST 16 5 - 45 U/L    ALT 34 12 - 78 U/L    Alkaline Phosphatase 65 46 - 116 U/L    Total Protein 7 2 6 4 - 8 2 g/dL    Albumin 3 7 3 5 - 5 0 g/dL    Total Bilirubin 0 44 0 20 - 1 00 mg/dL    eGFR 102 ml/min/1 73sq m   HS Troponin 0hr (reflex protocol)    Collection Time: 03/14/22  7:46 AM   Result Value Ref Range    hs TnI 0hr 5 "Refer to ACS Flowchart"- see link ng/L   ECG 12 lead    Collection Time: 03/14/22  7:50 AM   Result Value Ref Range    Ventricular Rate 64 BPM    Atrial Rate 64 BPM    PA Interval 168 ms    QRSD Interval 98 ms    QT Interval 422 ms    QTC Interval 435 ms    P Axis 0 degrees    QRS Axis -15 degrees    T Wave [place] 53 degrees   ECG 12 lead    Collection Time: 03/14/22  7:52 AM   Result Value Ref Range    Ventricular Rate 65 BPM    Atrial Rate 65 BPM    PA Interval 164 ms    QRSD Interval 98 ms    QT Interval 410 ms    QTC Interval 426 ms    P [place] 20 degrees    QRS Axis -7 degrees    T Wave Axis 51 degrees   ECG 12 lead    Collection Time: 03/14/22  9:27 AM   Result Value Ref Range    Ventricular Rate 62 BPM    Atrial Rate 62 BPM    PA Interval 192 ms    QRSD Interval 96 ms    QT Interval 410 ms    QTC Interval 416 ms    P Axis 55 degrees    QRS Axis -13 degrees    T Wave [place] 64 degrees   HS Troponin I 2hr    Collection Time: 03/14/22  9:29 AM   Result Value Ref Range    hs TnI 2hr 4 "Refer to ACS Flowchart"- see link ng/L    Delta 2hr hsTnI -1 <20 ng/L   ECG 12 lead    Collection Time: 03/14/22 10:04 AM   Result Value Ref Range    Ventricular Rate 92 BPM    Atrial Rate 87 BPM    PA Interval  ms    QRSD Interval 154 ms    QT Interval 408 ms    QTC Interval 504 ms    P Axis  degrees    QRS Axis -69 degrees    T Wave Axis 77 degrees   Fingerstick Glucose (POCT)    Collection Time: 03/24/22  9:12 AM   Result Value Ref Range    POC Glucose 150 (H) 65 - 140 mg/dl   POCT urine dip    Collection Time: 03/28/22  1:16 PM   Result Value Ref Range    LEUKOCYTE ESTERASE,UA -     NITRITE,UA -     SL AMB POCT UROBILINOGEN 0 2     POCT URINE PROTEIN -      PH,UA 6 5     BLOOD,UA -     SPECIFIC GRAVITY,UA 1 015     KETONES,UA -     BILIRUBIN,UA -     GLUCOSE, UA -      COLOR,UA yellow     CLARITY,UA clear    POCT Measure PVR    Collection Time: 03/28/22  1:17 PM   Result Value Ref Range    POST-VOID RESIDUAL VOLUME, ML  mL            Physical Exam  Constitutional:       General: He is not in acute distress  Appearance: He is well-developed  He is obese  He is not diaphoretic  HENT:      Head: Normocephalic and atraumatic  Right Ear: External ear normal       Left Ear: External ear normal       Nose: Nose normal       Mouth/Throat:      Pharynx: No oropharyngeal exudate  Eyes:      General:         Right eye: No discharge  Left eye: No discharge  Conjunctiva/sclera: Conjunctivae normal       Pupils: Pupils are equal, round, and reactive to light  Neck:      Thyroid: No thyromegaly  Cardiovascular:      Rate and Rhythm: Normal rate and regular rhythm  Heart sounds: Normal heart sounds  No murmur heard  No friction rub  No gallop  Pulmonary:      Effort: Pulmonary effort is normal  No respiratory distress  Breath sounds: Normal breath sounds  No stridor  No wheezing or rales  Abdominal:      General: Bowel sounds are normal  There is no distension  Palpations: Abdomen is soft  Tenderness: There is no abdominal tenderness  Musculoskeletal:      Cervical back: Normal range of motion and neck supple  Lymphadenopathy:      Cervical: No cervical adenopathy  Skin:     General: Skin is warm and dry  Findings: No erythema or rash  Neurological:      Mental Status: He is alert and oriented to person, place, and time     Psychiatric: Behavior: Behavior normal          Thought Content:  Thought content normal          Judgment: Judgment normal

## 2022-04-12 ENCOUNTER — HOSPITAL ENCOUNTER (OUTPATIENT)
Dept: NON INVASIVE DIAGNOSTICS | Age: 52
Discharge: HOME | End: 2022-04-12
Payer: COMMERCIAL

## 2022-04-12 VITALS
DIASTOLIC BLOOD PRESSURE: 74 MMHG | BODY MASS INDEX: 36.18 KG/M2 | WEIGHT: 273 LBS | SYSTOLIC BLOOD PRESSURE: 128 MMHG | HEIGHT: 73 IN

## 2022-04-12 DIAGNOSIS — R07.9 CHEST PAIN, UNSPECIFIED TYPE: ICD-10-CM

## 2022-04-12 DIAGNOSIS — I10 HYPERTENSION, ESSENTIAL: ICD-10-CM

## 2022-04-12 LAB
AORTIC ROOT: 3.5 CM
APICAL FOUR CHAMBER EJECTION FRACTION: 61 %
ASCENDING AORTA: 4.1 CM (ref 2.3–3.44)
E WAVE DECELERATION TIME: 186 MS
E/A RATIO: 1.05
FRACTIONAL SHORTENING: 36 (ref 28–44)
INTERVENTRICULAR SEPTUM IN DIASTOLE (PARASTERNAL SHORT AXIS VIEW): 1.2 CM
INTERVENTRICULAR SEPTUM: 1.2 CM (ref 0.59–1.1)
LAAS-AP2: 28.2 CM2
LAAS-AP4: 19.8 CM2
LEFT ATRIUM AREA SYSTOLE SINGLE PLANE A4C: 20.4 CM2
LEFT ATRIUM SIZE: 3.9 CM
LEFT ATRIUM VOLUME INDEX (MOD BIPLANE): 15.9
LEFT INTERNAL DIMENSION IN SYSTOLE: 2.8 CM (ref 8.08–12.25)
LEFT VENTRICLE DIASTOLIC VOLUME (MOD BIPLANE): 208 ML (ref 130.11–293.02)
LEFT VENTRICLE SYSTOLIC VOLUME (MOD BIPLANE): 89 ML
LEFT VENTRICULAR INTERNAL DIMENSION IN DIASTOLE: 4.4 CM (ref 13.83–20.63)
LEFT VENTRICULAR POSTERIOR WALL IN END DIASTOLE: 1.2 CM (ref 0.57–1.09)
LEFT VENTRICULAR STROKE VOLUME: 58 ML
LV EF: 57 %
LVSV (TEICH): 58 ML
MAX HR PERCENT: 91 %
MV E'TISSUE VEL-SEP: 9 CM/S
MV PEAK A VEL: 0.74 M/S
MV PEAK E VEL: 78 CM/S
MV STENOSIS PRESSURE HALF TIME: 54 MS
MV VALVE AREA P 1/2 METHOD: 4.1
RATE PRESSURE PRODUCT: NORMAL
RIGHT ATRIUM AREA SYSTOLE A4C: 16 CM2
RIGHT VENTRICLE ID DIMENSION: 3.7 CM
SL CV LEFT ATRIUM LENGTH A2C: 6.5 CM
SL CV LV DIAS VOL ENDO Z SCORE: -0.09
SL CV LV EF: 60
SL CV LV EF: 60
SL CV PED ECHO LEFT VENTRICLE DIASTOLIC VOLUME (MOD BIPLANE) 2D: 89 ML
SL CV PED ECHO LEFT VENTRICLE SYSTOLIC VOLUME (MOD BIPLANE) 2D: 30 ML
SL CV STRESS STAGE REACHED: 4
STRESS ANGINA INDEX: 0
STRESS BASELINE HR: 75 BPM
STRESS PEAK HR: 153 BPM
STRESS POST ESTIMATED WORKLOAD: 12.9 METS
STRESS POST EXERCISE DUR MIN: 9 MIN
STRESS POST EXERCISE DUR SEC: 51 SEC
STRESS POST PEAK BP: 180 MMHG
Z-SCORE OF ASCENDING AORTA: 4.26 CM
Z-SCORE OF INTERVENTRICULAR SEPTUM IN END DIASTOLE: 2.7
Z-SCORE OF LEFT VENTRICULAR DIMENSION IN END DIASTOLE: -13.45
Z-SCORE OF LEFT VENTRICULAR DIMENSION IN END SYSTOLE: -10.02
Z-SCORE OF LEFT VENTRICULAR POSTERIOR WALL IN END DIASTOLE: 2.81

## 2022-04-12 PROCEDURE — 93306 TTE W/DOPPLER COMPLETE: CPT | Performed by: INTERNAL MEDICINE

## 2022-04-12 PROCEDURE — 93350 STRESS TTE ONLY: CPT

## 2022-04-12 PROCEDURE — 93306 TTE W/DOPPLER COMPLETE: CPT

## 2022-04-12 PROCEDURE — 93351 STRESS TTE COMPLETE: CPT | Performed by: INTERNAL MEDICINE

## 2022-04-19 DIAGNOSIS — N40.1 BENIGN PROSTATIC HYPERPLASIA WITH URINARY FREQUENCY: ICD-10-CM

## 2022-04-19 DIAGNOSIS — R35.0 BENIGN PROSTATIC HYPERPLASIA WITH URINARY FREQUENCY: ICD-10-CM

## 2022-04-19 LAB
CHEST PAIN STATEMENT: NORMAL
MAX DIASTOLIC BP: 96 MMHG
MAX HEART RATE: 153 BPM
MAX PREDICTED HEART RATE: 168 BPM
MAX. SYSTOLIC BP: 180 MMHG
PROTOCOL NAME: NORMAL
REASON FOR TERMINATION: NORMAL
TARGET HR FORMULA: NORMAL
TEST INDICATION: NORMAL
TIME IN EXERCISE PHASE: NORMAL

## 2022-04-19 RX ORDER — TAMSULOSIN HYDROCHLORIDE 0.4 MG/1
CAPSULE ORAL
Qty: 30 CAPSULE | Refills: 3 | Status: SHIPPED | OUTPATIENT
Start: 2022-04-19

## 2022-05-06 DIAGNOSIS — K20.90 ESOPHAGITIS: ICD-10-CM

## 2022-05-06 RX ORDER — FAMOTIDINE 20 MG/1
20 TABLET, FILM COATED ORAL 2 TIMES DAILY
Qty: 90 TABLET | Refills: 0 | Status: SHIPPED | OUTPATIENT
Start: 2022-05-06

## 2022-05-06 RX ORDER — ALUMINA, MAGNESIA, AND SIMETHICONE 2400; 2400; 240 MG/30ML; MG/30ML; MG/30ML
10 SUSPENSION ORAL EVERY 6 HOURS PRN
Qty: 355 ML | Refills: 0 | Status: CANCELLED | OUTPATIENT
Start: 2022-05-06

## 2022-05-09 ENCOUNTER — TELEPHONE (OUTPATIENT)
Dept: NEUROLOGY | Facility: CLINIC | Age: 52
End: 2022-05-09

## 2022-05-09 NOTE — TELEPHONE ENCOUNTER
THE [facility] to confirm patient's 5/12/2022 @ 1 pm appointment with Dr Catarina Padilla and to provide new office address for that appointment of [address] [place] [address],[address], [place]  Call back number given [phone]

## 2022-05-10 ENCOUNTER — OFFICE VISIT (OUTPATIENT)
Dept: PODIATRY | Facility: CLINIC | Age: 52
End: 2022-05-10
Payer: COMMERCIAL

## 2022-05-10 ENCOUNTER — OFFICE VISIT (OUTPATIENT)
Dept: UROLOGY | Facility: AMBULATORY SURGERY CENTER | Age: 52
End: 2022-05-10
Payer: COMMERCIAL

## 2022-05-10 VITALS
BODY MASS INDEX: 36.45 KG/M2 | DIASTOLIC BLOOD PRESSURE: 86 MMHG | HEIGHT: 73 IN | HEART RATE: 66 BPM | SYSTOLIC BLOOD PRESSURE: 143 MMHG | WEIGHT: 275 LBS

## 2022-05-10 VITALS
DIASTOLIC BLOOD PRESSURE: 84 MMHG | HEART RATE: 74 BPM | SYSTOLIC BLOOD PRESSURE: 144 MMHG | HEIGHT: 73 IN | WEIGHT: 273 LBS | BODY MASS INDEX: 36.18 KG/M2

## 2022-05-10 DIAGNOSIS — N40.1 BENIGN PROSTATIC HYPERPLASIA WITH URINARY FREQUENCY: Primary | ICD-10-CM

## 2022-05-10 DIAGNOSIS — Z12.5 SCREENING FOR PROSTATE CANCER: ICD-10-CM

## 2022-05-10 DIAGNOSIS — R35.0 BENIGN PROSTATIC HYPERPLASIA WITH URINARY FREQUENCY: Primary | ICD-10-CM

## 2022-05-10 DIAGNOSIS — E11.9 CONTROLLED TYPE 2 DIABETES MELLITUS WITHOUT COMPLICATION, WITHOUT LONG-TERM CURRENT USE OF INSULIN (HCC): Primary | ICD-10-CM

## 2022-05-10 LAB — POST-VOID RESIDUAL VOLUME, ML POC: 19 ML

## 2022-05-10 PROCEDURE — 51798 US URINE CAPACITY MEASURE: CPT | Performed by: NURSE PRACTITIONER

## 2022-05-10 PROCEDURE — 3079F DIAST BP 80-89 MM HG: CPT | Performed by: PODIATRIST

## 2022-05-10 PROCEDURE — 99213 OFFICE O/P EST LOW 20 MIN: CPT | Performed by: NURSE PRACTITIONER

## 2022-05-10 PROCEDURE — 99203 OFFICE O/P NEW LOW 30 MIN: CPT | Performed by: PODIATRIST

## 2022-05-10 PROCEDURE — 3077F SYST BP >= 140 MM HG: CPT | Performed by: PODIATRIST

## 2022-05-10 NOTE — PROGRESS NOTES
Assessment/Plan:    Discussed principles of diabetic foot care  Vascular status is within normal limits and sensorium is intact  Trimmed hyperkeratotic lesion right foot  Recommended [place] p m  cramp tablets due to nocturnal foot and leg cramping  No treatment needed for the asymptomatic nails at this time  Yearly evaluation is recommended  No problem-specific Assessment & Plan notes found for this encounter  Diagnoses and all orders for this visit:    Controlled type 2 diabetes mellitus without complication, without long-term current use of insulin (HCC)          Subjective:      Patient ID: Kota Chou is a 45 y o  male  HPI     Patient, a 18-year-old type 2 diabetic presents for a diabetic foot exam and care  Patient denies foot problems from diabetes  He does have a painful callus on the bottom of the right foot and intermittent pain from ingrown nails  Today the toenails are asymptomatic  He does relate nocturnal foot and leg cramps  I personally reviewed A1c dated 2/8/22  It was 6 7  I personally reviewed A1c dated 08/10/2021  It was 7 3  The following portions of the patient's history were reviewed and updated as appropriate: allergies, current medications, past family history, past medical history, past social history, past surgical history and problem list     Review of Systems   Gastrointestinal: Negative  Neurological: Negative for numbness  Hematological: Negative  Psychiatric/Behavioral: Negative  Objective:      /86   Pulse 66   Ht 6' 1" (1 854 m)   Wt 125 kg (275 lb)   BMI 36 28 kg/m²          Physical Exam  Cardiovascular:      Pulses: no weak pulses          Dorsalis pedis pulses are 2+ on the right side and 2+ on the left side  Posterior tibial pulses are 2+ on the right side and 2+ on the left side  Feet:      Right foot:      Skin integrity: Callus present  No ulcer, skin breakdown, erythema, warmth or dry skin  Left foot:      Skin integrity: No ulcer, skin breakdown, erythema, warmth, callus or dry skin  Diabetic Foot Exam    Patient's shoes and socks removed  Right Foot/Ankle   Right Foot Inspection  Skin Exam: skin normal, skin intact, callus and callus  No dry skin, no warmth, no erythema, no maceration, no abnormal color, no pre-ulcer and no ulcer  Toe Exam: ROM and strength within normal limits  Sensory   Vibration: intact  Proprioception: intact  Monofilament testing: intact    Vascular  Capillary refills: < 3 seconds  The right DP pulse is 2+  The right PT pulse is 2+  Right Toe  - Comprehensive Exam  Ecchymosis: none  Arch: normal  Claw Toes: absent  Swelling: none   Tenderness: none         Left Foot/Ankle  Left Foot Inspection  Skin Exam: skin normal and skin intact  No dry skin, no warmth, no erythema, no maceration, normal color, no pre-ulcer, no ulcer and no callus  Toe Exam: ROM and strength within normal limits  Sensory   Vibration: intact  Proprioception: intact  Monofilament testing: intact    Vascular  Capillary refills: < 3 seconds  The left DP pulse is 2+  The left PT pulse is 2+       Left Toe  - Comprehensive Exam  Ecchymosis: none  Arch: normal  Hammertoes: absent  Claw toes: absent  Swelling: none   Tenderness: none           Assign Risk Category  No deformity present  No loss of protective sensation  No weak pulses  Risk: 0

## 2022-05-10 NOTE — PROGRESS NOTES
5/10/2022      Chief Complaint   Patient presents with    Follow-up     Assessment and Plan    45 y o  male managed by our office    1  Benign prostatic hyperplasia with lower urinary tract symptoms  · Status post cystoscopy performed 03/20/2022 with unremarkable findings  · Continue tamsulosin  · Will continue to monitor for worsening/progression of urinary symptoms  · Maintain adequate hydration upwards 40 oz of water intake per day while avoiding bladder irritating foods beverages    2  Peyronie's Disease  · Continue with follow-up with Dr Nix    3  Erectile Dysfunction  · Continue tadalafil    4  Prostate Cancer Screening   · PSA performed 02/08/2022 resulted 0 4  · ART performed   · Repeat PSA ART in 1 year    5  Family history of prostate cancer  · A few cousins and paternal uncle      History of Present Illness  Billie Ricks is a 45 y o  male here for follow up evaluation of  urinary symptoms secondary benign prostatic hyperplasia patient also has a history of erectile dysfunction  At his last office evaluation he was started on tamsulosin  He reports since initiation of tamsulosin he is less episodes of nocturia  He also reports greater sensation of the ability to empty his bladder with urination  He denies episodes of gross hematuria  Patient also reports a history of erectile dysfunction has been managed on tadalafil  He denies side effects of medication  He reports a family history of prostate cancer in a few cousins and a paternal uncle  Component PSA, Total   Latest Ref Rng & Units 0 0 - 4 0 ng/mL   2/12/2021 0 3   2/8/2022 0 4       Review of Systems   Constitutional: Negative for chills and fever  Respiratory: Negative for cough and shortness of breath  Cardiovascular: Negative for chest pain  Gastrointestinal: Negative for abdominal distention, abdominal pain, blood in stool, nausea and vomiting     Genitourinary: Negative for difficulty urinating, dysuria, enuresis, flank pain, frequency, hematuria and urgency  Skin: Negative for rash  Urinary Incontinence Screening      Most Recent Value   Urinary Incontinence    Urinary Incontinence? No   Incomplete emptying? Yes   Urinary frequency? Yes   Urinary urgency? Yes   Urinary hesitancy? No   Dysuria (painful difficult urination)? No   Nocturia (waking up to use the bathroom)? Yes   Straining (having to push to go)? No   Weak stream? No   Intermittent stream? Yes          AUA SYMPTOM SCORE      Most Recent Value   AUA SYMPTOM SCORE    How often have you had a sensation of not emptying your bladder completely after you finished urinating? 2   How often have you had to urinate again less than two hours after you finished urinating? 2   How often have you found you stopped and started again several times when you urinate? 1   How often have you found it difficult to postpone urination? 1   How often have you had a weak urinary stream? 0   How often have you had to push or strain to begin urination? 0   How many times did you most typically get up to urinate from the time you went to bed at night until the time you got up in the morning?  1   Quality of Life: If you were to spend the rest of your life with your urinary condition just the way it is now, how would you feel about that? 2   AUA SYMPTOM SCORE 7             Past Medical History  Past Medical History:   Diagnosis Date    Achalasia     [facility]neur[facility]m [facility] [address]     aortic    Colon polyp     Diabetes mellitus ([facility] [address] [facility]     Esophageal ulcer     Groin abscess     with I &D    Hyperlipidemia     Hypertension        Past Social History  Past Surgical History:   Procedure Laterality Date    ANTERIOR CERVICAL DISCECTOMY W/ FUSION      APPENDECTOMY      BICEPS TENDON REPAIR      EGD AND COLONOSCOPY      FL INJECTION LEFT WRIST (ARTHROGRAM)  11/9/2021    MENISCECTOMY      MYOTOMY HELLER LAPAROSCOPIC      ORTHOPEDIC SURGERY       Social History     Tobacco Use   Smoking Status Never Smoker   Smokeless Tobacco Never Used       Past Family History  Family History   Problem Relation Age of Onset    No Known Problems Mother     No Known Problems Father        Past Social history  Social History     Socioeconomic History    Marital status: /Civil Union     Spouse name: Not on file    Number of children: Not on file    Years of education: Not on file    Highest education level: Not on file   Occupational History    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   Tobacco Use    Smoking status: Never Smoker    Smokeless tobacco: Never Used   Vaping Use    Vaping Use: Never used   Substance and Sexual Activity    Alcohol use: Not Currently    Drug use: Never    Sexual activity: Yes     Partners: Female   Other Topics Concern    Not on file   Social History Narrative    Lives with spouse     Social Determinants of Health     Financial Resource Strain: Not on file   Food Insecurity: Not on file   Transportation Needs: Not on file   Physical Activity: Not on file   Stress: Not on file   Social Connections: Not on file   Intimate Partner Violence: Not on file   Housing Stability: Not on file       Current Medications  Current Outpatient Medications   Medication Sig Dispense Refill    aluminum-magnesium hydroxide-simethicone (MAALOX MAX) 400-400-40 MG/5ML suspension Take 10 mL by mouth every 6 (six) hours as needed for indigestion or heartburn 355 mL 1    Ascorbic Acid (vitamin C) 1000 MG tablet Take 1,000 mg by mouth daily        baclofen 20 mg tablet Take 1 tablet (20 mg total) by mouth daily at bedtime as needed for muscle spasms 90 tablet 2    diphenhydrAMINE-acetaminophen (TYLENOL PM)  MG TABS Take 1 tablet by mouth daily at bedtime as needed for sleep       famotidine (PEPCID) 20 mg tablet Take 1 tablet (20 mg total) by mouth 2 (two) times a day 90 tablet 0    lisinopril (ZESTRIL) 40 mg tablet Take 1 tablet (40 mg total) by mouth daily 90 tablet 1    metFORMIN (GLUCOPHAGE) 500 mg tablet Pt takes 1500 mg (3 tablets) by mouth in the morning, and a 1000 mg (2 tablets) in the evening 450 tablet 1    omega-3-acid ethyl esters (LOVAZA) 1 g capsule Take 1 g by mouth daily      pantoprazole (PROTONIX) 40 mg tablet Take 1 tablet (40 mg total) by mouth daily 30 tablet 3    simvastatin (ZOCOR) 20 mg tablet Take 1 tablet (20 mg total) by mouth daily 90 tablet 1    tadalafil (CIALIS) 20 MG tablet Take 1 tablet (20 mg total) by mouth as needed for erectile dysfunction 10 tablet 0    tamsulosin (FLOMAX) 0 4 mg TAKE 1 CAPSULE(0 4 MG) BY MOUTH DAILY WITH DINNER 30 capsule 3     No current facility-administered medications for this visit  Allergies  No Known Allergies      The following portions of the patient's history were reviewed and updated as appropriate: allergies, current medications, past medical history, past social history, past surgical history and problem list       Vitals  Vitals:    05/10/22 1450   BP: 144/84   BP Location: Left arm   Patient Position: Sitting   Cuff Size: Adult   Pulse: 74   Weight: 124 kg (273 lb)   Height: 6' 1" (1 854 m)           Physical Exam  Physical Exam  Vitals reviewed  Constitutional:       General: He is not in acute distress  Appearance: Normal appearance  He is normal weight  HENT:      Head: Normocephalic  Pulmonary:      Effort: No respiratory distress  Breath sounds: Normal breath sounds  Skin:     General: Skin is warm and dry  Neurological:      General: No focal deficit present  Mental Status: He is alert and oriented to person, place, and time     Psychiatric:         Mood and Affect: Mood normal          Behavior: Behavior normal            Results  Recent Results (from the past 1 hour(s))   POCT Measure PVR    Collection Time: 05/10/22  2:50 PM   Result Value Ref Range    POST-VOID RESIDUAL VOLUME, ML POC 19 mL   ]  Lab Results   Component Value Date    PSA 0 4 02/08/2022    PSA 0 3 02/12/2021    PSA 0 3 01/16/2015     Lab Results   Component Value Date    GLUCOSE 113 01/16/2015    CALCIUM 9 1 03/14/2022     01/16/2015    K 4 1 03/14/2022    CO2 24 03/14/2022     03/14/2022    BUN 13 03/14/2022    CREATININE 0 80 03/14/2022     Lab Results   Component Value Date    WBC 9 52 03/14/2022    HGB 14 3 03/14/2022    HCT 44 6 03/14/2022    MCV 92 03/14/2022     03/14/2022           Orders  Orders Placed This Encounter   Procedures    PSA, Total Screen     This is a patient instruction: This test is non-fasting  Please drink two glasses of water morning of bloodwork          Standing Status:   Future     Standing Expiration Date:   11/10/2023    POCT Measure PVR       MIKHAIL Rain

## 2022-05-12 ENCOUNTER — CONSULT (OUTPATIENT)
Dept: NEUROLOGY | Facility: CLINIC | Age: 52
End: 2022-05-12
Payer: COMMERCIAL

## 2022-05-12 VITALS
HEIGHT: 73 IN | OXYGEN SATURATION: 97 % | WEIGHT: 265 LBS | HEART RATE: 76 BPM | DIASTOLIC BLOOD PRESSURE: 80 MMHG | SYSTOLIC BLOOD PRESSURE: 140 MMHG | BODY MASS INDEX: 35.12 KG/M2

## 2022-05-12 DIAGNOSIS — R27.0 ATAXIA: ICD-10-CM

## 2022-05-12 DIAGNOSIS — R51.9 ACUTE NONINTRACTABLE HEADACHE, UNSPECIFIED HEADACHE TYPE: Primary | ICD-10-CM

## 2022-05-12 DIAGNOSIS — G25.0 TREMOR, ESSENTIAL: ICD-10-CM

## 2022-05-12 DIAGNOSIS — R25.1 EPISODE OF SHAKING: ICD-10-CM

## 2022-05-12 DIAGNOSIS — R25.1 TREMOR: ICD-10-CM

## 2022-05-12 PROCEDURE — 3008F BODY MASS INDEX DOCD: CPT | Performed by: NURSE PRACTITIONER

## 2022-05-12 PROCEDURE — 99244 OFF/OP CNSLTJ NEW/EST MOD 40: CPT | Performed by: PSYCHIATRY & NEUROLOGY

## 2022-05-12 NOTE — ASSESSMENT & PLAN NOTE
Patient has had baseline essential tremor on the right hand with only at action  Has no parkinsonian symptoms on exam (no cogwheel rigidity, no bradykinesia, no asymmetric arm swing)  Tremors elicited on writing and on extending the arms bilaterally  However, right sided arms and legs excessive "tremors" of 4 different episodes are concerning for focal aware seizure especially in setting of multiple microhemorrhages possible CAA  Also noted shaking of right hand causing him to drop objects  Exam showed no concerning signs of Parkinsonisms with no cogwheel rigidity, no bradykinesia, and no rest tremors  Did note bilateral essential tremors R>L on action  Plan:  Baseline tremors are not bothersome for patient and he would want to avoid further medications concerning the baseline tremors    Sleep deprived EEG ordered to characterize spells due to previous shaking episodes  At this point, patient would like to not add medications for seizures and is reasonable due to patient being conscious through these episodes and seemingly not of acute concern; however if these episodes happen again or if patient would like to try the medications we can consider topamax   If need medication, start topamax 25mg BID for a week then topamax 50mg BID for a week until a max of topamax of 100mg BID    Followup in 3 months

## 2022-05-12 NOTE — PROGRESS NOTES
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, cervical radiculopathy, ACDF [id number]  Headaches are aching not one sided and lasts for 7 hours and nearly daily but after change in lisinopril dosing headaches have been much reduced with only occurring 3-4 times a month and lasting less than 4 hours  No focal motor, sensory, visual problems occurring with the headache  Tylenol and motrin effective in controlling headaches and no concern for medication overuse in setting of only using total of 4 pills per month with the tylenol and motrin combined  Exam showed no focal motor, sensory, visual deficits  Pt denies red flag symptoms such as sudden onset headache, focal exam findings, stiff neck, temperature, altered mental status    Medications:   Previous trials: Tylenol, motrin   Current medications (abortive and preventative): Tylenol, motrin    MRI cspine 10/10/2021: Right paramedian protrusion C5-C6 and mild stenosis on C4-5 and C5-6  MRI brain 10/10/2021: Innummerable punctate foci consistent with CAA    Impression: patient has Migraine type headaches that are seemingly resolving with improvement of blood pressure (change in lisnopril) that use to be frequent to 30/30 days, lasting at least 7 hours and mild photosensitivity  Plan:   Patient continuing tylenol, naproxen and motrin as patient does not take enough to cause overuse headache (not take more than 2 times per day)   Patient can continue these medications PRN to help with the headaches   Patietn to followup in 3 months   All questions were address and patient verbalized understanding       Tremor, essential  Patient has had baseline essential tremor on the right hand with only at action  Has no parkinsonian symptoms on exam (no cogwheel rigidity, no bradykinesia, no asymmetric arm swing)   Tremors elicited on writing and on extending the arms bilaterally  However, right sided arms and legs excessive "tremors" of 4 different episodes are concerning for focal aware seizure especially in setting of multiple microhemorrhages possible CAA  Also noted shaking of right hand causing him to drop objects  Exam showed no concerning signs of Parkinsonisms with no cogwheel rigidity, no bradykinesia, and no rest tremors  Did note bilateral essential tremors R>L on action  Plan:  Baseline tremors are not bothersome for patient and he would want to avoid further medications concerning the baseline tremors    Sleep deprived EEG ordered to characterize spells due to previous shaking episodes  At this point, patient would like to not add medications for seizures and is reasonable due to patient being conscious through these episodes and seemingly not of acute concern; however if these episodes happen again or if patient would like to try the medications we can consider topamax   If need medication, start topamax 25mg BID for a week then topamax 50mg BID for a week until a max of topamax of 100mg BID  Followup in 3 months        Episode of shaking  Patient having episodes of right arm extending to right leg shaking that can last 5-15 minutes since last year July that was more intense than his usual right hand tremors  Usually occurs during action (Patient driving and washing dishes)  However, the episodic nature of these shaking and focal nature of the shaking with extension to the right leg concerning for focal aware seizures  Patient is also at increased risk for seizures with the MRI brain in 2021 showing multiple microhemorrhages in the cortical areas concerning for CAA  Patient's previous EEG in 8/2021 was negative for Electrographic seizures but would need further testing to check for possible seizures      Plan:  Sleep deprived EEG ordered to characterize spells  At this point, patient would like to not add medications for seizures and is reasonable due to patient being conscious through these episodes and seemingly not of acute concern; however if these episodes happen again or if patient would like to try the medications we can consider topamax   If need medication, start topamax 25mg BID for a week then topamax 50mg BID for a week until a max of topamax of 100mg BID  Diagnoses and all orders for this visit:    Acute nonintractable headache, unspecified headache type    Tremor  -     Ambulatory referral to Neurology    Ataxia  -     Ambulatory referral to Neurology    Tremor, essential  -     EEG Sleep deprived > 1 hour; Future    Episode of shaking  -     EEG Sleep deprived > 1 hour; Future           Subjective: We had the pleasure of evaluating Gibson Garcia in neurological consultation today for headaches  As you know,  he is a 45 y o  right handed  Male with 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, cervical radiculopathy, ACDF [id number]  Patient have been having ongoing neck pain with occasional radiculopathy to the right arm right pinky, numbness on the pinky and ring finger and right leg ataxia and shaky feeling  Symptoms started in June 2021 with headaches which were left sided on front and back which was an aching pain with sharpness which helped with tylenol but not completely  Headaches can last for 7 hours and was daily for 30/30 days  Headaches sometimes affect left eye  After adjustment of the lisinopril, Headaches started to become less intense, less frequent to the point 3-4 times a month  On average the headaches last for an hour  Patient not sleeping well due to neck pain and patient takes tylenol pm and baclofen at night to help during the night  Melatonin does not keep sleeping  Patient get 7- 8 hours of sleep but typically do not wake up well but is on baclofen and tylenol pm  Headaches with mild photosensitivity to light  No phonosensitivity, no nausea   Motrin he takes which helps to a degree and before it goes away in 2 hours  Patient has taken tylenol or motrin takes 4-5 times in a month  Then changed blood pressure medications and adjusted the blood pressure lisinopril which helped with the headaches  July 20, patient had stressful things happening at the time and then patient had right hand shaking that last for usually 15 minutes and always with the hand and then the leg and afterwards and then had right leg  Right hand shaking most at action, when reaching out hands and when trying to write something down  But has 4 different episodes of increased tremors of right hand and right leg when he was driving  Last episode was in March that last for 5 mintues and he was cleaning dishes  Not noticeable at rest  Drawing has been difficult due to the tremors    No seizures were found on EEG 8/2021  Occurs with headaches each time  Does not smoke, does not drink, does not use any recreational drugs  Patient previously been on gabapentin 2018 which he felt vibration on right leg  Different type of shaking at the time  March, patient had right hand shaking again while in the house and unsure if stressed out or anxious  Patient concerned whether he has Parkinson's disease or not as he has had positive family history of Parkinson's Disease  Patient had a visit with Neurosurgery 10/22/2021 and noted to have right neck pain and tremors and was suspected to be due to CAA and was referred to Neurology  Of note, the pain does not help with epidural pain injections  Sleep with baclofen  Right sided pins and needles of right side from the neck problems  Left sided pins and needles were gone after the ACDF  Patient found himself dropping objects which he is unsure why he does this  No dizziness nor vision problems  MRI cspine 10/10/2021: Right paramedian protrusion C5-C6 and mild stenosis on C4-5 and C5-6  MRI brain 10/10/2021:  Innummerable punctate foci consistent with CAA              The following portions of the patient's history were reviewed and updated as appropriate:   He  has a past medical history of Achalasia, Aneurysm ([facility] [place] [address] ), Colon polyp, Diabetes mellitus ([facility] [place] [address] ), Esophageal ulcer, Groin abscess, Hyperlipidemia, and Hypertension  He   Patient Active Problem List    Diagnosis Date Noted    Tremor, essential 05/12/2022    Costochondritis 03/15/2022    Esophagitis 03/15/2022    COVID-19 virus infection 01/11/2022    Left lower quadrant abdominal pain 12/13/2021    Right flank pain 12/13/2021    Penile bleeding 12/13/2021    Scrotal pain 12/13/2021    Other hyperlipidemia 08/09/2021    Episode of shaking 08/09/2021    Acute nonintractable headache 08/09/2021    Neck pain     Cervical spondylosis without myelopathy 11/16/2020    S/P cervical spinal fusion 01/03/2020    Cervical radiculopathy 01/03/2020    Cervical stenosis of spinal canal 01/03/2020    HNP (herniated nucleus pulposus), thoracic 01/03/2020    Hypertension, essential 02/23/2017    [facility] 02/12/2015     He  has a past surgical history that includes MYOTOMY HELLER LAPAROSCOPIC; Appendectomy; Meniscectomy; orthopedic surgery; Biceps tendon repair; Anterior cervical discectomy w/ fusion; FL injection left wrist (arthrogram) (11/9/2021); and EGD AND COLONOSCOPY  His family history includes No Known Problems in his father and mother  He  reports that he has never smoked  He has never used smokeless tobacco  He reports previous alcohol use  He reports that he does not use drugs    Current Outpatient Medications   Medication Sig Dispense Refill    aluminum-magnesium hydroxide-simethicone (MAALOX MAX) 400-400-40 MG/5ML suspension Take 10 mL by mouth every 6 (six) hours as needed for indigestion or heartburn 355 mL 1    Ascorbic Acid (vitamin C) 1000 MG tablet Take 1,000 mg by mouth daily        baclofen 20 mg tablet Take 1 tablet (20 mg total) by mouth daily at bedtime as needed for muscle spasms 90 tablet 2    diphenhydrAMINE-acetaminophen (TYLENOL PM)  MG TABS Take 1 tablet by mouth daily at bedtime as needed for sleep       famotidine (PEPCID) 20 mg tablet Take 1 tablet (20 mg total) by mouth 2 (two) times a day 90 tablet 0    lisinopril (ZESTRIL) 40 mg tablet Take 1 tablet (40 mg total) by mouth daily 90 tablet 1    metFORMIN (GLUCOPHAGE) 500 mg tablet Pt takes 1500 mg (3 tablets) by mouth in the morning, and a 1000 mg (2 tablets) in the evening 450 tablet 1    omega-3-acid ethyl esters (LOVAZA) 1 g capsule Take 1 g by mouth daily      pantoprazole (PROTONIX) 40 mg tablet Take 1 tablet (40 mg total) by mouth daily 30 tablet 3    simvastatin (ZOCOR) 20 mg tablet Take 1 tablet (20 mg total) by mouth daily 90 tablet 1    tadalafil (CIALIS) 20 MG tablet Take 1 tablet (20 mg total) by mouth as needed for erectile dysfunction 10 tablet 0    tamsulosin (FLOMAX) 0 4 mg TAKE 1 CAPSULE(0 4 MG) BY MOUTH DAILY WITH DINNER 30 capsule 3     No current facility-administered medications for this visit       Current Outpatient Medications on File Prior to Visit   Medication Sig    aluminum-magnesium hydroxide-simethicone (MAALOX MAX) 400-400-40 MG/5ML suspension Take 10 mL by mouth every 6 (six) hours as needed for indigestion or heartburn    Ascorbic Acid (vitamin C) 1000 MG tablet Take 1,000 mg by mouth daily      baclofen 20 mg tablet Take 1 tablet (20 mg total) by mouth daily at bedtime as needed for muscle spasms    diphenhydrAMINE-acetaminophen (TYLENOL PM)  MG TABS Take 1 tablet by mouth daily at bedtime as needed for sleep     famotidine (PEPCID) 20 mg tablet Take 1 tablet (20 mg total) by mouth 2 (two) times a day    lisinopril (ZESTRIL) 40 mg tablet Take 1 tablet (40 mg total) by mouth daily    metFORMIN (GLUCOPHAGE) 500 mg tablet Pt takes 1500 mg (3 tablets) by mouth in the morning, and a 1000 mg (2 tablets) in the evening    omega-3-acid ethyl esters (LOVAZA) 1 g capsule Take 1 g by mouth daily  pantoprazole (PROTONIX) 40 mg tablet Take 1 tablet (40 mg total) by mouth daily    simvastatin (ZOCOR) 20 mg tablet Take 1 tablet (20 mg total) by mouth daily    tadalafil (CIALIS) 20 MG tablet Take 1 tablet (20 mg total) by mouth as needed for erectile dysfunction    tamsulosin (FLOMAX) 0 4 mg TAKE 1 CAPSULE(0 4 MG) BY MOUTH DAILY WITH DINNER     No current facility-administered medications on file prior to visit  He has No Known Allergies            Objective:    Blood pressure 140/80, pulse 76, height 6' 1" (1 854 m), weight 120 kg (265 lb), SpO2 97 %  Physical Exam  Eyes:      Extraocular Movements: Extraocular movements intact  Neurological:      Coordination: Coordination is intact  Romberg sign negative  Deep Tendon Reflexes: Strength normal and reflexes are normal and symmetric  Psychiatric:         Speech: Speech normal          Neurological Exam  Mental Status  Awake, alert and oriented to person, place and time  Speech is normal  Language is fluent with no aphasia  Cranial Nerves  CN II: Visual acuity is normal   CN III, IV, VI: Extraocular movements intact bilaterally  CN V: Facial sensation is normal   CN VII: Full and symmetric facial movement  CN XI: Shoulder shrug strength is normal   CN XII: Tongue midline without atrophy or fasciculations  Motor  Normal muscle bulk throughout  No fasciculations present  Normal muscle tone  No abnormal involuntary movements  Strength is 5/5 throughout all four extremities  R>L essential tremors    No cogwheel rigidity bilaterally  Sensory  Sensation is intact to light touch, pinprick, vibration and proprioception in all four extremities  Reflexes  Deep tendon reflexes are 2+ and symmetric in all four extremities  Coordination    Finger-to-nose, rapid alternating movements and heel-to-shin normal bilaterally without dysmetria  Gait  Normal casual, toe, heel and tandem gait  Romberg is absent    No asymmetric arm swing         ROS:    Review of Systems  Review of Systems   Constitutional: Negative  Negative for appetite change and fever  HENT: Negative  Negative for hearing loss, tinnitus, trouble swallowing and voice change  Eyes: Positive for visual disturbance  Negative for photophobia and pain  Respiratory: Negative  Negative for shortness of breath  Cardiovascular: Negative  Negative for palpitations  Gastrointestinal: Negative  Negative for nausea and vomiting  Endocrine: Negative  Negative for cold intolerance  Genitourinary: Negative  Negative for dysuria, frequency and urgency  Musculoskeletal: Negative  Negative for myalgias and neck pain  Skin: Negative  Negative for rash  Neurological: Positive for tremors (Right hand) and headaches  Negative for dizziness, seizures, syncope, facial asymmetry, speech difficulty, weakness, light-headedness and numbness  Hematological: Negative  Does not bruise/bleed easily  Psychiatric/Behavioral: Negative  Negative for confusion, hallucinations and sleep disturbance

## 2022-05-12 NOTE — ASSESSMENT & PLAN NOTE
Patient is a 45year old man with 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, cervical radiculopathy, ACDF [id number]  Headaches are aching not one sided and lasts for 7 hours and nearly daily but after change in lisinopril dosing headaches have been much reduced with only occurring 3-4 times a month and lasting less than 4 hours  No focal motor, sensory, visual problems occurring with the headache  Tylenol and motrin effective in controlling headaches and no concern for medication overuse in setting of only using total of 4 pills per month with the tylenol and motrin combined  Exam showed no focal motor, sensory, visual deficits  Pt denies red flag symptoms such as sudden onset headache, focal exam findings, stiff neck, temperature, altered mental status    Medications:   Previous trials: Tylenol, motrin   Current medications (abortive and preventative): Tylenol, motrin    MRI cspine 10/10/2021: Right paramedian protrusion C5-C6 and mild stenosis on C4-5 and C5-6  MRI brain 10/10/2021: Innummerable punctate foci consistent with CAA    Impression: patient has Migraine type headaches that are seemingly resolving with improvement of blood pressure (change in lisnopril) that use to be frequent to 30/30 days, lasting at least 7 hours and mild photosensitivity      Plan:   Patient continuing tylenol, naproxen and motrin as patient does not take enough to cause overuse headache (not take more than 2 times per day)   Patient can continue these medications PRN to help with the headaches   Patietn to followup in 3 months   All questions were address and patient verbalized understanding

## 2022-05-12 NOTE — PROGRESS NOTES
Review of Systems   Constitutional: Negative  Negative for appetite change and fever  HENT: Negative  Negative for hearing loss, tinnitus, trouble swallowing and voice change  Eyes: Positive for visual disturbance  Negative for photophobia and pain  Respiratory: Negative  Negative for shortness of breath  Cardiovascular: Negative  Negative for palpitations  Gastrointestinal: Negative  Negative for nausea and vomiting  Endocrine: Negative  Negative for cold intolerance  Genitourinary: Negative  Negative for dysuria, frequency and urgency  Musculoskeletal: Negative  Negative for myalgias and neck pain  Skin: Negative  Negative for rash  Neurological: Positive for tremors (Right hand) and headaches  Negative for dizziness, seizures, syncope, facial asymmetry, speech difficulty, weakness, light-headedness and numbness  Hematological: Negative  Does not bruise/bleed easily  Psychiatric/Behavioral: Negative  Negative for confusion, hallucinations and sleep disturbance

## 2022-05-12 NOTE — PATIENT INSTRUCTIONS
Please get EEG before next visit    Ibuprofen and naproxen for headaches are ok from us  We discussed about topamax on this visit; please let us know by calling our office especially if you want to try the medication especially in setting of another episode      Talk about your blood pressure, cholesterol levels with your primary care doctor    Avoid excedrin due to having aspirin in them    Followup in 3 months

## 2022-05-12 NOTE — ASSESSMENT & PLAN NOTE
Patient having episodes of right arm extending to right leg shaking that can last 5-15 minutes since last year July that was more intense than his usual right hand tremors  Usually occurs during action (Patient driving and washing dishes)  However, the episodic nature of these shaking and focal nature of the shaking with extension to the right leg concerning for focal aware seizures  Patient is also at increased risk for seizures with the MRI brain in 2021 showing multiple microhemorrhages in the cortical areas concerning for CAA  Patient's previous EEG in 8/2021 was negative for Electrographic seizures but would need further testing to check for possible seizures  Plan:  Sleep deprived EEG ordered to characterize spells  At this point, patient would like to not add medications for seizures and is reasonable due to patient being conscious through these episodes and seemingly not of acute concern; however if these episodes happen again or if patient would like to try the medications we can consider topamax   If need medication, start topamax 25mg BID for a week then topamax 50mg BID for a week until a max of topamax of 100mg BID

## 2022-05-24 ENCOUNTER — TELEPHONE (OUTPATIENT)
Dept: PAIN MEDICINE | Facility: MEDICAL CENTER | Age: 52
End: 2022-05-24

## 2022-05-24 NOTE — TELEPHONE ENCOUNTER
Pt called in to schedule an appointment for lower back pain with pain shooting down his leg    He previously has been treating with Dr Omar Aguero and Julia Lomax for neck pain  Pt is requesting to be [place] [facility] stating that [facility] [address] [facility] is too far for him    Pt is requesting a transfer of care from Dr Omar Aguero to Dr Brian Stroud  Please advise,    Thank you

## 2022-05-31 NOTE — TELEPHONE ENCOUNTER
Okay to transfer care    Please schedule for 30 minute office visit considering this is my 1st time seeing the patient

## 2022-06-07 ENCOUNTER — OFFICE VISIT (OUTPATIENT)
Dept: PAIN MEDICINE | Facility: CLINIC | Age: 52
End: 2022-06-07
Payer: COMMERCIAL

## 2022-06-07 ENCOUNTER — TELEPHONE (OUTPATIENT)
Dept: NEUROLOGY | Facility: CLINIC | Age: 52
End: 2022-06-07

## 2022-06-07 ENCOUNTER — HOSPITAL ENCOUNTER (OUTPATIENT)
Dept: NEUROLOGY | Facility: CLINIC | Age: 52
Discharge: HOME | End: 2022-06-07
Payer: COMMERCIAL

## 2022-06-07 VITALS
HEART RATE: 61 BPM | WEIGHT: 266 LBS | DIASTOLIC BLOOD PRESSURE: 91 MMHG | SYSTOLIC BLOOD PRESSURE: 145 MMHG | BODY MASS INDEX: 35.25 KG/M2 | HEIGHT: 73 IN

## 2022-06-07 DIAGNOSIS — M48.02 CERVICAL STENOSIS OF SPINAL CANAL: ICD-10-CM

## 2022-06-07 DIAGNOSIS — G25.0 TREMOR, ESSENTIAL: ICD-10-CM

## 2022-06-07 DIAGNOSIS — R25.1 EPISODE OF SHAKING: ICD-10-CM

## 2022-06-07 DIAGNOSIS — M47.812 CERVICAL SPONDYLOSIS WITHOUT MYELOPATHY: ICD-10-CM

## 2022-06-07 DIAGNOSIS — Z98.1 S/P CERVICAL SPINAL FUSION: ICD-10-CM

## 2022-06-07 DIAGNOSIS — M54.12 CERVICAL RADICULOPATHY: Primary | ICD-10-CM

## 2022-06-07 PROCEDURE — 95813 EEG EXTND MNTR 61-119 MIN: CPT

## 2022-06-07 PROCEDURE — 3077F SYST BP >= 140 MM HG: CPT | Performed by: ANESTHESIOLOGY

## 2022-06-07 PROCEDURE — 95813 EEG EXTND MNTR 61-119 MIN: CPT | Performed by: STUDENT IN AN ORGANIZED HEALTH CARE EDUCATION/TRAINING PROGRAM

## 2022-06-07 PROCEDURE — 1036F TOBACCO NON-USER: CPT | Performed by: ANESTHESIOLOGY

## 2022-06-07 PROCEDURE — 3008F BODY MASS INDEX DOCD: CPT | Performed by: ANESTHESIOLOGY

## 2022-06-07 PROCEDURE — 3080F DIAST BP >= 90 MM HG: CPT | Performed by: ANESTHESIOLOGY

## 2022-06-07 PROCEDURE — 99214 OFFICE O/P EST MOD 30 MIN: CPT | Performed by: ANESTHESIOLOGY

## 2022-06-07 NOTE — TELEPHONE ENCOUNTER
----- Message from Ortiz Yao MD sent at 6/7/2022  4:17 PM EDT -----  Let patient know EEG was normal

## 2022-06-07 NOTE — PROGRESS NOTES
Assessment  1  Cervical radiculopathy    2  S/P cervical spinal fusion    3  Cervical spondylosis without myelopathy    4  Cervical stenosis of spinal canal        Plan  26-year-old male with a history of C6-7 ACDF in 2018, previously seen by my partner Dr Catalino Redman in 2020, transferring care secondary to location, presenting for interval follow-up regarding neck pain that radiates into both shoulders, biceps, and occasionally into the thumb of the left hand with associated paresthesias/dysesthesias and subjective weakness  He denies any recent trauma  Last MRI of the cervical spine with and without contrast from October 2021 shows stable ACDF at C6-7 with degenerative disc disease/spondylosis/disc bulging from C3-4 to C5-6  Mild-to-moderate central and foraminal stenosis at C4-5  Mild central stenosis at C5-6  Disc protrusion at C5-6 also contact right C6 nerve root  The patient has undergone C7-T1 cervical epidural steroid injection x2 with approximately 80-90% after the 1st injection, however not much relief after the 2nd injection  He did also undergo right C4-6 medial branch blocks  First round of blocks did give him some relief, however 2nd round of blocks only exacerbated his pain  He has tried gabapentin and he was unable tolerate the side effects  He does take Tylenol and ibuprofen p r n  with some relief  Physical therapy has not been helpful in the past   He has tried opioid medications and prefers to avoid these secondary to his job duties  He was evaluated by Neurosurgery in 2021 and he did not feel that surgical intervention was required at this time  1  I will schedule the patient for C6-7 cervical epidural steroid injection  2  May consider trial of duloxetine in the future pending results of ROSAURA  3  Patient will continue with his home exercise program   4   I will follow up the patient in 6 weeks        Complete risks and benefits including bleeding, infection, tissue reaction, nerve injury and allergic reaction were discussed  The approach was demonstrated using models and literature was provided  Verbal and written consent was obtained  My impressions and treatment recommendations were discussed in detail with the patient who verbalized understanding and had no further questions  Discharge instructions were provided  I personally saw and examined the patient and I agree with the above discussed plan of care  No orders of the defined types were placed in this encounter  No orders of the defined types were placed in this encounter  History of Present Illness    Abimael Lynne is a 45 y o  male with a history of C6-7 ACDF in 2018, previously seen by my partner Dr José Luis Mullins in 2020, transferring care secondary to location, presenting for interval follow-up regarding neck pain that radiates into both shoulders, biceps, and occasionally into the thumb of the left hand with associated paresthesias/dysesthesias and subjective weakness  He denies any recent trauma  He denies any bladder or bowel incontinence or balance issues  Last MRI of the cervical spine with and without contrast from October 2021 shows stable ACDF at C6-7 with degenerative disc disease/spondylosis/disc bulging from C3-4 to C5-6  Mild-to-moderate central and foraminal stenosis at C4-5  Mild central stenosis at C5-6  Disc protrusion at C5-6 also contact right C6 nerve root  The patient has undergone C7-T1 cervical epidural steroid injection x2 with approximately 80-90% after the 1st injection, however not much relief after the 2nd injection  He did also undergo right C4-6 medial branch blocks  First round of blocks did give him some relief, however 2nd round of blocks only exacerbated his pain  He has tried gabapentin and he was unable tolerate the side effects  He does take Tylenol and ibuprofen p r n  with some relief    Physical therapy has not been helpful in the past   He has tried opioid medications and prefers to avoid these secondary to his job duties  He was evaluated by Neurosurgery in 2021 and he did not feel that surgical intervention was required at this time  The patient rates his pain a 5/10 on the pain is worse at night  The pain is constant and described as dull, aching, and sharp  The pain is worse with exercise and extending his neck  Pain is alleviated with relaxation  Other than as stated above, the patient denies any interval changes in medications, medical condition, mental condition, symptoms, or allergies since the last office visit  I have personally reviewed and/or updated the patient's past medical history, past surgical history, family history, social history, current medications, allergies, and vital signs today  Review of Systems   Constitutional: Negative for fever and unexpected weight change  HENT: Negative for trouble swallowing  Eyes: Negative for visual disturbance  Respiratory: Negative for shortness of breath and wheezing  Cardiovascular: Positive for chest pain  Negative for palpitations  Gastrointestinal: Negative for constipation, diarrhea, nausea and vomiting  Endocrine: Negative for cold intolerance, heat intolerance and polydipsia  Genitourinary: Negative for difficulty urinating and frequency  Musculoskeletal: Negative for arthralgias, gait problem, joint swelling and myalgias  Skin: Negative for rash  Neurological: Negative for dizziness, seizures, syncope, weakness and headaches  Hematological: Does not bruise/bleed easily  Psychiatric/Behavioral: Negative for dysphoric mood  All other systems reviewed and are negative        Patient Active Problem List   Diagnosis    S/P cervical spinal fusion    Cervical radiculopathy    Cervical stenosis of spinal canal    HNP (herniated nucleus pulposus), thoracic    Cervical spondylosis without myelopathy    Neck pain    Diabetes mellitus ([facility]    Hypertension, essential    Other hyperlipidemia    Episode of shaking    Acute nonintractable headache    Left lower quadrant abdominal pain    Right flank pain    Penile bleeding    Scrotal pain    COVID-19 virus infection    Costochondritis    Esophagitis    Tremor, essential       Past Medical History:   Diagnosis Date    Achalasia     Aneurysm (HCC)     aortic    Colon polyp     Diabetes mellitus ([address]     Esophageal ulcer     Groin abscess     with I &D    Hyperlipidemia     Hypertension        Past Surgical History:   Procedure Laterality Date    ANTERIOR CERVICAL DISCECTOMY W/ FUSION      APPENDECTOMY      BICEPS TENDON REPAIR      EGD AND COLONOSCOPY      FL INJECTION LEFT WRIST (ARTHROGRAM)  11/9/2021    MENISCECTOMY      MYOTOMY HELLER LAPAROSCOPIC      ORTHOPEDIC SURGERY         Family History   Problem Relation Age of Onset    No Known Problems Mother     No Known Problems Father        Social History     Occupational History    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   Tobacco Use    Smoking status: Never Smoker    Smokeless tobacco: Never Used   Vaping Use    Vaping Use: Never used   Substance and Sexual Activity    Alcohol use: Not Currently    Drug use: Never    Sexual activity: Yes     Partners: Female       Current Outpatient Medications on File Prior to Visit   Medication Sig    aluminum-magnesium hydroxide-simethicone (MAALOX MAX) 400-400-40 MG/5ML suspension Take 10 mL by mouth every 6 (six) hours as needed for indigestion or heartburn    Ascorbic Acid (vitamin C) 1000 MG tablet Take 1,000 mg by mouth daily      baclofen 20 mg tablet Take 1 tablet (20 mg total) by mouth daily at bedtime as needed for muscle spasms    diphenhydrAMINE-acetaminophen (TYLENOL PM)  MG TABS Take 1 tablet by mouth daily at bedtime as needed for sleep     famotidine (PEPCID) 20 mg tablet Take 1 tablet (20 mg total) by mouth 2 (two) times a day    lisinopril (ZESTRIL) 40 mg tablet Take 1 tablet (40 mg total) by mouth daily    metFORMIN (GLUCOPHAGE) 500 mg tablet Pt takes 1500 mg (3 tablets) by mouth in the morning, and a 1000 mg (2 tablets) in the evening    omega-3-acid ethyl esters (LOVAZA) 1 g capsule Take 1 g by mouth daily    pantoprazole (PROTONIX) 40 mg tablet Take 1 tablet (40 mg total) by mouth daily    simvastatin (ZOCOR) 20 mg tablet Take 1 tablet (20 mg total) by mouth daily    tadalafil (CIALIS) 20 MG tablet Take 1 tablet (20 mg total) by mouth as needed for erectile dysfunction    tamsulosin (FLOMAX) 0 4 mg TAKE 1 CAPSULE(0 4 MG) BY MOUTH DAILY WITH DINNER     No current facility-administered medications on file prior to visit  No Known Allergies    Physical Exam    /91   Pulse 61   Ht 6' 1" (1 854 m)   Wt 121 kg (266 lb)   BMI 35 09 kg/m²     Constitutional: normal, well developed, well nourished, alert, in no distress and non-toxic and no overt pain behavior  Eyes: anicteric  HEENT: grossly intact  Neck: supple, symmetric, trachea midline and no masses   Pulmonary:even and unlabored  Cardiovascular:No edema or pitting edema present  Skin:Normal without rashes or lesions and well hydrated  Psychiatric:Mood and affect appropriate  Neurologic:Cranial Nerves II-XII grossly intact  Musculoskeletal:normal gait  Limited range of motion of cervical spine in all planes  Bilateral biceps, triceps, and brachioradialis wrist does were 2/4 and symmetrical   Negative Tolbert's reflex bilaterally  Bilateral upper extremity strength 5/5 in all muscle groups with the exception of right deltoid which was 4-5  Sensation intact to light touch in C5 through T1 dermatomes bilaterally  Positive Spurling's to the left      Imaging      Study Result    Narrative & Impression   LEFT SHOULDER     INDICATION:   severe left shoulder pain      COMPARISON:  None     VIEWS:  XR SHOULDER 2+ VW LEFT   Images: 3     FINDINGS:     There is no acute fracture or dislocation      No significant degenerative changes      No lytic or blastic osseous lesion      Soft tissues are unremarkable      IMPRESSION:     No acute osseous abnormality      Workstation performed: [id number]     Study Result    Narrative & Impression   MRI CERVICAL SPINE WITH AND WITHOUT CONTRAST     INDICATION: R25 1: Tremor, unspecified  R51 9: Headache, unspecified  M54 12: Radiculopathy, cervical region  M47 812: Spondylosis without myelopathy or radiculopathy, cervical region      COMPARISON:  None      TECHNIQUE:  Sagittal T1, sagittal T2, sagittal inversion recovery, axial 2D merge and axial T2  Sagittal T1 and axial T1 postcontrast     IV Contrast:  12 mL of Gadobutrol injection (SINGLE-DOSE)      IMAGE QUALITY:  Diagnostic      FINDINGS:     ALIGNMENT:  There is straightening of the cervical lordosis  Patient is status post ACDF at C6-7      MARROW SIGNAL:  Normal marrow signal is identified within the visualized bony structures  No discrete marrow lesion      CERVICAL AND VISUALIZED UPPER THORACIC CORD:  Normal signal within the visualized cord      PREVERTEBRAL AND PARASPINAL SOFT TISSUES:  Normal      VISUALIZED POSTERIOR FOSSA:  The visualized posterior fossa demonstrates no abnormal signal      CERVICAL DISC SPACES:     C2-C3:  Normal      C3-C4:  Diffuse annular bulge slightly eccentric to the left  Minimal central stenosis without foraminal narrowing      C4-C5:  Degenerative disc osteophyte complex with marginal osteophytes results in mild to moderate bilateral foraminal narrowing and mild to moderate central stenosis with slight flattening of the right side of the cord  No cord signal abnormality      C5-C6:  Small right paramedian broad-based protrusion contacts the right C6 ventral nerve root  Correlate for C6 radiculopathy  Mild central stenosis      C6-C7:  This level has been fused    No central or foraminal narrowing      C7-T1:  No central or foraminal narrowing        UPPER THORACIC DISC SPACES: Normal      POSTCONTRAST IMAGING:  Normal      IMPRESSION:     Degenerative changes are seen particularly C4-5 and C5-6  A right paramedian protrusion type disc herniation at C5-6 contacts the right C6 nerve root  There is mild to moderate foraminal narrowing and central stenosis at C4-5               Workstation performed: [id number]     Study Result    Narrative & Impression   CERVICAL SPINE     INDICATION:   R25 1: Tremor, unspecified  R51 9: Headache, unspecified  M54 12: Radiculopathy, cervical region  M47 812: Spondylosis without myelopathy or radiculopathy, cervical region      COMPARISON:  1/17/2020     VIEWS:  XR SPINE CERVICAL COMPLETE 6+ VW FLEX /EXT /OBL   Images: 7     FINDINGS: Stable ACDF hardware at C6-7      No fracture      Slight reversal of the normal cervical lordosis  Mild degenerative retrolisthesis of C4 on C5      Moderate degenerative changes at multiple discs, most pronounced at C4-5 and C5-6  There are also degenerative changes in the adjacent uncovertebral joint particularly at C4-5    Narrowed neural foramina bilaterally at C4-5      No evidence of dynamic instability seen with flexion or extension      The prevertebral soft tissues are within normal limits      The lung apices are clear      IMPRESSION:     Stable ACDF findings at C6-7      Degenerative changes are noted with bilaterally narrowed neural foramina at C4-5      No evidence for dynamic instability on flexion or extension      No acute osseous abnormalities         Workstation performed: [id number]

## 2022-06-08 DIAGNOSIS — N52.9 ERECTILE DYSFUNCTION, UNSPECIFIED ERECTILE DYSFUNCTION TYPE: ICD-10-CM

## 2022-06-08 RX ORDER — TADALAFIL 20 MG/1
20 TABLET ORAL AS NEEDED
Qty: 10 TABLET | Refills: 0 | Status: SHIPPED | OUTPATIENT
Start: 2022-06-08 | End: 2022-06-27 | Stop reason: SDUPTHER

## 2022-06-27 RX ORDER — TADALAFIL 20 MG/1
TABLET ORAL
Qty: 18 TABLET | Refills: 3 | Status: SHIPPED | OUTPATIENT
Start: 2022-06-27

## 2022-06-27 NOTE — TELEPHONE ENCOUNTER
Script refill request received from the patient's mail order plan  The patient was last seen on 5/10/22 by MIKHAIL Castrejon in the [place] location; continuation of the medication was authorized at that time    Request for same, 90 day supply (18 tablets) with 3 refills was queued and forwarded to the Advanced Practitioner covering the [place] location for approval

## 2022-06-29 ENCOUNTER — TELEPHONE (OUTPATIENT)
Dept: NEUROLOGY | Facility: CLINIC | Age: 52
End: 2022-06-29

## 2022-06-29 NOTE — TELEPHONE ENCOUNTER
kay for patient to call back to schedule 3m follow up with [phone]Miguel cardoza in august [place] 06/29/22

## 2022-07-11 ENCOUNTER — HOSPITAL ENCOUNTER (OUTPATIENT)
Dept: RADIOLOGY | Facility: CLINIC | Age: 52
Discharge: HOME | End: 2022-07-11
Admitting: ANESTHESIOLOGY
Payer: COMMERCIAL

## 2022-07-11 VITALS
SYSTOLIC BLOOD PRESSURE: 135 MMHG | HEART RATE: 64 BPM | DIASTOLIC BLOOD PRESSURE: 88 MMHG | RESPIRATION RATE: 20 BRPM | OXYGEN SATURATION: 96 % | TEMPERATURE: 97.4 F

## 2022-07-11 DIAGNOSIS — M54.12 CERVICAL RADICULOPATHY: ICD-10-CM

## 2022-07-11 PROCEDURE — 62321 NJX INTERLAMINAR CRV/THRC: CPT | Performed by: ANESTHESIOLOGY

## 2022-07-11 RX ORDER — PAPAVERINE HCL 150 MG
10 CAPSULE, EXTENDED RELEASE ORAL ONCE
Status: COMPLETED | OUTPATIENT
Start: 2022-07-11 | End: 2022-07-11

## 2022-07-11 RX ADMIN — DEXAMETHASONE SODIUM PHOSPHATE 10 MG: 10 INJECTION, SOLUTION INTRAMUSCULAR; INTRAVENOUS at 15:29

## 2022-07-11 NOTE — DISCHARGE INSTRUCTIONS
Epidural Steroid Injection   WHAT YOU NEED TO KNOW:   An epidural steroid injection (ROSAURA) is a procedure to inject steroid medicine into the epidural space  The epidural space is between your spinal cord and vertebrae  Steroids reduce inflammation and fluid buildup in your spine that may be causing pain  You may be given pain medicine along with the steroids  ACTIVITY  Do not drive or operate machinery today  No strenuous activity today - bending, lifting, etc   You may resume normal activites starting tomorrow - start slowly and as tolerated  You may shower today, but no tub baths or hot tubs  You may have numbness for several hours from the local anesthetic  Please use caution and common sense, especially with weight-bearing activities  CARE OF THE INJECTION SITE  If you have soreness or pain, apply ice to the area today (20 minutes on/20 minutes off)  Starting tomorrow, you may use warm, moist heat or ice if needed  You may have an increase or change in your discomfort for 36-48 hours after your treatment  Apply ice and continue with any pain medication you have been prescribed  Notify the [facility] if you have any of the following: redness, drainage, swelling, headache, stiff neck or fever above 100°F     SPECIAL INSTRUCTIONS  Our office will contact you in approximately 7 days for a progress report  MEDICATIONS  Continue to take all routine medications  Our office may have instructed you to hold some medications  As no general anesthesia was used in today's procedure, you should not experience any side effects related to anesthesia  If you have a problem specifically related to your procedure, please call our office at [phone]  Problems not related to your procedure should be directed to your primary care physician

## 2022-07-11 NOTE — H&P
History of Present Illness: The patient is a 45 y o  male who presents with complaints of neck and arm pain      Patient Active Problem List   Diagnosis    S/P cervical spinal fusion    Cervical radiculopathy    Cervical stenosis of spinal canal    HNP (herniated nucleus pulposus), thoracic    Cervical spondylosis without myelopathy    Neck pain    [facility] mellitus ([facility] [address] [facility]    Hypertension, essential    Other hyperlipidemia    Episode of shaking    Acute nonintractable headache    Left lower quadrant abdominal pain    Right flank pain    Penile bleeding    Scrotal pain    COVID-19 virus infection    Costochondritis    Esophagitis    Tremor, essential       Past Medical History:   Diagnosis Date    Achalasia     Aneurysm ([facility] [address] [facility]     aortic    Colon polyp     [facility]     Esophageal ulcer     Groin abscess     with I &D    Hyperlipidemia     Hypertension        Past Surgical History:   Procedure Laterality Date    ANTERIOR CERVICAL DISCECTOMY W/ FUSION      APPENDECTOMY      BICEPS TENDON REPAIR      EGD AND COLONOSCOPY      FL INJECTION LEFT WRIST (ARTHROGRAM)  11/9/2021    MENISCECTOMY      MYOTOMY HELLER LAPAROSCOPIC      ORTHOPEDIC SURGERY           Current Outpatient Medications:     aluminum-magnesium hydroxide-simethicone (MAALOX MAX) 400-400-40 MG/5ML suspension, Take 10 mL by mouth every 6 (six) hours as needed for indigestion or heartburn, Disp: 355 mL, Rfl: 1    Ascorbic Acid (vitamin C) 1000 MG tablet, Take 1,000 mg by mouth daily  , Disp: , Rfl:     baclofen 20 mg tablet, Take 1 tablet (20 mg total) by mouth daily at bedtime as needed for muscle spasms, Disp: 90 tablet, Rfl: 2    diphenhydrAMINE-acetaminophen (TYLENOL PM)  MG TABS, Take 1 tablet by mouth daily at bedtime as needed for sleep , Disp: , Rfl:     famotidine (PEPCID) 20 mg tablet, Take 1 tablet (20 mg total) by mouth 2 (two) times a day, Disp: 90 tablet, Rfl: 0    lisinopril (ZESTRIL) 40 mg tablet, Take 1 tablet (40 mg total) by mouth daily, Disp: 90 tablet, Rfl: 1    metFORMIN (GLUCOPHAGE) 500 mg tablet, Pt takes 1500 mg (3 tablets) by mouth in the morning, and a 1000 mg (2 tablets) in the evening, Disp: 450 tablet, Rfl: 1    omega-3-acid ethyl esters (LOVAZA) 1 g capsule, Take 1 g by mouth daily, Disp: , Rfl:     pantoprazole (PROTONIX) 40 mg tablet, Take 1 tablet (40 mg total) by mouth daily, Disp: 30 tablet, Rfl: 3    simvastatin (ZOCOR) 20 mg tablet, Take 1 tablet (20 mg total) by mouth daily, Disp: 90 tablet, Rfl: 1    tadalafil (CIALIS) 20 MG tablet, Take 1 tablet by mouth one (1) hour prior to intercourse on an empty stomach with no alcohol    Limit to 2 tablets per week , Disp: 18 tablet, Rfl: 3    tamsulosin (FLOMAX) 0 4 mg, TAKE 1 CAPSULE(0 4 MG) BY MOUTH DAILY WITH DINNER, Disp: 30 capsule, Rfl: 3    No Known Allergies    Physical Exam:   Vitals:    07/11/22 1514   BP: 131/80   Pulse: 72   Resp: 20   Temp: (!) 97 4 °F (36 3 °C)   SpO2: 93%     General: Awake, Alert, Oriented x 3, Mood and affect appropriate  Respiratory: Respirations even and unlabored  Cardiovascular: Peripheral pulses intact; no edema  Musculoskeletal Exam:  Bilateral cervical paraspinals tender to palpation    ASA Score: 3         Assessment:  Cervical radiculopathy    Plan: C6-7 JERRY

## 2022-07-15 ENCOUNTER — VBI (OUTPATIENT)
Dept: ADMINISTRATIVE | Facility: OTHER | Age: 52
End: 2022-07-15

## 2022-07-18 ENCOUNTER — TELEPHONE (OUTPATIENT)
Dept: PAIN MEDICINE | Facility: CLINIC | Age: 52
End: 2022-07-18

## 2022-07-18 NOTE — TELEPHONE ENCOUNTER
Pt reports 20-30% improvement post inj   He said he has numbness/tingling    Pt aware I will call next week for an update

## 2022-08-01 ENCOUNTER — TELEMEDICINE (OUTPATIENT)
Dept: INTERNAL MEDICINE CLINIC | Age: 52
End: 2022-08-01
Payer: COMMERCIAL

## 2022-08-01 VITALS
DIASTOLIC BLOOD PRESSURE: 93 MMHG | HEIGHT: 73 IN | WEIGHT: 266 LBS | SYSTOLIC BLOOD PRESSURE: 161 MMHG | TEMPERATURE: 96.7 F | HEART RATE: 59 BPM | BODY MASS INDEX: 35.25 KG/M2 | OXYGEN SATURATION: 96 %

## 2022-08-01 DIAGNOSIS — J02.9 SORE THROAT: Primary | ICD-10-CM

## 2022-08-01 DIAGNOSIS — J06.9 VIRAL UPPER RESPIRATORY TRACT INFECTION: ICD-10-CM

## 2022-08-01 DIAGNOSIS — I10 HYPERTENSION, ESSENTIAL: ICD-10-CM

## 2022-08-01 LAB
S PYO AG THROAT QL: NEGATIVE
SARS-COV-2 AG UPPER RESP QL IA: NEGATIVE
VALID CONTROL: NORMAL

## 2022-08-01 PROCEDURE — 3077F SYST BP >= 140 MM HG: CPT | Performed by: INTERNAL MEDICINE

## 2022-08-01 PROCEDURE — 87880 STREP A ASSAY W/OPTIC: CPT | Performed by: INTERNAL MEDICINE

## 2022-08-01 PROCEDURE — 99213 OFFICE O/P EST LOW 20 MIN: CPT | Performed by: INTERNAL MEDICINE

## 2022-08-01 PROCEDURE — 87811 SARS-COV-2 COVID19 W/OPTIC: CPT | Performed by: INTERNAL MEDICINE

## 2022-08-01 PROCEDURE — 3080F DIAST BP >= 90 MM HG: CPT | Performed by: INTERNAL MEDICINE

## 2022-08-01 NOTE — ASSESSMENT & PLAN NOTE
Avoid decongestants  mucinex and flonase for congestion  Monitor BP at home  Call if continues to be high

## 2022-08-01 NOTE — PROGRESS NOTES
Virtual Regular Visit    Verification of patient location:    Patient is located in the following state in which I hold an active license [place]      Assessment/Plan:    Problem List Items Addressed This Visit        Respiratory    Viral upper respiratory tract infection     Pt came to office for rapid COVID ag and strep tests  Both negative  Increase fluids, rest  Avoid decongestants  May use flonase 2 sprays q nostril daily  Call if symptoms worsen  Work note provided, still recommended wearing mask at work            Cardiovascular and Mediastinum    Hypertension, essential     Avoid decongestants  mucinex and flonase for congestion  Monitor BP at home  Call if continues to be high            Other    Sore throat - Primary    Relevant Orders    POCT Rapid Covid Ag (Completed)    POCT rapid strepA (Completed)               Reason for visit is   Chief Complaint   Patient presents with    COVID-19     sore throat, coughing, bodyaches, HA - negative  covid test 22    Virtual Regular Visit        Encounter provider Umer Pop PA-C    Provider located at [address] [address]      Recent Visits  No visits were found meeting these conditions  Showing recent visits within past 7 days and meeting all other requirements  Today's Visits  Date [facility] Provider Dept   [facility]   Showing today's visits and meeting all other requirements  Future Appointments  No visits were found meeting these conditions  Showing future appointments within next 150 days and meeting all other requirements       The patient was identified by name and date of birth  Allan Barker was informed that this is a telemedicine visit and that the visit is being conducted through [address] [place] [address] Now and patient was informed that this is a secure, HIPAA-compliant platform   He agrees to proceed     My office door was closed  No one else was in the room  He acknowledged consent and understanding of privacy and security of the video platform  The patient has agreed to participate and understands they can discontinue the visit at any time  Patient is aware this is a billable service  Subjective  Cheikh Poon is a 45 y o  male  With sore throat 2   Started on the left but now on the right  Sore throat described as "burning"  Notes hoarseness of his voice  + coughing worse at night  Took nyquil with some relief  He took a home COVID test on  that was negative but realized the test   He has bilateral back pain R>L  Cannot identify anything that makes it worse  Temp today 96 7  BP has been running 150s/90s  He took tylenol pm sat night  He states the back of his throat is very red  He is able to eat and drink  He has a slight stomach ache after eating  Very mild headache, similar to chronic headaches  He has + sick contacts in wife with "undiagnosed laryngitis" last week  He had the initial COVID series with 2 boosters with last one on 22  He has asthma but no issues with breathing  No loss of taste or smell  Pt works for [employer] as a   Sore Throat   This is a new problem  Episode onset: Friday  The problem has been gradually worsening  There has been no fever  The pain is moderate  Associated symptoms include abdominal pain, congestion, coughing, headaches and a hoarse voice  Pertinent negatives include no diarrhea, drooling, ear discharge, ear pain, plugged ear sensation, shortness of breath, trouble swallowing or vomiting          Past Medical History:   Diagnosis Date    Achalasia     Aneurysm ([facility] )     aortic    Asthma     Childhood    Colon polyp     [facility]     Esophageal ulcer     Groin abscess     with I &D    Hyperlipidemia     Hypertension        Past Surgical History:   Procedure Laterality Date  ANTERIOR CERVICAL DISCECTOMY W/ FUSION      APPENDECTOMY      BICEPS TENDON REPAIR      EGD AND COLONOSCOPY      FL INJECTION LEFT WRIST (ARTHROGRAM)  11/9/2021    MENISCECTOMY      MYOTOMY HELLER LAPAROSCOPIC      ORTHOPEDIC SURGERY         Current Outpatient Medications   Medication Sig Dispense Refill    Ascorbic Acid (vitamin C) 1000 MG tablet Take 1,000 mg by mouth daily        baclofen 20 mg tablet Take 1 tablet (20 mg total) by mouth daily at bedtime as needed for muscle spasms 90 tablet 2    CINNAMON PO Take by mouth      diphenhydrAMINE-acetaminophen (TYLENOL PM)  MG TABS Take 1 tablet by mouth daily at bedtime as needed for sleep       famotidine (PEPCID) 20 mg tablet Take 1 tablet (20 mg total) by mouth 2 (two) times a day 90 tablet 0    lisinopril (ZESTRIL) 40 mg tablet Take 1 tablet (40 mg total) by mouth daily 90 tablet 1    metFORMIN (GLUCOPHAGE) 500 mg tablet Pt takes 1500 mg (3 tablets) by mouth in the morning, and a 1000 mg (2 tablets) in the evening 450 tablet 1    omega-3-acid ethyl esters (LOVAZA) 1 g capsule Take 1 g by mouth daily      pantoprazole (PROTONIX) 40 mg tablet Take 1 tablet (40 mg total) by mouth daily 30 tablet 3    simvastatin (ZOCOR) 20 mg tablet Take 1 tablet (20 mg total) by mouth daily 90 tablet 1    tadalafil (CIALIS) 20 MG tablet Take 1 tablet by mouth one (1) hour prior to intercourse on an empty stomach with no alcohol  Limit to 2 tablets per week  18 tablet 3    tamsulosin (FLOMAX) 0 4 mg TAKE 1 CAPSULE(0 4 MG) BY MOUTH DAILY WITH DINNER 30 capsule 3    aluminum-magnesium hydroxide-simethicone (MAALOX MAX) 400-400-40 MG/5ML suspension Take 10 mL by mouth every 6 (six) hours as needed for indigestion or heartburn (Patient not taking: Reported on 8/1/2022) 355 mL 1     No current facility-administered medications for this visit  No Known Allergies    Review of Systems   Constitutional: Positive for chills and fatigue   Negative for fever    HENT: Positive for congestion, hoarse voice, postnasal drip and sore throat  Negative for drooling, ear discharge, ear pain and trouble swallowing  Eyes: Negative for visual disturbance  Respiratory: Positive for cough  Negative for shortness of breath and wheezing  Cardiovascular: Negative for chest pain, palpitations and leg swelling  Gastrointestinal: Positive for abdominal pain  Negative for constipation, diarrhea, nausea and vomiting  Musculoskeletal: Positive for back pain  Neurological: Positive for headaches  Negative for dizziness  Psychiatric/Behavioral: Negative for dysphoric mood  The patient is not nervous/anxious  Video Exam    Vitals:    08/01/22 0927   BP: 161/93   BP Location: Left arm   Patient Position: Sitting   Cuff Size: Standard   Pulse: 59   Temp: (!) 96 7 °F (35 9 °C)   TempSrc: Oral   SpO2: 96%   Weight: 121 kg (266 lb)   Height: 6' 1" (1 854 m)       Physical Exam  Vitals reviewed  Constitutional:       Appearance: He is obese  Comments: No distress  Drinking a large glass of orange juice during encounter  HENT:      Nose: Nose normal       Mouth/Throat:      Pharynx: Posterior oropharyngeal erythema present  No oropharyngeal exudate  Comments: Pt held phone to mouth  Posterior pharynx erythematous, airway patent  No visible exudates  Neck:      Comments: Mild tenderness to self palpation at lymph nodes but pt does not feel anything  Pulmonary:      Effort: Pulmonary effort is normal  No respiratory distress  Musculoskeletal:      Cervical back: Normal range of motion and neck supple  I spent 17 minutes directly with the patient during this visit    VIRTUAL VISIT DISCLAIMER      Latoya Arana verbally agrees to participate in [employer]   Pt is aware that [employer] could be limited without vital signs or the ability to perform a full hands-on physical Laure Levine understands he or the provider may request at any time to terminate the video visit and request the patient to seek care or treatment in person

## 2022-08-01 NOTE — ASSESSMENT & PLAN NOTE
Pt came to office for rapid COVID ag and strep tests  Both negative  Increase fluids, rest  Avoid decongestants  May use flonase 2 sprays q nostril daily  Call if symptoms worsen  Work note provided, still recommended wearing mask at work

## 2022-08-01 NOTE — LETTER
August 1, 2022     Patient: Wendy Pardo  YOB: 1970  Date of Visit: 8/1/2022      To Whom it May Concern:    Wendy Pardo is under my professional care  Ebb Sender was seen in my office on 8/1/2022  Ebb Sender may return to work on 8/3/22  If you have any questions or concerns, please don't hesitate to call           Sincerely,          Lucero Servin PA-C        CC: No Recipients

## 2022-08-10 ENCOUNTER — OFFICE VISIT (OUTPATIENT)
Dept: PAIN MEDICINE | Facility: CLINIC | Age: 52
End: 2022-08-10
Payer: COMMERCIAL

## 2022-08-10 VITALS
BODY MASS INDEX: 35.52 KG/M2 | DIASTOLIC BLOOD PRESSURE: 80 MMHG | HEART RATE: 61 BPM | WEIGHT: 268 LBS | HEIGHT: 73 IN | SYSTOLIC BLOOD PRESSURE: 133 MMHG

## 2022-08-10 DIAGNOSIS — M54.12 CERVICAL RADICULOPATHY: Primary | ICD-10-CM

## 2022-08-10 DIAGNOSIS — M48.02 CERVICAL STENOSIS OF SPINAL CANAL: ICD-10-CM

## 2022-08-10 DIAGNOSIS — Z98.1 S/P CERVICAL SPINAL FUSION: ICD-10-CM

## 2022-08-10 PROCEDURE — 99214 OFFICE O/P EST MOD 30 MIN: CPT | Performed by: NURSE PRACTITIONER

## 2022-08-10 RX ORDER — DULOXETIN HYDROCHLORIDE 30 MG/1
30 CAPSULE, DELAYED RELEASE ORAL
Qty: 30 CAPSULE | Refills: 0 | Status: SHIPPED | OUTPATIENT
Start: 2022-08-10 | End: 2022-09-14

## 2022-08-10 NOTE — PROGRESS NOTES
Assessment:  1  Cervical radiculopathy    2  S/P cervical spinal fusion    3  Cervical stenosis of spinal canal        Plan:  1  I will trial the patient on duloxetine 30 mg daily for neuropathic complaints  The patient denied being prescribed any anti-depressant and/or psychiatric medications  I reviewed with the patient that it may take 3-4 weeks for the medication's effects to be noticed and that it should never be abruptly stopped  Possible side effects include but are not limited to; vertigo, lethargy, nausea, worsening depression/anxiety, and confusion  I advised the patient to call our office if they experience any side effects  The patient verbalized an understanding  2  May consider repeating cervical epidural steroid injection  3  Patient will continue with his home exercise program  4  Patient may continue Tylenol p r n  and should not exceed more than 3000 mg in 24 hours   5  Patient will follow-up in 4 weeks or sooner if needed    My impressions and treatment recommendations were discussed in detail with the patient who verbalized understanding and had no further questions  Discharge instructions were provided  I personally saw and examined the patient and I agree with the above discussed plan of care  No orders of the defined types were placed in this encounter  New Medications Ordered This Visit   Medications    DULoxetine (Cymbalta) 30 mg delayed release capsule     Sig: Take 1 capsule (30 mg total) by mouth daily at bedtime     Dispense:  30 capsule     Refill:  0       History of Present Illness:  Mulugeta Scott is a 45 y o  male with a history of C6-7 ACDF in 2018 who presents for a follow up office visit in regards to neck pain that radiates into the bilateral shoulders, biceps and into the some and pointer finger of the left hand with associated dysesthesias, paresthesias and subjective weakness  Patient denies bowel or bladder incontinence or balance issues    MRI of the cervical spine with and without contrast from October 2021 reveals a stable ACDF at C6-7 with degenerative disc disease, spondylosis, and disc bulging from C3-4 to C5-6 which results in mild central stenosis at C5-6  A disc protrusion also contacts the right C6 nerve root  He is status post C6-7 cervical epidural steroid injection on July Levin 2022 and reports minimal improvement of his symptoms  He did try gabapentin in the past which caused side effects  He has never tried duloxetine  He prefers to avoid opioid medications secondary to job duties  He had been evaluated by neurosurgery in 2021 and did not feel surgical intervention was required at time  He rates his pain a 5/10 on the numeric pain rating scale  He constantly has pain at night which is described as dull aching and sharp    I have personally reviewed and/or updated the patient's past medical history, past surgical history, family history, social history, current medications, allergies, and vital signs today  Review of Systems   Musculoskeletal:        JOINT STIFFNESS  PAIN IN LEFT ARM AND HAND, AT TIMES IN RIGHT ARM   DECREASED ROM    Allergic/Immunologic: Negative  Neurological: Negative  Hematological: Negative  Psychiatric/Behavioral: Negative  All other systems reviewed and are negative        Patient Active Problem List   Diagnosis    S/P cervical spinal fusion    Cervical radiculopathy    Cervical stenosis of spinal canal    HNP (herniated nucleus pulposus), thoracic    Cervical spondylosis without myelopathy    Neck pain    Diabetes mellitus ([address]    Hypertension, essential    Other hyperlipidemia    Episode of shaking    Acute nonintractable headache    Viral upper respiratory tract infection    Left lower quadrant abdominal pain    Right flank pain    Penile bleeding    Scrotal pain    COVID-19 virus infection    Costochondritis    Esophagitis    Tremor, essential    Sore throat       Past Medical History:   Diagnosis Date    Achalasia     [facility]     aortic    Asthma     Childhood    Colon polyp     Diabetes [facility]     Esophageal ulcer     Groin abscess     with I &D    Hyperlipidemia     Hypertension        Past Surgical History:   Procedure Laterality Date    ANTERIOR CERVICAL DISCECTOMY W/ FUSION      APPENDECTOMY      BICEPS TENDON REPAIR      EGD AND COLONOSCOPY      FL INJECTION LEFT WRIST (ARTHROGRAM)  11/9/2021    MENISCECTOMY      MYOTOMY HELLER LAPAROSCOPIC      ORTHOPEDIC SURGERY         Family History   Problem Relation Age of Onset    No Known Problems Mother     No Known Problems Father        Social History     Occupational History    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   Tobacco Use    Smoking status: Never Smoker    Smokeless tobacco: Never Used   Vaping Use    Vaping Use: Never used   Substance and Sexual Activity    Alcohol use: Not Currently    Drug use: Never    Sexual activity: Yes     Partners: Female       Current Outpatient Medications on File Prior to Visit   Medication Sig    Ascorbic Acid (vitamin C) 1000 MG tablet Take 1,000 mg by mouth daily      baclofen 20 mg tablet Take 1 tablet (20 mg total) by mouth daily at bedtime as needed for muscle spasms    CINNAMON PO Take by mouth    diphenhydrAMINE-acetaminophen (TYLENOL PM)  MG TABS Take 1 tablet by mouth daily at bedtime as needed for sleep     famotidine (PEPCID) 20 mg tablet Take 1 tablet (20 mg total) by mouth 2 (two) times a day    lisinopril (ZESTRIL) 40 mg tablet Take 1 tablet (40 mg total) by mouth daily    metFORMIN (GLUCOPHAGE) 500 mg tablet Pt takes 1500 mg (3 tablets) by mouth in the morning, and a 1000 mg (2 tablets) in the evening    omega-3-acid ethyl esters (LOVAZA) 1 g capsule Take 1 g by mouth daily    pantoprazole (PROTONIX) 40 mg tablet Take 1 tablet (40 mg total) by mouth daily    simvastatin (ZOCOR) 20 mg tablet Take 1 tablet (20 mg total) by mouth daily    aluminum-magnesium hydroxide-simethicone (MAALOX MAX) 400-400-40 MG/5ML suspension Take 10 mL by mouth every 6 (six) hours as needed for indigestion or heartburn    tadalafil (CIALIS) 20 MG tablet Take 1 tablet by mouth one (1) hour prior to intercourse on an empty stomach with no alcohol  Limit to 2 tablets per week   tamsulosin (FLOMAX) 0 4 mg TAKE 1 CAPSULE(0 4 MG) BY MOUTH DAILY WITH DINNER     No current facility-administered medications on file prior to visit  No Known Allergies    Physical Exam:    /80 (BP Location: Right arm, Patient Position: Sitting)   Pulse 61   Ht 6' 1" (1 854 m)   Wt 122 kg (268 lb)   BMI 35 36 kg/m²     Constitutional:normal, well developed, well nourished, alert, in no distress and non-toxic and no overt pain behavior  Eyes:anicteric  HEENT:grossly intact  Neck:supple, symmetric, trachea midline and no masses   Pulmonary:even and unlabored  Cardiovascular:No edema or pitting edema present  Skin:Normal without rashes or lesions and well hydrated  Psychiatric:Mood and affect appropriate  Neurologic:Cranial Nerves II-XII grossly intact  Musculoskeletal:normal gait  Positive Spurling's to the left    Imaging  MRI CERVICAL SPINE WITH AND WITHOUT CONTRAST     INDICATION: R25 1: Tremor, unspecified  R51 9: Headache, unspecified  M54 12: Radiculopathy, cervical region  M47 812: Spondylosis without myelopathy or radiculopathy, cervical region      COMPARISON:  None      TECHNIQUE:  Sagittal T1, sagittal T2, sagittal inversion recovery, axial 2D merge and axial T2  Sagittal T1 and axial T1 postcontrast     IV Contrast:  12 mL of Gadobutrol injection (SINGLE-DOSE)      IMAGE QUALITY:  Diagnostic      FINDINGS:     ALIGNMENT:  There is straightening of the cervical lordosis  Patient is status post ACDF at C6-7      MARROW SIGNAL:  Normal marrow signal is identified within the visualized bony structures    No discrete marrow lesion      CERVICAL AND VISUALIZED UPPER THORACIC CORD:  Normal signal within the visualized cord      PREVERTEBRAL AND PARASPINAL SOFT TISSUES:  Normal      VISUALIZED POSTERIOR FOSSA:  The visualized posterior fossa demonstrates no abnormal signal      CERVICAL DISC SPACES:     C2-C3:  Normal      C3-C4:  Diffuse annular bulge slightly eccentric to the left  Minimal central stenosis without foraminal narrowing      C4-C5:  Degenerative disc osteophyte complex with marginal osteophytes results in mild to moderate bilateral foraminal narrowing and mild to moderate central stenosis with slight flattening of the right side of the cord  No cord signal abnormality      C5-C6:  Small right paramedian broad-based protrusion contacts the right C6 ventral nerve root  Correlate for C6 radiculopathy  Mild central stenosis      C6-C7:  This level has been fused  No central or foraminal narrowing      C7-T1:  No central or foraminal narrowing        UPPER THORACIC DISC SPACES:  Normal      POSTCONTRAST IMAGING:  Normal      IMPRESSION:     Degenerative changes are seen particularly C4-5 and C5-6  A right paramedian protrusion type disc herniation at C5-6 contacts the right C6 nerve root    There is mild to moderate foraminal narrowing and central stenosis at C4-5

## 2022-08-22 DIAGNOSIS — N40.1 BENIGN PROSTATIC HYPERPLASIA WITH URINARY FREQUENCY: ICD-10-CM

## 2022-08-22 DIAGNOSIS — R35.0 BENIGN PROSTATIC HYPERPLASIA WITH URINARY FREQUENCY: ICD-10-CM

## 2022-08-22 RX ORDER — TAMSULOSIN HYDROCHLORIDE 0.4 MG/1
CAPSULE ORAL
Qty: 30 CAPSULE | Refills: 3 | Status: SHIPPED | OUTPATIENT
Start: 2022-08-22

## 2022-09-14 ENCOUNTER — OFFICE VISIT (OUTPATIENT)
Dept: PAIN MEDICINE | Facility: CLINIC | Age: 52
End: 2022-09-14
Payer: COMMERCIAL

## 2022-09-14 VITALS
SYSTOLIC BLOOD PRESSURE: 146 MMHG | DIASTOLIC BLOOD PRESSURE: 84 MMHG | WEIGHT: 268 LBS | HEIGHT: 73 IN | HEART RATE: 65 BPM | BODY MASS INDEX: 35.52 KG/M2

## 2022-09-14 DIAGNOSIS — M48.02 CERVICAL STENOSIS OF SPINAL CANAL: ICD-10-CM

## 2022-09-14 DIAGNOSIS — M79.18 MYOFASCIAL PAIN SYNDROME: ICD-10-CM

## 2022-09-14 DIAGNOSIS — M54.12 CERVICAL RADICULOPATHY: Primary | ICD-10-CM

## 2022-09-14 DIAGNOSIS — Z98.1 S/P CERVICAL SPINAL FUSION: ICD-10-CM

## 2022-09-14 DIAGNOSIS — M47.812 CERVICAL SPONDYLOSIS WITHOUT MYELOPATHY: ICD-10-CM

## 2022-09-14 DIAGNOSIS — G89.4 CHRONIC PAIN SYNDROME: ICD-10-CM

## 2022-09-14 PROCEDURE — 99214 OFFICE O/P EST MOD 30 MIN: CPT | Performed by: NURSE PRACTITIONER

## 2022-09-14 PROCEDURE — 3079F DIAST BP 80-89 MM HG: CPT | Performed by: NURSE PRACTITIONER

## 2022-09-14 PROCEDURE — 3077F SYST BP >= 140 MM HG: CPT | Performed by: NURSE PRACTITIONER

## 2022-09-14 RX ORDER — TIZANIDINE 2 MG/1
2 TABLET ORAL
Qty: 30 TABLET | Refills: 1 | Status: SHIPPED | OUTPATIENT
Start: 2022-09-14

## 2022-09-14 NOTE — PROGRESS NOTES
Assessment:  1  Cervical radiculopathy    2  Cervical spondylosis without myelopathy    3  S/P cervical spinal fusion    4  Cervical stenosis of spinal canal    5  Chronic pain syndrome    6  Myofascial pain syndrome        Plan:  1  Patient will be scheduled for repeat right C6-7 cervical epidural steroid injection  Complete risks and benefits including bleeding, infection, tissue reaction, nerve injury and allergic reaction were discussed  The patient was agreeable and verbalized an understanding  2  I will discontinue baclofen and trial tizanidine 2 mg q h s  p r n  myofascial pain  I advised the patient that they should not drive or operate machinery while on this medication until they see how it affects them, as it could cause lethargy and mental cloudyness  I advised the patient to call our office if they experience any side effects or issues with the medication changes  The patient verbalized an understanding  3  Discontinue duloxetine secondary to side effects   4  Continue with home exercise program   5  May consider cervical medial branch blocks depending on response to JERRY to address the facetogenic component of his neck pain  6  Follow-up after procedure or sooner if needed    History of Present Illness: The patient is a 45 y o  male with a history of C6-7 ACDF in 2018 last seen on 8/10/22 who presents for a follow up office visit in regards to chronic neck pain pain that will intermittently radiate into the right upper extremity to the forearm with associated paresthesias  Patient denies bowel or bladder incontinence or balance issues  Patient did have a cervical epidural steroid injection on July 11, 2022 with transient improvement of his symptoms  He has tried both gabapentin and duloxetine which caused side effects  He wishes to avoid sedating medications secondary to his employment  He has been evaluated by neurosurgery in 2021 who did not feel like he required surgery at that time  MRI of the cervical spine with and without contrast from October 2021 reveals a stable ACDF at C6-7 with degenerative disc disease, spondylosis, and disc bulging from C3-4 to C5-6 which results in mild central stenosis at C5-6  A disc protrusion also contacts the right C6 nerve root    Patient rates his pain a 6/10 on the numeric pain rating scale  He intermittently has pain in the morning and at night which is described as dull aching      I have personally reviewed and/or updated the patient's past medical history, past surgical history, family history, social history, current medications, allergies, and vital signs today  Review of Systems:    Review of Systems   Respiratory: Negative for shortness of breath  Cardiovascular: Negative for chest pain  Gastrointestinal: Negative for constipation, diarrhea, nausea and vomiting  Musculoskeletal: Negative for arthralgias, gait problem, joint swelling and myalgias  Skin: Negative for rash  Neurological: Negative for dizziness, seizures and weakness  All other systems reviewed and are negative          Past Medical History:   Diagnosis Date    Achalasia     [facility]neur[facility]m [facility] )     aortic    Asthma     Childhood    Colon polyp     Diabetes mellitus ([facility] )     Esophageal ulcer     Groin abscess     with I &D    Hyperlipidemia     Hypertension        Past Surgical History:   Procedure Laterality Date    ANTERIOR CERVICAL DISCECTOMY W/ FUSION      APPENDECTOMY      BICEPS TENDON REPAIR      EGD AND COLONOSCOPY      FL INJECTION LEFT WRIST (ARTHROGRAM)  11/9/2021    MENISCECTOMY      MYOTOMY HELLER LAPAROSCOPIC      ORTHOPEDIC SURGERY         Family History   Problem Relation Age of Onset    No Known Problems Mother     No Known Problems Father        Social History     Occupational History    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   Tobacco Use    Smoking status: Never Smoker    Smokeless tobacco: Never Used   Vaping Use    Vaping Use: Never used   Substance and Sexual Activity    Alcohol use: Not Currently    Drug use: Never    Sexual activity: Yes     Partners: Female         Current Outpatient Medications:     tiZANidine (ZANAFLEX) 2 mg tablet, Take 1 tablet (2 mg total) by mouth daily at bedtime as needed for muscle spasms, Disp: 30 tablet, Rfl: 1    aluminum-magnesium hydroxide-simethicone (MAALOX MAX) 400-400-40 MG/5ML suspension, Take 10 mL by mouth every 6 (six) hours as needed for indigestion or heartburn, Disp: 355 mL, Rfl: 1    Ascorbic Acid (vitamin C) 1000 MG tablet, Take 1,000 mg by mouth daily  , Disp: , Rfl:     CINNAMON PO, Take by mouth, Disp: , Rfl:     diphenhydrAMINE-acetaminophen (TYLENOL PM)  MG TABS, Take 1 tablet by mouth daily at bedtime as needed for sleep , Disp: , Rfl:     famotidine (PEPCID) 20 mg tablet, Take 1 tablet (20 mg total) by mouth 2 (two) times a day, Disp: 90 tablet, Rfl: 0    lisinopril (ZESTRIL) 40 mg tablet, Take 1 tablet (40 mg total) by mouth daily, Disp: 90 tablet, Rfl: 1    metFORMIN (GLUCOPHAGE) 500 mg tablet, Pt takes 1500 mg (3 tablets) by mouth in the morning, and a 1000 mg (2 tablets) in the evening, Disp: 450 tablet, Rfl: 1    omega-3-acid ethyl esters (LOVAZA) 1 g capsule, Take 1 g by mouth daily, Disp: , Rfl:     pantoprazole (PROTONIX) 40 mg tablet, Take 1 tablet (40 mg total) by mouth daily, Disp: 30 tablet, Rfl: 3    simvastatin (ZOCOR) 20 mg tablet, Take 1 tablet (20 mg total) by mouth daily, Disp: 90 tablet, Rfl: 1    tadalafil (CIALIS) 20 MG tablet, Take 1 tablet by mouth one (1) hour prior to intercourse on an empty stomach with no alcohol    Limit to 2 tablets per week , Disp: 18 tablet, Rfl: 3    tamsulosin (FLOMAX) 0 4 mg, TAKE 1 CAPSULE(0 4 MG) BY MOUTH DAILY WITH DINNER, Disp: 30 capsule, Rfl: 3    No Known Allergies    Physical Exam:    /84   Pulse 65   Ht 6' 1" (1 854 m)   Wt 122 kg (268 lb)   BMI 35 36 kg/m²     Constitutional:normal, well developed, well nourished, alert, in no distress and non-toxic and no overt pain behavior  Eyes:anicteric  HEENT:grossly intact  Neck:supple, symmetric, trachea midline and no masses   Pulmonary:even and unlabored  Cardiovascular:No edema or pitting edema present  Skin:Normal without rashes or lesions and well hydrated  Psychiatric:Mood and affect appropriate  Neurologic:Cranial Nerves II-XII grossly intact  Musculoskeletal:normal gait  Decreased range of motion with side bending of the cervical spine  Equivocal Spurling to the right      Imaging  FL spine and pain procedure    (Results Pending)     MRI CERVICAL SPINE WITH AND WITHOUT CONTRAST   INDICATION: R25 1: Tremor, unspecified   R51 9: Headache, unspecified   M54 12: Radiculopathy, cervical region   M47 812: Spondylosis without myelopathy or radiculopathy, cervical region  COMPARISON: None  TECHNIQUE: Sagittal T1, sagittal T2, sagittal inversion recovery, axial 2D merge and axial T2  Sagittal T1 and axial T1 postcontrast    IV Contrast: 12 mL of Gadobutrol injection (SINGLE-DOSE)   IMAGE QUALITY: Diagnostic  FINDINGS:   ALIGNMENT: There is straightening of the cervical lordosis  Patient is status post ACDF at C6-7  MARROW SIGNAL: Normal marrow signal is identified within the visualized bony structures  No discrete marrow lesion  CERVICAL AND VISUALIZED UPPER THORACIC CORD: Normal signal within the visualized cord  PREVERTEBRAL AND PARASPINAL SOFT TISSUES: Normal    VISUALIZED POSTERIOR FOSSA: The visualized posterior fossa demonstrates no abnormal signal    CERVICAL DISC SPACES:   C2-C3: Normal    C3-C4: Diffuse annular bulge slightly eccentric to the left  Minimal central stenosis without foraminal narrowing  C4-C5: Degenerative disc osteophyte complex with marginal osteophytes results in mild to moderate bilateral foraminal narrowing and mild to moderate central stenosis with slight flattening of the right side of the cord   No cord signal abnormality  C5-C6: Small right paramedian broad-based protrusion contacts the right C6 ventral nerve root  Correlate for C6 radiculopathy  Mild central stenosis  C6-C7: This level has been fused  No central or foraminal narrowing  C7-T1: No central or foraminal narrowing  UPPER THORACIC DISC SPACES: Normal    POSTCONTRAST IMAGING: Normal    IMPRESSION:   Degenerative changes are seen particularly C4-5 and C5-6  A right paramedian protrusion type disc herniation at C5-6 contacts the right C6 nerve root  There is mild to moderate foraminal narrowing and central stenosis at C4-5         Orders Placed This Encounter   Procedures    FL spine and pain procedure

## 2022-09-14 NOTE — PATIENT INSTRUCTIONS
Tizanidine (By mouth)   Tizanidine ([id number]-NXV-j-siomara)  Treats muscle spasticity  Brand Name(s): Zanaflex, Zanaflex Capsule   There may be other brand names for this medicine  When This Medicine Should Not Be Used: This medicine is not right for everyone  Do not use if you had an allergic reaction to tizanidine  How to Use This Medicine:   Capsule, Tablet  Take your medicine as directed  Your dose may need to be changed several times to find what works best for you  You may take this medicine with or without food, but always take it the same way every time  Tizanidine works differently depending on whether you take it on an empty stomach or a full stomach  Talk to your doctor if you have any questions about this  Missed dose: Take a dose as soon as you remember  If it is almost time for your next dose, wait until then and take a regular dose  Do not take extra medicine to make up for a missed dose  Store the medicine in a closed container at room temperature, away from heat, moisture, and direct light  Drugs and Foods to Avoid:   Ask your doctor or pharmacist before using any other medicine, including over-the-counter medicines, vitamins, and herbal products  Do not use this medicine together with ciprofloxacin or fluvoxamine  Some foods and medicines can affect how tizanidine works  Tell your doctor if you are using any of the following:  Acyclovir, baclofen, cimetidine, famotidine, ticlopidine, verapamil, zileuton  Birth control pills, blood pressure medicine, medicine for heart rhythm problems (such as amiodarone, mexiletine, propafenone), or medicine to treat an infection (such as levofloxacin, moxifloxacin)  Do not drink alcohol while you are using this medicine  Tell your doctor if you use anything else that makes you sleepy  Some examples are allergy medicine, narcotic pain medicine, and alcohol    Warnings While Using This Medicine:   Tell your doctor if you are pregnant or breastfeeding, or if you have kidney disease or liver disease  This medicine may cause the following problems:  Low blood pressure  Liver damage  This medicine may make you dizzy or drowsy  Do not drive or do anything else that could be dangerous until you know how this medicine affects you  Stand or sit up slowly if you are dizzy  Do not stop using this medicine suddenly  Your doctor will need to slowly decrease your dose before you stop it completely  Your doctor will do lab tests at regular visits to check on the effects of this medicine  Keep all appointments  Keep all medicine out of the reach of children  Never share your medicine with anyone  Possible Side Effects While Using This Medicine:   Call your doctor right away if you notice any of these side effects: Allergic reaction: Itching or hives, swelling in your face or hands, swelling or tingling in your mouth or throat, chest tightness, trouble breathing  Dark urine or pale stools, nausea, vomiting, loss of appetite, stomach pain, yellow skin or eyes  Lightheadedness, dizziness, or fainting  Seeing or hearing things that are not really there  Slow heartbeat  If you notice these less serious side effects, talk with your doctor:   Dry mouth  Drowsiness or sleepiness  Weakness  If you notice other side effects that you think are caused by this medicine, tell your doctor  Call your doctor for medical advice about side effects  You may report side effects to FDA at 4-486-FDA-5235    © Copyright [employer] 2022 Information is for End User's use only and may not be sold, redistributed or otherwise used for commercial purposes  The above information is an  only  It is not intended as medical advice for individual conditions or treatments  Talk to your doctor, nurse or pharmacist before following any medical regimen to see if it is safe and effective for you

## 2022-09-19 PROCEDURE — 4010F ACE/ARB THERAPY RXD/TAKEN: CPT | Performed by: NURSE PRACTITIONER

## 2022-09-26 ENCOUNTER — HOSPITAL ENCOUNTER (OUTPATIENT)
Dept: RADIOLOGY | Facility: CLINIC | Age: 52
Discharge: HOME | End: 2022-09-26
Payer: COMMERCIAL

## 2022-09-26 VITALS
RESPIRATION RATE: 20 BRPM | DIASTOLIC BLOOD PRESSURE: 88 MMHG | SYSTOLIC BLOOD PRESSURE: 161 MMHG | TEMPERATURE: 97.2 F | OXYGEN SATURATION: 94 % | HEART RATE: 75 BPM

## 2022-09-26 DIAGNOSIS — M54.12 CERVICAL RADICULOPATHY: ICD-10-CM

## 2022-09-26 PROCEDURE — 62321 NJX INTERLAMINAR CRV/THRC: CPT | Performed by: ANESTHESIOLOGY

## 2022-09-26 RX ORDER — PAPAVERINE HCL 150 MG
10 CAPSULE, EXTENDED RELEASE ORAL ONCE
Status: COMPLETED | OUTPATIENT
Start: 2022-09-26 | End: 2022-09-26

## 2022-09-26 RX ADMIN — DEXAMETHASONE SODIUM PHOSPHATE 10 MG: 10 INJECTION, SOLUTION INTRAMUSCULAR; INTRAVENOUS at 15:45

## 2022-09-26 RX ADMIN — IOHEXOL 1 ML: 300 INJECTION, SOLUTION INTRAVENOUS at 15:45

## 2022-09-26 NOTE — DISCHARGE INSTRUCTIONS
Epidural Steroid Injection   WHAT YOU NEED TO KNOW:   An epidural steroid injection (ROSAURA) is a procedure to inject steroid medicine into the epidural space  The epidural space is between your spinal cord and vertebrae  Steroids reduce inflammation and fluid buildup in your spine that may be causing pain  You may be given pain medicine along with the steroids  ACTIVITY  Do not drive or operate machinery today  No strenuous activity today - bending, lifting, etc   You may resume normal activites starting tomorrow - start slowly and as tolerated  You may shower today, but no tub baths or hot tubs  You may have numbness for several hours from the local anesthetic  Please use caution and common sense, especially with weight-bearing activities  CARE OF THE INJECTION SITE  If you have soreness or pain, apply ice to the area today (20 minutes on/20 minutes off)  Starting tomorrow, you may use warm, moist heat or ice if needed  You may have an increase or change in your discomfort for 36-48 hours after your treatment  Apply ice and continue with any pain medication you have been prescribed  Notify [facility] if you have any of the following: redness, drainage, swelling, headache, stiff neck or fever above 100°F     SPECIAL INSTRUCTIONS  Our office will contact you in approximately 7 days for a progress report  MEDICATIONS  Continue to take all routine medications  Our office may have instructed you to hold some medications  As no general anesthesia was used in today's procedure, you should not experience any side effects related to anesthesia  If you are diabetic, the steroids used in today's injection may temporarily increase your blood sugar levels after the first few days after your injection  Please keep a close eye on your sugars and alert the doctor who manages your diabetes if your sugars are significantly high from your baseline or you are symptomatic       If you have a problem specifically related to your procedure, please call our office at [phone]  Problems not related to your procedure should be directed to your primary care physician

## 2022-09-26 NOTE — H&P
History of Present Illness: The patient is a 45 y o  male who presents with complaints of neck and arm pain      Patient Active Problem List   Diagnosis    S/P cervical spinal fusion    Cervical radiculopathy    Cervical stenosis of spinal canal    HNP (herniated nucleus pulposus), thoracic    Cervical spondylosis without myelopathy    Neck pain    [facility]    Hypertension, essential    Other hyperlipidemia    Episode of shaking    Acute nonintractable headache    Viral upper respiratory tract infection    Left lower quadrant abdominal pain    Right flank pain    Penile bleeding    Scrotal pain    COVID-19 virus infection    Costochondritis    Esophagitis    Tremor, essential    Sore throat       Past Medical History:   Diagnosis Date    Achalasia     Aneurysm ([facility] )     aortic    Asthma     Childhood    Colon polyp     [facility]     Esophageal ulcer     Groin abscess     with I &D    Hyperlipidemia     Hypertension        Past Surgical History:   Procedure Laterality Date    ANTERIOR CERVICAL DISCECTOMY W/ FUSION      APPENDECTOMY      BICEPS TENDON REPAIR      EGD AND COLONOSCOPY      FL INJECTION LEFT WRIST (ARTHROGRAM)  11/9/2021    MENISCECTOMY      MYOTOMY HELLER LAPAROSCOPIC      ORTHOPEDIC SURGERY           Current Outpatient Medications:     aluminum-magnesium hydroxide-simethicone (MAALOX MAX) 400-400-40 MG/5ML suspension, Take 10 mL by mouth every 6 (six) hours as needed for indigestion or heartburn, Disp: 355 mL, Rfl: 1    Ascorbic Acid (vitamin C) 1000 MG tablet, Take 1,000 mg by mouth daily  , Disp: , Rfl:     CINNAMON PO, Take by mouth, Disp: , Rfl:     diphenhydrAMINE-acetaminophen (TYLENOL PM)  MG TABS, Take 1 tablet by mouth daily at bedtime as needed for sleep , Disp: , Rfl:     famotidine (PEPCID) 20 mg tablet, Take 1 tablet (20 mg total) by mouth 2 (two) times a day, Disp: 90 tablet, Rfl: 0    lisinopril (ZESTRIL) 40 mg tablet, Take 1 tablet (40 mg total) by mouth daily, Disp: 90 tablet, Rfl: 1    metFORMIN (GLUCOPHAGE) 500 mg tablet, Pt takes 1500 mg (3 tablets) by mouth in the morning, and a 1000 mg (2 tablets) in the evening, Disp: 450 tablet, Rfl: 1    omega-3-acid ethyl esters (LOVAZA) 1 g capsule, Take 1 g by mouth daily, Disp: , Rfl:     pantoprazole (PROTONIX) 40 mg tablet, Take 1 tablet (40 mg total) by mouth daily, Disp: 30 tablet, Rfl: 3    simvastatin (ZOCOR) 20 mg tablet, Take 1 tablet (20 mg total) by mouth daily, Disp: 90 tablet, Rfl: 1    tadalafil (CIALIS) 20 MG tablet, Take 1 tablet by mouth one (1) hour prior to intercourse on an empty stomach with no alcohol  Limit to 2 tablets per week , Disp: 18 tablet, Rfl: 3    tamsulosin (FLOMAX) 0 4 mg, TAKE 1 CAPSULE(0 4 MG) BY MOUTH DAILY WITH DINNER, Disp: 30 capsule, Rfl: 3    tiZANidine (ZANAFLEX) 2 mg tablet, Take 1 tablet (2 mg total) by mouth daily at bedtime as needed for muscle spasms, Disp: 30 tablet, Rfl: 1    Current Facility-Administered Medications:     dexamethasone (PF) (DECADRON) injection 10 mg, 10 mg, Epidural, Once, Jadon Wheeler DO    iohexol (OMNIPAQUE) 300 mg/mL injection 50 mL, 50 mL, Epidural, Once, Phil Wheeler, DO    No Known Allergies    Physical Exam:   Vitals:    09/26/22 1526   BP: 143/83   Pulse: 72   Resp: 18   Temp: (!) 97 2 °F (36 2 °C)   SpO2: 95%     General: Awake, Alert, Oriented x 3, Mood and affect appropriate  Respiratory: Respirations even and unlabored  Cardiovascular: Peripheral pulses intact; no edema  Musculoskeletal Exam:  Bilateral cervical paraspinals tender to palpation    ASA Score: 3    Patient/Chart Verification  Patient ID Verified: Verbal  ID Band Applied: No  Consents Confirmed: Procedural  H&P( within 30 days) Verified: To be obtained in the Pre-Procedure area  Interval H&P(within 24 hr) Complete (required for Outpatients and Surgery Admit only):  To be obtained in the Pre-Procedure area  Allergies Reviewed: Yes  Anticoag/NSAID held?: No  Currently on antibiotics?: No  Pre-op Lab/Test Results Available: N/A    Assessment:   1   Cervical radiculopathy        Plan: Right C6-7 JERRY

## 2022-10-03 ENCOUNTER — TELEPHONE (OUTPATIENT)
Dept: PAIN MEDICINE | Facility: CLINIC | Age: 52
End: 2022-10-03

## 2022-10-03 ENCOUNTER — APPOINTMENT (OUTPATIENT)
Dept: LAB | Age: 52
End: 2022-10-03
Payer: COMMERCIAL

## 2022-10-03 DIAGNOSIS — E11.9 TYPE 2 DIABETES MELLITUS WITHOUT COMPLICATION, WITHOUT LONG-TERM CURRENT USE OF INSULIN (HCC): ICD-10-CM

## 2022-10-03 LAB
ALBUMIN SERPL BCP-MCNC: 3.7 G/DL (ref 3.5–5)
ALP SERPL-CCNC: 65 U/L (ref 46–116)
ALT SERPL W P-5'-P-CCNC: 46 U/L (ref 12–78)
ANION GAP SERPL CALCULATED.3IONS-SCNC: 5 MMOL/L (ref 4–13)
AST SERPL W P-5'-P-CCNC: 19 U/L (ref 5–45)
BASOPHILS # BLD AUTO: 0.04 THOUSANDS/ΜL (ref 0–0.1)
BASOPHILS NFR BLD AUTO: 1 % (ref 0–1)
BILIRUB SERPL-MCNC: 0.71 MG/DL (ref 0.2–1)
BUN SERPL-MCNC: 13 MG/DL (ref 5–25)
CALCIUM SERPL-MCNC: 9.1 MG/DL (ref 8.3–10.1)
CHLORIDE SERPL-SCNC: 105 MMOL/L (ref 96–108)
CHOLEST SERPL-MCNC: 158 MG/DL
CO2 SERPL-SCNC: 25 MMOL/L (ref 21–32)
CREAT SERPL-MCNC: 0.87 MG/DL (ref 0.6–1.3)
EOSINOPHIL # BLD AUTO: 0.16 THOUSAND/ΜL (ref 0–0.61)
EOSINOPHIL NFR BLD AUTO: 2 % (ref 0–6)
ERYTHROCYTE [DISTWIDTH] IN BLOOD BY AUTOMATED COUNT: 12.2 % (ref 11.6–15.1)
EST. AVERAGE GLUCOSE BLD GHB EST-MCNC: 140 MG/DL
GFR SERPL CREATININE-BSD FRML MDRD: 99 ML/MIN/1.73SQ M
GLUCOSE P FAST SERPL-MCNC: 127 MG/DL (ref 65–99)
HBA1C MFR BLD: 6.5 %
HCT VFR BLD AUTO: 45.8 % (ref 36.5–49.3)
HDLC SERPL-MCNC: 38 MG/DL
HGB BLD-MCNC: 14.7 G/DL (ref 12–17)
IMM GRANULOCYTES # BLD AUTO: 0.1 THOUSAND/UL (ref 0–0.2)
IMM GRANULOCYTES NFR BLD AUTO: 1 % (ref 0–2)
LDLC SERPL CALC-MCNC: 85 MG/DL (ref 0–100)
LYMPHOCYTES # BLD AUTO: 2.24 THOUSANDS/ΜL (ref 0.6–4.47)
LYMPHOCYTES NFR BLD AUTO: 31 % (ref 14–44)
MCH RBC QN AUTO: 29.2 PG (ref 26.8–34.3)
MCHC RBC AUTO-ENTMCNC: 32.1 G/DL (ref 31.4–37.4)
MCV RBC AUTO: 91 FL (ref 82–98)
MONOCYTES # BLD AUTO: 0.77 THOUSAND/ΜL (ref 0.17–1.22)
MONOCYTES NFR BLD AUTO: 11 % (ref 4–12)
NEUTROPHILS # BLD AUTO: 4.01 THOUSANDS/ΜL (ref 1.85–7.62)
NEUTS SEG NFR BLD AUTO: 54 % (ref 43–75)
NONHDLC SERPL-MCNC: 120 MG/DL
NRBC BLD AUTO-RTO: 0 /100 WBCS
PLATELET # BLD AUTO: 220 THOUSANDS/UL (ref 149–390)
PMV BLD AUTO: 11.1 FL (ref 8.9–12.7)
POTASSIUM SERPL-SCNC: 3.9 MMOL/L (ref 3.5–5.3)
PROT SERPL-MCNC: 7.4 G/DL (ref 6.4–8.4)
RBC # BLD AUTO: 5.03 MILLION/UL (ref 3.88–5.62)
SODIUM SERPL-SCNC: 135 MMOL/L (ref 135–147)
TRIGL SERPL-MCNC: 177 MG/DL
WBC # BLD AUTO: 7.32 THOUSAND/UL (ref 4.31–10.16)

## 2022-10-03 PROCEDURE — 80053 COMPREHEN METABOLIC PANEL: CPT

## 2022-10-03 NOTE — TELEPHONE ENCOUNTER
Patient calling stating 0% improvement did not help and pain level 6-7/10  Patient is not able to sleep      Patient can be reached at [phone]

## 2022-10-10 NOTE — TELEPHONE ENCOUNTER
Pt reports still no improvement 2wk post inj   Pain level 6-4/10  He said he has been having pain in his back and down his arms

## 2022-10-11 PROBLEM — J06.9 VIRAL UPPER RESPIRATORY TRACT INFECTION: Status: RESOLVED | Noted: 2021-09-10 | Resolved: 2022-10-11

## 2022-10-19 ENCOUNTER — OFFICE VISIT (OUTPATIENT)
Dept: INTERNAL MEDICINE CLINIC | Facility: OTHER | Age: 52
End: 2022-10-19
Payer: COMMERCIAL

## 2022-10-19 VITALS
HEIGHT: 73 IN | WEIGHT: 262 LBS | OXYGEN SATURATION: 99 % | DIASTOLIC BLOOD PRESSURE: 78 MMHG | HEART RATE: 60 BPM | SYSTOLIC BLOOD PRESSURE: 116 MMHG | TEMPERATURE: 98 F | BODY MASS INDEX: 34.72 KG/M2

## 2022-10-19 DIAGNOSIS — Z23 NEEDS FLU SHOT: Primary | ICD-10-CM

## 2022-10-19 DIAGNOSIS — K20.90 ESOPHAGITIS: ICD-10-CM

## 2022-10-19 DIAGNOSIS — I10 HYPERTENSION, ESSENTIAL: ICD-10-CM

## 2022-10-19 DIAGNOSIS — E78.49 OTHER HYPERLIPIDEMIA: ICD-10-CM

## 2022-10-19 DIAGNOSIS — M54.12 CERVICAL RADICULOPATHY: ICD-10-CM

## 2022-10-19 DIAGNOSIS — E11.9 TYPE 2 DIABETES MELLITUS WITHOUT COMPLICATION, WITHOUT LONG-TERM CURRENT USE OF INSULIN (HCC): ICD-10-CM

## 2022-10-19 PROBLEM — R51.9 ACUTE NONINTRACTABLE HEADACHE: Status: RESOLVED | Noted: 2021-08-09 | Resolved: 2022-10-19

## 2022-10-19 PROBLEM — J02.9 SORE THROAT: Status: RESOLVED | Noted: 2022-08-01 | Resolved: 2022-10-19

## 2022-10-19 PROBLEM — N50.82 SCROTAL PAIN: Status: RESOLVED | Noted: 2021-12-13 | Resolved: 2022-10-19

## 2022-10-19 PROBLEM — R10.32 LEFT LOWER QUADRANT ABDOMINAL PAIN: Status: RESOLVED | Noted: 2021-12-13 | Resolved: 2022-10-19

## 2022-10-19 PROBLEM — R10.9 RIGHT FLANK PAIN: Status: RESOLVED | Noted: 2021-12-13 | Resolved: 2022-10-19

## 2022-10-19 PROBLEM — M48.02 CERVICAL STENOSIS OF SPINAL CANAL: Status: RESOLVED | Noted: 2020-01-03 | Resolved: 2022-10-19

## 2022-10-19 PROBLEM — N48.89 PENILE BLEEDING: Status: RESOLVED | Noted: 2021-12-13 | Resolved: 2022-10-19

## 2022-10-19 PROBLEM — R25.1 EPISODE OF SHAKING: Status: RESOLVED | Noted: 2021-08-09 | Resolved: 2022-10-19

## 2022-10-19 PROCEDURE — 99214 OFFICE O/P EST MOD 30 MIN: CPT | Performed by: NURSE PRACTITIONER

## 2022-10-19 RX ORDER — FAMOTIDINE 20 MG/1
20 TABLET, FILM COATED ORAL 2 TIMES DAILY
Qty: 90 TABLET | Refills: 1 | Status: SHIPPED | OUTPATIENT
Start: 2022-10-19

## 2022-10-19 RX ORDER — LISINOPRIL 40 MG/1
40 TABLET ORAL DAILY
Qty: 90 TABLET | Refills: 1 | Status: SHIPPED | OUTPATIENT
Start: 2022-10-19 | End: 2022-10-19

## 2022-10-19 RX ORDER — LISINOPRIL AND HYDROCHLOROTHIAZIDE 20; 12.5 MG/1; MG/1
2 TABLET ORAL DAILY
Qty: 180 TABLET | Refills: 1 | Status: SHIPPED | OUTPATIENT
Start: 2022-10-19 | End: 2023-01-17

## 2022-10-19 RX ORDER — PANTOPRAZOLE SODIUM 40 MG/1
40 TABLET, DELAYED RELEASE ORAL DAILY
Qty: 90 TABLET | Refills: 1 | Status: SHIPPED | OUTPATIENT
Start: 2022-10-19

## 2022-10-19 RX ORDER — SIMVASTATIN 20 MG
20 TABLET ORAL DAILY
Qty: 90 TABLET | Refills: 1 | Status: SHIPPED | OUTPATIENT
Start: 2022-10-19

## 2022-10-19 NOTE — ASSESSMENT & PLAN NOTE
Lab Results   Component Value Date    HGBA1C 6 5 (H) 10/03/2022   Improved from last office visit, continue on metformin  to a 1500 mg in the morning and 1000 mg in the evening  Patient is to continue to work on diet and exercise  Limit sugars and carbohydrate intake  Avoid soda, juice, sweets, cookies, desserts, pasta, bread    Eat more whole grains, exercised 30 min of cardio at least 3 times a week  Also recommended daily foot exams to check for sores, and recommended yearly eye exams

## 2022-10-19 NOTE — ASSESSMENT & PLAN NOTE
Uncontrolled, start lisinopril HCTZ  Continue current regimen -   Continue to monitor blood pressure at home  Goal BP is < 130/80  Contact our office for consistent elevations  Recommend low sodium diet  Exercise 30 minutes 5 times a week as tolerated    Recommend yearly eye exam

## 2022-10-19 NOTE — PROGRESS NOTES
Assessment/Plan:    Diabetes mellitus ([facility] )    Lab Results   Component Value Date    HGBA1C 6 5 (H) 10/03/2022   Improved from last office visit, continue on metformin  to a 1500 mg in the morning and 1000 mg in the evening  Patient is to continue to work on diet and exercise  Limit sugars and carbohydrate intake  Avoid soda, juice, sweets, cookies, desserts, pasta, bread    Eat more whole grains, exercised 30 min of cardio at least 3 times a week  Also recommended daily foot exams to check for sores, and recommended yearly eye exams  Hypertension, essential  Uncontrolled, start lisinopril HCTZ  Continue current regimen -   Continue to monitor blood pressure at home  Goal BP is < 130/80  Contact our office for consistent elevations  Recommend low sodium diet  Exercise 30 minutes 5 times a week as tolerated  Recommend yearly eye exam        Cervical radiculopathy  Continue to follow with Pain Management and Neurosurgery  Other hyperlipidemia  ASCVD 4 6% continue statin, recommend ASA  Recommend healthy lifestyle choices for your cholesterol  Low fat/low cholesterol diet  Limit/avoid red meat  Eat more lean meat - chicken breast, ground turkey, fish  Exercise 30 mins at least 5 times a week as tolerated  Esophagitis  Continue to follow up with GI  Continue current medications  Diagnoses and all orders for this visit:    Needs flu shot  -     influenza vaccine, quadrivalent, recombinant, PF, 0 5 mL, for patients 18 yr+ (FLUBLOK)    Esophagitis  -     famotidine (PEPCID) 20 mg tablet; Take 1 tablet (20 mg total) by mouth 2 (two) times a day  -     pantoprazole (PROTONIX) 40 mg tablet; Take 1 tablet (40 mg total) by mouth daily    Hypertension, essential  -     Discontinue: lisinopril (ZESTRIL) 40 mg tablet; Take 1 tablet (40 mg total) by mouth daily  -     lisinopril-hydrochlorothiazide (PRINZIDE,ZESTORETIC) 20-12 5 MG per tablet;  Take 2 tablets by mouth daily  -     CBC and differential  -     Comprehensive metabolic panel; Future    Type 2 diabetes mellitus without complication, without long-term current use of insulin (HCC)  -     metFORMIN (GLUCOPHAGE) 500 mg tablet; Pt takes 1500 mg (3 tablets) by mouth in the morning, and a 1000 mg (2 tablets) in the evening  -     Hemoglobin A1C    Other hyperlipidemia  -     simvastatin (ZOCOR) 20 mg tablet; Take 1 tablet (20 mg total) by mouth daily  -     Lipid panel    Cervical radiculopathy          Subjective:      Patient ID: Bonney Landau is a 45 y o  male  Patient presents today for follow up and to review blood work:    S/P cervical spinal fusion- currently follows neurosurgery     Diabetes mellitus ([facility] )- HBA1C improved and controlled, HBA1C 6 5, continues on metformin without side effects, denies hypo or hyperglycemic events       Hypertension, essential-currently well controlled on lisinopril, lisinopril increased at last office visit, denies lightheadedness or dizziness, denies side effects with this medication    Hyperlipidemia- controlled on statin, denies side effects with medication   The 10-year ASCVD risk score (Audi Hamilton et al , 2013) is: 7 6%    Values used to calculate the score:      Age: 45 years      Sex: Male      Is Non- : No      Diabetic: Yes      Tobacco smoker: No      Systolic Blood Pressure: 099 mmHg      Is BP treated: Yes      HDL Cholesterol: 38 mg/dL      Total Cholesterol: 158 mg/dL             The following portions of the patient's history were reviewed and updated as appropriate: allergies, current medications, past family history, past medical history, past social history, past surgical history and problem list     Review of Systems   Constitutional: Negative for activity change, appetite change, chills, diaphoresis and fever  HENT: Negative for congestion, ear discharge, ear pain, postnasal drip, rhinorrhea, sinus pressure, sinus pain and sore throat      Eyes: Negative for pain, discharge, itching and visual disturbance  Respiratory: Negative for cough, chest tightness, shortness of breath and wheezing  Cardiovascular: Negative for chest pain, palpitations and leg swelling  Gastrointestinal: Negative for abdominal pain, constipation, diarrhea, nausea and vomiting  Endocrine: Negative for polydipsia, polyphagia and polyuria  Genitourinary: Negative for difficulty urinating, dysuria and urgency  Musculoskeletal: Negative for arthralgias, back pain and neck pain  Skin: Negative for rash and wound  Neurological: Negative for dizziness, weakness, numbness and headaches           Past Medical History:   Diagnosis Date   • Achalasia    • Aneurysm ([facility] )     aortic   • Asthma     Childhood   • Colon polyp    • Diabetes mellitus ([facility] )    • Esophageal ulcer    • Groin abscess     with I &D   • Hyperlipidemia    • Hypertension          Current Outpatient Medications:   •  aluminum-magnesium hydroxide-simethicone (MAALOX MAX) 400-400-40 MG/5ML suspension, Take 10 mL by mouth every 6 (six) hours as needed for indigestion or heartburn, Disp: 355 mL, Rfl: 1  •  Ascorbic Acid (vitamin C) 1000 MG tablet, Take 1,000 mg by mouth daily  , Disp: , Rfl:   •  CINNAMON PO, Take by mouth, Disp: , Rfl:   •  diphenhydrAMINE-acetaminophen (TYLENOL PM)  MG TABS, Take 1 tablet by mouth daily at bedtime as needed for sleep , Disp: , Rfl:   •  famotidine (PEPCID) 20 mg tablet, Take 1 tablet (20 mg total) by mouth 2 (two) times a day, Disp: 90 tablet, Rfl: 1  •  lisinopril-hydrochlorothiazide (PRINZIDE,ZESTORETIC) 20-12 5 MG per tablet, Take 2 tablets by mouth daily, Disp: 180 tablet, Rfl: 1  •  metFORMIN (GLUCOPHAGE) 500 mg tablet, Pt takes 1500 mg (3 tablets) by mouth in the morning, and a 1000 mg (2 tablets) in the evening, Disp: 450 tablet, Rfl: 1  •  omega-3-acid ethyl esters (LOVAZA) 1 g capsule, Take 1 g by mouth daily, Disp: , Rfl:   •  pantoprazole (PROTONIX) 40 mg tablet, Take 1 tablet (40 mg total) by mouth daily, Disp: 90 tablet, Rfl: 1  •  simvastatin (ZOCOR) 20 mg tablet, Take 1 tablet (20 mg total) by mouth daily, Disp: 90 tablet, Rfl: 1  •  tadalafil (CIALIS) 20 MG tablet, Take 1 tablet by mouth one (1) hour prior to intercourse on an empty stomach with no alcohol    Limit to 2 tablets per week , Disp: 18 tablet, Rfl: 3  •  tamsulosin (FLOMAX) 0 4 mg, TAKE 1 CAPSULE(0 4 MG) BY MOUTH DAILY WITH DINNER, Disp: 30 capsule, Rfl: 3  •  tiZANidine (ZANAFLEX) 2 mg tablet, Take 1 tablet (2 mg total) by mouth daily at bedtime as needed for muscle spasms, Disp: 30 tablet, Rfl: 1    No Known Allergies    Social History   Past Surgical History:   Procedure Laterality Date   • ANTERIOR CERVICAL DISCECTOMY W/ FUSION     • APPENDECTOMY     • BICEPS TENDON REPAIR     • EGD AND COLONOSCOPY     • FL INJECTION LEFT WRIST (ARTHROGRAM)  11/9/2021   • MENISCECTOMY     • MYOTOMY HELLER LAPAROSCOPIC     • ORTHOPEDIC SURGERY       Family History   Problem Relation Age of Onset   • No Known Problems Mother    • No Known Problems Father        Objective:  /78 (BP Location: Left arm, Patient Position: Sitting, Cuff Size: Large)   Pulse 60   Temp 98 °F (36 7 °C) (Temporal)   Ht 6' 1" (1 854 m)   Wt 119 kg (262 lb)   SpO2 99%   BMI 34 57 kg/m²     Recent Results (from the past 1344 hour(s))   CBC and differential    Collection Time: 10/03/22  7:08 AM   Result Value Ref Range    WBC 7 32 4 31 - 10 16 Thousand/uL    RBC 5 03 3 88 - 5 62 Million/uL    Hemoglobin 14 7 12 0 - 17 0 g/dL    Hematocrit 45 8 36 5 - 49 3 %    MCV 91 82 - 98 fL    MCH 29 2 26 8 - 34 3 pg    MCHC 32 1 31 4 - 37 4 g/dL    RDW 12 2 11 6 - 15 1 %    MPV 11 1 8 9 - 12 7 fL    Platelets 487 457 - 478 Thousands/uL    nRBC 0 /100 WBCs    Neutrophils Relative 54 43 - 75 %    Immat GRANS % 1 0 - 2 %    Lymphocytes Relative 31 14 - 44 %    Monocytes Relative 11 4 - 12 %    Eosinophils Relative 2 0 - 6 %    Basophils Relative 1 0 - 1 % Neutrophils Absolute 4 01 1 85 - 7 62 Thousands/µL    Immature Grans Absolute 0 10 0 00 - 0 20 Thousand/uL    Lymphocytes Absolute 2 24 0 60 - 4 47 Thousands/µL    Monocytes Absolute 0 77 0 17 - 1 22 Thousand/µL    Eosinophils Absolute 0 16 0 00 - 0 61 Thousand/µL    Basophils Absolute 0 04 0 00 - 0 10 Thousands/µL   Hemoglobin A1C    Collection Time: 10/03/22  7:08 AM   Result Value Ref Range    Hemoglobin A1C 6 5 (H) Normal 3 8-5 6%; PreDiabetic 5 7-6 4%; Diabetic >=6 5%; Glycemic control for adults with diabetes <7 0% %     mg/dl   Lipid panel    Collection Time: 10/03/22  7:08 AM   Result Value Ref Range    Cholesterol 158 See Comment mg/dL    Triglycerides 177 (H) See Comment mg/dL    HDL, Direct 38 (L) >=40 mg/dL    LDL Calculated 85 0 - 100 mg/dL    Non-HDL-Chol (CHOL-HDL) 120 mg/dl   Comprehensive metabolic panel    Collection Time: 10/03/22  7:08 AM   Result Value Ref Range    Sodium 135 135 - 147 mmol/L    Potassium 3 9 3 5 - 5 3 mmol/L    Chloride 105 96 - 108 mmol/L    CO2 25 21 - 32 mmol/L    ANION GAP 5 4 - 13 mmol/L    BUN 13 5 - 25 mg/dL    Creatinine 0 87 0 60 - 1 30 mg/dL    Glucose, Fasting 127 (H) 65 - 99 mg/dL    Calcium 9 1 8 3 - 10 1 mg/dL    AST 19 5 - 45 U/L    ALT 46 12 - 78 U/L    Alkaline Phosphatase 65 46 - 116 U/L    Total Protein 7 4 6 4 - 8 4 g/dL    Albumin 3 7 3 5 - 5 0 g/dL    Total Bilirubin 0 71 0 20 - 1 00 mg/dL    eGFR 99 ml/min/1 73sq m            Physical Exam  Constitutional:       General: He is not in acute distress  Appearance: He is well-developed  He is not diaphoretic  HENT:      Head: Normocephalic and atraumatic  Right Ear: External ear normal       Left Ear: External ear normal       Nose: Nose normal       Mouth/Throat:      Pharynx: No oropharyngeal exudate  Eyes:      General:         Right eye: No discharge  Left eye: No discharge        Conjunctiva/sclera: Conjunctivae normal       Pupils: Pupils are equal, round, and reactive to light    Neck:      Thyroid: No thyromegaly  Cardiovascular:      Rate and Rhythm: Normal rate and regular rhythm  Heart sounds: Normal heart sounds  No murmur heard  No friction rub  No gallop  Pulmonary:      Effort: Pulmonary effort is normal  No respiratory distress  Breath sounds: Normal breath sounds  No stridor  No wheezing or rales  Abdominal:      General: Bowel sounds are normal  There is no distension  Palpations: Abdomen is soft  Tenderness: There is no abdominal tenderness  Musculoskeletal:      Cervical back: Normal range of motion and neck supple  Lymphadenopathy:      Cervical: No cervical adenopathy  Skin:     General: Skin is warm and dry  Findings: No erythema or rash  Neurological:      Mental Status: He is alert and oriented to person, place, and time  Psychiatric:         Behavior: Behavior normal          Thought Content:  Thought content normal          Judgment: Judgment normal

## 2022-10-19 NOTE — ASSESSMENT & PLAN NOTE
ASCVD 4 6% continue statin, recommend ASA  Recommend healthy lifestyle choices for your cholesterol  Low fat/low cholesterol diet  Limit/avoid red meat  Eat more lean meat - chicken breast, ground turkey, fish  Exercise 30 mins at least 5 times a week as tolerated

## 2022-10-31 ENCOUNTER — OFFICE VISIT (OUTPATIENT)
Dept: PAIN MEDICINE | Facility: CLINIC | Age: 52
End: 2022-10-31

## 2022-10-31 VITALS
BODY MASS INDEX: 34.72 KG/M2 | DIASTOLIC BLOOD PRESSURE: 83 MMHG | SYSTOLIC BLOOD PRESSURE: 135 MMHG | HEART RATE: 64 BPM | WEIGHT: 262 LBS | HEIGHT: 73 IN

## 2022-10-31 DIAGNOSIS — M79.18 MYOFASCIAL PAIN SYNDROME: ICD-10-CM

## 2022-10-31 DIAGNOSIS — M54.12 CERVICAL RADICULOPATHY: Primary | ICD-10-CM

## 2022-10-31 DIAGNOSIS — Z98.1 S/P CERVICAL SPINAL FUSION: ICD-10-CM

## 2022-10-31 DIAGNOSIS — M47.812 CERVICAL SPONDYLOSIS WITHOUT MYELOPATHY: ICD-10-CM

## 2022-10-31 RX ORDER — PREGABALIN 50 MG/1
CAPSULE ORAL
Qty: 90 CAPSULE | Refills: 1 | Status: SHIPPED | OUTPATIENT
Start: 2022-10-31

## 2022-10-31 RX ORDER — BACLOFEN 20 MG/1
20 TABLET ORAL
Qty: 30 TABLET | Refills: 1 | Status: SHIPPED | OUTPATIENT
Start: 2022-10-31

## 2022-10-31 RX ORDER — AMOXICILLIN 500 MG/1
500 TABLET, FILM COATED ORAL 3 TIMES DAILY
COMMUNITY
Start: 2022-10-19

## 2022-10-31 NOTE — PROGRESS NOTES
Assessment:  1  Cervical radiculopathy    2  Cervical spondylosis without myelopathy    3  S/P cervical spinal fusion    4  Myofascial pain syndrome        Plan:  1  Patient will discontinue tizanidine and continue baclofen 20 mg q h s  p r n  myofascial pain  This medication was refilled today  2  I will trial the patient on pregabalin 50 mg q h s  and titrate up to t i d  dosing for neuropathic complaints  I discussed with the patient the type of medication it is, how it works, and that it requires a titration process that is specific to each individual  I reviewed with the patient that it may take 3-4 weeks for the medication's effects to be noticed and that it should never be abruptly stopped  Possible side effects include but are not limited to; vertigo, lethargy, nausea, and edema of the extremities  Advised the patient to call our office if they experience any side effects  The patient verbalized an understanding    3  Depending on response to medication, patient may be interested opinion with Neurosurgery for surgical intervention   4  continue with home exercise program  5  Follow-up in 4-6 weeks or sooner if needed    History of Present Illness: The patient is a 45 y o  male with a history of C6-7 ACDF in 2018 last seen on 9/14/22 who presents for a follow up office visit in regards to chronic neck pain that radiates into the posterolateral aspect of the right upper extremity terminating in the 3rd through 5th digits of the right hand and in the thumb and index finger of the left hand with associated numbness and paresthesias  Patient denies bowel or bladder incontinence or balance issues  Patient has had cervical epidural steroid injection x2, last on September 26, 2022 without any significant relief  He is tried gabapentin and duloxetine both of which caused side effects  He is found mild relief with baclofen and no relief with tizanidine    He rates his pain a 5/10 on the numeric pain rating scale   He constantly has pain at night which is described as dull aching, sharp and numbness  MRI of the cervical spine with and without contrast from October 2021 reveals a stable ACDF at C6-7 with degenerative disc disease, spondylosis, and disc bulging from C3-4 to C5-6 which results in mild central stenosis at C5-6   A disc protrusion also contacts the right C6 nerve root    I have personally reviewed and/or updated the patient's past medical history, past surgical history, family history, social history, current medications, allergies, and vital signs today  Review of Systems:    Review of Systems   Respiratory: Negative for shortness of breath  Cardiovascular: Negative for chest pain  Gastrointestinal: Negative for constipation, diarrhea, nausea and vomiting  Musculoskeletal: Negative for arthralgias, gait problem, joint swelling and myalgias  Skin: Negative for rash  Neurological: Negative for dizziness, seizures and weakness  All other systems reviewed and are negative          Past Medical History:   Diagnosis Date   • Achalasia    • Aneurysm ([facility] )     aortic   • Asthma     Childhood   • Colon polyp    • Diabetes mellitus ([facility] )    • Esophageal ulcer    • Groin abscess     with I &D   • Hyperlipidemia    • Hypertension        Past Surgical History:   Procedure Laterality Date   • ANTERIOR CERVICAL DISCECTOMY W/ FUSION     • APPENDECTOMY     • BICEPS TENDON REPAIR     • EGD AND COLONOSCOPY     • FL INJECTION LEFT WRIST (ARTHROGRAM)  11/9/2021   • MENISCECTOMY     • MYOTOMY HELLER LAPAROSCOPIC     • ORTHOPEDIC SURGERY         Family History   Problem Relation Age of Onset   • No Known Problems Mother    • No Known Problems Father        Social History     Occupational History   • 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   Tobacco Use   • Smoking status: Never Smoker   • Smokeless tobacco: Never Used   Vaping Use   • Vaping Use: Never used   Substance and Sexual Activity   • Alcohol use: Not Currently   • Drug use: Never   • Sexual activity: Yes     Partners: Female         Current Outpatient Medications:   •  baclofen 20 mg tablet, Take 1 tablet (20 mg total) by mouth daily at bedtime as needed for muscle spasms, Disp: 30 tablet, Rfl: 1  •  pregabalin (LYRICA) 50 mg capsule, Take 1 PO HS x 5 days, then 1 PO BID x 5 days, then 1 PO TID, Disp: 90 capsule, Rfl: 1  •  simvastatin (ZOCOR) 20 mg tablet, Take 1 tablet (20 mg total) by mouth daily, Disp: 90 tablet, Rfl: 1  •  tamsulosin (FLOMAX) 0 4 mg, TAKE 1 CAPSULE(0 4 MG) BY MOUTH DAILY WITH DINNER, Disp: 30 capsule, Rfl: 3  •  aluminum-magnesium hydroxide-simethicone (MAALOX MAX) 400-400-40 MG/5ML suspension, Take 10 mL by mouth every 6 (six) hours as needed for indigestion or heartburn, Disp: 355 mL, Rfl: 1  •  amoxicillin (AMOXIL) 500 MG tablet, Take 500 mg by mouth 3 (three) times a day (Patient not taking: Reported on 10/31/2022), Disp: , Rfl:   •  Ascorbic Acid (vitamin C) 1000 MG tablet, Take 1,000 mg by mouth daily  , Disp: , Rfl:   •  CINNAMON PO, Take by mouth, Disp: , Rfl:   •  diphenhydrAMINE-acetaminophen (TYLENOL PM)  MG TABS, Take 1 tablet by mouth daily at bedtime as needed for sleep , Disp: , Rfl:   •  famotidine (PEPCID) 20 mg tablet, Take 1 tablet (20 mg total) by mouth 2 (two) times a day, Disp: 90 tablet, Rfl: 1  •  lisinopril-hydrochlorothiazide (PRINZIDE,ZESTORETIC) 20-12 5 MG per tablet, Take 2 tablets by mouth daily (Patient not taking: Reported on 10/31/2022), Disp: 180 tablet, Rfl: 1  •  metFORMIN (GLUCOPHAGE) 500 mg tablet, Pt takes 1500 mg (3 tablets) by mouth in the morning, and a 1000 mg (2 tablets) in the evening, Disp: 450 tablet, Rfl: 1  •  omega-3-acid ethyl esters (LOVAZA) 1 g capsule, Take 1 g by mouth daily, Disp: , Rfl:   •  pantoprazole (PROTONIX) 40 mg tablet, Take 1 tablet (40 mg total) by mouth daily, Disp: 90 tablet, Rfl: 1  •  tadalafil (CIALIS) 20 MG tablet, Take 1 tablet by mouth one (1) hour prior to intercourse on an empty stomach with no alcohol  Limit to 2 tablets per week , Disp: 18 tablet, Rfl: 3    No Known Allergies    Physical Exam:    /83   Pulse 64   Ht 6' 1" (1 854 m)   Wt 119 kg (262 lb)   BMI 34 57 kg/m²     Constitutional:normal, well developed, well nourished, alert, in no distress and non-toxic and no overt pain behavior  Eyes:anicteric  HEENT:grossly intact  Neck:supple, symmetric, trachea midline and no masses   Pulmonary:even and unlabored  Cardiovascular:No edema or pitting edema present  Skin:Normal without rashes or lesions and well hydrated  Psychiatric:Mood and affect appropriate  Neurologic:Cranial Nerves II-XII grossly intact  Musculoskeletal:normal gait  Positive Spurling's, right greater than left      Imaging  No orders to display     MRI CERVICAL SPINE WITH AND WITHOUT CONTRAST   INDICATION: R25 1: Tremor, unspecified   R51 9: Headache, unspecified   M54 12: Radiculopathy, cervical region   M47 812: Spondylosis without myelopathy or radiculopathy, cervical region  COMPARISON: None  TECHNIQUE: Sagittal T1, sagittal T2, sagittal inversion recovery, axial 2D merge and axial T2  Sagittal T1 and axial T1 postcontrast    IV Contrast: 12 mL of Gadobutrol injection (SINGLE-DOSE)   IMAGE QUALITY: Diagnostic  FINDINGS:   ALIGNMENT: There is straightening of the cervical lordosis  Patient is status post ACDF at C6-7  MARROW SIGNAL: Normal marrow signal is identified within the visualized bony structures  No discrete marrow lesion  CERVICAL AND VISUALIZED UPPER THORACIC CORD: Normal signal within the visualized cord  PREVERTEBRAL AND PARASPINAL SOFT TISSUES: Normal    VISUALIZED POSTERIOR FOSSA: The visualized posterior fossa demonstrates no abnormal signal    CERVICAL DISC SPACES:   C2-C3: Normal    C3-C4: Diffuse annular bulge slightly eccentric to the left  Minimal central stenosis without foraminal narrowing     C4-C5: Degenerative disc osteophyte complex with marginal osteophytes results in mild to moderate bilateral foraminal narrowing and mild to moderate central stenosis with slight flattening of the right side of the cord  No cord signal abnormality  C5-C6: Small right paramedian broad-based protrusion contacts the right C6 ventral nerve root  Correlate for C6 radiculopathy  Mild central stenosis  C6-C7: This level has been fused  No central or foraminal narrowing  C7-T1: No central or foraminal narrowing  UPPER THORACIC DISC SPACES: Normal    POSTCONTRAST IMAGING: Normal    IMPRESSION:   Degenerative changes are seen particularly C4-5 and C5-6  A right paramedian protrusion type disc herniation at C5-6 contacts the right C6 nerve root  There is mild to moderate foraminal narrowing and central stenosis at C4-5  No orders of the defined types were placed in this encounter

## 2022-11-10 ENCOUNTER — OFFICE VISIT (OUTPATIENT)
Dept: PODIATRY | Facility: CLINIC | Age: 52
End: 2022-11-10

## 2022-11-10 VITALS
DIASTOLIC BLOOD PRESSURE: 85 MMHG | HEART RATE: 86 BPM | BODY MASS INDEX: 33.51 KG/M2 | SYSTOLIC BLOOD PRESSURE: 125 MMHG | WEIGHT: 254 LBS

## 2022-11-10 DIAGNOSIS — S90.111A CONTUSION OF RIGHT GREAT TOE WITHOUT DAMAGE TO NAIL, INITIAL ENCOUNTER: Primary | ICD-10-CM

## 2022-11-10 DIAGNOSIS — S92.414A CLOSED NONDISPLACED FRACTURE OF PROXIMAL PHALANX OF RIGHT GREAT TOE, INITIAL ENCOUNTER: ICD-10-CM

## 2022-11-10 NOTE — PROGRESS NOTES
Assessment/Plan:   I personally reviewed x-rays of the right foot  A chip fracture at the interphalangeal joint is noted on the lateral view  There is no joint space at the IPJ on any view  Reviewed x-ray findings with patient  Explained that he does have a chip fracture which typically takes 6 weeks to heal   Patient is employed in job where he were was a steel-toed work boot that minimizes motion  He states that he is comfortable when wearing the shoe  For this reason, he was told to stay in the shoe as much as possible to minimi motion in the great toe  He should utilize Tylenol for pain relief  He does have a history of GI ulceration  He will be reassessed in 3 weeks  No problem-specific Assessment & Plan notes found for this encounter  Diagnoses and all orders for this visit:    Contusion of right great toe without damage to nail, initial encounter  -     X-ray foot right 3+ views; Future          Subjective:      Patient ID: Ruperto Santiago is a 45 y o  male  HPI     Patient, a 11-year-old type 2 diabetic presents with a painful right great toe  Patient stubbed the toe on Tuesday, 11/ 8/22  He has pain at the IPJ when flexing the toe  Patient is concerned that he may have fractured the toe  No nail involvement  The following portions of the patient's history were reviewed and updated as appropriate: allergies, current medications, past family history, past medical history, past social history, past surgical history and problem list     Review of Systems   Gastrointestinal:        History of GI ulceration   Musculoskeletal: Positive for neck pain  Neurological:        Cervical radiculopathy   Hematological: Negative  Psychiatric/Behavioral: Negative  Objective:      /85   Pulse 86   Wt 115 kg (254 lb)   BMI 33 51 kg/m²          Physical Exam  Constitutional:       Appearance: Normal appearance  Cardiovascular:      Pulses: Normal pulses  Musculoskeletal:         General: Tenderness and signs of injury present  Comments: Pain with palpation interphalangeal joint right hallux  Pain is exacerbated when patient flexes right great toe at IPJ  No pain with range of motion 1st MPJ  Minimal edema noted  Skin:     General: Skin is warm  Neurological:      General: No focal deficit present  Mental Status: He is oriented to person, place, and time

## 2022-11-18 ENCOUNTER — TELEPHONE (OUTPATIENT)
Dept: PODIATRY | Facility: CLINIC | Age: 52
End: 2022-11-18

## 2022-11-18 NOTE — TELEPHONE ENCOUNTER
Caller: margaret - radiology    Doctor: Neeru Rodríguez    Reason for call: Immediate findings - can be seen in Epic      Call back#: [phone] opt [phone]

## 2022-12-01 ENCOUNTER — OFFICE VISIT (OUTPATIENT)
Dept: PODIATRY | Facility: CLINIC | Age: 52
End: 2022-12-01

## 2022-12-01 VITALS
HEART RATE: 70 BPM | HEIGHT: 73 IN | BODY MASS INDEX: 35.12 KG/M2 | WEIGHT: 265 LBS | DIASTOLIC BLOOD PRESSURE: 88 MMHG | SYSTOLIC BLOOD PRESSURE: 157 MMHG

## 2022-12-01 DIAGNOSIS — M76.72 PERONEAL TENDINITIS OF LEFT LOWER EXTREMITY: Primary | ICD-10-CM

## 2022-12-01 DIAGNOSIS — S90.111D CONTUSION OF RIGHT GREAT TOE WITHOUT DAMAGE TO NAIL, SUBSEQUENT ENCOUNTER: ICD-10-CM

## 2022-12-01 RX ORDER — LIDOCAINE HYDROCHLORIDE 10 MG/ML
1 INJECTION, SOLUTION EPIDURAL; INFILTRATION; INTRACAUDAL; PERINEURAL ONCE
Status: COMPLETED | OUTPATIENT
Start: 2022-12-01 | End: 2022-12-01

## 2022-12-01 RX ORDER — TRIAMCINOLONE ACETONIDE 40 MG/ML
20 INJECTION, SUSPENSION INTRA-ARTICULAR; INTRAMUSCULAR ONCE
Status: COMPLETED | OUTPATIENT
Start: 2022-12-01 | End: 2022-12-01

## 2022-12-01 RX ADMIN — LIDOCAINE HYDROCHLORIDE 1 ML: 10 INJECTION, SOLUTION EPIDURAL; INFILTRATION; INTRACAUDAL; PERINEURAL at 08:12

## 2022-12-01 RX ADMIN — TRIAMCINOLONE ACETONIDE 20 MG: 40 INJECTION, SUSPENSION INTRA-ARTICULAR; INTRAMUSCULAR at 08:12

## 2022-12-01 NOTE — PROGRESS NOTES
Patient presents for assessment of right great toe  Patient has an avulsion fracture seen on the lateral view of his last x-ray at the interphalangeal joint of the hallux  Patient is wearing a steel-toed work boot for the most part and has no pain  He did have discomfort when he put on a sneaker and try to walk  He notes that he tripped and injured the left foot approximately 1 week ago  He is not limping but he has pain with direct pressure to the 5th metatarsal base  On exam, no pain with palpation right hallux interphalangeal joint  Pain elicited with palpation at the 5th metatarsal base left foot  Symptoms are consistent with peroneal brevis tendinitis  Treatment:  Patient unable to take oral anti-inflammatories  For this reason, injected 0 5 cc Kenalog 40 along with 1 cc 1% xylocaine to the left foot at the base of the 5th metatarsal   He will be reassessed in 4 weeks  Foot injection     Date/Time 12/1/2022 8:15 AM     Performed by  Stephen Soliman DPM     Authorized by Stephen Soliman DPM      Universal Protocol   Consent: Verbal consent obtained  Risks and benefits: risks, benefits and alternatives were discussed  Consent given by: patient  Patient understanding: patient states understanding of the procedure being performed  Patient identity confirmed: verbally with patient        Local anesthesia used: yes     Anesthesia   Local anesthesia used: yes  Local Anesthetic: lidocaine 1% without epinephrine     Procedure Details   Procedure Notes: Injected left foot with 0 5 cc Kenalog 40 along with 1 cc 1% xylocaine

## 2022-12-02 NOTE — PROGRESS NOTES
Assessment:  1  Chronic pain syndrome    2  Cervical radiculopathy    3  Cervical spondylosis without myelopathy    4  S/P cervical spinal fusion    5  Myofascial pain syndrome        Plan:  1  Patient may continue pregabalin 50 mg 3 times a day and baclofen 20 mg q h s  p r n  myofascial pain as prescribed  Both of these medications were refilled today  2  Should the patient's pain worsen in the future, may consider a neurosurgical evaluation however he does not feel it necessary at this time   3  Continue with home exercise program  4  Follow-up in 3 months or sooner if needed    History of Present Illness: The patient is a 47 y o  male with a history of C6-7 ACDF in 2018 last seen on 10/31/22 who presents for a follow up office visit in regards to chronic neck pain to the posterolateral aspect of the right upper extremity into the 3rd through 5th digits of the right hand with associated paresthesias  Patient denies bowel or bladder incontinence or balance issues  He is had cervical epidural steroid injection x2 without any significant relief  He is tried gabapentin and duloxetine both of which caused side effects  He is currently taking pregabalin 50 mg 3 times a day with a 50% improvement of his pain without side effects  He also continues on baclofen 20 mg q h s  p r n  He rates his pain as 6/10 on the numeric pain rating scale  He constantly has pain at night which is described as dull aching  I have personally reviewed and/or updated the patient's past medical history, past surgical history, family history, social history, current medications, allergies, and vital signs today  Review of Systems:    Review of Systems   Respiratory: Negative for shortness of breath  Cardiovascular: Positive for chest pain  Gastrointestinal: Negative for constipation, diarrhea, nausea and vomiting  Musculoskeletal: Negative for arthralgias, gait problem, joint swelling and myalgias     Skin: Negative for rash  Neurological: Negative for dizziness, seizures and weakness  All other systems reviewed and are negative          Past Medical History:   Diagnosis Date   • Achalasia    • Aneurysm ([facility] [address] )     aortic   • Asthma     Childhood   • Colon polyp    • Diabetes mellitus ([facility] [address] )    • Esophageal ulcer    • Groin abscess     with I &D   • Hyperlipidemia    • Hypertension        Past Surgical History:   Procedure Laterality Date   • ANTERIOR CERVICAL DISCECTOMY W/ FUSION     • APPENDECTOMY     • BICEPS TENDON REPAIR     • EGD AND COLONOSCOPY     • FL INJECTION LEFT WRIST (ARTHROGRAM)  11/9/2021   • MENISCECTOMY     • MYOTOMY HELLER LAPAROSCOPIC     • ORTHOPEDIC SURGERY         Family History   Problem Relation Age of Onset   • No Known Problems Mother    • No Known Problems Father        Social History     Occupational History   • 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   Tobacco Use   • Smoking status: Never   • Smokeless tobacco: Never   Vaping Use   • Vaping Use: Never used   Substance and Sexual Activity   • Alcohol use: Not Currently   • Drug use: Never   • Sexual activity: Yes     Partners: Female         Current Outpatient Medications:   •  aluminum-magnesium hydroxide-simethicone (MAALOX MAX) 400-400-40 MG/5ML suspension, Take 10 mL by mouth every 6 (six) hours as needed for indigestion or heartburn, Disp: 355 mL, Rfl: 1  •  Ascorbic Acid (vitamin C) 1000 MG tablet, Take 1,000 mg by mouth daily  , Disp: , Rfl:   •  baclofen 20 mg tablet, Take 1 tablet (20 mg total) by mouth daily at bedtime as needed for muscle spasms, Disp: 90 tablet, Rfl: 0  •  CINNAMON PO, Take by mouth, Disp: , Rfl:   •  diphenhydrAMINE-acetaminophen (TYLENOL PM)  MG TABS, Take 1 tablet by mouth daily at bedtime as needed for sleep , Disp: , Rfl:   •  famotidine (PEPCID) 20 mg tablet, Take 1 tablet (20 mg total) by mouth 2 (two) times a day, Disp: 90 tablet, Rfl: 1  •  lisinopril-hydrochlorothiazide (PRINZIDE,ZESTORETIC) 20-12 5 MG per tablet, Take 2 tablets by mouth daily, Disp: 180 tablet, Rfl: 1  •  metFORMIN (GLUCOPHAGE) 500 mg tablet, Pt takes 1500 mg (3 tablets) by mouth in the morning, and a 1000 mg (2 tablets) in the evening, Disp: 450 tablet, Rfl: 1  •  omega-3-acid ethyl esters (LOVAZA) 1 g capsule, Take 1 g by mouth daily, Disp: , Rfl:   •  pantoprazole (PROTONIX) 40 mg tablet, Take 1 tablet (40 mg total) by mouth daily, Disp: 90 tablet, Rfl: 1  •  pregabalin (LYRICA) 50 mg capsule, Take 1 capsule (50 mg total) by mouth 3 (three) times a day, Disp: 270 capsule, Rfl: 0  •  simvastatin (ZOCOR) 20 mg tablet, Take 1 tablet (20 mg total) by mouth daily, Disp: 90 tablet, Rfl: 1  •  tadalafil (CIALIS) 20 MG tablet, Take 1 tablet by mouth one (1) hour prior to intercourse on an empty stomach with no alcohol  Limit to 2 tablets per week , Disp: 18 tablet, Rfl: 3  •  tamsulosin (FLOMAX) 0 4 mg, TAKE 1 CAPSULE(0 4 MG) BY MOUTH DAILY WITH DINNER, Disp: 30 capsule, Rfl: 3  •  amoxicillin (AMOXIL) 500 MG tablet, Take 500 mg by mouth 3 (three) times a day (Patient not taking: Reported on 10/31/2022), Disp: , Rfl:     No Known Allergies    Physical Exam:    /84   Pulse (!) 54   Ht 6' 1" (1 854 m)   Wt 122 kg (268 lb)   BMI 35 36 kg/m²     Constitutional:normal, well developed, well nourished, alert, in no distress and non-toxic and no overt pain behavior  Eyes:anicteric  HEENT:grossly intact  Neck:supple, symmetric, trachea midline and no masses   Pulmonary:even and unlabored  Cardiovascular:No edema or pitting edema present  Skin:Normal without rashes or lesions and well hydrated  Psychiatric:Mood and affect appropriate  Neurologic:Cranial Nerves II-XII grossly intact  Musculoskeletal:normal gait      Imaging  No orders to display         No orders of the defined types were placed in this encounter

## 2022-12-05 ENCOUNTER — OFFICE VISIT (OUTPATIENT)
Dept: PAIN MEDICINE | Facility: CLINIC | Age: 52
End: 2022-12-05

## 2022-12-05 VITALS
HEIGHT: 73 IN | SYSTOLIC BLOOD PRESSURE: 150 MMHG | DIASTOLIC BLOOD PRESSURE: 84 MMHG | BODY MASS INDEX: 35.52 KG/M2 | HEART RATE: 54 BPM | WEIGHT: 268 LBS

## 2022-12-05 DIAGNOSIS — M79.18 MYOFASCIAL PAIN SYNDROME: ICD-10-CM

## 2022-12-05 DIAGNOSIS — M54.12 CERVICAL RADICULOPATHY: ICD-10-CM

## 2022-12-05 DIAGNOSIS — Z98.1 S/P CERVICAL SPINAL FUSION: ICD-10-CM

## 2022-12-05 DIAGNOSIS — G89.4 CHRONIC PAIN SYNDROME: Primary | ICD-10-CM

## 2022-12-05 DIAGNOSIS — M47.812 CERVICAL SPONDYLOSIS WITHOUT MYELOPATHY: ICD-10-CM

## 2022-12-05 RX ORDER — BACLOFEN 20 MG/1
20 TABLET ORAL
Qty: 90 TABLET | Refills: 0 | Status: SHIPPED | OUTPATIENT
Start: 2022-12-05

## 2022-12-05 RX ORDER — PREGABALIN 50 MG/1
50 CAPSULE ORAL 3 TIMES DAILY
Qty: 270 CAPSULE | Refills: 0 | Status: SHIPPED | OUTPATIENT
Start: 2022-12-05

## 2023-01-23 ENCOUNTER — TELEPHONE (OUTPATIENT)
Dept: INTERNAL MEDICINE CLINIC | Facility: OTHER | Age: 53
End: 2023-01-23

## 2023-01-23 ENCOUNTER — AMB VIDEO VISIT (OUTPATIENT)
Dept: OTHER | Facility: HOSPITAL | Age: 53
End: 2023-01-23

## 2023-01-23 VITALS — RESPIRATION RATE: 16 BRPM

## 2023-01-23 DIAGNOSIS — U07.1 COVID: Primary | ICD-10-CM

## 2023-01-23 RX ORDER — MOLNUPIRAVIR 200 MG/1
800 CAPSULE ORAL EVERY 12 HOURS
Qty: 40 CAPSULE | Refills: 0 | Status: SHIPPED | OUTPATIENT
Start: 2023-01-23 | End: 2023-01-28

## 2023-01-23 RX ORDER — GUAIFENESIN 600 MG/1
1200 TABLET, EXTENDED RELEASE ORAL EVERY 12 HOURS SCHEDULED
Qty: 20 TABLET | Refills: 0 | Status: SHIPPED | OUTPATIENT
Start: 2023-01-23 | End: 2023-01-25 | Stop reason: SDUPTHER

## 2023-01-23 RX ORDER — BENZONATATE 100 MG/1
100 CAPSULE ORAL 3 TIMES DAILY PRN
Qty: 12 CAPSULE | Refills: 0 | Status: SHIPPED | OUTPATIENT
Start: 2023-01-23 | End: 2023-01-27

## 2023-01-23 NOTE — TELEPHONE ENCOUNTER
Pt called to let you know he had a virtual visit and was told to call his PCP if he really wanted to take Paxlovid since he is on Simvastatin  Pt will take the [address], which was prescribed instead and will let us know if he has any problems w side effects

## 2023-01-23 NOTE — PROGRESS NOTES
Video Visit - Brian Abrams 47 y o  male MRN: [id number]    REQUIRED DOCUMENTATION:         1  This service was provided via [facility]  2  Provider located at [address] [place] [id number] [phone]  3  [facility] provider: MIKHAIL Thompson  4  Identify all parties in room with patient during [address] visit:  Patient and wife    5  After connecting through [facility], patient was identified by name and date of birth  Patient was then informed that this was a Telemedicine visit and that the exam was being conducted confidentially over secure lines  My office door was closed  No one else was in the room  Patient acknowledged consent and understanding of privacy and security of the Telemedicine visit  I informed the patient that I have reviewed their record in Epic and presented the opportunity for them to ask any questions regarding the visit today  The patient agreed to participate  This is a 47year old male here today for video visit  He states he started 3 days ago with cough and scatchy throat  Then 1-2 days ago he started with sinus headache, chills  Yesterday he started with mucinex  He states he did a home covid test which was positive  Today he continues to feel unwell  He is vaccinated for covid  He denies any chest pain or sob  Review of Systems   Constitutional: Positive for activity change, chills and fatigue  Negative for fever  HENT: Positive for congestion, rhinorrhea, sinus pressure and sinus pain  Respiratory: Positive for cough  Negative for shortness of breath and wheezing  Cardiovascular: Negative  Skin: Negative  Neurological: Negative  Psychiatric/Behavioral: Negative  Vitals:    01/23/23 0939   Resp: 16     Physical Exam  Constitutional:       General: He is not in acute distress  Appearance: Normal appearance  He is not ill-appearing or toxic-appearing  HENT:      Head: Normocephalic and atraumatic  Eyes:      Conjunctiva/sclera: Conjunctivae normal    Pulmonary:      Effort: Pulmonary effort is normal  No respiratory distress  Comments: Able to speak in full sentences   Skin:     Comments: No rash on head or neck  Neurological:      Mental Status: He is alert and oriented to person, place, and time  Psychiatric:         Mood and Affect: Mood normal          Behavior: Behavior normal          Thought Content: Thought content normal          Judgment: Judgment normal        Diagnoses and all orders for this visit:    COVID  -     molnupiravir (Lagevrio) 200 mg capsule; Take 4 capsules (800 mg total) by mouth every 12 (twelve) hours for 5 days  -     guaiFENesin (MUCINEX) 600 mg 12 hr tablet; Take 2 tablets (1,200 mg total) by mouth every 12 (twelve) hours for 5 days  -     benzonatate (TESSALON PERLES) 100 mg capsule; Take 1 capsule (100 mg total) by mouth 3 (three) times a day as needed for cough for up to 4 days      Patient Instructions          Home covid test was positive  Rest and drink extra fluids  Tylenol as needed for pain  Go to ER with any worsening symptoms, chest pain, sob, difficulty breathing, lethargy, confusion, dehyrdration, persistent fever 103 or higher  Will hold off on paxlovid due to simvastatin  WIll start [address]  DIscussed sided effects  Mucinex and cough medication sent to pharm  Monitor oxygen if less than 90, chest pain or sob go directly to ER  Fact Sheet for Patients And Caregivers Emergency Use Authorization (EUA) Of LAGEVRIO™ (molnupiravir) capsules For Coronavirus Disease 2019 (COVID-19) What is the most important information I should know about LAGEVRIO? Daniel Alonso may cause serious side effects, including: • LAGEVRIO may cause harm to your unborn baby  It is not known if LAGEVRIO will harm your baby if you take LAGEVRIO during pregnancy  o Daniel Alonso is not recommended for use in pregnancy  o Daniel Alonso has not been studied in pregnancy   Daniel Alonso was studied in pregnant animals only  When Leveda Erb was given to pregnant animals, Leveda Erb caused harm to their unborn babies  o You and your healthcare provider may decide that you should take Leveda Erb during pregnancy if there are no other COVID-19 treatment options approved or authorized by the FDA that are accessible or clinically appropriate for you  o If you and your healthcare provider decide that you should take Leveda Erb during pregnancy, you and your healthcare provider should discuss the known and potential benefits and the potential risks of taking LAGEVRIO during pregnancy  For individuals who are able to become pregnant: • You should use a reliable method of birth control (contraception) consistently and correctly during treatment with LAGEVRIO and for 4 days after the last dose of LAGEVRIO  Talk to your healthcare provider about reliable birth control methods  • Before starting treatment with Leveda Erb your healthcare provider may do a pregnancy test to see if you are pregnant before starting treatment with LAGEVRIO  • Tell your healthcare provider right away if you become pregnant or think you may be pregnant during treatment with LAGEVRIO  Pregnancy Surveillance Program: • There is a pregnancy surveillance program for individuals who take Leveda Erb during pregnancy  The purpose of this program is to collect information about the health of you and your baby  Talk to your healthcare provider about how to take part in this program  • If you take Leveda Erb during pregnancy and you agree to participate in the pregnancy surveillance program and allow your healthcare provider to share your information with Imani Rubio, then your healthcare provider will report your use of Leveda Erb during pregnancy to [address] [place] [address]  by calling [phone] or pregnancyreporting msd com   For individuals who are sexually active with partners who are able to become pregnant: • It is not known if LAGEVRIO can affect sperm  While the risk is regarded as low, animal studies to fully assess the potential for LAGEVRIO to affect the babies of males treated with LAGEVRIO have not been completed  A reliable method of birth control (contraception) should be used consistently and correctly during treatment with LAGEVRIO and for at least 3 months after the last dose  The risk to sperm beyond 3 months is not known  Studies to understand the risk to sperm beyond 3 months are ongoing  Talk to your healthcare provider about reliable birth control methods  Talk to your healthcare provider if you have questions or concerns about how Madolyn Beer may affect sperm  You are being given this fact sheet because your healthcare provider believes it is necessary to provide you with Madolyn Beer for the treatment of adults with mild-to-moderate coronavirus disease 2019 (COVID-19) with positive results of direct SARS-CoV-2 viral testing, and who are at high risk for progression to severe COVID-19 including hospitalization or death, and for whom other COVID-19 treatment options approved or authorized by the FDA are not accessible or clinically appropriate  The [place]  Food and Drug Administration (FDA) has issued an Emergency Use Authorization (EUA) to make Madolyn Beer available during the COVID-19 pandemic (for more details about an EUA please see Beverly Dodd is an Emergency Use Authorization?” at the end of this document)  Madolyn Beer is not an FDA-approved medicine in the [place]  Read this Fact Sheet for information about LAGEVRIO  Talk to your healthcare provider about your options if you have any questions  It is your choice to take LAGEVRIO  What is COVID-19? COVID-19 is caused by a virus called a coronavirus  You can get COVID-19 through close contact with another person who has the virus  COVID-19 illnesses have ranged from very mild-to-severe, including illness resulting in death   While information so far suggests that most COVID-19 illness is mild, serious illness can happen and may cause some of your other medical conditions to become worse  Older people and people of all ages with severe, long lasting (chronic) medical conditions like heart disease, lung disease and diabetes, for example seem to be at higher risk of being hospitalized for COVID-19  What is LAGEVRIO? Olden Alonso is an investigational medicine used to treat mild-to-moderate COVID-19 in adults: • with positive results of direct SARS-CoV-2 viral testing, and • who are at high risk for progression to severe COVID-19 including hospitalization or death, and for whom other COVID-19 treatment options approved or authorized by the FDA are not accessible or clinically appropriate  The FDA has authorized the emergency use of LAGEVRIO for the treatment of mild-tomoderate COVID-19 in adults under an EUA  For more information on EUA, see the [place] is an Emergency Use Authorization (EUA)?” section at the end of this Fact Sheet  Olden Alonso is not authorized: • for use in people less than 25years of age  • for prevention of COVID-19  • for people needing hospitalization for COVID-19  • for use for longer than 5 consecutive days  What should I tell my healthcare provider before I take Olden Alonso? Tell your healthcare provider if you: • Have any allergies • Are breastfeeding or plan to breastfeed • Have any serious illnesses • Are taking any medicines (prescription, over-the-counter, vitamins, or herbal products)  How do I take LAGEVRIO? • Take Olden Alonso exactly as your healthcare provider tells you to take it  • Take 4 capsules of LAGEVRIO every 12 hours (for example, at 8 am and at 8 pm) • Take LAGEVRIO for 5 days  It is important that you complete the full 5 days of treatment with LAGEVRIO  Do not stop taking LAGEVRIO before you complete the full 5 days of treatment, even if you feel better  • Take LAGEVRIO with or without food  • You should stay in isolation for as long as your healthcare provider tells you to  Talk to your healthcare provider if you are not sure about how to properly isolate while you have COVID-19  • Swallow LAGEVRIO capsules whole  Do not open, break, or crush the capsules  If you cannot swallow capsules whole, tell your healthcare provider  • What to do if you miss a dose: o If it has been less than 10 hours since the missed dose, take it as soon as you remember o If it has been more than 10 hours since the missed dose, skip the missed dose and take your dose at the next scheduled time  • Do not double the dose of LAGEVRIO to make up for a missed dose  What are the important possible side effects of LAGEVRIO? • See, America Biswas is the most important information I should know about LAGEVRIO?” • Allergic Reactions  Allergic reactions can happen in people taking LAGEVRIO, even after only 1 dose  Stop taking LAGEVRIO and call your healthcare provider right away if you get any of the following symptoms of an allergic reaction: o hives o rapid heartbeat o trouble swallowing or breathing o swelling of the mouth, lips, or face o throat tightness o hoarseness o skin rash The most common side effects of LAGEVRIO are: • diarrhea • nausea • dizziness These are not all the possible side effects of LAGEVRIO  Not many people have taken LAGEVRIO  Serious and unexpected side effects may happen  This medicine is still being studied, so it is possible that all of the risks are not known at this time  What other treatment choices are there? Veklury (remdesivir) is FDA-approved as an intravenous (IV) infusion for the treatment of mildto-moderate [id number]-71 in certain adults and children  Talk with your doctor to see if Donte Quigley is appropriate for you  Like Álvaro Soriano may also allow for the emergency use of other medicines to treat people with COVID-19  Go to [facility] for more information   It is your choice to be treated or not to be treated with LAGEVRIO  Should you decide not to take it, it will not change your standard medical care  What if I am breastfeeding? Breastfeeding is not recommended during treatment with LAGEVRIO and for 4 days after the last dose of LAGEVRIO  If you are breastfeeding or plan to breastfeed, talk to your healthcare provider about your options and specific situation before taking LAGEVRIO  How do I report side effects with LAGEVRIO? Contact your healthcare provider if you have any side effects that bother you or do not go away  Report side effects to FDA MedWatch at www fda gov/medwatch or call 1-834-AZP-7729 (1- [phone])  How should I store [facility]? • Store LAGEVRIO capsules at room temperature between 68°F to 77°F (20°C to 25°C)  • Keep LAGEVRIO and all medicines out of the reach of children and pets  How can I learn more about COVID-19? • Ask your healthcare provider  • Visit www cdc gov/COVID19 • Contact your local or state public health department  • Call 230Ember Cody Rubio at [phone] (toll free in the [place] ) • Visit www molnupiravir  com What Is an Emergency Use Authorization (EUA)? The [place] FDA has made 5 [facility] available under an emergency access mechanism called an Emergency Use Authorization (EUA) The EUA is supported by a  of Health and Human Service (HHS) declaration that circumstances exist to justify emergency use of drugs and biological products during the COVID-19 pandemic  5 [facility] for the treatment of mild-to-moderate COVID-19 in adults with positive results of direct SARS-CoV-2 viral testing, who are at high risk for progression to severe COVID-19, including hospitalization or death, and for whom alternative COVID-19 treatment options approved or authorized by FDA are not accessible or clinically appropriate, has not undergone the same type of review as an FDA-approved product   In issuing an EUA under the NNEIQ-55 public health emergency, the FDA has determined, among other things, that based on the total amount of scientific evidence available including data from adequate and well-controlled clinical trials, if available, it is reasonable to believe that the product may be effective for diagnosing, treating, or preventing COVID-19, or a serious or life-threatening disease or condition caused by COVID-19; that the known and potential benefits of the product, when used to diagnose, treat, or prevent such disease or condition, outweigh the known and potential risks of such product; and that there are no adequate, approved, and available alternatives  All of these criteria must be met to allow for the product to be used in the treatment of patients during the COVID-19 pandemic  The EUA for Trudee Pilar is in effect for the duration of the COVID-19 declaration justifying emergency use of LAGEVRIO, unless terminated or revoked (after which Trudee Pilar may no longer be used under the EUA)  Amandeepf  for: [employer], [address] [facility] For patent information: www VasSol/research/patent Copyright © 4879-JCRM [address] and its affiliates    Please Be Courteous:  You are being tested for novel coronavirus (COVID-19) your test is pending at this time  You need to self quarantine at least until you have the results back  This means go home and stay home  Have someone else  your medications for you and bring them to you and drop them off at your door  Tests typically come back in 1-3 days  Results can be found in the "COVID-19" section of your MyChart account  In Your Home:  If you live with other people, trying to avoid common spaces and disinfect areas that you come into contact with  Per the CDC's recommendations: persons who suspect that they might have COVID-19 should isolate, stay at home, and use a separate bedroom and bathroom if feasible   Isolation should begin even before seeking testing and before test results become available  All household members should start wearing a mask in the home, particularly in shared spaces where appropriate distancing is not possible  Take Care of Yourself:  Try to sleep on your stomach as much as possible  If you have the ability to, take vitamin D3 2000 IU by mouth daily, vitamin-C 1000 mg by mouth twice a day, a multivitamin daily to help boost your immune system  You should check your temperature twice day  Return to the emergency department for any severe shortness of breath or pulse ox less than 90%  If You Test Positive for COVID-19 (Isolate)     Everyone, regardless of vaccination status:     · Stay home for 5 days  · If you have no symptoms or your symptoms are resolving after 5 days, you can leave your house  · Continue to wear a mask around others for 5 additional days  If you have a fever, continue to stay home until your fever resolves  If You Were Exposed to Someone with COVID-19 (Quarantine)  If you:  Have been boosted  OR  Completed the primary series of Pfizer or Moderna vaccine within the last 6 months  OR  Completed the primary series of J&J vaccine within the last 2 months     · Wear a mask around others for 10 days  · Test on day 5, if possible  If you develop symptoms get a test and stay home  If you:  Completed the primary series of Pfizer or Moderna vaccine over 6 months ago and are not boosted  OR  Completed the primary series of J&J over 2 months ago and are not boosted  OR  Are unvaccinated     · Stay home for 5 days  After that continue to wear a mask around others for 5 additional days  · If you can’t quarantine you must wear a mask for 10 days  · Test on day 5 if possible  If you develop symptoms get a test and stay home           Follow up with PCP if not improved, if symptoms are worse, go to the ER

## 2023-01-23 NOTE — LETTER
January 23, 2023    Patient: Italia Tidwell  YOB: 1970  Date of Last Encounter: Visit date not found      To whom it may concern:     Italia Tidwell has tested positive for COVID-19 (Coronavirus)  He may return to work on 01/26/2023, which is 5 days from illness onset (provided symptoms are improving) and 24 hours without fever      Sincerely,         [address] [facility], CRNP

## 2023-01-23 NOTE — PATIENT INSTRUCTIONS
Home covid test was positive  Rest and drink extra fluids  Tylenol as needed for pain  Go to ER with any worsening symptoms, chest pain, sob, difficulty breathing, lethargy, confusion, dehyrdration, persistent fever 103 or higher  Will hold off on paxlovid due to simvastatin  WIll start [address]  DIscussed sided effects  Mucinex and cough medication sent to pharm  Monitor oxygen if less than 90, chest pain or sob go directly to ER  Fact Sheet for Patients And Caregivers Emergency Use Authorization (EUA) Of LAGEVRIO™ (molnupiravir) capsules For Coronavirus Disease 2019 (COVID-19) What is the most important information I should know about LAGEVRIO? Jackson Life may cause serious side effects, including:  Indira Life may cause harm to your unborn baby  It is not known if LAGEVRIO will harm your baby if you take LAGEVRIO during pregnancy  o Indira Life is not recommended for use in pregnancy  o Jackson Life has not been studied in pregnancy  Jackson Life was studied in pregnant animals only  When Indira Life was given to pregnant animals, Indira Life caused harm to their unborn babies  o You and your healthcare provider may decide that you should take Indira Life during pregnancy if there are no other COVID-19 treatment options approved or authorized by the FDA that are accessible or clinically appropriate for you  o If you and your healthcare provider decide that you should take Indira Life during pregnancy, you and your healthcare provider should discuss the known and potential benefits and the potential risks of taking LAGEVRIO during pregnancy  For individuals who are able to become pregnant:  You should use a reliable method of birth control (contraception) consistently and correctly during treatment with LAGEVRIO and for 4 days after the last dose of LAGEVRIO  Talk to your healthcare provider about reliable birth control methods    Before starting treatment with Jackson Life your healthcare provider may do a pregnancy test to see if you are pregnant before starting treatment with LAGEVRIO  Tell your healthcare provider right away if you become pregnant or think you may be pregnant during treatment with LAGEVRIO  Pregnancy Surveillance Program:  There is a pregnancy surveillance program for individuals who take Elizabeth Rude during pregnancy  The purpose of this program is to collect information about the health of you and your baby  Talk to your healthcare provider about how to take part in this program   If you take Elizabeth Rude during pregnancy and you agree to participate in the pregnancy surveillance program and allow your healthcare provider to share your information with Imani Ross , then your healthcare provider will report your use of Elizabeth Rude during pregnancy to [address] [place] [address]  by calling [phone] or pregnancyreporting msd com  For individuals who are sexually active with partners who are able to become pregnant: It is not known if LAGEVRIO can affect sperm  While the risk is regarded as low, animal studies to fully assess the potential for LAGEVRIO to affect the babies of males treated with LAGEVRIO have not been completed  A reliable method of birth control (contraception) should be used consistently and correctly during treatment with LAGEVRIO and for at least 3 months after the last dose  The risk to sperm beyond 3 months is not known  Studies to understand the risk to sperm beyond 3 months are ongoing  Talk to your healthcare provider about reliable birth control methods  Talk to your healthcare provider if you have questions or concerns about how Elizabeth Rude may affect sperm   You are being given this fact sheet because your healthcare provider believes it is necessary to provide you with Elizabeth Rude for the treatment of adults with mild-to-moderate coronavirus disease 2019 (COVID-19) with positive results of direct SARS-CoV-2 viral testing, and who are at high risk for progression to severe COVID-19 including hospitalization or death, and for whom other COVID-19 treatment options approved or authorized by the FDA are not accessible or clinically appropriate  The [place]  Food and Drug Administration (FDA) has issued an Emergency Use Authorization (EUA) to make Earmon Whiting available during the COVID-19 pandemic (for more details about an EUA please see Elenor Frankel is an Emergency Use Authorization?” at the end of this document)  Earmon Whiting is not an FDA-approved medicine in the [place]  Read this Fact Sheet for information about LAGEVRIO  Talk to your healthcare provider about your options if you have any questions  It is your choice to take LAGEVRIO  What is COVID-19? COVID-19 is caused by a virus called a coronavirus  You can get COVID-19 through close contact with another person who has the virus  COVID-19 illnesses have ranged from very mild-to-severe, including illness resulting in death  While information so far suggests that most COVID-19 illness is mild, serious illness can happen and may cause some of your other medical conditions to become worse  Older people and people of all ages with severe, long lasting (chronic) medical conditions like heart disease, lung disease and diabetes, for example seem to be at higher risk of being hospitalized for COVID-19  What is LAGEVRIO? Earmon Whiting is an investigational medicine used to treat mild-to-moderate COVID-19 in adults:  with positive results of direct SARS-CoV-2 viral testing, and  who are at high risk for progression to severe COVID-19 including hospitalization or death, and for whom other COVID-19 treatment options approved or authorized by the FDA are not accessible or clinically appropriate  The FDA has authorized the emergency use of LAGEVRIO for the treatment of mild-tomoderate COVID-19 in adults under an EUA  For more information on EUA, see the Elenor Frankel is an Emergency Use Authorization (EUA)?” section at the end of this Fact Sheet   Earmon Whiting is not authorized:  for use in people less than 25years of age  for prevention of COVID-19   for people needing hospitalization for COVID-19   for use for longer than 5 consecutive days  What should I tell my healthcare provider before I take Leita Real? Tell your healthcare provider if you:  Have any allergies  Are breastfeeding or plan to breastfeed  Have any serious illnesses  Are taking any medicines (prescription, over-the-counter, vitamins, or herbal products)  How do I take LAGEVRIO? Take Leita Real exactly as your healthcare provider tells you to take it  Take 4 capsules of LAGEVRIO every 12 hours (for example, at 8 am and at 8 pm)  Take LAGEVRIO for 5 days  It is important that you complete the full 5 days of treatment with LAGEVRIO  Do not stop taking LAGEVRIO before you complete the full 5 days of treatment, even if you feel better  Take LAGEVRIO with or without food  You should stay in isolation for as long as your healthcare provider tells you to  Talk to your healthcare provider if you are not sure about how to properly isolate while you have COVID-19  Swallow LAGEVRIO capsules whole  Do not open, break, or crush the capsules  If you cannot swallow capsules whole, tell your healthcare provider  What to do if you miss a dose: o If it has been less than 10 hours since the missed dose, take it as soon as you remember o If it has been more than 10 hours since the missed dose, skip the missed dose and take your dose at the next scheduled time  Do not double the dose of LAGEVRIO to make up for a missed dose  What are the important possible side effects of LAGEVRIO? See, Susan Gooden is the most important information I should know about LAGEVRIO?”  Allergic Reactions  Allergic reactions can happen in people taking LAGEVRIO, even after only 1 dose   Stop taking LAGEVRIO and call your healthcare provider right away if you get any of the following symptoms of an allergic reaction: o hives o rapid heartbeat o trouble swallowing or breathing o swelling of the mouth, lips, or face o throat tightness o hoarseness o skin rash The most common side effects of LAGEVRIO are:  diarrhea  nausea  dizziness These are not all the possible side effects of LAGEVRIO  Not many people have taken LAGEVRIO  Serious and unexpected side effects may happen  This medicine is still being studied, so it is possible that all of the risks are not known at this time  What other treatment choices are there? Veklury (remdesivir) is FDA-approved as an intravenous (IV) infusion for the treatment of mildto-moderate BJDIA-13 in certain adults and children  Talk with your doctor to see if Derrill Brooklynn is appropriate for you  Like Yoshi Murphy may also allow for the emergency use of other medicines to treat people with COVID-19  Go to [facility] for more information  It is your choice to be treated or not to be treated with LAGEVRIO  Should you decide not to take it, it will not change your standard medical care  What if I am breastfeeding? Breastfeeding is not recommended during treatment with LAGEVRIO and for 4 days after the last dose of LAGEVRIO  If you are breastfeeding or plan to breastfeed, talk to your healthcare provider about your options and specific situation before taking LAGEVRIO  How do I report side effects with LAGEVRIO? Contact your healthcare provider if you have any side effects that bother you or do not go away  Report side effects to FDA MedWatch at www fda gov/medwatch or call 9-829-FDA-9260 (1- 515.355.4888)  How should I store [facility]? Store LAGEVRIO capsules at room temperature between 68°F to 77°F (20°C to 25°C)  Keep LAGEVRIO and all medicines out of the reach of children and pets  How can I learn more about COVID-19? Ask your healthcare provider  Visit www cdc gov/COVID19  Contact your local or state public health department    Call [employer] at [phone] (toll free in the [place] )  Visit www iSSimple What Is an Emergency Use Authorization (EUA)? The [place] FDA has made 5 [facility] available under an emergency access mechanism called an Emergency Use Authorization (EUA) The EUA is supported by a [place] of Health and Human Service ([facility]) declaration that circumstances exist to justify emergency use of drugs and biological products during the COVID-19 pandemic  5 [facility] for the treatment of mild-to-moderate COVID-19 in adults with positive results of direct SARS-CoV-2 viral testing, who are at high risk for progression to severe COVID-19, including hospitalization or death, and for whom alternative COVID-19 treatment options approved or authorized by FDA are not accessible or clinically appropriate, has not undergone the same type of review as an FDA-approved product  In issuing an EUA under the JCZPQ-87 public health emergency, the FDA has determined, among other things, that based on the total amount of scientific evidence available including data from adequate and well-controlled clinical trials, if available, it is reasonable to believe that the product may be effective for diagnosing, treating, or preventing COVID-19, or a serious or life-threatening disease or condition caused by COVID-19; that the known and potential benefits of the product, when used to diagnose, treat, or prevent such disease or condition, outweigh the known and potential risks of such product; and that there are no adequate, approved, and available alternatives  All of these criteria must be met to allow for the product to be used in the treatment of patients during the COVID-19 pandemic  The EUA for 5 [facility] is in effect for the duration of the COVID-19 declaration justifying emergency use of LAGEVRIO, unless terminated or revoked (after which 5 [facility] may no longer be used under the EUA)   [place]  for: [employer], [address], [place] For patent information: www msd [employer]/research/patent Copyright © 2021-XXXX [address] [facility], [address] [place] [address] and its affiliates    Please Be Courteous:  You are being tested for novel coronavirus (COVID-19) your test is pending at this time  You need to self quarantine at least until you have the results back  This means go home and stay home  Have someone else  your medications for you and bring them to you and drop them off at your door  Tests typically come back in 1-3 days  Results can be found in the "COVID-19" section of your MyChart account  In Your Home:  If you live with other people, trying to avoid common spaces and disinfect areas that you come into contact with  Per the CDC's recommendations: persons who suspect that they might have COVID-19 should isolate, stay at home, and use a separate bedroom and bathroom if feasible  Isolation should begin even before seeking testing and before test results become available  All household members should start wearing a mask in the home, particularly in shared spaces where appropriate distancing is not possible  Take Care of Yourself:  Try to sleep on your stomach as much as possible  If you have the ability to, take vitamin D3 2000 IU by mouth daily, vitamin-C 1000 mg by mouth twice a day, a multivitamin daily to help boost your immune system  You should check your temperature twice day  Return to the emergency department for any severe shortness of breath or pulse ox less than 90%  If You Test Positive for COVID-19 (Isolate)     Everyone, regardless of vaccination status:     Stay home for 5 days  If you have no symptoms or your symptoms are resolving after 5 days, you can leave your house  Continue to wear a mask around others for 5 additional days  If you have a fever, continue to stay home until your fever resolves       If You Were Exposed to Someone with COVID-19 Winnie Rose)  If you:  Have been boosted  OR  Completed the primary series of Pfizer or Moderna vaccine within the last 6 months  OR  Completed the primary series of J&J vaccine within the last 2 months     Wear a mask around others for 10 days  Test on day 5, if possible  If you develop symptoms get a test and stay home  If you:  Completed the primary series of Pfizer or Moderna vaccine over 6 months ago and are not boosted  OR  Completed the primary series of J&J over 2 months ago and are not boosted  OR  Are unvaccinated     Stay home for 5 days  After that continue to wear a mask around others for 5 additional days  If you can’t quarantine you must wear a mask for 10 days  Test on day 5 if possible       If you develop symptoms get a test and stay home

## 2023-01-25 ENCOUNTER — TELEMEDICINE (OUTPATIENT)
Dept: INTERNAL MEDICINE CLINIC | Facility: OTHER | Age: 53
End: 2023-01-25

## 2023-01-25 VITALS — HEIGHT: 73 IN | BODY MASS INDEX: 34.06 KG/M2 | WEIGHT: 257 LBS

## 2023-01-25 DIAGNOSIS — U07.1 COVID-19: Primary | ICD-10-CM

## 2023-01-25 DIAGNOSIS — E11.9 TYPE 2 DIABETES MELLITUS WITHOUT COMPLICATION, WITHOUT LONG-TERM CURRENT USE OF INSULIN (HCC): ICD-10-CM

## 2023-01-25 DIAGNOSIS — U07.1 COVID: ICD-10-CM

## 2023-01-25 RX ORDER — GUAIFENESIN 600 MG/1
1200 TABLET, EXTENDED RELEASE ORAL EVERY 12 HOURS SCHEDULED
Qty: 20 TABLET | Refills: 0 | Status: SHIPPED | OUTPATIENT
Start: 2023-01-25 | End: 2023-01-30

## 2023-01-25 RX ORDER — PREDNISONE 20 MG/1
40 TABLET ORAL DAILY
Qty: 10 TABLET | Refills: 0 | Status: SHIPPED | OUTPATIENT
Start: 2023-01-25 | End: 2023-01-30

## 2023-01-25 NOTE — LETTER
January 25, 2023     Patient: Rosalind Sparks  YOB: 1970  Date of Visit: 1/25/2023      To Whom it May Concern:    Rosalind Sparks is under my professional care  Julianne Win was seen in my office on 1/25/2023  Julianne Win may return to work on 1/30/23  If you have any questions or concerns, please don't hesitate to call           Sincerely,          MIKHAIL Montaño        CC: No Recipients

## 2023-01-25 NOTE — PROGRESS NOTES
COVID-19 Outpatient Progress Note    Assessment/Plan:    Problem List Items Addressed This Visit        Endocrine    Diabetes mellitus ([facility] [address] )    Relevant Orders    Microalbumin / creatinine urine ratio       Other    COVID-19 - Primary    Relevant Medications    predniSONE 20 mg tablet   Other Visit Diagnoses     COVID        Relevant Medications    guaiFENesin (MUCINEX) 600 mg 12 hr tablet         Disposition:     Patient has asymptomatic or mild COVID-19 infection  Based off CDC guidelines, they were recommended to isolate for 5 days  If they are asymptomatic or symptoms are improving with no fevers in the past 24 hours, isolation may be ended followed by 5 days of wearing a mask when around othes to minimize risk of infecting others  If still have a fever or other symptoms have not improved, continue to isolate until they improve  Regardless of when they end isolation, avoid being around people who are more likely to get very sick from COVID-19 until at least day 11  Out of the window for treatment with antiviral medications, start prednisone, continue tessalon pearls and Muscina  Rest and fluids advised  Educated that the course of this illness could be 2-4 weeks  Discussed symptomatic relief, such as warm steam inhalations, tylenol/ibuprofen for fevers and body aches, rest, and drink plenty of fluids  Warm salt gargles for sore throat  Discussed red flag signs to go to the ER, such as chest pain or shortness of breath  Return to the office for reevaluation if symptoms worsen or do not improve in 1-2 weeks       I have spent 15 minutes directly with the patient         Encounter provider: MIKHAIL Tran     Provider located at: [address] [place] [address]     Recent Visits  Date Type Provider Dept   01/23/23 Telephone Celsa Hayes MA [facility] [place]   Showing recent visits within past 7 days and meeting all other requirements  Today's Visits  Date Type Provider Dept   01/25/23 [place], CRNP [facility]   Showing today's visits and meeting all other requirements  Future Appointments  No visits were found meeting these conditions  Showing future appointments within next 150 days and meeting all other requirements       Patient agrees to participate in a virtual check in via telephone or video visit instead of presenting to the office to address urgent/immediate medical needs  Patient is aware this is a billable service  He acknowledged consent and understanding of privacy and security of the video platform  The patient has agreed to participate and understands they can discontinue the visit at any time  After connecting through [facility], the patient was identified by name and date of birth  Félix Alvarez was informed that this was a telemedicine visit and that the exam was being conducted confidentially over secure lines  My office door was closed  No one else was in the room  Félix Alvarez acknowledged consent and understanding of privacy and security of the telemedicine visit  I informed the patient that I have reviewed his record in Epic and presented the opportunity for him to ask any questions regarding the visit today  The patient agreed to participate  Verification of patient location:  Patient is located in the following state in which I hold an active license: [place]    Subjective:   Félix Alvarez is a 47 y o  male who has been screened for COVID-19  Symptom change since last report: unchanged  Patient's symptoms include chills, fatigue, nasal congestion, rhinorrhea, sore throat, loss of taste, cough and myalgias  Patient denies fever, malaise, anosmia, shortness of breath, chest tightness, abdominal pain, nausea, vomiting, diarrhea and headaches       - Date of symptom onset: 1/19/2023  - Date of positive COVID-19 test: 1/22/2023  Type of test: Home antigen  COVID-19 vaccination status: Fully vaccinated with booster    Sangeeta Leon has been staying home and has isolated themselves in his home  He is taking care to not share personal items and is cleaning all surfaces that are touched often, like counters, tabletops, and doorknobs using household cleaning sprays or wipes  He is wearing a mask when he leaves his room  Was seen via virtual visit and was started on LEGEVRIO(never started this medication), tessalon pearls and Mucinex    Lab Results   Component Value Date    SARSCOV2 Negative 09/07/2021    SARSCOV2 Not Detected 12/13/2020    [address] [place] Negative 08/01/2022       Review of Systems   Constitutional: Positive for chills and fatigue  Negative for activity change, appetite change, diaphoresis and fever  HENT: Positive for congestion, rhinorrhea and sore throat  Negative for ear discharge, ear pain, postnasal drip, sinus pressure and sinus pain  Eyes: Negative for pain, discharge, itching and visual disturbance  Respiratory: Positive for cough and wheezing  Negative for chest tightness and shortness of breath  Cardiovascular: Negative for chest pain, palpitations and leg swelling  Gastrointestinal: Negative for abdominal pain, constipation, diarrhea, nausea and vomiting  Endocrine: Negative for polydipsia, polyphagia and polyuria  Genitourinary: Negative for difficulty urinating, dysuria and urgency  Musculoskeletal: Positive for myalgias  Negative for arthralgias, back pain and neck pain  Skin: Negative for rash and wound  Neurological: Negative for dizziness, weakness, numbness and headaches       Current Outpatient Medications on File Prior to Visit   Medication Sig   • aluminum-magnesium hydroxide-simethicone (MAALOX MAX) 400-400-40 MG/5ML suspension Take 10 mL by mouth every 6 (six) hours as needed for indigestion or heartburn   • Ascorbic Acid (vitamin C) 1000 MG tablet Take 1,000 mg by mouth daily     • baclofen 20 mg tablet Take 1 tablet (20 mg total) by mouth daily at bedtime as needed for muscle spasms   • benzonatate (TESSALON PERLES) 100 mg capsule Take 1 capsule (100 mg total) by mouth 3 (three) times a day as needed for cough for up to 4 days   • CINNAMON PO Take by mouth   • diphenhydrAMINE-acetaminophen (TYLENOL PM)  MG TABS Take 1 tablet by mouth daily at bedtime as needed for sleep    • famotidine (PEPCID) 20 mg tablet Take 1 tablet (20 mg total) by mouth 2 (two) times a day   • metFORMIN (GLUCOPHAGE) 500 mg tablet Pt takes 1500 mg (3 tablets) by mouth in the morning, and a 1000 mg (2 tablets) in the evening   • molnupiravir (Lagevrio) 200 mg capsule Take 4 capsules (800 mg total) by mouth every 12 (twelve) hours for 5 days   • omega-3-acid ethyl esters (LOVAZA) 1 g capsule Take 1 g by mouth daily   • pantoprazole (PROTONIX) 40 mg tablet Take 1 tablet (40 mg total) by mouth daily   • pregabalin (LYRICA) 50 mg capsule Take 1 capsule (50 mg total) by mouth 3 (three) times a day   • simvastatin (ZOCOR) 20 mg tablet Take 1 tablet (20 mg total) by mouth daily   • tadalafil (CIALIS) 20 MG tablet Take 1 tablet by mouth one (1) hour prior to intercourse on an empty stomach with no alcohol  Limit to 2 tablets per week  • tamsulosin (FLOMAX) 0 4 mg TAKE 1 CAPSULE(0 4 MG) BY MOUTH DAILY WITH DINNER   • [DISCONTINUED] guaiFENesin (MUCINEX) 600 mg 12 hr tablet Take 2 tablets (1,200 mg total) by mouth every 12 (twelve) hours for 5 days   • lisinopril-hydrochlorothiazide (PRINZIDE,ZESTORETIC) 20-12 5 MG per tablet Take 2 tablets by mouth daily       Objective:    Ht 6' 1" (1 854 m)   Wt 117 kg (257 lb)   BMI 33 91 kg/m²      Physical Exam  Pulmonary:      Comments: cough  Neurological:      Mental Status: He is alert and oriented to person, place, and time         MIKHAIL Mitchell

## 2023-02-01 ENCOUNTER — RA CDI HCC (OUTPATIENT)
Dept: OTHER | Facility: HOSPITAL | Age: 53
End: 2023-02-01

## 2023-02-01 NOTE — PROGRESS NOTES
[facility]  coding opportunities       Chart reviewed, no opportunity found: CHART REVIEWED, NO OPPORTUNITY FOUND        Patients Insurance        Commercial Insurance: [address] [place] [address]

## 2023-02-03 ENCOUNTER — APPOINTMENT (OUTPATIENT)
Dept: LAB | Facility: MEDICAL CENTER | Age: 53
End: 2023-02-03

## 2023-02-03 DIAGNOSIS — I10 HYPERTENSION, ESSENTIAL: ICD-10-CM

## 2023-02-03 LAB
ALBUMIN SERPL BCP-MCNC: 3.7 G/DL (ref 3.5–5)
ALP SERPL-CCNC: 70 U/L (ref 46–116)
ALT SERPL W P-5'-P-CCNC: 49 U/L (ref 12–78)
ANION GAP SERPL CALCULATED.3IONS-SCNC: 2 MMOL/L (ref 4–13)
AST SERPL W P-5'-P-CCNC: 18 U/L (ref 5–45)
BASOPHILS # BLD AUTO: 0.05 THOUSANDS/ÂΜL (ref 0–0.1)
BASOPHILS NFR BLD AUTO: 1 % (ref 0–1)
BILIRUB SERPL-MCNC: 0.35 MG/DL (ref 0.2–1)
BUN SERPL-MCNC: 16 MG/DL (ref 5–25)
CALCIUM SERPL-MCNC: 9.2 MG/DL (ref 8.3–10.1)
CHLORIDE SERPL-SCNC: 106 MMOL/L (ref 96–108)
CHOLEST SERPL-MCNC: 176 MG/DL
CO2 SERPL-SCNC: 28 MMOL/L (ref 21–32)
CREAT SERPL-MCNC: 0.88 MG/DL (ref 0.6–1.3)
CREAT UR-MCNC: 181 MG/DL
EOSINOPHIL # BLD AUTO: 0.12 THOUSAND/ÂΜL (ref 0–0.61)
EOSINOPHIL NFR BLD AUTO: 2 % (ref 0–6)
ERYTHROCYTE [DISTWIDTH] IN BLOOD BY AUTOMATED COUNT: 11.9 % (ref 11.6–15.1)
EST. AVERAGE GLUCOSE BLD GHB EST-MCNC: 137 MG/DL
GFR SERPL CREATININE-BSD FRML MDRD: 98 ML/MIN/1.73SQ M
GLUCOSE P FAST SERPL-MCNC: 136 MG/DL (ref 65–99)
HBA1C MFR BLD: 6.4 %
HCT VFR BLD AUTO: 43.4 % (ref 36.5–49.3)
HDLC SERPL-MCNC: 42 MG/DL
HGB BLD-MCNC: 14 G/DL (ref 12–17)
IMM GRANULOCYTES # BLD AUTO: 0.1 THOUSAND/UL (ref 0–0.2)
IMM GRANULOCYTES NFR BLD AUTO: 2 % (ref 0–2)
LDLC SERPL CALC-MCNC: 94 MG/DL (ref 0–100)
LYMPHOCYTES # BLD AUTO: 2.26 THOUSANDS/ÂΜL (ref 0.6–4.47)
LYMPHOCYTES NFR BLD AUTO: 33 % (ref 14–44)
MCH RBC QN AUTO: 29.7 PG (ref 26.8–34.3)
MCHC RBC AUTO-ENTMCNC: 32.3 G/DL (ref 31.4–37.4)
MCV RBC AUTO: 92 FL (ref 82–98)
MICROALBUMIN UR-MCNC: 13.9 MG/L (ref 0–20)
MICROALBUMIN/CREAT 24H UR: 8 MG/G CREATININE (ref 0–30)
MONOCYTES # BLD AUTO: 0.84 THOUSAND/ÂΜL (ref 0.17–1.22)
MONOCYTES NFR BLD AUTO: 12 % (ref 4–12)
NEUTROPHILS # BLD AUTO: 3.47 THOUSANDS/ÂΜL (ref 1.85–7.62)
NEUTS SEG NFR BLD AUTO: 50 % (ref 43–75)
NONHDLC SERPL-MCNC: 134 MG/DL
NRBC BLD AUTO-RTO: 0 /100 WBCS
PLATELET # BLD AUTO: 220 THOUSANDS/UL (ref 149–390)
PMV BLD AUTO: 10.9 FL (ref 8.9–12.7)
POTASSIUM SERPL-SCNC: 3.9 MMOL/L (ref 3.5–5.3)
PROT SERPL-MCNC: 6.8 G/DL (ref 6.4–8.4)
RBC # BLD AUTO: 4.71 MILLION/UL (ref 3.88–5.62)
SODIUM SERPL-SCNC: 136 MMOL/L (ref 135–147)
TRIGL SERPL-MCNC: 201 MG/DL
WBC # BLD AUTO: 6.84 THOUSAND/UL (ref 4.31–10.16)

## 2023-02-17 ENCOUNTER — OFFICE VISIT (OUTPATIENT)
Dept: INTERNAL MEDICINE CLINIC | Facility: OTHER | Age: 53
End: 2023-02-17

## 2023-02-17 VITALS
RESPIRATION RATE: 20 BRPM | HEART RATE: 69 BPM | SYSTOLIC BLOOD PRESSURE: 122 MMHG | WEIGHT: 264 LBS | BODY MASS INDEX: 34.99 KG/M2 | OXYGEN SATURATION: 98 % | TEMPERATURE: 98.5 F | DIASTOLIC BLOOD PRESSURE: 78 MMHG | HEIGHT: 73 IN

## 2023-02-17 DIAGNOSIS — J06.9 VIRAL URI WITH COUGH: ICD-10-CM

## 2023-02-17 DIAGNOSIS — M54.12 CERVICAL RADICULOPATHY: ICD-10-CM

## 2023-02-17 DIAGNOSIS — I10 HYPERTENSION, ESSENTIAL: ICD-10-CM

## 2023-02-17 DIAGNOSIS — E11.9 TYPE 2 DIABETES MELLITUS WITHOUT COMPLICATION, WITHOUT LONG-TERM CURRENT USE OF INSULIN (HCC): Primary | ICD-10-CM

## 2023-02-17 DIAGNOSIS — S76.219A GROIN STRAIN, UNSPECIFIED LATERALITY, INITIAL ENCOUNTER: ICD-10-CM

## 2023-02-17 DIAGNOSIS — K20.90 ESOPHAGITIS: ICD-10-CM

## 2023-02-17 DIAGNOSIS — E78.49 OTHER HYPERLIPIDEMIA: ICD-10-CM

## 2023-02-17 PROBLEM — M94.0 COSTOCHONDRITIS: Status: RESOLVED | Noted: 2022-03-15 | Resolved: 2023-02-17

## 2023-02-17 PROBLEM — U07.1 COVID-19 VIRUS INFECTION: Status: RESOLVED | Noted: 2022-01-11 | Resolved: 2023-02-17

## 2023-02-17 PROBLEM — U07.1 COVID-19: Status: RESOLVED | Noted: 2023-01-25 | Resolved: 2023-02-17

## 2023-02-17 RX ORDER — LISINOPRIL AND HYDROCHLOROTHIAZIDE 20; 12.5 MG/1; MG/1
1 TABLET ORAL DAILY
Qty: 90 TABLET | Refills: 1 | Status: SHIPPED | OUTPATIENT
Start: 2023-02-17 | End: 2023-05-18

## 2023-02-17 RX ORDER — SIMVASTATIN 20 MG
20 TABLET ORAL DAILY
Qty: 90 TABLET | Refills: 1 | Status: SHIPPED | OUTPATIENT
Start: 2023-02-17

## 2023-02-17 RX ORDER — PANTOPRAZOLE SODIUM 40 MG/1
40 TABLET, DELAYED RELEASE ORAL DAILY
Qty: 90 TABLET | Refills: 1 | Status: SHIPPED | OUTPATIENT
Start: 2023-02-17

## 2023-02-17 NOTE — ASSESSMENT & PLAN NOTE
Discussed diet and exercise  Continue simvastatin 20 mg daily and recommended he start taking a fish oil

## 2023-02-17 NOTE — PROGRESS NOTES
Assessment/Plan:    Diabetes mellitus ([facility] [address] )    Lab Results   Component Value Date    HGBA1C 6 4 (H) 02/03/2023   Well-controlled  Continue metformin 1500 mg in the morning and 1000 mg in the evening  Hypertension, essential  Controlled  Continue lisinopril-hydrochlorothiazide 20-12 5 mg daily  Cervical radiculopathy  Continue Lyrica 100 mg daily  Other hyperlipidemia  Discussed diet and exercise  Continue simvastatin 20 mg daily and recommended he start taking a fish oil  Viral URI with cough  Moving  Defer on antibiotics at this time  Continue over-the-counter decongestants as needed  He is to contact our office for worsening symptoms  Groin strain  Recommend he perform stretching exercises  Diagnoses and all orders for this visit:    Type 2 diabetes mellitus without complication, without long-term current use of insulin ([facility])  -     Hemoglobin A1C; Future  -     Comprehensive metabolic panel; Future    Esophagitis  -     pantoprazole (PROTONIX) 40 mg tablet; Take 1 tablet (40 mg total) by mouth daily    Hypertension, essential  -     lisinopril-hydrochlorothiazide (PRINZIDE,ZESTORETIC) 20-12 5 MG per tablet; Take 1 tablet by mouth daily    Other hyperlipidemia  -     simvastatin (ZOCOR) 20 mg tablet; Take 1 tablet (20 mg total) by mouth daily    Cervical radiculopathy    Viral URI with cough    Groin strain, unspecified laterality, initial encounter          BMI Counseling: Body mass index is 34 83 kg/m²  The BMI is above normal  Nutrition recommendations include decreasing portion sizes, encouraging healthy choices of fruits and vegetables, decreasing fast food intake, consuming healthier snacks, limiting drinks that contain sugar, moderation in carbohydrate intake, increasing intake of lean protein, reducing intake of saturated and trans fat and reducing intake of cholesterol   Exercise recommendations include moderate physical activity 150 minutes/week and exercising 3-5 times per week  No pharmacotherapy was ordered  Patient referred to PCP  Rationale for BMI follow-up plan is due to patient being overweight or obese  Subjective:      Patient ID: Kelley Bowman is a 47 y o  male  Chief Complaint   Patient presents with   • Follow-up     4 month, labs done 2/3/23   • hm     Hep c, hiv, annual exam, bmi f/u plan, foot exam   • Pain     Down the inside of both legs in the inner thigh    • scratchy throat     Past week with occasional cough and nasal congestion  Checked 3 x's for COVID and was neg  Has been taking day and night quil       50-year-old male seen today for follow-up of chronic conditions  Laboratory studies reviewed today: A1c is 6 4%  lipid panel shows triglycerides elevated at 201  Remaining labs are within acceptable range  He has been compliant with his medication regimen  He has been having a URI since 1 week  He has been having medial bilateral proximal leg pain  He denies any rash  Symptoms started 1 month ago and are intermittent  He denies any exerctional activity of trauma  Otherwise, he has no active complaints or concerns at this time  URI   This is a new problem  The current episode started in the past 7 days  The problem has been gradually improving  There has been no fever  Associated symptoms include congestion, coughing and a sore throat  Pertinent negatives include no abdominal pain, chest pain, diarrhea, dysuria, headaches, nausea, rhinorrhea, sinus pain, sneezing, vomiting or wheezing  He has tried decongestant for the symptoms  The treatment provided moderate relief  Hypertension  This is a chronic problem  The current episode started more than 1 year ago  The problem is unchanged  The problem is controlled  Pertinent negatives include no chest pain, headaches, palpitations or shortness of breath  Past treatments include ACE inhibitors and diuretics  The current treatment provides moderate improvement  There are no compliance problems  Heartburn  He complains of coughing and a sore throat  He reports no abdominal pain, no chest pain, no choking, no nausea or no wheezing  This is a chronic problem  The current episode started more than 1 year ago  The problem occurs rarely  The problem has been unchanged  Pertinent negatives include no fatigue  He has tried a PPI for the symptoms  The treatment provided moderate relief  Diabetes  He presents for his follow-up diabetic visit  He has type 2 diabetes mellitus  Pertinent negatives for hypoglycemia include no dizziness or headaches  Pertinent negatives for diabetes include no chest pain, no fatigue and no weakness  Hyperlipidemia  Pertinent negatives include no chest pain or shortness of breath  The following portions of the patient's history were reviewed and updated as appropriate: allergies, current medications, past family history, past medical history, past social history, past surgical history and problem list     Review of Systems   Constitutional: Negative for activity change, appetite change, chills, diaphoresis, fatigue and fever  HENT: Positive for congestion and sore throat  Negative for postnasal drip, rhinorrhea, sinus pressure, sinus pain and sneezing  Eyes: Negative for visual disturbance  Respiratory: Positive for cough  Negative for apnea, choking, chest tightness, shortness of breath and wheezing  Cardiovascular: Negative for chest pain, palpitations and leg swelling  Gastrointestinal: Negative for abdominal distention, abdominal pain, anal bleeding, blood in stool, constipation, diarrhea, nausea and vomiting  Endocrine: Negative for cold intolerance and heat intolerance  Genitourinary: Negative for difficulty urinating, dysuria and hematuria  Musculoskeletal: Negative  Skin: Negative  Neurological: Negative for dizziness, weakness, light-headedness, numbness and headaches  Hematological: Negative for adenopathy     Psychiatric/Behavioral: Negative for agitation, sleep disturbance and suicidal ideas  All other systems reviewed and are negative          Past Medical History:   Diagnosis Date   • Achalasia    • Aneurysm ([facility] [address] )     aortic   • Asthma     Childhood   • Colon polyp    • [facility]    • Esophageal ulcer    • Groin abscess     with I &D   • Hyperlipidemia    • Hypertension          Current Outpatient Medications:   •  aluminum-magnesium hydroxide-simethicone (MAALOX MAX) 400-400-40 MG/5ML suspension, Take 10 mL by mouth every 6 (six) hours as needed for indigestion or heartburn, Disp: 355 mL, Rfl: 1  •  Ascorbic Acid (vitamin C) 1000 MG tablet, Take 1,000 mg by mouth daily  , Disp: , Rfl:   •  baclofen 20 mg tablet, Take 1 tablet (20 mg total) by mouth daily at bedtime as needed for muscle spasms, Disp: 90 tablet, Rfl: 0  •  CINNAMON PO, Take by mouth, Disp: , Rfl:   •  diphenhydrAMINE-acetaminophen (TYLENOL PM)  MG TABS, Take 1 tablet by mouth daily at bedtime as needed for sleep , Disp: , Rfl:   •  famotidine (PEPCID) 20 mg tablet, Take 1 tablet (20 mg total) by mouth 2 (two) times a day (Patient taking differently: Take 20 mg by mouth daily), Disp: 90 tablet, Rfl: 1  •  lisinopril-hydrochlorothiazide (PRINZIDE,ZESTORETIC) 20-12 5 MG per tablet, Take 1 tablet by mouth daily, Disp: 90 tablet, Rfl: 1  •  metFORMIN (GLUCOPHAGE) 500 mg tablet, Pt takes 1500 mg (3 tablets) by mouth in the morning, and a 1000 mg (2 tablets) in the evening, Disp: 450 tablet, Rfl: 1  •  pantoprazole (PROTONIX) 40 mg tablet, Take 1 tablet (40 mg total) by mouth daily, Disp: 90 tablet, Rfl: 1  •  pregabalin (LYRICA) 50 mg capsule, Take 1 capsule (50 mg total) by mouth 3 (three) times a day (Patient taking differently: Take 100 mg by mouth daily at bedtime), Disp: 270 capsule, Rfl: 0  •  simvastatin (ZOCOR) 20 mg tablet, Take 1 tablet (20 mg total) by mouth daily, Disp: 90 tablet, Rfl: 1  •  tadalafil (CIALIS) 20 MG tablet, Take 1 tablet by mouth one (1) hour prior to intercourse on an empty stomach with no alcohol    Limit to 2 tablets per week , Disp: 18 tablet, Rfl: 3  •  omega-3-acid ethyl esters (LOVAZA) 1 g capsule, Take 1 g by mouth daily (Patient not taking: Reported on 2/17/2023), Disp: , Rfl:     No Known Allergies    Social History   Past Surgical History:   Procedure Laterality Date   • ANTERIOR CERVICAL DISCECTOMY W/ FUSION     • APPENDECTOMY     • BICEPS TENDON REPAIR     • EGD AND COLONOSCOPY     • FL INJECTION LEFT WRIST (ARTHROGRAM)  11/9/2021   • MENISCECTOMY     • MYOTOMY HELLER LAPAROSCOPIC     • ORTHOPEDIC SURGERY       Family History   Problem Relation Age of Onset   • No Known Problems Mother    • No Known Problems Father        Objective:  /78 (BP Location: Left arm, Patient Position: Sitting, Cuff Size: Large)   Pulse 69   Temp 98 5 °F (36 9 °C) (Temporal)   Resp 20   Ht 6' 1" (1 854 m)   Wt 120 kg (264 lb)   SpO2 98%   BMI 34 83 kg/m²     Recent Results (from the past 1344 hour(s))   CBC and differential    Collection Time: 02/03/23  8:16 AM   Result Value Ref Range    WBC 6 84 4 31 - 10 16 Thousand/uL    RBC 4 71 3 88 - 5 62 Million/uL    Hemoglobin 14 0 12 0 - 17 0 g/dL    Hematocrit 43 4 36 5 - 49 3 %    MCV 92 82 - 98 fL    MCH 29 7 26 8 - 34 3 pg    MCHC 32 3 31 4 - 37 4 g/dL    RDW 11 9 11 6 - 15 1 %    MPV 10 9 8 9 - 12 7 fL    Platelets 750 095 - 547 Thousands/uL    nRBC 0 /100 WBCs    Neutrophils Relative 50 43 - 75 %    Immat GRANS % 2 0 - 2 %    Lymphocytes Relative 33 14 - 44 %    Monocytes Relative 12 4 - 12 %    Eosinophils Relative 2 0 - 6 %    Basophils Relative 1 0 - 1 %    Neutrophils Absolute 3 47 1 85 - 7 62 Thousands/µL    Immature Grans Absolute 0 10 0 00 - 0 20 Thousand/uL    Lymphocytes Absolute 2 26 0 60 - 4 47 Thousands/µL    Monocytes Absolute 0 84 0 17 - 1 22 Thousand/µL    Eosinophils Absolute 0 12 0 00 - 0 61 Thousand/µL    Basophils Absolute 0 05 0 00 - 0 10 Thousands/µL   Hemoglobin A1C Collection Time: 02/03/23  8:16 AM   Result Value Ref Range    Hemoglobin A1C 6 4 (H) Normal 3 8-5 6%; PreDiabetic 5 7-6 4%; Diabetic >=6 5%; Glycemic control for adults with diabetes <7 0% %     mg/dl   Lipid panel    Collection Time: 02/03/23  8:16 AM   Result Value Ref Range    Cholesterol 176 See Comment mg/dL    Triglycerides 201 (H) See Comment mg/dL    HDL, Direct 42 >=40 mg/dL    LDL Calculated 94 0 - 100 mg/dL    Non-HDL-Chol (CHOL-HDL) 134 mg/dl   Microalbumin / creatinine urine ratio    Collection Time: 02/03/23  8:16 AM   Result Value Ref Range    Creatinine, Ur 181 0 mg/dL    Microalbum  ,U,Random 13 9 0 0 - 20 0 mg/L    Microalb Creat Ratio 8 0 - 30 mg/g creatinine   Comprehensive metabolic panel    Collection Time: 02/03/23  8:16 AM   Result Value Ref Range    Sodium 136 135 - 147 mmol/L    Potassium 3 9 3 5 - 5 3 mmol/L    Chloride 106 96 - 108 mmol/L    CO2 28 21 - 32 mmol/L    ANION GAP 2 (L) 4 - 13 mmol/L    BUN 16 5 - 25 mg/dL    Creatinine 0 88 0 60 - 1 30 mg/dL    Glucose, Fasting 136 (H) 65 - 99 mg/dL    Calcium 9 2 8 3 - 10 1 mg/dL    AST 18 5 - 45 U/L    ALT 49 12 - 78 U/L    Alkaline Phosphatase 70 46 - 116 U/L    Total Protein 6 8 6 4 - 8 4 g/dL    Albumin 3 7 3 5 - 5 0 g/dL    Total Bilirubin 0 35 0 20 - 1 00 mg/dL    eGFR 98 ml/min/1 73sq m            Physical Exam  Vitals and nursing note reviewed  Constitutional:       General: He is not in acute distress  Appearance: He is well-developed  He is not diaphoretic  HENT:      Head: Normocephalic and atraumatic  Eyes:      General: No scleral icterus  Right eye: No discharge  Left eye: No discharge  Conjunctiva/sclera: Conjunctivae normal       Pupils: Pupils are equal, round, and reactive to light  Neck:      Thyroid: No thyromegaly  Vascular: No JVD  Cardiovascular:      Rate and Rhythm: Normal rate and regular rhythm        Pulses: no weak pulses          Dorsalis pedis pulses are 2+ on the right side and 2+ on the left side  Posterior tibial pulses are 2+ on the right side and 2+ on the left side  Heart sounds: Normal heart sounds  No murmur heard  No friction rub  No gallop  Pulmonary:      Effort: Pulmonary effort is normal  No respiratory distress  Breath sounds: Normal breath sounds  No wheezing or rales  Chest:      Chest wall: No tenderness  Abdominal:      General: Bowel sounds are normal  There is no distension  Palpations: Abdomen is soft  There is no mass  Tenderness: There is no abdominal tenderness  There is no guarding or rebound  Musculoskeletal:         General: No tenderness or deformity  Normal range of motion  Cervical back: Normal range of motion and neck supple  Feet:    Feet:      Right foot:      Skin integrity: Callus present  No ulcer, skin breakdown, erythema, warmth or dry skin  Left foot:      Skin integrity: Callus present  No ulcer, skin breakdown, erythema, warmth or dry skin  Lymphadenopathy:      Cervical: No cervical adenopathy  Skin:     General: Skin is warm and dry  Coloration: Skin is not pale  Findings: No erythema or rash  Neurological:      Mental Status: He is alert and oriented to person, place, and time  Cranial Nerves: No cranial nerve deficit  Coordination: Coordination normal       Deep Tendon Reflexes: Reflexes are normal and symmetric  Psychiatric:         Behavior: Behavior normal          Thought Content: Thought content normal          Judgment: Judgment normal          Diabetic Foot Exam    Patient's shoes and socks removed  Right Foot/Ankle   Right Foot Inspection  Skin Exam: skin normal, skin intact, callus and callus  No dry skin, no warmth, no erythema, no maceration, no abnormal color, no pre-ulcer and no ulcer  Toe Exam: ROM and strength within normal limits       Sensory   Monofilament testing: intact    Vascular  Capillary refills: < 3 seconds  The right DP pulse is 2+  The right PT pulse is 2+  Left Foot/Ankle  Left Foot Inspection  Skin Exam: skin normal, skin intact and callus  No dry skin, no warmth, no erythema, no maceration, normal color, no pre-ulcer and no ulcer  Toe Exam: ROM and strength within normal limits  Sensory   Monofilament testing: intact    Vascular  Capillary refills: < 3 seconds  The left DP pulse is 2+  The left PT pulse is 2+       Assign Risk Category  No deformity present  No loss of protective sensation  No weak pulses  Risk: 0

## 2023-02-17 NOTE — ASSESSMENT & PLAN NOTE
Moving  Defer on antibiotics at this time  Continue over-the-counter decongestants as needed  He is to contact our office for worsening symptoms

## 2023-02-17 NOTE — ASSESSMENT & PLAN NOTE
Lab Results   Component Value Date    HGBA1C 6 4 (H) 02/03/2023   Well-controlled  Continue metformin 1500 mg in the morning and 1000 mg in the evening

## 2023-02-23 NOTE — PROGRESS NOTES
Assessment:  1  Chronic pain syndrome    2  Cervical radiculopathy    3  Cervical spondylosis without myelopathy    4  S/P cervical spinal fusion    5  Myofascial pain syndrome        Plan:  1  Patient may continue pregabalin 50 mg in the morning and 100 mg at bedtime and baclofen 20 mg nightly as needed myofascial pain as prescribed  Both of these medications were refilled today  Patient does not wish to increase pregabalin dosing at this time for neuropathic complaints  2  Should the patient's pain worsen in the future, may consider a neurosurgical evaluation however he does not feel it necessary at this time   3  Continue with home exercise program  4  Follow-up in 3 months or sooner if needed    History of Present Illness: The patient is a 47 y o  male the history of C6-7 ACDF in 2018 last seen on 12/05/2022 who presents for a follow up office visit in regards to chronic neck pain that radiates into the right upper extremity in the third through fifth digits of the right hand  He denies bowel or bladder incontinence or balance issues  Today, he actually reports his right upper extremity symptoms are much improved he is now experiencing pain in the left first finger with associated numbness which he states started about a week ago without inciting event or trauma  He has had cervical epidural steroid injection x2 without any significant long-term benefit  He is tried gabapentin and duloxetine both which caused side effects  Continues on pregabalin 50 mg in the morning and 100 mg at bedtime and baclofen 20 mg nightly as needed with 50% improvement of his pain without side effects  He rates his pain a 5 out of 10 on the numeric pain rating scale  He has pain at night which is described as dull aching    I have personally reviewed and/or updated the patient's past medical history, past surgical history, family history, social history, current medications, allergies, and vital signs today         Review of Systems:    Review of Systems   Respiratory: Negative for shortness of breath  Gastrointestinal: Positive for constipation  Negative for diarrhea, nausea and vomiting  Musculoskeletal: Positive for gait problem  Negative for arthralgias, joint swelling and myalgias  Skin: Negative for rash  Neurological: Negative for dizziness, seizures and weakness  All other systems reviewed and are negative          Past Medical History:   Diagnosis Date   • Achalasia    • Aneurysm ([facility] [address] )     aortic   • Asthma     Childhood   • Colon polyp    • [facility]    • Esophageal ulcer    • Groin abscess     with I &D   • Hyperlipidemia    • Hypertension        Past Surgical History:   Procedure Laterality Date   • ANTERIOR CERVICAL DISCECTOMY W/ FUSION     • APPENDECTOMY     • BICEPS TENDON REPAIR     • EGD AND COLONOSCOPY     • FL INJECTION LEFT WRIST (ARTHROGRAM)  11/9/2021   • MENISCECTOMY     • MYOTOMY HELLER LAPAROSCOPIC     • ORTHOPEDIC SURGERY         Family History   Problem Relation Age of Onset   • No Known Problems Mother    • No Known Problems Father        Social History     Occupational History   • 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   Tobacco Use   • Smoking status: Never   • Smokeless tobacco: Never   Vaping Use   • Vaping Use: Never used   Substance and Sexual Activity   • Alcohol use: Not Currently   • Drug use: Never   • Sexual activity: Yes     Partners: Female         Current Outpatient Medications:   •  aluminum-magnesium hydroxide-simethicone (MAALOX MAX) 400-400-40 MG/5ML suspension, Take 10 mL by mouth every 6 (six) hours as needed for indigestion or heartburn, Disp: 355 mL, Rfl: 1  •  Ascorbic Acid (vitamin C) 1000 MG tablet, Take 1,000 mg by mouth daily  , Disp: , Rfl:   •  baclofen 20 mg tablet, Take 1 tablet (20 mg total) by mouth daily at bedtime as needed for muscle spasms, Disp: 90 tablet, Rfl: 0  •  CINNAMON PO, Take by mouth, Disp: , Rfl:   • diphenhydrAMINE-acetaminophen (TYLENOL PM)  MG TABS, Take 1 tablet by mouth daily at bedtime as needed for sleep , Disp: , Rfl:   •  famotidine (PEPCID) 20 mg tablet, Take 1 tablet (20 mg total) by mouth 2 (two) times a day (Patient taking differently: Take 20 mg by mouth daily), Disp: 90 tablet, Rfl: 1  •  lisinopril-hydrochlorothiazide (PRINZIDE,ZESTORETIC) 20-12 5 MG per tablet, Take 1 tablet by mouth daily, Disp: 90 tablet, Rfl: 1  •  metFORMIN (GLUCOPHAGE) 500 mg tablet, Pt takes 1500 mg (3 tablets) by mouth in the morning, and a 1000 mg (2 tablets) in the evening, Disp: 450 tablet, Rfl: 1  •  pantoprazole (PROTONIX) 40 mg tablet, Take 1 tablet (40 mg total) by mouth daily, Disp: 90 tablet, Rfl: 1  •  pregabalin (LYRICA) 50 mg capsule, Take 1 PO AM and 2 PO HS, Disp: 270 capsule, Rfl: 0  •  simvastatin (ZOCOR) 20 mg tablet, Take 1 tablet (20 mg total) by mouth daily, Disp: 90 tablet, Rfl: 1  •  tadalafil (CIALIS) 20 MG tablet, Take 1 tablet by mouth one (1) hour prior to intercourse on an empty stomach with no alcohol  Limit to 2 tablets per week , Disp: 18 tablet, Rfl: 3  •  omega-3-acid ethyl esters (LOVAZA) 1 g capsule, Take 1 g by mouth daily (Patient not taking: Reported on 2/17/2023), Disp: , Rfl:     No Known Allergies    Physical Exam:    /88   Pulse 65   Ht 6' 1" (1 854 m)   Wt 122 kg (270 lb)   BMI 35 62 kg/m²     Constitutional:normal, well developed, well nourished, alert, in no distress and non-toxic and no overt pain behavior  Eyes:anicteric  HEENT:grossly intact  Neck:supple, symmetric, trachea midline and no masses   Pulmonary:even and unlabored  Cardiovascular:No edema or pitting edema present  Skin:Normal without rashes or lesions and well hydrated  Psychiatric:Mood and affect appropriate  Neurologic:Cranial Nerves II-XII grossly intact  Musculoskeletal:normal gait      Imaging  No orders to display         No orders of the defined types were placed in this encounter

## 2023-02-27 ENCOUNTER — OFFICE VISIT (OUTPATIENT)
Dept: PAIN MEDICINE | Facility: CLINIC | Age: 53
End: 2023-02-27

## 2023-02-27 VITALS
HEART RATE: 65 BPM | SYSTOLIC BLOOD PRESSURE: 146 MMHG | WEIGHT: 270 LBS | DIASTOLIC BLOOD PRESSURE: 88 MMHG | HEIGHT: 73 IN | BODY MASS INDEX: 35.78 KG/M2

## 2023-02-27 DIAGNOSIS — M79.18 MYOFASCIAL PAIN SYNDROME: ICD-10-CM

## 2023-02-27 DIAGNOSIS — M47.812 CERVICAL SPONDYLOSIS WITHOUT MYELOPATHY: ICD-10-CM

## 2023-02-27 DIAGNOSIS — M54.12 CERVICAL RADICULOPATHY: ICD-10-CM

## 2023-02-27 DIAGNOSIS — G89.4 CHRONIC PAIN SYNDROME: Primary | ICD-10-CM

## 2023-02-27 DIAGNOSIS — Z98.1 S/P CERVICAL SPINAL FUSION: ICD-10-CM

## 2023-02-27 RX ORDER — BACLOFEN 20 MG/1
20 TABLET ORAL
Qty: 90 TABLET | Refills: 0 | Status: SHIPPED | OUTPATIENT
Start: 2023-02-27

## 2023-02-27 RX ORDER — PREGABALIN 50 MG/1
CAPSULE ORAL
Qty: 270 CAPSULE | Refills: 0 | Status: SHIPPED | OUTPATIENT
Start: 2023-02-27

## 2023-03-09 DIAGNOSIS — E78.49 OTHER HYPERLIPIDEMIA: ICD-10-CM

## 2023-03-09 RX ORDER — SIMVASTATIN 20 MG
20 TABLET ORAL DAILY
Qty: 90 TABLET | Refills: 1 | Status: SHIPPED | OUTPATIENT
Start: 2023-03-09

## 2023-03-09 NOTE — TELEPHONE ENCOUNTER
NOV: 8/23/23    Sent to [facility]

## 2023-03-22 ENCOUNTER — TELEPHONE (OUTPATIENT)
Dept: INTERNAL MEDICINE CLINIC | Facility: CLINIC | Age: 53
End: 2023-03-22

## 2023-04-18 PROBLEM — J06.9 VIRAL URI WITH COUGH: Status: RESOLVED | Noted: 2021-09-10 | Resolved: 2023-04-18

## 2023-04-24 ENCOUNTER — OFFICE VISIT (OUTPATIENT)
Dept: INTERNAL MEDICINE CLINIC | Facility: CLINIC | Age: 53
End: 2023-04-24

## 2023-04-24 VITALS
WEIGHT: 263 LBS | BODY MASS INDEX: 34.7 KG/M2 | SYSTOLIC BLOOD PRESSURE: 136 MMHG | TEMPERATURE: 98.2 F | HEART RATE: 70 BPM | OXYGEN SATURATION: 90 % | DIASTOLIC BLOOD PRESSURE: 78 MMHG

## 2023-04-24 DIAGNOSIS — R21 RASH: Primary | ICD-10-CM

## 2023-04-24 RX ORDER — PREDNISONE 20 MG/1
40 TABLET ORAL DAILY
Qty: 10 TABLET | Refills: 0 | Status: SHIPPED | OUTPATIENT
Start: 2023-04-24 | End: 2023-04-29

## 2023-04-24 RX ORDER — TRIAMCINOLONE ACETONIDE 1 MG/G
CREAM TOPICAL
Qty: 30 G | Refills: 0 | Status: SHIPPED | OUTPATIENT
Start: 2023-04-24 | End: 2023-05-01

## 2023-04-24 NOTE — PROGRESS NOTES
Assessment/Plan:    Diagnoses and all orders for this visit:    Rash  -     predniSONE 20 mg tablet; Take 2 tablets (40 mg total) by mouth daily for 5 days  -     triamcinolone (KENALOG) 0 1 % cream; Apply a very tiny amount on the buttock area, and back of neck for 7 days    Other orders  -     Multiple Vitamins-Minerals (CENTRUM SILVER 50+MEN PO); Take by mouth       Given widespread itchy rash will give short course of steroid, recommend OTC Claritin for symptom improvement, and OTC Sarna for local application on the body  Follow-up in 1 week if symptoms are not improved       Patient Instructions   Itchy Skin   WHAT YOU NEED TO KNOW:   Itchy skin may interfere with your daily tasks and sleep  Treatment is important because constant scratching can damage your skin and increase your risk of infection  DISCHARGE INSTRUCTIONS:   Medicines:  • Medicines  may help decrease itching or inflammation  Skin creams, such as steroid creams or anti-itch creams may also help  • Take your medicine as directed  Contact your healthcare provider if you think your medicine is not helping or if you have side effects  Tell your provider if you are allergic to any medicine  Keep a list of the medicines, vitamins, and herbs you take  Include the amounts, and when and why you take them  Bring the list or the pill bottles to follow-up visits  Carry your medicine list with you in case of an emergency  Follow up with your healthcare provider as directed:  Write down your questions so you remember to ask them during your visits  Manage itchy skin:   • Take short showers in warm water  Avoid using hot water for your showers  Use only a small amount of mild skin cleanser  • Apply moisturizer or cooling creams  after you bathe and throughout the day  • Use a cool mist humidifier  to moisten the air in your home and maintain a cool temperature  Cool, humid air can decrease skin dryness and itching       • Avoid allergens and skin irritants  Do not use perfume, fabric softener, or makeup that irritates your skin  Use a mild detergent to wash your clothes  Wear loose cotton clothes and use cotton sheets  Avoid wool  Contact your healthcare provider if:   • Your itching does not improve or gets worse  • Scratching has caused your skin to be red or swollen  • You have new symptoms such as weight loss, fatigue, changes in urination, or fever  • You have questions or concerns about your condition or care  © Copyright [facility] 2022 Information is for End User's use only and may not be sold, redistributed or otherwise used for commercial purposes  The above information is an  only  It is not intended as medical advice for individual conditions or treatments  Talk to your doctor, nurse or pharmacist before following any medical regimen to see if it is safe and effective for you  Subjective:      Patient ID: Maddie Chester is a 47 y o  male    Patient complains of widespread itchy macular rash since few days, and more localized rash at the back of the neck and on the left buttock area  Denies any fever chills joint pain nausea vomiting or diarrhea          Current Outpatient Medications:   •  baclofen 20 mg tablet, Take 1 tablet (20 mg total) by mouth daily at bedtime as needed for muscle spasms, Disp: 90 tablet, Rfl: 0  •  diphenhydrAMINE-acetaminophen (TYLENOL PM)  MG TABS, Take 1 tablet by mouth daily at bedtime as needed for sleep , Disp: , Rfl:   •  famotidine (PEPCID) 20 mg tablet, Take 1 tablet (20 mg total) by mouth 2 (two) times a day (Patient taking differently: Take 20 mg by mouth daily), Disp: 90 tablet, Rfl: 1  •  lisinopril-hydrochlorothiazide (PRINZIDE,ZESTORETIC) 20-12 5 MG per tablet, Take 1 tablet by mouth daily, Disp: 90 tablet, Rfl: 1  •  metFORMIN (GLUCOPHAGE) 500 mg tablet, Pt takes 1500 mg (3 tablets) by mouth in the morning, and a 1000 mg (2 tablets) in the evening, Disp: 450 tablet, Rfl: 1  •  Multiple Vitamins-Minerals (CENTRUM SILVER 50+MEN PO), Take by mouth, Disp: , Rfl:   •  omega-3-acid ethyl esters (LOVAZA) 1 g capsule, Take 1 g by mouth daily, Disp: , Rfl:   •  pantoprazole (PROTONIX) 40 mg tablet, Take 1 tablet (40 mg total) by mouth daily, Disp: 90 tablet, Rfl: 1  •  predniSONE 20 mg tablet, Take 2 tablets (40 mg total) by mouth daily for 5 days, Disp: 10 tablet, Rfl: 0  •  pregabalin (LYRICA) 50 mg capsule, Take 1 PO AM and 2 PO HS, Disp: 270 capsule, Rfl: 0  •  simvastatin (ZOCOR) 20 mg tablet, Take 1 tablet (20 mg total) by mouth daily, Disp: 90 tablet, Rfl: 1  •  tadalafil (CIALIS) 20 MG tablet, Take 1 tablet by mouth one (1) hour prior to intercourse on an empty stomach with no alcohol  Limit to 2 tablets per week , Disp: 18 tablet, Rfl: 3  •  triamcinolone (KENALOG) 0 1 % cream, Apply a very tiny amount on the buttock area, and back of neck for 7 days, Disp: 30 g, Rfl: 0  •  aluminum-magnesium hydroxide-simethicone (MAALOX MAX) 400-400-40 MG/5ML suspension, Take 10 mL by mouth every 6 (six) hours as needed for indigestion or heartburn, Disp: 355 mL, Rfl: 1  •  Ascorbic Acid (vitamin C) 1000 MG tablet, Take 1,000 mg by mouth daily  , Disp: , Rfl:   •  CINNAMON PO, Take by mouth, Disp: , Rfl:      Past Medical History:   Diagnosis Date   • Achalasia    • Aneurysm ([facility] [address] )     aortic   • Asthma     Childhood   • Colon polyp    • Diabetes mellitus ([facility] [address] )    • Esophageal ulcer    • Groin abscess     with I &D   • Hyperlipidemia    • Hypertension          Past Surgical History:   Procedure Laterality Date   • ANTERIOR CERVICAL DISCECTOMY W/ FUSION     • APPENDECTOMY     • BICEPS TENDON REPAIR     • EGD AND COLONOSCOPY     • FL INJECTION LEFT WRIST (ARTHROGRAM)  11/9/2021   • MENISCECTOMY     • MYOTOMY HELLER LAPAROSCOPIC     • ORTHOPEDIC SURGERY           No Known Allergies    No results found for this or any previous visit (from the past 1008 hour(s))      The following portions of the patient's history were reviewed and updated as appropriate: allergies, current medications, past family history, past medical history, past social history, past surgical history and problem list      Review of Systems   Constitutional: Negative for activity change, appetite change, chills, diaphoresis, fatigue, fever and unexpected weight change  HENT: Negative for congestion and sore throat  Respiratory: Negative for apnea, cough, choking, chest tightness, shortness of breath, wheezing and stridor  Cardiovascular: Negative for chest pain, palpitations and leg swelling  Gastrointestinal: Negative for abdominal distention, abdominal pain, blood in stool, constipation, nausea and rectal pain  Genitourinary: Negative for dysuria, flank pain, frequency and urgency  Musculoskeletal: Negative for arthralgias, back pain, gait problem, joint swelling and myalgias  Skin: Positive for rash  Negative for color change and pallor  Neurological: Negative for headaches  Objective:      Vitals:    04/24/23 1509   BP: 136/78   Pulse: 70   Temp: 98 2 °F (36 8 °C)   SpO2: 90%          Physical Exam  Vitals reviewed  Constitutional:       General: He is not in acute distress  Appearance: Normal appearance  He is not ill-appearing, toxic-appearing or diaphoretic  HENT:      Mouth/Throat:      Mouth: Mucous membranes are moist    Cardiovascular:      Rate and Rhythm: Normal rate and regular rhythm  Pulses: Normal pulses  Heart sounds: Normal heart sounds  No murmur heard  No friction rub  No gallop  Pulmonary:      Effort: Pulmonary effort is normal  No respiratory distress  Breath sounds: Normal breath sounds  No stridor  No wheezing, rhonchi or rales  Chest:      Chest wall: No tenderness  Musculoskeletal:      Right lower leg: No edema  Left lower leg: No edema  Skin:     General: Skin is warm and dry  Findings: Rash present  No lesion        Comments: Multiple punctate macular erythematous rash on the entire body with scratch marks visible  More localized erythematous rash in a small area at the back of the neck and on the left buttock  Neurological:      Mental Status: He is alert

## 2023-04-24 NOTE — PATIENT INSTRUCTIONS
Itchy Skin   WHAT YOU NEED TO KNOW:   Itchy skin may interfere with your daily tasks and sleep  Treatment is important because constant scratching can damage your skin and increase your risk of infection  DISCHARGE INSTRUCTIONS:   Medicines:  Medicines  may help decrease itching or inflammation  Skin creams, such as steroid creams or anti-itch creams may also help  Take your medicine as directed  Contact your healthcare provider if you think your medicine is not helping or if you have side effects  Tell your provider if you are allergic to any medicine  Keep a list of the medicines, vitamins, and herbs you take  Include the amounts, and when and why you take them  Bring the list or the pill bottles to follow-up visits  Carry your medicine list with you in case of an emergency  Follow up with your healthcare provider as directed:  Write down your questions so you remember to ask them during your visits  Manage itchy skin:   Take short showers in warm water  Avoid using hot water for your showers  Use only a small amount of mild skin cleanser  Apply moisturizer or cooling creams  after you bathe and throughout the day  Use a cool mist humidifier  to moisten the air in your home and maintain a cool temperature  Cool, humid air can decrease skin dryness and itching  Avoid allergens and skin irritants  Do not use perfume, fabric softener, or makeup that irritates your skin  Use a mild detergent to wash your clothes  Wear loose cotton clothes and use cotton sheets  Avoid wool  Contact your healthcare provider if:   Your itching does not improve or gets worse  Scratching has caused your skin to be red or swollen  You have new symptoms such as weight loss, fatigue, changes in urination, or fever  You have questions or concerns about your condition or care      © Copyright [employer] 2022 Information is for End User's use only and may not be sold, redistributed or otherwise used for commercial purposes  The above information is an  only  It is not intended as medical advice for individual conditions or treatments  Talk to your doctor, nurse or pharmacist before following any medical regimen to see if it is safe and effective for you

## 2023-05-16 ENCOUNTER — TELEPHONE (OUTPATIENT)
Dept: UROLOGY | Facility: MEDICAL CENTER | Age: 53
End: 2023-05-16

## 2023-05-19 NOTE — PROGRESS NOTES
Assessment:  1  Chronic pain syndrome    2  Myofascial pain syndrome    3  Cervical radiculopathy    4  Cervical spondylosis without myelopathy    5  S/P cervical spinal fusion        Plan:  1  I will gently increase pregabalin to 75 mg in the morning and 150 mg at bedtime  I advised the patient that if they experience any side effects or issues with the changes in their medication regiment, they should give our office a call to discuss  I also advised the patient not to drive or operate machinery until they see how the changes in the medication regimen affects them  The patient was agreeable and verbalized an understanding  2   Patient may continue baclofen as prescribed  This medication was refilled today  3  Continue with home exercise program  4   patient may continue Tylenol as needed and should not exceed more than 3000 mg in 24 hours  5  Follow-up in 8 weeks or sooner if needed    History of Present Illness: The patient is a 47 y o  male with a history of C6-7 ACDF in 2018 last seen on 02/27/2023 who presents for a follow up office visit in regards to chronic neck pain that radiates into the posterior aspect of the bilateral upper extremities, right greater than left into the third through fifth digits of the hand with associated numbness and paresthesias  he denies bowel or bladder incontinence or balance issues  He has had cervical epidural steroid injection x2 without any significant long-term benefit  He has tried both gabapentin and duloxetine which caused side effects therefore were discontinued  Continues on pregabalin 50 mg in the morning and 100 mg at bedtime, and baclofen 20 mg nightly with a 5-10% imovement of his pain  He is still finding trouble with pain mostly at night, depending on neck position  He has been having to make some activity modifications at work as well to avoid lifting over his head  Patient rates his pain a 5 out of 10 on the numeric pain rating scale    He intermittently has pain at night which is described as dull aching and pins-and-needles    I have personally reviewed and/or updated the patient's past medical history, past surgical history, family history, social history, current medications, allergies, and vital signs today  Review of Systems:    Review of Systems   Respiratory: Negative for shortness of breath  Cardiovascular: Negative for chest pain  Gastrointestinal: Negative for constipation, diarrhea, nausea and vomiting  Musculoskeletal: Negative for arthralgias, gait problem, joint swelling and myalgias  Skin: Negative for rash  Neurological: Negative for dizziness, seizures and weakness  All other systems reviewed and are negative          Past Medical History:   Diagnosis Date   • Achalasia    • [facility]m [facility] )     aortic   • Asthma     Childhood   • Colon polyp    • Diabetes mellitus ([facility] )    • Esophageal ulcer    • Groin abscess     with I &D   • Hyperlipidemia    • Hypertension        Past Surgical History:   Procedure Laterality Date   • ANTERIOR CERVICAL DISCECTOMY W/ FUSION     • APPENDECTOMY     • BICEPS TENDON REPAIR     • EGD AND COLONOSCOPY     • FL INJECTION LEFT WRIST (ARTHROGRAM)  11/9/2021   • MENISCECTOMY     • MYOTOMY HELLER LAPAROSCOPIC     • ORTHOPEDIC SURGERY         Family History   Problem Relation Age of Onset   • No Known Problems Mother    • No Known Problems Father        Social History     Occupational History   • 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   Tobacco Use   • Smoking status: Never   • Smokeless tobacco: Never   Vaping Use   • Vaping Use: Never used   Substance and Sexual Activity   • Alcohol use: Not Currently   • Drug use: Never   • Sexual activity: Yes     Partners: Female         Current Outpatient Medications:   •  Ascorbic Acid (vitamin C) 1000 MG tablet, Take 1,000 mg by mouth daily  , Disp: , Rfl:   •  baclofen 20 mg tablet, Take 1 tablet (20 mg total) by mouth daily at bedtime as needed for muscle spasms, Disp: 90 tablet, Rfl: 0  •  metFORMIN (GLUCOPHAGE) 500 mg tablet, Pt takes 1500 mg (3 tablets) by mouth in the morning, and a 1000 mg (2 tablets) in the evening, Disp: 450 tablet, Rfl: 1  •  pregabalin (LYRICA) 75 mg capsule, Take 1 PO AM and 2 PO HS, Disp: 90 capsule, Rfl: 1  •  simvastatin (ZOCOR) 20 mg tablet, Take 1 tablet (20 mg total) by mouth daily, Disp: 90 tablet, Rfl: 1  •  tadalafil (CIALIS) 20 MG tablet, Take 1 tablet by mouth one (1) hour prior to intercourse on an empty stomach with no alcohol    Limit to 2 tablets per week , Disp: 18 tablet, Rfl: 3  •  aluminum-magnesium hydroxide-simethicone (MAALOX MAX) 400-400-40 MG/5ML suspension, Take 10 mL by mouth every 6 (six) hours as needed for indigestion or heartburn, Disp: 355 mL, Rfl: 1  •  CINNAMON PO, Take by mouth, Disp: , Rfl:   •  diphenhydrAMINE-acetaminophen (TYLENOL PM)  MG TABS, Take 1 tablet by mouth daily at bedtime as needed for sleep , Disp: , Rfl:   •  famotidine (PEPCID) 20 mg tablet, Take 1 tablet (20 mg total) by mouth 2 (two) times a day (Patient taking differently: Take 20 mg by mouth daily), Disp: 90 tablet, Rfl: 1  •  lisinopril-hydrochlorothiazide (PRINZIDE,ZESTORETIC) 20-12 5 MG per tablet, Take 1 tablet by mouth daily, Disp: 90 tablet, Rfl: 1  •  Multiple Vitamins-Minerals (CENTRUM SILVER 50+MEN PO), Take by mouth, Disp: , Rfl:   •  omega-3-acid ethyl esters (LOVAZA) 1 g capsule, Take 1 g by mouth daily, Disp: , Rfl:   •  pantoprazole (PROTONIX) 40 mg tablet, Take 1 tablet (40 mg total) by mouth daily, Disp: 90 tablet, Rfl: 1  •  triamcinolone (KENALOG) 0 1 % cream, Apply a very tiny amount on the buttock area, and back of neck for 7 days, Disp: 30 g, Rfl: 0    No Known Allergies    Physical Exam:    /86   Pulse 83   Wt 122 kg (270 lb)   BMI 35 62 kg/m²     Constitutional:normal, well developed, well nourished, alert, in no distress and non-toxic and no overt pain behavior  Eyes:anicteric  HEENT:grossly intact  Neck:supple, symmetric, trachea midline and no masses   Pulmonary:even and unlabored  Cardiovascular:No edema or pitting edema present  Skin:Normal without rashes or lesions and well hydrated  Psychiatric:Mood and affect appropriate  Neurologic:Cranial Nerves II-XII grossly intact  Musculoskeletal:normal gait  Positive Spurling's bilaterally      Imaging  No orders to display         No orders of the defined types were placed in this encounter

## 2023-05-22 ENCOUNTER — OFFICE VISIT (OUTPATIENT)
Dept: PAIN MEDICINE | Facility: CLINIC | Age: 53
End: 2023-05-22

## 2023-05-22 VITALS
SYSTOLIC BLOOD PRESSURE: 128 MMHG | DIASTOLIC BLOOD PRESSURE: 86 MMHG | WEIGHT: 270 LBS | BODY MASS INDEX: 35.62 KG/M2 | HEART RATE: 83 BPM

## 2023-05-22 DIAGNOSIS — M47.812 CERVICAL SPONDYLOSIS WITHOUT MYELOPATHY: ICD-10-CM

## 2023-05-22 DIAGNOSIS — G89.4 CHRONIC PAIN SYNDROME: Primary | ICD-10-CM

## 2023-05-22 DIAGNOSIS — M79.18 MYOFASCIAL PAIN SYNDROME: ICD-10-CM

## 2023-05-22 DIAGNOSIS — Z98.1 S/P CERVICAL SPINAL FUSION: ICD-10-CM

## 2023-05-22 DIAGNOSIS — M54.12 CERVICAL RADICULOPATHY: ICD-10-CM

## 2023-05-22 RX ORDER — PREGABALIN 75 MG/1
CAPSULE ORAL
Qty: 90 CAPSULE | Refills: 1 | Status: SHIPPED | OUTPATIENT
Start: 2023-05-22

## 2023-05-22 RX ORDER — BACLOFEN 20 MG/1
20 TABLET ORAL
Qty: 90 TABLET | Refills: 0 | Status: SHIPPED | OUTPATIENT
Start: 2023-05-22

## 2023-05-30 DIAGNOSIS — E11.9 TYPE 2 DIABETES MELLITUS WITHOUT COMPLICATION, WITHOUT LONG-TERM CURRENT USE OF INSULIN (HCC): ICD-10-CM

## 2023-06-14 ENCOUNTER — TELEPHONE (OUTPATIENT)
Dept: OTHER | Facility: OTHER | Age: 53
End: 2023-06-14

## 2023-06-14 DIAGNOSIS — R97.20 ELEVATED PSA: Primary | ICD-10-CM

## 2023-06-14 NOTE — TELEPHONE ENCOUNTER
"          [facility]  [address]  [facility] [address]               Arely Lopez   2017 12:49 PM   ED    Description:  Female : 1990   Department:  [facility] [place]           Your Care was Coordinated By:     Provider Role From To    Jason Cardoso II, MD Attending Provider 17 1255 --      Reason for Visit     Puncture Wound           Diagnoses this Visit        Comments    Puncture wound of toe of left foot, initial encounter    -  Primary       ED Disposition     None           To Do List           Follow-up Information     Follow up with Patrick Vergara MD In 1 week.    Specialty:  Obstetrics    Contact information:    [address] [place] [facility] [address]  [phone]        [facility]     [facility] Care Line -  Assistance  Unless otherwise directed by your provider, please contact [facility], our nurse care line that is available for  assistance.     Registered nurses in the [facility] provide: appointment scheduling, clinical advisement, health education, and other advisory services.  Call: [phone] (toll free)               Medications           Message regarding Medications     Verify the changes and/or additions to your medication regime listed below are the same as discussed with your clinician today.  If any of these changes or additions are incorrect, please notify your healthcare provider.             Verify that the below list of medications is an accurate representation of the medications you are currently taking.  If none reported, the list may be blank. If incorrect, please contact your healthcare provider. Carry this list with you in case of emergency.                Clinical Reference Information           Your Vitals Were     BP Pulse Temp Resp Height Weight    185/68 (BP Location: Left arm, Patient Position: Sitting) 68 98 °F (36.7 °C) (Oral) 20 5' 6" (1.676 m) 63.5 " Call from patient asking if the PSA test is needed for his appointment today? He went for the testing and the script is  so he was unable to have it done  Please return his call  kg (140 lb)    Last Period SpO2 BMI          10/15/2013 100% 22.6 kg/m2        Allergies as of 4/27/2017     No Known Allergies      Immunizations Administered on Date of Encounter - 4/27/2017     None      ED Micro, Lab, POCT     None      ED Imaging Orders     None        Discharge Instructions         Puncture Wound: Foot       A puncture wound occurs when a pointed object (such as a nail) pushes into the skin. It may go into the tissues below the skin of the foot, including fat and muscle. This type of wound is narrow and deep. They can be hard to clean. Puncture wounds are at high risk for becoming infected. One type of serious infection is more likely if you were wearing a rubber-soled shoe at the time of injury. Bacteria from the sole of the shoe may be dragged into the wound. Symptoms of infection may appear as late as 2 to 3 weeks after the injury. Be sure to watch for symptoms of infection and call your healthcare provider right away if any them appear.  X-rays may be done to see whether any objects remain under the skin. Your may also need a tetanus shot. This is given if you are not up to date on this vaccination and the object that caused the wound may lead to tetanus.  Puncture wounds can easily become infected.   Home care  · When you sit or lie down, raise the foot above the level of your heart. This helps reduce swelling and pain.  · Do not put weight on the injured foot if it hurts to do so or if you were told to keep weight off the injury.  · Your healthcare provider may prescribe an antibiotic. This is to help prevent infection. Follow all instructions for taking this medicine. Take the medicine every day until it is gone or you are told to stop. You should not have any left over.  · The healthcare provider may prescribe medicines for pain. Follow instructions for taking them.  · You can take acetaminophen or ibuprofen for pain, unless you were given a different pain medicine to use.   · Follow  the healthcare providers instructions on how to care for the wound.  · Keep the wound clean and dry. Do not get the wound wet until you are told it is okay to do so. If the area gets wet, gently pat it dry with a clean cloth. Replace the wet bandage with a dry one.  · If a bandage was applied and it becomes wet or dirty, replace it. Otherwise, leave it in place for the first 24 hours.  · Once you can get the wound wet, you may shower as usual but do not soak the wound in water (no tub baths or swimming)  · Check the wound daily for symptoms of infection. These include:  ¨ Increasing redness or swelling around the wound  ¨ Increased warmth of the wound  ¨ Worsening pain  ¨ Red streaking lines away from the wound  ¨ Draining pus  Follow-up care  Follow up with your healthcare provider as advised.   When to seek medical advice  Call your healthcare provider right away if any of these occur:  · Any symptoms of infection (listed above)  · Fever of 100.4°F (38.ºC) or higher, or as directed by your healthcare provider  · Wound changes colors  · Numbness around the wound  · Decreased movement around the injured area  Date Last Reviewed: 8/24/2015 © 2000-2016 [employer]. [address] [place] [address], [place] [address]. All rights reserved. This information is not intended as a substitute for professional medical care. Always follow your healthcare professional's instructions.           [facility] complies with applicable Federal civil rights laws and does not discriminate on the basis of race, color, national origin, age, disability, or sex.        Language Assistance Services     ATTENTION: Language assistance services are available, free of charge. Please call [phone].      ATENCIÓN: Si habla jose angel, tiene a neves disposición servicios gratuitos de asistencia lingüística. Llame al [phone].     CHÚ Ý: N?u b?n nói Ti?ng Vi?t, có các d?ch v? h? tr? ngôn ng? mi?n phí dành cho b?n. G?i  s? [phone].

## 2023-08-10 ENCOUNTER — TELEPHONE (OUTPATIENT)
Dept: UROLOGY | Facility: MEDICAL CENTER | Age: 53
End: 2023-08-10

## 2023-08-10 NOTE — TELEPHONE ENCOUNTER
Tried calling patient to let him know that he does need his PSA completed prior to his appt this Monday with casey. Pt did not answer left detailed voicemail.

## 2023-08-11 ENCOUNTER — APPOINTMENT (OUTPATIENT)
Dept: LAB | Facility: CLINIC | Age: 53
End: 2023-08-11
Payer: COMMERCIAL

## 2023-08-11 DIAGNOSIS — R97.20 ELEVATED PSA: ICD-10-CM

## 2023-08-11 LAB — PSA SERPL-MCNC: 0.16 NG/ML (ref 0–4)

## 2023-08-11 PROCEDURE — 36415 COLL VENOUS BLD VENIPUNCTURE: CPT

## 2023-08-11 PROCEDURE — G0103 PSA SCREENING: HCPCS

## 2023-08-14 ENCOUNTER — OFFICE VISIT (OUTPATIENT)
Dept: UROLOGY | Facility: AMBULATORY SURGERY CENTER | Age: 53
End: 2023-08-14

## 2023-08-14 ENCOUNTER — RA CDI HCC (OUTPATIENT)
Dept: OTHER | Facility: HOSPITAL | Age: 53
End: 2023-08-14

## 2023-08-14 VITALS
SYSTOLIC BLOOD PRESSURE: 124 MMHG | DIASTOLIC BLOOD PRESSURE: 72 MMHG | OXYGEN SATURATION: 97 % | RESPIRATION RATE: 18 BRPM | HEART RATE: 72 BPM | HEIGHT: 73 IN | WEIGHT: 270 LBS | BODY MASS INDEX: 35.78 KG/M2

## 2023-08-14 DIAGNOSIS — R68.82 LOW LIBIDO: ICD-10-CM

## 2023-08-14 DIAGNOSIS — R35.0 BENIGN PROSTATIC HYPERPLASIA WITH URINARY FREQUENCY: Primary | ICD-10-CM

## 2023-08-14 DIAGNOSIS — Z80.42 FAMILY HISTORY OF PROSTATE CANCER: ICD-10-CM

## 2023-08-14 DIAGNOSIS — N40.1 BENIGN PROSTATIC HYPERPLASIA WITH URINARY FREQUENCY: Primary | ICD-10-CM

## 2023-08-14 DIAGNOSIS — Z12.5 SCREENING FOR PROSTATE CANCER: ICD-10-CM

## 2023-08-14 DIAGNOSIS — N52.9 ERECTILE DYSFUNCTION, UNSPECIFIED ERECTILE DYSFUNCTION TYPE: ICD-10-CM

## 2023-08-14 LAB — POST-VOID RESIDUAL VOLUME, ML POC: 51 ML

## 2023-08-14 NOTE — PROGRESS NOTES
8/14/2023    [facility]  1970  [id number]      Assessment  -BPH with lower urinary tract symptoms  -Prostate cancer screening  -Strong family history of prostate malignancy  -Erectile dysfunction  -Low libido    Discussion/Plan  Jasmine Loja is a 47 y.o. male being managed by Dr. Iva Montemayor.     1. BPH with mild lower urinary tract symptoms- PVR in the office today is 51 mL. He has no urinary complaints at this time. Reviewed dietary and behavioral modifications. We will continue to monitor his urinary symptoms and consider restarting tamsulosin if patient becomes symptomatic. 2. Erectile dysfunction, low libido-continue Cialis as needed prior to sexual activity. We also discussed checking testosterone level for symptoms of low libido and lethargy. Plan to call patient with results. 3. Prostate cancer screening, strong family history of prostate malignancy- we discussed results of his recent PSA which is 0.16, previously 0.4. No abnormal findings noted on ART today. Continue annual prostate cancer screening. Call with results of testosterone level. We will determine follow-up thereafter. Patient was advised to call sooner with any questions or issues.      -All questions answered, patient agrees with plan      History of Present Illness  47 y.o. male with a history of BPH, prostate cancer screening, and ED presents today for follow up. Patient last seen in the office in May 2022. He is no longer taking tamsulosin secondary to improvement of urinary symptoms. Patient denies any lower urinary tract symptoms, gross hematuria, or dysuria. Last PSA from 2/8/2022 was 0.4. He states his 2 uncles and cousin had a history of prostate malignancy. Patient continues to use Cialis as needed prior to sexual activity. However, he also reports worsening decreased libido and fatigue. Patient requesting to have testosterone level checked.     Component      Latest Ref Rng 2/12/2021 2/8/2022 8/11/2023   PSA, Total      0.00 - 4.00 ng/mL 0.3  0.4  0.16        Review of Systems  Review of Systems   Constitutional: Negative. HENT: Negative. Respiratory: Negative. Cardiovascular: Negative. Gastrointestinal: Negative. Genitourinary: Negative for decreased urine volume, difficulty urinating, dysuria, flank pain, frequency, hematuria and urgency. Musculoskeletal: Negative. Skin: Negative. Neurological: Negative. Psychiatric/Behavioral: Negative.           Past Medical History  Past Medical History:   Diagnosis Date   • Achalasia    • Aneurysm ([address])     aortic   • Asthma     Childhood   • Colon polyp    • Diabetes mellitus ([address])    • Esophageal ulcer    • Groin abscess     with I &D   • Hyperlipidemia    • Hypertension        Past Social History  Past Surgical History:   Procedure Laterality Date   • ANTERIOR CERVICAL DISCECTOMY W/ FUSION     • APPENDECTOMY     • BICEPS TENDON REPAIR     • EGD AND COLONOSCOPY     • FL INJECTION LEFT WRIST (ARTHROGRAM)  11/9/2021   • FL MYELOGRAM CERVICAL  6/13/2018   • MENISCECTOMY     • MYOTOMY HELLER LAPAROSCOPIC     • ORTHOPEDIC SURGERY         Past Family History  Family History   Problem Relation Age of Onset   • No Known Problems Mother    • No Known Problems Father        Past Social history  Social History     Socioeconomic History   • Marital status: /Civil Union     Spouse name: Not on file   • Number of children: Not on file   • Years of education: Not on file   • Highest education level: Not on file   Occupational History   • Occupation: Tech Trainer/ [employer]   Tobacco Use   • Smoking status: Never   • Smokeless tobacco: Never   Vaping Use   • Vaping Use: Never used   Substance and Sexual Activity   • Alcohol use: Not Currently   • Drug use: Never   • Sexual activity: Yes     Partners: Female   Other Topics Concern   • Not on file   Social History Narrative    Lives with spouse     Social Determinants of Health     Financial Resource Strain: Not on file   Food Insecurity: Not on file   Transportation Needs: Not on file   Physical Activity: Not on file   Stress: Not on file   Social Connections: Not on file   Intimate Partner Violence: Not on file   Housing Stability: Not on file       Current Medications  Current Outpatient Medications   Medication Sig Dispense Refill   • aluminum-magnesium hydroxide-simethicone (MAALOX MAX) 400-400-40 MG/5ML suspension Take 10 mL by mouth every 6 (six) hours as needed for indigestion or heartburn 355 mL 1   • Ascorbic Acid (vitamin C) 1000 MG tablet Take 1,000 mg by mouth daily       • baclofen 20 mg tablet Take 1 tablet (20 mg total) by mouth daily at bedtime as needed for muscle spasms 90 tablet 0   • CINNAMON PO Take by mouth     • diphenhydrAMINE-acetaminophen (TYLENOL PM)  MG TABS Take 1 tablet by mouth daily at bedtime as needed for sleep      • famotidine (PEPCID) 20 mg tablet Take 1 tablet (20 mg total) by mouth 2 (two) times a day (Patient taking differently: Take 20 mg by mouth daily) 90 tablet 1   • metFORMIN (GLUCOPHAGE) 500 mg tablet Pt takes 1500 mg (3 tablets) by mouth in the morning, and a 1000 mg (2 tablets) in the evening 450 tablet 1   • Multiple Vitamins-Minerals (CENTRUM SILVER 50+MEN PO) Take by mouth     • omega-3-acid ethyl esters (LOVAZA) 1 g capsule Take 1 g by mouth daily     • pantoprazole (PROTONIX) 40 mg tablet Take 1 tablet (40 mg total) by mouth daily 90 tablet 1   • pregabalin (LYRICA) 75 mg capsule Take 1 PO AM and 2 PO HS 90 capsule 1   • simvastatin (ZOCOR) 20 mg tablet Take 1 tablet (20 mg total) by mouth daily 90 tablet 1   • tadalafil (CIALIS) 20 MG tablet Take 1 tablet by mouth one (1) hour prior to intercourse on an empty stomach with no alcohol. Limit to 2 tablets per week.  18 tablet 3   • lisinopril-hydrochlorothiazide (PRINZIDE,ZESTORETIC) 20-12.5 MG per tablet Take 1 tablet by mouth daily 90 tablet 1   • triamcinolone (KENALOG) 0.1 % cream Apply a very tiny amount on the buttock area, and back of neck for 7 days 30 g 0     No current facility-administered medications for this visit. Allergies  No Known Allergies    Past Medical History, Social History, Family History, medications and allergies were reviewed. Vitals  Vitals:    08/14/23 1349   BP: 124/72   BP Location: Left arm   Patient Position: Sitting   Cuff Size: Standard   Pulse: 72   Resp: 18   SpO2: 97%   Weight: 122 kg (270 lb)   Height: 6' 1" (1.854 m)       Physical Exam  Physical Exam  Constitutional:       Appearance: Normal appearance. He is well-developed. HENT:      Head: Normocephalic. Eyes:      Pupils: Pupils are equal, round, and reactive to light. Pulmonary:      Effort: Pulmonary effort is normal.   Abdominal:      Palpations: Abdomen is soft. Genitourinary:     Prostate: Normal.      Rectum: Normal.      Comments: Prostate 45gm, smooth, nontender, no nodules  Musculoskeletal:         General: Normal range of motion. Cervical back: Normal range of motion. Skin:     General: Skin is warm and dry. Neurological:      General: No focal deficit present. Mental Status: He is alert and oriented to person, place, and time. Psychiatric:         Mood and Affect: Mood normal.         Behavior: Behavior normal.         Thought Content:  Thought content normal.         Judgment: Judgment normal.         Results    I have personally reviewed all pertinent lab results and reviewed with patient  Lab Results   Component Value Date    PSA 0.16 08/11/2023    PSA 0.4 02/08/2022    PSA 0.3 02/12/2021     Lab Results   Component Value Date    GLUCOSE 113 01/16/2015    CALCIUM 9.2 02/03/2023     01/16/2015    K 3.9 02/03/2023    CO2 28 02/03/2023     02/03/2023    BUN 16 02/03/2023    CREATININE 0.88 02/03/2023     Lab Results   Component Value Date    WBC 6.84 02/03/2023    HGB 14.0 02/03/2023    HCT 43.4 02/03/2023    MCV 92 02/03/2023     02/03/2023     Recent Results (from the past 1 hour(s))   POCT Measure PVR    Collection Time: 08/14/23  1:55 PM   Result Value Ref Range    POST-VOID RESIDUAL VOLUME, ML POC 51 mL

## 2023-08-14 NOTE — PROGRESS NOTES
[address] [facility] coding opportunities       Chart reviewed, no opportunity found: CHART REVIEWED, NO OPPORTUNITY FOUND        Patients Insurance        Commercial Insurance: Jm Stein

## 2023-08-17 DIAGNOSIS — M79.18 MYOFASCIAL PAIN SYNDROME: ICD-10-CM

## 2023-08-17 DIAGNOSIS — K20.90 ESOPHAGITIS: ICD-10-CM

## 2023-08-17 DIAGNOSIS — N52.9 ERECTILE DYSFUNCTION, UNSPECIFIED ERECTILE DYSFUNCTION TYPE: ICD-10-CM

## 2023-08-18 RX ORDER — BACLOFEN 20 MG/1
TABLET ORAL
Qty: 90 TABLET | Refills: 3 | Status: SHIPPED | OUTPATIENT
Start: 2023-08-18

## 2023-08-18 RX ORDER — TADALAFIL 20 MG/1
TABLET ORAL
Qty: 18 TABLET | Refills: 4 | Status: SHIPPED | OUTPATIENT
Start: 2023-08-18

## 2023-08-18 RX ORDER — PANTOPRAZOLE SODIUM 40 MG/1
40 TABLET, DELAYED RELEASE ORAL DAILY
Qty: 90 TABLET | Refills: 3 | OUTPATIENT
Start: 2023-08-18

## 2023-08-18 NOTE — TELEPHONE ENCOUNTER
Patient last seen in 5/22/23. No OVS scheduled. S/w the patient to clarify and he stated it does help with the pain for when he wakes up. He would appreciate a refill.

## 2023-09-13 ENCOUNTER — APPOINTMENT (OUTPATIENT)
Dept: LAB | Facility: MEDICAL CENTER | Age: 53
End: 2023-09-13
Payer: COMMERCIAL

## 2023-09-13 DIAGNOSIS — R68.82 LOW LIBIDO: ICD-10-CM

## 2023-09-13 PROCEDURE — 36415 COLL VENOUS BLD VENIPUNCTURE: CPT

## 2023-09-13 PROCEDURE — 84402 ASSAY OF FREE TESTOSTERONE: CPT

## 2023-09-13 PROCEDURE — 84403 ASSAY OF TOTAL TESTOSTERONE: CPT

## 2023-09-14 LAB
TESTOST FREE SERPL-MCNC: 6.4 PG/ML (ref 7.2–24)
TESTOST SERPL-MCNC: 531 NG/DL (ref 264–916)

## 2023-09-18 ENCOUNTER — APPOINTMENT (OUTPATIENT)
Dept: LAB | Facility: MEDICAL CENTER | Age: 53
End: 2023-09-18
Payer: COMMERCIAL

## 2023-09-18 DIAGNOSIS — E11.9 TYPE 2 DIABETES MELLITUS WITHOUT COMPLICATION, WITHOUT LONG-TERM CURRENT USE OF INSULIN (HCC): ICD-10-CM

## 2023-09-18 LAB
ALBUMIN SERPL BCP-MCNC: 4.4 G/DL (ref 3.5–5)
ALP SERPL-CCNC: 57 U/L (ref 34–104)
ALT SERPL W P-5'-P-CCNC: 61 U/L (ref 7–52)
ANION GAP SERPL CALCULATED.3IONS-SCNC: 8 MMOL/L
AST SERPL W P-5'-P-CCNC: 33 U/L (ref 13–39)
BILIRUB SERPL-MCNC: 0.45 MG/DL (ref 0.2–1)
BUN SERPL-MCNC: 12 MG/DL (ref 5–25)
CALCIUM SERPL-MCNC: 8.8 MG/DL (ref 8.4–10.2)
CHLORIDE SERPL-SCNC: 105 MMOL/L (ref 96–108)
CO2 SERPL-SCNC: 25 MMOL/L (ref 21–32)
CREAT SERPL-MCNC: 0.82 MG/DL (ref 0.6–1.3)
EST. AVERAGE GLUCOSE BLD GHB EST-MCNC: 163 MG/DL
GFR SERPL CREATININE-BSD FRML MDRD: 100 ML/MIN/1.73SQ M
GLUCOSE P FAST SERPL-MCNC: 141 MG/DL (ref 65–99)
HBA1C MFR BLD: 7.3 %
POTASSIUM SERPL-SCNC: 4.2 MMOL/L (ref 3.5–5.3)
PROT SERPL-MCNC: 6.8 G/DL (ref 6.4–8.4)
SODIUM SERPL-SCNC: 138 MMOL/L (ref 135–147)

## 2023-09-18 PROCEDURE — 36415 COLL VENOUS BLD VENIPUNCTURE: CPT

## 2023-09-18 PROCEDURE — 80053 COMPREHEN METABOLIC PANEL: CPT

## 2023-09-18 PROCEDURE — 83036 HEMOGLOBIN GLYCOSYLATED A1C: CPT

## 2023-09-25 ENCOUNTER — TELEPHONE (OUTPATIENT)
Age: 53
End: 2023-09-25

## 2023-09-25 NOTE — TELEPHONE ENCOUNTER
Hello,  Please advise if the following patient can be forced onto the schedule:    Patient: Nasima Rojas    : 1970    MRN: [id number]    Call back #: [phone]    Insurance: [facility]    Reason for appointment: left foot pain/no injury/worried as diabetic    Requested doctor/location: Dr. Peewee Zavala office      Thank you.

## 2023-09-26 ENCOUNTER — OFFICE VISIT (OUTPATIENT)
Dept: PODIATRY | Facility: CLINIC | Age: 53
End: 2023-09-26
Payer: COMMERCIAL

## 2023-09-26 VITALS
WEIGHT: 266 LBS | RESPIRATION RATE: 18 BRPM | BODY MASS INDEX: 35.25 KG/M2 | HEART RATE: 79 BPM | DIASTOLIC BLOOD PRESSURE: 88 MMHG | HEIGHT: 73 IN | SYSTOLIC BLOOD PRESSURE: 153 MMHG

## 2023-09-26 DIAGNOSIS — M72.2 PLANTAR FASCIITIS: Primary | ICD-10-CM

## 2023-09-26 DIAGNOSIS — M79.672 LEFT FOOT PAIN: ICD-10-CM

## 2023-09-26 PROCEDURE — 99212 OFFICE O/P EST SF 10 MIN: CPT | Performed by: PODIATRIST

## 2023-09-26 PROCEDURE — 20550 NJX 1 TENDON SHEATH/LIGAMENT: CPT | Performed by: PODIATRIST

## 2023-09-26 RX ORDER — BUPIVACAINE HYDROCHLORIDE 2.5 MG/ML
1 INJECTION, SOLUTION INFILTRATION; PERINEURAL
Status: SHIPPED | OUTPATIENT
Start: 2023-09-26

## 2023-09-26 RX ORDER — TRIAMCINOLONE ACETONIDE 40 MG/ML
20 INJECTION, SUSPENSION INTRA-ARTICULAR; INTRAMUSCULAR
Status: SHIPPED | OUTPATIENT
Start: 2023-09-26

## 2023-09-26 RX ORDER — LIDOCAINE HYDROCHLORIDE 10 MG/ML
1 INJECTION, SOLUTION INFILTRATION; PERINEURAL
Status: SHIPPED | OUTPATIENT
Start: 2023-09-26

## 2023-09-26 RX ADMIN — TRIAMCINOLONE ACETONIDE 20 MG: 40 INJECTION, SUSPENSION INTRA-ARTICULAR; INTRAMUSCULAR at 16:15

## 2023-09-26 RX ADMIN — LIDOCAINE HYDROCHLORIDE 1 ML: 10 INJECTION, SOLUTION INFILTRATION; PERINEURAL at 16:15

## 2023-09-26 RX ADMIN — BUPIVACAINE HYDROCHLORIDE 1 ML: 2.5 INJECTION, SOLUTION INFILTRATION; PERINEURAL at 16:15

## 2023-09-26 NOTE — PROGRESS NOTES
Patient presents with sharp pain in his left heel on 48-hour duration. Pain began without recalled trauma. Pain is most prevalent with for steps after ambulation. Is lasting throughout his day. Patient is unable to take NSAIDs due to prior history of GI ulcer. On exam, sharp pain with palpation central aspect left heel at fascia insertion into calcaneus. Explained to patient that his symptoms are most consistent with plantars fasciitis. Advised him to refrain from walking barefoot. Stretching exercises are advised. Injected 0.5 cc Kenalog 40 along with 1 cc 1% Xylocaine and 1 cc 0.5% Marcaine to the left heel. He is reassessed in 3 weeks. Foot/lower extremity injection    Performed by: Tatyana Jameson DPM  Authorized by: Tatyana Jameson DPM    Procedure:      Other Assisting Provider: No      Verbal consent obtained?: Yes      Risks and benefits: Risks, benefits and alternatives were discussed      Consent given by:  Patient    Patient states understanding of procedure being performed: Yes      Patient identity confirmed:  Verbally with patient    Supporting Documentation:     Indications:  Pain    Procedure Details:                Ethyl Chloride was applied      Needle size: 25 G G    Ultrasound Guidance: no      Approach:  Medial    Laterality:  Left    Cyst Aspiration/Injection: No      Location: aponeurosis      Aponeurosis Structures: Plantar fascia origin      Injection Information:       Medications:  1 mL bupivacaine 0.25 %; 1 mL lidocaine 1 %; 20 mg triamcinolone acetonide 40 mg/mL

## 2023-09-27 ENCOUNTER — OFFICE VISIT (OUTPATIENT)
Dept: INTERNAL MEDICINE CLINIC | Facility: OTHER | Age: 53
End: 2023-09-27
Payer: COMMERCIAL

## 2023-09-27 ENCOUNTER — HOSPITAL ENCOUNTER (OUTPATIENT)
Dept: RADIOLOGY | Facility: HOSPITAL | Age: 53
Discharge: HOME | End: 2023-09-27
Payer: COMMERCIAL

## 2023-09-27 VITALS
DIASTOLIC BLOOD PRESSURE: 78 MMHG | HEART RATE: 73 BPM | WEIGHT: 266 LBS | SYSTOLIC BLOOD PRESSURE: 136 MMHG | BODY MASS INDEX: 35.25 KG/M2 | TEMPERATURE: 97.1 F | OXYGEN SATURATION: 97 % | HEIGHT: 73 IN

## 2023-09-27 DIAGNOSIS — I10 HYPERTENSION, ESSENTIAL: ICD-10-CM

## 2023-09-27 DIAGNOSIS — I71.20 THORACIC AORTIC ANEURYSM WITHOUT RUPTURE, UNSPECIFIED PART (HCC): ICD-10-CM

## 2023-09-27 DIAGNOSIS — E11.9 TYPE 2 DIABETES MELLITUS WITHOUT COMPLICATION, WITHOUT LONG-TERM CURRENT USE OF INSULIN (HCC): ICD-10-CM

## 2023-09-27 DIAGNOSIS — K20.90 ESOPHAGITIS: ICD-10-CM

## 2023-09-27 DIAGNOSIS — Z11.52 ENCOUNTER FOR SCREENING FOR COVID-19: ICD-10-CM

## 2023-09-27 DIAGNOSIS — J45.20 MILD INTERMITTENT ASTHMA WITHOUT COMPLICATION: ICD-10-CM

## 2023-09-27 DIAGNOSIS — E78.49 OTHER HYPERLIPIDEMIA: ICD-10-CM

## 2023-09-27 DIAGNOSIS — R05.9 COUGH, UNSPECIFIED TYPE: ICD-10-CM

## 2023-09-27 DIAGNOSIS — R91.1 LUNG NODULE: Primary | ICD-10-CM

## 2023-09-27 LAB
SARS-COV-2 AG UPPER RESP QL IA: NEGATIVE
VALID CONTROL: NORMAL

## 2023-09-27 PROCEDURE — G1004 CDSM NDSC: HCPCS

## 2023-09-27 PROCEDURE — 71275 CT ANGIOGRAPHY CHEST: CPT

## 2023-09-27 PROCEDURE — 74174 CTA ABD&PLVS W/CONTRAST: CPT

## 2023-09-27 PROCEDURE — 87811 SARS-COV-2 COVID19 W/OPTIC: CPT | Performed by: NURSE PRACTITIONER

## 2023-09-27 PROCEDURE — 99214 OFFICE O/P EST MOD 30 MIN: CPT | Performed by: NURSE PRACTITIONER

## 2023-09-27 RX ORDER — CETIRIZINE HYDROCHLORIDE 10 MG/1
10 TABLET ORAL DAILY
Qty: 90 TABLET | Refills: 0 | Status: SHIPPED | OUTPATIENT
Start: 2023-09-27

## 2023-09-27 RX ORDER — PANTOPRAZOLE SODIUM 40 MG/1
40 TABLET, DELAYED RELEASE ORAL DAILY
Qty: 90 TABLET | Refills: 1 | Status: SHIPPED | OUTPATIENT
Start: 2023-09-27 | End: 2023-10-04 | Stop reason: SDUPTHER

## 2023-09-27 RX ORDER — LISINOPRIL AND HYDROCHLOROTHIAZIDE 20; 12.5 MG/1; MG/1
2 TABLET ORAL DAILY
Qty: 180 TABLET | Refills: 1 | Status: SHIPPED | OUTPATIENT
Start: 2023-09-27

## 2023-09-27 RX ORDER — FAMOTIDINE 20 MG/1
20 TABLET, FILM COATED ORAL DAILY
Qty: 90 TABLET | Refills: 1 | Status: SHIPPED | OUTPATIENT
Start: 2023-09-27

## 2023-09-27 RX ORDER — FLUTICASONE PROPIONATE 50 MCG
1 SPRAY, SUSPENSION (ML) NASAL DAILY
Qty: 16 G | Refills: 1 | Status: SHIPPED | OUTPATIENT
Start: 2023-09-27

## 2023-09-27 RX ORDER — SIMVASTATIN 20 MG
20 TABLET ORAL DAILY
Qty: 90 TABLET | Refills: 1 | Status: SHIPPED | OUTPATIENT
Start: 2023-09-27

## 2023-09-27 RX ADMIN — IOHEXOL 100 ML: 350 INJECTION, SOLUTION INTRAVENOUS at 17:51

## 2023-09-27 NOTE — PROGRESS NOTES
Assessment/Plan:    Diabetes mellitus ([address])    Lab Results   Component Value Date    HGBA1C 7.3 (H) 09/18/2023   -Uncontrolled  -Continue metformin 1500 mg in the morning and 1000mg in the evening  -Start Januvia   -F/U in 3 months     Hypertension, essential  - Mildly elevated, increase lisinopril-hydrochlorothiazide to 2 tablets daily  -Continue to monitor blood pressure at home    Other hyperlipidemia  - Continue simvastatin  -Due for updated lipid panel    Lung nodule  Seen on CT in 2022    Thoracic aortic aneurysm without rupture ([address])  - Last CT 2022  -Due for updated CT    Cough  -start Flonase and zyrtec               Diagnoses and all orders for this visit:    Lung nodule    Esophagitis  -     famotidine (PEPCID) 20 mg tablet; Take 1 tablet (20 mg total) by mouth daily  -     pantoprazole (PROTONIX) 40 mg tablet; Take 1 tablet (40 mg total) by mouth daily    Type 2 diabetes mellitus without complication, without long-term current use of insulin (HCC)  -     metFORMIN (GLUCOPHAGE) 500 mg tablet; Pt takes 1500 mg (3 tablets) by mouth in the morning, and a 1000 mg (2 tablets) in the evening  -     sitaGLIPtin (JANUVIA) 100 mg tablet; Take 1 tablet (100 mg total) by mouth daily  -     CBC and differential; Future  -     Comprehensive metabolic panel; Future  -     Hemoglobin A1C; Future  -     Lipid panel; Future  -     Albumin / creatinine urine ratio; Future    Other hyperlipidemia  -     simvastatin (ZOCOR) 20 mg tablet; Take 1 tablet (20 mg total) by mouth daily    Hypertension, essential  -     lisinopril-hydrochlorothiazide (PRINZIDE,ZESTORETIC) 20-12.5 MG per tablet; Take 2 tablets by mouth daily    Thoracic aortic aneurysm without rupture, unspecified part ([address])  -     CTA dissection protocol chest/abdomen/pelvis; Future    Cough, unspecified type  -     cetirizine (ZyrTEC) 10 mg tablet;  Take 1 tablet (10 mg total) by mouth daily  -     fluticasone (FLONASE) 50 mcg/act nasal spray; 1 spray into each nostril daily    Mild intermittent asthma without complication          Subjective:      Patient ID: Esperanza Rocha is a 47 y.o. male. Patient presents today for follow up and to review blood work. He reports that he has been having a cough and, wheezing and chest tightness. Took COVID test x3 and have been negative. Started about 3 weeks ago  Intermittent cough, dry    S/P cervical spinal fusion- currently follows neurosurgery     Diabetes mellitus ([address])- HBA1C 7.3, continues on metformin without side effects, denies hypo or hyperglycemic events  BS over 150-200 in the morning        Hypertension, essential-currently well controlled on lisinopril, lisinopril increased at last office visit, denies lightheadedness or dizziness, denies side effects with this medication    Hyperlipidemia- controlled on statin, denies side effects with medication, LDL 94  The 10-year ASCVD risk score (Kenneth Upton, et al., 2013) is: 11.4           The following portions of the patient's history were reviewed and updated as appropriate: allergies, current medications, past family history, past medical history, past social history, past surgical history and problem list.    Review of Systems   Constitutional: Negative for activity change, appetite change, chills, diaphoresis and fever. HENT: Positive for postnasal drip. Negative for congestion, ear discharge, ear pain, rhinorrhea, sinus pressure, sinus pain and sore throat. Eyes: Negative for pain, discharge, itching and visual disturbance. Respiratory: Positive for cough and wheezing. Negative for chest tightness and shortness of breath. Cardiovascular: Negative for chest pain, palpitations and leg swelling. Gastrointestinal: Negative for abdominal pain, constipation, diarrhea, nausea and vomiting. Endocrine: Negative for polydipsia, polyphagia and polyuria. Genitourinary: Negative for difficulty urinating, dysuria and urgency.    Musculoskeletal: Negative for arthralgias, back pain and neck pain. Skin: Negative for rash and wound. Neurological: Negative for dizziness, weakness, numbness and headaches.          Past Medical History:   Diagnosis Date   • Achalasia    • Aneurysm ([address])     aortic   • Asthma     Childhood   • Colon polyp    • Diabetes mellitus ([address])    • Esophageal ulcer    • Groin abscess     with I &D   • Hyperlipidemia    • Hypertension    • Plantar fasciitis          Current Outpatient Medications:   •  aluminum-magnesium hydroxide-simethicone (MAALOX MAX) 400-400-40 MG/5ML suspension, Take 10 mL by mouth every 6 (six) hours as needed for indigestion or heartburn, Disp: 355 mL, Rfl: 1  •  Ascorbic Acid (vitamin C) 1000 MG tablet, Take 1,000 mg by mouth daily  , Disp: , Rfl:   •  baclofen 20 mg tablet, TAKE 1 TABLET DAILY AT BEDTIME AS NEEDED FOR MUSCLE SPASMS, Disp: 90 tablet, Rfl: 3  •  cetirizine (ZyrTEC) 10 mg tablet, Take 1 tablet (10 mg total) by mouth daily, Disp: 90 tablet, Rfl: 0  •  CINNAMON PO, Take by mouth, Disp: , Rfl:   •  diphenhydrAMINE-acetaminophen (TYLENOL PM)  MG TABS, Take 1 tablet by mouth daily at bedtime as needed for sleep , Disp: , Rfl:   •  famotidine (PEPCID) 20 mg tablet, Take 1 tablet (20 mg total) by mouth daily, Disp: 90 tablet, Rfl: 1  •  fluticasone (FLONASE) 50 mcg/act nasal spray, 1 spray into each nostril daily, Disp: 16 g, Rfl: 1  •  lisinopril-hydrochlorothiazide (PRINZIDE,ZESTORETIC) 20-12.5 MG per tablet, Take 2 tablets by mouth daily, Disp: 180 tablet, Rfl: 1  •  metFORMIN (GLUCOPHAGE) 500 mg tablet, Pt takes 1500 mg (3 tablets) by mouth in the morning, and a 1000 mg (2 tablets) in the evening, Disp: 450 tablet, Rfl: 1  •  Multiple Vitamins-Minerals (CENTRUM SILVER 50+MEN PO), Take by mouth, Disp: , Rfl:   •  omega-3-acid ethyl esters (LOVAZA) 1 g capsule, Take 1 g by mouth daily, Disp: , Rfl:   •  pantoprazole (PROTONIX) 40 mg tablet, Take 1 tablet (40 mg total) by mouth daily, Disp: 90 tablet, Rfl: 1  • pregabalin (LYRICA) 75 mg capsule, Take 1 PO AM and 2 PO HS (Patient taking differently: Take 75 mg by mouth daily Take 1 PO AM and 2 PO HS), Disp: 90 capsule, Rfl: 1  •  simvastatin (ZOCOR) 20 mg tablet, Take 1 tablet (20 mg total) by mouth daily, Disp: 90 tablet, Rfl: 1  •  sitaGLIPtin (JANUVIA) 100 mg tablet, Take 1 tablet (100 mg total) by mouth daily, Disp: 90 tablet, Rfl: 1  •  tadalafil (CIALIS) 20 MG tablet, TAKE 1 TABLET 1 HOUR PRIOR TO INTERCOURSE ON AN EMPTY STOMACH WITH NO ALCOHOL, LIMIT TO 2 TABLETS PER WEEK, Disp: 18 tablet, Rfl: 4    Current Facility-Administered Medications:   •  bupivacaine (MARCAINE) 0.25 % injection 1 mL, 1 mL, Infiltration, , Claudio Flavors, DPM, 1 mL at 09/26/23 1615  •  lidocaine (XYLOCAINE) 1 % injection 1 mL, 1 mL, Infiltration, , Claudio Flavors, DPM, 1 mL at 09/26/23 1615  •  triamcinolone acetonide (KENALOG-40) 40 mg/mL injection 20 mg, 20 mg, Infiltration, , Claudio Flavors, DPM, 20 mg at 09/26/23 1615    No Known Allergies    Social History   Past Surgical History:   Procedure Laterality Date   • ANTERIOR CERVICAL DISCECTOMY W/ FUSION     • APPENDECTOMY     • BICEPS TENDON REPAIR     • EGD AND COLONOSCOPY     • FL INJECTION LEFT WRIST (ARTHROGRAM)  11/9/2021   • FL MYELOGRAM CERVICAL  6/13/2018   • MENISCECTOMY     • MYOTOMY HELLER LAPAROSCOPIC     • ORTHOPEDIC SURGERY       Family History   Problem Relation Age of Onset   • No Known Problems Mother    • No Known Problems Father        Objective:  /78 (BP Location: Left arm, Patient Position: Sitting, Cuff Size: Large)   Pulse 73   Temp (!) 97.1 °F (36.2 °C) (Temporal)   Ht 6' 1" (1.854 m)   Wt 121 kg (266 lb)   SpO2 97% Comment: room air  BMI 35.09 kg/m²     Recent Results (from the past 1344 hour(s))   PSA, Total Screen    Collection Time: 08/11/23 11:43 AM   Result Value Ref Range    PSA 0.16 0.00 - 4.00 ng/mL   POCT Measure PVR    Collection Time: 08/14/23  1:55 PM   Result Value Ref Range    POST-VOID RESIDUAL VOLUME, ML POC 51 mL   Testosterone, free, total    Collection Time: 09/13/23  8:08 AM   Result Value Ref Range    Testosterone, Free 6.4 (L) 7.2 - 24.0 pg/mL    TESTOSTERONE TOTAL 531 264 - 916 ng/dL   Hemoglobin A1C    Collection Time: 09/18/23  8:33 AM   Result Value Ref Range    Hemoglobin A1C 7.3 (H) Normal 4.0-5.6%; PreDiabetic 5.7-6.4%; Diabetic >=6.5%; Glycemic control for adults with diabetes <7.0% %     mg/dl   Comprehensive metabolic panel    Collection Time: 09/18/23  8:33 AM   Result Value Ref Range    Sodium 138 135 - 147 mmol/L    Potassium 4.2 3.5 - 5.3 mmol/L    Chloride 105 96 - 108 mmol/L    CO2 25 21 - 32 mmol/L    ANION GAP 8 mmol/L    BUN 12 5 - 25 mg/dL    Creatinine 0.82 0.60 - 1.30 mg/dL    Glucose, Fasting 141 (H) 65 - 99 mg/dL    Calcium 8.8 8.4 - 10.2 mg/dL    AST 33 13 - 39 U/L    ALT 61 (H) 7 - 52 U/L    Alkaline Phosphatase 57 34 - 104 U/L    Total Protein 6.8 6.4 - 8.4 g/dL    Albumin 4.4 3.5 - 5.0 g/dL    Total Bilirubin 0.45 0.20 - 1.00 mg/dL    eGFR 100 ml/min/1.73sq m            Physical Exam  Constitutional:       General: He is not in acute distress. Appearance: He is well-developed. He is not diaphoretic. HENT:      Head: Normocephalic and atraumatic. Right Ear: External ear normal.      Left Ear: External ear normal.      Nose: Nose normal.      Mouth/Throat:      Pharynx: No oropharyngeal exudate. Eyes:      General:         Right eye: No discharge. Left eye: No discharge. Conjunctiva/sclera: Conjunctivae normal.      Pupils: Pupils are equal, round, and reactive to light. Neck:      Thyroid: No thyromegaly. Cardiovascular:      Rate and Rhythm: Normal rate and regular rhythm. Heart sounds: Normal heart sounds. No murmur heard. No friction rub. No gallop. Pulmonary:      Effort: Pulmonary effort is normal. No respiratory distress. Breath sounds: Normal breath sounds. No stridor. No wheezing or rales.    Abdominal: General: Bowel sounds are normal. There is no distension. Palpations: Abdomen is soft. Tenderness: There is no abdominal tenderness. Musculoskeletal:      Cervical back: Normal range of motion and neck supple. Lymphadenopathy:      Cervical: No cervical adenopathy. Skin:     General: Skin is warm and dry. Findings: No erythema or rash. Neurological:      Mental Status: He is alert and oriented to person, place, and time. Psychiatric:         Behavior: Behavior normal.         Thought Content:  Thought content normal.         Judgment: Judgment normal.

## 2023-09-27 NOTE — ASSESSMENT & PLAN NOTE
- Mildly elevated, increase lisinopril-hydrochlorothiazide to 2 tablets daily  -Continue to monitor blood pressure at home

## 2023-09-27 NOTE — ASSESSMENT & PLAN NOTE
Lab Results   Component Value Date    HGBA1C 7.3 (H) 09/18/2023   -Uncontrolled  -Continue metformin 1500 mg in the morning and 1000mg in the evening  -Start Januvia   -F/U in 3 months

## 2023-09-28 DIAGNOSIS — K21.00 GASTROESOPHAGEAL REFLUX DISEASE WITH ESOPHAGITIS, UNSPECIFIED WHETHER HEMORRHAGE: Primary | ICD-10-CM

## 2023-09-29 ENCOUNTER — TELEPHONE (OUTPATIENT)
Age: 53
End: 2023-09-29

## 2023-09-29 NOTE — TELEPHONE ENCOUNTER
Patients GI provider:  Dr. Jose Yepez    Number to return call: [phone]    Reason for call: GI complaint GERD    Scheduled procedure/appointment date if applicable: Appt 75/2/84

## 2023-10-04 ENCOUNTER — OFFICE VISIT (OUTPATIENT)
Dept: GASTROENTEROLOGY | Facility: AMBULARY SURGERY CENTER | Age: 53
End: 2023-10-04
Payer: COMMERCIAL

## 2023-10-04 VITALS
BODY MASS INDEX: 35.12 KG/M2 | OXYGEN SATURATION: 99 % | DIASTOLIC BLOOD PRESSURE: 90 MMHG | SYSTOLIC BLOOD PRESSURE: 162 MMHG | HEIGHT: 73 IN | HEART RATE: 72 BPM | WEIGHT: 265 LBS

## 2023-10-04 DIAGNOSIS — R93.3 ABNORMAL CT SCAN, ESOPHAGUS: ICD-10-CM

## 2023-10-04 DIAGNOSIS — K21.9 GASTROESOPHAGEAL REFLUX DISEASE WITHOUT ESOPHAGITIS: Primary | ICD-10-CM

## 2023-10-04 DIAGNOSIS — R07.89 CHEST PAIN, ATYPICAL: ICD-10-CM

## 2023-10-04 DIAGNOSIS — K20.90 ESOPHAGITIS: ICD-10-CM

## 2023-10-04 PROCEDURE — 99214 OFFICE O/P EST MOD 30 MIN: CPT | Performed by: INTERNAL MEDICINE

## 2023-10-04 RX ORDER — PANTOPRAZOLE SODIUM 40 MG/1
40 TABLET, DELAYED RELEASE ORAL 2 TIMES DAILY
Qty: 60 TABLET | Refills: 4 | Status: SHIPPED | OUTPATIENT
Start: 2023-10-04

## 2023-10-04 NOTE — PATIENT INSTRUCTIONS
Scheduled date of EGD(as of today):   11/21/23  Physician performing EGD:  Dr Zamzam Obrien  Location of EGD:  [facility]   Instructions reviewed with patient by:  Kait HERNANDEZ  Clearances:  N/s

## 2023-10-04 NOTE — PROGRESS NOTES
Follow-up Note -  [facility]  Nasima Rojas 1970 male         Reason: Follow-up regarding chest pain and esophageal thickening    HPI:  Mr. Fortunato West reports having episodes of chest pain for which she was seen by cardiologist.  He had a CT scan done which showed mild thickening of the esophagus. He has history of GERD for which she takes pantoprazole in the morning and Pepcid in the evening. He has history of achalasia status post Heller's myotomy in the past.  Last upper endoscopy was in March 2022. He also complains about cough sometimes. Denies any difficulty swallowing. Good appetite, no recent weight loss. Regular bowel movements and denies any blood or mucus in the stool. Last colonoscopy was in September 2021    Chaperon: Ms. Carlita Winn: Review of Systems   Constitutional: Negative for activity change, appetite change, chills, diaphoresis, fatigue, fever and unexpected weight change. HENT: Negative for ear discharge, ear pain, facial swelling, hearing loss, nosebleeds, sore throat, tinnitus and voice change. Eyes: Negative for pain, discharge, redness, itching and visual disturbance. Respiratory: Positive for cough. Negative for apnea, chest tightness, shortness of breath and wheezing. Cardiovascular: Negative for chest pain and palpitations. Gastrointestinal:        As noted in HPI   Endocrine: Negative for cold intolerance, heat intolerance and polyuria. Genitourinary: Negative for difficulty urinating, dysuria, flank pain, hematuria and urgency. Musculoskeletal: Positive for arthralgias. Negative for back pain, gait problem, joint swelling and myalgias. Skin: Negative for rash and wound. Neurological: Negative for dizziness, tremors, seizures, speech difficulty, light-headedness, numbness and headaches. Hematological: Negative for adenopathy. Does not bruise/bleed easily.    Psychiatric/Behavioral: Negative for agitation, behavioral problems and confusion. The patient is not nervous/anxious. Past Medical History:   Diagnosis Date   • Achalasia    • Aneurysm ([address])     aortic   • Asthma     Childhood   • Colon polyp    • Diabetes mellitus ([address])    • Esophageal ulcer    • Groin abscess     with I &D   • Hyperlipidemia    • Hypertension    • Plantar fasciitis       Past Surgical History:   Procedure Laterality Date   • ANTERIOR CERVICAL DISCECTOMY W/ FUSION     • APPENDECTOMY     • BICEPS TENDON REPAIR     • EGD AND COLONOSCOPY     • FL INJECTION LEFT WRIST (ARTHROGRAM)  11/9/2021   • FL MYELOGRAM CERVICAL  6/13/2018   • MENISCECTOMY     • MYOTOMY HELLER LAPAROSCOPIC     • ORTHOPEDIC SURGERY       Social History     Socioeconomic History   • Marital status: /Civil Union     Spouse name: Not on file   • Number of children: Not on file   • Years of education: Not on file   • Highest education level: Not on file   Occupational History   • Occupation: Tech Trainer/ [employer]   Tobacco Use   • Smoking status: Never   • Smokeless tobacco: Never   Vaping Use   • Vaping Use: Never used   Substance and Sexual Activity   • Alcohol use: Not Currently   • Drug use: Never   • Sexual activity: Yes     Partners: Female   Other Topics Concern   • Not on file   Social History Narrative    Lives with spouse     Social Determinants of Health     Financial Resource Strain: Not on file   Food Insecurity: Not on file   Transportation Needs: Not on file   Physical Activity: Not on file   Stress: Not on file   Social Connections: Not on file   Intimate Partner Violence: Not on file   Housing Stability: Not on file     Family History   Problem Relation Age of Onset   • No Known Problems Mother    • No Known Problems Father      Patient has no known allergies.   Current Outpatient Medications   Medication Sig Dispense Refill   • aluminum-magnesium hydroxide-simethicone (MAALOX MAX) 400-400-40 MG/5ML suspension Take 10 mL by mouth every 6 (six) hours as needed for indigestion or heartburn 355 mL 1   • Ascorbic Acid (vitamin C) 1000 MG tablet Take 1,000 mg by mouth daily       • baclofen 20 mg tablet TAKE 1 TABLET DAILY AT BEDTIME AS NEEDED FOR MUSCLE SPASMS 90 tablet 3   • cetirizine (ZyrTEC) 10 mg tablet Take 1 tablet (10 mg total) by mouth daily 90 tablet 0   • CINNAMON PO Take by mouth     • diphenhydrAMINE-acetaminophen (TYLENOL PM)  MG TABS Take 1 tablet by mouth daily at bedtime as needed for sleep      • famotidine (PEPCID) 20 mg tablet Take 1 tablet (20 mg total) by mouth daily 90 tablet 1   • fluticasone (FLONASE) 50 mcg/act nasal spray 1 spray into each nostril daily 16 g 1   • lisinopril-hydrochlorothiazide (PRINZIDE,ZESTORETIC) 20-12.5 MG per tablet Take 2 tablets by mouth daily 180 tablet 1   • metFORMIN (GLUCOPHAGE) 500 mg tablet Pt takes 1500 mg (3 tablets) by mouth in the morning, and a 1000 mg (2 tablets) in the evening 450 tablet 1   • Multiple Vitamins-Minerals (CENTRUM SILVER 50+MEN PO) Take by mouth     • omega-3-acid ethyl esters (LOVAZA) 1 g capsule Take 1 g by mouth daily     • pantoprazole (PROTONIX) 40 mg tablet Take 1 tablet (40 mg total) by mouth 2 (two) times a day 60 tablet 4   • pregabalin (LYRICA) 75 mg capsule Take 1 PO AM and 2 PO HS (Patient taking differently: Take 75 mg by mouth daily Take 1 PO AM and 2 PO HS) 90 capsule 1   • simvastatin (ZOCOR) 20 mg tablet Take 1 tablet (20 mg total) by mouth daily 90 tablet 1   • sitaGLIPtin (JANUVIA) 100 mg tablet Take 1 tablet (100 mg total) by mouth daily 90 tablet 1   • tadalafil (CIALIS) 20 MG tablet TAKE 1 TABLET 1 HOUR PRIOR TO INTERCOURSE ON AN EMPTY STOMACH WITH NO ALCOHOL, LIMIT TO 2 TABLETS PER WEEK 18 tablet 4     Current Facility-Administered Medications   Medication Dose Route Frequency Provider Last Rate Last Admin   • bupivacaine (MARCAINE) 0.25 % injection 1 mL  1 mL Infiltration  Johnathan Mary DPM   1 mL at 09/26/23 1615   • lidocaine (XYLOCAINE) 1 % injection 1 mL  1 mL Infiltration  Ly Bile, DPM   1 mL at 09/26/23 1615   • triamcinolone acetonide (KENALOG-40) 40 mg/mL injection 20 mg  20 mg Infiltration  Ly Bile, DPM   20 mg at 09/26/23 1615       Blood pressure 162/90, pulse 72, height 6' 1" (1.854 m), weight 120 kg (265 lb), SpO2 99 %. PHYSICAL EXAM: Physical Exam  Constitutional:       Appearance: He is well-developed. HENT:      Head: Normocephalic and atraumatic. Eyes:      General: No scleral icterus. Right eye: No discharge. Left eye: No discharge. Conjunctiva/sclera: Conjunctivae normal.      Pupils: Pupils are equal, round, and reactive to light. Neck:      Thyroid: No thyromegaly. Vascular: No JVD. Trachea: No tracheal deviation. Cardiovascular:      Rate and Rhythm: Normal rate and regular rhythm. Heart sounds: Normal heart sounds. No murmur heard. No friction rub. No gallop. Pulmonary:      Effort: Pulmonary effort is normal. No respiratory distress. Breath sounds: Normal breath sounds. No wheezing or rales. Chest:      Chest wall: No tenderness. Abdominal:      General: Bowel sounds are normal. There is no distension. Palpations: Abdomen is soft. There is no mass. Tenderness: There is no abdominal tenderness. There is no guarding or rebound. Hernia: No hernia is present. Musculoskeletal:      Cervical back: Neck supple. Lymphadenopathy:      Cervical: No cervical adenopathy. Skin:     General: Skin is warm and dry. Findings: No erythema or rash. Neurological:      Mental Status: He is alert and oriented to person, place, and time. Psychiatric:         Behavior: Behavior normal.         Thought Content:  Thought content normal.          Lab Results   Component Value Date    WBC 6.84 02/03/2023    HGB 14.0 02/03/2023    HCT 43.4 02/03/2023    MCV 92 02/03/2023     02/03/2023     Lab Results   Component Value Date    GLUCOSE 113 01/16/2015    CALCIUM 8.8 09/18/2023     01/16/2015    K 4.2 09/18/2023    CO2 25 09/18/2023     09/18/2023    BUN 12 09/18/2023    CREATININE 0.82 09/18/2023     Lab Results   Component Value Date    ALT 61 (H) 09/18/2023    AST 33 09/18/2023    ALKPHOS 57 09/18/2023    BILITOT 0.56 01/16/2015     No results found for: "INR", "PROTIME"    CTA dissection protocol chest/abdomen/pelvis    Result Date: 9/27/2023  Impression: No evidence of aortic aneurysm or dissection Esophageal wall thickening which may reflect esophagitis. No evidence of acute intra-abdominal or pelvic pathology Workstation performed: [id number]       ASSESSMENT & PLAN:    Abnormal CT scan, esophagus  Thickening of the esophagus could be due to postsurgical changes from the Heller's myotomy along with some reflux induced esophagitis. Rule out any other significant lesions.    -Patient was explained about the lifestyle and dietary modifications. Advised to avoid fatty foods, chocolates, caffeine, alcohol and any other triggering foods. Avoid eating for at least 3 hours before going to bed.    -Increase Protonix to twice daily and advised him to take Pepcid at bedtime    -Schedule EGD    -Patient was explained about  the risks and benefits of the procedure. Risks including but not limited to bleeding, infection, perforation were explained in detail. Also explained about less than 100% sensitivity with the exam and other alternatives. Chest pain, atypical  Most likely secondary to reflux induced esophagitis. Possible musculoskeletal pain.     -Treatment plan as described above    Gastroesophageal reflux disease without esophagitis    -Plan as described above

## 2023-10-04 NOTE — ASSESSMENT & PLAN NOTE
Thickening of the esophagus could be due to postsurgical changes from the Heller's myotomy along with some reflux induced esophagitis. Rule out any other significant lesions.    -Patient was explained about the lifestyle and dietary modifications. Advised to avoid fatty foods, chocolates, caffeine, alcohol and any other triggering foods. Avoid eating for at least 3 hours before going to bed.    -Increase Protonix to twice daily and advised him to take Pepcid at bedtime    -Schedule EGD    -Patient was explained about  the risks and benefits of the procedure. Risks including but not limited to bleeding, infection, perforation were explained in detail. Also explained about less than 100% sensitivity with the exam and other alternatives.

## 2023-10-04 NOTE — ASSESSMENT & PLAN NOTE
Most likely secondary to reflux induced esophagitis. Possible musculoskeletal pain.     -Treatment plan as described above

## 2023-10-24 ENCOUNTER — OFFICE VISIT (OUTPATIENT)
Dept: PODIATRY | Facility: CLINIC | Age: 53
End: 2023-10-24
Payer: COMMERCIAL

## 2023-10-24 VITALS
HEART RATE: 72 BPM | BODY MASS INDEX: 34.7 KG/M2 | SYSTOLIC BLOOD PRESSURE: 118 MMHG | WEIGHT: 263 LBS | RESPIRATION RATE: 18 BRPM | DIASTOLIC BLOOD PRESSURE: 78 MMHG

## 2023-10-24 DIAGNOSIS — M76.72 PERONEAL TENDINITIS OF LEFT LOWER EXTREMITY: Primary | ICD-10-CM

## 2023-10-24 DIAGNOSIS — B35.3 TINEA PEDIS OF RIGHT FOOT: ICD-10-CM

## 2023-10-24 PROCEDURE — 99212 OFFICE O/P EST SF 10 MIN: CPT | Performed by: PODIATRIST

## 2023-10-24 PROCEDURE — 20551 NJX 1 TENDON ORIGIN/INSJ: CPT | Performed by: PODIATRIST

## 2023-10-24 RX ORDER — TRIAMCINOLONE ACETONIDE 40 MG/ML
20 INJECTION, SUSPENSION INTRA-ARTICULAR; INTRAMUSCULAR
Status: SHIPPED | OUTPATIENT
Start: 2023-10-24

## 2023-10-24 RX ORDER — LIDOCAINE HYDROCHLORIDE 10 MG/ML
1 INJECTION, SOLUTION INFILTRATION; PERINEURAL
Status: SHIPPED | OUTPATIENT
Start: 2023-10-24

## 2023-10-24 RX ORDER — CLOTRIMAZOLE AND BETAMETHASONE DIPROPIONATE 10; .64 MG/G; MG/G
CREAM TOPICAL 2 TIMES DAILY
Qty: 15 G | Refills: 1 | Status: SHIPPED | OUTPATIENT
Start: 2023-10-24

## 2023-10-24 RX ADMIN — TRIAMCINOLONE ACETONIDE 20 MG: 40 INJECTION, SUSPENSION INTRA-ARTICULAR; INTRAMUSCULAR at 16:00

## 2023-10-24 RX ADMIN — LIDOCAINE HYDROCHLORIDE 1 ML: 10 INJECTION, SOLUTION INFILTRATION; PERINEURAL at 16:00

## 2023-10-24 NOTE — PROGRESS NOTES
Patient presents for assessment of left heel. He notes no heel pain as he responded well to cortisone injection given at last visit. Patient is having issues however with the left foot. He again has pain at the fifth metatarsal base left foot secondary to peroneal brevis tendinitis. In the past he responded well to cortisone injection here. A second concern involves an itchy rash between the fourth and fifth toes of the right foot. On exam, pain with palpation fifth metatarsal base left foot. No bruising or swelling noted. Minor scaling present between fourth and fifth toes right foot. Explained to patient that he is dealing with peroneal brevis tendinitis left foot along with tinea pedis right foot. Injected left foot at the fifth metatarsal base with 0.5 cc Kenalog 40 along with 1 cc 1% Xylocaine. Prescribed Lotrisone cream twice daily for the right foot tinea. Reappoint 4 weeks. Foot/lower extremity injection    Performed by: Regina Cole DPM  Authorized by: Regina Cole DPM    Procedure:      Other Assisting Provider: No      Verbal consent obtained?: Yes      Risks and benefits: Risks, benefits and alternatives were discussed      Consent given by:  Patient    Patient states understanding of procedure being performed: Yes      Patient identity confirmed:  Verbally with patient    Supporting Documentation:     Indications:  Pain    Procedure Details:    Prep: patient was prepped and draped in usual sterile fashion                Ethyl Chloride was applied      Needle size: 25 G G    Ultrasound Guidance: no      Approach:  Lateral    Laterality:  Left    Cyst Aspiration/Injection: No      Location: tendon insertion      Tendon Structures: Peroneus Brevis      Injection Information:       Medications:  1 mL lidocaine 1 %; 20 mg triamcinolone acetonide 40 mg/mL

## 2023-10-26 ENCOUNTER — CLINICAL SUPPORT (OUTPATIENT)
Age: 53
End: 2023-10-26
Payer: COMMERCIAL

## 2023-10-26 DIAGNOSIS — Z23 ENCOUNTER FOR IMMUNIZATION: Primary | ICD-10-CM

## 2023-10-26 PROCEDURE — 90471 IMMUNIZATION ADMIN: CPT

## 2023-10-26 PROCEDURE — 90686 IIV4 VACC NO PRSV 0.5 ML IM: CPT

## 2023-11-07 ENCOUNTER — ANESTHESIA (OUTPATIENT)
Dept: ANESTHESIOLOGY | Facility: HOSPITAL | Age: 53
End: 2023-11-07

## 2023-11-07 ENCOUNTER — ANESTHESIA EVENT (OUTPATIENT)
Dept: ANESTHESIOLOGY | Facility: HOSPITAL | Age: 53
End: 2023-11-07

## 2023-11-21 ENCOUNTER — ANESTHESIA (OUTPATIENT)
Dept: GASTROENTEROLOGY | Facility: AMBULARY SURGERY CENTER | Age: 53
End: 2023-11-21

## 2023-11-21 ENCOUNTER — PREP FOR PROCEDURE (OUTPATIENT)
Dept: GASTROENTEROLOGY | Facility: CLINIC | Age: 53
End: 2023-11-21

## 2023-11-21 ENCOUNTER — ANESTHESIA EVENT (OUTPATIENT)
Dept: GASTROENTEROLOGY | Facility: AMBULARY SURGERY CENTER | Age: 53
End: 2023-11-21

## 2023-11-21 ENCOUNTER — HOSPITAL ENCOUNTER (OUTPATIENT)
Dept: GASTROENTEROLOGY | Facility: AMBULARY SURGERY CENTER | Age: 53
Setting detail: OUTPATIENT SURGERY
Discharge: HOME | End: 2023-11-21
Attending: INTERNAL MEDICINE
Payer: COMMERCIAL

## 2023-11-21 VITALS
DIASTOLIC BLOOD PRESSURE: 74 MMHG | SYSTOLIC BLOOD PRESSURE: 124 MMHG | BODY MASS INDEX: 35.12 KG/M2 | HEIGHT: 73 IN | TEMPERATURE: 97 F | RESPIRATION RATE: 16 BRPM | OXYGEN SATURATION: 98 % | WEIGHT: 265 LBS | HEART RATE: 66 BPM

## 2023-11-21 DIAGNOSIS — R07.89 CHEST PAIN, ATYPICAL: ICD-10-CM

## 2023-11-21 DIAGNOSIS — K21.9 GASTROESOPHAGEAL REFLUX DISEASE WITHOUT ESOPHAGITIS: Primary | ICD-10-CM

## 2023-11-21 DIAGNOSIS — K21.9 GASTROESOPHAGEAL REFLUX DISEASE WITHOUT ESOPHAGITIS: ICD-10-CM

## 2023-11-21 DIAGNOSIS — K22.0 ACHALASIA: ICD-10-CM

## 2023-11-21 LAB — GLUCOSE SERPL-MCNC: 102 MG/DL (ref 65–140)

## 2023-11-21 PROCEDURE — 82948 REAGENT STRIP/BLOOD GLUCOSE: CPT

## 2023-11-21 PROCEDURE — 43239 EGD BIOPSY SINGLE/MULTIPLE: CPT | Performed by: INTERNAL MEDICINE

## 2023-11-21 PROCEDURE — 88305 TISSUE EXAM BY PATHOLOGIST: CPT | Performed by: STUDENT IN AN ORGANIZED HEALTH CARE EDUCATION/TRAINING PROGRAM

## 2023-11-21 RX ORDER — SODIUM CHLORIDE, SODIUM LACTATE, POTASSIUM CHLORIDE, CALCIUM CHLORIDE 600; 310; 30; 20 MG/100ML; MG/100ML; MG/100ML; MG/100ML
INJECTION, SOLUTION INTRAVENOUS CONTINUOUS PRN
Status: DISCONTINUED | OUTPATIENT
Start: 2023-11-21 | End: 2023-11-21

## 2023-11-21 RX ORDER — FENTANYL CITRATE 50 UG/ML
INJECTION, SOLUTION INTRAMUSCULAR; INTRAVENOUS AS NEEDED
Status: DISCONTINUED | OUTPATIENT
Start: 2023-11-21 | End: 2023-11-21

## 2023-11-21 RX ORDER — LIDOCAINE HYDROCHLORIDE 20 MG/ML
INJECTION, SOLUTION EPIDURAL; INFILTRATION; INTRACAUDAL; PERINEURAL AS NEEDED
Status: DISCONTINUED | OUTPATIENT
Start: 2023-11-21 | End: 2023-11-21

## 2023-11-21 RX ORDER — PROPOFOL 10 MG/ML
INJECTION, EMULSION INTRAVENOUS AS NEEDED
Status: DISCONTINUED | OUTPATIENT
Start: 2023-11-21 | End: 2023-11-21

## 2023-11-21 RX ADMIN — LIDOCAINE HYDROCHLORIDE 100 MG: 20 INJECTION, SOLUTION EPIDURAL; INFILTRATION; INTRACAUDAL; PERINEURAL at 09:15

## 2023-11-21 RX ADMIN — PROPOFOL 50 MG: 10 INJECTION, EMULSION INTRAVENOUS at 09:21

## 2023-11-21 RX ADMIN — PROPOFOL 150 MG: 10 INJECTION, EMULSION INTRAVENOUS at 09:15

## 2023-11-21 RX ADMIN — FENTANYL CITRATE 50 MCG: 50 INJECTION INTRAMUSCULAR; INTRAVENOUS at 09:13

## 2023-11-21 RX ADMIN — SODIUM CHLORIDE, SODIUM LACTATE, POTASSIUM CHLORIDE, AND CALCIUM CHLORIDE: .6; .31; .03; .02 INJECTION, SOLUTION INTRAVENOUS at 08:56

## 2023-11-21 RX ADMIN — PROPOFOL 50 MG: 10 INJECTION, EMULSION INTRAVENOUS at 09:18

## 2023-11-21 RX ADMIN — PROPOFOL 50 MG: 10 INJECTION, EMULSION INTRAVENOUS at 09:24

## 2023-11-21 NOTE — H&P
History and Physical - [facility]  Dmitri Cunha 47 y.o. male MRN: [id number]                  HPI: Dmitri Cunha is a 47y.o. year old male who presents for EGD      REVIEW OF SYSTEMS: Per the HPI, and otherwise unremarkable.     Historical Information   Past Medical History:   Diagnosis Date    Achalasia     Aneurysm ([address])     aortic    Asthma     Childhood    Colon polyp     Diabetes mellitus ([address])     Esophageal ulcer     Fatty liver     Groin abscess     with I &D    Hyperlipidemia     Hypertension     Plantar fasciitis      Past Surgical History:   Procedure Laterality Date    ANTERIOR CERVICAL DISCECTOMY W/ FUSION      APPENDECTOMY      BICEPS TENDON REPAIR      EGD AND COLONOSCOPY      FL INJECTION LEFT WRIST (ARTHROGRAM)  11/9/2021    FL MYELOGRAM CERVICAL  6/13/2018    MENISCECTOMY      MYOTOMY HELLER LAPAROSCOPIC      ORTHOPEDIC SURGERY       Social History   Social History     Substance and Sexual Activity   Alcohol Use Not Currently     Social History     Substance and Sexual Activity   Drug Use Never     Social History     Tobacco Use   Smoking Status Never   Smokeless Tobacco Never     Family History   Problem Relation Age of Onset    No Known Problems Mother     No Known Problems Father        Meds/Allergies       Current Outpatient Medications:     Ascorbic Acid (vitamin C) 1000 MG tablet    cetirizine (ZyrTEC) 10 mg tablet    CINNAMON PO    clotrimazole-betamethasone (LOTRISONE) 1-0.05 % cream    diphenhydrAMINE-acetaminophen (TYLENOL PM)  MG TABS    famotidine (PEPCID) 20 mg tablet    lisinopril-hydrochlorothiazide (PRINZIDE,ZESTORETIC) 20-12.5 MG per tablet    metFORMIN (GLUCOPHAGE) 500 mg tablet    Multiple Vitamins-Minerals (CENTRUM SILVER 50+MEN PO)    omega-3-acid ethyl esters (LOVAZA) 1 g capsule    pantoprazole (PROTONIX) 40 mg tablet    pregabalin (LYRICA) 75 mg capsule    simvastatin (ZOCOR) 20 mg tablet    sitaGLIPtin (JANUVIA) 100 mg tablet aluminum-magnesium hydroxide-simethicone (MAALOX MAX) 400-400-40 MG/5ML suspension    baclofen 20 mg tablet    fluticasone (FLONASE) 50 mcg/act nasal spray    tadalafil (CIALIS) 20 MG tablet    Current Facility-Administered Medications:     bupivacaine (MARCAINE) 0.25 % injection 1 mL, 1 mL, Infiltration, , 1 mL at 09/26/23 1615    lidocaine (XYLOCAINE) 1 % injection 1 mL, 1 mL, Infiltration, , 1 mL at 09/26/23 1615    lidocaine (XYLOCAINE) 1 % injection 1 mL, 1 mL, Infiltration, , 1 mL at 10/24/23 1600    triamcinolone acetonide (KENALOG-40) 40 mg/mL injection 20 mg, 20 mg, Infiltration, , 20 mg at 09/26/23 1615    triamcinolone acetonide (KENALOG-40) 40 mg/mL injection 20 mg, 20 mg, Infiltration, , 20 mg at 10/24/23 1600    No Known Allergies    Objective     /89   Pulse 60   Temp (!) 96.9 °F (36.1 °C) (Temporal)   Resp 19   Ht 6' 1" (1.854 m)   Wt 120 kg (265 lb)   SpO2 99%   BMI 34.96 kg/m²       PHYSICAL EXAM    Gen: NAD  Head: NCAT  CV: RRR  CHEST: Clear  ABD: soft, NT/ND  EXT: no edema      ASSESSMENT/PLAN:  This is a 47y.o. year old male here for EGD, and he is stable and optimized for his procedure.

## 2023-11-21 NOTE — ANESTHESIA PREPROCEDURE EVALUATION
Procedure:  EGD    Relevant Problems   CARDIO   (+) Chest pain, atypical   (+) Hypertension, essential   (+) Other hyperlipidemia   (+) Thoracic aortic aneurysm without rupture (HCC)      GI/HEPATIC   (+) Gastroesophageal reflux disease without esophagitis      MUSCULOSKELETAL   (+) Cervical spondylosis without myelopathy      NEURO/PSYCH   (+) Chronic pain syndrome      PULMONARY   (+) Mild intermittent asthma without complication        Physical Exam    Airway    Mallampati score: III  TM Distance: >3 FB  Neck ROM: full     Dental   No notable dental hx     Cardiovascular  No weak pulses    Pulmonary   No stridor    Other Findings        Anesthesia Plan  ASA Score- 2     Anesthesia Type- IV sedation with anesthesia with ASA Monitors. Additional Monitors:     Airway Plan:            Plan Factors-    Chart reviewed. Existing labs reviewed. Patient summary reviewed. Induction- intravenous. Postoperative Plan-     Informed Consent- Anesthetic plan and risks discussed with patient. I personally reviewed this patient with the CRNA. Discussed and agreed on the Anesthesia Plan with the CRNA. Ta Kitchen

## 2023-11-21 NOTE — ANESTHESIA POSTPROCEDURE EVALUATION
Post-Op Assessment Note    CV Status:  Stable    Pain management: adequate       Mental Status:  Lethargic and sleepy   Hydration Status:  Stable   PONV Controlled:  None   Airway Patency:  Patent     Post Op Vitals Reviewed: Yes    No anethesia notable event occurred.     Staff: CRNA               BP      Temp     Pulse     Resp      SpO2

## 2023-11-26 PROBLEM — R05.9 COUGH: Status: RESOLVED | Noted: 2023-09-27 | Resolved: 2023-11-26

## 2023-11-27 PROCEDURE — 88305 TISSUE EXAM BY PATHOLOGIST: CPT | Performed by: STUDENT IN AN ORGANIZED HEALTH CARE EDUCATION/TRAINING PROGRAM

## 2023-11-28 ENCOUNTER — OFFICE VISIT (OUTPATIENT)
Dept: PODIATRY | Facility: CLINIC | Age: 53
End: 2023-11-28
Payer: COMMERCIAL

## 2023-11-28 VITALS
WEIGHT: 269 LBS | BODY MASS INDEX: 35.65 KG/M2 | SYSTOLIC BLOOD PRESSURE: 125 MMHG | HEIGHT: 73 IN | HEART RATE: 72 BPM | DIASTOLIC BLOOD PRESSURE: 83 MMHG

## 2023-11-28 DIAGNOSIS — B35.3 TINEA PEDIS OF RIGHT FOOT: ICD-10-CM

## 2023-11-28 DIAGNOSIS — M76.72 PERONEAL TENDINITIS OF LEFT LOWER EXTREMITY: Primary | ICD-10-CM

## 2023-11-28 PROCEDURE — 99212 OFFICE O/P EST SF 10 MIN: CPT | Performed by: PODIATRIST

## 2023-11-28 NOTE — PROGRESS NOTES
Patient presents for pedal assessment. At last visit, patient was given a cortisone injection at the peroneal brevis insertion left foot. He has no pain at this time. Also at last visit, patient was prescribed Lotrisone due to tinea infection between lesser toes. This infection has resolved and no treatment now is needed.   Reappoint as needed

## 2023-12-18 ENCOUNTER — TELEPHONE (OUTPATIENT)
Dept: GASTROENTEROLOGY | Facility: HOSPITAL | Age: 53
End: 2023-12-18

## 2023-12-26 ENCOUNTER — HOSPITAL ENCOUNTER (OUTPATIENT)
Dept: GASTROENTEROLOGY | Facility: HOSPITAL | Age: 53
Discharge: HOME | End: 2023-12-26
Payer: COMMERCIAL

## 2023-12-26 VITALS
RESPIRATION RATE: 16 BRPM | SYSTOLIC BLOOD PRESSURE: 133 MMHG | OXYGEN SATURATION: 96 % | HEART RATE: 66 BPM | DIASTOLIC BLOOD PRESSURE: 82 MMHG | TEMPERATURE: 97.8 F

## 2023-12-26 DIAGNOSIS — K22.0 ACHALASIA: ICD-10-CM

## 2023-12-26 DIAGNOSIS — K21.9 GASTROESOPHAGEAL REFLUX DISEASE WITHOUT ESOPHAGITIS: ICD-10-CM

## 2023-12-26 PROCEDURE — 91010 ESOPHAGUS MOTILITY STUDY: CPT

## 2023-12-26 PROCEDURE — 91038 ESOPH IMPED FUNCT TEST > 1HR: CPT

## 2023-12-26 NOTE — PERIOPERATIVE NURSING NOTE
Patient brought in the room and explained the esophageal manometry procedure. After the confirmation of allergies, Lidocaine 2% viscous solution given via right/ left nostrils and  a transnasal insertion of the High Resolution esophageal manometry catheter was inserted via left nostril. Patient given water to drink during the insertion and once the catheter inserted pressure bands of both Upper esophageal sphincter  (UES) and Lower esophageal sphincter ( LES) were observed on the color contour. Patient instructed to take a deep breath to verify placement of the catheter, diaphragmatic pinch noted on inspiration. Catheter was secured to left cheek. Patient was assisted to supine position .Patient was instructed to relax  while acclimating the catheter for about 5 minutes. A 30 second baseline resting pressure was obtained to identify the UES and LES followed by a series of 10 liquid swallows using 5 cc room temperature normal saline to assess esophageal motility and bolus transit. Patient administered 10 viscous swallows using 5 cc viscous solution, 1 multiple rapid drink swallow using 2 cc room temperature normal saline given a total of 5 drinks. Patient instructed to sit up at the edge of the stretcher and given 5 upright liquid swallows using 5 cc room temperature normal saline and 1 rapid drink challenge using 200 cc room temperature water. At the end of the procedure the high resolution esophageal manometry catheter was removed from the nostril intact. sensor PH probe inserted via left nostril and secured. Zephr recorder teachback performed and patient verbalized understanding. Patient instructed to return next day to have probe remove. Discharge instructions given and patient ambulated out of room in stable condition.

## 2023-12-27 ENCOUNTER — APPOINTMENT (OUTPATIENT)
Dept: LAB | Facility: CLINIC | Age: 53
End: 2023-12-27
Payer: COMMERCIAL

## 2023-12-27 DIAGNOSIS — E11.9 TYPE 2 DIABETES MELLITUS WITHOUT COMPLICATION, WITHOUT LONG-TERM CURRENT USE OF INSULIN (HCC): ICD-10-CM

## 2023-12-27 LAB
ALBUMIN SERPL BCP-MCNC: 4.4 G/DL (ref 3.5–5)
ALP SERPL-CCNC: 46 U/L (ref 34–104)
ALT SERPL W P-5'-P-CCNC: 59 U/L (ref 7–52)
ANION GAP SERPL CALCULATED.3IONS-SCNC: 4 MMOL/L
AST SERPL W P-5'-P-CCNC: 28 U/L (ref 13–39)
BASOPHILS # BLD AUTO: 0.04 THOUSANDS/ÂΜL (ref 0–0.1)
BASOPHILS NFR BLD AUTO: 1 % (ref 0–1)
BILIRUB SERPL-MCNC: 0.69 MG/DL (ref 0.2–1)
BUN SERPL-MCNC: 10 MG/DL (ref 5–25)
CALCIUM SERPL-MCNC: 9.2 MG/DL (ref 8.4–10.2)
CHLORIDE SERPL-SCNC: 104 MMOL/L (ref 96–108)
CHOLEST SERPL-MCNC: 168 MG/DL
CO2 SERPL-SCNC: 31 MMOL/L (ref 21–32)
CREAT SERPL-MCNC: 1.01 MG/DL (ref 0.6–1.3)
CREAT UR-MCNC: 199.5 MG/DL
EOSINOPHIL # BLD AUTO: 0.11 THOUSAND/ÂΜL (ref 0–0.61)
EOSINOPHIL NFR BLD AUTO: 2 % (ref 0–6)
ERYTHROCYTE [DISTWIDTH] IN BLOOD BY AUTOMATED COUNT: 11.9 % (ref 11.6–15.1)
EST. AVERAGE GLUCOSE BLD GHB EST-MCNC: 154 MG/DL
GFR SERPL CREATININE-BSD FRML MDRD: 83 ML/MIN/1.73SQ M
GLUCOSE P FAST SERPL-MCNC: 119 MG/DL (ref 65–99)
HBA1C MFR BLD: 7 %
HCT VFR BLD AUTO: 46.3 % (ref 36.5–49.3)
HDLC SERPL-MCNC: 43 MG/DL
HGB BLD-MCNC: 15.4 G/DL (ref 12–17)
IMM GRANULOCYTES # BLD AUTO: 0.06 THOUSAND/UL (ref 0–0.2)
IMM GRANULOCYTES NFR BLD AUTO: 1 % (ref 0–2)
LDLC SERPL CALC-MCNC: 85 MG/DL (ref 0–100)
LYMPHOCYTES # BLD AUTO: 2.04 THOUSANDS/ÂΜL (ref 0.6–4.47)
LYMPHOCYTES NFR BLD AUTO: 39 % (ref 14–44)
MCH RBC QN AUTO: 30.7 PG (ref 26.8–34.3)
MCHC RBC AUTO-ENTMCNC: 33.3 G/DL (ref 31.4–37.4)
MCV RBC AUTO: 92 FL (ref 82–98)
MICROALBUMIN UR-MCNC: 14.1 MG/L
MICROALBUMIN/CREAT 24H UR: 7 MG/G CREATININE (ref 0–30)
MONOCYTES # BLD AUTO: 0.55 THOUSAND/ÂΜL (ref 0.17–1.22)
MONOCYTES NFR BLD AUTO: 10 % (ref 4–12)
NEUTROPHILS # BLD AUTO: 2.5 THOUSANDS/ÂΜL (ref 1.85–7.62)
NEUTS SEG NFR BLD AUTO: 47 % (ref 43–75)
NONHDLC SERPL-MCNC: 125 MG/DL
NRBC BLD AUTO-RTO: 0 /100 WBCS
PLATELET # BLD AUTO: 192 THOUSANDS/UL (ref 149–390)
PMV BLD AUTO: 10.5 FL (ref 8.9–12.7)
POTASSIUM SERPL-SCNC: 4.2 MMOL/L (ref 3.5–5.3)
PROT SERPL-MCNC: 6.9 G/DL (ref 6.4–8.4)
RBC # BLD AUTO: 5.01 MILLION/UL (ref 3.88–5.62)
SODIUM SERPL-SCNC: 139 MMOL/L (ref 135–147)
TRIGL SERPL-MCNC: 200 MG/DL
WBC # BLD AUTO: 5.3 THOUSAND/UL (ref 4.31–10.16)

## 2023-12-27 PROCEDURE — 36415 COLL VENOUS BLD VENIPUNCTURE: CPT

## 2023-12-27 PROCEDURE — 3051F HG A1C>EQUAL 7.0%<8.0%: CPT | Performed by: PODIATRIST

## 2023-12-27 PROCEDURE — 80061 LIPID PANEL: CPT

## 2023-12-27 PROCEDURE — 83036 HEMOGLOBIN GLYCOSYLATED A1C: CPT

## 2023-12-27 PROCEDURE — 85025 COMPLETE CBC W/AUTO DIFF WBC: CPT

## 2023-12-27 PROCEDURE — 80053 COMPREHEN METABOLIC PANEL: CPT

## 2023-12-27 PROCEDURE — 82043 UR ALBUMIN QUANTITATIVE: CPT

## 2023-12-27 PROCEDURE — 82570 ASSAY OF URINE CREATININE: CPT

## 2023-12-28 ENCOUNTER — PREP FOR PROCEDURE (OUTPATIENT)
Dept: GASTROENTEROLOGY | Facility: CLINIC | Age: 53
End: 2023-12-28

## 2023-12-28 DIAGNOSIS — Z12.11 SCREENING FOR COLON CANCER: Primary | ICD-10-CM

## 2024-01-11 ENCOUNTER — RA CDI HCC (OUTPATIENT)
Dept: OTHER | Facility: HOSPITAL | Age: 54
End: 2024-01-11

## 2024-01-11 NOTE — PROGRESS NOTES
Mild intermittent asthma without complication  Noted 9/27/2023  [J45.20]    Thoracic aortic aneurysm without rupture (HCC)  Noted 9/27/2023  [I71.20]    HCC coding opportunities          Chart Reviewed number of suggestions sent to Provider: 2     Patients Insurance        Commercial Insurance: [employer] Commercial [employer]

## 2024-01-12 ENCOUNTER — OFFICE VISIT (OUTPATIENT)
Age: 54
End: 2024-01-12
Payer: COMMERCIAL

## 2024-01-12 VITALS
TEMPERATURE: 97.9 F | SYSTOLIC BLOOD PRESSURE: 130 MMHG | HEART RATE: 69 BPM | DIASTOLIC BLOOD PRESSURE: 86 MMHG | BODY MASS INDEX: 33.91 KG/M2 | OXYGEN SATURATION: 98 % | HEIGHT: 74 IN | WEIGHT: 264.2 LBS

## 2024-01-12 DIAGNOSIS — E11.9 TYPE 2 DIABETES MELLITUS WITHOUT COMPLICATION, WITHOUT LONG-TERM CURRENT USE OF INSULIN (HCC): Primary | ICD-10-CM

## 2024-01-12 DIAGNOSIS — I10 HYPERTENSION, ESSENTIAL: ICD-10-CM

## 2024-01-12 DIAGNOSIS — I71.20 THORACIC AORTIC ANEURYSM WITHOUT RUPTURE, UNSPECIFIED PART (HCC): ICD-10-CM

## 2024-01-12 DIAGNOSIS — Z23 ENCOUNTER FOR IMMUNIZATION: ICD-10-CM

## 2024-01-12 DIAGNOSIS — E78.49 OTHER HYPERLIPIDEMIA: ICD-10-CM

## 2024-01-12 DIAGNOSIS — K21.9 GASTROESOPHAGEAL REFLUX DISEASE WITHOUT ESOPHAGITIS: ICD-10-CM

## 2024-01-12 DIAGNOSIS — R91.1 LUNG NODULE: ICD-10-CM

## 2024-01-12 PROCEDURE — 90750 HZV VACC RECOMBINANT IM: CPT

## 2024-01-12 PROCEDURE — 90471 IMMUNIZATION ADMIN: CPT

## 2024-01-12 PROCEDURE — 99214 OFFICE O/P EST MOD 30 MIN: CPT | Performed by: NURSE PRACTITIONER

## 2024-01-12 NOTE — PROGRESS NOTES
Assessment/Plan:    Gastroesophageal reflux disease without esophagitis  -currently following GI    Diabetes mellitus ([facility])    Lab Results   Component Value Date    HGBA1C 7.0 (H) 12/27/2023   -Uncontrolled  -Continue metformin 1500 mg in the morning and 1000mg in the evening  -Continue Januvia   -F/U in 3 months     Lung nodule  Seen on CT in 2022    Thoracic aortic aneurysm without rupture, unspecified part ([facility])  - Last CT 2022  -Due for updated CT    Hypertension, essential  - Controlled on lisinopril-hydrochlorothiazide to 2 tablets daily  -Continue to monitor blood pressure at home    Other hyperlipidemia  - Continue simvastatin              Diagnoses and all orders for this visit:    Type 2 diabetes mellitus without complication, without long-term current use of insulin ([facility])  -     CBC and differential; Future  -     Comprehensive metabolic panel; Future  -     Hemoglobin A1C; Future  -     Lipid panel; Future    Thoracic aortic aneurysm without rupture, unspecified part ([facility])    Encounter for immunization  -     Zoster Vaccine Recombinant IM    Gastroesophageal reflux disease without esophagitis    Lung nodule    Hypertension, essential    Other hyperlipidemia          Subjective:      Patient ID: Jeremy George is a 53 y.o. male.    Patient presents today for follow up and to review blood work.    S/P cervical spinal fusion- currently follows neurosurgery     Diabetes mellitus ([facility])- HBA1C 7.3-->7.0, continues on metformin and was started on Januvia at last office visit, he denies side effects, denies hypo or hyperglycemic events   in the morning        Hypertension, essential-currently well controlled on lisinopril, lisinopril increased at last office visit, denies lightheadedness or dizziness, denies side effects with this medication    Hyperlipidemia- controlled on statin, denies side effects with medication  ASCVD 11.2%  Cholesterol 168, triglycerides 200, HDL 43, LDL 85             The  following portions of the patient's history were reviewed and updated as appropriate: allergies, current medications, past family history, past medical history, past social history, past surgical history, and problem list.    Review of Systems   Constitutional:  Negative for activity change, appetite change, chills, diaphoresis and fever.   HENT:  Negative for congestion, ear discharge, ear pain, postnasal drip, rhinorrhea, sinus pressure, sinus pain and sore throat.    Eyes:  Negative for pain, discharge, itching and visual disturbance.   Respiratory:  Negative for cough, chest tightness, shortness of breath and wheezing.    Cardiovascular:  Negative for chest pain, palpitations and leg swelling.   Gastrointestinal:  Negative for abdominal pain, constipation, diarrhea, nausea and vomiting.   Endocrine: Negative for polydipsia, polyphagia and polyuria.   Genitourinary:  Negative for difficulty urinating, dysuria and urgency.   Musculoskeletal:  Negative for arthralgias, back pain and neck pain.   Skin:  Negative for rash and wound.   Neurological:  Negative for dizziness, weakness, numbness and headaches.         Past Medical History:   Diagnosis Date    Achalasia     Aneurysm (HCC)     aortic    Asthma     Childhood    Colon polyp     Diabetes mellitus (HCC)     Esophageal ulcer     Fatty liver     Groin abscess     with I &D    Hyperlipidemia     Hypertension     Plantar fasciitis          Current Outpatient Medications:     Ascorbic Acid (vitamin C) 1000 MG tablet, Take 1,000 mg by mouth daily  , Disp: , Rfl:     baclofen 20 mg tablet, TAKE 1 TABLET DAILY AT BEDTIME AS NEEDED FOR MUSCLE SPASMS, Disp: 90 tablet, Rfl: 3    cetirizine (ZyrTEC) 10 mg tablet, Take 1 tablet (10 mg total) by mouth daily, Disp: 90 tablet, Rfl: 0    CINNAMON PO, Take by mouth, Disp: , Rfl:     clotrimazole-betamethasone (LOTRISONE) 1-0.05 % cream, Apply topically 2 (two) times a day, Disp: 15 g, Rfl: 1    diphenhydrAMINE-acetaminophen  (TYLENOL PM)  MG TABS, Take 1 tablet by mouth daily at bedtime as needed for sleep , Disp: , Rfl:     famotidine (PEPCID) 20 mg tablet, Take 1 tablet (20 mg total) by mouth daily, Disp: 90 tablet, Rfl: 1    fluticasone (FLONASE) 50 mcg/act nasal spray, 1 spray into each nostril daily, Disp: 16 g, Rfl: 1    lisinopril-hydrochlorothiazide (PRINZIDE,ZESTORETIC) 20-12.5 MG per tablet, Take 2 tablets by mouth daily, Disp: 180 tablet, Rfl: 1    metFORMIN (GLUCOPHAGE) 500 mg tablet, Pt takes 1500 mg (3 tablets) by mouth in the morning, and a 1000 mg (2 tablets) in the evening, Disp: 450 tablet, Rfl: 1    Multiple Vitamins-Minerals (CENTRUM SILVER 50+MEN PO), Take by mouth, Disp: , Rfl:     omega-3-acid ethyl esters (LOVAZA) 1 g capsule, Take 1 g by mouth daily, Disp: , Rfl:     pantoprazole (PROTONIX) 40 mg tablet, Take 1 tablet (40 mg total) by mouth 2 (two) times a day, Disp: 60 tablet, Rfl: 4    pregabalin (LYRICA) 75 mg capsule, Take 1 PO AM and 2 PO HS (Patient taking differently: Take 75 mg by mouth daily Take 1 PO AM and 2 PO HS), Disp: 90 capsule, Rfl: 1    simvastatin (ZOCOR) 20 mg tablet, Take 1 tablet (20 mg total) by mouth daily, Disp: 90 tablet, Rfl: 1    sitaGLIPtin (JANUVIA) 100 mg tablet, Take 1 tablet (100 mg total) by mouth daily, Disp: 90 tablet, Rfl: 1    tadalafil (CIALIS) 20 MG tablet, TAKE 1 TABLET 1 HOUR PRIOR TO INTERCOURSE ON AN EMPTY STOMACH WITH NO ALCOHOL, LIMIT TO 2 TABLETS PER WEEK, Disp: 18 tablet, Rfl: 4    Current Facility-Administered Medications:     bupivacaine (MARCAINE) 0.25 % injection 1 mL, 1 mL, Infiltration, , Mark Munoz DPM, 1 mL at 09/26/23 1615    lidocaine (XYLOCAINE) 1 % injection 1 mL, 1 mL, Infiltration, , Mark Munoz DPM, 1 mL at 09/26/23 1615    lidocaine (XYLOCAINE) 1 % injection 1 mL, 1 mL, Infiltration, , Mark Munoz DPM, 1 mL at 10/24/23 1600    triamcinolone acetonide (KENALOG-40) 40 mg/mL injection 20 mg, 20 mg, Infiltration, , Mark Munoz,  "DPM, 20 mg at 09/26/23 1615    triamcinolone acetonide (KENALOG-40) 40 mg/mL injection 20 mg, 20 mg, Infiltration, , Mark Munoz, DPM, 20 mg at 10/24/23 1600    No Known Allergies    Social History   Past Surgical History:   Procedure Laterality Date    ANTERIOR CERVICAL DISCECTOMY W/ FUSION      APPENDECTOMY      BICEPS TENDON REPAIR      EGD AND COLONOSCOPY      FL INJECTION LEFT WRIST (ARTHROGRAM)  11/9/2021    FL MYELOGRAM CERVICAL  6/13/2018    MENISCECTOMY      MYOTOMY HELLER LAPAROSCOPIC      ORTHOPEDIC SURGERY       Family History   Problem Relation Age of Onset    No Known Problems Mother     No Known Problems Father        Objective:  /86 (BP Location: Left arm, Patient Position: Sitting, Cuff Size: Standard)   Pulse 69   Temp 97.9 °F (36.6 °C) (Temporal)   Ht 6' 1.74\" (1.873 m)   Wt 120 kg (264 lb 3.2 oz)   SpO2 98%   BMI 34.16 kg/m²     Recent Results (from the past 1344 hour(s))   Fingerstick Glucose (POCT)    Collection Time: 11/21/23  8:43 AM   Result Value Ref Range    POC Glucose 102 65 - 140 mg/dl   Tissue Exam    Collection Time: 11/21/23  9:23 AM   Result Value Ref Range    Case Report       Surgical Pathology Report                         Case: [id number]                                   Authorizing Provider:  Nerissa Reddy DO    Collected:           11/21/2023 0923              Ordering Location:     [facility]        Received:            11/21/2023 145                                     [facility]                                                    Pathologist:           Brianne Espinosa MD                                                         Specimens:   A) - Esophagus, distal esophagus bx RO EOE                                                          B) - Esophagus, proximal esophagus bx RO EOE                                               Final Diagnosis       A. Esophagus, distal esophagus bx RO EOE:      - Squamous mucosa with no " "significant histopathologic abnormalities      - No increase in eosinophils    B. Esophagus, proximal esophagus bx RO EOE:      - Squamous mucosa with no significant histopathologic abnormalities      - No increase in eosinophils      Additional Information       Interpretation performed at [facility], [facility] [address] [facility] [address]    All reported additional testing was performed with appropriately reactive controls.  These tests were developed and their performance characteristics determined by [facility] or appropriate performing facility, though some tests may be performed on tissues which have not been validated for performance characteristics (such as staining performed on alcohol exposed cell blocks and decalcified tissues).  Results should be interpreted with caution and in the context of the patients’ clinical condition. These tests may not be cleared or approved by the U.S. Food and Drug Administration, though the FDA has determined that such clearance or approval is not necessary. These tests are used for clinical purposes and they should not be regarded as investigational or for research. This laboratory has been approved by CLIA 88, designated as a high-complexity laboratory and is qualified to perform these tests.  .      Gross Description       A. The specimen is received in formalin, labeled with the patient's name and hospital number, and is designated \" distal esophagus\".  The specimen consistent 2 colorless soft tissue fragments measuring 0.3 and 0.4 cm in greatest dimension.  Entirely submitted. One screened cassette.  B. The specimen is received in formalin, labeled with the patient's name and hospital number, and is designated \" proximal esophagus\".  The specimen consists of 4 colorless soft tissue fragments measuring range from 0.2 to 0.4 cm in greatest dimension.  Entirely submitted. One screened cassette.    Note: The estimated total formalin fixation time based " upon information provided by the submitting clinician and the standard processing schedule is under 72 hours.      Suzy     CBC and differential    Collection Time: 12/27/23  7:47 AM   Result Value Ref Range    WBC 5.30 4.31 - 10.16 Thousand/uL    RBC 5.01 3.88 - 5.62 Million/uL    Hemoglobin 15.4 12.0 - 17.0 g/dL    Hematocrit 46.3 36.5 - 49.3 %    MCV 92 82 - 98 fL    MCH 30.7 26.8 - 34.3 pg    MCHC 33.3 31.4 - 37.4 g/dL    RDW 11.9 11.6 - 15.1 %    MPV 10.5 8.9 - 12.7 fL    Platelets 192 149 - 390 Thousands/uL    nRBC 0 /100 WBCs    Neutrophils Relative 47 43 - 75 %    Immat GRANS % 1 0 - 2 %    Lymphocytes Relative 39 14 - 44 %    Monocytes Relative 10 4 - 12 %    Eosinophils Relative 2 0 - 6 %    Basophils Relative 1 0 - 1 %    Neutrophils Absolute 2.50 1.85 - 7.62 Thousands/µL    Immature Grans Absolute 0.06 0.00 - 0.20 Thousand/uL    Lymphocytes Absolute 2.04 0.60 - 4.47 Thousands/µL    Monocytes Absolute 0.55 0.17 - 1.22 Thousand/µL    Eosinophils Absolute 0.11 0.00 - 0.61 Thousand/µL    Basophils Absolute 0.04 0.00 - 0.10 Thousands/µL   Comprehensive metabolic panel    Collection Time: 12/27/23  7:47 AM   Result Value Ref Range    Sodium 139 135 - 147 mmol/L    Potassium 4.2 3.5 - 5.3 mmol/L    Chloride 104 96 - 108 mmol/L    CO2 31 21 - 32 mmol/L    ANION GAP 4 mmol/L    BUN 10 5 - 25 mg/dL    Creatinine 1.01 0.60 - 1.30 mg/dL    Glucose, Fasting 119 (H) 65 - 99 mg/dL    Calcium 9.2 8.4 - 10.2 mg/dL    AST 28 13 - 39 U/L    ALT 59 (H) 7 - 52 U/L    Alkaline Phosphatase 46 34 - 104 U/L    Total Protein 6.9 6.4 - 8.4 g/dL    Albumin 4.4 3.5 - 5.0 g/dL    Total Bilirubin 0.69 0.20 - 1.00 mg/dL    eGFR 83 ml/min/1.73sq m   Hemoglobin A1C    Collection Time: 12/27/23  7:47 AM   Result Value Ref Range    Hemoglobin A1C 7.0 (H) Normal 4.0-5.6%; PreDiabetic 5.7-6.4%; Diabetic >=6.5%; Glycemic control for adults with diabetes <7.0% %     mg/dl   Lipid panel    Collection Time: 12/27/23  7:47 AM   Result  Value Ref Range    Cholesterol 168 See Comment mg/dL    Triglycerides 200 (H) See Comment mg/dL    HDL, Direct 43 >=40 mg/dL    LDL Calculated 85 0 - 100 mg/dL    Non-HDL-Chol (CHOL-HDL) 125 mg/dl   Albumin / creatinine urine ratio    Collection Time: 12/27/23  7:47 AM   Result Value Ref Range    Creatinine, Ur 199.5 Reference range not established. mg/dL    Albumin,U,Random 14.1 <20.0 mg/L    Albumin Creat Ratio 7 0 - 30 mg/g creatinine            Physical Exam  Constitutional:       General: He is not in acute distress.     Appearance: He is well-developed. He is not diaphoretic.   HENT:      Head: Normocephalic and atraumatic.      Right Ear: External ear normal.      Left Ear: External ear normal.      Nose: Nose normal.      Mouth/Throat:      Mouth: Mucous membranes are moist.      Pharynx: No oropharyngeal exudate or posterior oropharyngeal erythema.   Eyes:      General:         Right eye: No discharge.         Left eye: No discharge.      Conjunctiva/sclera: Conjunctivae normal.      Pupils: Pupils are equal, round, and reactive to light.   Neck:      Thyroid: No thyromegaly.   Cardiovascular:      Rate and Rhythm: Normal rate and regular rhythm.      Heart sounds: Normal heart sounds. No murmur heard.     No friction rub. No gallop.   Pulmonary:      Effort: Pulmonary effort is normal. No respiratory distress.      Breath sounds: Normal breath sounds. No stridor. No wheezing or rales.   Abdominal:      General: Bowel sounds are normal. There is no distension.      Palpations: Abdomen is soft.      Tenderness: There is no abdominal tenderness.   Musculoskeletal:      Cervical back: Normal range of motion and neck supple.   Lymphadenopathy:      Cervical: No cervical adenopathy.   Skin:     General: Skin is warm and dry.      Findings: No erythema or rash.   Neurological:      Mental Status: He is alert and oriented to person, place, and time.   Psychiatric:         Behavior: Behavior normal.         Thought  Content: Thought content normal.         Judgment: Judgment normal.

## 2024-01-12 NOTE — PROGRESS NOTES
Diabetic Foot Exam    Patient's shoes and socks removed.    Right Foot/Ankle   Right Foot Inspection  Skin Exam: skin intact.     Sensory   Monofilament testing: intact    Left Foot/Ankle  Left Foot Inspection  Skin Exam: skin intact.     Sensory   Monofilament testing: intact

## 2024-01-15 NOTE — ASSESSMENT & PLAN NOTE
Lab Results   Component Value Date    HGBA1C 7.0 (H) 12/27/2023   -Uncontrolled  -Continue metformin 1500 mg in the morning and 1000mg in the evening  -Continue Januvia   -F/U in 3 months   
- Continue simvastatin    
- Controlled on lisinopril-hydrochlorothiazide to 2 tablets daily  -Continue to monitor blood pressure at home  
- Last CT 2022  -Due for updated CT  
-currently following GI  
Seen on CT in 2022  
none

## 2024-02-02 ENCOUNTER — TELEPHONE (OUTPATIENT)
Dept: GASTROENTEROLOGY | Facility: CLINIC | Age: 54
End: 2024-02-02

## 2024-02-02 NOTE — TELEPHONE ENCOUNTER
----- Message from Henri Burnette MD sent at 1/30/2024  9:27 AM EST -----  Can you please arrange an appointment for this patient to be seen in clinic

## 2024-02-02 NOTE — TELEPHONE ENCOUNTER
Patient returned call, scheduled office visit with Dr. Henri Burnette on 3/6 at [facility].

## 2024-03-06 ENCOUNTER — CONSULT (OUTPATIENT)
Dept: GASTROENTEROLOGY | Facility: CLINIC | Age: 54
End: 2024-03-06
Payer: COMMERCIAL

## 2024-03-06 VITALS
HEIGHT: 74 IN | TEMPERATURE: 98.6 F | SYSTOLIC BLOOD PRESSURE: 124 MMHG | DIASTOLIC BLOOD PRESSURE: 78 MMHG | WEIGHT: 261 LBS | BODY MASS INDEX: 33.5 KG/M2

## 2024-03-06 DIAGNOSIS — K21.9 GASTROESOPHAGEAL REFLUX DISEASE WITHOUT ESOPHAGITIS: ICD-10-CM

## 2024-03-06 DIAGNOSIS — K76.0 FATTY LIVER: Primary | ICD-10-CM

## 2024-03-06 DIAGNOSIS — K59.00 CONSTIPATION, UNSPECIFIED CONSTIPATION TYPE: ICD-10-CM

## 2024-03-06 PROCEDURE — 99214 OFFICE O/P EST MOD 30 MIN: CPT | Performed by: INTERNAL MEDICINE

## 2024-03-06 RX ORDER — OMEPRAZOLE 40 MG/1
40 CAPSULE, DELAYED RELEASE ORAL DAILY
Qty: 90 CAPSULE | Refills: 3 | Status: SHIPPED | OUTPATIENT
Start: 2024-03-06

## 2024-03-06 RX ORDER — POLYETHYLENE GLYCOL 3350 17 G/17G
17 POWDER, FOR SOLUTION ORAL DAILY
Qty: 90 EACH | Refills: 5 | Status: SHIPPED | OUTPATIENT
Start: 2024-03-06

## 2024-03-06 NOTE — PATIENT INSTRUCTIONS
Patient was given labs to be done.   Scheduled Elastography and given a referral for Weight loss management  Recall for follow up in 6 months  PATIENT IS DIABETIC      High Fiber Diet   WHAT YOU NEED TO KNOW:   A high-fiber diet includes foods that have a high amount of fiber. Fiber is the part of fruits, vegetables, and grains that is not broken down by your body. Fiber keeps your bowel movements regular. Fiber can also help lower your cholesterol level, control blood sugar in people with diabetes, and relieve constipation. Fiber can also help you control your weight because it helps you feel full faster. Most adults should eat 25 to 35 grams of fiber each day. Talk to your dietitian or healthcare provider about the amount of fiber you need.  DISCHARGE INSTRUCTIONS:   Good sources of fiber:       Foods with at least 4 grams of fiber per serving:      ? to ½ cup of high-fiber cereal (check the nutrition label on the box)    ½ cup of blackberries or raspberries    4 dried prunes    1 cooked artichoke    ½ cup of cooked legumes, such as lentils, or red, kidney, and stephens beans    Foods with 1 to 3 grams of fiber per servin slice of whole-wheat, pumpernickel, or rye bread    ½ cup of cooked brown rice    4 whole-wheat crackers    1 cup of oatmeal    ½ cup of cereal with 1 to 3 grams of fiber per serving (check the nutrition label on the box)    1 small piece of fruit, such as an apple, banana, pear, kiwi, or orange    3 dates    ½ cup of canned apricots, fruit cocktail, peaches, or pears    ½ cup of raw or cooked vegetables, such as carrots, cauliflower, cabbage, spinach, squash, or corn  Ways that you can increase fiber in your diet:   Choose brown or wild rice instead of white rice.     Use whole wheat flour in recipes instead of white or all-purpose flour.     Add beans and peas to casseroles or soups.     Choose fresh fruit and vegetables with peels or skins on instead of juices.    Other diet guidelines  to follow:   Add fiber to your diet slowly.  You may have abdominal discomfort, bloating, and gas if you add fiber to your diet too quickly.     Drink plenty of liquids as you add fiber to your diet.  You may have nausea or develop constipation if you do not drink enough water. Ask how much liquid to drink each day and which liquids are best for you.    © Copyright [employer] 2023 Information is for End User's use only and may not be sold, redistributed or otherwise used for commercial purposes.  The above information is an  only. It is not intended as medical advice for individual conditions or treatments. Talk to your doctor, nurse or pharmacist before following any medical regimen to see if it is safe and effective for you.  Jeremy George  3/6/2024      Recommended Total Fiber Intake**   AGE MEN WOMAN   19-50 38 grams/day 25 grams/day   Over 50 30 grams/day 21 grams/day     Fiber Sources in Common Foods  Use this guide to find out if you have enough fiber in you diet.   Food Size of Serving Fiber Grams/Servings Calories/  Serving Food Size of Serving Fiber Grams/Servings Calories/  Serving   Fruits:  (raw unless otherwise noted Vegetables:  (cooked, unless otherwise noted)   Apple (w/peel) 1 medium 3.7 81 Artichoke 1 globe 6.5 60   Apricots 1 cup 3.7 74 Asparagus ½ cup 1.8 25   Banana 1 medium 2.7 105 Beans:      Blackberries 1 cup 7.2 75 Green  (canned)                       ½ cup 1.3 14   Blueberries 1 cup 3.9 81 Kidney ½ cup 5.7 114   Cantaloupe 1 cup 1.3 56 Hahn ½ cup 6.1 85   Grapefruit 1 medium 2.8 82 Haddad ½ cup 7.4 118   Grapes 1 cup 1.6 114 White ½ cup 5.5 122   Orange 1 medium 3.1 62 Beets ½ cup 1.6 37   Pear (with peel) 1 medium 4.0 98 Broccoli ½ cup 2.8 26   Pineapple 1 cup 1.9 76 Cabbage, green ½ cup 2.1 16   Plums 1 medium 1.0 36 Cabbage, green (raw) ½ cup 0.8 9   Prunes (dried) 1 cup 11.4 386 Carrots ½ cup 2.6 35   Raspberries 1 cup 8.4 60 Cauliflower ½ cup 2.0 17   Strawberries 1 cup  3.4 45 Cauliflower (raw) ½ cup 1.3 13   Watermelon 1 slice 0.8 51 Celery (raw) ½ cup 1.0 10   GRAIN PRODUCTS AND OTHERS: Corn ½ cup 2.0 66   Bread:    Cucumber (raw) ½ cup 0.4 7   Gabonese 1 slice 0.8 68 Eggplant ½ cup 1.2 13   Rye 1 slice 1.6 67 Green Peas ½ cup 4.4 62   White 1 slice 0.6 67 Lettuce, iceberg (raw) ½ cup 0.4 4   Whole      Wheat 1 slice 2.0 70 Onions (raw) ½ cup 1.4 30   Cereal:    Potato (baked with skin) ½ cup 1.5 66   Bran 1 ounce 9.7 70 Spinach ½ cup 2.7 25   Corn Flakes 1 ounce 1.0 110 Tomato ½ cup 1.0 19   Oat Bran 1 ounce 4.3 69 Zucchini ½ cup 1.3 14   Oatmeal 1 ounce 3.0 109 METAMUCIL:   Shredded Wheat 1 ounce 2.8 102 Capsules 6 capsules 3.0 10   Crackers:    Smooth Texture Orange (sugar free) 1 tsp 3.0 20   Blaine 1 square 0.1 27 Smooth Texture Orange (with sugar) 1 tbsp 3.0 45   Saltine 1 regular 0.1 13 Wafers 2 wafers 3.0 120   Rice:          Brown ½ cup 1.8 108       White ½ cup 0.3 103       Spaghetti 2 ounces 2.1 225       Almonds (roasted) ½ cup 6.4 351       Peanuts (roasted) ½ cup 6.1 388         ** [place] [employer], [employer] Academy of Sciences, 2002    Track your fiber intake for five days.  Use the Fiber Source Guide to find out how much fiber is in common food.    If you’re not getting your recommended amount of fiber each day, talk to your doctor about how you can increase the fiber in your diet.   Example Monday Tuesday Wednesday Thursday Friday   Food Oatmeal        Fiber Grams 2.8        Food Blueberries        Fiber Grams 3.9        Food W.W. Bread        Fiber Grams 1.9        Food W.W. Bread        Fiber Grams 1.9        Food Apple        Fiber Grams 3.7        Food Spaghetti        Fiber Grams .14        Food Corn        Fiber Grams 2.0        Food White Bread        Fiber Grams .6        Add numbers in each column to find your daily fiber intake.   Total Daily Fiber Intake 18.2            Too Low - Like most Americans, this example is not enough fiber. Talk to  your doctor about how to add fiber to your diet.    Quick Fiber Facts  · Most Americans consume only about half of the recommended fiber they need each day.  · Fiber helps maintain normal bowel function, and helps prevent constipation and its potential complications.  Straining and pressure from constipation may lead to diverticular disease and hemorrhoids.  · Stool softeners or stimulant laxatives only offer short-term relief of constipation, while dietary changes or fiber therapies help break the cycle of irregularity.  · Diets low in saturated fat and cholesterol that include 7 grams of soluble fiber per day from psyllium husk, as in Metamucil, may reduce the risk of heart disease by lowering cholesterol.  One adult dose of Metamucil has 2.4 grams of this soluble fiber.  · Increase fiber intake gradually, giving the body time to adjust.

## 2024-03-06 NOTE — PROGRESS NOTES
[facility] - Outpatient Consultation  Jeremy George 53 y.o. male MRN: [id number]  Encounter: [id number]      PCP: MIKHAIL Ha  Referring: No referring provider defined for this encounter.      ASSESSMENT AND PLAN:     53 yr old M w/ history of HLD, HLD, DM2, achalasia s/p heller myotomy 2011 who presents to GI clinic for follow up.    GERD  Achalasia s/p Heller myotomy    Patient was having atypical chest pain which has now resolved since he has been on PPI and famotidine.  EGD with patulous GE junction but otherwise unremarkable and esophageal biopsies negative for EOE.  24-hour pH study with significant acid reflux episodes but acid exposure time of 0%.  Manometry showed a peristalsis with normal LES relaxation-Heller myotomy.    Eckardt score 1    Recommend switching to omeprazole 40 mg daily and taking famotidine as needed      Chronic idiopathic constipation    Check TSH  Recommend increase dietary insoluble fiber  Start taking MiraLAX 2-3 times a day    Fatty liver    Patient had incidental finding of fatty liver on CTA abdomen pelvis.  He does have mild elevation in ALT.  He denies any significant alcohol use.  The most likely etiology for his fatty liver is metabolic syndrome.  His fib 4 score is consistent with absence of advanced fibrosis.    Recommend weight loss-per provided to weight loss management clinic  Check hemochromatosis, alpha 1 antitrypsin and chronic hepatitis panel  Will get ultrasound elastography to assess for any underlying scarring       Follow up in clinic in 3 months    Henri Burnette MD   Gastroenterology Fellow         ______________________________________________________________________    CC:  Chief Complaint   Patient presents with    Consult     Patient stated here for manometry results       HPI:  53 yr old M w/ history of HLD, HLD, DM2, achalasia s/p heller myotomy who presents to GI clinic for follow up.    In September patient was  having atypical chest pain which led to cardiac workup and EGD.  EGD with patulous GE junction but otherwise unremarkable, biopsies negative for EOE.  He underwent 24-hour pH study and manometry for further evaluation.  This revealed significant episodes of reflux but acid exposure time of 0%.  He likely had weak acid reflux.  He was recommended to start pantoprazole 40 mg daily and famotidine at bedtime.  He states since he has been on these medications he has been having no discomfort.  He has had no further episodes of chest pain.  Denies regurgitation or dysphagia.    He does endorse constipation and states that he has [place] 1 to 3 old.  Does endorse straining.  Has increased fiber in diet without significant improvement in symptoms.          REVIEW OF SYSTEMS:    CONSTITUTIONAL: Denies any fever, chills, rigors, and weight loss.  HEENT: No earache or tinnitus. Denies hearing loss or visual disturbances.  CARDIOVASCULAR: No chest pain or palpitations.   RESPIRATORY: Denies any cough, hemoptysis, shortness of breath or dyspnea on exertion.  GASTROINTESTINAL: As noted in the History of Present Illness.   GENITOURINARY: No problems with urination. Denies any hematuria or dysuria.  NEUROLOGIC: No dizziness or vertigo, denies headaches.   MUSCULOSKELETAL: Denies any muscle or joint pain.   SKIN: Denies skin rashes or itching.   ENDOCRINE: Denies excessive thirst. Denies intolerance to heat or cold.  PSYCHOSOCIAL: Denies depression or anxiety. Denies any recent memory loss.       Historical Information   Past Medical History:   Diagnosis Date    Achalasia     Aneurysm (HCC)     aortic    Asthma     Childhood    Colon polyp     Diabetes mellitus (HCC)     Esophageal ulcer     Fatty liver     Groin abscess     with I &D    Hyperlipidemia     Hypertension     Plantar fasciitis      Past Surgical History:   Procedure Laterality Date    ANTERIOR CERVICAL DISCECTOMY W/ FUSION      APPENDECTOMY      BICEPS TENDON REPAIR       EGD AND COLONOSCOPY      FL INJECTION LEFT WRIST (ARTHROGRAM)  11/9/2021    FL MYELOGRAM CERVICAL  6/13/2018    MENISCECTOMY      MYOTOMY HELLER LAPAROSCOPIC      ORTHOPEDIC SURGERY       Social History   Social History     Substance and Sexual Activity   Alcohol Use Not Currently     Social History     Substance and Sexual Activity   Drug Use Never     Social History     Tobacco Use   Smoking Status Never   Smokeless Tobacco Never     Family History   Problem Relation Age of Onset    No Known Problems Mother     No Known Problems Father        Meds/Allergies       Current Outpatient Medications:     Ascorbic Acid (vitamin C) 1000 MG tablet    baclofen 20 mg tablet    cetirizine (ZyrTEC) 10 mg tablet    CINNAMON PO    clotrimazole-betamethasone (LOTRISONE) 1-0.05 % cream    diphenhydrAMINE-acetaminophen (TYLENOL PM)  MG TABS    famotidine (PEPCID) 20 mg tablet    fluticasone (FLONASE) 50 mcg/act nasal spray    lisinopril-hydrochlorothiazide (PRINZIDE,ZESTORETIC) 20-12.5 MG per tablet    metFORMIN (GLUCOPHAGE) 500 mg tablet    Multiple Vitamins-Minerals (CENTRUM SILVER 50+MEN PO)    omega-3-acid ethyl esters (LOVAZA) 1 g capsule    pantoprazole (PROTONIX) 40 mg tablet    pregabalin (LYRICA) 75 mg capsule    simvastatin (ZOCOR) 20 mg tablet    sitaGLIPtin (JANUVIA) 100 mg tablet    tadalafil (CIALIS) 20 MG tablet    Current Facility-Administered Medications:     bupivacaine (MARCAINE) 0.25 % injection 1 mL, 1 mL, Infiltration, , 1 mL at 09/26/23 1615    lidocaine (XYLOCAINE) 1 % injection 1 mL, 1 mL, Infiltration, , 1 mL at 09/26/23 1615    lidocaine (XYLOCAINE) 1 % injection 1 mL, 1 mL, Infiltration, , 1 mL at 10/24/23 1600    triamcinolone acetonide (KENALOG-40) 40 mg/mL injection 20 mg, 20 mg, Infiltration, , 20 mg at 09/26/23 1615    triamcinolone acetonide (KENALOG-40) 40 mg/mL injection 20 mg, 20 mg, Infiltration, , 20 mg at 10/24/23 1600    No Known Allergies        Objective     Blood pressure 124/78,  "temperature 98.6 °F (37 °C), temperature source Tympanic, height 6' 1.74\" (1.873 m), weight 118 kg (261 lb). Body mass index is 33.75 kg/m².     PHYSICAL EXAM:      General Appearance:   Alert, cooperative, no distress   HEENT:   Normocephalic, atraumatic, anicteric.     Neck:  Supple, symmetrical, trachea midline   Lungs:   Clear to auscultation bilaterally; no rales, rhonchi or wheezing; respirations unlabored    Heart::   Regular rate and rhythm; no murmur, rub, or gallop.   Abdomen:   Soft, non-tender, non-distended; normal bowel sounds; no masses, no organomegaly    Genitalia:   Deferred    Rectal:   Deferred    Extremities:  No cyanosis, clubbing or edema    Pulses:  2+ and symmetric    Skin:  No jaundice, rashes, or lesions    Lymph nodes:  No palpable cervical lymphadenopathy        Lab Results:     Lab Results   Component Value Date    WBC 5.30 12/27/2023    HGB 15.4 12/27/2023    HCT 46.3 12/27/2023    MCV 92 12/27/2023     12/27/2023       Lab Results   Component Value Date     01/16/2015    K 4.2 12/27/2023     12/27/2023    CO2 31 12/27/2023    ANIONGAP 8 01/16/2015    BUN 10 12/27/2023    CREATININE 1.01 12/27/2023    GLUCOSE 113 01/16/2015    GLUF 119 (H) 12/27/2023    CALCIUM 9.2 12/27/2023    AST 28 12/27/2023    ALT 59 (H) 12/27/2023    ALKPHOS 46 12/27/2023    PROT 7.9 01/16/2015    BILITOT 0.56 01/16/2015    EGFR 83 12/27/2023       Lab Results   Component Value Date    INR 1.0 01/30/2018         Radiology Results:   No results found.    Portions of the record may have been created with voice recognition software. Occasional wrong word or \"sound a like\" substitutions may have occurred due to the inherent limitations of voice recognition software. Read the chart carefully and recognize, using context, where substitutions have occurred.       "

## 2024-03-12 ENCOUNTER — HOSPITAL ENCOUNTER (OUTPATIENT)
Dept: RADIOLOGY | Facility: HOSPITAL | Age: 54
Discharge: HOME | End: 2024-03-12
Payer: COMMERCIAL

## 2024-03-12 DIAGNOSIS — K76.0 FATTY LIVER: ICD-10-CM

## 2024-03-12 PROCEDURE — 76981 USE PARENCHYMA: CPT

## 2024-03-18 ENCOUNTER — APPOINTMENT (OUTPATIENT)
Dept: LAB | Age: 54
End: 2024-03-18
Payer: COMMERCIAL

## 2024-03-18 DIAGNOSIS — K59.00 CONSTIPATION, UNSPECIFIED CONSTIPATION TYPE: ICD-10-CM

## 2024-03-18 DIAGNOSIS — K76.0 FATTY LIVER: ICD-10-CM

## 2024-03-18 LAB
FERRITIN SERPL-MCNC: 152 NG/ML (ref 24–336)
HBV CORE AB SER QL: NORMAL
HBV CORE IGM SER QL: NORMAL
HBV SURFACE AG SER QL: NORMAL
HCV AB SER QL: NORMAL
IRON SATN MFR SERPL: 43 % (ref 15–50)
IRON SERPL-MCNC: 145 UG/DL (ref 50–212)
TIBC SERPL-MCNC: 339 UG/DL (ref 250–450)
TSH SERPL DL<=0.05 MIU/L-ACNC: 1.34 UIU/ML (ref 0.45–4.5)
UIBC SERPL-MCNC: 194 UG/DL (ref 155–355)

## 2024-03-18 PROCEDURE — 82103 ALPHA-1-ANTITRYPSIN TOTAL: CPT

## 2024-03-18 PROCEDURE — 82728 ASSAY OF FERRITIN: CPT

## 2024-03-18 PROCEDURE — 36415 COLL VENOUS BLD VENIPUNCTURE: CPT

## 2024-03-18 PROCEDURE — 86705 HEP B CORE ANTIBODY IGM: CPT

## 2024-03-18 PROCEDURE — 86704 HEP B CORE ANTIBODY TOTAL: CPT

## 2024-03-18 PROCEDURE — 83550 IRON BINDING TEST: CPT

## 2024-03-18 PROCEDURE — 83540 ASSAY OF IRON: CPT

## 2024-03-18 PROCEDURE — 84443 ASSAY THYROID STIM HORMONE: CPT

## 2024-03-18 PROCEDURE — 87340 HEPATITIS B SURFACE AG IA: CPT

## 2024-03-18 PROCEDURE — 86803 HEPATITIS C AB TEST: CPT

## 2024-03-19 LAB — A1AT SERPL-MCNC: 95 MG/DL (ref 101–187)

## 2024-03-20 ENCOUNTER — NURSE TRIAGE (OUTPATIENT)
Age: 54
End: 2024-03-20

## 2024-03-20 DIAGNOSIS — K76.0 FATTY LIVER: Primary | ICD-10-CM

## 2024-03-20 NOTE — TELEPHONE ENCOUNTER
----- Message from Tanya Noble sent at 3/20/2024  4:30 PM EDT -----  Pt calling re: results from Dr. Burnette, pt has a few questions on this.

## 2024-03-21 LAB
LEFT EYE DIABETIC RETINOPATHY: NORMAL
RIGHT EYE DIABETIC RETINOPATHY: NORMAL

## 2024-03-25 ENCOUNTER — APPOINTMENT (OUTPATIENT)
Dept: LAB | Facility: MEDICAL CENTER | Age: 54
End: 2024-03-25
Payer: COMMERCIAL

## 2024-03-25 ENCOUNTER — OFFICE VISIT (OUTPATIENT)
Dept: PAIN MEDICINE | Facility: CLINIC | Age: 54
End: 2024-03-25
Payer: COMMERCIAL

## 2024-03-25 VITALS
WEIGHT: 264 LBS | HEIGHT: 74 IN | SYSTOLIC BLOOD PRESSURE: 139 MMHG | BODY MASS INDEX: 33.88 KG/M2 | HEART RATE: 76 BPM | DIASTOLIC BLOOD PRESSURE: 84 MMHG

## 2024-03-25 DIAGNOSIS — M54.12 CERVICAL RADICULOPATHY: ICD-10-CM

## 2024-03-25 DIAGNOSIS — M54.50 CHRONIC LEFT-SIDED LOW BACK PAIN, UNSPECIFIED WHETHER SCIATICA PRESENT: ICD-10-CM

## 2024-03-25 DIAGNOSIS — K76.0 FATTY LIVER: ICD-10-CM

## 2024-03-25 DIAGNOSIS — M79.18 MYOFASCIAL PAIN SYNDROME: ICD-10-CM

## 2024-03-25 DIAGNOSIS — G89.29 CHRONIC LEFT-SIDED LOW BACK PAIN, UNSPECIFIED WHETHER SCIATICA PRESENT: ICD-10-CM

## 2024-03-25 DIAGNOSIS — G89.4 CHRONIC PAIN SYNDROME: Primary | ICD-10-CM

## 2024-03-25 DIAGNOSIS — M47.812 CERVICAL SPONDYLOSIS WITHOUT MYELOPATHY: ICD-10-CM

## 2024-03-25 DIAGNOSIS — Z98.1 S/P CERVICAL SPINAL FUSION: ICD-10-CM

## 2024-03-25 PROCEDURE — 82104 ALPHA-1-ANTITRYPSIN PHENO: CPT

## 2024-03-25 PROCEDURE — 36415 COLL VENOUS BLD VENIPUNCTURE: CPT

## 2024-03-25 PROCEDURE — 99214 OFFICE O/P EST MOD 30 MIN: CPT | Performed by: NURSE PRACTITIONER

## 2024-03-25 PROCEDURE — 82103 ALPHA-1-ANTITRYPSIN TOTAL: CPT

## 2024-03-25 RX ORDER — BACLOFEN 20 MG/1
20 TABLET ORAL
Qty: 90 TABLET | Refills: 1 | Status: SHIPPED | OUTPATIENT
Start: 2024-03-25

## 2024-03-25 RX ORDER — PREGABALIN 75 MG/1
150 CAPSULE ORAL
Qty: 540 CAPSULE | Refills: 0 | Status: SHIPPED | OUTPATIENT
Start: 2024-03-25

## 2024-03-25 NOTE — PROGRESS NOTES
Assessment:  1. Chronic pain syndrome    2. Cervical spondylosis without myelopathy    3. S/P cervical spinal fusion    4. Cervical radiculopathy    5. Chronic left-sided low back pain, unspecified whether sciatica present    6. Myofascial pain syndrome        Plan:  I will order an updated MRI of the cervical spine with and without contrast  I will order an x-ray of the lumbar spine  I will increase pregabalin to 150 mg nightly for pain complaints.  I advised the patient that if they experience any side effects or issues with the changes in their medication regiment, they should give our office a call to discuss. I also advised the patient not to drive or operate machinery until they see how the changes in the medication regimen affects them. The patient was agreeable and verbalized an understanding.  Patient was given referral to physical therapy for cervical and lumbar complaints  Patient may continue Tylenol as needed  Will avoid NSAIDs secondary to GI upset  Patient may continue baclofen as prescribed.  This medication was refilled today  Will follow-up in 6 weeks or sooner if needed    History of Present Illness:    The patient is a 53 y.o. male with a history of C6-7 ACDF in 2018 last seen on 05/22/2023  who presents for a follow up office visit in regards to chronic worsening neck pain that radiates into the bilateral upper extremities, right radiates to the shoulder and left radiates into the dorsal aspect of the left hand with associated occipital headaches and paresthesias.  He denies bowel or bladder incontinence.  He is taking pregabalin 75 mg nightly without relief or side effects.  He also uses Tylenol as needed.  He avoids NSAIDs secondary to GI upset.  He does find some relief with baclofen 20 mg nightly as needed.  He has tried gabapentin and duloxetine which caused side effects therefore it were discontinued.  He has not had much relief with cervical epidural steroid injections in the  past    Patient also has a secondary complaint of left-sided low back pain that will radiate into the lateral thigh.  He denies right-sided symptoms.  Pain has been present for about 4 months without inciting event or trauma.  Denies bowel or bladder incontinence or saddle anesthesia    The patient rates his pain a 6 out of 10 on the numeric pain rating scale.  Pain is intermittent at night and it is described as dull aching and sharp    I have personally reviewed and/or updated the patient's past medical history, past surgical history, family history, social history, current medications, allergies, and vital signs today.       Review of Systems:    Review of Systems   Respiratory:  Negative for shortness of breath.    Cardiovascular:  Negative for chest pain.   Gastrointestinal:  Negative for constipation, diarrhea, nausea and vomiting.   Musculoskeletal:  Positive for back pain and gait problem. Negative for arthralgias, joint swelling and myalgias.   Skin:  Negative for rash.   Neurological:  Negative for dizziness, seizures and weakness.   All other systems reviewed and are negative.        Past Medical History:   Diagnosis Date    Achalasia     Aneurysm (HCC)     aortic    Asthma     Childhood    Colon polyp     Diabetes mellitus (HCC)     Esophageal ulcer     Fatty liver     Groin abscess     with I &D    Hyperlipidemia     Hypertension     Plantar fasciitis        Past Surgical History:   Procedure Laterality Date    ANTERIOR CERVICAL DISCECTOMY W/ FUSION      APPENDECTOMY      BICEPS TENDON REPAIR      EGD AND COLONOSCOPY      FL INJECTION LEFT WRIST (ARTHROGRAM)  11/9/2021    FL MYELOGRAM CERVICAL  6/13/2018    MENISCECTOMY      MYOTOMY HELLER LAPAROSCOPIC      ORTHOPEDIC SURGERY         Family History   Problem Relation Age of Onset    No Known Problems Mother     No Known Problems Father        Social History     Occupational History    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   Tobacco Use     Smoking status: Never    Smokeless tobacco: Never   Vaping Use    Vaping status: Never Used   Substance and Sexual Activity    Alcohol use: Not Currently    Drug use: Never    Sexual activity: Yes     Partners: Female         Current Outpatient Medications:     Ascorbic Acid (vitamin C) 1000 MG tablet, Take 1,000 mg by mouth daily  , Disp: , Rfl:     baclofen 20 mg tablet, Take 1 tablet (20 mg total) by mouth daily at bedtime as needed for muscle spasms, Disp: 90 tablet, Rfl: 1    cetirizine (ZyrTEC) 10 mg tablet, Take 1 tablet (10 mg total) by mouth daily, Disp: 90 tablet, Rfl: 0    CINNAMON PO, Take by mouth, Disp: , Rfl:     clotrimazole-betamethasone (LOTRISONE) 1-0.05 % cream, Apply topically 2 (two) times a day, Disp: 15 g, Rfl: 1    diphenhydrAMINE-acetaminophen (TYLENOL PM)  MG TABS, Take 1 tablet by mouth daily at bedtime as needed for sleep , Disp: , Rfl:     famotidine (PEPCID) 20 mg tablet, Take 1 tablet (20 mg total) by mouth daily, Disp: 90 tablet, Rfl: 1    fluticasone (FLONASE) 50 mcg/act nasal spray, 1 spray into each nostril daily, Disp: 16 g, Rfl: 1    lisinopril-hydrochlorothiazide (PRINZIDE,ZESTORETIC) 20-12.5 MG per tablet, Take 2 tablets by mouth daily, Disp: 180 tablet, Rfl: 1    metFORMIN (GLUCOPHAGE) 500 mg tablet, Pt takes 1500 mg (3 tablets) by mouth in the morning, and a 1000 mg (2 tablets) in the evening, Disp: 450 tablet, Rfl: 1    Multiple Vitamins-Minerals (CENTRUM SILVER 50+MEN PO), Take by mouth, Disp: , Rfl:     omega-3-acid ethyl esters (LOVAZA) 1 g capsule, Take 1 g by mouth daily, Disp: , Rfl:     omeprazole (PriLOSEC) 40 MG capsule, Take 1 capsule (40 mg total) by mouth daily, Disp: 90 capsule, Rfl: 3    pregabalin (LYRICA) 75 mg capsule, Take 2 capsules (150 mg total) by mouth daily at bedtime, Disp: 540 capsule, Rfl: 0    simvastatin (ZOCOR) 20 mg tablet, Take 1 tablet (20 mg total) by mouth daily, Disp: 90 tablet, Rfl: 1    sitaGLIPtin (JANUVIA) 100 mg tablet, Take 1  "tablet (100 mg total) by mouth daily, Disp: 90 tablet, Rfl: 1    tadalafil (CIALIS) 20 MG tablet, TAKE 1 TABLET 1 HOUR PRIOR TO INTERCOURSE ON AN EMPTY STOMACH WITH NO ALCOHOL, LIMIT TO 2 TABLETS PER WEEK, Disp: 18 tablet, Rfl: 4    polyethylene glycol (MiraLax) 17 g packet, Take 17 g by mouth daily, Disp: 90 each, Rfl: 5    Current Facility-Administered Medications:     bupivacaine (MARCAINE) 0.25 % injection 1 mL, 1 mL, Infiltration, , Mark Tammy, DPM, 1 mL at 09/26/23 1615    lidocaine (XYLOCAINE) 1 % injection 1 mL, 1 mL, Infiltration, , Mark Tammy, DPM, 1 mL at 09/26/23 1615    lidocaine (XYLOCAINE) 1 % injection 1 mL, 1 mL, Infiltration, , Mark Tammy, DPM, 1 mL at 10/24/23 1600    triamcinolone acetonide (KENALOG-40) 40 mg/mL injection 20 mg, 20 mg, Infiltration, , Mark Tammy, DPM, 20 mg at 09/26/23 1615    triamcinolone acetonide (KENALOG-40) 40 mg/mL injection 20 mg, 20 mg, Infiltration, , Mark Tammy, DPM, 20 mg at 10/24/23 1600    No Known Allergies    Physical Exam:    /84   Pulse 76   Ht 6' 1.74\" (1.873 m)   Wt 120 kg (264 lb)   BMI 34.14 kg/m²     Constitutional:normal, well developed, well nourished, alert, in no distress and non-toxic and no overt pain behavior.  Eyes:anicteric  HEENT:grossly intact  Neck:supple, symmetric, trachea midline and no masses   Pulmonary:even and unlabored  Cardiovascular:No edema or pitting edema present  Skin:Normal without rashes or lesions and well hydrated  Psychiatric:Mood and affect appropriate  Neurologic:Cranial Nerves II-XII grossly intact  Musculoskeletal:normal gait.  Limited range of motion of all planes of the cervical spine.  Bilateral upper extremity strength 5 out of 5 in all muscle groups.  Positive cervical facet loading maneuvers.  Positive Spurling's bilaterally.  Left lumbar paraspinal musculature mildly tender to palpation.  Left SI joint mildly tender to palpation.  Bilateral lower extremity strength 5 out of 5 in all " muscle groups.  Negative seated straight leg raise bilaterally      Imaging  MRI cervical spine w wo contrast    (Results Pending)   X-ray lumbar spine 2 or 3 views    (Results Pending)         Orders Placed This Encounter   Procedures    MRI cervical spine w wo contrast    X-ray lumbar spine 2 or 3 views    Ambulatory referral to Physical Therapy

## 2024-04-01 LAB
A1AT PHENOTYP SERPL IFE: NORMAL
A1AT SERPL-MCNC: 107 MG/DL (ref 101–187)

## 2024-04-04 ENCOUNTER — HOSPITAL ENCOUNTER (OUTPATIENT)
Dept: RADIOLOGY | Facility: HOSPITAL | Age: 54
Discharge: HOME | End: 2024-04-04
Payer: COMMERCIAL

## 2024-04-04 DIAGNOSIS — M54.12 CERVICAL RADICULOPATHY: ICD-10-CM

## 2024-04-04 DIAGNOSIS — Z98.1 S/P CERVICAL SPINAL FUSION: ICD-10-CM

## 2024-04-04 PROCEDURE — A9585 GADOBUTROL INJECTION: HCPCS | Performed by: NURSE PRACTITIONER

## 2024-04-04 PROCEDURE — 72156 MRI NECK SPINE W/O & W/DYE: CPT

## 2024-04-04 RX ORDER — GADOBUTROL 604.72 MG/ML
12 INJECTION INTRAVENOUS
Status: COMPLETED | OUTPATIENT
Start: 2024-04-04 | End: 2024-04-04

## 2024-04-04 RX ADMIN — GADOBUTROL 12 ML: 604.72 INJECTION INTRAVENOUS at 06:24

## 2024-04-12 ENCOUNTER — TELEPHONE (OUTPATIENT)
Dept: PAIN MEDICINE | Facility: CLINIC | Age: 54
End: 2024-04-12

## 2024-04-12 NOTE — TELEPHONE ENCOUNTER
----- Message from MIKHAIL Laboy sent at 4/12/2024  7:41 AM EDT -----  MRI of the cervical spine reveals degenerative disc disease and osteophytes resulting in minimal to mild central stenosis at C3-4 through C5-6

## 2024-04-12 NOTE — TELEPHONE ENCOUNTER
S/w pt and advised of same. Pt verbalized understanding and appreciation.    Pt had f/u apt 5/6/24.

## 2024-04-23 ENCOUNTER — APPOINTMENT (EMERGENCY)
Dept: RADIOLOGY | Facility: HOSPITAL | Age: 54
DRG: 103 | End: 2024-04-23
Payer: COMMERCIAL

## 2024-04-23 ENCOUNTER — OFFICE VISIT (OUTPATIENT)
Dept: URGENT CARE | Facility: MEDICAL CENTER | Age: 54
End: 2024-04-23
Payer: COMMERCIAL

## 2024-04-23 ENCOUNTER — HOSPITAL ENCOUNTER (INPATIENT)
Facility: HOSPITAL | Age: 54
LOS: 4 days | Discharge: HOME | DRG: 103 | End: 2024-04-27
Attending: EMERGENCY MEDICINE | Admitting: STUDENT IN AN ORGANIZED HEALTH CARE EDUCATION/TRAINING PROGRAM
Payer: COMMERCIAL

## 2024-04-23 VITALS
BODY MASS INDEX: 34.85 KG/M2 | SYSTOLIC BLOOD PRESSURE: 173 MMHG | WEIGHT: 263 LBS | HEIGHT: 73 IN | DIASTOLIC BLOOD PRESSURE: 79 MMHG | RESPIRATION RATE: 18 BRPM | HEART RATE: 72 BPM | OXYGEN SATURATION: 99 % | TEMPERATURE: 97.5 F

## 2024-04-23 DIAGNOSIS — R42 DIZZINESS: Primary | ICD-10-CM

## 2024-04-23 DIAGNOSIS — E53.8 B12 DEFICIENCY: ICD-10-CM

## 2024-04-23 DIAGNOSIS — R51.9 ACUTE NONINTRACTABLE HEADACHE, UNSPECIFIED HEADACHE TYPE: ICD-10-CM

## 2024-04-23 DIAGNOSIS — R51.9 HEADACHE: ICD-10-CM

## 2024-04-23 DIAGNOSIS — R26.81 GAIT INSTABILITY: ICD-10-CM

## 2024-04-23 LAB
2HR DELTA HS TROPONIN: -1 NG/L
4HR DELTA HS TROPONIN: 1 NG/L
ALBUMIN SERPL BCP-MCNC: 4.4 G/DL (ref 3.5–5)
ALP SERPL-CCNC: 65 U/L (ref 34–104)
ALT SERPL W P-5'-P-CCNC: 72 U/L (ref 7–52)
ANION GAP SERPL CALCULATED.3IONS-SCNC: 8 MMOL/L (ref 4–13)
APTT PPP: 26 SECONDS (ref 23–37)
AST SERPL W P-5'-P-CCNC: 34 U/L (ref 13–39)
ATRIAL RATE: 66 BPM
ATRIAL RATE: 68 BPM
B BURGDOR IGG+IGM SER QL IA: NEGATIVE
BASOPHILS # BLD AUTO: 0.05 THOUSANDS/ÂΜL (ref 0–0.1)
BASOPHILS NFR BLD AUTO: 1 % (ref 0–1)
BILIRUB SERPL-MCNC: 0.51 MG/DL (ref 0.2–1)
BUN SERPL-MCNC: 16 MG/DL (ref 5–25)
CALCIUM SERPL-MCNC: 9.5 MG/DL (ref 8.4–10.2)
CARDIAC TROPONIN I PNL SERPL HS: 3 NG/L
CARDIAC TROPONIN I PNL SERPL HS: 4 NG/L
CARDIAC TROPONIN I PNL SERPL HS: 5 NG/L
CHLORIDE SERPL-SCNC: 101 MMOL/L (ref 96–108)
CO2 SERPL-SCNC: 27 MMOL/L (ref 21–32)
CREAT SERPL-MCNC: 0.79 MG/DL (ref 0.6–1.3)
EOSINOPHIL # BLD AUTO: 0.1 THOUSAND/ÂΜL (ref 0–0.61)
EOSINOPHIL NFR BLD AUTO: 2 % (ref 0–6)
ERYTHROCYTE [DISTWIDTH] IN BLOOD BY AUTOMATED COUNT: 11.9 % (ref 11.6–15.1)
FLUAV RNA RESP QL NAA+PROBE: NEGATIVE
FLUBV RNA RESP QL NAA+PROBE: NEGATIVE
GFR SERPL CREATININE-BSD FRML MDRD: 101 ML/MIN/1.73SQ M
GLUCOSE SERPL-MCNC: 121 MG/DL (ref 65–140)
GLUCOSE SERPL-MCNC: 138 MG/DL (ref 65–140)
GLUCOSE SERPL-MCNC: 177 MG/DL (ref 65–140)
GLUCOSE SERPL-MCNC: 199 MG/DL (ref 65–140)
HCT VFR BLD AUTO: 44.6 % (ref 36.5–49.3)
HGB BLD-MCNC: 15 G/DL (ref 12–17)
IMM GRANULOCYTES # BLD AUTO: 0.04 THOUSAND/UL (ref 0–0.2)
IMM GRANULOCYTES NFR BLD AUTO: 1 % (ref 0–2)
INR PPP: 0.98 (ref 0.84–1.19)
LYMPHOCYTES # BLD AUTO: 1.92 THOUSANDS/ÂΜL (ref 0.6–4.47)
LYMPHOCYTES NFR BLD AUTO: 32 % (ref 14–44)
MCH RBC QN AUTO: 30.9 PG (ref 26.8–34.3)
MCHC RBC AUTO-ENTMCNC: 33.6 G/DL (ref 31.4–37.4)
MCV RBC AUTO: 92 FL (ref 82–98)
MONOCYTES # BLD AUTO: 0.8 THOUSAND/ÂΜL (ref 0.17–1.22)
MONOCYTES NFR BLD AUTO: 14 % (ref 4–12)
NEUTROPHILS # BLD AUTO: 3.03 THOUSANDS/ÂΜL (ref 1.85–7.62)
NEUTS SEG NFR BLD AUTO: 50 % (ref 43–75)
NRBC BLD AUTO-RTO: 0 /100 WBCS
P AXIS: 1 DEGREES
P AXIS: 45 DEGREES
PLATELET # BLD AUTO: 173 THOUSANDS/UL (ref 149–390)
PMV BLD AUTO: 10.7 FL (ref 8.9–12.7)
POTASSIUM SERPL-SCNC: 4.4 MMOL/L (ref 3.5–5.3)
PR INTERVAL: 122 MS
PR INTERVAL: 170 MS
PROT SERPL-MCNC: 6.9 G/DL (ref 6.4–8.4)
PROTHROMBIN TIME: 12.9 SECONDS (ref 11.6–14.5)
QRS AXIS: 236 DEGREES
QRS AXIS: 244 DEGREES
QRSD INTERVAL: 100 MS
QRSD INTERVAL: 92 MS
QT INTERVAL: 394 MS
QT INTERVAL: 396 MS
QTC INTERVAL: 415 MS
QTC INTERVAL: 418 MS
RBC # BLD AUTO: 4.86 MILLION/UL (ref 3.88–5.62)
RSV RNA RESP QL NAA+PROBE: NEGATIVE
SARS-COV-2 RNA RESP QL NAA+PROBE: NEGATIVE
SODIUM SERPL-SCNC: 136 MMOL/L (ref 135–147)
T WAVE AXIS: 37 DEGREES
T WAVE AXIS: 54 DEGREES
VENTRICULAR RATE: 66 BPM
VENTRICULAR RATE: 68 BPM
WBC # BLD AUTO: 5.94 THOUSAND/UL (ref 4.31–10.16)

## 2024-04-23 PROCEDURE — C9113 INJ PANTOPRAZOLE SODIUM, VIA: HCPCS

## 2024-04-23 PROCEDURE — 85025 COMPLETE CBC W/AUTO DIFF WBC: CPT | Performed by: EMERGENCY MEDICINE

## 2024-04-23 PROCEDURE — 0241U HB NFCT DS VIR RESP RNA 4 TRGT: CPT

## 2024-04-23 PROCEDURE — 96365 THER/PROPH/DIAG IV INF INIT: CPT

## 2024-04-23 PROCEDURE — 70496 CT ANGIOGRAPHY HEAD: CPT

## 2024-04-23 PROCEDURE — 85610 PROTHROMBIN TIME: CPT

## 2024-04-23 PROCEDURE — 84484 ASSAY OF TROPONIN QUANT: CPT | Performed by: EMERGENCY MEDICINE

## 2024-04-23 PROCEDURE — 96361 HYDRATE IV INFUSION ADD-ON: CPT

## 2024-04-23 PROCEDURE — 80053 COMPREHEN METABOLIC PANEL: CPT | Performed by: EMERGENCY MEDICINE

## 2024-04-23 PROCEDURE — 86618 LYME DISEASE ANTIBODY: CPT

## 2024-04-23 PROCEDURE — NC001 PR NO CHARGE: Performed by: STUDENT IN AN ORGANIZED HEALTH CARE EDUCATION/TRAINING PROGRAM

## 2024-04-23 PROCEDURE — 99291 CRITICAL CARE FIRST HOUR: CPT | Performed by: STUDENT IN AN ORGANIZED HEALTH CARE EDUCATION/TRAINING PROGRAM

## 2024-04-23 PROCEDURE — 99285 EMERGENCY DEPT VISIT HI MDM: CPT

## 2024-04-23 PROCEDURE — 99285 EMERGENCY DEPT VISIT HI MDM: CPT | Performed by: EMERGENCY MEDICINE

## 2024-04-23 PROCEDURE — 71045 X-RAY EXAM CHEST 1 VIEW: CPT

## 2024-04-23 PROCEDURE — 82948 REAGENT STRIP/BLOOD GLUCOSE: CPT

## 2024-04-23 PROCEDURE — 93005 ELECTROCARDIOGRAM TRACING: CPT

## 2024-04-23 PROCEDURE — 99203 OFFICE O/P NEW LOW 30 MIN: CPT

## 2024-04-23 PROCEDURE — 70498 CT ANGIOGRAPHY NECK: CPT

## 2024-04-23 PROCEDURE — 36415 COLL VENOUS BLD VENIPUNCTURE: CPT

## 2024-04-23 PROCEDURE — 93010 ELECTROCARDIOGRAM REPORT: CPT | Performed by: INTERNAL MEDICINE

## 2024-04-23 PROCEDURE — 96375 TX/PRO/DX INJ NEW DRUG ADDON: CPT

## 2024-04-23 PROCEDURE — 85730 THROMBOPLASTIN TIME PARTIAL: CPT

## 2024-04-23 RX ORDER — FAMOTIDINE 10 MG/ML
20 INJECTION, SOLUTION INTRAVENOUS ONCE
Status: COMPLETED | OUTPATIENT
Start: 2024-04-23 | End: 2024-04-23

## 2024-04-23 RX ORDER — MECLIZINE HYDROCHLORIDE 25 MG/1
50 TABLET ORAL ONCE
Status: COMPLETED | OUTPATIENT
Start: 2024-04-23 | End: 2024-04-23

## 2024-04-23 RX ORDER — KETOROLAC TROMETHAMINE 30 MG/ML
15 INJECTION, SOLUTION INTRAMUSCULAR; INTRAVENOUS ONCE
Status: COMPLETED | OUTPATIENT
Start: 2024-04-23 | End: 2024-04-23

## 2024-04-23 RX ORDER — PANTOPRAZOLE SODIUM 40 MG/1
40 TABLET, DELAYED RELEASE ORAL
Status: DISCONTINUED | OUTPATIENT
Start: 2024-04-24 | End: 2024-04-27 | Stop reason: HOSPADM

## 2024-04-23 RX ORDER — FAMOTIDINE 20 MG/1
20 TABLET, FILM COATED ORAL DAILY
Status: DISCONTINUED | OUTPATIENT
Start: 2024-04-24 | End: 2024-04-27 | Stop reason: HOSPADM

## 2024-04-23 RX ORDER — PANTOPRAZOLE SODIUM 40 MG/10ML
40 INJECTION, POWDER, LYOPHILIZED, FOR SOLUTION INTRAVENOUS ONCE
Status: COMPLETED | OUTPATIENT
Start: 2024-04-23 | End: 2024-04-23

## 2024-04-23 RX ORDER — PREGABALIN 75 MG/1
150 CAPSULE ORAL
Status: DISCONTINUED | OUTPATIENT
Start: 2024-04-23 | End: 2024-04-27 | Stop reason: HOSPADM

## 2024-04-23 RX ORDER — METOCLOPRAMIDE HYDROCHLORIDE 5 MG/ML
10 INJECTION INTRAMUSCULAR; INTRAVENOUS ONCE
Status: COMPLETED | OUTPATIENT
Start: 2024-04-23 | End: 2024-04-23

## 2024-04-23 RX ORDER — SUCRALFATE 1 G/1
1 TABLET ORAL ONCE
Status: COMPLETED | OUTPATIENT
Start: 2024-04-23 | End: 2024-04-23

## 2024-04-23 RX ORDER — MAGNESIUM SULFATE HEPTAHYDRATE 40 MG/ML
2 INJECTION, SOLUTION INTRAVENOUS ONCE
Status: COMPLETED | OUTPATIENT
Start: 2024-04-23 | End: 2024-04-23

## 2024-04-23 RX ORDER — MORPHINE SULFATE 10 MG/ML
10 INJECTION, SOLUTION INTRAMUSCULAR; INTRAVENOUS ONCE
Status: DISCONTINUED | OUTPATIENT
Start: 2024-04-23 | End: 2024-04-23

## 2024-04-23 RX ORDER — ENOXAPARIN SODIUM 100 MG/ML
40 INJECTION SUBCUTANEOUS DAILY
Status: DISCONTINUED | OUTPATIENT
Start: 2024-04-24 | End: 2024-04-27 | Stop reason: HOSPADM

## 2024-04-23 RX ORDER — MORPHINE SULFATE 4 MG/ML
4 INJECTION, SOLUTION INTRAMUSCULAR; INTRAVENOUS ONCE
Status: COMPLETED | OUTPATIENT
Start: 2024-04-23 | End: 2024-04-23

## 2024-04-23 RX ORDER — LISINOPRIL 20 MG/1
40 TABLET ORAL DAILY
Status: DISCONTINUED | OUTPATIENT
Start: 2024-04-24 | End: 2024-04-27 | Stop reason: HOSPADM

## 2024-04-23 RX ORDER — PRAVASTATIN SODIUM 40 MG
40 TABLET ORAL
Status: DISCONTINUED | OUTPATIENT
Start: 2024-04-23 | End: 2024-04-27 | Stop reason: HOSPADM

## 2024-04-23 RX ORDER — DIAZEPAM 5 MG/1
5 TABLET ORAL ONCE
Status: COMPLETED | OUTPATIENT
Start: 2024-04-23 | End: 2024-04-23

## 2024-04-23 RX ORDER — INSULIN LISPRO 100 [IU]/ML
2-12 INJECTION, SOLUTION INTRAVENOUS; SUBCUTANEOUS
Status: DISCONTINUED | OUTPATIENT
Start: 2024-04-23 | End: 2024-04-27 | Stop reason: HOSPADM

## 2024-04-23 RX ORDER — HYDROCHLOROTHIAZIDE 25 MG/1
25 TABLET ORAL DAILY
Status: DISCONTINUED | OUTPATIENT
Start: 2024-04-24 | End: 2024-04-27 | Stop reason: HOSPADM

## 2024-04-23 RX ORDER — BACLOFEN 20 MG/1
20 TABLET ORAL
Status: DISCONTINUED | OUTPATIENT
Start: 2024-04-23 | End: 2024-04-27 | Stop reason: HOSPADM

## 2024-04-23 RX ORDER — DIPHENHYDRAMINE HYDROCHLORIDE 50 MG/ML
25 INJECTION INTRAMUSCULAR; INTRAVENOUS ONCE
Status: COMPLETED | OUTPATIENT
Start: 2024-04-23 | End: 2024-04-23

## 2024-04-23 RX ORDER — ONDANSETRON 2 MG/ML
4 INJECTION INTRAMUSCULAR; INTRAVENOUS ONCE
Status: COMPLETED | OUTPATIENT
Start: 2024-04-23 | End: 2024-04-23

## 2024-04-23 RX ORDER — ASCORBIC ACID 500 MG
1000 TABLET ORAL DAILY
Status: DISCONTINUED | OUTPATIENT
Start: 2024-04-24 | End: 2024-04-27 | Stop reason: HOSPADM

## 2024-04-23 RX ADMIN — DIPHENHYDRAMINE HYDROCHLORIDE 25 MG: 50 INJECTION, SOLUTION INTRAMUSCULAR; INTRAVENOUS at 18:01

## 2024-04-23 RX ADMIN — SODIUM CHLORIDE 1000 ML: 0.9 INJECTION, SOLUTION INTRAVENOUS at 18:00

## 2024-04-23 RX ADMIN — MORPHINE SULFATE 4 MG: 4 INJECTION INTRAVENOUS at 11:37

## 2024-04-23 RX ADMIN — METOCLOPRAMIDE HYDROCHLORIDE 10 MG: 5 INJECTION INTRAMUSCULAR; INTRAVENOUS at 18:01

## 2024-04-23 RX ADMIN — FAMOTIDINE 20 MG: 10 INJECTION INTRAVENOUS at 11:57

## 2024-04-23 RX ADMIN — PRAVASTATIN SODIUM 40 MG: 40 TABLET ORAL at 17:28

## 2024-04-23 RX ADMIN — MAGNESIUM SULFATE HEPTAHYDRATE 2 G: 40 INJECTION, SOLUTION INTRAVENOUS at 18:01

## 2024-04-23 RX ADMIN — ONDANSETRON 4 MG: 2 INJECTION INTRAMUSCULAR; INTRAVENOUS at 11:04

## 2024-04-23 RX ADMIN — IOHEXOL 85 ML: 350 INJECTION, SOLUTION INTRAVENOUS at 11:18

## 2024-04-23 RX ADMIN — SUCRALFATE 1 G: 1 TABLET ORAL at 11:57

## 2024-04-23 RX ADMIN — KETOROLAC TROMETHAMINE 15 MG: 30 INJECTION, SOLUTION INTRAMUSCULAR; INTRAVENOUS at 18:01

## 2024-04-23 RX ADMIN — SODIUM CHLORIDE 1000 ML: 0.9 INJECTION, SOLUTION INTRAVENOUS at 10:50

## 2024-04-23 RX ADMIN — MECLIZINE HYDROCHLORIDE 50 MG: 25 TABLET ORAL at 11:57

## 2024-04-23 RX ADMIN — PANTOPRAZOLE SODIUM 40 MG: 40 INJECTION, POWDER, FOR SOLUTION INTRAVENOUS at 11:57

## 2024-04-23 RX ADMIN — PREGABALIN 150 MG: 75 CAPSULE ORAL at 22:09

## 2024-04-23 RX ADMIN — DIAZEPAM 5 MG: 5 TABLET ORAL at 15:11

## 2024-04-23 RX ADMIN — MAGNESIUM SULFATE HEPTAHYDRATE 2 G: 40 INJECTION, SOLUTION INTRAVENOUS at 15:11

## 2024-04-23 RX ADMIN — KETOROLAC TROMETHAMINE 15 MG: 30 INJECTION, SOLUTION INTRAMUSCULAR at 13:13

## 2024-04-23 NOTE — Clinical Note
Case was discussed with sod and the patient's admission status was agreed to be Admission Status: inpatient status to the service of sod .

## 2024-04-23 NOTE — ASSESSMENT & PLAN NOTE
"53 y.o. male with prior vertigo episode 20+ years ago, suspected CAA, headaches, HTN, HLD, DM2, cervical radiculopathy s/p ACDF, tremors, and recent travel to [place] with intermittent dizziness for the past week who presented to [facility] [place] on 4/23/2024 as a stroke alert for persistent dizziness.    Patient has had intermittent dizziness over the past week that only lasted a few minutes. Day of presentation, patient was bending down doing toe touches when he suddenly developed severe/persistent dizziness described as \"swaying\" associated with ambulatory dysfunction and nausea.  Patient also developed a left frontal/parietal headache and chest discomfort.    NIHSS 0.    CT head negative for acute intracranial abnormalities.    CTA head/neck negative for LVO, dissection, aneurysm, or high-grade stenosis.    Patient was not a TNK candidate due to bleeding risk from presumed CAA and higher suspicion for peripheral vestibulopathy.    MRI Brain w/o contrast - No acute intracranial abnormality.     HINTS exam: a few beats of nonsustained nystagmus to the right, no skew deviation, catch up saccades present    High suspicion for peripheral vestibulopathy provoked by position change, however given persistent symptoms despite using meclizine, will obtain MRI brain to rule out acute intracranial pathology.  Could also be vestibular migraine.    Plan:  - PT/OT; patient may benefit from vestibular therapy  - Continue to treat migraine/headaches as they are likely contributing to his dizziness.     "

## 2024-04-23 NOTE — ED PROVIDER NOTES
History  Chief Complaint   Patient presents with    Dizziness     C/o of being bitten by a bug in [place], scratched it. Then a pain radiated up his leg, then intermittent dizziness for the past few days, headaches and then while stretching at work almost feel over.      53-year-old male with h/o HTN, HLD, DM, presents to the ED due to lightheadedness. Patient was bitten by a bug while in [place] 1 week ago. Since then he has been having intermittent episodes where he feelse like he is going to pass out. He notices it more so when he bends forwards such as today while stretching at work. He denies any room spinning sensation. Notes it seems to come in waves. Denies associated neuro deficits, cp, sob, abd pain, f/c, n/v, or other complaints.     Prior to Admission Medications   Prescriptions Last Dose Informant Patient Reported? Taking?   Ascorbic Acid (vitamin C) 1000 MG tablet More than a month Self Yes No   Sig: Take 1,000 mg by mouth daily     CINNAMON PO Past Month Self Yes Yes   Sig: Take by mouth   Multiple Vitamins-Minerals (CENTRUM SILVER 50+MEN PO) Past Month Self Yes Yes   Sig: Take by mouth   baclofen 20 mg tablet 2024  No Yes   Sig: Take 1 tablet (20 mg total) by mouth daily at bedtime as needed for muscle spasms   cetirizine (ZyrTEC) 10 mg tablet More than a month Self No No   Sig: Take 1 tablet (10 mg total) by mouth daily   clotrimazole-betamethasone (LOTRISONE) 1-0.05 % cream Not Taking Self No No   Sig: Apply topically 2 (two) times a day   Patient not taking: Reported on 2024   diphenhydrAMINE-acetaminophen (TYLENOL PM)  MG TABS 2024 Self Yes Yes   Sig: Take 2 tablets by mouth daily at bedtime as needed for sleep 1000 mg in total   famotidine (PEPCID) 20 mg tablet 2024 Self No Yes   Sig: Take 1 tablet (20 mg total) by mouth daily   fluticasone (FLONASE) 50 mcg/act nasal spray Not Taking Self No No   Si spray into each nostril daily   Patient not taking:  Reported on 4/23/2024   lisinopril-hydrochlorothiazide (PRINZIDE,ZESTORETIC) 20-12.5 MG per tablet 4/22/2024 Self No Yes   Sig: Take 2 tablets by mouth daily   metFORMIN (GLUCOPHAGE) 500 mg tablet 4/22/2024 Self No Yes   Sig: Pt takes 1500 mg (3 tablets) by mouth in the morning, and a 1000 mg (2 tablets) in the evening   omega-3-acid ethyl esters (LOVAZA) 1 g capsule More than a month Self Yes No   Sig: Take 1 g by mouth daily   omeprazole (PriLOSEC) 40 MG capsule Unknown  No No   Sig: Take 1 capsule (40 mg total) by mouth daily   Patient taking differently: Take 40 mg by mouth daily Still has to start   polyethylene glycol (MiraLax) 17 g packet Past Week  No Yes   Sig: Take 17 g by mouth daily   pregabalin (LYRICA) 75 mg capsule 4/22/2024  No Yes   Sig: Take 2 capsules (150 mg total) by mouth daily at bedtime   Patient taking differently: Take 150 mg by mouth daily at bedtime Bedtime   simvastatin (ZOCOR) 20 mg tablet 4/22/2024 Self No Yes   Sig: Take 1 tablet (20 mg total) by mouth daily   sitaGLIPtin (JANUVIA) 100 mg tablet 4/22/2024 Self No Yes   Sig: Take 1 tablet (100 mg total) by mouth daily   tadalafil (CIALIS) 20 MG tablet Not Taking Self No No   Sig: TAKE 1 TABLET 1 HOUR PRIOR TO INTERCOURSE ON AN EMPTY STOMACH WITH NO ALCOHOL, LIMIT TO 2 TABLETS PER WEEK   Patient not taking: Reported on 4/23/2024      Facility-Administered Medications Last Administration Doses Remaining   bupivacaine (MARCAINE) 0.25 % injection 1 mL 9/26/2023  4:15 PM    lidocaine (XYLOCAINE) 1 % injection 1 mL 9/26/2023  4:15 PM    lidocaine (XYLOCAINE) 1 % injection 1 mL 10/24/2023  4:00 PM    triamcinolone acetonide (KENALOG-40) 40 mg/mL injection 20 mg 9/26/2023  4:15 PM    triamcinolone acetonide (KENALOG-40) 40 mg/mL injection 20 mg 10/24/2023  4:00 PM           Past Medical History:   Diagnosis Date    Achalasia     Aneurysm (HCC)     aortic    Asthma     Childhood    Colon polyp     Diabetes mellitus (HCC)     Esophageal ulcer      Fatty liver     Groin abscess     with I &D    Hyperlipidemia     Hypertension     Plantar fasciitis        Past Surgical History:   Procedure Laterality Date    ANTERIOR CERVICAL DISCECTOMY W/ FUSION      APPENDECTOMY      BICEPS TENDON REPAIR      EGD AND COLONOSCOPY      FL INJECTION LEFT WRIST (ARTHROGRAM)  11/9/2021    FL MYELOGRAM CERVICAL  6/13/2018    MENISCECTOMY      MYOTOMY HELLER LAPAROSCOPIC      ORTHOPEDIC SURGERY         Family History   Problem Relation Age of Onset    No Known Problems Mother     No Known Problems Father      I have reviewed and agree with the history as documented.    E-Cigarette/Vaping    E-Cigarette Use Never User      E-Cigarette/Vaping Substances    Nicotine No     THC No     CBD No     Flavoring No     Other No     Unknown No      Social History     Tobacco Use    Smoking status: Never    Smokeless tobacco: Never   Vaping Use    Vaping status: Never Used   Substance Use Topics    Alcohol use: Not Currently    Drug use: Never        Review of Systems   All other systems reviewed and are negative.      Physical Exam  ED Triage Vitals   Temperature Pulse Respirations Blood Pressure SpO2   04/23/24 0959 04/23/24 0959 04/23/24 0959 04/23/24 0959 04/23/24 0959   97.7 °F (36.5 °C) 67 18 166/94 99 %      Temp Source Heart Rate Source Patient Position - Orthostatic VS BP Location FiO2 (%)   04/23/24 0959 04/23/24 0959 04/23/24 1600 04/23/24 1600 --   Temporal Monitor Lying Right arm       Pain Score       04/23/24 0959       4             Orthostatic Vital Signs  Vitals:    04/26/24 1449 04/26/24 2114 04/27/24 0511 04/27/24 0717   BP: 126/63 124/53 133/77 135/75   Pulse: 66 60 55 61   Patient Position - Orthostatic VS:           Physical Exam  Vitals and nursing note reviewed.   Constitutional:       General: He is not in acute distress.     Appearance: He is well-developed.   HENT:      Head: Normocephalic and atraumatic.   Eyes:      Conjunctiva/sclera: Conjunctivae normal.       Comments: right beating nystagmus   Cardiovascular:      Rate and Rhythm: Normal rate and regular rhythm.      Heart sounds: No murmur heard.  Pulmonary:      Effort: Pulmonary effort is normal. No respiratory distress.      Breath sounds: Normal breath sounds.   Abdominal:      Palpations: Abdomen is soft.      Tenderness: There is no abdominal tenderness.   Musculoskeletal:         General: No swelling.      Cervical back: Neck supple.   Skin:     General: Skin is warm and dry.      Capillary Refill: Capillary refill takes less than 2 seconds.   Neurological:      Mental Status: He is alert and oriented to person, place, and time.      Cranial Nerves: No cranial nerve deficit.      Sensory: No sensory deficit.      Motor: No weakness.      Coordination: Coordination normal.      Gait: Gait abnormal (Unable to stand at bedside without assistance).   Psychiatric:         Mood and Affect: Mood normal.         ED Medications  Medications   sodium chloride 0.9 % bolus 1,000 mL (0 mL Intravenous Stopped 4/23/24 1150)   ondansetron (ZOFRAN) injection 4 mg (4 mg Intravenous Given 4/23/24 1104)   iohexol (OMNIPAQUE) 350 MG/ML injection (MULTI-DOSE) 85 mL (85 mL Intravenous Given 4/23/24 1118)   morphine injection 4 mg (4 mg Intravenous Given 4/23/24 1137)   Famotidine (PF) (PEPCID) injection 20 mg (20 mg Intravenous Given 4/23/24 1157)   pantoprazole (PROTONIX) injection 40 mg (40 mg Intravenous Given 4/23/24 1157)   sucralfate (CARAFATE) tablet 1 g (1 g Oral Given 4/23/24 1157)   meclizine (ANTIVERT) tablet 50 mg (50 mg Oral Given 4/23/24 1157)   ketorolac (TORADOL) injection 15 mg (15 mg Intravenous Given 4/23/24 1313)   magnesium sulfate 2 g/50 mL IVPB (premix) 2 g (2 g Intravenous New Bag 4/23/24 1511)   diazepam (VALIUM) tablet 5 mg (5 mg Oral Given 4/23/24 1511)   metoclopramide (REGLAN) injection 10 mg (10 mg Intravenous Given 4/23/24 1801)   diphenhydrAMINE (BENADRYL) injection 25 mg (25 mg Intravenous Given  4/23/24 1801)   ketorolac (TORADOL) injection 15 mg (15 mg Intravenous Given 4/23/24 1801)   sodium chloride 0.9 % bolus 1,000 mL (1,000 mL Intravenous New Bag 4/23/24 1800)   magnesium sulfate 2 g/50 mL IVPB (premix) 2 g (2 g Intravenous New Bag 4/23/24 1801)   diphenhydrAMINE (BENADRYL) injection 25 mg (25 mg Intravenous Given 4/25/24 0119)   ketorolac (TORADOL) injection 15 mg (15 mg Intravenous Given 4/25/24 0118)   metoclopramide (REGLAN) injection 10 mg (10 mg Intravenous Given 4/25/24 0118)   dexamethasone (PF) (DECADRON) injection 10 mg (10 mg Intravenous Given 4/25/24 1449)   sodium chloride 0.9 % infusion (100 mL/hr Intravenous New Bag 4/26/24 1121)   rimegepant sulfate (NURTEC) disintegrating tablet 75 mg (75 mg Oral Given 4/26/24 1121)       Diagnostic Studies  Results Reviewed       Procedure Component Value Units Date/Time    Lipase [[id number]]  (Abnormal) Collected: 04/25/24 0950    Lab Status: Final result Specimen: Blood from Arm, Right Updated: 04/25/24 1118     Lipase 83 u/L     HS Troponin I 4hr [[id number]]  (Normal) Collected: 04/23/24 1718    Lab Status: Final result Specimen: Blood from Arm, Right Updated: 04/23/24 1754     hs TnI 4hr 5 ng/L      Delta 4hr hsTnI 1 ng/L     Lyme Total AB W Reflex to IGM/IGG [[id number]]  (Normal) Collected: 04/23/24 1051    Lab Status: Final result Specimen: Blood from Arm, Left Updated: 04/23/24 1542    Narrative:      The following orders were created for panel order Lyme Total AB W Reflex to IGM/IGG.  Procedure                               Abnormality         Status                     ---------                               -----------         ------                     Lyme Total AB W Reflex t...[[id number]]  Normal              Final result                 Please view results for these tests on the individual orders.    Lyme Total AB W Reflex to IGM/IGG [[id number]]  (Normal) Collected: 04/23/24 1051    Lab Status: Final result Specimen: Blood from Arm,  Left Updated: 04/23/24 1542     Lyme Total Antibodies Negative    HS Troponin I 2hr [[id number]]  (Normal) Collected: 04/23/24 1205    Lab Status: Final result Specimen: Blood from Arm, Left Updated: 04/23/24 1248     hs TnI 2hr 3 ng/L      Delta 2hr hsTnI -1 ng/L     Protime-INR [[id number]]  (Normal) Collected: 04/23/24 1023    Lab Status: Final result Specimen: Blood from Arm, Left Updated: 04/23/24 1224     Protime 12.9 seconds      INR 0.98    APTT [[id number]]  (Normal) Collected: 04/23/24 1023    Lab Status: Final result Specimen: Blood from Arm, Left Updated: 04/23/24 1224     PTT 26 seconds     FLU/RSV/COVID - if FLU/RSV clinically relevant [[id number]]  (Normal) Collected: 04/23/24 1125    Lab Status: Final result Specimen: Nares from Nose Updated: 04/23/24 1217     SARS-CoV-2 Negative     INFLUENZA A PCR Negative     INFLUENZA B PCR Negative     RSV PCR Negative    Narrative:      FOR PEDIATRIC PATIENTS - copy/paste COVID Guidelines URL to browser: [email or web address]    SARS-CoV-2 assay is a Nucleic Acid Amplification assay intended for the  qualitative detection of nucleic acid from SARS-CoV-2 in nasopharyngeal  swabs. Results are for the presumptive identification of SARS-CoV-2 RNA.    Positive results are indicative of infection with SARS-CoV-2, the virus  causing COVID-19, but do not rule out bacterial infection or co-infection  with other viruses. Laboratories within the [place] and its  territories are required to report all positive results to the appropriate  public health authorities. Negative results do not preclude SARS-CoV-2  infection and should not be used as the sole basis for treatment or other  patient management decisions. Negative results must be combined with  clinical observations, patient history, and epidemiological information.  This test has not been FDA cleared or approved.    This test has been authorized by FDA under an  Emergency Use Authorization  (EUA). This test is only authorized for the duration of time the  declaration that circumstances exist justifying the authorization of the  emergency use of an in vitro diagnostic tests for detection of SARS-CoV-2  virus and/or diagnosis of COVID-19 infection under section 564(b)(1) of  the Act, 21 U.S.C. 360bbb-3(b)(1), unless the authorization is terminated  or revoked sooner. The test has been validated but independent review by FDA  and CLIA is pending.    Test performed using Recombinepert: This RT-PCR assay targets N2,  a region unique to SARS-CoV-2. A conserved region in the E-gene was chosen  for pan-Sarbecovirus detection which includes SARS-CoV-2.    According to CMS-2020-01-R, this platform meets the definition of high-throughput technology.    HS Troponin 0hr (reflex protocol) [[id number]]  (Normal) Collected: 04/23/24 1023    Lab Status: Final result Specimen: Blood from Arm, Left Updated: 04/23/24 1058     hs TnI 0hr 4 ng/L     Comprehensive metabolic panel [[id number]]  (Abnormal) Collected: 04/23/24 1023    Lab Status: Final result Specimen: Blood from Arm, Left Updated: 04/23/24 1058     Sodium 136 mmol/L      Potassium 4.4 mmol/L      Chloride 101 mmol/L      CO2 27 mmol/L      ANION GAP 8 mmol/L      BUN 16 mg/dL      Creatinine 0.79 mg/dL      Glucose 138 mg/dL      Calcium 9.5 mg/dL      AST 34 U/L      ALT 72 U/L      Alkaline Phosphatase 65 U/L      Total Protein 6.9 g/dL      Albumin 4.4 g/dL      Total Bilirubin 0.51 mg/dL      eGFR 101 ml/min/1.73sq m     Narrative:      National Kidney Disease Foundation guidelines for Chronic Kidney Disease (CKD):     Stage 1 with normal or high GFR (GFR > 90 mL/min/1.73 square meters)    Stage 2 Mild CKD (GFR = 60-89 mL/min/1.73 square meters)    Stage 3A Moderate CKD (GFR = 45-59 mL/min/1.73 square meters)    Stage 3B Moderate CKD (GFR = 30-44 mL/min/1.73 square meters)    Stage 4 Severe CKD (GFR = 15-29 mL/min/1.73 square  meters)    Stage 5 End Stage CKD (GFR <15 mL/min/1.73 square meters)  Note: GFR calculation is accurate only with a steady state creatinine    CBC and differential [[id number]]  (Abnormal) Collected: 04/23/24 1023    Lab Status: Final result Specimen: Blood from Arm, Left Updated: 04/23/24 1033     WBC 5.94 Thousand/uL      RBC 4.86 Million/uL      Hemoglobin 15.0 g/dL      Hematocrit 44.6 %      MCV 92 fL      MCH 30.9 pg      MCHC 33.6 g/dL      RDW 11.9 %      MPV 10.7 fL      Platelets 173 Thousands/uL      nRBC 0 /100 WBCs      Segmented % 50 %      Immature Grans % 1 %      Lymphocytes % 32 %      Monocytes % 14 %      Eosinophils Relative 2 %      Basophils Relative 1 %      Absolute Neutrophils 3.03 Thousands/µL      Absolute Immature Grans 0.04 Thousand/uL      Absolute Lymphocytes 1.92 Thousands/µL      Absolute Monocytes 0.80 Thousand/µL      Eosinophils Absolute 0.10 Thousand/µL      Basophils Absolute 0.05 Thousands/µL                    MRI brain wo contrast   Final Result by Iker Nye MD (04/24 0150)      No acute intracranial abnormality. No restricted diffusion to suggest acute ischemia.      Mild scattered periventricular and subcortical foci of white matter T2 hyperintensity which are nonspecific and most likely related to chronic small vessel ischemic changes. Other less like etiologies include demyelinating disease, vasculitis, Lyme and    migraines.      Reidentified and normal punctate foci of susceptibility artifact are seen in the supratentorial brain centrally at the gray-white junction peripherally in distribution most compatible with cerebral amyloid angiopathy.  Chronic microhemorrhage in the    setting of hypertension is less likely with this distribution but not excluded.               Workstation performed: [id number]         XR chest 1 view portable   ED Interpretation by Kendall Ward DO (04/23 0591)   Chest x-ray interpreted me shows no acute cardiopulmonary disease        Final Result by Erasmo Fu MD (04/23 2241)      No acute cardiopulmonary disease.            Workstation performed: [id number]         CTA stroke alert (head/neck)   Final Result by Dave Figueredo MD (04/23 1130)      Negative CTA head and neck for large vessel occlusion, dissection, aneurysm, or high-grade stenosis.      Additional chronic/incidental findings as detailed above.            Findings were directly discussed with Alo Pike at approximately 11:19 AM.                        Workstation performed: [id number]         CT stroke alert brain   Final Result by Dave Figueredo MD (04/23 1121)      No acute intracranial abnormality.      Findings were directly discussed with Alo Pike at approximately 11:18 AM.      Workstation performed: [id number]               Procedures  Procedures      ED Course  ED Course as of 04/28/24 2209 Tue Apr 23, 2024   1048 Per recent CTA: There is no aortic aneurysm.   1137    Negative CTA head and neck for large vessel occlusion, dissection, aneurysm, or high-grade stenosis.                    Stroke Assessment       Row Name 04/23/24 1105             NIH Stroke Scale    Interval Baseline      Level of Consciousness (1a.) 0      LOC Questions (1b.) 0      LOC Commands (1c.) 0      Best Gaze (2.) 0      Visual (3.) 0      Facial Palsy (4.) 0      Motor Arm, Left (5a.) 0      Motor Arm, Right (5b.) 0      Motor Leg, Left (6a.) 0      Motor Leg, Right (6b.) 0      Limb Ataxia (7.) 0      Sensory (8.) 0      Best Language (9.) 0      Dysarthria (10.) 0      Extinction and Inattention (11.) (Formerly Neglect) 0      Total 0                                        Medical Decision Making  53-year-old male present emergency department as a stroke alert due to concerns for possible posterior infarct given inability to stand at bedside and persistent vertigo symptoms at baseline without any exacerbation with movement making this concerning for central etiology.   Imaging at that time negative.  Discussed with neurology recommend trial of multiple separate medications for symptomatic control.  After patient received medications, he is unable to stand at that point in time.  Due to persistence of symptoms, high risk for falls, possibility of missed CVA by CT alone, neurology recommending admission for further management.  Patient agreeable with plan.    Amount and/or Complexity of Data Reviewed  Labs: ordered.  Radiology: ordered and independent interpretation performed.    Risk  Prescription drug management.  Decision regarding hospitalization.          Disposition  Final diagnoses:   Dizziness   Gait instability   Headache     Time reflects when diagnosis was documented in both MDM as applicable and the Disposition within this note       Time User Action Codes Description Comment    4/23/2024 11:06 AM Yokubaitis, Sebastian Add [R42] Dizziness     4/23/2024  2:01 PM Yokubaitis, Sebastian Add [R26.81] Gait instability     4/23/2024  2:01 PM Yokubaitis, Sebastian Add [R51.9] Headache     4/27/2024  3:55 PM Ayala Weaver Add [E53.8] B12 deficiency           ED Disposition       ED Disposition   Admit    Condition   Stable    Date/Time   Tue Apr 23, 2024  3:39 PM    Comment   Case was discussed with sod and the patient's admission status was agreed to be Admission Status: inpatient status to the service of neuro .               Follow-up Information    None         Discharge Medication List as of 4/27/2024  4:03 PM        START taking these medications    Details   cyanocobalamin 1,000 mcg/mL Inject 1 mL (1,000 mcg total) into a muscle every 7 days for 9 doses, Starting Thu 5/2/2024, Until Fri 6/28/2024, Normal      rimegepant sulfate (NURTEC) 75 mg TBDP Take 1 tablet (75 mg total) by mouth every other day, Starting Sat 4/27/2024, Normal      topiramate (TOPAMAX) 25 mg tablet Take 25 mg daily for 7 days starting 04/25/2024  Take 50 mg daily for 7 days starting 05/02/2024  take 75 mg daily for 7  days starting 05/09/2024  Take 100 mg daily starting 05/16/2024 Do not start before April 28, 2024., Normal           CONTINUE these medications which have NOT CHANGED    Details   baclofen 20 mg tablet Take 1 tablet (20 mg total) by mouth daily at bedtime as needed for muscle spasms, Starting Mon 3/25/2024, Normal      CINNAMON PO Take by mouth, Historical Med      diphenhydrAMINE-acetaminophen (TYLENOL PM)  MG TABS Take 2 tablets by mouth daily at bedtime as needed for sleep 1000 mg in total, Historical Med      famotidine (PEPCID) 20 mg tablet Take 1 tablet (20 mg total) by mouth daily, Starting Wed 9/27/2023, Normal      lisinopril-hydrochlorothiazide (PRINZIDE,ZESTORETIC) 20-12.5 MG per tablet Take 2 tablets by mouth daily, Starting Wed 9/27/2023, Normal      metFORMIN (GLUCOPHAGE) 500 mg tablet Pt takes 1500 mg (3 tablets) by mouth in the morning, and a 1000 mg (2 tablets) in the evening, Normal      Multiple Vitamins-Minerals (CENTRUM SILVER 50+MEN PO) Take by mouth, Historical Med      polyethylene glycol (MiraLax) 17 g packet Take 17 g by mouth daily, Starting Wed 3/6/2024, Normal      pregabalin (LYRICA) 75 mg capsule Take 2 capsules (150 mg total) by mouth daily at bedtime, Starting Mon 3/25/2024, Normal      simvastatin (ZOCOR) 20 mg tablet Take 1 tablet (20 mg total) by mouth daily, Starting Wed 9/27/2023, Normal      sitaGLIPtin (JANUVIA) 100 mg tablet Take 1 tablet (100 mg total) by mouth daily, Starting Wed 9/27/2023, Normal      Ascorbic Acid (vitamin C) 1000 MG tablet Take 1,000 mg by mouth daily  , Historical Med      cetirizine (ZyrTEC) 10 mg tablet Take 1 tablet (10 mg total) by mouth daily, Starting Wed 9/27/2023, Normal      omega-3-acid ethyl esters (LOVAZA) 1 g capsule Take 1 g by mouth daily, Historical Med      omeprazole (PriLOSEC) 40 MG capsule Take 1 capsule (40 mg total) by mouth daily, Starting Wed 3/6/2024, Normal           STOP taking these medications        clotrimazole-betamethasone (LOTRISONE) 1-0.05 % cream Comments:   Reason for Stopping:         fluticasone (FLONASE) 50 mcg/act nasal spray Comments:   Reason for Stopping:         tadalafil (CIALIS) 20 MG tablet Comments:   Reason for Stopping:             No discharge procedures on file.    PDMP Review         Value Time User    PDMP Reviewed  Yes 5/31/2022  5:46 PM Jadon Wheeler DO             ED Provider  Attending physically available and evaluated Jeremy George. I managed the patient along with the ED Attending.    Electronically Signed by           Sebastian Brown MD  04/28/24 1128

## 2024-04-23 NOTE — ED ATTENDING ATTESTATION
4/23/2024  I, Kendall Ward DO, saw and evaluated the patient. I have discussed the patient with the resident/non-physician practitioner and agree with the resident's/non-physician practitioner's findings, Plan of Care, and MDM as documented in the resident's/non-physician practitioner's note, except where noted. All available labs and Radiology studies were reviewed.  I was present for key portions of any procedure(s) performed by the resident/non-physician practitioner and I was immediately available to provide assistance.       At this point I agree with the current assessment done in the Emergency Department.  I have conducted an independent evaluation of this patient a history and physical is as follows:    Patient is a 53-year-old male with history of hypertension, hyperlipidemia, diabetes, says that about 730 this morning while at work and stretching and the onset of feeling somewhat weak, lightheaded, feeling like he is a bit off balance.  He is worse if he tries to stand up or move, still feels a bit like he is moving at rest.  No difficulty speaking, thinking, or concentrating, no focal weakness, no pain anywhere currently.  Patient was in [place], 3 weeks ago up until a week ago.  He said he was fine there, then 2 days after he got home he noticed a slight bug bite behind his left calf, he scratched it and felt irritated and painful about a day and a half and then resolved.  He had no pain in the leg since then.  No chest pain, shortness of breath, no fever, no chills.  Earlier today he went to urgent care and was sent to the ED.    General:  Patient is well-appearing  Head:  Atraumatic  Eyes:  Conjunctiva pink, Extraocular muscle intact, PERRL, no nystagmus  ENT:  Mucous membranes are moist  Neck:  Supple  Cardiac:  S1-S2, without murmurs  Lungs:  Clear to auscultation bilaterally  Abdomen:  Soft, nontender, normal bowel sounds, no CVA tenderness, no tympany, no rigidity, no  guarding  Extremities:  Normal range of motion  Neurologic:  Awake, fluent speech, normal comprehension. AAOx3. Cranial nerves 2-12 are intact, strength is 5/5 in the bilateral upper & lower extremities, no slurred speech, no facial droop, no deficit on finger-to-nose testing, no pronator drift.  Sensation to light touch is equal and symmetric throughout the whole body, no deficit on heel-to-shin testing , with ambulation he has some slight ataxia.  Skin:  Pink warm and dry, no rash  Psychiatric:  Alert, pleasant, cooperative          ED Course  ED Course as of 04/23/24 1505   Tue Apr 23, 2024   1111 ECG interpreted me, sinus rhythm, rate of 66, left axis deviation, no acute ischemic or infarctive changes, previous ECG from March 14, 2022 shows right bundle branch block which is not present today   1120 I discussed the case with neurology ZAYDA Gtz, she is at bedside, discussing with neuro attending   1132 Notified by nursing staff patient was having chest pain, reassessed patient, he was hemodynamically stable, saying was having some nausea and some burning discomfort in his chest down to his upper abdomen.  Not pleuritic in nature, not knifelike, ripping, or tearing or going through to the back.  Repeat ECG ordered   1132 Repeat ECG interpreted by me, sinus rhythm at a rate of 68, no acute ischemic or infarctive changes, no acute change from earlier ECG   1143 Neurology indicated that when they evaluate the patient at bedside he did have some slight left beating nystagmus, and he had an unremarkable hints exam, suggestive of a peripheral pathology.  Recommended treatment with meclizine or Valium, and will reassess   1400 I reassessed the patient, his chest pain had resolved, he was still unsteady on his feet with ambulation.     XR chest 1 view portable   ED Interpretation   Chest x-ray interpreted me shows no acute cardiopulmonary disease       CTA stroke alert (head/neck)   Final Result      Negative CTA head  and neck for large vessel occlusion, dissection, aneurysm, or high-grade stenosis.      Additional chronic/incidental findings as detailed above.            Findings were directly discussed with Alo Pike at approximately 11:19 AM.                        Workstation performed: [id number]         CT stroke alert brain   Final Result      No acute intracranial abnormality.      Findings were directly discussed with Alo Pike at approximately 11:18 AM.      Workstation performed: [id number]             Labs Reviewed   CBC AND DIFFERENTIAL - Abnormal       Result Value Ref Range Status    WBC 5.94  4.31 - 10.16 Thousand/uL Final    RBC 4.86  3.88 - 5.62 Million/uL Final    Hemoglobin 15.0  12.0 - 17.0 g/dL Final    Hematocrit 44.6  36.5 - 49.3 % Final    MCV 92  82 - 98 fL Final    MCH 30.9  26.8 - 34.3 pg Final    MCHC 33.6  31.4 - 37.4 g/dL Final    RDW 11.9  11.6 - 15.1 % Final    MPV 10.7  8.9 - 12.7 fL Final    Platelets 173  149 - 390 Thousands/uL Final    nRBC 0  /100 WBCs Final    Segmented % 50  43 - 75 % Final    Immature Grans % 1  0 - 2 % Final    Lymphocytes % 32  14 - 44 % Final    Monocytes % 14 (*) 4 - 12 % Final    Eosinophils Relative 2  0 - 6 % Final    Basophils Relative 1  0 - 1 % Final    Absolute Neutrophils 3.03  1.85 - 7.62 Thousands/µL Final    Absolute Immature Grans 0.04  0.00 - 0.20 Thousand/uL Final    Absolute Lymphocytes 1.92  0.60 - 4.47 Thousands/µL Final    Absolute Monocytes 0.80  0.17 - 1.22 Thousand/µL Final    Eosinophils Absolute 0.10  0.00 - 0.61 Thousand/µL Final    Basophils Absolute 0.05  0.00 - 0.10 Thousands/µL Final   COMPREHENSIVE METABOLIC PANEL - Abnormal    Sodium 136  135 - 147 mmol/L Final    Potassium 4.4  3.5 - 5.3 mmol/L Final    Chloride 101  96 - 108 mmol/L Final    CO2 27  21 - 32 mmol/L Final    ANION GAP 8  4 - 13 mmol/L Final    BUN 16  5 - 25 mg/dL Final    Creatinine 0.79  0.60 - 1.30 mg/dL Final    Comment: Standardized to IDMS reference method     Glucose 138  65 - 140 mg/dL Final    Comment: If the patient is fasting, the ADA then defines impaired fasting glucose as > 100 mg/dL and diabetes as > or equal to 123 mg/dL.    Calcium 9.5  8.4 - 10.2 mg/dL Final    AST 34  13 - 39 U/L Final    ALT 72 (*) 7 - 52 U/L Final    Comment: Specimen collection should occur prior to Sulfasalazine administration due to the potential for falsely depressed results.     Alkaline Phosphatase 65  34 - 104 U/L Final    Total Protein 6.9  6.4 - 8.4 g/dL Final    Albumin 4.4  3.5 - 5.0 g/dL Final    Total Bilirubin 0.51  0.20 - 1.00 mg/dL Final    Comment: Use of this assay is not recommended for patients undergoing treatment with eltrombopag due to the potential for falsely elevated results.  N-acetyl-p-benzoquinone imine (metabolite of Acetaminophen) will generate erroneously low results in samples for patients that have taken an overdose of Acetaminophen.    eGFR 101  ml/min/1.73sq m Final    Narrative:     National Kidney Disease Foundation guidelines for Chronic Kidney Disease (CKD):     Stage 1 with normal or high GFR (GFR > 90 mL/min/1.73 square meters)    Stage 2 Mild CKD (GFR = 60-89 mL/min/1.73 square meters)    Stage 3A Moderate CKD (GFR = 45-59 mL/min/1.73 square meters)    Stage 3B Moderate CKD (GFR = 30-44 mL/min/1.73 square meters)    Stage 4 Severe CKD (GFR = 15-29 mL/min/1.73 square meters)    Stage 5 End Stage CKD (GFR <15 mL/min/1.73 square meters)  Note: GFR calculation is accurate only with a steady state creatinine   COVID19, INFLUENZA A/B, RSV PCR, [facility] - Normal    SARS-CoV-2 Negative  Negative Final    Comment:      INFLUENZA A PCR Negative  Negative Final    Comment:      INFLUENZA B PCR Negative  Negative Final    Comment:      RSV PCR Negative  Negative Final    Comment:      Narrative:     FOR PEDIATRIC PATIENTS - copy/paste COVID Guidelines URL to browser: [email or web address]    SARS-CoV-2 assay is  "a Nucleic Acid Amplification assay intended for the  qualitative detection of nucleic acid from SARS-CoV-2 in nasopharyngeal  swabs. Results are for the presumptive identification of SARS-CoV-2 RNA.    Positive results are indicative of infection with SARS-CoV-2, the virus  causing COVID-19, but do not rule out bacterial infection or co-infection  with other viruses. Laboratories within the [place] and its  territories are required to report all positive results to the appropriate  public health authorities. Negative results do not preclude SARS-CoV-2  infection and should not be used as the sole basis for treatment or other  patient management decisions. Negative results must be combined with  clinical observations, patient history, and epidemiological information.  This test has not been FDA cleared or approved.    This test has been authorized by FDA under an Emergency Use Authorization  (EUA). This test is only authorized for the duration of time the  declaration that circumstances exist justifying the authorization of the  emergency use of an in vitro diagnostic tests for detection of SARS-CoV-2  virus and/or diagnosis of COVID-19 infection under section 564(b)(1) of  the Act, 21 U.S.C. 360bbb-3(b)(1), unless the authorization is terminated  or revoked sooner. The test has been validated but independent review by FDA  and CLIA is pending.    Test performed using [employer] GeneXpert: This RT-PCR assay targets N2,  a region unique to SARS-CoV-2. A conserved region in the E-gene was chosen  for pan-Sarbecovirus detection which includes SARS-CoV-2.    According to CMS-2020-01-R, this platform meets the definition of high-throughput technology.   HS TROPONIN I 0HR - Normal    hs TnI 0hr 4  \"Refer to ACS Flowchart\"- see link ng/L Final    Comment:                                              Initial (time 0) result  If >=50 ng/L, Myocardial injury suggested ;  Type of myocardial injury and treatment strategy  to " "be determined.  If 5-49 ng/L, a delta result at 2 hours and or 4 hours will be needed to further evaluate.  If <4 ng/L, and chest pain has been >3 hours since onset, patient may qualify for discharge based on the HEART score in the ED.  If <5 ng/L and <3hours since onset of chest pain, a delta result at 2 hours will be needed to further evaluate.    HS Troponin 99th Percentile URL of a Health Population=12 ng/L with a 95% Confidence Interval of 8-18 ng/L.    Second Troponin (time 2 hours)  If calculated delta >= 20 ng/L,  Myocardial injury suggested ; Type of myocardial injury and treatment strategy to be determined.  If 5-49 ng/L and the calculated delta is 5-19 ng/L, consult medical service for evaluation.  Continue evaluation for ischemia on ecg and other possible etiology and repeat hs troponin at 4 hours.  If delta is <5 ng/L at 2 hours, consider discharge based on risk stratification via the HEART score (if in ED), or RASHMI risk score in IP/Observation.    HS Troponin 99th Percentile URL of a Health Population=12 ng/L with a 95% Confidence Interval of 8-18 ng/L.   HS TROPONIN I 2HR - Normal    hs TnI 2hr 3  \"Refer to ACS Flowchart\"- see link ng/L Final    Comment:                                              Initial (time 0) result  If >=50 ng/L, Myocardial injury suggested ;  Type of myocardial injury and treatment strategy  to be determined.  If 5-49 ng/L, a delta result at 2 hours and or 4 hours will be needed to further evaluate.  If <4 ng/L, and chest pain has been >3 hours since onset, patient may qualify for discharge based on the HEART score in the ED.  If <5 ng/L and <3hours since onset of chest pain, a delta result at 2 hours will be needed to further evaluate.    HS Troponin 99th Percentile URL of a Health Population=12 ng/L with a 95% Confidence Interval of 8-18 ng/L.    Second Troponin (time 2 hours)  If calculated delta >= 20 ng/L,  Myocardial injury suggested ; Type of myocardial injury and " treatment strategy to be determined.  If 5-49 ng/L and the calculated delta is 5-19 ng/L, consult medical service for evaluation.  Continue evaluation for ischemia on ecg and other possible etiology and repeat hs troponin at 4 hours.  If delta is <5 ng/L at 2 hours, consider discharge based on risk stratification via the HEART score (if in ED), or RASHMI risk score in IP/Observation.    HS Troponin 99th Percentile URL of a Health Population=12 ng/L with a 95% Confidence Interval of 8-18 ng/L.    Delta 2hr hsTnI -1  <20 ng/L Final   PROTIME-INR - Normal    Protime 12.9  11.6 - 14.5 seconds Final    INR 0.98  0.84 - 1.19 Final   APTT - Normal    PTT 26  23 - 37 seconds Final    Comment: Therapeutic Heparin Range =  60-90 seconds   LYME TOTAL AB W REFLEX TO IGM/IGG    Narrative:     The following orders were created for panel order Lyme Total AB W Reflex to IGM/IGG.  Procedure                               Abnormality         Status                     ---------                               -----------         ------                     Lyme Total AB W Reflex t...[[id number]]                      In process                   Please view results for these tests on the individual orders.   LYME TOTAL AB W REFLEX TO IGM/IGG   LIPASE   HS TROPONIN I 4HR   LIGHT BLUE TOP     At this point the cause of patient's symptoms is unclear.  He has no evidence of life-threatening intracranial hemorrhage, do not believe thrombolytics indicated given his NIH stroke scale of 0, and the non-debilitating nature of his symptoms as well as the time of onset.  The cause of his transient chest pain is unclear, CTA head and neck shows no evidence of aortic dissection down to the root, ACS considered to be unlikely given his negative delta troponin.  He may have a peripheral cause of his symptoms.  I do believe he would benefit from hospitalization and further care.      The patient was evaluated for sepsis in the emergency department. After  that assessment, at the time of admission, no sepsis, severe sepsis, or septic shock was found.      MEDICAL DECISION MAKING CODING    Patient presents with acute new problem with:  Threat to life or bodily function    Chronic conditions affecting care: As per HPI    COLLECTION AND INTERPRETATION OF DATA  I reviewed prior external notes, including March 14, 2022 ECG    I ordered each unique test  Tests reviewed personally by me:  ECG: See my ED course  Labs: See above  Imaging: I independently reviewed the head CT and found no acute pathology.    Discussion with other providers: See above      RISK    Treatment:  Patient admitted    Surgery  -I considered surgery may be necessary prior to completion of the work up but afterwards there is no indication for immediate surgery      Critical Care Time  Procedures

## 2024-04-23 NOTE — LETTER
[facility]  [address] [address]  Dept: [phone]    April 27, 2024     Patient: Jeremy George   YOB: 1970   Date of Visit: 4/23/2024       To Whom it May Concern:    Jeremy George is under my professional care. He was seen in the hospital from 4/23/2024 to 04/27/24. He may return to work on 05/06/2024 without limitations. The patient will work with physical therapy for vestibular therapy in the outpatient setting and they will continue to assess his safety to return to work.    If you have any questions or concerns, please don't hesitate to call.         Sincerely,          Ayala Weaver

## 2024-04-23 NOTE — H&P
"  NEUROLOGY - STROKE- ADMISSION H&P NOTE     Name: Jeremy George   Age & Sex: 53 y.o. male   MRN: [id number]  Unit/Bed#: ED 29   Encounter: [id number]    ASSESSMENT & PLAN     Dizziness  Assessment & Plan  53 y.o. male with prior vertigo episode 20+ years ago, suspected CAA, headaches, HTN, HLD, DM2, cervical radiculopathy s/p ACDF, tremors, and recent travel to [place] with intermittent dizziness for the past week who presented to [facility] on 4/23/2024 as a stroke alert for persistent dizziness.    Patient has had intermittent dizziness over the past week that only lasted a few minutes.  Today, patient was bending down doing toe touches when he suddenly developed severe/persistent dizziness described as \"swaying\" associated with ambulatory dysfunction and nausea.  Patient also developed a left frontal/parietal headache and chest discomfort.    Upon arrival to the ED, /94.    NIHSS 0.    CT head negative for acute intracranial abnormalities.    CTA head/neck negative for LVO, dissection, aneurysm, or high-grade stenosis.    Patient was not a TNK candidate due to bleeding risk from presumed CAA and higher suspicion for peripheral vestibulopathy.      HINTS exam: a few beats of nonsustained nystagmus to the right, no skew deviation, catch up saccades present    High suspicion for peripheral vestibulopathy provoked by position change, however given persistent symptoms despite using meclizine, will obtain MRI brain to rule out acute intracranial pathology.  Could also be vestibular migraine.    Plan:  - MRI brain wo contrast  - Meclizine 25 mg was not helpful  - Can try Valium  - Magnesium sulfate 2 g  - PT/OT; patient may benefit from vestibular therapy  - Telemetry  - Continue to monitor and notify neurology with any changes.     Hypertension, essential  Assessment & Plan  - Normotensive goal  - Lisinopril-hydrochlorothiazide 20-12.5 mg, 2 tablets daily    Diabetes mellitus (HCC)  Assessment & Plan    Lab " "Results   Component Value Date    HGBA1C 7.0 (H) 12/27/2023     - Sliding insulin scale        Recommendations for outpatient neurological follow up have yet to be determined.    Pending for discharge: MRI brain    Thrombolytic Decision: Patient not a candidate. Bleeding risk. CAA on prior MRI brain.  He also has had intermittent dizziness for the past week.     VTE Prophylaxis: Enoxaparin (Lovenox)  / sequential compression device   Code Status: Level 1    Anticipated Length of Stay:  Patient will be admitted on an Emergency basis with an anticipated length of stay of  2 midnights.     Justification for Hospital Stay: requiring MRI brain    CHIEF COMPLAINT     Chief Complaint   Patient presents with    Dizziness     C/o of being bitten by a bug in [place], scratched it. Then a pain radiated up his leg, then intermittent dizziness for the past few days, headaches and then while stretching at work almost feel over.         HISTORY OF PRESENT ILLNESS     Hx and PE limited by: N/A  Patient last known well: 7:20 AM; however has had intermittent dizziness for the past week  Stroke alert called: 11:08 AM  Neurology time of arrival: immediate  HPI: Jeremy George is a 53 y.o. male with prior vertigo episode 20+ years ago, suspected CAA, headaches, HTN, HLD, DM2, cervical radiculopathy s/p ACDF, tremors, and recent travel to [place] with intermittent dizziness for the past week who presented to [facility] on 4/23/2024 as a stroke alert for persistent dizziness.     Patient was recently visiting his family in [place] and sustained a bug bite on his leg approximately 1 week ago.  Following this, he had severe pain going up his left leg and intermittent dizziness.  He states the dizziness felt like he was \"swaying.\"  This usually occurred while standing.  He is unsure if this worsened with head turn or position changes.  The dizziness would only last a few minutes and then would resolve.     This morning, he woke up in " "his normal state.  LKN around 7:20 AM while at work.  He was doing toe touch stretches and then suddenly developed severe dizziness described as swaying.  Nothing made the dizziness better.  Looking to the right made the dizziness worse.  He was unable to ambulate, feeling like he was leaning to the right.  He also developed a left frontal/parietal headache and nausea.  Headache is similar to his prior headaches.  He did have vertigo 20+ years ago, but that was more severe and he could not ambulate at all.     Upon arrival to the ED, /94.  NIHSS 0.  CT head negative for acute intracranial abnormalities.  CTA head/neck negative for LVO, dissection, aneurysm, or high-grade stenosis.  He had a few beats of nonsustained nystagmus when looking to the right and did have catch-up saccade with head impulse testing.  No skew deviation.  Patient was not a TNK candidate due to bleeding risk from presumed CAA and higher suspicion for peripheral vestibulopathy.  Of note, he then developed severe chest pain radiating into his neck and right jaw.  ECG was normal and no troponin elevation.     Denies any hearing loss, tinnitus, or recent illnesses.  His R ear felt \"itchy\" the other day, but denies any current ear complaints.      REVIEW OF SYSTEMS     Review of Systems   Respiratory:  Positive for chest tightness.    Cardiovascular:  Positive for chest pain.   Musculoskeletal:  Positive for gait problem.   Neurological:  Positive for dizziness.   All other systems reviewed and are negative.      PAST MEDICAL HISTORY     Past Medical History:   Diagnosis Date    Achalasia     Aneurysm (HCC)     aortic    Asthma     Childhood    Colon polyp     Diabetes mellitus (HCC)     Esophageal ulcer     Fatty liver     Groin abscess     with I &D    Hyperlipidemia     Hypertension     Plantar fasciitis        Allergies:   No Known Allergies    PAST SURGICAL HISTORY     Past Surgical History:   Procedure Laterality Date    ANTERIOR " CERVICAL DISCECTOMY W/ FUSION      APPENDECTOMY      BICEPS TENDON REPAIR      EGD AND COLONOSCOPY      FL INJECTION LEFT WRIST (ARTHROGRAM)  2021    FL MYELOGRAM CERVICAL  2018    MENISCECTOMY      MYOTOMY ALLA LAPAROSCOPIC      ORTHOPEDIC SURGERY         SOCIAL & FAMILY HISTORY     Social History     Substance and Sexual Activity   Alcohol Use Not Currently       Social History     Substance and Sexual Activity   Drug Use Never     Social History     Tobacco Use   Smoking Status Never   Smokeless Tobacco Never       Marital Status: /Civil Union   Occupation: works at [employer]    Family History:  Family History   Problem Relation Age of Onset    No Known Problems Mother     No Known Problems Father            OBJECTIVE     Vitals:    24 1415 24 1430 24 1445 24 1500   BP: 138/65 147/70 139/63 117/58   Pulse: 60 62 60 60   Resp: 16 20 13 13   Temp:       TempSrc:       SpO2: 97% 97% 96% 97%        Temperature:   Temp (24hrs), Av.6 °F (36.4 °C), Min:97.5 °F (36.4 °C), Max:97.7 °F (36.5 °C)    Temperature: 97.7 °F (36.5 °C)    Intake & Output:  I/O          0701   0700  0701   0700  0701   0700    IV Piggyback   1000    Total Intake   1000    Net   +1000                   Weights:        There is no height or weight on file to calculate BMI.  Weight (last 2 days)       None            Physical Exam  Vitals and nursing note reviewed.   Constitutional:       Appearance: Normal appearance.      Comments: Patient lying in bed in moderate distress.  Appears uncomfortable due to subjective dizziness and chest pain   HENT:      Head: Normocephalic and atraumatic.      Mouth/Throat:      Mouth: Mucous membranes are moist.      Pharynx: Oropharynx is clear.   Eyes:      Extraocular Movements: Extraocular movements intact.      Conjunctiva/sclera: Conjunctivae normal.      Pupils: Pupils are equal, round, and reactive to light.      Comments: A few beats of  nonsustained nystagmus when looking to the right   Pulmonary:      Effort: Pulmonary effort is normal.   Musculoskeletal:         General: Normal range of motion.   Skin:     General: Skin is warm and dry.   Neurological:      Mental Status: He is alert and oriented to person, place, and time.      Comments: See full neuro exam below         Neurologic Exam      Mental Status   Oriented to person, place, and time.   Patient awake and alert.  Oriented to person, age, and month  No dysarthria or aphasia.  Able to follow simple midline and appendicular commands.      Cranial Nerves      CN III, IV, VI   Pupils are equal, round, and reactive to light.  Pupils 3 mm, round, reactive to light bilaterally.  EOMs intact with a few beats of non-sustained nystagmus when looking to the right (this caused the patient to have slight worsening of subjective dizziness).  No skew deviation  Catch up saccade with head impulse test  No visual field deficits.  Facial sensation to light touch intact throughout.  Facial expressions full and symmetric.  Tongue midline.  Soft palate raises symmetrically.  Full strength of sternocleidomastoid and trapezius muscles   Hearing grossly intact.      Motor Exam   Muscle bulk: normal  Overall muscle tone: normal  Right arm pronator drift: absent  Left arm pronator drift: absent  5/5 strength in bilateral upper and lower extremities.      Sensory Exam   Sensation to light touch and pinprick intact throughout.  No extinction abnormalities      Gait, Coordination, and Reflexes   Patient was slightly unsteady when standing  No ataxia with finger to nose or heel to shin bilaterally  No resting or action tremor         NIHSS:  1a.Level of Consciousness: 0 = Alert   1b. LOC Questions: 0 = Answers both correctly   1c. LOC Commands: 0 = Obeys both correctly   2. Best Gaze: 0 = Normal   3. Visual: 0 = No visual field loss   4. Facial Palsy: 0=Normal symmetric movement   5a. Motor Right Arm: 0=No drift, limb  holds 90 (or 45) degrees for full 10 seconds   5b. Motor Left Arm: 0=No drift, limb holds 90 (or 45) degrees for full 10 seconds   6a. Motor Right Le=No drift, limb holds 90 (or 45) degrees for full 10 seconds   6b. Motor Left Le=No drift, limb holds 90 (or 45) degrees for full 10 seconds   7. Limb Ataxia:  0=Absent   8. Sensory: 0=Normal; no sensory loss   9. Best Language:  0=No aphasia, normal   10. Dysarthria: 0=Normal articulation   11. Extinction and Inattention (formerly Neglect): 0=No abnormality   Total Score: 0      Time NIHSS was completed: 11:29     Modified Maricruz Score:  0 (No baseline symptoms/disability)      LABORATORY DATA     Labs: I have personally reviewed pertinent reports.   and I have personally reviewed pertinent films in PACS  Results from last 7 days   Lab Units 24  1023   WBC Thousand/uL 5.94   HEMOGLOBIN g/dL 15.0   HEMATOCRIT % 44.6   PLATELETS Thousands/uL 173   SEGS PCT % 50   MONO PCT % 14*   EOS PCT % 2      Results from last 7 days   Lab Units 24  1023   SODIUM mmol/L 136   POTASSIUM mmol/L 4.4   CHLORIDE mmol/L 101   CO2 mmol/L 27   BUN mg/dL 16   CREATININE mg/dL 0.79   CALCIUM mg/dL 9.5   ALK PHOS U/L 65   ALT U/L 72*   AST U/L 34              Results from last 7 days   Lab Units 24  1023   INR  0.98   PTT seconds 26               Micro:  Lab Results   Component Value Date    WOUNDCULT No growth 2017       IMAGING & DIAGNOSTIC TESTING     Radiology Results: I have personally reviewed pertinent reports.   and I have personally reviewed pertinent films in PACS    XR chest 1 view portable   ED Interpretation by Kendall Ward DO ( [id number])   Chest x-ray interpreted me shows no acute cardiopulmonary disease       CTA stroke alert (head/neck)   Final Result by Dave Figueredo MD ( [id number])      Negative CTA head and neck for large vessel occlusion, dissection, aneurysm, or high-grade stenosis.      Additional chronic/incidental findings  as detailed above.            Findings were directly discussed with Alo Pike at approximately 11:19 AM.                        Workstation performed: [id number]         CT stroke alert brain   Final Result by Dave Figueredo MD (04/23 1121)      No acute intracranial abnormality.      Findings were directly discussed with Alo Pike at approximately 11:18 AM.      Workstation performed: [id number]             Other Diagnostic Testing: I have personally reviewed pertinent reports.   and I have personally reviewed pertinent films in PACS    ACTIVE MEDICATIONS     Current Facility-Administered Medications   Medication Dose Route Frequency    bupivacaine (MARCAINE) 0.25 % injection 1 mL  1 mL Infiltration     lidocaine (XYLOCAINE) 1 % injection 1 mL  1 mL Infiltration     lidocaine (XYLOCAINE) 1 % injection 1 mL  1 mL Infiltration     magnesium sulfate 2 g/50 mL IVPB (premix) 2 g  2 g Intravenous Once    triamcinolone acetonide (KENALOG-40) 40 mg/mL injection 20 mg  20 mg Infiltration     triamcinolone acetonide (KENALOG-40) 40 mg/mL injection 20 mg  20 mg Infiltration          HOME MEDICATIONS     Prior to Admission medications    Medication Sig Start Date End Date Taking? Authorizing Provider   Ascorbic Acid (vitamin C) 1000 MG tablet Take 1,000 mg by mouth daily      Historical Provider, MD   baclofen 20 mg tablet Take 1 tablet (20 mg total) by mouth daily at bedtime as needed for muscle spasms 3/25/24   MIKHAIL Laboy   cetirizine (ZyrTEC) 10 mg tablet Take 1 tablet (10 mg total) by mouth daily 9/27/23   MIKHAIL Ha   CINNAMON PO Take by mouth    Historical Provider, MD   clotrimazole-betamethasone (LOTRISONE) 1-0.05 % cream Apply topically 2 (two) times a day 10/24/23   Mark Munoz DPM   diphenhydrAMINE-acetaminophen (TYLENOL PM)  MG TABS Take 1 tablet by mouth daily at bedtime as needed for sleep     Historical Provider, MD   famotidine (PEPCID) 20 mg tablet Take 1 tablet (20  mg total) by mouth daily 9/27/23   MIKHAIL Ha   fluticasone (FLONASE) 50 mcg/act nasal spray 1 spray into each nostril daily 9/27/23   MIKHAIL Ha   lisinopril-hydrochlorothiazide (PRINZIDE,ZESTORETIC) 20-12.5 MG per tablet Take 2 tablets by mouth daily 9/27/23   MIKHAIL Ha   metFORMIN (GLUCOPHAGE) 500 mg tablet Pt takes 1500 mg (3 tablets) by mouth in the morning, and a 1000 mg (2 tablets) in the evening 9/27/23   MIKHAIL Ha   Multiple Vitamins-Minerals (CENTRUM SILVER 50+MEN PO) Take by mouth    Historical Provider, MD   omega-3-acid ethyl esters (LOVAZA) 1 g capsule Take 1 g by mouth daily    Historical Provider, MD   omeprazole (PriLOSEC) 40 MG capsule Take 1 capsule (40 mg total) by mouth daily 3/6/24   Henri Burnette MD   polyethylene glycol (MiraLax) 17 g packet Take 17 g by mouth daily 3/6/24   Henri Burnette MD   pregabalin (LYRICA) 75 mg capsule Take 2 capsules (150 mg total) by mouth daily at bedtime 3/25/24   MIKHAIL Laboy   simvastatin (ZOCOR) 20 mg tablet Take 1 tablet (20 mg total) by mouth daily 9/27/23   MIKHAIL Ha   sitaGLIPtin (JANUVIA) 100 mg tablet Take 1 tablet (100 mg total) by mouth daily 9/27/23   MIKHAIL Ha   tadalafil (CIALIS) 20 MG tablet TAKE 1 TABLET 1 HOUR PRIOR TO INTERCOURSE ON AN EMPTY STOMACH WITH NO ALCOHOL, LIMIT TO 2 TABLETS PER WEEK 8/18/23   MIKHAIL Kenyon     ACTIVE MEDICATIONS    Counseling / Coordination of Care  Total Critical Care time spent 35 minutes excluding procedures, teaching and family updates.

## 2024-04-23 NOTE — PROGRESS NOTES
[facility]        NAME: Jeremy George is a 53 y.o. male  : 1970    MRN: [id number]  DATE: 2024  TIME: 9:24 AM    Assessment and Plan   Dizziness [R42]  1. Dizziness  Transfer to other facility      2. Acute nonintractable headache, unspecified headache type  Transfer to other facility        Blood glucose: 199 mg/dl    Recommend patient proceed to the ED for further evaluation. Recommended EMS transport, patient declined. Coworker driving [facility].    Patient Instructions       Follow up with PCP in 3-5 days.  Proceed to  ER if symptoms worsen.    If tests are performed, our office will contact you with results only if changes need to made to the care plan discussed with you at the visit. [facility]    Chief Complaint     Chief Complaint   Patient presents with    Dizziness     Pt states he has been dizzy since .  Pt came back from [place] Wednesday noticed a bug bite on left medial lower leg.  When he scratched it pain went up medial part of left leg toward groin.  Pt was stretching bent forward and became nauseated and dizzy. For the last hour developed headache on left side of head but dizziness became more intense this last hour.          History of Present Illness       52 y/o male with a PMH significant for diabetes, hypertension, thoracic aortic aneurysm. presents today with complaints of headache and feeling dizzy. He notes about 6 days ago upon returning from [place] he noticed a bug bite on his left lower leg. 2 nights ago when scratching his bug bite he started to have a pain in his left leg radiating up his leg to the area of his groin. Shortly after that he started feeling intermittent lightheadedness, this then went away. Yesterday evening he started feeling it again. This morning around 0730 at work he was performing stretches, he bent over to touch his toes, became extremely dizzy, and almost fell  forward. Ever since then he has had a headache on the left side of his head and dizziness that has been constant. He describes the dizziness as a swaying sensation and feeling like he is going to pass out. The headache was more intense when it first started, now he would rate it as a 3-4/10. He notes he feels loopy and out of it. He did eat breakfast this morning, an omelet. A coworker drove him here today to be evaluated. Patient utilizes metformin and Januvia for diabetes, he did not get to take the medications this morning. He utilizes lisinopril-HCTZ for hypertension which he has been taking as directed. He has not yet done anything for treatment of his symptoms.        Review of Systems   Review of Systems   Constitutional:  Negative for activity change, appetite change and chills.   Respiratory:  Negative for chest tightness and shortness of breath.    Cardiovascular:  Negative for chest pain and palpitations.   Gastrointestinal:  Negative for abdominal pain, diarrhea, nausea and vomiting.   Musculoskeletal:  Negative for myalgias.   Skin:  Positive for wound (open bug bite).   Neurological:  Positive for dizziness, weakness, light-headedness and headaches. Negative for syncope, facial asymmetry, speech difficulty and numbness.   Psychiatric/Behavioral:  Negative for confusion.          Current Medications       Current Outpatient Medications:     Ascorbic Acid (vitamin C) 1000 MG tablet, Take 1,000 mg by mouth daily  , Disp: , Rfl:     baclofen 20 mg tablet, Take 1 tablet (20 mg total) by mouth daily at bedtime as needed for muscle spasms, Disp: 90 tablet, Rfl: 1    cetirizine (ZyrTEC) 10 mg tablet, Take 1 tablet (10 mg total) by mouth daily, Disp: 90 tablet, Rfl: 0    CINNAMON PO, Take by mouth, Disp: , Rfl:     clotrimazole-betamethasone (LOTRISONE) 1-0.05 % cream, Apply topically 2 (two) times a day, Disp: 15 g, Rfl: 1    diphenhydrAMINE-acetaminophen (TYLENOL PM)  MG TABS, Take 1 tablet by mouth  daily at bedtime as needed for sleep , Disp: , Rfl:     famotidine (PEPCID) 20 mg tablet, Take 1 tablet (20 mg total) by mouth daily, Disp: 90 tablet, Rfl: 1    fluticasone (FLONASE) 50 mcg/act nasal spray, 1 spray into each nostril daily, Disp: 16 g, Rfl: 1    lisinopril-hydrochlorothiazide (PRINZIDE,ZESTORETIC) 20-12.5 MG per tablet, Take 2 tablets by mouth daily, Disp: 180 tablet, Rfl: 1    metFORMIN (GLUCOPHAGE) 500 mg tablet, Pt takes 1500 mg (3 tablets) by mouth in the morning, and a 1000 mg (2 tablets) in the evening, Disp: 450 tablet, Rfl: 1    Multiple Vitamins-Minerals (CENTRUM SILVER 50+MEN PO), Take by mouth, Disp: , Rfl:     omega-3-acid ethyl esters (LOVAZA) 1 g capsule, Take 1 g by mouth daily, Disp: , Rfl:     polyethylene glycol (MiraLax) 17 g packet, Take 17 g by mouth daily, Disp: 90 each, Rfl: 5    pregabalin (LYRICA) 75 mg capsule, Take 2 capsules (150 mg total) by mouth daily at bedtime, Disp: 540 capsule, Rfl: 0    simvastatin (ZOCOR) 20 mg tablet, Take 1 tablet (20 mg total) by mouth daily, Disp: 90 tablet, Rfl: 1    sitaGLIPtin (JANUVIA) 100 mg tablet, Take 1 tablet (100 mg total) by mouth daily, Disp: 90 tablet, Rfl: 1    tadalafil (CIALIS) 20 MG tablet, TAKE 1 TABLET 1 HOUR PRIOR TO INTERCOURSE ON AN EMPTY STOMACH WITH NO ALCOHOL, LIMIT TO 2 TABLETS PER WEEK, Disp: 18 tablet, Rfl: 4    omeprazole (PriLOSEC) 40 MG capsule, Take 1 capsule (40 mg total) by mouth daily, Disp: 90 capsule, Rfl: 3    Current Facility-Administered Medications:     bupivacaine (MARCAINE) 0.25 % injection 1 mL, 1 mL, Infiltration, , Mark Munoz DPM, 1 mL at 09/26/23 1615    lidocaine (XYLOCAINE) 1 % injection 1 mL, 1 mL, Infiltration, , Mark Munoz DPM, 1 mL at 09/26/23 1615    lidocaine (XYLOCAINE) 1 % injection 1 mL, 1 mL, Infiltration, , Mark Munoz DPM, 1 mL at 10/24/23 1600    triamcinolone acetonide (KENALOG-40) 40 mg/mL injection 20 mg, 20 mg, Infiltration, , Mark Munoz DPM, 20 mg at 09/26/23  "1615    triamcinolone acetonide (KENALOG-40) 40 mg/mL injection 20 mg, 20 mg, Infiltration, , Mark Munoz, BUSTER, 20 mg at 10/24/23 1600    Current Allergies     Allergies as of 04/23/2024    (No Known Allergies)            The following portions of the patient's history were reviewed and updated as appropriate: allergies, current medications, past family history, past medical history, past social history, past surgical history and problem list.     Past Medical History:   Diagnosis Date    Achalasia     Aneurysm (HCC)     aortic    Asthma     Childhood    Colon polyp     Diabetes mellitus (HCC)     Esophageal ulcer     Fatty liver     Groin abscess     with I &D    Hyperlipidemia     Hypertension     Plantar fasciitis        Past Surgical History:   Procedure Laterality Date    ANTERIOR CERVICAL DISCECTOMY W/ FUSION      APPENDECTOMY      BICEPS TENDON REPAIR      EGD AND COLONOSCOPY      FL INJECTION LEFT WRIST (ARTHROGRAM)  11/9/2021    FL MYELOGRAM CERVICAL  6/13/2018    MENISCECTOMY      MYOTOMY HELLER LAPAROSCOPIC      ORTHOPEDIC SURGERY         Family History   Problem Relation Age of Onset    No Known Problems Mother     No Known Problems Father          Medications have been verified.        Objective   BP (!) 173/79 (BP Location: Left arm, Patient Position: Supine)   Pulse 72   Temp 97.5 °F (36.4 °C)   Resp 18   Ht 6' 1\" (1.854 m)   Wt 119 kg (263 lb)   SpO2 99%   BMI 34.70 kg/m²        Physical Exam     Physical Exam  Vitals and nursing note reviewed.   Constitutional:       General: He is not in acute distress.     Appearance: Normal appearance. He is not ill-appearing.   Eyes:      Extraocular Movements: Extraocular movements intact.      Pupils: Pupils are equal, round, and reactive to light.   Cardiovascular:      Rate and Rhythm: Normal rate and regular rhythm.      Pulses: Normal pulses.      Heart sounds: Normal heart sounds.   Pulmonary:      Effort: Pulmonary effort is normal. No " respiratory distress.      Breath sounds: Normal breath sounds.   Neurological:      General: No focal deficit present.      Mental Status: He is alert and oriented to person, place, and time.      GCS: GCS eye subscore is 4. GCS verbal subscore is 5. GCS motor subscore is 6.      Cranial Nerves: Cranial nerves 2-12 are intact.      Sensory: Sensation is intact.      Motor: Motor function is intact.      Coordination: Coordination is intact. Finger-Nose-Finger Test normal.

## 2024-04-24 ENCOUNTER — APPOINTMENT (OUTPATIENT)
Dept: RADIOLOGY | Facility: HOSPITAL | Age: 54
DRG: 103 | End: 2024-04-24
Payer: COMMERCIAL

## 2024-04-24 LAB
EST. AVERAGE GLUCOSE BLD GHB EST-MCNC: 148 MG/DL
GLUCOSE SERPL-MCNC: 150 MG/DL (ref 65–140)
GLUCOSE SERPL-MCNC: 169 MG/DL (ref 65–140)
GLUCOSE SERPL-MCNC: 179 MG/DL (ref 65–140)
GLUCOSE SERPL-MCNC: 216 MG/DL (ref 65–140)
HBA1C MFR BLD: 6.8 %
VIT B12 SERPL-MCNC: 237 PG/ML (ref 180–914)

## 2024-04-24 PROCEDURE — 97167 OT EVAL HIGH COMPLEX 60 MIN: CPT

## 2024-04-24 PROCEDURE — 83036 HEMOGLOBIN GLYCOSYLATED A1C: CPT

## 2024-04-24 PROCEDURE — 99233 SBSQ HOSP IP/OBS HIGH 50: CPT | Performed by: PSYCHIATRY & NEUROLOGY

## 2024-04-24 PROCEDURE — 70551 MRI BRAIN STEM W/O DYE: CPT

## 2024-04-24 PROCEDURE — 97163 PT EVAL HIGH COMPLEX 45 MIN: CPT

## 2024-04-24 PROCEDURE — 82948 REAGENT STRIP/BLOOD GLUCOSE: CPT

## 2024-04-24 PROCEDURE — 3044F HG A1C LEVEL LT 7.0%: CPT | Performed by: NURSE PRACTITIONER

## 2024-04-24 PROCEDURE — 82607 VITAMIN B-12: CPT

## 2024-04-24 RX ORDER — KETOROLAC TROMETHAMINE 30 MG/ML
15 INJECTION, SOLUTION INTRAMUSCULAR; INTRAVENOUS EVERY 8 HOURS
Status: COMPLETED | OUTPATIENT
Start: 2024-04-24 | End: 2024-04-25

## 2024-04-24 RX ORDER — METOCLOPRAMIDE HYDROCHLORIDE 5 MG/ML
10 INJECTION INTRAMUSCULAR; INTRAVENOUS EVERY 8 HOURS
Status: COMPLETED | OUTPATIENT
Start: 2024-04-24 | End: 2024-04-25

## 2024-04-24 RX ORDER — ACETAMINOPHEN 325 MG/1
650 TABLET ORAL EVERY 6 HOURS PRN
Status: DISCONTINUED | OUTPATIENT
Start: 2024-04-24 | End: 2024-04-27 | Stop reason: HOSPADM

## 2024-04-24 RX ORDER — ACETAMINOPHEN 325 MG/1
650 TABLET ORAL EVERY 6 HOURS PRN
Status: DISCONTINUED | OUTPATIENT
Start: 2024-04-24 | End: 2024-04-24

## 2024-04-24 RX ORDER — DIPHENHYDRAMINE HYDROCHLORIDE 50 MG/ML
25 INJECTION INTRAMUSCULAR; INTRAVENOUS EVERY 8 HOURS
Status: COMPLETED | OUTPATIENT
Start: 2024-04-24 | End: 2024-04-25

## 2024-04-24 RX ADMIN — KETOROLAC TROMETHAMINE 15 MG: 30 INJECTION, SOLUTION INTRAMUSCULAR; INTRAVENOUS at 17:11

## 2024-04-24 RX ADMIN — LISINOPRIL 40 MG: 20 TABLET ORAL at 08:01

## 2024-04-24 RX ADMIN — HYDROCHLOROTHIAZIDE 25 MG: 25 TABLET ORAL at 08:01

## 2024-04-24 RX ADMIN — BACLOFEN 20 MG: 20 TABLET ORAL at 20:39

## 2024-04-24 RX ADMIN — ENOXAPARIN SODIUM 40 MG: 40 INJECTION SUBCUTANEOUS at 08:01

## 2024-04-24 RX ADMIN — INSULIN LISPRO 4 UNITS: 100 INJECTION, SOLUTION INTRAVENOUS; SUBCUTANEOUS at 17:12

## 2024-04-24 RX ADMIN — ACETAMINOPHEN 650 MG: 325 TABLET, FILM COATED ORAL at 06:33

## 2024-04-24 RX ADMIN — INSULIN LISPRO 2 UNITS: 100 INJECTION, SOLUTION INTRAVENOUS; SUBCUTANEOUS at 08:00

## 2024-04-24 RX ADMIN — KETOROLAC TROMETHAMINE 15 MG: 30 INJECTION, SOLUTION INTRAMUSCULAR; INTRAVENOUS at 10:23

## 2024-04-24 RX ADMIN — METOCLOPRAMIDE HYDROCHLORIDE 10 MG: 5 INJECTION INTRAMUSCULAR; INTRAVENOUS at 17:11

## 2024-04-24 RX ADMIN — PREGABALIN 150 MG: 75 CAPSULE ORAL at 19:42

## 2024-04-24 RX ADMIN — DIPHENHYDRAMINE HYDROCHLORIDE 25 MG: 50 INJECTION, SOLUTION INTRAMUSCULAR; INTRAVENOUS at 17:11

## 2024-04-24 RX ADMIN — ACETAMINOPHEN 650 MG: 325 TABLET, FILM COATED ORAL at 19:42

## 2024-04-24 RX ADMIN — INSULIN LISPRO 2 UNITS: 100 INJECTION, SOLUTION INTRAVENOUS; SUBCUTANEOUS at 11:19

## 2024-04-24 RX ADMIN — PANTOPRAZOLE SODIUM 40 MG: 40 TABLET, DELAYED RELEASE ORAL at 06:06

## 2024-04-24 RX ADMIN — OXYCODONE HYDROCHLORIDE AND ACETAMINOPHEN 1000 MG: 500 TABLET ORAL at 08:01

## 2024-04-24 RX ADMIN — FAMOTIDINE 20 MG: 20 TABLET, FILM COATED ORAL at 08:01

## 2024-04-24 RX ADMIN — PRAVASTATIN SODIUM 40 MG: 40 TABLET ORAL at 17:09

## 2024-04-24 RX ADMIN — DIPHENHYDRAMINE HYDROCHLORIDE 25 MG: 50 INJECTION, SOLUTION INTRAMUSCULAR; INTRAVENOUS at 10:23

## 2024-04-24 RX ADMIN — METOCLOPRAMIDE HYDROCHLORIDE 10 MG: 5 INJECTION INTRAMUSCULAR; INTRAVENOUS at 10:23

## 2024-04-24 NOTE — UTILIZATION REVIEW
Initial Clinical Review    Admission: Date/Time/Statement:   Admission Orders (From admission, onward)       Ordered        04/23/24 1539  INPATIENT ADMISSION  Once                          Orders Placed This Encounter   Procedures    INPATIENT ADMISSION     Standing Status:   Standing     Number of Occurrences:   1     Order Specific Question:   Level of Care     Answer:   Med Surg [16]     Order Specific Question:   Estimated length of stay     Answer:   More than 2 Midnights     Order Specific Question:   Certification     Answer:   I certify that inpatient services are medically necessary for this patient for a duration of greater than two midnights. See H&P and MD Progress Notes for additional information about the patient's course of treatment.     ED Arrival Information       Expected   4/23/2024     Arrival   4/23/2024 09:54    Acuity   Urgent              Means of arrival   Walk-In    Escorted by   Self    Service   Neurology    Admission type   Emergency              Arrival complaint   Dizziness             Chief Complaint   Patient presents with    Dizziness     C/o of being bitten by a bug in [place], scratched it. Then a pain radiated up his leg, then intermittent dizziness for the past few days, headaches and then while stretching at work almost feel over.        Initial Presentation: 53 y.o. male presents to ed from urgent care  by co worker  on 4-23-24 for evaluation and treatment of dizziness with headache and nausea.   Patient reports traveling to [place] where he was bitten by an insect.  He scratched the area and noted pain radiating up his leg. Afterwards he began having episodes of dizziness and headaches, especially when bending forward.   At work today  he became severely dizzy while stretching down nearly falling . PMHX: DM, HTN. Clinical assessment significant for slight ataxia with ambulation and left beating nystagmus.. NIHSS =0  Approximately 2 hrs after after arrival,  patient reported chest pain with nausea and burning of chest and upper abdomen. EKG: incomplete RBBB  Repeat EKG: no ischemic changes. Imaging w/o acute finding. Initially treated with iv .9% ns 1L bolus x 1, iv zofran x1,  iv msor x1, iv pepcid x1, iv protonix x1, carafate x1, iv toradol x1, iv Mag x2, po valium x1.  Admit to inpatient med surg for dizziness. Suspect peripheral vestibulopathy.  Given persistent symptoms despite meclixine will obtain MRI to rule out acute intracranial pathology.  Also consider vestibular migraine.  Plan MRI brain, meclizine not helpful, trial valium, monitor on telemetry, keep blood pressure normotensive, neuro checks, PT/OT evaluations    Thrombolytic Decision: Patient not a candidate. Bleeding risk. CAA on prior MRI brain. He also has had intermittent dizziness for the past week.     Anticipated Length of Stay/Certification Statement: Patient will be admitted on an Emergency basis with an anticipated length of stay of  2 midnights.     Justification for Hospital Stay: requiring MRI brain    Date: 4-24-24    Day 2: inpatient med surg  Patient with headache pain 5/ 10.  Telemetry: sinus rhythm. GCS=15  MRI of brain without acute ischemia. There are scattered foci of white matter T2 hyperintensity which are likely chronic small vessel ischemic changes. Other etiologies include : demyelinating disease, vasculitis, lyme and migraine.  Plan includes : scheduled iv reglan q8 hr,iv toradol q8 hr and iv benadryl q8 hr with sq lovenox, po protonix, po pepcid,  lisinopril and hydrochlorothiazide. Continue neuro checks and telemetry.  Obtain PT/OT evaluations.  Check B 12 and Hba1c.        ED Triage Vitals   04/23/24 0959 04/23/24 0959 04/23/24 0959 04/23/24 0959 04/23/24 0959   97.7 °F (36.5 °C) 67 18 166/94 99 %      Temporal Monitor         Pain Score       4          04/23/24 119 kg (263 lb)     Additional Vital Signs:   Date/Time Temp Pulse Resp BP MAP (mmHg) SpO2 O2 Device   04/24/24  07:47:52 97.8 °F (36.6 °C) 56 -- 120/59 79 96 % --   04/23/24 21:13:08 97.7 °F (36.5 °C) 61 18 141/86 104 97 % None (Room air)   04/23/24 1940 98.3 °F (36.8 °C) 64 16 121/77 92 97 % --   04/23/24 16:46:08 97.8 °F (36.6 °C) 65 16 144/82 103 96 % None (Room air)   04/23/24 1600 -- 64 16 134/60 87 98 % None (Room air)   04/23/24 1500 -- 60 13 117/58 -- 97 % None (Room air)   04/23/24 1445 -- 60 13 139/63 -- 96 % None (Room air)   04/23/24 1430 -- 62 20 147/70 -- 97 % None (Room air)   04/23/24 1415 -- 60 16 138/65 -- 97 % None (Room air)   04/23/24 1400 -- 60 12 165/72 -- 97 % None (Room air)   04/23/24 1345 -- 64 16 173/83   Abnormal  -- 97 % None (Room air)   04/23/24 13:30:08 -- 66 18 155/85 -- 97 % None (Room air)   04/23/24 1315 -- 60 12 147/76 -- 97 % None (Room air)   04/23/24 1300 -- 62 20 145/72 -- 96 % None (Room air)   04/23/24 1245 -- 64 15 147/81 -- 95 % None (Room air)   04/23/24 1230 -- 62 12 145/80 -- 97 % None (Room air)   04/23/24 1215 -- 62 12 160/73 -- 95 % None (Room air)   04/23/24 1200 -- 66 14 144/72 -- 97 % None (Room air)   04/23/24 1145 -- 68 16 159/80 -- 96 % None (Room air)   04/23/24 1130 -- 72 21 165/77 -- 98 % None (Room air)   04/23/24 1115 -- 69 20 160/64 -- 98 % None (Room air)   04/23/24 0959 97.7 °F (36.5 °C) 67 18 166/94 106 99 % None (Room air)               Pertinent Labs/Diagnostic Test Results:     MRI brain wo contrast   Final (04/24 0150)      No acute intracranial abnormality. No restricted diffusion to suggest acute ischemia.      Mild scattered periventricular and subcortical foci of white matter T2 hyperintensity which are nonspecific and most likely related to chronic small vessel ischemic changes. Other less like etiologies include demyelinating disease, vasculitis, Lyme and migraines.         Reidentified and normal punctate foci of susceptibility artifact are seen in the supratentorial brain centrally at the gray-white junction peripherally in distribution most  compatible with cerebral amyloid angiopathy.  Chronic microhemorrhage in the setting of hypertension is less likely with this distribution but not excluded.            XR chest 1 view portable   Final (04/23 2241)      No acute cardiopulmonary disease.         CTA stroke alert (head/neck)   Final Result by Dave Figueredo MD (04/23 1130)      Negative CTA head and neck for large vessel occlusion, dissection, aneurysm, or high-grade stenosis.            CT stroke alert brain   Final  (04/23 1121)      No acute intracranial abnormality.           Results from last 7 days   Lab Units 04/23/24  1125   SARS-COV-2  Negative     Results from last 7 days   Lab Units 04/23/24  1023   WBC Thousand/uL 5.94   HEMOGLOBIN g/dL 15.0   HEMATOCRIT % 44.6   PLATELETS Thousands/uL 173   TOTAL NEUT ABS Thousands/µL 3.03         Results from last 7 days   Lab Units 04/23/24  1023   SODIUM mmol/L 136   POTASSIUM mmol/L 4.4   CHLORIDE mmol/L 101   CO2 mmol/L 27   ANION GAP mmol/L 8   BUN mg/dL 16   CREATININE mg/dL 0.79   EGFR ml/min/1.73sq m 101   CALCIUM mg/dL 9.5     Results from last 7 days   Lab Units 04/23/24  1023   AST U/L 34   ALT U/L 72*   ALK PHOS U/L 65   TOTAL PROTEIN g/dL 6.9   ALBUMIN g/dL 4.4   TOTAL BILIRUBIN mg/dL 0.51     Results from last 7 days   Lab Units 04/24/24  0627 04/23/24  2054 04/23/24  1655 04/23/24  0827   POC GLUCOSE mg/dl 179* 177* 121 199*     Results from last 7 days   Lab Units 04/23/24  1023   GLUCOSE RANDOM mg/dL 138     Results from last 7 days   Lab Units 04/23/24  1718 04/23/24  1205 04/23/24  1023   HS TNI 0HR ng/L  --   --  4   HS TNI 2HR ng/L  --  3  --    HSTNI D2 ng/L  --  -1  --    HS TNI 4HR ng/L 5  --   --    HSTNI D4 ng/L 1  --   --          Results from last 7 days   Lab Units 04/23/24  1023   PROTIME seconds 12.9   INR  0.98   PTT seconds 26     Results from last 7 days   Lab Units 04/23/24  1125   INFLUENZA A PCR  Negative   INFLUENZA B PCR  Negative   RSV PCR  Negative        ED Treatment:   Medication Administration from 04/23/2024 0924 to 04/23/2024 1640         Date/Time Order Dose Route Action     04/23/2024 1050 EDT sodium chloride 0.9 % bolus 1,000 mL 1,000 mL Intravenous New Bag     04/23/2024 1104 EDT ondansetron (ZOFRAN) injection 4 mg 4 mg Intravenous Given     04/23/2024 1137 EDT morphine injection 4 mg 4 mg Intravenous Given     04/23/2024 1157 EDT Famotidine (PF) (PEPCID) injection 20 mg 20 mg Intravenous Given     04/23/2024 1157 EDT pantoprazole (PROTONIX) injection 40 mg 40 mg Intravenous Given     04/23/2024 1157 EDT sucralfate (CARAFATE) tablet 1 g 1 g Oral Given     04/23/2024 1157 EDT meclizine (ANTIVERT) tablet 50 mg 50 mg Oral Given     04/23/2024 1313 EDT ketorolac (TORADOL) injection 15 mg 15 mg Intravenous Given     04/23/2024 1511 EDT magnesium sulfate 2 g/50 mL IVPB (premix) 2 g 2 g Intravenous New Bag     04/23/2024 1511 EDT diazepam (VALIUM) tablet 5 mg 5 mg Oral Given          Past Medical History:   Diagnosis Date    Achalasia     Aneurysm (HCC)     aortic    Asthma     Childhood    Colon polyp     Diabetes mellitus (HCC)     Esophageal ulcer     Fatty liver     Groin abscess     with I &D    Hyperlipidemia     Hypertension     Plantar fasciitis      Present on Admission:   Hypertension, essential   Diabetes mellitus (HCC)      Admitting Diagnosis:     Dizziness [R42]  Gait instability [R26.81]  Dizzy [R42]  Headache [R51.9]    Age/Sex: 53 y.o. male    Scheduled Medications:    vitamin C, 1,000 mg, Oral, Daily  enoxaparin, 40 mg, Subcutaneous, Daily  famotidine, 20 mg, Oral, Daily  lisinopril, 40 mg, Oral, Daily   And  hydroCHLOROthiazide, 25 mg, Oral, Daily  insulin lispro, 2-12 Units, Subcutaneous, TID AC  pantoprazole, 40 mg, Oral, Early Morning  pravastatin, 40 mg, Oral, Daily With Dinner  pregabalin, 150 mg, Oral, HS      Continuous IV Infusions:     PRN Meds:  acetaminophen, 650 mg, Oral, Q6H PRN  baclofen, 20 mg, Oral, HS PRN        IP CONSULT  TO NEUROLOGY    Network Utilization Review Department  ATTENTION: Please call with any questions or concerns to [phone] and carefully listen to the prompts so that you are directed to the right person. All voicemails are confidential.   For Discharge needs, contact Care Management DC Support Team at [phone] opt. 2  Send all requests for admission clinical reviews, approved or denied determinations and any other requests to dedicated fax number below belonging to the campus where the patient is receiving treatment. List of dedicated fax numbers for the Facilities:  FACILITY NAME UR FAX NUMBER   ADMISSION DENIALS (Administrative/Medical Necessity) [phone]   DISCHARGE SUPPORT TEAM (NETWORK) [phone]   PARENT CHILD HEALTH (Maternity/NICU/Pediatrics) [phone]   [facility] [phone]   [facility] [phone]   [facility] [phone]   [facility] [phone]   [facility] [phone]   [facility] [phone]   [facility] [phone]   [facility] [phone]   [facility] [phone]   [facility] [phone]   [facility] [phone]   [facility] [phone]

## 2024-04-24 NOTE — PLAN OF CARE
Problem: OCCUPATIONAL THERAPY ADULT  Goal: Performs self-care activities at highest level of function for planned discharge setting.  See evaluation for individualized goals.  Description: Treatment Interventions: ADL retraining, UE strengthening/ROM, Functional transfer training, Activityengagement, Continued evaluation, Compensatory technique education, Fine motor coordination activities, Neuromuscular reeducation, Equipment evaluation/education, Patient/family training, Visual perceptual retraining  Equipment Recommended:  (TBD)       See flowsheet documentation for full assessment, interventions and recommendations.   4/24/2024 1408 by Raquel Ahmadi OT  Note: Limitation: Decreased ADL status, Decreased endurance, Decreased self-care trans, Decreased high-level ADLs  Prognosis: Good  Assessment: Pt is a 53 y.o. male who was admitted to [facility] on 4/23/2024 with acute onset of dizziness, (when bending forward with stretching exercises at work), headaches, mild photophobia, HTN, diabetes + MRI w/ very suspicious appearing microhemorrhage burden . Patient  has a past medical history of Achalasia, Aneurysm (HCC), Asthma, Colon polyp, Diabetes mellitus (HCC), Esophageal ulcer, Fatty liver, Groin abscess, Hyperlipidemia, Hypertension, and Plantar fasciitis.  At baseline pt was completing I with ALD's/IaDL's, no AD with functional mobility, +drives, works full time. Pt lives with spouse and 3 adult children in a split level home with 2+2 JARED. Currently pt requires S UB/min a LB for overall ADLS and min a with RW and chair follow  for functional mobility/transfers. Pt currently presents with impairments in the following categories -steps to enter environment, difficulty performing ADLS, and difficulty performing IADLS  activity tolerance, endurance, standing balance/tolerance, sitting balance/tolerance, and visual perceptual skills  visual attention. These impairments, as well as pt's fatigue, pain,  decreased caregiver support, and risk for falls dizziness  limit pt's ability to safely engage in all baseline areas of occupation, includingbathing, dressing, toileting, functional mobility/transfers, community mobility, laundry , driving, house maintenance, medication management, meal prep, cleaning, work/volunteer work , social participation , and leisure activities  The patient's raw score on the -PAC Daily Activity Inpatient Short Form is 19. A raw score of greater than or equal to 19 suggests the patient may benefit from discharge to home. Please refer to the recommendation of the Occupational Therapist for safe discharge planning. From OT standpoint, recommend max level I upon D/C. OT will continue to follow to address the below stated goals.     Rehab Resource Intensity Level, OT: I (Maximum Resource Intensity)     Raquel Ahmadi OTR/L

## 2024-04-24 NOTE — CASE MANAGEMENT
Case Management Assessment    Patient name Jeremy George  Location [facility] MRN [id number]  : 1970 Date 2024       Current Admission Date: 2024  Current Admission Diagnosis:Dizziness   Patient Active Problem List    Diagnosis Date Noted    Dizziness 2024    Gastroesophageal reflux disease without esophagitis 10/04/2023    Chest pain, atypical 10/04/2023    Abnormal CT scan, esophagus 10/04/2023    Mild intermittent asthma without complication 2023    Thoracic aortic aneurysm without rupture, unspecified part (HCC) 2023    Lung nodule 2023    Groin strain 2023    Chronic pain syndrome 2022    Tremor, essential 2022    Other hyperlipidemia 2021    Cervical spondylosis without myelopathy 2020    S/P cervical spinal fusion 2020    Cervical radiculopathy 2020    HNP (herniated nucleus pulposus), thoracic 2020    Hypertension, essential 2017    Diabetes mellitus (HCC) 2015      LOS (days): 1  Geometric Mean LOS (GMLOS) (days):   Days to GMLOS:     OBJECTIVE:    Risk of Unplanned Readmission Score: 8.32         Current admission status: Inpatient  Referral Reason: Other    Preferred Pharmacy:   [facility] [address] [address]  Phone: [phone] Fax: [phone]    [id number]  [place], [place] - [address]  [address]  [place] [address]  Phone: [phone] Fax: [phone]    Primary Care Provider: MIKHAIL Ha    Primary Insurance: BLUE CROSS  Secondary Insurance:     ASSESSMENT:  Active Health Care Proxies    There are no active Health Care Proxies on file.       Advance Directives  Does patient have a Health Care POA?: Yes  Does patient have Advance Directives?: Yes  Advance Directives: Power of  for health care  Primary Contact: Ynes George  (Spouse)  [phone] (Mobile)         Readmission Root Cause  30 Day Readmission: No    Patient Information  Admitted from:: Home  Mental Status: Alert  During Assessment patient was accompanied by: Not accompanied during assessment  Assessment information provided by:: Patient  Primary Caregiver: Self  Support Systems: Self, Spouse/significant other, Family members  County of Residence: [place]  What city do you live in?: [place]  Home entry access options. Select all that apply.: Stairs  Number of steps to enter home.: 5  Type of Current Residence: Bi-level  Upon entering residence, is there a bedroom on the main floor (no further steps)?: No  A bedroom is located on the following floor levels of residence (select all that apply):: 2nd Floor  Upon entering residence, is there a bathroom on the main floor (no further steps)?: No  Indicate which floors of current residence have a bathroom (select all the apply):: 2nd Floor  Number of steps to 2nd floor from main floor: 5  Living Arrangements: Lives w/ Spouse/significant other, Lives w/ Daughter, Lives w/ Son  Is patient a ?: No    Activities of Daily Living Prior to Admission  Functional Status: Independent  Completes ADLs independently?: Yes  Ambulates independently?: Yes  Does patient use assisted devices?: No  Does patient currently own DME?: No  Does patient have a history of Outpatient Therapy (PT/OT)?: No  Does the patient have a history of Short-Term Rehab?: No  Does patient have a history of HHC?: No  Does patient currently have HHC?: No         Patient Information Continued  Income Source: Employed  Does patient have prescription coverage?: Yes  Does patient receive dialysis treatments?: No  Does patient have a history of substance abuse?: No  Does patient have a history of Mental Health Diagnosis?: No         Means of Transportation  Means of Transport to Appts:: Drives Self      Social Determinants of Health (SDOH)      Flowsheet Row Most  Recent Value   Housing Stability    In the last 12 months, was there a time when you were not able to pay the mortgage or rent on time? N   In the last 12 months, how many places have you lived? 1   In the last 12 months, was there a time when you did not have a steady place to sleep or slept in a shelter (including now)? N   Transportation Needs    In the past 12 months, has lack of transportation kept you from medical appointments or from getting medications? no   In the past 12 months, has lack of transportation kept you from meetings, work, or from getting things needed for daily living? No   Food Insecurity    Within the past 12 months, you worried that your food would run out before you got the money to buy more. Never true   Within the past 12 months, the food you bought just didn't last and you didn't have money to get more. Never true   Utilities    In the past 12 months has the electric, gas, oil, or water company threatened to shut off services in your home? No          CM reviewed d/c planning process including the following: identifying help at home, patient preference for d/c planning needs,[facility] Meds to Bed program, availability of treatment team to discuss questions or concerns patient and/or family may have regarding understanding medications and recognizing signs and symptoms once discharged. CM also encouraged patient to follow up with all recommended appointments after discharge. Patient advised of importance for patient and family to participate in managing patient’s medical well being.

## 2024-04-24 NOTE — PLAN OF CARE
Problem: PAIN - ADULT  Goal: Verbalizes/displays adequate comfort level or baseline comfort level  Description: Interventions:  - Encourage patient to monitor pain and request assistance  - Assess pain using appropriate pain scale  - Administer analgesics based on type and severity of pain and evaluate response  - Implement non-pharmacological measures as appropriate and evaluate response  - Consider cultural and social influences on pain and pain management  - Notify physician/advanced practitioner if interventions unsuccessful or patient reports new pain  Outcome: Not Progressing       Problem: SAFETY ADULT  Goal: Maintain or return to baseline ADL function  Description: INTERVENTIONS:  -  Assess patient's ability to carry out ADLs; assess patient's baseline for ADL function and identify physical deficits which impact ability to perform ADLs (bathing, care of mouth/teeth, toileting, grooming, dressing, etc.)  - Assess/evaluate cause of self-care deficits   - Assess range of motion  - Assess patient's mobility; develop plan if impaired  - Assess patient's need for assistive devices and provide as appropriate  - Encourage maximum independence but intervene and supervise when necessary  - Involve family in performance of ADLs  - Assess for home care needs following discharge   - Consider OT consult to assist with ADL evaluation and planning for discharge  - Provide patient education as appropriate  Outcome: Not Progressing

## 2024-04-24 NOTE — PHYSICAL THERAPY NOTE
Physical Therapy Evaluation    Patient Name: Jeremy George    Today's Date: 4/24/2024     Problem List  Active Problems:    Diabetes mellitus (HCC)    Hypertension, essential    Dizziness       Past Medical History  Past Medical History:   Diagnosis Date    Achalasia     Aneurysm (HCC)     aortic    Asthma     Childhood    Colon polyp     Diabetes mellitus (HCC)     Esophageal ulcer     Fatty liver     Groin abscess     with I &D    Hyperlipidemia     Hypertension     Plantar fasciitis         Past Surgical History  Past Surgical History:   Procedure Laterality Date    ANTERIOR CERVICAL DISCECTOMY W/ FUSION      APPENDECTOMY      BICEPS TENDON REPAIR      EGD AND COLONOSCOPY      FL INJECTION LEFT WRIST (ARTHROGRAM)  11/9/2021    FL MYELOGRAM CERVICAL  6/13/2018    MENISCECTOMY      MYOTOMY HELLER LAPAROSCOPIC      ORTHOPEDIC SURGERY           04/24/24 0851   PT Last Visit   PT Visit Date 04/24/24   Note Type   Note type Evaluation   Pain Assessment   Pain Assessment Tool 0-10   Pain Score 5   Pain Location/Orientation Location: Head   Restrictions/Precautions   Weight Bearing Precautions Per Order No   Other Precautions Chair Alarm;Bed Alarm;Multiple lines;Telemetry;Fall Risk;Pain  (photophobia, dizziness, imbalance)   Home Living   Type of Home House   Home Layout Multi-level;Stairs to enter with rails;Bed/bath upstairs  (split level, 4 JARED, 5 steps up vs down)   Bathroom Shower/Tub Tub/shower unit   Bathroom Toilet Standard   Bathroom Equipment Commode   Home Equipment Walker   Additional Comments no DME use PTA   Prior Function   Level of [place] Independent with ADLs;Independent with functional mobility;Independent with IADLS   Lives With Spouse;Family  (3 adult children)   Receives Help From Family   IADLs Independent with driving;Independent with meal prep;Independent with medication management   Falls in the last 6 months 0   Vocational Full time  employment  ( for [employer])   General   Family/Caregiver Present No   Cognition   Overall Cognitive Status WFL   Arousal/Participation Cooperative   Orientation Level Oriented X4   Memory Within functional limits   Following Commands Follows all commands and directions without difficulty   Comments very pleasant   Subjective   Subjective agreeable   RLE Assessment   RLE Assessment WFL   LLE Assessment   LLE Assessment WFL   Vestibular   Vestibular Comments nystagmus and L gaze and difficulty tracking to R (closing eyes, unable to A nystagmus), pt reported difficulty performing horizontal saccades, impaired VOR. Defer testing with neck movement as pt with h/o ACDF. per chart review, +head impulse R>L. +romberg with drift to L with eyes closed   Light Touch   RLE Light Touch Grossly intact   LLE Light Touch Grossly intact   Bed Mobility   Supine to Sit Unable to assess   Sit to Supine Unable to assess   Transfers   Sit to Stand 4  Minimal assistance   Additional items Assist x 1;Increased time required;Verbal cues   Stand to Sit 4  Minimal assistance   Additional items Assist x 1;Increased time required;Verbal cues   Additional Comments c RW   Ambulation/Elevation   Gait pattern Improper Weight shift;Decreased foot clearance;Short stride;Excessively slow  (unsteady)   Gait Assistance 4  Minimal assist   Additional items Assist x 1;Verbal cues  (+chair follow of 2nd)   Assistive Device Rolling walker   Distance 30'   Balance   Static Sitting Fair   Dynamic Sitting Fair -   Static Standing Poor +   Dynamic Standing Poor +   Ambulatory Poor   Endurance Deficit   Endurance Deficit Yes   Endurance Deficit Description dizziness   Activity Tolerance   Activity Tolerance Patient limited by fatigue   Medical Staff Made Aware OT MAXIM   Nurse Made Aware yes-cleared   Assessment   Prognosis Good   Problem List Decreased endurance;Decreased mobility;Impaired balance;Impaired vision   Assessment Pt is a 53 y.o. male  admitted to \[facility]\"" on 4/23/24 with acute onset dizziness-several spontaneous nonspecific episodes with one more severe and persistent when bending over to touch toes. Pt received a primary medical dx of dizziness. Work up pending. ? R vestibulopathy vs vestibular migraine. Pt has the following comorbidities which affect their treatment: active problems listed above,  has a past medical history of Achalasia, Aneurysm (HCC), Asthma, Colon polyp, Diabetes mellitus (HCC), Esophageal ulcer, Fatty liver, Groin abscess, Hyperlipidemia, Hypertension, and Plantar fasciitis.  , as well as personal factors including stairs to access home. Pt has a high complexity clinical presentation due to Ongoing medical management for primary dx, Increased reliance on more restrictive AD compared to baseline, Decreased activity tolerance compared to baseline, Fall risk, Increased assistance needed from caregiver at current time, Ongoing telemetry monitoring, Trending lab values, Diagnostic imaging pending, Continuous pulse oximetry monitoring  , and PMH. PT was consulted to evaluate pt's functional mobility and discharge needs. Upon evaluation, patient required minAx1 for STS transfers, minAx1 for ambulation. Body system impairments include photophobia, +pain, impaired balance and oculomotor screen, impaired endurance. Pt's functional activity impairments include: +romberg, impaired activity tolerance and mobility. Participation restrictions include inability to safely return to home, community, and work. At conclusion of eval, pt remained seated in chair with chair alarm, phone, call bell, and all other personal needs within reach. Pt would benefit from skilled PT to address their functional mobility limitations. The patient's AM-PAC Basic Mobility Inpatient Short Form Raw Score is 17. A Raw score of greater than 16 suggests the patient may benefit from discharge to home. Please also refer to the recommendation of the Physical Therapist for  safe discharge planning.   Barriers to Discharge Decreased caregiver support;Inaccessible home environment   Goals   Patient Goals to improve   STG Expiration Date 05/08/24   Short Term Goal #1 In 14 days, pt will: 1) perform bed mobility with mod I to promote functional independence and decrease caregiver burden. 2) perform transfers to<>from all surfaces with mod I to promote functional independence and decrease caregiver burden. 3) ambulate 300' with mod I and least restrictive device to promote safe return to home. 4) negotiate 10 stairs with mod I to promote safe return to home, community, work. 5) improve balance grades by 1/2 to promote safety with functional mobility. 6) improve strength grades by 1/2 to promote safety with functional mobility.   PT Treatment Day 0   Plan   Treatment/Interventions Functional transfer training;LE strengthening/ROM;Elevations;Therapeutic exercise;Endurance training;Patient/family training;Equipment eval/education;Bed mobility;Gait training;Spoke to nursing;Spoke to case management;OT   PT Frequency 3-5x/wk   Discharge Recommendation   Rehab Resource Intensity Level, PT (S)  I (Maximum Resource Intensity)   Equipment Recommended Walker   Walker Package Recommended Wheeled walker   AM-PAC Basic Mobility Inpatient   Turning in Flat Bed Without Bedrails 3   Lying on Back to Sitting on Edge of Flat Bed Without Bedrails 3   Moving Bed to Chair 3   Standing Up From Chair Using Arms 3   Walk in Room 3   Climb 3-5 Stairs With Railing 2   Basic Mobility Inpatient Raw Score 17   Basic Mobility Standardized Score 39.67   [facility] Highest Level Of Mobility   -HLM Goal 5: Stand one or more mins   JH-HLM Achieved 7: Walk 25 feet or more   Modified [place]ruz Scale   Modified [place] Scale 4   Barthel Index   Feeding 10   Bathing 0   Grooming Score 0   Dressing Score 10   Bladder Score 10   Bowels Score 10   Toilet Use Score 5   Transfers (Bed/Chair) Score 10   Mobility (Level Surface) Score  0   Stairs Score 0   Barthel Index Score 55   Jm Mcconnell, PT, DPT

## 2024-04-24 NOTE — PHYSICAL THERAPY NOTE
Physical Therapy Evaluation    Patient Name: Jeremy George    Today's Date: 4/24/2024     Problem List  Active Problems:    Diabetes mellitus (HCC)    Hypertension, essential    Dizziness       Past Medical History  Past Medical History:   Diagnosis Date    Achalasia     Aneurysm (HCC)     aortic    Asthma     Childhood    Colon polyp     Diabetes mellitus (HCC)     Esophageal ulcer     Fatty liver     Groin abscess     with I &D    Hyperlipidemia     Hypertension     Plantar fasciitis         Past Surgical History  Past Surgical History:   Procedure Laterality Date    ANTERIOR CERVICAL DISCECTOMY W/ FUSION      APPENDECTOMY      BICEPS TENDON REPAIR      EGD AND COLONOSCOPY      FL INJECTION LEFT WRIST (ARTHROGRAM)  11/9/2021    FL MYELOGRAM CERVICAL  6/13/2018    MENISCECTOMY      MYOTOMY HELLER LAPAROSCOPIC      ORTHOPEDIC SURGERY        04/24/24 0851   PT Last Visit   PT Visit Date 04/24/24   Note Type   Note type Evaluation   Pain Assessment   Pain Assessment Tool 0-10   Pain Score 5   Pain Location/Orientation Location: Head   Restrictions/Precautions   Weight Bearing Precautions Per Order No   Other Precautions Chair Alarm;Bed Alarm;Multiple lines;Telemetry;Fall Risk;Pain  (photophobia, dizziness, imbalance)   Home Living   Type of Home House   Home Layout Multi-level;Stairs to enter with rails;Bed/bath upstairs  (split level, 4 JARED, 5 steps up vs down)   Bathroom Shower/Tub Tub/shower unit   Bathroom Toilet Standard   Bathroom Equipment Commode   Home Equipment Walker   Additional Comments no DME use PTA   Prior Function   Level of [place] Independent with ADLs;Independent with functional mobility;Independent with IADLS   Lives With Spouse;Family  (3 adult children)   Receives Help From Family   IADLs Independent with driving;Independent with meal prep;Independent with medication management   Falls in the last 6 months 0   Vocational Full time  employment  ( for [employer])   General   Family/Caregiver Present No   Cognition   Overall Cognitive Status WFL   Arousal/Participation Cooperative   Orientation Level Oriented X4   Memory Within functional limits   Following Commands Follows all commands and directions without difficulty   Comments very pleasant   Subjective   Subjective agreeable   RLE Assessment   RLE Assessment WFL   LLE Assessment   LLE Assessment WFL   Vestibular   Vestibular Comments nystagmus and L gaze and difficulty tracking to R (closing eyes, unable to A nystagmus), pt reported difficulty performing horizontal saccades, impaired VOR. Defer testing with neck movement as pt with h/o ACDF. per chart review, +head impulse R>L. +romberg with drift to L with eyes closed   Light Touch   RLE Light Touch Grossly intact   LLE Light Touch Grossly intact   Bed Mobility   Supine to Sit Unable to assess   Sit to Supine Unable to assess   Transfers   Sit to Stand 4  Minimal assistance   Additional items Assist x 1;Increased time required;Verbal cues   Stand to Sit 4  Minimal assistance   Additional items Assist x 1;Increased time required;Verbal cues   Additional Comments c RW   Ambulation/Elevation   Gait pattern Improper Weight shift;Decreased foot clearance;Short stride;Excessively slow  (unsteady)   Gait Assistance 4  Minimal assist   Additional items Assist x 1;Verbal cues  (+chair follow of 2nd)   Assistive Device Rolling walker   Distance 30'   Balance   Static Sitting Fair   Dynamic Sitting Fair -   Static Standing Poor +   Dynamic Standing Poor +   Ambulatory Poor   Endurance Deficit   Endurance Deficit Yes   Endurance Deficit Description dizziness   Activity Tolerance   Activity Tolerance Patient limited by fatigue   Medical Staff Made Aware OT MAXIM   Nurse Made Aware yes-cleared   Assessment   Prognosis Good   Problem List Decreased endurance;Decreased mobility;Impaired balance;Impaired vision   Assessment Pt is a 53 y.o. male  admitted to [facility] on 4/23/24 with acute onset dizziness-several spontaneous nonspecific episodes with one more severe and persistent when bending over to touch toes. Pt received a primary medical dx of dizziness. Work up pending. ? R vestibulopathy vs vestibular migraine. Pt has the following comorbidities which affect their treatment: active problems listed above,  has a past medical history of Achalasia, Aneurysm (HCC), Asthma, Colon polyp, Diabetes mellitus (HCC), Esophageal ulcer, Fatty liver, Groin abscess, Hyperlipidemia, Hypertension, and Plantar fasciitis.  , as well as personal factors including stairs to access home. Pt has a high complexity clinical presentation due to Ongoing medical management for primary dx, Increased reliance on more restrictive AD compared to baseline, Decreased activity tolerance compared to baseline, Fall risk, Increased assistance needed from caregiver at current time, Ongoing telemetry monitoring, Trending lab values, Diagnostic imaging pending, Continuous pulse oximetry monitoring  , and PMH. PT was consulted to evaluate pt's functional mobility and discharge needs. Upon evaluation, patient required minAx1 for STS transfers, minAx1 for ambulation. Body system impairments include photophobia, +pain, impaired balance and oculomotor screen, impaired endurance. Pt's functional activity impairments include: +romberg, impaired activity tolerance and mobility. Participation restrictions include inability to safely return to home, community, and work. At conclusion of eval, pt remained seated in chair with chair alarm, phone, call bell, and all other personal needs within reach. Pt would benefit from skilled PT to address their functional mobility limitations. The patient's AM-PAC Basic Mobility Inpatient Short Form Raw Score is 17. A Raw score of greater than 16 suggests the patient may benefit from discharge to home. Please also refer to the recommendation of the Physical Therapist for  safe discharge planning.   Barriers to Discharge Decreased caregiver support;Inaccessible home environment   Goals   Patient Goals to improve   STG Expiration Date 05/08/24   PT Treatment Day 0   Plan   Treatment/Interventions Functional transfer training;LE strengthening/ROM;Elevations;Therapeutic exercise;Endurance training;Patient/family training;Equipment eval/education;Bed mobility;Gait training;Spoke to nursing;Spoke to case management;OT   PT Frequency 3-5x/wk   Discharge Recommendation   Rehab Resource Intensity Level, PT (S)  I (Maximum Resource Intensity)   Equipment Recommended Walker   Walker Package Recommended Wheeled walker   AM-PAC Basic Mobility Inpatient   Turning in Flat Bed Without Bedrails 3   Lying on Back to Sitting on Edge of Flat Bed Without Bedrails 3   Moving Bed to Chair 3   Standing Up From Chair Using Arms 3   Walk in Room 3   Climb 3-5 Stairs With Railing 2   Basic Mobility Inpatient Raw Score 17   Basic Mobility Standardized Score 39.67   [facility] Highest Level Of Mobility   -[facility] Goal 5: Stand one or more mins   -HLM Achieved 7: Walk 25 feet or more   Modified [place] Scale   Modified [place] Scale 4   Barthel Index   Feeding 10   Bathing 0   Grooming Score 0   Dressing Score 10   Bladder Score 10   Bowels Score 10   Toilet Use Score 5   Transfers (Bed/Chair) Score 10   Mobility (Level Surface) Score 0   Stairs Score 0   Barthel Index Score 55   Jm Mcconnell, PT, DPT

## 2024-04-24 NOTE — PLAN OF CARE
Problem: PHYSICAL THERAPY ADULT  Goal: Performs mobility at highest level of function for planned discharge setting.  See evaluation for individualized goals.  Description: Treatment/Interventions: Functional transfer training, LE strengthening/ROM, Elevations, Therapeutic exercise, Endurance training, Patient/family training, Equipment eval/education, Bed mobility, Gait training, Spoke to nursing, Spoke to case management, OT  Equipment Recommended: Walker       See flowsheet documentation for full assessment, interventions and recommendations.  Note: Prognosis: Good  Problem List: Decreased endurance, Decreased mobility, Impaired balance, Impaired vision  Assessment: Pt is a 53 y.o. male admitted to [facility] on 4/23/24 with acute onset dizziness-several spontaneous nonspecific episodes with one more severe and persistent when bending over to touch toes. Pt received a primary medical dx of dizziness. Work up pending. ? R vestibulopathy vs vestibular migraine. Pt has the following comorbidities which affect their treatment: active problems listed above,  has a past medical history of Achalasia, Aneurysm (HCC), Asthma, Colon polyp, Diabetes mellitus (HCC), Esophageal ulcer, Fatty liver, Groin abscess, Hyperlipidemia, Hypertension, and Plantar fasciitis.  , as well as personal factors including stairs to access home. Pt has a high complexity clinical presentation due to Ongoing medical management for primary dx, Increased reliance on more restrictive AD compared to baseline, Decreased activity tolerance compared to baseline, Fall risk, Increased assistance needed from caregiver at current time, Ongoing telemetry monitoring, Trending lab values, Diagnostic imaging pending, Continuous pulse oximetry monitoring  , and PMH. PT was consulted to evaluate pt's functional mobility and discharge needs. Upon evaluation, patient required minAx1 for STS transfers, minAx1 for ambulation. Body system impairments include photophobia,  +pain, impaired balance and oculomotor screen, impaired endurance. Pt's functional activity impairments include: +romberg, impaired activity tolerance and mobility. Participation restrictions include inability to safely return to home, community, and work. At conclusion of eval, pt remained seated in chair with chair alarm, phone, call bell, and all other personal needs within reach. Pt would benefit from skilled PT to address their functional mobility limitations. The patient's AM-[facility] Basic Mobility Inpatient Short Form Raw Score is 17. A Raw score of greater than 16 suggests the patient may benefit from discharge to home. Please also refer to the recommendation of the Physical Therapist for safe discharge planning.  Barriers to Discharge: Decreased caregiver support, Inaccessible home environment     Rehab Resource Intensity Level, PT: (S) I (Maximum Resource Intensity)    See flowsheet documentation for full assessment.

## 2024-04-24 NOTE — PROGRESS NOTES
"    NEUROLOGY RESIDENCY PROGRESS NOTE     Name: Jeremy George   Age & Sex: 53 y.o. male   MRN: [id number]  Unit/Bed#: [facility] 707-01   Encounter: [id number]    Recommendations for outpatient neurological follow up have yet to be determined.     ASSESSMENT & PLAN     Dizziness  Assessment & Plan  53 y.o. male with prior vertigo episode 20+ years ago, suspected CAA, headaches, HTN, HLD, DM2, cervical radiculopathy s/p ACDF, tremors, and recent travel to [place] with intermittent dizziness for the past week who presented to [facility] on 4/23/2024 as a stroke alert for persistent dizziness.    Patient has had intermittent dizziness over the past week that only lasted a few minutes. Day of presentation, patient was bending down doing toe touches when he suddenly developed severe/persistent dizziness described as \"swaying\" associated with ambulatory dysfunction and nausea.  Patient also developed a left frontal/parietal headache and chest discomfort.    Upon arrival to the ED, /94.    NIHSS 0.    CT head negative for acute intracranial abnormalities.    CTA head/neck negative for LVO, dissection, aneurysm, or high-grade stenosis.    Patient was not a TNK candidate due to bleeding risk from presumed CAA and higher suspicion for peripheral vestibulopathy.    MRI Brain w/o contrast - No acute intracranial abnormality.     HINTS exam: a few beats of nonsustained nystagmus to the right, no skew deviation, catch up saccades present    High suspicion for peripheral vestibulopathy provoked by position change, however given persistent symptoms despite using meclizine, will obtain MRI brain to rule out acute intracranial pathology.  Could also be vestibular migraine.    Plan:  - Continue Migraine cocktail - Toradol, Reglan, Benadryl to treat migraine which may improve dizziness.  - PT/OT; patient may benefit from vestibular therapy  - Telemetry  - Continue to monitor and notify neurology with any changes.     Hypertension, " essential  Assessment & Plan  - Normotensive goal  - Lisinopril-hydrochlorothiazide 20-12.5 mg, 2 tablets daily    Diabetes mellitus (HCC)  Assessment & Plan    Lab Results   Component Value Date    HGBA1C 7.0 (H) 2023     - Sliding insulin scale          SUBJECTIVE     Patient was seen and examined. No acute events overnight. Patient continues to complain of headache and dizziness. We discussed continuing with migraine cocktails for treatment. Imaging reviewed. All questions and concerns addressed.      OBJECTIVE     Patient ID: Jeremy George is a 53 y.o. male.    Vitals:    24 2113 24 0747 24 1055 24 1556   BP: 141/86 120/59 143/78 (!) 174/83   BP Location: Left arm      Pulse: 61 56 57 69   Resp: 18      Temp: 97.7 °F (36.5 °C) 97.8 °F (36.6 °C) 98.2 °F (36.8 °C) 98.7 °F (37.1 °C)   TempSrc: Oral   Oral   SpO2: 97% 96% 96% 96%   Weight:       Height:          Temperature:   Temp (24hrs), Av.1 °F (36.7 °C), Min:97.7 °F (36.5 °C), Max:98.7 °F (37.1 °C)    Temperature: 98.7 °F (37.1 °C)      Physical Exam  Vitals reviewed.   HENT:      Mouth/Throat:      Mouth: Mucous membranes are moist.      Pharynx: Oropharynx is clear.   Eyes:      Extraocular Movements: Extraocular movements intact.      Conjunctiva/sclera: Conjunctivae normal.      Pupils: Pupils are equal, round, and reactive to light.   Cardiovascular:      Rate and Rhythm: Normal rate and regular rhythm.      Pulses: Normal pulses.      Heart sounds: Normal heart sounds.   Pulmonary:      Effort: Pulmonary effort is normal.      Breath sounds: Normal breath sounds.   Abdominal:      Palpations: Abdomen is soft.   Neurological:      Mental Status: He is alert and oriented to person, place, and time.      Cranial Nerves: Cranial nerves 2-12 are intact.          Neurologic Exam     Mental Status   Oriented to person, place, and time.     Cranial Nerves   Cranial nerves II through XII intact.     CN III, IV, VI   Pupils are  equal, round, and reactive to light.    Motor Exam   Muscle bulk: normal  Overall muscle tone: normal    Strength   Strength 5/5 except as noted.   Right iliopsoas: 4/5  Left iliopsoas: 4/5  Right quadriceps: 4/5  Left quadriceps: 4/5  Right hamstrin/5  Left hamstrin/5    Sensory Exam   Light touch normal.   Vibration normal.          LABORATORY DATA     Labs: I have personally reviewed pertinent reports.    Results from last 7 days   Lab Units 24  1023   WBC Thousand/uL 5.94   HEMOGLOBIN g/dL 15.0   HEMATOCRIT % 44.6   PLATELETS Thousands/uL 173   SEGS PCT % 50   MONO PCT % 14*   EOS PCT % 2      Results from last 7 days   Lab Units 24  1023   SODIUM mmol/L 136   POTASSIUM mmol/L 4.4   CHLORIDE mmol/L 101   CO2 mmol/L 27   BUN mg/dL 16   CREATININE mg/dL 0.79   CALCIUM mg/dL 9.5   ALK PHOS U/L 65   ALT U/L 72*   AST U/L 34              Results from last 7 days   Lab Units 24  1023   INR  0.98   PTT seconds 26               IMAGING & DIAGNOSTIC TESTING     Radiology Results: I have personally reviewed pertinent reports.      MRI brain wo contrast   Final Result by Iker Nye MD ( 0150)      No acute intracranial abnormality. No restricted diffusion to suggest acute ischemia.      Mild scattered periventricular and subcortical foci of white matter T2 hyperintensity which are nonspecific and most likely related to chronic small vessel ischemic changes. Other less like etiologies include demyelinating disease, vasculitis, Lyme and    migraines.      Reidentified and normal punctate foci of susceptibility artifact are seen in the supratentorial brain centrally at the gray-white junction peripherally in distribution most compatible with cerebral amyloid angiopathy.  Chronic microhemorrhage in the    setting of hypertension is less likely with this distribution but not excluded.               Workstation performed: [id number]         XR chest 1 view portable   ED Interpretation by  Kendall Ward DO (04/23 1347)   Chest x-ray interpreted me shows no acute cardiopulmonary disease       Final Result by Erasmo Fu MD (04/23 2241)      No acute cardiopulmonary disease.            Workstation performed: [id number]         CTA stroke alert (head/neck)   Final Result by Dave Figueredo MD (04/23 1130)      Negative CTA head and neck for large vessel occlusion, dissection, aneurysm, or high-grade stenosis.      Additional chronic/incidental findings as detailed above.            Findings were directly discussed with Alo Pike at approximately 11:19 AM.                        Workstation performed: [id number]         CT stroke alert brain   Final Result by Dave Figueredo MD (04/23 1121)      No acute intracranial abnormality.      Findings were directly discussed with Alo Pike at approximately 11:18 AM.      Workstation performed: [id number]             Other Diagnostic Testing: I have personally reviewed pertinent reports.      ACTIVE MEDICATIONS     Current Facility-Administered Medications   Medication Dose Route Frequency    acetaminophen (TYLENOL) tablet 650 mg  650 mg Oral Q6H PRN    ascorbic acid (VITAMIN C) tablet 1,000 mg  1,000 mg Oral Daily    baclofen tablet 20 mg  20 mg Oral HS PRN    diphenhydrAMINE (BENADRYL) injection 25 mg  25 mg Intravenous Q8H    enoxaparin (LOVENOX) subcutaneous injection 40 mg  40 mg Subcutaneous Daily    famotidine (PEPCID) tablet 20 mg  20 mg Oral Daily    lisinopril (ZESTRIL) tablet 40 mg  40 mg Oral Daily    And    hydroCHLOROthiazide tablet 25 mg  25 mg Oral Daily    insulin lispro (HumALOG/ADMELOG) 100 units/mL subcutaneous injection 2-12 Units  2-12 Units Subcutaneous TID AC    ketorolac (TORADOL) injection 15 mg  15 mg Intravenous Q8H    metoclopramide (REGLAN) injection 10 mg  10 mg Intravenous Q8H    pantoprazole (PROTONIX) EC tablet 40 mg  40 mg Oral Early Morning    pravastatin (PRAVACHOL) tablet 40 mg  40 mg Oral Daily With  Dinner    pregabalin (LYRICA) capsule 150 mg  150 mg Oral HS       Prior to Admission medications    Medication Sig Start Date End Date Taking? Authorizing Provider   baclofen 20 mg tablet Take 1 tablet (20 mg total) by mouth daily at bedtime as needed for muscle spasms 3/25/24  Yes MIKHAIL Laboy   CINNAMON PO Take by mouth   Yes Historical Provider, MD   diphenhydrAMINE-acetaminophen (TYLENOL PM)  MG TABS Take 2 tablets by mouth daily at bedtime as needed for sleep 1000 mg in total   Yes Historical Provider, MD   famotidine (PEPCID) 20 mg tablet Take 1 tablet (20 mg total) by mouth daily 9/27/23  Yes MIKHAIL Ha   lisinopril-hydrochlorothiazide (PRINZIDE,ZESTORETIC) 20-12.5 MG per tablet Take 2 tablets by mouth daily 9/27/23  Yes MIKHAIL Ha   metFORMIN (GLUCOPHAGE) 500 mg tablet Pt takes 1500 mg (3 tablets) by mouth in the morning, and a 1000 mg (2 tablets) in the evening 9/27/23  Yes MIKHAIL Ha   Multiple Vitamins-Minerals (CENTRUM SILVER 50+MEN PO) Take by mouth   Yes Historical Provider, MD   polyethylene glycol (MiraLax) 17 g packet Take 17 g by mouth daily 3/6/24  Yes Henri Burnette MD   pregabalin (LYRICA) 75 mg capsule Take 2 capsules (150 mg total) by mouth daily at bedtime  Patient taking differently: Take 150 mg by mouth daily at bedtime Bedtime 3/25/24  Yes MIKHAIL Laboy   simvastatin (ZOCOR) 20 mg tablet Take 1 tablet (20 mg total) by mouth daily 9/27/23  Yes MIKHAIL Ha   sitaGLIPtin (JANUVIA) 100 mg tablet Take 1 tablet (100 mg total) by mouth daily 9/27/23  Yes MIKHAIL Ha   Ascorbic Acid (vitamin C) 1000 MG tablet Take 1,000 mg by mouth daily      Historical Provider, MD   cetirizine (ZyrTEC) 10 mg tablet Take 1 tablet (10 mg total) by mouth daily 9/27/23   MIKHAIL Ha   clotrimazole-betamethasone (LOTRISONE) 1-0.05 % cream Apply topically 2 (two) times a day  Patient not taking: Reported on 4/23/2024  10/24/23   Mark Munoz DPM   fluticasone (FLONASE) 50 mcg/act nasal spray 1 spray into each nostril daily  Patient not taking: Reported on 4/23/2024 9/27/23   MIKHAIL Ha   omega-3-acid ethyl esters (LOVAZA) 1 g capsule Take 1 g by mouth daily    Historical Provider, MD   omeprazole (PriLOSEC) 40 MG capsule Take 1 capsule (40 mg total) by mouth daily  Patient taking differently: Take 40 mg by mouth daily Still has to start 3/6/24   Henri Burnette MD   tadalafil (CIALIS) 20 MG tablet TAKE 1 TABLET 1 HOUR PRIOR TO INTERCOURSE ON AN EMPTY STOMACH WITH NO ALCOHOL, LIMIT TO 2 TABLETS PER WEEK  Patient not taking: Reported on 4/23/2024 8/18/23   MIKHAIL Kenyon         VTE Pharmacologic Prophylaxis: VTE covered by:  enoxaparin, Subcutaneous, 40 mg at 04/24/24 0801      VTE Mechanical Prophylaxis: sequential compression device and foot pump applied    ======    I have discussed the patient's history, physical exam findings, assessment, and plan in detail with attending, Dr. Lewis    Thank you for allowing me to participate in the care of your patient, Jeremy George.    Ayala Weaver  [facility], PGY-2

## 2024-04-24 NOTE — RESTORATIVE TECHNICIAN NOTE
Restorative Technician Note      Patient Name: Jeremy George     Note Type: Mobility  Patient Position Upon Consult: Supine  Activity Performed: Ambulated; Dangled; Stood  Assistive Device: Roller walker; Other (Comment) (Assist x1 with chair follow.)  Education Provided: Yes  Patient Position at End of Consult: Supine; All needs within reach; Bed/Chair alarm activated  Clarissa BENOIT, Restorative Technician,

## 2024-04-24 NOTE — OCCUPATIONAL THERAPY NOTE
Occupational Therapy Evaluation     Patient Name: Jeremy George  Today's Date: 4/24/2024  Problem List  Active Problems:    Diabetes mellitus (HCC)    Hypertension, essential    Dizziness    Past Medical History  Past Medical History:   Diagnosis Date    Achalasia     Aneurysm (HCC)     aortic    Asthma     Childhood    Colon polyp     Diabetes mellitus (HCC)     Esophageal ulcer     Fatty liver     Groin abscess     with I &D    Hyperlipidemia     Hypertension     Plantar fasciitis      Past Surgical History  Past Surgical History:   Procedure Laterality Date    ANTERIOR CERVICAL DISCECTOMY W/ FUSION      APPENDECTOMY      BICEPS TENDON REPAIR      EGD AND COLONOSCOPY      FL INJECTION LEFT WRIST (ARTHROGRAM)  11/9/2021    FL MYELOGRAM CERVICAL  6/13/2018    MENISCECTOMY      MYOTOMY HELLER LAPAROSCOPIC      ORTHOPEDIC SURGERY           04/24/24 0825   OT Last Visit   OT Visit Date 04/24/24   Note Type   Note type Evaluation   Pain Assessment   Pain Assessment Tool 0-10   Pain Score 4   Pain Location/Orientation Other (Comment)  (headache)   Hospital Pain Intervention(s) Emotional support;Ambulation/increased activity;Repositioned;Rest   Restrictions/Precautions   Weight Bearing Precautions Per Order No   Other Precautions Fall Risk;Pain;Telemetry;Cognitive;Chair Alarm;Bed Alarm  (mild photophobia, dizziness)   Home Living   Type of Home House   Home Layout Multi-level;Stairs to enter with rails;Bed/bath upstairs;Work area in basement  (pt lives in a split level home with 2+2 JARED, 3 steps to first, 5 steps to 2nd/3rd floors)   Bathroom Shower/Tub Tub/shower unit   Bathroom Toilet Standard   Bathroom Equipment Commode   Bathroom Accessibility Accessible   Home Equipment Walker   Prior Function   Level of [place] Independent with ADLs;Independent with IADLS   Lives With Spouse;Other (Comment)  (three adult children)   Receives Help From Family   IADLs Independent with driving;Independent with meal  prep;Independent with medication management   Falls in the last 6 months 0   Vocational Retired   Lifestyle   Autonomy I with ADL's/IADL's, no AD with functional mobility +drives   Reciprocal Relationships Spouse -works during the day, adult children   Service to Others .full time employement -   Intrinsic Gratification wood working   General   Family/Caregiver Present No   ADL   Eating Assistance 7  Independent   Grooming Assistance 7  Independent   UB Bathing Assistance 5  Supervision/Setup   LB Bathing Assistance 4  Minimal Assistance   UB Dressing Assistance 5  Supervision/Setup   LB Dressing Assistance 4  Minimal Assistance   Toileting Assistance  4  Minimal Assistance   Bed Mobility   Supine to Sit Unable to assess   Transfers   Sit to Stand 4  Minimal assistance   Additional items Assist x 1   Stand to Sit 4  Minimal assistance   Additional items Assist x 1   Additional Comments +RW   Functional Mobility   Functional Mobility 4  Minimal assistance   Additional Comments min a x 1 with RW and chair follow for safety pt unsteady 2* to dizziness, visual deficit.   Additional items Rolling walker   Balance   Static Sitting Fair +   Dynamic Sitting Fair   Static Standing Poor +   Dynamic Standing Poor +   Ambulatory Poor +   Activity Tolerance   Activity Tolerance Patient limited by fatigue;Other (Comment);Patient limited by pain  (dizzness)   Medical Staff Made Aware PT griselda due to the patient's co-morbidities, clinically unstable presentation, and present impairments which are a regression from the patient's baseline.    Nurse Made Aware RN cleared pt for therapy   RUE Assessment   RUE Assessment WFL   LUE Assessment   LUE Assessment WFL   Hand Function   Gross Motor Coordination Functional   Fine Motor Coordination Functional   Vision - Complex Assessment   Ocular Range of Motion Intact   Tracking Impaired to right, able to track throughout room/hallway with mobilty tasks   Saccades Impaired  +Nystagmus noted Right>left  (pt lost visual attention to left with eye fatigue/strain repetitively with finger confrontation assessment.)   Convergence (WFL)   Visual Screen Results Visual processing deficits   Additional Comments pt presents with visual attention/concentration deficits 2* to dizziness.   Perception   Inattention/Neglect Appears intact   Cognition   Overall Cognitive Status WFL   Arousal/Participation Alert;Responsive;Cooperative   Attention Within functional limits   Orientation Level Oriented X4   Memory Within functional limits   Following Commands Follows all commands and directions without difficulty   Comments pt pleasant and motivated for therapy, good memory, dircection following with session, insight into deficits   Assessment   Limitation Decreased ADL status;Decreased endurance;Decreased self-care trans;Decreased high-level ADLs   Prognosis Good   Assessment Pt is a 53 y.o. male who was admitted to [facility] on 4/23/2024 with acute onset of dizziness, (when bending forward with stretching exercises at work), headaches, mild photophobia, HTN, diabetes + MRI w/ very suspicious appearing microhemorrhage burden . Patient  has a past medical history of Achalasia, Aneurysm (HCC), Asthma, Colon polyp, Diabetes mellitus (HCC), Esophageal ulcer, Fatty liver, Groin abscess, Hyperlipidemia, Hypertension, and Plantar fasciitis.  At baseline pt was completing I with ALD's/IaDL's, no AD with functional mobility, +drives, works full time. Pt lives with spouse and 3 adult children in a split level home with 2+2 JARED. Currently pt requires S UB/min a LB for overall ADLS and min a with RW and chair follow  for functional mobility/transfers. Pt currently presents with impairments in the following categories -steps to enter environment, difficulty performing ADLS, and difficulty performing IADLS  activity tolerance, endurance, standing balance/tolerance, sitting balance/tolerance, and visual  perceptual skills  visual attention. These impairments, as well as pt's fatigue, pain, decreased caregiver support, and risk for falls dizziness  limit pt's ability to safely engage in all baseline areas of occupation, includingbathing, dressing, toileting, functional mobility/transfers, community mobility, laundry , driving, house maintenance, medication management, meal prep, cleaning, work/volunteer work , social participation , and leisure activities  The patient's raw score on the AM-PAC Daily Activity Inpatient Short Form is 19. A raw score of greater than or equal to 19 suggests the patient may benefit from discharge to home. Please refer to the recommendation of the Occupational Therapist for safe discharge planning. From OT standpoint, recommend max level I upon D/C. OT will continue to follow to address the below stated goals.   Goals   Patient Goals less dizziness   LTG Time Frame 10-14   Long Term Goal #1 see goals below   Plan   Treatment Interventions ADL retraining;UE strengthening/ROM;Functional transfer training;Activityengagement;Continued evaluation;Compensatory technique education;Fine motor coordination activities;Neuromuscular reeducation;Equipment evaluation/education;Patient/family training;Visual perceptual retraining   Goal Expiration Date 05/08/24   OT Frequency 2-3x/wk   Discharge Recommendation   Rehab Resource Intensity Level, OT I (Maximum Resource Intensity)    Equipment Recommended (TBD)   AM-PAC Daily Activity Inpatient   Lower Body Dressing 2   Bathing 3   Toileting 3   Upper Body Dressing 3   Grooming 4   Eating 4   Daily Activity Raw Score 19   Daily Activity Standardized Score (Calc for Raw Score >=11) 40.22   AM-PAC Applied Cognition Inpatient   Following a Speech/Presentation 4   Understanding Ordinary Conversation 4   Taking Medications 4   Remembering Where Things Are Placed or Put Away 4   Remembering List of 4-5 Errands 4   Taking Care of Complicated Tasks 4   Applied  Cognition Raw Score 24   Applied Cognition Standardized Score 62.21   End of Consult   Patient Position at End of Consult Bedside chair;Bed/Chair alarm activated;All needs within reach   Nurse Communication Nurse aware of consult    Occupational Therapy Goals:    *Mod I with bed mobility to engage in functional tasks.  *Mod I Adl's after setup with use of AE PRN  *Mod I toileting and clothing management   *Mod I functional mobility and transfers to/from all surfaces with Fair + dynamic balance and safety for participation in dynamic adls and iadl tasks   *Demonstrate good carryover with safe use of RW during functional tasks   *Assess DME needs   *Increase activity tolerance to 25-30 minutes for participation in adls and enjoyable activities  *Pt to participate in further cognitive testing with good attention and participation to assist with safe d/c recommendations  *Mod I with Simulated IADL management task.  *Demonstrate good carryover of pt/family education and training with good tolerance for increased safety and independence with ADL's/ADl's.  *Pt will improve standing balance to 4-5 minutes with functional tasks to increase I with toileting/transfers.  *Patient will demonstrate 100% carryover of energy conservation techniques t/o functional I/ADL/leisure tasks w/o cues s/p skilled education to increase endurance during functional tasks  *Pt will increase visual attention and concentration for 5 minutes to increase I with functional tasks.    Raquel HUMMEL, OTR/L   MOCA Certified #[id number]

## 2024-04-24 NOTE — UTILIZATION REVIEW
NOTIFICATION OF INPATIENT ADMISSION   AUTHORIZATION REQUEST   SERVICING FACILITY:   [facility]  Address: [address] [place] [address]  Tax ID: [id number]  NPI: [id number] ATTENDING PROVIDER:  Attending Name and NPI#: Tawanna Lewis Do [[id number]]  Address: [address] [place] [address]  Phone: [phone]   ADMISSION INFORMATION:  Place of Service: Inpatient [facility]  Place of Service Code: 21  Inpatient Admission Date/Time: 4/23/24  3:39 PM  Discharge Date/Time: No discharge date for patient encounter.  Admitting Diagnosis Code/Description:  Dizziness [R42]  Gait instability [R26.81]  Dizzy [R42]  Headache [R51.9]     UTILIZATION REVIEW CONTACT:  Senthil Floyd Utilization   Network Utilization Review Department  Phone: [phone]  Fax: [phone]  Email: [email or web address]  Contact for approvals/pending authorizations, clinical reviews, and discharge.     PHYSICIAN ADVISORY SERVICES:  Medical Necessity Denial & [id number] Review  Phone: [phone]  Fax: [phone]  Email: [email or web address]     DISCHARGE SUPPORT TEAM:  For Patients Discharge Needs & Updates  Phone: [phone] opt. 2 Fax: [phone]  Email: [email or web address]

## 2024-04-25 ENCOUNTER — TELEPHONE (OUTPATIENT)
Dept: NEUROLOGY | Facility: CLINIC | Age: 54
End: 2024-04-25

## 2024-04-25 LAB
ALBUMIN SERPL BCP-MCNC: 3.9 G/DL (ref 3.5–5)
ALP SERPL-CCNC: 48 U/L (ref 34–104)
ALT SERPL W P-5'-P-CCNC: 74 U/L (ref 7–52)
ANION GAP SERPL CALCULATED.3IONS-SCNC: 5 MMOL/L (ref 4–13)
AST SERPL W P-5'-P-CCNC: 36 U/L (ref 13–39)
BILIRUB SERPL-MCNC: 0.51 MG/DL (ref 0.2–1)
BUN SERPL-MCNC: 16 MG/DL (ref 5–25)
CALCIUM SERPL-MCNC: 8.8 MG/DL (ref 8.4–10.2)
CHLORIDE SERPL-SCNC: 104 MMOL/L (ref 96–108)
CO2 SERPL-SCNC: 27 MMOL/L (ref 21–32)
CREAT SERPL-MCNC: 0.79 MG/DL (ref 0.6–1.3)
ERYTHROCYTE [DISTWIDTH] IN BLOOD BY AUTOMATED COUNT: 11.9 % (ref 11.6–15.1)
GFR SERPL CREATININE-BSD FRML MDRD: 101 ML/MIN/1.73SQ M
GLUCOSE SERPL-MCNC: 145 MG/DL (ref 65–140)
GLUCOSE SERPL-MCNC: 151 MG/DL (ref 65–140)
GLUCOSE SERPL-MCNC: 168 MG/DL (ref 65–140)
GLUCOSE SERPL-MCNC: 194 MG/DL (ref 65–140)
GLUCOSE SERPL-MCNC: 288 MG/DL (ref 65–140)
HCT VFR BLD AUTO: 42.2 % (ref 36.5–49.3)
HGB BLD-MCNC: 14.3 G/DL (ref 12–17)
LIPASE SERPL-CCNC: 83 U/L (ref 11–82)
MCH RBC QN AUTO: 31 PG (ref 26.8–34.3)
MCHC RBC AUTO-ENTMCNC: 33.9 G/DL (ref 31.4–37.4)
MCV RBC AUTO: 92 FL (ref 82–98)
PLATELET # BLD AUTO: 177 THOUSANDS/UL (ref 149–390)
PMV BLD AUTO: 10.6 FL (ref 8.9–12.7)
POTASSIUM SERPL-SCNC: 4.1 MMOL/L (ref 3.5–5.3)
PROT SERPL-MCNC: 6.4 G/DL (ref 6.4–8.4)
RBC # BLD AUTO: 4.61 MILLION/UL (ref 3.88–5.62)
SODIUM SERPL-SCNC: 136 MMOL/L (ref 135–147)
WBC # BLD AUTO: 5.44 THOUSAND/UL (ref 4.31–10.16)

## 2024-04-25 PROCEDURE — 80053 COMPREHEN METABOLIC PANEL: CPT

## 2024-04-25 PROCEDURE — 97110 THERAPEUTIC EXERCISES: CPT

## 2024-04-25 PROCEDURE — 82948 REAGENT STRIP/BLOOD GLUCOSE: CPT

## 2024-04-25 PROCEDURE — 99233 SBSQ HOSP IP/OBS HIGH 50: CPT | Performed by: PSYCHIATRY & NEUROLOGY

## 2024-04-25 PROCEDURE — 83690 ASSAY OF LIPASE: CPT

## 2024-04-25 PROCEDURE — 97116 GAIT TRAINING THERAPY: CPT

## 2024-04-25 PROCEDURE — 85027 COMPLETE CBC AUTOMATED: CPT

## 2024-04-25 RX ORDER — DEXAMETHASONE SODIUM PHOSPHATE 10 MG/ML
10 INJECTION, SOLUTION INTRAMUSCULAR; INTRAVENOUS ONCE
Status: COMPLETED | OUTPATIENT
Start: 2024-04-25 | End: 2024-04-25

## 2024-04-25 RX ORDER — TOPIRAMATE 25 MG/1
25 TABLET ORAL DAILY
Status: DISCONTINUED | OUTPATIENT
Start: 2024-04-25 | End: 2024-04-27 | Stop reason: HOSPADM

## 2024-04-25 RX ORDER — CYANOCOBALAMIN 1000 UG/ML
1000 INJECTION, SOLUTION INTRAMUSCULAR; SUBCUTANEOUS
Status: DISCONTINUED | OUTPATIENT
Start: 2024-04-25 | End: 2024-04-27 | Stop reason: HOSPADM

## 2024-04-25 RX ADMIN — CYANOCOBALAMIN 1000 MCG: 1000 INJECTION, SOLUTION INTRAMUSCULAR at 13:42

## 2024-04-25 RX ADMIN — DIPHENHYDRAMINE HYDROCHLORIDE 25 MG: 50 INJECTION, SOLUTION INTRAMUSCULAR; INTRAVENOUS at 01:19

## 2024-04-25 RX ADMIN — HYDROCHLOROTHIAZIDE 25 MG: 25 TABLET ORAL at 08:08

## 2024-04-25 RX ADMIN — TOPIRAMATE 25 MG: 25 TABLET, FILM COATED ORAL at 13:42

## 2024-04-25 RX ADMIN — BACLOFEN 20 MG: 20 TABLET ORAL at 21:14

## 2024-04-25 RX ADMIN — DEXAMETHASONE SODIUM PHOSPHATE 10 MG: 10 INJECTION, SOLUTION INTRAMUSCULAR; INTRAVENOUS at 14:49

## 2024-04-25 RX ADMIN — PRAVASTATIN SODIUM 40 MG: 40 TABLET ORAL at 15:47

## 2024-04-25 RX ADMIN — LISINOPRIL 40 MG: 20 TABLET ORAL at 08:09

## 2024-04-25 RX ADMIN — FAMOTIDINE 20 MG: 20 TABLET, FILM COATED ORAL at 08:08

## 2024-04-25 RX ADMIN — METOCLOPRAMIDE HYDROCHLORIDE 10 MG: 5 INJECTION INTRAMUSCULAR; INTRAVENOUS at 01:18

## 2024-04-25 RX ADMIN — ENOXAPARIN SODIUM 40 MG: 40 INJECTION SUBCUTANEOUS at 08:09

## 2024-04-25 RX ADMIN — OXYCODONE HYDROCHLORIDE AND ACETAMINOPHEN 1000 MG: 500 TABLET ORAL at 08:08

## 2024-04-25 RX ADMIN — INSULIN LISPRO 2 UNITS: 100 INJECTION, SOLUTION INTRAVENOUS; SUBCUTANEOUS at 15:47

## 2024-04-25 RX ADMIN — INSULIN LISPRO 2 UNITS: 100 INJECTION, SOLUTION INTRAVENOUS; SUBCUTANEOUS at 08:10

## 2024-04-25 RX ADMIN — ACETAMINOPHEN 650 MG: 325 TABLET, FILM COATED ORAL at 03:43

## 2024-04-25 RX ADMIN — ACETAMINOPHEN 650 MG: 325 TABLET, FILM COATED ORAL at 08:13

## 2024-04-25 RX ADMIN — INSULIN LISPRO 2 UNITS: 100 INJECTION, SOLUTION INTRAVENOUS; SUBCUTANEOUS at 11:01

## 2024-04-25 RX ADMIN — PREGABALIN 150 MG: 75 CAPSULE ORAL at 21:10

## 2024-04-25 RX ADMIN — KETOROLAC TROMETHAMINE 15 MG: 30 INJECTION, SOLUTION INTRAMUSCULAR; INTRAVENOUS at 01:18

## 2024-04-25 RX ADMIN — ACETAMINOPHEN 650 MG: 325 TABLET, FILM COATED ORAL at 21:14

## 2024-04-25 NOTE — PLAN OF CARE
Problem: PHYSICAL THERAPY ADULT  Goal: Performs mobility at highest level of function for planned discharge setting.  See evaluation for individualized goals.  Description: Treatment/Interventions: Functional transfer training, LE strengthening/ROM, Elevations, Therapeutic exercise, Endurance training, Patient/family training, Equipment eval/education, Bed mobility, Gait training, Spoke to nursing, Spoke to case management, OT  Equipment Recommended: Walker       See flowsheet documentation for full assessment, interventions and recommendations.  Outcome: Progressing  Note: Prognosis: Good  Problem List: Decreased endurance, Impaired balance, Decreased mobility  Assessment: Pt agreeable to participate in PT session. Pt performed functional mobility and therex as outlined above. Pt with slight improvement in balance compared to IE yesterday however remains with R LOB with R head turns. Impaired VORx1 horizontal and vertical with poor gaze stability and +symptomatic. Improved smooth pursuits and saccades compared to yesterday. Instructed pt in HEP. Educated pt and wife on safety with return home including RW use and avoiding headturns (For now for safety) as well as proper guarding on steps and with ambulation (stay on R side). Wife states she can be home with pt and care for him while he is recovering. Agreeable to OP vestibular therapy. Pt left seated in chair with chair alarm, call bell, phone, and all personal needs within reach. Pt will continue to benefit from skilled acute care PT to further address their functional mobility limitations.  Barriers to Discharge: Decreased caregiver support, Inaccessible home environment     Rehab Resource Intensity Level, PT: (S) III (Minimum Resource Intensity) (focus on vestibular therapy)    See flowsheet documentation for full assessment.

## 2024-04-25 NOTE — UTILIZATION REVIEW
Continued Stay Review    Date: 4-25-24                           Current Patient Class: inpatient  Current Level of Care: med surg    HPI:53 y.o. male initially admitted on 4-23-24      Assessment/Plan:     Headache 5/10 >  3/10. Continue migraine treatment of scheduled iv benadryl, iv toradol, iv reglan. Start topamax 25 mg po daily. Received prn tylenol at 0343 and 0813.    Vital Signs:     Date/Time Temp Pulse Resp BP MAP (mmHg) SpO2   04/25/24 11:04:49 98.8 °F (37.1 °C) 58 16 145/77 100 96 %   04/25/24 08:02:46 98.2 °F (36.8 °C) 57 16 141/74 96 95 %         Pertinent Labs/Diagnostic Results:   Results from last 7 days   Lab Units 04/23/24  1125   SARS-COV-2  Negative     Results from last 7 days   Lab Units 04/25/24  0950 04/23/24  1023   WBC Thousand/uL 5.44 5.94   HEMOGLOBIN g/dL 14.3 15.0   HEMATOCRIT % 42.2 44.6   PLATELETS Thousands/uL 177 173   TOTAL NEUT ABS Thousands/µL  --  3.03         Results from last 7 days   Lab Units 04/25/24  0950 04/23/24  1023   SODIUM mmol/L 136 136   POTASSIUM mmol/L 4.1 4.4   CHLORIDE mmol/L 104 101   CO2 mmol/L 27 27   ANION GAP mmol/L 5 8   BUN mg/dL 16 16   CREATININE mg/dL 0.79 0.79   EGFR ml/min/1.73sq m 101 101   CALCIUM mg/dL 8.8 9.5     Results from last 7 days   Lab Units 04/25/24  0950 04/23/24  1023   AST U/L 36 34   ALT U/L 74* 72*   ALK PHOS U/L 48 65   TOTAL PROTEIN g/dL 6.4 6.9   ALBUMIN g/dL 3.9 4.4   TOTAL BILIRUBIN mg/dL 0.51 0.51     Results from last 7 days   Lab Units 04/25/24  1053 04/25/24  0559 04/24/24  2132 04/24/24  1624 04/24/24  1055 04/24/24  0627 04/23/24  2054 04/23/24  1655 04/23/24  0827   POC GLUCOSE mg/dl 168* 194* 150* 216* 169* 179* 177* 121 199*     Results from last 7 days   Lab Units 04/25/24  0950 04/23/24  1023   GLUCOSE RANDOM mg/dL 145* 138         Results from last 7 days   Lab Units 04/24/24  1042   HEMOGLOBIN A1C % 6.8*   EAG mg/dl 148     Results from last 7 days   Lab Units 04/23/24  1718 04/23/24  1205 04/23/24  1023    HS TNI 0HR ng/L  --   --  4   HS TNI 2HR ng/L  --  3  --    HSTNI D2 ng/L  --  -1  --    HS TNI 4HR ng/L 5  --   --    HSTNI D4 ng/L 1  --   --          Results from last 7 days   Lab Units 04/23/24  1023   PROTIME seconds 12.9   INR  0.98   PTT seconds 26     Results from last 7 days   Lab Units 04/25/24  0950   LIPASE u/L 83*     Results from last 7 days   Lab Units 04/23/24  1125   INFLUENZA A PCR  Negative   INFLUENZA B PCR  Negative   RSV PCR  Negative       Scheduled Medications:  vitamin C, 1,000 mg, Oral, Daily  cyanocobalamin, 1,000 mcg, Intramuscular, Q7 Days  enoxaparin, 40 mg, Subcutaneous, Daily  famotidine, 20 mg, Oral, Daily  lisinopril, 40 mg, Oral, Daily   And  hydroCHLOROthiazide, 25 mg, Oral, Daily  insulin lispro, 2-12 Units, Subcutaneous, TID AC  pantoprazole, 40 mg, Oral, Early Morning  pravastatin, 40 mg, Oral, Daily With Dinner  pregabalin, 150 mg, Oral, HS  topiramate, 25 mg, Oral, Daily      Continuous IV Infusions:     PRN Meds:  acetaminophen, 650 mg, Oral, Q6H PRN  baclofen, 20 mg, Oral, HS PRN        Discharge Plan: patient is recommended for outpatient therapy for vestibular therapy and ongoing therapy needs.     [facility] Utilization Review Department  ATTENTION: Please call with any questions or concerns to [phone] and carefully listen to the prompts so that you are directed to the right person. All voicemails are confidential.   For Discharge needs, contact Care Management DC Support Team at [phone] opt. [phone]  Send all requests for admission clinical reviews, approved or denied determinations and any other requests to dedicated fax number below belonging to the campus where the patient is receiving treatment. List of dedicated fax numbers for the Facilities:  FACILITY NAME UR FAX NUMBER   ADMISSION DENIALS (Administrative/Medical Necessity) [phone]   DISCHARGE SUPPORT TEAM (NETWORK) [phone]   PARENT CHILD HEALTH (Maternity/NICU/Pediatrics) [phone]   [facility]  [facility] [phone]   [facility] [phone]   [facility] [phone]   [facility] [phone]   [facility] [phone]   [facility] [phone]   [facility] [phone]   [facility] [phone]   [facility] [phone]   [facility] [phone]   [facility] [phone]   [facility] [phone]

## 2024-04-25 NOTE — CASE MANAGEMENT
Case Management Discharge Planning Note    Patient name Jeremy George  Location [facility] 707-01 MRN [id number]  : 1970 Date 2024       Current Admission Date: 2024  Current Admission Diagnosis:Dizziness   Patient Active Problem List    Diagnosis Date Noted    Dizziness 2024    Gastroesophageal reflux disease without esophagitis 10/04/2023    Chest pain, atypical 10/04/2023    Abnormal CT scan, esophagus 10/04/2023    Mild intermittent asthma without complication 2023    Thoracic aortic aneurysm without rupture, unspecified part (HCC) 2023    Lung nodule 2023    Groin strain 2023    Chronic pain syndrome 2022    Tremor, essential 2022    Other hyperlipidemia 2021    Cervical spondylosis without myelopathy 2020    S/P cervical spinal fusion 2020    Cervical radiculopathy 2020    HNP (herniated nucleus pulposus), thoracic 2020    Hypertension, essential 2017    Diabetes mellitus (HCC) 2015      LOS (days): 2  Geometric Mean LOS (GMLOS) (days):   Days to GMLOS:     OBJECTIVE:  Risk of Unplanned Readmission Score: 7.29         Current admission status: Inpatient   Preferred Pharmacy:   [facility] [address] [address]  Phone: [phone] Fax: [phone]    [id number] - [place], [place] - [address]  [address] [facility] [address] [address]  Phone: [phone] Fax: [phone]    Primary Care Provider: MIKHAIL Ha    Primary Insurance: BLUE CROSS  Secondary Insurance:     DISCHARGE DETAILS:    Discharge planning discussed with:: Patient and spouse at the bedside        CM contacted family/caregiver?: Yes  Were Treatment Team discharge recommendations reviewed with patient/caregiver?: Yes  Did patient/caregiver verbalize understanding of patient care needs?: Yes  Were  patient/caregiver advised of the risks associated with not following Treatment Team discharge recommendations?: Yes    Contacts  Patient Contacts: Ynes George (Spouse)  [phone] (Mobile)  Relationship to Patient:: Family  Contact Method: In Person  Reason/Outcome: Discharge Planning       Other Referral/Resources/Interventions Provided:  Interventions: Outpatient OT, Outpatient PT    Would you like to participate in our [facility] service program?  : No - Declined    Treatment Team Recommendation: Home  Discharge Destination Plan:: Home  Transport at Discharge : Family          Additional Comments: Patient progressed past Inpatient therapy. Outpatient information given to patient and spouse at the beside to schedule appointement. Patient will discharge with an Outpatient therapy plan, once medicaly cleared.

## 2024-04-25 NOTE — PLAN OF CARE
Problem: PAIN - ADULT  Goal: Verbalizes/displays adequate comfort level or baseline comfort level  Description: Interventions:  - Encourage patient to monitor pain and request assistance  - Assess pain using appropriate pain scale  - Administer analgesics based on type and severity of pain and evaluate response  - Implement non-pharmacological measures as appropriate and evaluate response  - Consider cultural and social influences on pain and pain management  - Notify physician/advanced practitioner if interventions unsuccessful or patient reports new pain  Outcome: Progressing     Problem: INFECTION - ADULT  Goal: Absence or prevention of progression during hospitalization  Description: INTERVENTIONS:  - Assess and monitor for signs and symptoms of infection  - Monitor lab/diagnostic results  - Monitor all insertion sites, i.e. indwelling lines, tubes, and drains  - Monitor endotracheal if appropriate and nasal secretions for changes in amount and color  - [place] appropriate cooling/warming therapies per order  - Administer medications as ordered  - Instruct and encourage patient and family to use good hand hygiene technique  - Identify and instruct in appropriate isolation precautions for identified infection/condition  Outcome: Progressing  Goal: Absence of fever/infection during neutropenic period  Description: INTERVENTIONS:  - Monitor WBC    Outcome: Progressing     Problem: DISCHARGE PLANNING  Goal: Discharge to home or other facility with appropriate resources  Description: INTERVENTIONS:  - Identify barriers to discharge w/patient and caregiver  - Arrange for needed discharge resources and transportation as appropriate  - Identify discharge learning needs (meds, wound care, etc.)  - Arrange for interpretive services to assist at discharge as needed  - Refer to Case Management Department for coordinating discharge planning if the patient needs post-hospital services based on physician/advanced  practitioner order or complex needs related to functional status, cognitive ability, or social support system  Outcome: Progressing     Problem: Knowledge Deficit  Goal: Patient/family/caregiver demonstrates understanding of disease process, treatment plan, medications, and discharge instructions  Description: Complete learning assessment and assess knowledge base.  Interventions:  - Provide teaching at level of understanding  - Provide teaching via preferred learning methods  Outcome: Progressing

## 2024-04-25 NOTE — PHYSICAL THERAPY NOTE
PHYSICAL THERAPY NOTE          Patient Name: Jeremy George  Today's Date: 4/25/2024 04/25/24 1350   PT Last Visit   PT Visit Date 04/25/24   Note Type   Note Type Treatment   Pain Assessment   Pain Assessment Tool 0-10   Pain Score No Pain   Restrictions/Precautions   Weight Bearing Precautions Per Order No   Other Precautions Chair Alarm;Bed Alarm;Multiple lines;Telemetry;Fall Risk  (likely R ear vestibular hypofunction)   General   Chart Reviewed Yes   Family/Caregiver Present Yes  (spouse)   Cognition   Overall Cognitive Status WFL   Arousal/Participation Cooperative   Attention Within functional limits   Orientation Level Oriented X4   Memory Within functional limits   Following Commands Follows all commands and directions without difficulty   Subjective   Subjective reports feeling slightly better today; pt and wife recently in [place] and wife with head cold.    Bed Mobility   Supine to Sit 6  Modified independent   Sit to Supine Unable to assess   Transfers   Sit to Stand 4  Minimal assistance   Additional items Assist x 1;Increased time required;Verbal cues   Stand to Sit 4  Minimal assistance   Additional items Assist x 1;Increased time required;Verbal cues   Toilet transfer 4  Minimal assistance   Additional items Assist x 1;Increased time required;Standard toilet   Additional Comments c RW: x3   Ambulation/Elevation   Gait pattern Improper Weight shift;Decreased foot clearance;Short stride;Excessively slow   Gait Assistance 4  Minimal assist   Additional items Assist x 1;Verbal cues   Assistive Device Rolling walker   Distance 75'+150'+50'  (seated vs standing rest breaks)   Stair Management Assistance 4  Minimal assist   Additional items Assist x 1;Verbal cues;Increased time required   Stair Management Technique One rail L;Step to pattern;Foreward;Nonreciprocal   Number of Stairs 7   Ambulation/Elevation  Additional Comments LOB towards R especially with R head turns. improved steadiness compared to yesterday   Balance   Static Sitting Fair   Dynamic Sitting Fair -   Static Standing Poor +   Dynamic Standing Poor +   Ambulatory Poor   Endurance Deficit   Endurance Deficit Yes   Endurance Deficit Description dizziness   Activity Tolerance   Activity Tolerance Patient limited by fatigue   Medical Staff Made Aware evan morrison CM for dc planning, contacting MD regarding exam findings   Nurse Made Aware yes-cleared   Exercises   Balance training  HEP: VORx1 horizontal and vertical with VORx2 when progress made with x1   Assessment   Prognosis Good   Problem List Decreased endurance;Impaired balance;Decreased mobility   Assessment Pt agreeable to participate in PT session. Pt performed functional mobility and therex as outlined above. Pt with slight improvement in balance compared to IE yesterday however remains with R LOB with R head turns. Impaired VORx1 horizontal and vertical with poor gaze stability and +symptomatic. Improved smooth pursuits and saccades compared to yesterday. Instructed pt in HEP. Educated pt and wife on safety with return home including RW use and avoiding headturns (For now for safety) as well as proper guarding on steps and with ambulation (stay on R side). Wife states she can be home with pt and care for him while he is recovering. Agreeable to OP vestibular therapy. Pt left seated in chair with chair alarm, call bell, phone, and all personal needs within reach. Pt will continue to benefit from skilled acute care PT to further address their functional mobility limitations.   Barriers to Discharge Decreased caregiver support;Inaccessible home environment   Goals   Patient Goals to improve   STG Expiration Date 05/08/24   PT Treatment Day 1   Plan   Treatment/Interventions Functional transfer training;LE strengthening/ROM;Therapeutic exercise;Elevations;Endurance training;Patient/family  training;Equipment eval/education;Bed mobility;Gait training;Spoke to nursing;Spoke to case management;OT   Progress Progressing toward goals   PT Frequency 3-5x/wk   Discharge Recommendation   Rehab Resource Intensity Level, PT (S)  III (Minimum Resource Intensity)  (focus on vestibular therapy)   Equipment Recommended (S)  Walker   Walker Package Recommended (S)  Wheeled walker   Additional Comments per CM, pt declined for ARC due to being too functional and no need for physician oversight. spoke with wife and pt-both comfortable with pt transitioning home with support, RW, and vestibular OPPT   AM-PAC Basic Mobility Inpatient   Turning in Flat Bed Without Bedrails 3   Lying on Back to Sitting on Edge of Flat Bed Without Bedrails 3   Moving Bed to Chair 3   Standing Up From Chair Using Arms 3   Walk in Room 3   Climb 3-5 Stairs With Railing 3   Basic Mobility Inpatient Raw Score 18   Basic Mobility Standardized Score 41.05   [place] Highest Level Of Mobility   JH-HLM Goal 6: Walk 10 steps or more   JH-HLM Achieved 7: Walk 25 feet or more   Jm Mcconnell, PT, DPT

## 2024-04-25 NOTE — TELEPHONE ENCOUNTER
Linsey Weaver.     Pt wife called to schedule HFU. Please provide instructions how to proceed scheduling patient. Will it be with attending/ AP for what specialty and time frame?    Thank you  Ru

## 2024-04-25 NOTE — PLAN OF CARE
Problem: PAIN - ADULT  Goal: Verbalizes/displays adequate comfort level or baseline comfort level  Description: Interventions:  - Encourage patient to monitor pain and request assistance  - Assess pain using appropriate pain scale  - Administer analgesics based on type and severity of pain and evaluate response  - Implement non-pharmacological measures as appropriate and evaluate response  - Consider cultural and social influences on pain and pain management  - Notify physician/advanced practitioner if interventions unsuccessful or patient reports new pain  Outcome: Progressing     Problem: INFECTION - ADULT  Goal: Absence or prevention of progression during hospitalization  Description: INTERVENTIONS:  - Assess and monitor for signs and symptoms of infection  - Monitor lab/diagnostic results  - Monitor all insertion sites, i.e. indwelling lines, tubes, and drains  - Monitor endotracheal if appropriate and nasal secretions for changes in amount and color  - [place] appropriate cooling/warming therapies per order  - Administer medications as ordered  - Instruct and encourage patient and family to use good hand hygiene technique  - Identify and instruct in appropriate isolation precautions for identified infection/condition  Outcome: Progressing     Problem: SAFETY ADULT  Goal: Patient will remain free of falls  Description: INTERVENTIONS:  - Educate patient/family on patient safety including physical limitations  - Instruct patient to call for assistance with activity   - Consult OT/PT to assist with strengthening/mobility   - Keep Call bell within reach  - Keep bed low and locked with side rails adjusted as appropriate  - Keep care items and personal belongings within reach  - Initiate and maintain comfort rounds  - Make Fall Risk Sign visible to staff  - Offer Toileting every 2 Hours, in advance of need  - Initiate/Maintain BED alarm  - Obtain necessary fall risk management equipment: nonskid sock, call bell  -  Apply yellow socks and bracelet for high fall risk patients  - Consider moving patient to room near nurses station  Outcome: Progressing     Problem: Knowledge Deficit  Goal: Patient/family/caregiver demonstrates understanding of disease process, treatment plan, medications, and discharge instructions  Description: Complete learning assessment and assess knowledge base.  Interventions:  - Provide teaching at level of understanding  - Provide teaching via preferred learning methods  Outcome: Progressing

## 2024-04-25 NOTE — ARC ADMISSION
Referral received for consideration of patient for inpatient acute rehab.    Reviewed patient's case - patient is recommended for outpatient therapy for vestibular therapy and ongoing therapy needs. Patient has no significant acute medical necessity requiring frequent physician oversight. CM has been updated.

## 2024-04-25 NOTE — TELEPHONE ENCOUNTER
DION REQUEST,     Spoke to patient and wife who are looking for a transfer of care to . Patient stated they mentioned this prior.     and  do you both approve DION?    Thank you all for your time and help,     Saba JOSE WITH , 5/12/2022    HFU/ SL RADHA/ DIZZINESS    STILL ADMITTED       Aun Owen   to Ru Sheikh   AS    4/25/24  9:28 AM  Headache attending/HERACLIO/resident

## 2024-04-25 NOTE — RESTORATIVE TECHNICIAN NOTE
Restorative Technician Note      Patient Name: Jeremy George     Note Type: Mobility  Patient Position Upon Consult: Bedside chair  Activity Performed: Ambulated; Dangled; Stood  Assistive Device: Roller walker; Other (Comment) (Assist x1 to ambulate the halls/ascend/descend 7 steps in stairwell. Assisted PT Jm.)  Education Provided: Yes  Patient Position at End of Consult: Supine; All needs within reach; Bed/Chair alarm activated    Clarissa BENOIT, Restorative Technician,

## 2024-04-25 NOTE — PROGRESS NOTES
"    NEUROLOGY RESIDENCY PROGRESS NOTE     Name: Jeremy George   Age & Sex: 53 y.o. male   MRN: [id number]  Unit/Bed#: [facility] 707-01   Encounter: [id number]    Recommendations for outpatient neurological follow up have yet to be determined.     ASSESSMENT & PLAN     Dizziness  Assessment & Plan  53 y.o. male with prior vertigo episode 20+ years ago, suspected CAA, headaches, HTN, HLD, DM2, cervical radiculopathy s/p ACDF, tremors, and recent travel to [place] with intermittent dizziness for the past week who presented to [facility] on 4/23/2024 as a stroke alert for persistent dizziness.    Patient has had intermittent dizziness over the past week that only lasted a few minutes. Day of presentation, patient was bending down doing toe touches when he suddenly developed severe/persistent dizziness described as \"swaying\" associated with ambulatory dysfunction and nausea.  Patient also developed a left frontal/parietal headache and chest discomfort.    Upon arrival to the ED, /94.    NIHSS 0.    CT head negative for acute intracranial abnormalities.    CTA head/neck negative for LVO, dissection, aneurysm, or high-grade stenosis.    Patient was not a TNK candidate due to bleeding risk from presumed CAA and higher suspicion for peripheral vestibulopathy.    MRI Brain w/o contrast - No acute intracranial abnormality.     HINTS exam: a few beats of nonsustained nystagmus to the right, no skew deviation, catch up saccades present    High suspicion for peripheral vestibulopathy provoked by position change, however given persistent symptoms despite using meclizine, will obtain MRI brain to rule out acute intracranial pathology.  Could also be vestibular migraine.    Plan:  - Continue Migraine cocktail - Toradol, Reglan, Benadryl to treat migraine which may improve dizziness.  - PT/OT; patient may benefit from vestibular therapy  - Telemetry  - Continue to monitor and notify neurology with any changes.     Hypertension, " essential  Assessment & Plan  - Normotensive goal  - Lisinopril-hydrochlorothiazide 20-12.5 mg, 2 tablets daily    Diabetes mellitus (HCC)  Assessment & Plan    Lab Results   Component Value Date    HGBA1C 7.0 (H) 2023     - Sliding insulin scale      SUBJECTIVE     Patient was seen and examined. No acute events overnight. Patient continues to complain of headache and dizziness although he states it has improved. We discussed starting a prophylactic headache medication. We discussed initiating the patient on Topiramate. Benefits and side effects discussed in detail. All questions and concerns addressed.    OBJECTIVE     Patient ID: Jeremy George is a 53 y.o. male.    Vitals:    24 1556 24 2134 24 0802 24 1104   BP: (!) 174/83 121/61 141/74 145/77   BP Location:  Right arm Right arm    Pulse: 69 66 57 58   Resp:   16 16   Temp: 98.7 °F (37.1 °C) 98 °F (36.7 °C) 98.2 °F (36.8 °C) 98.8 °F (37.1 °C)   TempSrc: Oral Oral Oral    SpO2: 96% 97% 95% 96%   Weight:       Height:          Temperature:   Temp (24hrs), Av.4 °F (36.9 °C), Min:98 °F (36.7 °C), Max:98.8 °F (37.1 °C)    Temperature: 98.8 °F (37.1 °C)      Physical Exam  Vitals reviewed.   HENT:      Mouth/Throat:      Mouth: Mucous membranes are moist.      Pharynx: Oropharynx is clear.   Eyes:      Extraocular Movements: Extraocular movements intact.      Conjunctiva/sclera: Conjunctivae normal.      Pupils: Pupils are equal, round, and reactive to light.   Cardiovascular:      Rate and Rhythm: Normal rate and regular rhythm.      Pulses: Normal pulses.      Heart sounds: Normal heart sounds.   Pulmonary:      Effort: Pulmonary effort is normal.      Breath sounds: Normal breath sounds.   Abdominal:      Palpations: Abdomen is soft.   Neurological:      Mental Status: He is alert and oriented to person, place, and time.      Cranial Nerves: Cranial nerves 2-12 are intact.      Motor: Motor strength is normal.       Neurologic Exam      Mental Status   Oriented to person, place, and time.     Cranial Nerves   Cranial nerves II through XII intact.     CN III, IV, VI   Pupils are equal, round, and reactive to light.    Motor Exam   Muscle bulk: normal  Overall muscle tone: normal    Strength   Strength 5/5 throughout.     Sensory Exam   Light touch normal.   Vibration normal.        LABORATORY DATA     Labs: I have personally reviewed pertinent reports.    Results from last 7 days   Lab Units 04/25/24  0950 04/23/24  1023   WBC Thousand/uL 5.44 5.94   HEMOGLOBIN g/dL 14.3 15.0   HEMATOCRIT % 42.2 44.6   PLATELETS Thousands/uL 177 173   SEGS PCT %  --  50   MONO PCT %  --  14*   EOS PCT %  --  2      Results from last 7 days   Lab Units 04/25/24  0950 04/23/24  1023   SODIUM mmol/L 136 136   POTASSIUM mmol/L 4.1 4.4   CHLORIDE mmol/L 104 101   CO2 mmol/L 27 27   BUN mg/dL 16 16   CREATININE mg/dL 0.79 0.79   CALCIUM mg/dL 8.8 9.5   ALK PHOS U/L 48 65   ALT U/L 74* 72*   AST U/L 36 34              Results from last 7 days   Lab Units 04/23/24  1023   INR  0.98   PTT seconds 26               IMAGING & DIAGNOSTIC TESTING     Radiology Results: I have personally reviewed pertinent reports.      MRI brain wo contrast   Final Result by Iker Nye MD (04/24 0150)      No acute intracranial abnormality. No restricted diffusion to suggest acute ischemia.      Mild scattered periventricular and subcortical foci of white matter T2 hyperintensity which are nonspecific and most likely related to chronic small vessel ischemic changes. Other less like etiologies include demyelinating disease, vasculitis, Lyme and    migraines.      Reidentified and normal punctate foci of susceptibility artifact are seen in the supratentorial brain centrally at the gray-white junction peripherally in distribution most compatible with cerebral amyloid angiopathy.  Chronic microhemorrhage in the    setting of hypertension is less likely with this distribution but not  excluded.               Workstation performed: [id number]         XR chest 1 view portable   ED Interpretation by Kendall Ward DO (04/23 1347)   Chest x-ray interpreted me shows no acute cardiopulmonary disease       Final Result by Erasmo Fu MD (04/23 2241)      No acute cardiopulmonary disease.            Workstation performed: [id number]         CTA stroke alert (head/neck)   Final Result by Dave Figueredo MD (04/23 1130)      Negative CTA head and neck for large vessel occlusion, dissection, aneurysm, or high-grade stenosis.      Additional chronic/incidental findings as detailed above.            Findings were directly discussed with Alo Pike at approximately 11:19 AM.                        Workstation performed: [id number]         CT stroke alert brain   Final Result by Dave Figueredo MD (04/23 1121)      No acute intracranial abnormality.      Findings were directly discussed with Alo Pike at approximately 11:18 AM.      Workstation performed: [id number]             Other Diagnostic Testing: I have personally reviewed pertinent reports.      ACTIVE MEDICATIONS     Current Facility-Administered Medications   Medication Dose Route Frequency    acetaminophen (TYLENOL) tablet 650 mg  650 mg Oral Q6H PRN    ascorbic acid (VITAMIN C) tablet 1,000 mg  1,000 mg Oral Daily    baclofen tablet 20 mg  20 mg Oral HS PRN    enoxaparin (LOVENOX) subcutaneous injection 40 mg  40 mg Subcutaneous Daily    famotidine (PEPCID) tablet 20 mg  20 mg Oral Daily    lisinopril (ZESTRIL) tablet 40 mg  40 mg Oral Daily    And    hydroCHLOROthiazide tablet 25 mg  25 mg Oral Daily    insulin lispro (HumALOG/ADMELOG) 100 units/mL subcutaneous injection 2-12 Units  2-12 Units Subcutaneous TID AC    pantoprazole (PROTONIX) EC tablet 40 mg  40 mg Oral Early Morning    pravastatin (PRAVACHOL) tablet 40 mg  40 mg Oral Daily With Dinner    pregabalin (LYRICA) capsule 150 mg  150 mg Oral HS       Prior to Admission  medications    Medication Sig Start Date End Date Taking? Authorizing Provider   baclofen 20 mg tablet Take 1 tablet (20 mg total) by mouth daily at bedtime as needed for muscle spasms 3/25/24  Yes MIKHAIL Laboy   CINNAMON PO Take by mouth   Yes Historical Provider, MD   diphenhydrAMINE-acetaminophen (TYLENOL PM)  MG TABS Take 2 tablets by mouth daily at bedtime as needed for sleep 1000 mg in total   Yes Historical Provider, MD   famotidine (PEPCID) 20 mg tablet Take 1 tablet (20 mg total) by mouth daily 9/27/23  Yes MIKHAIL Ha   lisinopril-hydrochlorothiazide (PRINZIDE,ZESTORETIC) 20-12.5 MG per tablet Take 2 tablets by mouth daily 9/27/23  Yes MIKHAIL Ha   metFORMIN (GLUCOPHAGE) 500 mg tablet Pt takes 1500 mg (3 tablets) by mouth in the morning, and a 1000 mg (2 tablets) in the evening 9/27/23  Yes MIKHAIL Ha   Multiple Vitamins-Minerals (CENTRUM SILVER 50+MEN PO) Take by mouth   Yes Historical Provider, MD   polyethylene glycol (MiraLax) 17 g packet Take 17 g by mouth daily 3/6/24  Yes Henri Burnette MD   pregabalin (LYRICA) 75 mg capsule Take 2 capsules (150 mg total) by mouth daily at bedtime  Patient taking differently: Take 150 mg by mouth daily at bedtime Bedtime 3/25/24  Yes MIKHAIL Laboy   simvastatin (ZOCOR) 20 mg tablet Take 1 tablet (20 mg total) by mouth daily 9/27/23  Yes MIKHAIL Ha   sitaGLIPtin (JANUVIA) 100 mg tablet Take 1 tablet (100 mg total) by mouth daily 9/27/23  Yes MIKHAIL Ha   Ascorbic Acid (vitamin C) 1000 MG tablet Take 1,000 mg by mouth daily      Historical Provider, MD   cetirizine (ZyrTEC) 10 mg tablet Take 1 tablet (10 mg total) by mouth daily 9/27/23   MIKHAIL Ha   clotrimazole-betamethasone (LOTRISONE) 1-0.05 % cream Apply topically 2 (two) times a day  Patient not taking: Reported on 4/23/2024 10/24/23   Mark Munoz DPM   fluticasone (FLONASE) 50 mcg/act nasal spray 1 spray  into each nostril daily  Patient not taking: Reported on 4/23/2024 9/27/23   MIKHAIL Ha   omega-3-acid ethyl esters (LOVAZA) 1 g capsule Take 1 g by mouth daily    Historical Provider, MD   omeprazole (PriLOSEC) 40 MG capsule Take 1 capsule (40 mg total) by mouth daily  Patient taking differently: Take 40 mg by mouth daily Still has to start 3/6/24   Henri Burnette MD   tadalafil (CIALIS) 20 MG tablet TAKE 1 TABLET 1 HOUR PRIOR TO INTERCOURSE ON AN EMPTY STOMACH WITH NO ALCOHOL, LIMIT TO 2 TABLETS PER WEEK  Patient not taking: Reported on 4/23/2024 8/18/23   MIKHAIL Kenyon         VTE Pharmacologic Prophylaxis: VTE covered by:  enoxaparin, Subcutaneous, 40 mg at 04/25/24 0809      VTE Mechanical Prophylaxis: sequential compression device and foot pump applied    ======    I have discussed the patient's history, physical exam findings, assessment, and plan in detail with attending, Dr. Lewis    Thank you for allowing me to participate in the care of your patient, Jeremy George.    Ayala Weaver  . [facility], PGY-2

## 2024-04-26 LAB
GLUCOSE SERPL-MCNC: 200 MG/DL (ref 65–140)
GLUCOSE SERPL-MCNC: 209 MG/DL (ref 65–140)
GLUCOSE SERPL-MCNC: 294 MG/DL (ref 65–140)
GLUCOSE SERPL-MCNC: 306 MG/DL (ref 65–140)

## 2024-04-26 PROCEDURE — 99233 SBSQ HOSP IP/OBS HIGH 50: CPT | Performed by: PSYCHIATRY & NEUROLOGY

## 2024-04-26 PROCEDURE — 93005 ELECTROCARDIOGRAM TRACING: CPT

## 2024-04-26 PROCEDURE — 82948 REAGENT STRIP/BLOOD GLUCOSE: CPT

## 2024-04-26 RX ORDER — METOCLOPRAMIDE HYDROCHLORIDE 5 MG/ML
10 INJECTION INTRAMUSCULAR; INTRAVENOUS EVERY 8 HOURS SCHEDULED
Status: DISCONTINUED | OUTPATIENT
Start: 2024-04-26 | End: 2024-04-27 | Stop reason: HOSPADM

## 2024-04-26 RX ORDER — AMOXICILLIN 250 MG
1 CAPSULE ORAL
Status: DISCONTINUED | OUTPATIENT
Start: 2024-04-26 | End: 2024-04-27 | Stop reason: HOSPADM

## 2024-04-26 RX ORDER — POLYETHYLENE GLYCOL 3350 17 G/17G
17 POWDER, FOR SOLUTION ORAL DAILY PRN
Status: DISCONTINUED | OUTPATIENT
Start: 2024-04-26 | End: 2024-04-27 | Stop reason: HOSPADM

## 2024-04-26 RX ORDER — MAGNESIUM SULFATE HEPTAHYDRATE 40 MG/ML
2 INJECTION, SOLUTION INTRAVENOUS
Status: DISCONTINUED | OUTPATIENT
Start: 2024-04-26 | End: 2024-04-27 | Stop reason: HOSPADM

## 2024-04-26 RX ORDER — SODIUM CHLORIDE 9 MG/ML
100 INJECTION, SOLUTION INTRAVENOUS ONCE
Status: COMPLETED | OUTPATIENT
Start: 2024-04-26 | End: 2024-04-26

## 2024-04-26 RX ORDER — KETOROLAC TROMETHAMINE 30 MG/ML
30 INJECTION, SOLUTION INTRAMUSCULAR; INTRAVENOUS EVERY 8 HOURS SCHEDULED
Status: DISCONTINUED | OUTPATIENT
Start: 2024-04-26 | End: 2024-04-27 | Stop reason: HOSPADM

## 2024-04-26 RX ORDER — DIPHENHYDRAMINE HYDROCHLORIDE 50 MG/ML
25 INJECTION INTRAMUSCULAR; INTRAVENOUS EVERY 8 HOURS SCHEDULED
Status: DISCONTINUED | OUTPATIENT
Start: 2024-04-26 | End: 2024-04-27 | Stop reason: HOSPADM

## 2024-04-26 RX ORDER — LIDOCAINE HYDROCHLORIDE 10 MG/ML
10 INJECTION, SOLUTION EPIDURAL; INFILTRATION; INTRACAUDAL; PERINEURAL ONCE
Status: DISCONTINUED | OUTPATIENT
Start: 2024-04-26 | End: 2024-04-27 | Stop reason: HOSPADM

## 2024-04-26 RX ADMIN — PANTOPRAZOLE SODIUM 40 MG: 40 TABLET, DELAYED RELEASE ORAL at 05:24

## 2024-04-26 RX ADMIN — LISINOPRIL 40 MG: 20 TABLET ORAL at 08:53

## 2024-04-26 RX ADMIN — SODIUM CHLORIDE 100 ML/HR: 0.9 INJECTION, SOLUTION INTRAVENOUS at 11:21

## 2024-04-26 RX ADMIN — ACETAMINOPHEN 650 MG: 325 TABLET, FILM COATED ORAL at 04:54

## 2024-04-26 RX ADMIN — POLYETHYLENE GLYCOL 3350 17 G: 17 POWDER, FOR SOLUTION ORAL at 16:40

## 2024-04-26 RX ADMIN — DIPHENHYDRAMINE HYDROCHLORIDE 25 MG: 50 INJECTION, SOLUTION INTRAMUSCULAR; INTRAVENOUS at 21:02

## 2024-04-26 RX ADMIN — KETOROLAC TROMETHAMINE 30 MG: 30 INJECTION, SOLUTION INTRAMUSCULAR; INTRAVENOUS at 13:06

## 2024-04-26 RX ADMIN — BACLOFEN 20 MG: 20 TABLET ORAL at 20:38

## 2024-04-26 RX ADMIN — RIMEGEPANT SULFATE 75 MG: 75 TABLET, ORALLY DISINTEGRATING ORAL at 11:21

## 2024-04-26 RX ADMIN — METOCLOPRAMIDE HYDROCHLORIDE 10 MG: 5 INJECTION INTRAMUSCULAR; INTRAVENOUS at 21:02

## 2024-04-26 RX ADMIN — KETOROLAC TROMETHAMINE 30 MG: 30 INJECTION, SOLUTION INTRAMUSCULAR; INTRAVENOUS at 21:02

## 2024-04-26 RX ADMIN — DIPHENHYDRAMINE HYDROCHLORIDE 25 MG: 50 INJECTION, SOLUTION INTRAMUSCULAR; INTRAVENOUS at 13:07

## 2024-04-26 RX ADMIN — INSULIN LISPRO 6 UNITS: 100 INJECTION, SOLUTION INTRAVENOUS; SUBCUTANEOUS at 08:53

## 2024-04-26 RX ADMIN — FAMOTIDINE 20 MG: 20 TABLET, FILM COATED ORAL at 08:53

## 2024-04-26 RX ADMIN — HYDROCHLOROTHIAZIDE 25 MG: 25 TABLET ORAL at 08:52

## 2024-04-26 RX ADMIN — OXYCODONE HYDROCHLORIDE AND ACETAMINOPHEN 1000 MG: 500 TABLET ORAL at 08:52

## 2024-04-26 RX ADMIN — INSULIN LISPRO 8 UNITS: 100 INJECTION, SOLUTION INTRAVENOUS; SUBCUTANEOUS at 16:37

## 2024-04-26 RX ADMIN — PREGABALIN 150 MG: 75 CAPSULE ORAL at 21:02

## 2024-04-26 RX ADMIN — ENOXAPARIN SODIUM 40 MG: 40 INJECTION SUBCUTANEOUS at 08:53

## 2024-04-26 RX ADMIN — MAGNESIUM SULFATE HEPTAHYDRATE 2 G: 40 INJECTION, SOLUTION INTRAVENOUS at 11:22

## 2024-04-26 RX ADMIN — INSULIN LISPRO 4 UNITS: 100 INJECTION, SOLUTION INTRAVENOUS; SUBCUTANEOUS at 11:39

## 2024-04-26 RX ADMIN — TOPIRAMATE 25 MG: 25 TABLET, FILM COATED ORAL at 08:53

## 2024-04-26 RX ADMIN — PRAVASTATIN SODIUM 40 MG: 40 TABLET ORAL at 16:37

## 2024-04-26 RX ADMIN — METOCLOPRAMIDE HYDROCHLORIDE 10 MG: 5 INJECTION INTRAMUSCULAR; INTRAVENOUS at 13:07

## 2024-04-26 RX ADMIN — SENNOSIDES AND DOCUSATE SODIUM 1 TABLET: 50; 8.6 TABLET ORAL at 21:02

## 2024-04-26 NOTE — PLAN OF CARE
Problem: NEUROSENSORY - ADULT  Goal: Achieves stable or improved neurological status  Description: INTERVENTIONS  - Monitor and report changes in neurological status  - Monitor vital signs such as temperature, blood pressure, glucose, and any other labs ordered   - Initiate measures to prevent increased intracranial pressure  - Monitor for seizure activity and implement precautions if appropriate      Outcome: Progressing  Goal: Achieves maximal functionality and self care  Description: INTERVENTIONS  - Monitor swallowing and airway patency with patient fatigue and changes in neurological status  - Encourage and assist patient to increase activity and self care.   - Encourage visually impaired, hearing impaired and aphasic patients to use assistive/communication devices  Outcome: Progressing

## 2024-04-26 NOTE — PLAN OF CARE
Problem: PAIN - ADULT  Goal: Verbalizes/displays adequate comfort level or baseline comfort level  Description: Interventions:  - Encourage patient to monitor pain and request assistance  - Assess pain using appropriate pain scale  - Administer analgesics based on type and severity of pain and evaluate response  - Implement non-pharmacological measures as appropriate and evaluate response  - Consider cultural and social influences on pain and pain management  - Notify physician/advanced practitioner if interventions unsuccessful or patient reports new pain  4/26/2024 0006 by Marcelino Burgos RN  Outcome: Progressing  4/25/2024 1553 by Marcelino Burgos RN  Outcome: Progressing     Problem: INFECTION - ADULT  Goal: Absence or prevention of progression during hospitalization  Description: INTERVENTIONS:  - Assess and monitor for signs and symptoms of infection  - Monitor lab/diagnostic results  - Monitor all insertion sites, i.e. indwelling lines, tubes, and drains  - Monitor endotracheal if appropriate and nasal secretions for changes in amount and color  - [place] appropriate cooling/warming therapies per order  - Administer medications as ordered  - Instruct and encourage patient and family to use good hand hygiene technique  - Identify and instruct in appropriate isolation precautions for identified infection/condition  Outcome: Progressing     Problem: SAFETY ADULT  Goal: Patient will remain free of falls  Description: INTERVENTIONS:  - Educate patient/family on patient safety including physical limitations  - Instruct patient to call for assistance with activity   - Consult OT/PT to assist with strengthening/mobility   - Keep Call bell within reach  - Keep bed low and locked with side rails adjusted as appropriate  - Keep care items and personal belongings within reach  - Initiate and maintain comfort rounds  - Make Fall Risk Sign visible to staff  - Offer Toileting every 2 Hours, in advance of need  -  Initiate/Maintain bed alarm  - Obtain necessary fall risk management equipment: walker, non-skids socks, call bell  - Apply yellow socks and bracelet for high fall risk patients  - Consider moving patient to room near nurses station  4/26/2024 0006 by Marcelino Burgos RN  Outcome: Progressing  4/25/2024 1553 by Marcelino Burgos RN  Outcome: Progressing  Goal: Maintain or return to baseline ADL function  Description: INTERVENTIONS:  -  Assess patient's ability to carry out ADLs; assess patient's baseline for ADL function and identify physical deficits which impact ability to perform ADLs (bathing, care of mouth/teeth, toileting, grooming, dressing, etc.)  - Assess/evaluate cause of self-care deficits   - Assess range of motion  - Assess patient's mobility; develop plan if impaired  - Assess patient's need for assistive devices and provide as appropriate  - Encourage maximum independence but intervene and supervise when necessary  - Involve family in performance of ADLs  - Assess for home care needs following discharge   - Consider OT consult to assist with ADL evaluation and planning for discharge  - Provide patient education as appropriate  Outcome: Progressing     Problem: Knowledge Deficit  Goal: Patient/family/caregiver demonstrates understanding of disease process, treatment plan, medications, and discharge instructions  Description: Complete learning assessment and assess knowledge base.  Interventions:  - Provide teaching at level of understanding  - Provide teaching via preferred learning methods  4/26/2024 0006 by Marcelino Burgos RN  Outcome: Progressing  4/25/2024 1553 by Marcelino Burgos RN  Outcome: Progressing

## 2024-04-26 NOTE — PROGRESS NOTES
"    NEUROLOGY RESIDENCY PROGRESS NOTE     Name: Jeremy George   Age & Sex: 53 y.o. male   MRN: [id number]  Unit/Bed#: [facility] 707-01   Encounter: [id number]    Recommendations for outpatient neurological follow up have yet to be determined.     ASSESSMENT & PLAN     Dizziness  Assessment & Plan  53 y.o. male with prior vertigo episode 20+ years ago, suspected CAA, headaches, HTN, HLD, DM2, cervical radiculopathy s/p ACDF, tremors, and recent travel to [place] with intermittent dizziness for the past week who presented to [facility] on 4/23/2024 as a stroke alert for persistent dizziness.    Patient has had intermittent dizziness over the past week that only lasted a few minutes. Day of presentation, patient was bending down doing toe touches when he suddenly developed severe/persistent dizziness described as \"swaying\" associated with ambulatory dysfunction and nausea.  Patient also developed a left frontal/parietal headache and chest discomfort.    Upon arrival to the ED, /94.    NIHSS 0.    CT head negative for acute intracranial abnormalities.    CTA head/neck negative for LVO, dissection, aneurysm, or high-grade stenosis.    Patient was not a TNK candidate due to bleeding risk from presumed CAA and higher suspicion for peripheral vestibulopathy.    MRI Brain w/o contrast - No acute intracranial abnormality.     HINTS exam: a few beats of nonsustained nystagmus to the right, no skew deviation, catch up saccades present    High suspicion for peripheral vestibulopathy provoked by position change, however given persistent symptoms despite using meclizine, will obtain MRI brain to rule out acute intracranial pathology.  Could also be vestibular migraine.    Plan:  - Continue Migraine cocktail -with Nurtec  - Plan for occipital nerve block on 4/26/2024.  - PT/OT; patient may benefit from vestibular therapy  - Continue to monitor and notify neurology with any changes.     Hypertension, essential  Assessment & Plan  - " Normotensive goal  - Lisinopril-hydrochlorothiazide 20-12.5 mg, 2 tablets daily    Diabetes mellitus (HCC)  Assessment & Plan    Lab Results   Component Value Date    HGBA1C 7.0 (H) 2023     - Sliding insulin scale      SUBJECTIVE     Patient was seen and examined. No acute events overnight. Patient continues to complain of headache and dizziness.  The patient reports that the dizziness had improved somewhat.  The patient states he did not sleep well overnight.  The patient stated that he is used to taking Tylenol PM which includes diphenhydramine at nighttime to sleep.  Patient continues to have headache that he rated as 7 out of 10.  Initiated migraine cocktail protocol including Nurtec. A nerve block was offered to the patient for his on going neck pain which he states is the origin of his headache.  Benefits and risks were discussed with the patient.    OBJECTIVE     Patient ID: Jeremy George is a 53 y.o. male.    Vitals:    24 0732 24 0851 24 1141 24 1449   BP: 159/85 145/88 149/84 126/63   BP Location:   Right arm    Pulse: 69 89 78 66   Resp:   16    Temp: 97.6 °F (36.4 °C)  98 °F (36.7 °C) 97.8 °F (36.6 °C)   TempSrc:   Oral    SpO2: 96% 97% 96% 98%   Weight:       Height:          Temperature:   Temp (24hrs), Av.1 °F (36.7 °C), Min:97.6 °F (36.4 °C), Max:98.6 °F (37 °C)    Temperature: 97.8 °F (36.6 °C)      Physical Exam  Vitals reviewed.   HENT:      Mouth/Throat:      Mouth: Mucous membranes are moist.      Pharynx: Oropharynx is clear.   Eyes:      Extraocular Movements: Extraocular movements intact.      Conjunctiva/sclera: Conjunctivae normal.      Pupils: Pupils are equal, round, and reactive to light.   Cardiovascular:      Rate and Rhythm: Normal rate and regular rhythm.      Pulses: Normal pulses.      Heart sounds: Normal heart sounds.   Pulmonary:      Effort: Pulmonary effort is normal.      Breath sounds: Normal breath sounds.   Abdominal:      Palpations:  Abdomen is soft.   Neurological:      Mental Status: He is alert and oriented to person, place, and time.      Cranial Nerves: Cranial nerves 2-12 are intact.      Motor: Motor strength is normal.         Neurologic Exam     Mental Status   Oriented to person, place, and time.     Cranial Nerves   Cranial nerves II through XII intact.     CN III, IV, VI   Pupils are equal, round, and reactive to light.    Motor Exam   Muscle bulk: normal  Overall muscle tone: normal    Strength   Strength 5/5 throughout.     Sensory Exam   Light touch normal.   Vibration normal.        LABORATORY DATA     Labs: I have personally reviewed pertinent reports.    Results from last 7 days   Lab Units 04/25/24  0950 04/23/24  1023   WBC Thousand/uL 5.44 5.94   HEMOGLOBIN g/dL 14.3 15.0   HEMATOCRIT % 42.2 44.6   PLATELETS Thousands/uL 177 173   SEGS PCT %  --  50   MONO PCT %  --  14*   EOS PCT %  --  2      Results from last 7 days   Lab Units 04/25/24  0950 04/23/24  1023   SODIUM mmol/L 136 136   POTASSIUM mmol/L 4.1 4.4   CHLORIDE mmol/L 104 101   CO2 mmol/L 27 27   BUN mg/dL 16 16   CREATININE mg/dL 0.79 0.79   CALCIUM mg/dL 8.8 9.5   ALK PHOS U/L 48 65   ALT U/L 74* 72*   AST U/L 36 34              Results from last 7 days   Lab Units 04/23/24  1023   INR  0.98   PTT seconds 26               IMAGING & DIAGNOSTIC TESTING     Radiology Results: I have personally reviewed pertinent reports.      MRI brain wo contrast   Final Result by Iker Nye MD (04/24 0150)      No acute intracranial abnormality. No restricted diffusion to suggest acute ischemia.      Mild scattered periventricular and subcortical foci of white matter T2 hyperintensity which are nonspecific and most likely related to chronic small vessel ischemic changes. Other less like etiologies include demyelinating disease, vasculitis, Lyme and    migraines.      Reidentified and normal punctate foci of susceptibility artifact are seen in the supratentorial brain  centrally at the gray-white junction peripherally in distribution most compatible with cerebral amyloid angiopathy.  Chronic microhemorrhage in the    setting of hypertension is less likely with this distribution but not excluded.               Workstation performed: [id number]         XR chest 1 view portable   ED Interpretation by Kendall Ward DO (04/23 1347)   Chest x-ray interpreted me shows no acute cardiopulmonary disease       Final Result by Erasmo Fu MD (04/23 2241)      No acute cardiopulmonary disease.            Workstation performed: [id number]         CTA stroke alert (head/neck)   Final Result by Dave Figueredo MD (04/23 1130)      Negative CTA head and neck for large vessel occlusion, dissection, aneurysm, or high-grade stenosis.      Additional chronic/incidental findings as detailed above.            Findings were directly discussed with Alo Pike at approximately 11:19 AM.                        Workstation performed: [id number]         CT stroke alert brain   Final Result by Dave Figueredo MD (04/23 1121)      No acute intracranial abnormality.      Findings were directly discussed with Alo Pike at approximately 11:18 AM.      Workstation performed: [id number]             Other Diagnostic Testing: I have personally reviewed pertinent reports.      ACTIVE MEDICATIONS     Current Facility-Administered Medications   Medication Dose Route Frequency    acetaminophen (TYLENOL) tablet 650 mg  650 mg Oral Q6H PRN    ascorbic acid (VITAMIN C) tablet 1,000 mg  1,000 mg Oral Daily    baclofen tablet 20 mg  20 mg Oral HS PRN    cyanocobalamin injection 1,000 mcg  1,000 mcg Intramuscular Q7 Days    diphenhydrAMINE (BENADRYL) injection 25 mg  25 mg Intravenous Q8H BERNABE    enoxaparin (LOVENOX) subcutaneous injection 40 mg  40 mg Subcutaneous Daily    famotidine (PEPCID) tablet 20 mg  20 mg Oral Daily    lisinopril (ZESTRIL) tablet 40 mg  40 mg Oral Daily    And     hydroCHLOROthiazide tablet 25 mg  25 mg Oral Daily    insulin lispro (HumALOG/ADMELOG) 100 units/mL subcutaneous injection 2-12 Units  2-12 Units Subcutaneous TID AC    ketorolac (TORADOL) injection 30 mg  30 mg Intravenous Q8H BERNABE    lidocaine (PF) (XYLOCAINE-MPF) 1 % injection 10 mL  10 mL Injection Once    magnesium sulfate 2 g/50 mL IVPB (premix) 2 g  2 g Intravenous Q24H BERNABE    metoclopramide (REGLAN) injection 10 mg  10 mg Intravenous Q8H BERNABE    pantoprazole (PROTONIX) EC tablet 40 mg  40 mg Oral Early Morning    pravastatin (PRAVACHOL) tablet 40 mg  40 mg Oral Daily With Dinner    pregabalin (LYRICA) capsule 150 mg  150 mg Oral HS    topiramate (TOPAMAX) tablet 25 mg  25 mg Oral Daily       Prior to Admission medications    Medication Sig Start Date End Date Taking? Authorizing Provider   baclofen 20 mg tablet Take 1 tablet (20 mg total) by mouth daily at bedtime as needed for muscle spasms 3/25/24  Yes MIKHAIL Laboy   CINNAMON PO Take by mouth   Yes Historical Provider, MD   diphenhydrAMINE-acetaminophen (TYLENOL PM)  MG TABS Take 2 tablets by mouth daily at bedtime as needed for sleep 1000 mg in total   Yes Historical Provider, MD   famotidine (PEPCID) 20 mg tablet Take 1 tablet (20 mg total) by mouth daily 9/27/23  Yes MIKHAIL Ha   lisinopril-hydrochlorothiazide (PRINZIDE,ZESTORETIC) 20-12.5 MG per tablet Take 2 tablets by mouth daily 9/27/23  Yes MIKHAIL Ha   metFORMIN (GLUCOPHAGE) 500 mg tablet Pt takes 1500 mg (3 tablets) by mouth in the morning, and a 1000 mg (2 tablets) in the evening 9/27/23  Yes MIKHAIL Ha   Multiple Vitamins-Minerals (CENTRUM SILVER 50+MEN PO) Take by mouth   Yes Historical Provider, MD   polyethylene glycol (MiraLax) 17 g packet Take 17 g by mouth daily 3/6/24  Yes Henri Burnette MD   pregabalin (LYRICA) 75 mg capsule Take 2 capsules (150 mg total) by mouth daily at bedtime  Patient taking differently: Take 150 mg by mouth daily  at bedtime Bedtime 3/25/24  Yes MIKHAIL Laoby   simvastatin (ZOCOR) 20 mg tablet Take 1 tablet (20 mg total) by mouth daily 9/27/23  Yes MIKHAIL Ha   sitaGLIPtin (JANUVIA) 100 mg tablet Take 1 tablet (100 mg total) by mouth daily 9/27/23  Yes MIKHAIL Ha   Ascorbic Acid (vitamin C) 1000 MG tablet Take 1,000 mg by mouth daily      Historical Provider, MD   cetirizine (ZyrTEC) 10 mg tablet Take 1 tablet (10 mg total) by mouth daily 9/27/23   MIKHAIL Ha   clotrimazole-betamethasone (LOTRISONE) 1-0.05 % cream Apply topically 2 (two) times a day  Patient not taking: Reported on 4/23/2024 10/24/23   Mark Munoz DPM   fluticasone (FLONASE) 50 mcg/act nasal spray 1 spray into each nostril daily  Patient not taking: Reported on 4/23/2024 9/27/23   MIKHAIL Ha   omega-3-acid ethyl esters (LOVAZA) 1 g capsule Take 1 g by mouth daily    Historical Provider, MD   omeprazole (PriLOSEC) 40 MG capsule Take 1 capsule (40 mg total) by mouth daily  Patient taking differently: Take 40 mg by mouth daily Still has to start 3/6/24   Henri Burnette MD   tadalafil (CIALIS) 20 MG tablet TAKE 1 TABLET 1 HOUR PRIOR TO INTERCOURSE ON AN EMPTY STOMACH WITH NO ALCOHOL, LIMIT TO 2 TABLETS PER WEEK  Patient not taking: Reported on 4/23/2024 8/18/23   MIKHAIL Kenyon         VTE Pharmacologic Prophylaxis: VTE covered by:  enoxaparin, Subcutaneous, 40 mg at 04/26/24 0853      VTE Mechanical Prophylaxis: sequential compression device and foot pump applied    ======    I have discussed the patient's history, physical exam findings, assessment, and plan in detail with attending, Dr. Lewis    Thank you for allowing me to participate in the care of your patient, Jeremy George.    Ayala Weaver  [facility] Neurology Residency, PGY-2

## 2024-04-26 NOTE — TELEPHONE ENCOUNTER
Hello Good Morning,     Patient has accepted 7/02/2024, Dr.Mendez Ching, 2 pm, 60 minute appt and placed on the waiting list as per pt he was told to be seen ASAP after dc- home.    Not use if any hold can be offered sooner?      Thank you,     Saba

## 2024-04-27 VITALS
HEART RATE: 61 BPM | OXYGEN SATURATION: 96 % | SYSTOLIC BLOOD PRESSURE: 135 MMHG | TEMPERATURE: 98.2 F | WEIGHT: 263 LBS | RESPIRATION RATE: 16 BRPM | DIASTOLIC BLOOD PRESSURE: 75 MMHG | HEIGHT: 73 IN | BODY MASS INDEX: 34.85 KG/M2

## 2024-04-27 PROBLEM — E53.8 B12 DEFICIENCY: Status: ACTIVE | Noted: 2024-04-27

## 2024-04-27 LAB
DME PARACHUTE DELIVERY DATE REQUESTED: NORMAL
DME PARACHUTE ITEM DESCRIPTION: NORMAL
DME PARACHUTE ORDER STATUS: NORMAL
DME PARACHUTE SUPPLIER NAME: NORMAL
DME PARACHUTE SUPPLIER PHONE: NORMAL
GLUCOSE SERPL-MCNC: 217 MG/DL (ref 65–140)
GLUCOSE SERPL-MCNC: 258 MG/DL (ref 65–140)

## 2024-04-27 PROCEDURE — 99238 HOSP IP/OBS DSCHRG MGMT 30/<: CPT | Performed by: PSYCHIATRY & NEUROLOGY

## 2024-04-27 PROCEDURE — 97116 GAIT TRAINING THERAPY: CPT

## 2024-04-27 PROCEDURE — 82948 REAGENT STRIP/BLOOD GLUCOSE: CPT

## 2024-04-27 PROCEDURE — 97530 THERAPEUTIC ACTIVITIES: CPT

## 2024-04-27 RX ORDER — TOPIRAMATE 25 MG/1
TABLET ORAL
Qty: 130 TABLET | Refills: 0 | Status: SHIPPED | OUTPATIENT
Start: 2024-04-28

## 2024-04-27 RX ORDER — CYANOCOBALAMIN 1000 UG/ML
1000 INJECTION, SOLUTION INTRAMUSCULAR; SUBCUTANEOUS
Qty: 1 ML | Refills: 0 | Status: SHIPPED | OUTPATIENT
Start: 2024-05-02 | End: 2024-06-28

## 2024-04-27 RX ADMIN — INSULIN LISPRO 4 UNITS: 100 INJECTION, SOLUTION INTRAVENOUS; SUBCUTANEOUS at 08:01

## 2024-04-27 RX ADMIN — FAMOTIDINE 20 MG: 20 TABLET, FILM COATED ORAL at 08:01

## 2024-04-27 RX ADMIN — TOPIRAMATE 25 MG: 25 TABLET, FILM COATED ORAL at 08:01

## 2024-04-27 RX ADMIN — INSULIN LISPRO 6 UNITS: 100 INJECTION, SOLUTION INTRAVENOUS; SUBCUTANEOUS at 12:00

## 2024-04-27 RX ADMIN — DIPHENHYDRAMINE HYDROCHLORIDE 25 MG: 50 INJECTION, SOLUTION INTRAMUSCULAR; INTRAVENOUS at 05:02

## 2024-04-27 RX ADMIN — KETOROLAC TROMETHAMINE 30 MG: 30 INJECTION, SOLUTION INTRAMUSCULAR; INTRAVENOUS at 05:02

## 2024-04-27 RX ADMIN — MAGNESIUM SULFATE HEPTAHYDRATE 2 G: 40 INJECTION, SOLUTION INTRAVENOUS at 08:01

## 2024-04-27 RX ADMIN — METOCLOPRAMIDE HYDROCHLORIDE 10 MG: 5 INJECTION INTRAMUSCULAR; INTRAVENOUS at 05:02

## 2024-04-27 RX ADMIN — ENOXAPARIN SODIUM 40 MG: 40 INJECTION SUBCUTANEOUS at 08:01

## 2024-04-27 RX ADMIN — HYDROCHLOROTHIAZIDE 25 MG: 25 TABLET ORAL at 08:01

## 2024-04-27 RX ADMIN — PANTOPRAZOLE SODIUM 40 MG: 40 TABLET, DELAYED RELEASE ORAL at 05:02

## 2024-04-27 RX ADMIN — LISINOPRIL 40 MG: 20 TABLET ORAL at 08:01

## 2024-04-27 RX ADMIN — OXYCODONE HYDROCHLORIDE AND ACETAMINOPHEN 1000 MG: 500 TABLET ORAL at 08:01

## 2024-04-27 NOTE — PLAN OF CARE
Problem: PHYSICAL THERAPY ADULT  Goal: Performs mobility at highest level of function for planned discharge setting.  See evaluation for individualized goals.  Description: Treatment/Interventions: Functional transfer training, LE strengthening/ROM, Elevations, Therapeutic exercise, Endurance training, Patient/family training, Equipment eval/education, Bed mobility, Gait training, Spoke to nursing, Spoke to case management, OT  Equipment Recommended: Walker       See flowsheet documentation for full assessment, interventions and recommendations.  Outcome: Adequate for Discharge  Note: Prognosis: Good  Problem List: Decreased endurance, Impaired balance, Decreased mobility  Assessment: The patient has improving symptoms today, and he does mobilize well other than with dynamic head turns. With instruction for manuevering slow and steadily as well as limiting head movements he performed all mobility safely including the stairs. His wife is an excellent resource for him, and demonstrated appropriate guarding and instruction throughout. They have demonstrated the necessary knowledge and safety in order to return home safely.  Barriers to Discharge: None     Rehab Resource Intensity Level, PT: III (Minimum Resource Intensity) (Vestibular therapy.)    See flowsheet documentation for full assessment.

## 2024-04-27 NOTE — UTILIZATION REVIEW
Continued Stay Review    Date: 4/26/24                          Current Patient Class: IP  Current Level of Care: Med Surg    HPI:53 y.o. male initially admitted on  4/23    Assessment/Plan:  Patient continues to complain of headache and dizziness.  The patient reports that the dizziness had improved somewhat.  The patient states he did not sleep well overnight.  Patient continues to have headache that he rated as 7 out of 10.  Initiated migraine cocktail protocol including Nurtec. A nerve block was offered to the patient for his on going neck pain which he states is the origin of his headache. Will reassess after ONB, continue scheduled migraine cocktail until 4/27, Decadron not help thus discontinued, Topamax 25 hs initiated to help preventatively with goal of increasing this by 25 mg weekly to a total of 100 hs over the next 4 weeks.     Vital Signs:     Date/Time Temp Pulse Resp BP MAP (mmHg) SpO2 O2 Device   04/27/24 07:17:33 98.2 °F (36.8 °C) 61 -- 135/75 95 96 % --   04/27/24 05:11:56 -- 55 -- 133/77 96 97 % --   04/26/24 21:14:47 98 °F (36.7 °C) 60 -- 124/53 77 97 % --   04/26/24 14:49:38 97.8 °F (36.6 °C) 66 -- 126/63 84 98 % --   04/26/24 11:41:52 98 °F (36.7 °C) 78 16 149/84 106 96 % None (Room air)   04/26/24 08:51:18 -- 89 -- 145/88 107 97 % --   04/26/24 07:32:03 97.6 °F (36.4 °C) 69 -- 159/85 110 96 % --   04/25/24 19:56:28 97.8 °F (36.6 °C) 72 -- 165/85 112 94 % --   04/25/24 15:21:29 98.6 °F (37 °C) 61 16 152/83 106 94 % None (Room air)   04/25/24 15:19:56 98.6 °F (37 °C) 61 -- 95/71 79 94 % --   04/25/24 11:04:49 98.8 °F (37.1 °C) 58 16 145/77 100 96 % --   04/25/24 08:02:46 98.2 °F (36.8 °C) 57 16 141/74 96 95 % --       Pertinent Labs/Diagnostic Results:   Results from last 7 days   Lab Units 04/23/24  1125   SARS-COV-2  Negative     Results from last 7 days   Lab Units 04/25/24  0950 04/23/24  1023   WBC Thousand/uL 5.44 5.94   HEMOGLOBIN g/dL 14.3 15.0   HEMATOCRIT % 42.2 44.6   PLATELETS  Thousands/uL 177 173   TOTAL NEUT ABS Thousands/µL  --  3.03         Results from last 7 days   Lab Units 04/25/24  0950 04/23/24  1023   SODIUM mmol/L 136 136   POTASSIUM mmol/L 4.1 4.4   CHLORIDE mmol/L 104 101   CO2 mmol/L 27 27   ANION GAP mmol/L 5 8   BUN mg/dL 16 16   CREATININE mg/dL 0.79 0.79   EGFR ml/min/1.73sq m 101 101   CALCIUM mg/dL 8.8 9.5     Results from last 7 days   Lab Units 04/25/24  0950 04/23/24  1023   AST U/L 36 34   ALT U/L 74* 72*   ALK PHOS U/L 48 65   TOTAL PROTEIN g/dL 6.4 6.9   ALBUMIN g/dL 3.9 4.4   TOTAL BILIRUBIN mg/dL 0.51 0.51     Results from last 7 days   Lab Units 04/27/24  0630 04/26/24  1604 04/26/24  1122 04/26/24  0848 04/26/24  0625 04/25/24  2117 04/25/24  1517 04/25/24  1053 04/25/24  0559 04/24/24  2132 04/24/24  1624 04/24/24  1055   POC GLUCOSE mg/dl 217* 306* 209* 294* 200* 288* 151* 168* 194* 150* 216* 169*     Results from last 7 days   Lab Units 04/25/24  0950 04/23/24  1023   GLUCOSE RANDOM mg/dL 145* 138         Results from last 7 days   Lab Units 04/24/24  1042   HEMOGLOBIN A1C % 6.8*   EAG mg/dl 148           Results from last 7 days   Lab Units 04/23/24  1718 04/23/24  1205 04/23/24  1023   HS TNI 0HR ng/L  --   --  4   HS TNI 2HR ng/L  --  3  --    HSTNI D2 ng/L  --  -1  --    HS TNI 4HR ng/L 5  --   --    HSTNI D4 ng/L 1  --   --          Results from last 7 days   Lab Units 04/23/24  1023   PROTIME seconds 12.9   INR  0.98   PTT seconds 26           Results from last 7 days   Lab Units 04/25/24  0950   LIPASE u/L 83*         Results from last 7 days   Lab Units 04/23/24  1125   INFLUENZA A PCR  Negative   INFLUENZA B PCR  Negative   RSV PCR  Negative         Medications:   Scheduled Medications:  vitamin C, 1,000 mg, Oral, Daily  cyanocobalamin, 1,000 mcg, Intramuscular, Q7 Days  diphenhydrAMINE, 25 mg, Intravenous, Q8H BERNABE  enoxaparin, 40 mg, Subcutaneous, Daily  famotidine, 20 mg, Oral, Daily  lisinopril, 40 mg, Oral, Daily   And  hydroCHLOROthiazide,  25 mg, Oral, Daily  insulin lispro, 2-12 Units, Subcutaneous, TID AC  ketorolac, 30 mg, Intravenous, Q8H BERNABE  lidocaine (PF), 10 mL, Injection, Once  magnesium sulfate, 2 g, Intravenous, Q24H BERNABE  metoclopramide, 10 mg, Intravenous, Q8H BERNABE  pantoprazole, 40 mg, Oral, Early Morning  pravastatin, 40 mg, Oral, Daily With Dinner  pregabalin, 150 mg, Oral, HS  senna-docusate sodium, 1 tablet, Oral, HS  topiramate, 25 mg, Oral, Daily      Continuous IV Infusions:     PRN Meds:  acetaminophen, 650 mg, Oral, Q6H PRN  baclofen, 20 mg, Oral, HS PRN  polyethylene glycol, 17 g, Oral, Daily PRN        Discharge Plan: D    Network Utilization Review Department  ATTENTION: Please call with any questions or concerns to [phone] and carefully listen to the prompts so that you are directed to the right person. All voicemails are confidential.   For Discharge needs, contact Care Management DC Support Team at [phone] opt. [phone]  Send all requests for admission clinical reviews, approved or denied determinations and any other requests to dedicated fax number below belonging to the campus where the patient is receiving treatment. List of dedicated fax numbers for the Facilities:  FACILITY NAME UR FAX NUMBER   ADMISSION DENIALS (Administrative/Medical Necessity) [phone]   DISCHARGE SUPPORT TEAM (NETWORK) [phone]   PARENT CHILD HEALTH (Maternity/NICU/Pediatrics) [phone]   [facility] [phone]   [facility] [phone]   [facility] [phone]   [facility] [phone]   [facility] [phone]   [facility] [phone]   [facility] [phone]   [facility] [phone]   [facility] [phone]   [facility] [phone]   [facility]  [facility] [phone]   [facility] [phone]

## 2024-04-27 NOTE — PLAN OF CARE
Problem: PAIN - ADULT  Goal: Verbalizes/displays adequate comfort level or baseline comfort level  Description: Interventions:  - Encourage patient to monitor pain and request assistance  - Assess pain using appropriate pain scale  - Administer analgesics based on type and severity of pain and evaluate response  - Implement non-pharmacological measures as appropriate and evaluate response  - Notify physician/advanced practitioner if interventions unsuccessful or patient reports new pain  4/27/2024 1426 by Mkaayla Bartlett RN  Outcome: Completed  4/27/2024 1426 by Makayla Bartlett RN  Outcome: Progressing     Problem: INFECTION - ADULT  Goal: Absence or prevention of progression during hospitalization  Description: INTERVENTIONS:  - Assess and monitor for signs and symptoms of infection  - Monitor lab/diagnostic results  - Monitor all insertion sites, i.e. indwelling lines, tubes, and drains  - [place] appropriate cooling/warming therapies per order  - Administer medications as ordered  - Instruct and encourage patient and family to use good hand hygiene technique  - Identify and instruct in appropriate isolation precautions for identified infection/condition  4/27/2024 1426 by Makayla Bartlett RN  Outcome: Completed  4/27/2024 1426 by Makayla Bartlett RN  Outcome: Progressing     Problem: SAFETY ADULT  Goal: Patient will remain free of falls  Description: INTERVENTIONS:  - Educate patient/family on patient safety including physical limitations  - Instruct patient to call for assistance with activity   - Consult OT/PT to assist with strengthening/mobility   - Keep Call bell within reach  - Keep bed low and locked with side rails adjusted as appropriate  - Keep care items and personal belongings within reach  - Initiate and maintain comfort rounds  - Make Fall Risk Sign visible to staff  - Offer Toileting every 2 Hours, in advance of need  - Initiate/Maintain bed/chair alarm  - Obtain necessary fall  risk management equipment.  - Apply yellow socks and bracelet for high fall risk patients  - Consider moving patient to room near nurses station  4/27/2024 1426 by Makayla Bartlett RN  Outcome: Completed  4/27/2024 1426 by Makayla Bartlett RN  Outcome: Progressing  Goal: Maintain or return to baseline ADL function  Description: INTERVENTIONS:  -  Assess patient's ability to carry out ADLs; assess patient's baseline for ADL function and identify physical deficits which impact ability to perform ADLs (bathing, care of mouth/teeth, toileting, grooming, dressing, etc.)  - Assess/evaluate cause of self-care deficits   - Assess range of motion  - Assess patient's mobility; develop plan if impaired  - Assess patient's need for assistive devices and provide as appropriate  - Encourage maximum independence but intervene and supervise when necessary  - Involve family in performance of ADLs  - Assess for home care needs following discharge   - Consider OT consult to assist with ADL evaluation and planning for discharge  - Provide patient education as appropriate  4/27/2024 1426 by Makayla Bartlett RN  Outcome: Completed  4/27/2024 1426 by Makayla Bartlett RN  Outcome: Progressing  Goal: Maintains/Returns to pre admission functional level  Description: INTERVENTIONS:  - Perform AM-PAC 6 Click Basic Mobility/ Daily Activity assessment daily.  - Set and communicate daily mobility goal to care team and patient/family/caregiver.   - Collaborate with rehabilitation services on mobility goals if consulted  - Dangle patient 3 times a day  - Stand patient 3 times a day  - Ambulate patient 3 times a day  - Out of bed to chair 3 times a day   - Out of bed for meals 3 times a day  - Out of bed for toileting  - Record patient progress and toleration of activity level   4/27/2024 1426 by Makayla Bartlett RN  Outcome: Completed  4/27/2024 1426 by Makayla Bartlett RN  Outcome: Progressing     Problem: DISCHARGE  PLANNING  Goal: Discharge to home or other facility with appropriate resources  Description: INTERVENTIONS:  - Identify barriers to discharge w/patient and caregiver  - Arrange for needed discharge resources and transportation as appropriate  - Identify discharge learning needs (meds, wound care, etc.)  - Refer to Case Management Department for coordinating discharge planning if the patient needs post-hospital services based on physician/advanced practitioner order or complex needs related to functional status, cognitive ability, or social support system  4/27/2024 1426 by Makayla Bartlett RN  Outcome: Completed  4/27/2024 1426 by Makayla Bartlett RN  Outcome: Progressing     Problem: Knowledge Deficit  Goal: Patient/family/caregiver demonstrates understanding of disease process, treatment plan, medications, and discharge instructions  Description: Complete learning assessment and assess knowledge base.  Interventions:  - Provide teaching at level of understanding  - Provide teaching via preferred learning methods  4/27/2024 1426 by Makayla Bartlett RN  Outcome: Completed  4/27/2024 1426 by Makayla Bartlett RN  Outcome: Progressing     Problem: NEUROSENSORY - ADULT  Goal: Achieves stable or improved neurological status  Description: INTERVENTIONS  - Monitor and report changes in neurological status  - Monitor vital signs such as temperature, blood pressure, glucose, and any other labs ordered   - Initiate measures to prevent increased intracranial pressure  - Monitor for seizure activity and implement precautions if appropriate      4/27/2024 1426 by Makayla Bartlett RN  Outcome: Completed  4/27/2024 1426 by Makayla Bartlett RN  Outcome: Progressing  Goal: Achieves maximal functionality and self care  Description: INTERVENTIONS  - Monitor swallowing and airway patency with patient fatigue and changes in neurological status  - Encourage and assist patient to increase activity and self care.   -  Encourage visually impaired, hearing impaired and aphasic patients to use assistive/communication devices  4/27/2024 1426 by Makayla Bartlett RN  Outcome: Completed  4/27/2024 1426 by Makayla Bartlett, RN  Outcome: Progressing

## 2024-04-27 NOTE — PHYSICAL THERAPY NOTE
Physical Therapy Progress Note     04/27/24 1400   PT Last Visit   PT Visit Date 04/27/24   Note Type   Note Type Treatment   Pain Assessment   Pain Assessment Tool 0-10   Pain Score 3   Pain Location/Orientation Location: Head   Hospital Pain Intervention(s) Repositioned;Ambulation/increased activity;Emotional support   Restrictions/Precautions   Other Precautions Pain;Fall Risk   Subjective   Subjective The patient states that he is feeling much better, and he is eager to return home. His wife was present, and she notes that she just took him for a walk in the hallway prior to therapy arriving.   Transfers   Sit to Stand 5  Supervision   Stand to Sit 5  Supervision   Ambulation/Elevation   Gait pattern Excessively slow;Short stride   Gait Assistance 5  Supervision   Additional items Verbal cues;Tactile cues   Assistive Device Rolling walker   Distance 110 feet x 2.   Stair Management Assistance 5  Supervision   Additional items Verbal cues   Stair Management Technique One rail L;Step to pattern;Reciprocal;Sideways;Foreward  (Sideways descending, forward ascending.)   Number of Stairs 7   Balance   Static Sitting Good   Dynamic Sitting Fair   Static Standing Fair   Dynamic Standing Fair -   Ambulatory Fair -   Activity Tolerance   Activity Tolerance Patient tolerated treatment well   Nurse Made Luciano Dacosta RN.   Assessment   Prognosis Good   Problem List Decreased endurance;Impaired balance;Decreased mobility   Assessment The patient has improving symptoms today, and he does mobilize well other than with dynamic head turns. With instruction for manuevering slow and steadily as well as limiting head movements he performed all mobility safely including the stairs. His wife is an excellent resource for him, and demonstrated appropriate guarding and instruction throughout. They have demonstrated the necessary knowledge and safety in order to return home safely.   Barriers to Discharge None   Goals   Patient Goals To  get back to normal.   STG Expiration Date 05/08/24   PT Treatment Day 2   Plan   Treatment/Interventions Functional transfer training;LE strengthening/ROM;Elevations;Therapeutic exercise;Endurance training;Patient/family training;Bed mobility;Gait training   Progress Improving as expected   PT Frequency 3-5x/wk   Discharge Recommendation   Rehab Resource Intensity Level, PT III (Minimum Resource Intensity)  (Vestibular therapy.)   Equipment Recommended Walker   Walker Package Recommended Wheeled walker   AM-PAC Basic Mobility Inpatient   Turning in Flat Bed Without Bedrails 4   Lying on Back to Sitting on Edge of Flat Bed Without Bedrails 4   Moving Bed to Chair 4   Standing Up From Chair Using Arms 4   Walk in Room 3   Climb 3-5 Stairs With Railing 3   Basic Mobility Inpatient Raw Score 22   Basic Mobility Standardized Score 47.4   [facility] Highest Level Of Mobility   -HLM Goal 7: Walk 25 feet or more   -HLM Achieved 7: Walk 25 feet or more         An AM-PAC Basic Mobility raw score less than 16 suggests the patient may benefit from discharge to post-acute rehab services.    Parag Nix, PTA

## 2024-04-27 NOTE — CASE MANAGEMENT
Case Management Discharge Planning Note    Patient name Jeremy George  Location [facility] 707-01 MRN [id number]  : 1970 Date 2024       Current Admission Date: 2024  Current Admission Diagnosis:Dizziness   Patient Active Problem List    Diagnosis Date Noted    Dizziness 2024    Gastroesophageal reflux disease without esophagitis 10/04/2023    Chest pain, atypical 10/04/2023    Abnormal CT scan, esophagus 10/04/2023    Mild intermittent asthma without complication 2023    Thoracic aortic aneurysm without rupture, unspecified part (HCC) 2023    Lung nodule 2023    Groin strain 2023    Chronic pain syndrome 2022    Tremor, essential 2022    Other hyperlipidemia 2021    Cervical spondylosis without myelopathy 2020    S/P cervical spinal fusion 2020    Cervical radiculopathy 2020    HNP (herniated nucleus pulposus), thoracic 2020    Hypertension, essential 2017    Diabetes mellitus (HCC) 2015      LOS (days): 4  Geometric Mean LOS (GMLOS) (days):   Days to GMLOS:     OBJECTIVE:  Risk of Unplanned Readmission Score: 8.07         Current admission status: Inpatient   Preferred Pharmacy:   [facility] [address] [address]  Phone: [phone] Fax: [phone]    [id number] - [place], [place] - [address]  [address]  [place] [address]  Phone: [phone] Fax: [phone]    Primary Care Provider: MIKHAIL Ha    Primary Insurance: BLUE CROSS  Secondary Insurance:     DISCHARGE DETAILS:     DME Referral Provided  Referral made for DME?: Yes  DME referral completed for the following items:: Walker, Shower Chair  DME Supplier Name:: [employer]    Other Referral/Resources/Interventions Provided:  Referral Comments: Patient needs walker and shower chair at discharge.  Walker  ordered for bedside delivery and shower chair requested for home delivery.  CM following .

## 2024-04-27 NOTE — DISCHARGE SUMMARY
NEUROLOGY RESIDENCY - DISCHARGE SUMMARY     Name: Jeremy George   Age & Sex: 53 y.o. male   MRN: [id number]  Unit/Bed#: [facility] 707-01   Encounter: [id number]    Discharging Resident Physician: Ayala Weaver  Attending: No att. providers found  PCP: MIKHAIL aH  Admission Date: 4/23/2024  Discharge Date: 04/27/2024    Jeremy George will need follow up in in 6 weeks with headache Attending/HERACLIO .  He will not require outpatient neurological testing.     ASSESSMENT & PLAN     B12 deficiency  Assessment & Plan  Weekly injections of B12 at 1000 mcg weekly.    Headache  Assessment & Plan  53-year-old male with prior vertigo episodes, suspected CAA, headaches, hypertension, HLD, DM2, cervical radiculopathy status post ACDF, tremors and recent travel presented with headaches and intermittent dizziness.  The patient's headaches were treated with migraine cocktail throughout his stay.  The addition of Nurtec improved his headaches considerably.  The patient also complained of a tension type pain that started at the base of his neck and traveled up.  There was suspected that this was contributing to his persistent headaches.  An occipital nerve block was completed and this provided the patient with significant relief.  Within addition of continued migraine cocktails and the nerve block the patient's headache went down to a 1 or 0.  We prescribed the patient Nurtec at discharge.  We also prescribed the patient topiramate.    - Prescribed Nurtec on discharge  - occipital nerve block on 4/26/2024 - Patient greatly benefited from the nerve block and reported significant headache improvement.  -Continue topiramate which was initially prescribed 25 mg daily to be titrated up by 25 mg weekly until the patient reaches 100 mg daily.     Hypertension, essential  Assessment & Plan  - Normotensive goal  - Lisinopril-hydrochlorothiazide 20-12.5 mg, 2 tablets daily    Diabetes mellitus (HCC)  Assessment & Plan    Lab Results  "  Component Value Date    HGBA1C 7.0 (H) 12/27/2023     - Sliding insulin scale    * Dizziness  Assessment & Plan  53 y.o. male with prior vertigo episode 20+ years ago, suspected CAA, headaches, HTN, HLD, DM2, cervical radiculopathy s/p ACDF, tremors, and recent travel to [place] with intermittent dizziness for the past week who presented to [facility] on 4/23/2024 as a stroke alert for persistent dizziness.    Patient has had intermittent dizziness over the past week that only lasted a few minutes. Day of presentation, patient was bending down doing toe touches when he suddenly developed severe/persistent dizziness described as \"swaying\" associated with ambulatory dysfunction and nausea.  Patient also developed a left frontal/parietal headache and chest discomfort.    NIHSS 0.    CT head negative for acute intracranial abnormalities.    CTA head/neck negative for LVO, dissection, aneurysm, or high-grade stenosis.    Patient was not a TNK candidate due to bleeding risk from presumed CAA and higher suspicion for peripheral vestibulopathy.    MRI Brain w/o contrast - No acute intracranial abnormality.     HINTS exam: a few beats of nonsustained nystagmus to the right, no skew deviation, catch up saccades present    High suspicion for peripheral vestibulopathy provoked by position change, however given persistent symptoms despite using meclizine, will obtain MRI brain to rule out acute intracranial pathology.  Could also be vestibular migraine.    Plan:  - PT/OT; patient may benefit from vestibular therapy  - Continue to treat migraine/headaches as they are likely contributing to his dizziness.                Disposition:     Home    Reason for Admission: Headache/Dizziness    Consultations During Hospital Stay:  IP CONSULT TO NEUROLOGY    Procedures Performed:   Occipital Nerve block    Significant Findings / Test Results:   Labs: Hemoglobin A1c 6.8 (4/24/2024), LDL 85 (12/27/2023)    MRI brain wo contrast    Result " Date: 4/24/2024  Impression: No acute intracranial abnormality. No restricted diffusion to suggest acute ischemia. Mild scattered periventricular and subcortical foci of white matter T2 hyperintensity which are nonspecific and most likely related to chronic small vessel ischemic changes. Other less like etiologies include demyelinating disease, vasculitis, Lyme and migraines. Reidentified and normal punctate foci of susceptibility artifact are seen in the supratentorial brain centrally at the gray-white junction peripherally in distribution most compatible with cerebral amyloid angiopathy.  Chronic microhemorrhage in the setting of hypertension is less likely with this distribution but not excluded. Workstation performed: [id number]     XR chest 1 view portable    Result Date: 4/23/2024  Impression: No acute cardiopulmonary disease. Workstation performed: [id number]     CTA stroke alert (head/neck)    Result Date: 4/23/2024  Impression: Negative CTA head and neck for large vessel occlusion, dissection, aneurysm, or high-grade stenosis. Additional chronic/incidental findings as detailed above. Findings were directly discussed with Alo Pike at approximately 11:19 AM. Workstation performed: [id number]     CT stroke alert brain    Result Date: 4/23/2024  Impression: No acute intracranial abnormality. Findings were directly discussed with Alo Pike at approximately 11:18 AM. Workstation performed: [id number]       Incidental Findings:   None     Test Results Pending at Discharge (will require follow up):   None     Outpatient Tests Requested:  None    Complications:  None    Hospital Course:     Jeremy George is a 53 y.o. male patient who originally presented to the hospital on 4/23/2024 due to a stroke alert for persistent dizziness. The patient has a past medical history of HTN,, cervical radiculopathy status post ACDF, suspected unspecified tremors, and migraines.  The patient states they recently traveled to  [place] and believes that he may have gotten a bug bite which led to an becoming dizzy.  The patient reports that his dizziness as swaying from side-to-side.  The patient reports this usually occurred while standing.  The patient states that the dizziness can last a few minutes and then it was a the morning of presentation the patient had woken up in his normal state and around 7:20 AM while at work he was stretching his back but attributes to the send he developed severe dizziness described as swaying. Nothing made the dizziness better, looking to the right mid the dizziness worse.  The patient was unable to ambulate and he fell like he was examined to the right.  He had developed a left frontal/parietal headache and nausea.  Headache was similar to his prior headaches.  The patient did report a history of vertigo 20 years prior which at that time was far worse than what he is experiencing at this time.  Patient was brought to the ED as a stroke alert on the 23rd.  NIH stroke scale is 0, blood pressure was 166/94, CT head was negative for any acute intracranial abnormalities, CTA head and neck was negative for any LVO, dissection, aneurysm or high-grade stenosis.  The patient did have some nystagmus to the right did have 6 polyps with heavy assessment.  No skew deviation.  Patient will not TNK candidate medial initially presented CAA and higher suspicion for peripheral vestibulopathy.  The patient did develop severe chest pain radiating to his neck and right jaw and he had a cardiac workup while in the ED.  The patient denied any hearing loss tinnitus or recent illnesses.  The patient was admitted to the hospital.  Throughout her stay the patient received migraine cocktails every 8 hours.  The first day he did receive 4 g of magnesium.  Subsequent to the rated 2 days to give him 2 g of magnesium daily.  The patient was continued on migraine cocktail and then Nurtec was also added.  The patient did well on the  "Nurtec and reported that it helped.  On 4/26/2024 and occipital nerve block; which the patient reported significantly helped him.  Open note the patient did have a vitamin B12 deficiency for which we started him on replenishment via weekly B12 injections.  The patient will continue the weekly B12 injections with his primary care doctor as his wife is going to transport him there weekly.  The patient's headache resolved to a 0-1/10 on 4/27/2024.  The patient's dizziness persisted but he reported that it has significantly improved.  The patient was ready for discharge home.  Will discharge the patient home with a follow-up outpatient with physical therapy for vestibular therapy.      Condition at Discharge: stable     Discharge Day Visit / Exam:     Subjective:  53 year old male with     Vitals: Blood Pressure: 135/75 (04/27/24 0717)  Pulse: 61 (04/27/24 0717)  Temperature: 98.2 °F (36.8 °C) (04/27/24 0717)  Temp Source: Oral (04/26/24 1141)  Respirations: 16 (04/26/24 1141)  Height: 6' 1\" (185.4 cm) (04/23/24 1753)  Weight - Scale: 119 kg (263 lb) (04/23/24 1753)  SpO2: 96 % (04/27/24 0717)    Exam:     Physical Exam  Vitals reviewed.   HENT:      Head: Normocephalic and atraumatic.      Mouth/Throat:      Mouth: Mucous membranes are moist.      Pharynx: Oropharynx is clear.   Eyes:      Extraocular Movements: Extraocular movements intact.      Conjunctiva/sclera: Conjunctivae normal.      Pupils: Pupils are equal, round, and reactive to light.   Cardiovascular:      Rate and Rhythm: Normal rate and regular rhythm.      Pulses: Normal pulses.      Heart sounds: Normal heart sounds.   Pulmonary:      Effort: Pulmonary effort is normal.      Breath sounds: Normal breath sounds.   Neurological:      Mental Status: He is alert and oriented to person, place, and time.      Cranial Nerves: Cranial nerves 2-12 are intact.      Motor: Motor strength is normal.     Coordination: Finger-Nose-Finger Test and Heel to Shin Test " normal.      Gait: Gait is intact.      Deep Tendon Reflexes:      Reflex Scores:       Bicep reflexes are 2+ on the right side and 2+ on the left side.       Brachioradialis reflexes are 2+ on the right side and 2+ on the left side.       Patellar reflexes are 2+ on the right side and 2+ on the left side.       Achilles reflexes are 2+ on the right side and 2+ on the left side.        Neurologic Exam     Mental Status   Oriented to person, place, and time.     Cranial Nerves   Cranial nerves II through XII intact.     CN III, IV, VI   Pupils are equal, round, and reactive to light.    Motor Exam   Muscle bulk: normal  Overall muscle tone: normal    Strength   Strength 5/5 throughout.     Sensory Exam   Light touch normal.   Vibration normal.   Pinprick normal.     Gait, Coordination, and Reflexes     Gait  Gait: normal    Coordination   Finger to nose coordination: normal  Heel to shin coordination: normal    Reflexes   Right brachioradialis: 2+  Left brachioradialis: 2+  Right biceps: 2+  Left biceps: 2+  Right patellar: 2+  Left patellar: 2+  Right achilles: 2+  Left achilles: 2+        Discussion with Family: Discussed follow up with his wife.    Discharge instructions/Information to patient and family:   See after visit summary for information provided to patient and family.      Provisions for Follow-Up Care:  See after visit summary for information related to follow-up care and any pertinent home health orders.      Planned Readmission: No    Discharge Statement:         Discharge Medications:  See after visit summary for reconciled discharge medications provided to patient and family.      ** Please Note: This note has been constructed using a voice recognition system **      ======    I have discussed the patient's history, physical exam findings, assessment, and plan in detail with attending, Dr. Lewis    Thank you for allowing me to participate in the care of your patient, Jeremy George.    Aun  Owen Hays [facility], PGY-2

## 2024-04-27 NOTE — PLAN OF CARE
Problem: PAIN - ADULT  Goal: Verbalizes/displays adequate comfort level or baseline comfort level  Description: Interventions:  - Encourage patient to monitor pain and request assistance  - Assess pain using appropriate pain scale  - Administer analgesics based on type and severity of pain and evaluate response  - Implement non-pharmacological measures as appropriate and evaluate response  - Consider cultural and social influences on pain and pain management  - Notify physician/advanced practitioner if interventions unsuccessful or patient reports new pain  Outcome: Progressing     Problem: SAFETY ADULT  Goal: Patient will remain free of falls  Description: INTERVENTIONS:  - Educate patient/family on patient safety including physical limitations  - Instruct patient to call for assistance with activity   - Consult OT/PT to assist with strengthening/mobility   - Keep Call bell within reach  - Keep bed low and locked with side rails adjusted as appropriate  - Keep care items and personal belongings within reach  - Initiate and maintain comfort rounds  - Make Fall Risk Sign visible to staff  - Offer Toileting every 2 Hours, in advance of need  - Initiate/Maintain bedalarm  - Obtain necessary fall risk management equipment: walker  - Apply yellow socks and bracelet for high fall risk patients  - Consider moving patient to room near nurses station  Outcome: Progressing

## 2024-04-27 NOTE — PLAN OF CARE
Problem: PAIN - ADULT  Goal: Verbalizes/displays adequate comfort level or baseline comfort level  Description: Interventions:  - Encourage patient to monitor pain and request assistance  - Assess pain using appropriate pain scale  - Administer analgesics based on type and severity of pain and evaluate response  - Implement non-pharmacological measures as appropriate and evaluate response  - Notify physician/advanced practitioner if interventions unsuccessful or patient reports new pain  Outcome: Progressing     Problem: INFECTION - ADULT  Goal: Absence or prevention of progression during hospitalization  Description: INTERVENTIONS:  - Assess and monitor for signs and symptoms of infection  - Monitor lab/diagnostic results  - Monitor all insertion sites, i.e. indwelling lines, tubes, and drains  - [place] appropriate cooling/warming therapies per order  - Administer medications as ordered  - Instruct and encourage patient and family to use good hand hygiene technique  - Identify and instruct in appropriate isolation precautions for identified infection/condition  Outcome: Progressing     Problem: SAFETY ADULT  Goal: Patient will remain free of falls  Description: INTERVENTIONS:  - Educate patient/family on patient safety including physical limitations  - Instruct patient to call for assistance with activity   - Consult OT/PT to assist with strengthening/mobility   - Keep Call bell within reach  - Keep bed low and locked with side rails adjusted as appropriate  - Keep care items and personal belongings within reach  - Initiate and maintain comfort rounds  - Make Fall Risk Sign visible to staff  - Offer Toileting every 2 Hours, in advance of need  - Initiate/Maintain bed/chair alarm  - Obtain necessary fall risk management equipment.  - Apply yellow socks and bracelet for high fall risk patients  - Consider moving patient to room near nurses station  Outcome: Progressing  Goal: Maintain or return to baseline ADL  function  Description: INTERVENTIONS:  -  Assess patient's ability to carry out ADLs; assess patient's baseline for ADL function and identify physical deficits which impact ability to perform ADLs (bathing, care of mouth/teeth, toileting, grooming, dressing, etc.)  - Assess/evaluate cause of self-care deficits   - Assess range of motion  - Assess patient's mobility; develop plan if impaired  - Assess patient's need for assistive devices and provide as appropriate  - Encourage maximum independence but intervene and supervise when necessary  - Involve family in performance of ADLs  - Assess for home care needs following discharge   - Consider OT consult to assist with ADL evaluation and planning for discharge  - Provide patient education as appropriate  Outcome: Progressing  Goal: Maintains/Returns to pre admission functional level  Description: INTERVENTIONS:  - Perform AM-PAC 6 Click Basic Mobility/ Daily Activity assessment daily.  - Set and communicate daily mobility goal to care team and patient/family/caregiver.   - Collaborate with rehabilitation services on mobility goals if consulted  - Dangle patient 3 times a day  - Stand patient 3 times a day  - Ambulate patient 3 times a day  - Out of bed to chair 3 times a day   - Out of bed for meals 3 times a day  - Out of bed for toileting  - Record patient progress and toleration of activity level   Outcome: Progressing     Problem: DISCHARGE PLANNING  Goal: Discharge to home or other facility with appropriate resources  Description: INTERVENTIONS:  - Identify barriers to discharge w/patient and caregiver  - Arrange for needed discharge resources and transportation as appropriate  - Identify discharge learning needs (meds, wound care, etc.)  - Refer to Case Management Department for coordinating discharge planning if the patient needs post-hospital services based on physician/advanced practitioner order or complex needs related to functional status, cognitive  ability, or social support system  Outcome: Progressing     Problem: Knowledge Deficit  Goal: Patient/family/caregiver demonstrates understanding of disease process, treatment plan, medications, and discharge instructions  Description: Complete learning assessment and assess knowledge base.  Interventions:  - Provide teaching at level of understanding  - Provide teaching via preferred learning methods  Outcome: Progressing     Problem: NEUROSENSORY - ADULT  Goal: Achieves stable or improved neurological status  Description: INTERVENTIONS  - Monitor and report changes in neurological status  - Monitor vital signs such as temperature, blood pressure, glucose, and any other labs ordered   - Initiate measures to prevent increased intracranial pressure  - Monitor for seizure activity and implement precautions if appropriate      Outcome: Progressing  Goal: Achieves maximal functionality and self care  Description: INTERVENTIONS  - Monitor swallowing and airway patency with patient fatigue and changes in neurological status  - Encourage and assist patient to increase activity and self care.   - Encourage visually impaired, hearing impaired and aphasic patients to use assistive/communication devices  Outcome: Progressing

## 2024-04-28 ENCOUNTER — TRANSITIONAL CARE MANAGEMENT (OUTPATIENT)
Dept: INTERNAL MEDICINE CLINIC | Facility: OTHER | Age: 54
End: 2024-04-28

## 2024-04-28 LAB
ATRIAL RATE: 69 BPM
ATRIAL RATE: 72 BPM
P AXIS: 28 DEGREES
P AXIS: 40 DEGREES
PR INTERVAL: 170 MS
PR INTERVAL: 174 MS
QRS AXIS: 201 DEGREES
QRS AXIS: 216 DEGREES
QRSD INTERVAL: 96 MS
QRSD INTERVAL: 96 MS
QT INTERVAL: 404 MS
QT INTERVAL: 406 MS
QTC INTERVAL: 435 MS
QTC INTERVAL: 442 MS
T WAVE AXIS: 64 DEGREES
T WAVE AXIS: 64 DEGREES
VENTRICULAR RATE: 69 BPM
VENTRICULAR RATE: 72 BPM

## 2024-04-28 PROCEDURE — 93010 ELECTROCARDIOGRAM REPORT: CPT | Performed by: INTERNAL MEDICINE

## 2024-04-28 NOTE — ASSESSMENT & PLAN NOTE
53-year-old male with prior vertigo episodes, suspected CAA, headaches, hypertension, HLD, DM2, cervical radiculopathy status post ACDF, tremors and recent travel presented with headaches and intermittent dizziness.  The patient's headaches were treated with migraine cocktail throughout his stay.  The addition of Nurtec improved his headaches considerably.  The patient also complained of a tension type pain that started at the base of his neck and traveled up.  There was suspected that this was contributing to his persistent headaches.  An occipital nerve block was completed and this provided the patient with significant relief.  Within addition of continued migraine cocktails and the nerve block the patient's headache went down to a 1 or 0.  We prescribed the patient Nurtec at discharge.  We also prescribed the patient topiramate.    - Prescribed Nurtec on discharge  - occipital nerve block on 4/26/2024 - Patient greatly benefited from the nerve block and reported significant headache improvement.  -Continue topiramate which was initially prescribed 25 mg daily to be titrated up by 25 mg weekly until the patient reaches 100 mg daily.

## 2024-04-29 ENCOUNTER — EVALUATION (OUTPATIENT)
Dept: PHYSICAL THERAPY | Facility: CLINIC | Age: 54
End: 2024-04-29
Payer: COMMERCIAL

## 2024-04-29 DIAGNOSIS — R42 DIZZINESS: ICD-10-CM

## 2024-04-29 DIAGNOSIS — Z74.09 IMPAIRED FUNCTIONAL MOBILITY, BALANCE, GAIT, AND ENDURANCE: Primary | ICD-10-CM

## 2024-04-29 PROCEDURE — 97162 PT EVAL MOD COMPLEX 30 MIN: CPT

## 2024-04-29 NOTE — UTILIZATION REVIEW
NOTIFICATION OF ADMISSION DISCHARGE   This is a Notification of Discharge from [facility]. Please be advised that this patient has been discharge from our facility. Below you will find the admission and discharge date and time including the patient’s disposition.   UTILIZATION REVIEW CONTACT:  Senthil Floyd  Utilization   Network Utilization Review Department  Phone: [phone] x carefully listen to the prompts. All voicemails are confidential.  Email: [email or web address]     ADMISSION INFORMATION  PRESENTATION DATE: 4/23/2024 10:16 AM  OBERVATION ADMISSION DATE:   INPATIENT ADMISSION DATE: 4/23/24  3:39 PM   DISCHARGE DATE: 4/27/2024  4:15 PM   DISPOSITION:Home/Self Care    Network Utilization Review Department  ATTENTION: Please call with any questions or concerns to [phone] and carefully listen to the prompts so that you are directed to the right person. All voicemails are confidential.   For Discharge needs, contact Care Management DC Support Team at [phone] opt. 2  Send all requests for admission clinical reviews, approved or denied determinations and any other requests to dedicated fax number below belonging to the campus where the patient is receiving treatment. List of dedicated fax numbers for the Facilities:  FACILITY NAME UR FAX NUMBER   ADMISSION DENIALS (Administrative/Medical Necessity) [phone]   DISCHARGE SUPPORT TEAM ([facility]) [phone]   PARENT CHILD HEALTH (Maternity/NICU/Pediatrics) [phone]   [facility] [phone]   [facility] [phone]   [facility] [phone]   [facility] [phone]   Replaced by [facility] [phone]   [facility] [phone]   General acute hospital [phone]   [facility] [phone]   [facility]  [facility] [phone]   [facility] [phone]   [facility] [phone]   [facility] [phone]

## 2024-04-29 NOTE — PROGRESS NOTES
PT Evaluation     Today's date: 2024  Patient name: Jeremy George  : 1970  MRN: [id number]  Referring provider: Cici Austin CR*  Dx:   Encounter Diagnosis     ICD-10-CM    1. Impaired functional mobility, balance, gait, and endurance  Z74.09       2. Dizziness  R42 Ambulatory referral to Physical Therapy                     Assessment  Assessment details: Jeremy George is a 53 year-old male who presents to outpatient physical therapy with persistent dizziness starting about 1 week ago. He was hospitalized from -, with diagnosis of dizziness and gait instability. During hospitalization, he was treated with migraine cocktail, which appears to have decreased symptoms of headache and dizziness. Upon testing, demonstrated a few beats of R beating nystagmus with R gaze hold, and possible 1-2 beats of L beating nystagmus with L gaze hold (possible direction changing nystagmus, unable to determine). Other HINTS exam (head impulse, test of skew) were negative. Jeremy did have increased dizziness with saccades, with hypometria vertically, which may also be indicative of vestibular migraine. With history of cerebral amyloid angiopathy, his central signs as well as dizziness and mobility difficulties may also be due to these circulatory changes. BPPV testing was not completed during evaluation due to constant symptoms and low likelihood due to subjective complaints of dizziness. Negative head impulse testing with decreased likelihood of peripheral vestibular issue. He is demonstrating significant gait instability noted with decreased gait speed of 0.69m/s, needing use of FWW for mobility (no AD prior to hospitalization), and requiring CGA to min A for mobility due to LOB backwards and to R side. Further testing for coordination, balance, and vestibular testing with MCTSIB to be completed at next session (not completed due to limited time).  Jeremy would benefit from skilled physical therapy to  decrease fall risk, improve balance, improve dizziness, improve functional mobility, improve activity tolerance, and return to prior level of function.   Impairments: abnormal gait, activity intolerance, impaired balance, lacks appropriate home exercise program and safety issue  Understanding of Dx/Px/POC: good   Prognosis: good    Goals  ST. Pt will improve gait speed to at least 0.75 m/s with least restrictive device within 4 weeks needed to improve safety with ambulation.  2. Pt will be able to complete all mobility tasks with SPV and LRAD within 4 weeks needed to reduce fall risk.  3. Pt will improve ABC scale to at least 30% within 4 weeks needed to reduce fall risk.  4. Pt will demonstrate independence with HEP within 4 weeks.    LT. Pt will improve gait speed to at least 0.85m/s with least restrictive device within 8 weeks needed to improve safety with ambulation.  2. Pt will improve ABC scale to at least 50% within 8 weeks needed to reduce fall risk.  3. Pt will improve DHI score to 1 MCID value from 4 week score in 8 weeks.   4. Pt will demonstrate independence with HEP within 8 weeks.        Plan  Patient would benefit from: skilled physical therapy  Planned therapy interventions: balance, neuromuscular re-education, canalith repositioning, patient education, gait training, therapeutic exercise, therapeutic activities and home exercise program  Frequency: 2x week  Duration in weeks: 8  Plan of Care beginning date: 2024  Plan of Care expiration date: 2024  Treatment plan discussed with: patient        Subjective Evaluation    History of Present Illness  Mechanism of injury: Works as a  for [employer], was doing stretching with toe touches, bend down and almost went down head first. Started having lightheadedness, headache, and persistent dizziness. Kept increasing with the headaches, states they eliminated that he was having a stroke. Trying to find out if it was the crystals,  "migraine, or cluster headaches. States that they want him to see an ENT. Was able to move around better on Saturday, Sunday was having a bad day. States that he feels like he is moving, getting nauseous and dizzy. Reports that the dizziness feels more constant, yesterday if he was looking to the R side it felt like he was feeling like he was falling to the R side. Not sure if it was if his eyes were moving or turning his head. Helps to sit still and look straight down and wait for things to settle down, but he continues to feel like he is on a boat that is moving. L side headaches primarily, but can be the whole front. Reports that he feels like everything is moving all the time. Using FWW at this time, did not use prior. Reports that he has been having neck pain in the last 2-2.5 months (has hx of ACDF in 2018).     Reports that he had a shakiness in his hand 2 years ago, and a shaking in his R leg, diagnosed with microhemmorages. States that the symptoms     Per medical chart, \"53 y.o. male with prior vertigo episode 20+ years ago, suspected CAA, headaches, HTN, HLD, DM2, cervical radiculopathy s/p ACDF, tremors, and recent travel to [place] with intermittent dizziness for the past week who presented to [facility] on 4/23/2024 as a stroke alert for persistent dizziness.     Patient has had intermittent dizziness over the past week that only lasted a few minutes. Day of presentation, patient was bending down doing toe touches when he suddenly developed severe/persistent dizziness described as \"swaying\" associated with ambulatory dysfunction and nausea.  Patient also developed a left frontal/parietal headache and chest discomfort.    Result Date: 4/24/2024  Impression: No acute intracranial abnormality. No restricted diffusion to suggest acute ischemia. Mild scattered periventricular and subcortical foci of white matter T2 hyperintensity which are nonspecific and most likely related to chronic small vessel ischemic " changes. Other less like etiologies include demyelinating disease, vasculitis, Lyme and migraines. Reidentified and normal punctate foci of susceptibility artifact are seen in the supratentorial brain centrally at the gray-white junction peripherally in distribution most compatible with cerebral amyloid angiopathy.  Chronic microhemorrhage in the setting of hypertension is less likely with this distribution but not excluded.     Pain  Current pain ratin  At best pain ratin  At worst pain ratin  Location: head        Objective     Concurrent Complaints  Positive for headaches, nausea/motion sickness, tinnitus (pain in R ear, occasional ringing in L ear) and visual change (bluriness). Negative for hearing loss, memory loss and aural fullness  Neuro Exam:     Dizziness  Positive for disequilibrium, vertigo, motion sickness and rocking or swaying.   Negative for oscillopsia, light-headedness and diplopia.     Exacerbating factors  Positive for bending over, turning head and optokinetic movement.   Negative for rolling in bed, looking up (limited due to neck pain), walking and supine to/from sitting.     Symptoms   Intensity at best: 0/10  Intensity at worst: 7/10  Average intensity: 5/10    Headaches   Patient reports headaches: Yes.     Oculomotor exam   Oculomotor ROM: WNL  Resting nystagmus: not present   Smooth pursuits: within normal limits  Vertical saccades: normal  Horizontal saccades: normal  Cover test: normal and exo cover from outside  Crossover test: normal        Balance Test    Gait speed 0.69m/s with FWW SSGS   5x Sit To Stand (s): NV   TUG: NV   FGA: NV       MCTSIB Number of Seconds   Feet Together, Eyes Open    Feet Together, Eyes Closed    Feet Together, Eyes Open Foam    Feet Together, Eyes Closed Foam          Coordination Left Right   Heel To Shin     Finger To Nose     Rapid Alternating Movement     UE     LE         Sensation Left Right   Kinesthesia     Light Touch      Sharp/Dull     2 Point Discrimination         Mobility    Sit To Stand Contact Guard   ambulation CGA-min A due to LOB backwards and to the R side   stairs TBD       Gait Assessment: Significantly decreased gait speed with FWW, stopping at times due to imbalance and dizziness, downward gaze, LOB backwards and to the R side, hesitancy to turn head            Precautions: HTN. Thoracic aneurysm, asthma, c-spine fusion    POC expires Unit limit Auth Expiration date PT/OT/ST + Visit Limit?   6/24 BOMN N/a 90 PT/OT/SLP                           Visit/Unit Tracking  AUTH Status:  Date 4/29             None Used 1              Remaining                   Manuals             Coordination testing  Positional testing   Balance testing (TUG, 5xSTS, Kiser)                                                    Neuro Re-Ed             VOR x 1 seated                                                                                           Ther Ex                                                                                                                     Ther Activity                                       Gait Training                                       Modalities

## 2024-04-30 ENCOUNTER — OFFICE VISIT (OUTPATIENT)
Age: 54
End: 2024-04-30
Payer: COMMERCIAL

## 2024-04-30 VITALS
HEART RATE: 62 BPM | OXYGEN SATURATION: 96 % | SYSTOLIC BLOOD PRESSURE: 120 MMHG | BODY MASS INDEX: 34.87 KG/M2 | DIASTOLIC BLOOD PRESSURE: 70 MMHG | HEIGHT: 73 IN | TEMPERATURE: 97.4 F | WEIGHT: 263.1 LBS

## 2024-04-30 DIAGNOSIS — R51.9 HEADACHE: ICD-10-CM

## 2024-04-30 DIAGNOSIS — I10 HYPERTENSION, ESSENTIAL: ICD-10-CM

## 2024-04-30 DIAGNOSIS — R42 DIZZINESS: Primary | ICD-10-CM

## 2024-04-30 DIAGNOSIS — E53.8 B12 DEFICIENCY: ICD-10-CM

## 2024-04-30 DIAGNOSIS — E11.9 TYPE 2 DIABETES MELLITUS WITHOUT COMPLICATION, WITHOUT LONG-TERM CURRENT USE OF INSULIN (HCC): ICD-10-CM

## 2024-04-30 DIAGNOSIS — I71.20 THORACIC AORTIC ANEURYSM WITHOUT RUPTURE, UNSPECIFIED PART (HCC): ICD-10-CM

## 2024-04-30 LAB
DME PARACHUTE DELIVERY DATE ACTUAL: NORMAL
DME PARACHUTE DELIVERY DATE REQUESTED: NORMAL
DME PARACHUTE ITEM DESCRIPTION: NORMAL
DME PARACHUTE ORDER STATUS: NORMAL
DME PARACHUTE SUPPLIER NAME: NORMAL
DME PARACHUTE SUPPLIER PHONE: NORMAL

## 2024-04-30 PROCEDURE — 96372 THER/PROPH/DIAG INJ SC/IM: CPT

## 2024-04-30 PROCEDURE — 1111F DSCHRG MED/CURRENT MED MERGE: CPT | Performed by: NURSE PRACTITIONER

## 2024-04-30 PROCEDURE — 99496 TRANSJ CARE MGMT HIGH F2F 7D: CPT | Performed by: NURSE PRACTITIONER

## 2024-04-30 RX ORDER — CYANOCOBALAMIN 1000 UG/ML
1000 INJECTION, SOLUTION INTRAMUSCULAR; SUBCUTANEOUS
Status: SHIPPED | OUTPATIENT
Start: 2024-04-30

## 2024-04-30 RX ORDER — MECLIZINE HYDROCHLORIDE 25 MG/1
25 TABLET ORAL 3 TIMES DAILY PRN
Qty: 30 TABLET | Refills: 0 | Status: SHIPPED | OUTPATIENT
Start: 2024-04-30

## 2024-04-30 RX ORDER — FLUTICASONE PROPIONATE 50 MCG
2 SPRAY, SUSPENSION (ML) NASAL DAILY
Qty: 18 ML | Refills: 1 | Status: SHIPPED | OUTPATIENT
Start: 2024-04-30

## 2024-04-30 RX ADMIN — CYANOCOBALAMIN 1000 MCG: 1000 INJECTION, SOLUTION INTRAMUSCULAR; SUBCUTANEOUS at 08:37

## 2024-04-30 NOTE — ASSESSMENT & PLAN NOTE
Occipital nerve block completed while inpatient with significant relief  He will continue on Topamax and Nurtec  Advised to try to obtain a sooner appointment with neurology

## 2024-04-30 NOTE — ASSESSMENT & PLAN NOTE
Lab Results   Component Value Date    HGBA1C 6.8 (H) 04/24/2024   -Continue metformin 1500 mg in the morning and 1000mg in the evening  -Continue Januvia   -F/U in 3 months

## 2024-04-30 NOTE — ASSESSMENT & PLAN NOTE
Continue with weekly B12 injections  Second injection received today while in office  We will continue them in office weekly unless transportation becomes an issue, patient has family members who would be able to administer this injection

## 2024-04-30 NOTE — PROGRESS NOTES
Assessment & Plan     1. Dizziness  Assessment & Plan:  High suspicion for peripheral vestibulopathy provoked by position change, continue with PT OT try to obtain a sooner appointment continue migraine medications, meclizine as needed, continue Zyrtec and add on Flonase    Orders:  -     fluticasone (FLONASE) 50 mcg/act nasal spray; 2 sprays into each nostril daily  -     meclizine (ANTIVERT) 25 mg tablet; Take 1 tablet (25 mg total) by mouth 3 (three) times a day as needed for dizziness    2. Type 2 diabetes mellitus without complication, without long-term current use of insulin ([facility])  Assessment & Plan:    Lab Results   Component Value Date    HGBA1C 6.8 (H) 04/24/2024   -Continue metformin 1500 mg in the morning and 1000mg in the evening  -Continue Januvia   -F/U in 3 months     Orders:  -     sitaGLIPtin (JANUVIA) 100 mg tablet; Take 1 tablet (100 mg total) by mouth daily    3. Thoracic aortic aneurysm without rupture, unspecified part ([facility])    4. Headache  Assessment & Plan:  Occipital nerve block completed while inpatient with significant relief  He will continue on Topamax and Nurtec  Advised to try to obtain a sooner appointment with neurology    Orders:  -     rimegepant sulfate (NURTEC) 75 mg TBDP; Take 1 tablet (75 mg total) by mouth every other day    5. B12 deficiency  Assessment & Plan:  Continue with weekly B12 injections  Second injection received today while in office  We will continue them in office weekly unless transportation becomes an issue, patient has family members who would be able to administer this injection      6. Hypertension, essential  Assessment & Plan:  Well-controlled on current medication  Continue on lisinopril-hydrochlorothiazide           Subjective     Transitional Care Management Review:   Jeremy George is a 53 y.o. male here for TCM follow up.     During the TCM phone call patient stated:  TCM Call       Date and time call was made  4/28/2024  4:30 PM    Hospital care  "reviewed  Records reviewed    Patient was hospitialized at  [facility]    Date of Admission  04/23/24    Date of discharge  04/27/24    Diagnosis  dizziness    Disposition  Home    Current Symptoms  Dizziness    Dizziness severity  Moderate    Quality Character  Vertigo; Loss of balance; Lightheadedness    Episode pattern  Occassional    How long does the episode last for  few mins    Cause  No known event    What makes the symptoms better  Avoiding head movement    What makes symptoms worse  Head movement; Turning neck; Looking up; Getting up quickly    Clinical progress  Improving    Progress timing  sx have mildly improved    Dizziness- family history  Diabetes    Dizziness pertinent history  Diabetes Mellitis; Migraine          TCM Call       Post hospital issues  Poor ADL (Activities of Daily Living) performance    Should patient be enrolled in anticoag monitoring?  No    Scheduled for follow up?  No    Referrals needed  ENT, wife will call, PT appt at 930 on 04/29/2024    Did you obtain your prescribed medications  Yes    Do you need help managing your prescriptions or medications  No    Is transportation to your appointment needed  No    I have advised the patient to call PCP with any new or worsening symptoms  Day Lindsay MA    Living Arrangements  Family members    Support System  Family    The type of support provided  Emotional; Physical          Patient presents today for a transition of care appointment, patient was admitted from April 23 to April 27 for dizziness.    Hospital Course as per Dr. Stacy:   \"Jeremy George is a 53 y.o. male patient who originally presented to the hospital on 4/23/2024 due to a stroke alert for persistent dizziness. The patient has a past medical history of HTN,, cervical radiculopathy status post ACDF, suspected unspecified tremors, and migraines.  The patient states they recently traveled to [place] and believes that he may have gotten a bug bite which " led to an becoming dizzy.  The patient reports that his dizziness as swaying from side-to-side.  The patient reports this usually occurred while standing.  The patient states that the dizziness can last a few minutes and then it was a the morning of presentation the patient had woken up in his normal state and around 7:20 AM while at work he was stretching his back but attributes to the send he developed severe dizziness described as swaying. Nothing made the dizziness better, looking to the right mid the dizziness worse.  The patient was unable to ambulate and he fell like he was examined to the right.  He had developed a left frontal/parietal headache and nausea.  Headache was similar to his prior headaches.  The patient did report a history of vertigo 20 years prior which at that time was far worse than what he is experiencing at this time.  Patient was brought to the ED as a stroke alert on the 23rd.  NIH stroke scale is 0, blood pressure was 166/94, CT head was negative for any acute intracranial abnormalities, CTA head and neck was negative for any LVO, dissection, aneurysm or high-grade stenosis.  The patient did have some nystagmus to the right did have 6 polyps with heavy assessment.  No skew deviation.  Patient will not TNK candidate medial initially presented CAA and higher suspicion for peripheral vestibulopathy.  The patient did develop severe chest pain radiating to his neck and right jaw and he had a cardiac workup while in the ED.  The patient denied any hearing loss tinnitus or recent illnesses.  The patient was admitted to the hospital.  Throughout her stay the patient received migraine cocktails every 8 hours.  The first day he did receive 4 g of magnesium.  Subsequent to the rated 2 days to give him 2 g of magnesium daily.  The patient was continued on migraine cocktail and then Nurtec was also added.  The patient did well on the Nurtec and reported that it helped.  On 4/26/2024 and occipital  "nerve block; which the patient reported significantly helped him.  Open note the patient did have a vitamin B12 deficiency for which we started him on replenishment via weekly B12 injections.  The patient will continue the weekly B12 injections with his primary care doctor as his wife is going to transport him there weekly.  The patient's headache resolved to a 0-1/10 on 4/27/2024.  The patient's dizziness persisted but he reported that it has significantly improved.  The patient was ready for discharge home.  Will discharge the patient home with a follow-up outpatient with physical therapy for vestibular therapy.\"    He reports that his headaches have been intermittent, and has improved     Has been using the walker due to the dizziness     His BP has been in control, he has been monitoring at home -140s    He presents today with his son, as he is having trouble ambulating due to the dizziness            Review of Systems   Constitutional:  Negative for activity change, appetite change, chills, diaphoresis and fever.   HENT:  Negative for congestion, ear discharge, ear pain, postnasal drip, rhinorrhea, sinus pressure, sinus pain and sore throat.    Eyes:  Negative for pain, discharge, itching and visual disturbance.   Respiratory:  Negative for cough, chest tightness, shortness of breath and wheezing.    Cardiovascular:  Negative for chest pain, palpitations and leg swelling.   Gastrointestinal:  Positive for nausea. Negative for abdominal pain, constipation, diarrhea and vomiting.   Endocrine: Negative for polydipsia, polyphagia and polyuria.   Genitourinary:  Negative for difficulty urinating, dysuria and urgency.   Musculoskeletal:  Negative for arthralgias, back pain and neck pain.   Skin:  Negative for rash and wound.   Neurological:  Positive for dizziness and headaches. Negative for weakness and numbness.       Objective     /70 (BP Location: Left arm, Patient Position: Sitting, Cuff Size: " "Standard)   Pulse 62   Temp (!) 97.4 °F (36.3 °C) (Temporal)   Ht 6' 1\" (1.854 m)   Wt 119 kg (263 lb 1.6 oz)   SpO2 96%   BMI 34.71 kg/m²      Physical Exam  Vitals and nursing note reviewed.   Constitutional:       General: He is not in acute distress.     Appearance: He is well-developed.   HENT:      Head: Normocephalic and atraumatic.   Eyes:      Conjunctiva/sclera: Conjunctivae normal.   Cardiovascular:      Rate and Rhythm: Normal rate and regular rhythm.      Heart sounds: No murmur heard.  Pulmonary:      Effort: Pulmonary effort is normal. No respiratory distress.      Breath sounds: Normal breath sounds.   Abdominal:      Palpations: Abdomen is soft.      Tenderness: There is no abdominal tenderness.   Musculoskeletal:         General: No swelling.      Cervical back: Neck supple.      Comments: Ambulating with walker   Skin:     General: Skin is warm and dry.      Capillary Refill: Capillary refill takes less than 2 seconds.   Neurological:      Mental Status: He is alert.      Cranial Nerves: No dysarthria or facial asymmetry.      Motor: No weakness.   Psychiatric:         Mood and Affect: Mood normal.       Medications have been reviewed by provider in current encounter    MIKHAIL Ha         "

## 2024-04-30 NOTE — ASSESSMENT & PLAN NOTE
High suspicion for peripheral vestibulopathy provoked by position change, continue with PT OT try to obtain a sooner appointment continue migraine medications, meclizine as needed, continue Zyrtec and add on Flonase

## 2024-05-01 ENCOUNTER — OFFICE VISIT (OUTPATIENT)
Dept: PHYSICAL THERAPY | Facility: CLINIC | Age: 54
End: 2024-05-01
Payer: COMMERCIAL

## 2024-05-01 ENCOUNTER — TELEPHONE (OUTPATIENT)
Age: 54
End: 2024-05-01

## 2024-05-01 DIAGNOSIS — R51.9 NONINTRACTABLE HEADACHE, UNSPECIFIED CHRONICITY PATTERN, UNSPECIFIED HEADACHE TYPE: Primary | ICD-10-CM

## 2024-05-01 DIAGNOSIS — R42 DIZZINESS: ICD-10-CM

## 2024-05-01 DIAGNOSIS — R42 DIZZINESS: Primary | ICD-10-CM

## 2024-05-01 DIAGNOSIS — Z74.09 IMPAIRED FUNCTIONAL MOBILITY, BALANCE, GAIT, AND ENDURANCE: ICD-10-CM

## 2024-05-01 PROCEDURE — 97112 NEUROMUSCULAR REEDUCATION: CPT

## 2024-05-01 NOTE — PROGRESS NOTES
Daily Note     Today's date: 2024  Patient name: Jeremy George  : 1970  MRN: [id number]  Referring provider: Cici Austin CR*  Dx:   Encounter Diagnosis     ICD-10-CM    1. Dizziness  R42       2. Impaired functional mobility, balance, gait, and endurance  Z74.09                      Subjective: reports that he took the meclizine this morning and 2 times yesterday. Reports that he feels that he has had some ringing in his L ear (3 times, high pitched). Noticed getting up off the bed and looking down (moving head down) brings on movement that is not really there. Felt ok yesterday, but today is not a good. Dizziness /10, HA 4-5/10. HA started shortly after waking up. Dizziness did not really kick in until after the headache started. States that the drive over here made him feel worse.       Objective: See treatment diary below  Strength all  except R hip flexion (pain)  Finger to nose, alt supination, and alt toe taps WNL  VOR Cx WNL, increased dizziness  Tolbert test: negative bilaterally  R and L sidelying tests negative, roll test negative bilaterally    TU.9 seconds with FWW, CGA, min A on turns  DHI 84    Assessment: Tolerated treatment fair. Patient would benefit from continued PT. Patient demonstrates frequent closing of eyes during session. Further testing was completed during session, no significant findings regarding strength or coordination testing. Increased dizziness with VOR Cx, also indicative of potential vestibular migraine. BPPV testing was negative in all canals, patient with decreased symptoms in laying supine (decreased headache and dizziness). Discussed and demo'd VOR x 1 and imaginary targets, patient with difficulty with imaginary targets to L>R side. Discussed to not have patient perform these exercises with significant headache due to potential for vestibular migraine and increased headache symptoms with increased visual demand. Patient with some nausea during  session, was given ice pack to back of neck with improved symptoms. Recommended follow up with neurology, patient's wife went next door during session to make appointment. Continue to progress as tolerated.      Plan: Continue per plan of care.      Precautions: HTN. Thoracic aneurysm, asthma, c-spine fusion    POC expires Unit limit Auth Expiration date PT/OT/ST + Visit Limit?   6/24 BOMN N/a 90 PT/OT/SLP                           Visit/Unit Tracking  AUTH Status:  Date 4/29 5/1            None Used 1 2             Remaining                   Manuals             Coordination testing  Positional testing   Balance testing (TUG, 5xSTS, Kiser) See subjective                                                   Neuro Re-Ed             VOR x 1 seated X 10 horiztonal            Imaginary targets X 3 each direction                                                                             Ther Ex                                                                                                                     Ther Activity                                       Gait Training                                       Education Examination findings, use of ice to control nausea symptoms, symptoms of BPPV, vestibular migraine, follow up with neurology

## 2024-05-02 NOTE — TELEPHONE ENCOUNTER
VM 4/30 at 9:45 am:    Hi, I'm calling for my , Jeremy George. He was hospitalized last week. He was very dizzy, and he was off balance. And he still unbalanced. He went to see the primary doctor today, and he needs to have that neurologist appointment sooner than July. If you will, please call me back and see if maybe you can move that appointment a little sooner than July. Again, my cellphone number is [phone]. And his name is Jeremy George. [birth year]. And he has an appointment with Doctor Tremaine. Thank you and have a good day.  _______________________________________________    Appointment is now 6/26. See previous note. Pt is on the wait list.

## 2024-05-03 ENCOUNTER — TELEPHONE (OUTPATIENT)
Dept: NEUROLOGY | Facility: CLINIC | Age: 54
End: 2024-05-03

## 2024-05-03 DIAGNOSIS — R51.9 NONINTRACTABLE HEADACHE, UNSPECIFIED CHRONICITY PATTERN, UNSPECIFIED HEADACHE TYPE: Primary | ICD-10-CM

## 2024-05-03 RX ORDER — SUMATRIPTAN 50 MG/1
50 TABLET, FILM COATED ORAL ONCE AS NEEDED
Qty: 10 TABLET | Refills: 5 | Status: SHIPPED | OUTPATIENT
Start: 2024-05-03 | End: 2024-05-03 | Stop reason: SDUPTHER

## 2024-05-03 RX ORDER — SUMATRIPTAN 50 MG/1
50 TABLET, FILM COATED ORAL ONCE AS NEEDED
Qty: 10 TABLET | Refills: 5 | Status: SHIPPED | OUTPATIENT
Start: 2024-05-03

## 2024-05-03 NOTE — TELEPHONE ENCOUNTER
I was saying that I placed an ASAP referral for patient which means it should have been upgraded in the system to get an earlier appointment.  
Informed patient he is requesting rx SUMAtriptan (IMITREX) 50 mg tablet [facility] [address]. In regards to his appointment to neuro, June is the first opening they have, this goes for other locations as well . He is requesting if perhaps the ordering provider can get him in sooner.   
PA for rimegepant sulfate (NURTEC)     Submitted via    []CMM-KEY    [x]Surescripts-Case ID # [id number]   []Faxed to plan   []Other website    []Phone call Case ID #      Office notes sent, clinical questions answered. Awaiting determination    Turnaround time for your insurance to make a decision on your Prior Authorization can take 7-21 business days.           
Patient is aware to medication ordered and his wife also stopped into neurology to see if they can move is July appt they scheduled him for June and put him on a waiting list to try to get him in sooner  
Patient is calling to ask if he can be placed on a different medication than the Nurtek while the prior authorization gets back from insurance, please call patient and advise him what can be done.  
Prescription sent and ASAP referral placed to neurology please have patient try to call and reschedule for sooner appointment.  
Script sent for Imitrex.  Please also advise patient that I would recommend that he follows up with neurology sooner than June.  
Spoke with neurology  June is the first available appt patient is on waiting list     
36.3

## 2024-05-03 NOTE — TELEPHONE ENCOUNTER
Ayala Josephed  You18 minutes ago (1:55 PM)     AS  The patient's PCP may be contacted for additional refills

## 2024-05-03 NOTE — TELEPHONE ENCOUNTER
5/1 at 9:11 am:    Hi, I'm calling from [facility], pharmacy on [address], our phone number [phone]. Regarding a script that was sent over on April 27th by Dr. Weaver, for a patient. His name is Jeremy George, date of birth of [birth year]. It was for the cyancobalamin 1000 micrograms per mL injection solution. Directions are to inject one mL into the muscle every 7 days for 9 doses, but the quantity is only for one mL with no refills. So we're just calling to verify that quantity is correct. If you could just give us a call back, our number [phone]. Thanks so much and have a great day  __________________________________________________________________    Script was sent when pt was discharged from the hospital. Another refill was sent yesterday. Routed to provider to advise if pt's PCP should be contacted for additional refills.

## 2024-05-03 NOTE — TELEPHONE ENCOUNTER
Natasha called in as patient is established with neurology but doctor placed a referral    Upon checking referral is with an AP and DION has been granted for patient to begin seeing Dr Tremaine Kaur requested a sooner appointment but was unable to offer one as Dr Penaloza next opening is 8/2024    She also inquired if patient can see someone else. I explained that a DION was requested and approved for patient to see Dr Penaloza and that is the patient neurologist who they are requested to stay with. Patient has a new dx from PCP that Dr Penaloza can see as she is a General Neurologist who can see from multiple dx, and this was explained to Natasha.    I did confirm that patient is on the wait list for a sooner appointment.

## 2024-05-06 ENCOUNTER — OFFICE VISIT (OUTPATIENT)
Age: 54
End: 2024-05-06

## 2024-05-06 ENCOUNTER — OFFICE VISIT (OUTPATIENT)
Dept: PHYSICAL THERAPY | Facility: CLINIC | Age: 54
End: 2024-05-06
Payer: COMMERCIAL

## 2024-05-06 VITALS
OXYGEN SATURATION: 97 % | WEIGHT: 260 LBS | DIASTOLIC BLOOD PRESSURE: 72 MMHG | HEIGHT: 73 IN | BODY MASS INDEX: 34.46 KG/M2 | TEMPERATURE: 97.8 F | SYSTOLIC BLOOD PRESSURE: 130 MMHG | HEART RATE: 65 BPM

## 2024-05-06 DIAGNOSIS — R42 DIZZINESS: Primary | ICD-10-CM

## 2024-05-06 DIAGNOSIS — Z74.09 IMPAIRED FUNCTIONAL MOBILITY, BALANCE, GAIT, AND ENDURANCE: ICD-10-CM

## 2024-05-06 DIAGNOSIS — R42 DIZZINESS: ICD-10-CM

## 2024-05-06 DIAGNOSIS — I71.20 THORACIC AORTIC ANEURYSM WITHOUT RUPTURE, UNSPECIFIED PART (HCC): ICD-10-CM

## 2024-05-06 DIAGNOSIS — E53.8 B12 DEFICIENCY: Primary | ICD-10-CM

## 2024-05-06 DIAGNOSIS — R51.9 ACUTE NONINTRACTABLE HEADACHE, UNSPECIFIED HEADACHE TYPE: ICD-10-CM

## 2024-05-06 DIAGNOSIS — Z98.1 S/P CERVICAL SPINAL FUSION: ICD-10-CM

## 2024-05-06 DIAGNOSIS — M54.12 CERVICAL RADICULOPATHY: ICD-10-CM

## 2024-05-06 PROCEDURE — 97112 NEUROMUSCULAR REEDUCATION: CPT

## 2024-05-06 NOTE — PROGRESS NOTES
Daily Note     Today's date: 2024  Patient name: Jeremy George  : 1970  MRN: [id number]  Referring provider: Cici Austin CR*  Dx:   Encounter Diagnosis     ICD-10-CM    1. Dizziness  R42       2. Impaired functional mobility, balance, gait, and endurance  Z74.09                        Subjective: Reports that Saturday was a bad day, had a really bad headache and light sensitivity, dizziness was very bad. States he needed to just sit. Tried to go to sleep for a little while and tried to stay still as much as possible. Sister and son convinced him to take ibuprofen and it helped calm everything down (took a few hours). States that yesterday and  wasn't too bad, and was able to try doing the exercises more. Dizziness not too bad today. Dizziness today 2-3/10, with an occasional spike up. Yesterday and today tried to walk around without using the walker and was able to get around ok.       Objective: See treatment diary below    Assessment: Tolerated treatment fair. Patient would benefit from continued PT. Able to increase activity compared to previous sessions. Added VOR x 1 in vertical plane and imaginary targets in all 4 planes of movement. Discussed headache triggers and was issued low tyramine handout and encouraged Jeremy to complete a headache journal to try to identify potential triggers of headaches. Attempted standing in // bars without UE support, completed EO and EC firm of MCTSIB, with significant difficulty firm eyes closed (able to hold for 5 seconds with significant sway). Increased difficulty with weight shifting circles in counter-clockwise direction, improved with repetitions. Continue to progress as tolerated.       Plan: Continue per plan of care.      Precautions: HTN. Thoracic aneurysm, asthma, c-spine fusion    POC expires Unit limit Auth Expiration date PT/OT/ST + Visit Limit?    BOMN N/a 90 PT/OT/SLP                           Visit/Unit Tracking  AUTH Status:   "Date 4/29 5/1 5/6           None Used 1 2 3            Remaining                   Manuals  5/6           Coordination testing  Positional testing   Balance testing (TUG, 5xSTS, Kiser) See subjective                                                   Neuro Re-Ed             VOR x 1 seated X 10 horiztonal Seated VOR x 1 30\" x 2 each direction           Imaginary targets X 3 each direction X 5 each direction R/L/up/down           Foam EC             Standing circles weight shifting  x 10 each direction EO, EC x 5 each direction with 2-finger support           Spot it saccades             NBOS  EO 30\"  EC 5\"                        Ther Ex                                                                                                                     Ther Activity                                       Gait Training                                       Education Examination findings, use of ice to control nausea symptoms, symptoms of BPPV, vestibular migraine, follow up with neurology Low tyramine diet, headache triggers, keeping a headache journal, balance systems, decreased visual dependence                                          "

## 2024-05-06 NOTE — ASSESSMENT & PLAN NOTE
High suspicion for peripheral vestibulopathy provoked by position change, continue with PT OT try to obtain a sooner appointment continue migraine medications, meclizine as needed, continue Zyrtec and  Flonase  Follow up in two weeks or sooner if needed

## 2024-05-06 NOTE — PROGRESS NOTES
Assessment/Plan:    Dizziness  High suspicion for peripheral vestibulopathy provoked by position change, continue with PT OT try to obtain a sooner appointment continue migraine medications, meclizine as needed, continue Zyrtec and  Flonase  Follow up in two weeks or sooner if needed    Headache  Occipital nerve block completed while inpatient with significant relief  He will continue on Topamax and sumatriptan  Advised to try to obtain a sooner appointment with neurology    Cervical radiculopathy  -Continue to follow with spine and pain management  -Recommend follow-up with neurosurgery    B12 deficiency  Continue with weekly B12 injections  Second injection received today while in office  We will continue them in office weekly unless transportation becomes an issue, patient has family members who would be able to administer this injection              Diagnoses and all orders for this visit:    B12 deficiency    S/P cervical spinal fusion  -     Ambulatory Referral to Neurosurgery; Future    Cervical radiculopathy  -     Ambulatory Referral to Neurosurgery; Future    Thoracic aortic aneurysm without rupture, unspecified part (HCC)    Dizziness    Acute nonintractable headache, unspecified headache type          Subjective:      Patient ID: Jeremy George is a 53 y.o. male.    Patient presents today to follow-up on dizziness and headache:      5/6/24:  At last office visit patient was started on antihistamine Flonase and as needed meclizine, Nurtec was not covered by insurance and he was started on sumatriptan hand in addition to Topamax  He continues with physical therapy, was unable to obtain a sooner appointment with neurology, has appointment scheduled with ENT on May 9    Headaches: had a headache on Saturday took sumatriptan x2 with no relief, then took ibuprofen and the headache was relieved      Dizziness:continues with PT, her feels better, but still feeling unsteady, has been taking antihistamine and  "Flonase      Details of last office visit:  Hospital Course as per Dr. Stacy:   \"Jeremy Goerge is a 53 y.o. male patient who originally presented to the hospital on 4/23/2024 due to a stroke alert for persistent dizziness. The patient has a past medical history of HTN,, cervical radiculopathy status post ACDF, suspected unspecified tremors, and migraines.  The patient states they recently traveled to [place] and believes that he may have gotten a bug bite which led to an becoming dizzy.  The patient reports that his dizziness as swaying from side-to-side.  The patient reports this usually occurred while standing.  The patient states that the dizziness can last a few minutes and then it was a the morning of presentation the patient had woken up in his normal state and around 7:20 AM while at work he was stretching his back but attributes to the send he developed severe dizziness described as swaying. Nothing made the dizziness better, looking to the right mid the dizziness worse.  The patient was unable to ambulate and he fell like he was examined to the right.  He had developed a left frontal/parietal headache and nausea.  Headache was similar to his prior headaches.  The patient did report a history of vertigo 20 years prior which at that time was far worse than what he is experiencing at this time.  Patient was brought to the ED as a stroke alert on the 23rd.  NIH stroke scale is 0, blood pressure was 166/94, CT head was negative for any acute intracranial abnormalities, CTA head and neck was negative for any LVO, dissection, aneurysm or high-grade stenosis.  The patient did have some nystagmus to the right did have 6 polyps with heavy assessment.  No skew deviation.  Patient will not TNK candidate medial initially presented [facility] and higher suspicion for peripheral vestibulopathy.  The patient did develop severe chest pain radiating to his neck and right jaw and he had a cardiac workup while in the ED.  The " "patient denied any hearing loss tinnitus or recent illnesses.  The patient was admitted to the hospital.  Throughout her stay the patient received migraine cocktails every 8 hours.  The first day he did receive 4 g of magnesium.  Subsequent to the rated 2 days to give him 2 g of magnesium daily.  The patient was continued on migraine cocktail and then Nurtec was also added.  The patient did well on the Nurtec and reported that it helped.  On 4/26/2024 and occipital nerve block; which the patient reported significantly helped him.  Open note the patient did have a vitamin B12 deficiency for which we started him on replenishment via weekly B12 injections.  The patient will continue the weekly B12 injections with his primary care doctor as his wife is going to transport him there weekly.  The patient's headache resolved to a 0-1/10 on 4/27/2024.  The patient's dizziness persisted but he reported that it has significantly improved.  The patient was ready for discharge home.  Will discharge the patient home with a follow-up outpatient with physical therapy for vestibular therapy.\"    He reports that his headaches have been intermittent, and has improved     Has been using the walker due to the dizziness     His BP has been in control, he has been monitoring at home -140s    He presents today with his son, as he is having trouble ambulating due to the dizziness             The following portions of the patient's history were reviewed and updated as appropriate: allergies, current medications, past family history, past medical history, past social history, past surgical history, and problem list.    Review of Systems   Constitutional:  Negative for activity change, appetite change, chills, diaphoresis and fever.   HENT:  Negative for congestion, ear discharge, ear pain, postnasal drip, rhinorrhea, sinus pressure, sinus pain and sore throat.    Eyes:  Negative for pain, discharge, itching and visual disturbance. "   Respiratory:  Negative for cough, chest tightness, shortness of breath and wheezing.    Cardiovascular:  Negative for chest pain, palpitations and leg swelling.   Gastrointestinal:  Negative for abdominal pain, constipation, diarrhea, nausea and vomiting.   Endocrine: Negative for polydipsia, polyphagia and polyuria.   Genitourinary:  Negative for difficulty urinating, dysuria and urgency.   Musculoskeletal:  Negative for arthralgias, back pain and neck pain.   Skin:  Negative for rash and wound.   Neurological:  Positive for dizziness and headaches. Negative for weakness and numbness.         Past Medical History:   Diagnosis Date    Achalasia     Aneurysm (HCC)     aortic    Asthma     Childhood    Colon polyp     Diabetes mellitus (HCC)     Esophageal ulcer     Fatty liver     Groin abscess     with I &D    Hyperlipidemia     Hypertension     Plantar fasciitis          Current Outpatient Medications:     Ascorbic Acid (vitamin C) 1000 MG tablet, Take 1,000 mg by mouth daily  , Disp: , Rfl:     baclofen 20 mg tablet, Take 1 tablet (20 mg total) by mouth daily at bedtime as needed for muscle spasms, Disp: 90 tablet, Rfl: 1    cetirizine (ZyrTEC) 10 mg tablet, Take 1 tablet (10 mg total) by mouth daily, Disp: 90 tablet, Rfl: 0    CINNAMON PO, Take by mouth, Disp: , Rfl:     cyanocobalamin 1,000 mcg/mL, Inject 1 mL (1,000 mcg total) into a muscle every 7 days for 9 doses, Disp: 1 mL, Rfl: 0    diphenhydrAMINE-acetaminophen (TYLENOL PM)  MG TABS, Take 2 tablets by mouth daily at bedtime as needed for sleep 1000 mg in total, Disp: , Rfl:     famotidine (PEPCID) 20 mg tablet, Take 1 tablet (20 mg total) by mouth daily, Disp: 90 tablet, Rfl: 1    fluticasone (FLONASE) 50 mcg/act nasal spray, 2 sprays into each nostril daily, Disp: 18 mL, Rfl: 1    lisinopril-hydrochlorothiazide (PRINZIDE,ZESTORETIC) 20-12.5 MG per tablet, Take 2 tablets by mouth daily, Disp: 180 tablet, Rfl: 1    meclizine (ANTIVERT) 25 mg  tablet, Take 1 tablet (25 mg total) by mouth 3 (three) times a day as needed for dizziness, Disp: 30 tablet, Rfl: 0    metFORMIN (GLUCOPHAGE) 500 mg tablet, Pt takes 1500 mg (3 tablets) by mouth in the morning, and a 1000 mg (2 tablets) in the evening, Disp: 450 tablet, Rfl: 1    Multiple Vitamins-Minerals (CENTRUM SILVER 50+MEN PO), Take by mouth, Disp: , Rfl:     omega-3-acid ethyl esters (LOVAZA) 1 g capsule, Take 1 g by mouth daily, Disp: , Rfl:     omeprazole (PriLOSEC) 40 MG capsule, Take 1 capsule (40 mg total) by mouth daily (Patient taking differently: Take 40 mg by mouth daily Still has to start), Disp: 90 capsule, Rfl: 3    polyethylene glycol (MiraLax) 17 g packet, Take 17 g by mouth daily, Disp: 90 each, Rfl: 5    pregabalin (LYRICA) 75 mg capsule, Take 2 capsules (150 mg total) by mouth daily at bedtime (Patient taking differently: Take 150 mg by mouth daily at bedtime Bedtime), Disp: 540 capsule, Rfl: 0    simvastatin (ZOCOR) 20 mg tablet, Take 1 tablet (20 mg total) by mouth daily, Disp: 90 tablet, Rfl: 1    sitaGLIPtin (JANUVIA) 100 mg tablet, Take 1 tablet (100 mg total) by mouth daily, Disp: 90 tablet, Rfl: 1    SUMAtriptan (IMITREX) 50 mg tablet, Take 1 tablet (50 mg total) by mouth once as needed for migraine for up to 1 dose may repeat in 2 hours if necessary, Disp: 10 tablet, Rfl: 5    topiramate (TOPAMAX) 25 mg tablet, Take 25 mg daily for 7 days starting 04/25/2024  Take 50 mg daily for 7 days starting 05/02/2024  take 75 mg daily for 7 days starting 05/09/2024  Take 100 mg daily starting 05/16/2024 Do not start before April 28, 2024., Disp: 130 tablet, Rfl: 0    Current Facility-Administered Medications:     cyanocobalamin injection 1,000 mcg, 1,000 mcg, Intramuscular, Q30 Days, MIKHAIL Ha, 1,000 mcg at 04/30/24 0837    No Known Allergies    Social History   Past Surgical History:   Procedure Laterality Date    ANTERIOR CERVICAL DISCECTOMY W/ FUSION      APPENDECTOMY       "BICEPS TENDON REPAIR      EGD AND COLONOSCOPY      FL INJECTION LEFT WRIST (ARTHROGRAM)  11/9/2021    FL MYELOGRAM CERVICAL  6/13/2018    MENISCECTOMY      MYOTOMY HELLER LAPAROSCOPIC      ORTHOPEDIC SURGERY       Family History   Problem Relation Age of Onset    No Known Problems Mother     No Known Problems Father        Objective:  /72 (BP Location: Left arm, Patient Position: Sitting, Cuff Size: Large)   Pulse 65   Temp 97.8 °F (36.6 °C) (Temporal)   Ht 6' 1\" (1.854 m)   Wt 118 kg (260 lb)   SpO2 97%   BMI 34.30 kg/m²     Recent Results (from the past 1344 hour(s))   Chronic Hepatitis Panel    Collection Time: 03/18/24  7:46 AM   Result Value Ref Range    Hepatitis B Surface Ag Non-reactive Non-Reactive    Hepatitis C Ab Non-reactive Non-Reactive    Hep B C IgM Non-reactive Non-Reactive    Hep B Core Total Ab Non-reactive Non-Reactive   Alpha-1-antitrypsin    Collection Time: 03/18/24  7:46 AM   Result Value Ref Range    A-1 Antitrypsin 95 (L) 101 - 187 mg/dL   TSH, 3rd generation with Free T4 reflex    Collection Time: 03/18/24  7:46 AM   Result Value Ref Range    TSH 3RD GENERATON 1.343 0.450 - 4.500 uIU/mL   TIBC Panel (incl. Iron, TIBC, % Iron Saturation)    Collection Time: 03/18/24  7:46 AM   Result Value Ref Range    Iron Saturation 43 15 - 50 %    TIBC 339 250 - 450 ug/dL    Iron 145 50 - 212 ug/dL    UIBC 194 155 - 355 ug/dL   Ferritin    Collection Time: 03/18/24  7:46 AM   Result Value Ref Range    Ferritin 152 24 - 336 ng/mL   Hm Diabetes Eye Exam    Collection Time: 03/21/24  9:29 AM   Result Value Ref Range    Right Eye Diabetic Retinopathy None     Left Eye Diabetic Retinopathy None    Alpha 1 Antitrypsin Phenotype    Collection Time: 03/25/24  9:35 AM   Result Value Ref Range    A-1 Antitrypsin 107 101 - 187 mg/dL    A-1 Antitrypsin Pheno MM    Fingerstick Glucose (POCT)    Collection Time: 04/23/24  8:27 AM   Result Value Ref Range    POC Glucose 199 (H) 65 - 140 mg/dl   ECG 12 lead " "   Collection Time: 04/23/24 10:05 AM   Result Value Ref Range    Ventricular Rate 66 BPM    Atrial Rate 66 BPM    ME Interval 122 ms    QRSD Interval 92 ms    QT Interval 396 ms    QTC Interval 415 ms    P Axis 1 degrees    QRS Axis 244 degrees    T Wave Axis 37 degrees   CBC and differential    Collection Time: 04/23/24 10:23 AM   Result Value Ref Range    WBC 5.94 4.31 - 10.16 Thousand/uL    RBC 4.86 3.88 - 5.62 Million/uL    Hemoglobin 15.0 12.0 - 17.0 g/dL    Hematocrit 44.6 36.5 - 49.3 %    MCV 92 82 - 98 fL    MCH 30.9 26.8 - 34.3 pg    MCHC 33.6 31.4 - 37.4 g/dL    RDW 11.9 11.6 - 15.1 %    MPV 10.7 8.9 - 12.7 fL    Platelets 173 149 - 390 Thousands/uL    nRBC 0 /100 WBCs    Segmented % 50 43 - 75 %    Immature Grans % 1 0 - 2 %    Lymphocytes % 32 14 - 44 %    Monocytes % 14 (H) 4 - 12 %    Eosinophils Relative 2 0 - 6 %    Basophils Relative 1 0 - 1 %    Absolute Neutrophils 3.03 1.85 - 7.62 Thousands/µL    Absolute Immature Grans 0.04 0.00 - 0.20 Thousand/uL    Absolute Lymphocytes 1.92 0.60 - 4.47 Thousands/µL    Absolute Monocytes 0.80 0.17 - 1.22 Thousand/µL    Eosinophils Absolute 0.10 0.00 - 0.61 Thousand/µL    Basophils Absolute 0.05 0.00 - 0.10 Thousands/µL   Comprehensive metabolic panel    Collection Time: 04/23/24 10:23 AM   Result Value Ref Range    Sodium 136 135 - 147 mmol/L    Potassium 4.4 3.5 - 5.3 mmol/L    Chloride 101 96 - 108 mmol/L    CO2 27 21 - 32 mmol/L    ANION GAP 8 4 - 13 mmol/L    BUN 16 5 - 25 mg/dL    Creatinine 0.79 0.60 - 1.30 mg/dL    Glucose 138 65 - 140 mg/dL    Calcium 9.5 8.4 - 10.2 mg/dL    AST 34 13 - 39 U/L    ALT 72 (H) 7 - 52 U/L    Alkaline Phosphatase 65 34 - 104 U/L    Total Protein 6.9 6.4 - 8.4 g/dL    Albumin 4.4 3.5 - 5.0 g/dL    Total Bilirubin 0.51 0.20 - 1.00 mg/dL    eGFR 101 ml/min/1.73sq m   HS Troponin 0hr (reflex protocol)    Collection Time: 04/23/24 10:23 AM   Result Value Ref Range    hs TnI 0hr 4 \"Refer to ACS Flowchart\"- see link ng/L " "  Protime-INR    Collection Time: 04/23/24 10:23 AM   Result Value Ref Range    Protime 12.9 11.6 - 14.5 seconds    INR 0.98 0.84 - 1.19   APTT    Collection Time: 04/23/24 10:23 AM   Result Value Ref Range    PTT 26 23 - 37 seconds   Lyme Total AB W Reflex to IGM/IGG    Collection Time: 04/23/24 10:51 AM   Result Value Ref Range    Lyme Total Antibodies Negative Negative   FLU/RSV/COVID - if FLU/RSV clinically relevant    Collection Time: 04/23/24 11:25 AM    Specimen: Nose; Nares   Result Value Ref Range    SARS-CoV-2 Negative Negative    INFLUENZA A PCR Negative Negative    INFLUENZA B PCR Negative Negative    RSV PCR Negative Negative   ECG 12 lead    Collection Time: 04/23/24 11:33 AM   Result Value Ref Range    Ventricular Rate 68 BPM    Atrial Rate 68 BPM    MS Interval 170 ms    QRSD Interval 100 ms    QT Interval 394 ms    QTC Interval 418 ms    P Axis 45 degrees    QRS Axis 236 degrees    T Wave [place] 54 degrees   HS Troponin I 2hr    Collection Time: 04/23/24 12:05 PM   Result Value Ref Range    hs TnI 2hr 3 \"Refer to ACS Flowchart\"- see link ng/L    Delta 2hr hsTnI -1 <20 ng/L   Fingerstick Glucose (POCT)    Collection Time: 04/23/24  4:55 PM   Result Value Ref Range    POC Glucose 121 65 - 140 mg/dl   HS Troponin I 4hr    Collection Time: 04/23/24  5:18 PM   Result Value Ref Range    hs TnI 4hr 5 \"Refer to ACS Flowchart\"- see link ng/L    Delta 4hr hsTnI 1 <20 ng/L   Fingerstick Glucose (POCT)    Collection Time: 04/23/24  8:54 PM   Result Value Ref Range    POC Glucose 177 (H) 65 - 140 mg/dl   Fingerstick Glucose (POCT)    Collection Time: 04/24/24  6:27 AM   Result Value Ref Range    POC Glucose 179 (H) 65 - 140 mg/dl   Vitamin B12    Collection Time: 04/24/24 10:42 AM   Result Value Ref Range    Vitamin B-12 237 180 - 914 pg/mL   Hemoglobin A1C    Collection Time: 04/24/24 10:42 AM   Result Value Ref Range    Hemoglobin A1C 6.8 (H) Normal 4.0-5.6%; PreDiabetic 5.7-6.4%; Diabetic >=6.5%; Glycemic " control for adults with diabetes <7.0% %     mg/dl   Fingerstick Glucose (POCT)    Collection Time: 04/24/24 10:55 AM   Result Value Ref Range    POC Glucose 169 (H) 65 - 140 mg/dl   Fingerstick Glucose (POCT)    Collection Time: 04/24/24  4:24 PM   Result Value Ref Range    POC Glucose 216 (H) 65 - 140 mg/dl   Fingerstick Glucose (POCT)    Collection Time: 04/24/24  9:32 PM   Result Value Ref Range    POC Glucose 150 (H) 65 - 140 mg/dl   Fingerstick Glucose (POCT)    Collection Time: 04/25/24  5:59 AM   Result Value Ref Range    POC Glucose 194 (H) 65 - 140 mg/dl   Lipase    Collection Time: 04/25/24  9:50 AM   Result Value Ref Range    Lipase 83 (H) 11 - 82 u/L   CBC and Platelet    Collection Time: 04/25/24  9:50 AM   Result Value Ref Range    WBC 5.44 4.31 - 10.16 Thousand/uL    RBC 4.61 3.88 - 5.62 Million/uL    Hemoglobin 14.3 12.0 - 17.0 g/dL    Hematocrit 42.2 36.5 - 49.3 %    MCV 92 82 - 98 fL    MCH 31.0 26.8 - 34.3 pg    MCHC 33.9 31.4 - 37.4 g/dL    RDW 11.9 11.6 - 15.1 %    Platelets 177 149 - 390 Thousands/uL    MPV 10.6 8.9 - 12.7 fL   Comprehensive metabolic panel    Collection Time: 04/25/24  9:50 AM   Result Value Ref Range    Sodium 136 135 - 147 mmol/L    Potassium 4.1 3.5 - 5.3 mmol/L    Chloride 104 96 - 108 mmol/L    CO2 27 21 - 32 mmol/L    ANION GAP 5 4 - 13 mmol/L    BUN 16 5 - 25 mg/dL    Creatinine 0.79 0.60 - 1.30 mg/dL    Glucose 145 (H) 65 - 140 mg/dL    Calcium 8.8 8.4 - 10.2 mg/dL    AST 36 13 - 39 U/L    ALT 74 (H) 7 - 52 U/L    Alkaline Phosphatase 48 34 - 104 U/L    Total Protein 6.4 6.4 - 8.4 g/dL    Albumin 3.9 3.5 - 5.0 g/dL    Total Bilirubin 0.51 0.20 - 1.00 mg/dL    eGFR 101 ml/min/1.73sq m   Fingerstick Glucose (POCT)    Collection Time: 04/25/24 10:53 AM   Result Value Ref Range    POC Glucose 168 (H) 65 - 140 mg/dl   Fingerstick Glucose (POCT)    Collection Time: 04/25/24  3:17 PM   Result Value Ref Range    POC Glucose 151 (H) 65 - 140 mg/dl   Fingerstick Glucose  (POCT)    Collection Time: 04/25/24  9:17 PM   Result Value Ref Range    POC Glucose 288 (H) 65 - 140 mg/dl   Fingerstick Glucose (POCT)    Collection Time: 04/26/24  6:25 AM   Result Value Ref Range    POC Glucose 200 (H) 65 - 140 mg/dl   Fingerstick Glucose (POCT)    Collection Time: 04/26/24  8:48 AM   Result Value Ref Range    POC Glucose 294 (H) 65 - 140 mg/dl   ECG 12 lead    Collection Time: 04/26/24 10:41 AM   Result Value Ref Range    Ventricular Rate 72 BPM    Atrial Rate 72 BPM    IN Interval 170 ms    QRSD Interval 96 ms    QT Interval 404 ms    QTC Interval 442 ms    P Axis 40 degrees    QRS Axis 201 degrees    T Wave Axis 64 degrees   ECG 12 lead    Collection Time: 04/26/24 10:41 AM   Result Value Ref Range    Ventricular Rate 69 BPM    Atrial Rate 69 BPM    IN Interval 174 ms    QRSD Interval 96 ms    QT Interval 406 ms    QTC Interval 435 ms    P Axis 28 degrees    QRS Axis 216 degrees    T Wave Axis 64 degrees   Fingerstick Glucose (POCT)    Collection Time: 04/26/24 11:22 AM   Result Value Ref Range    POC Glucose 209 (H) 65 - 140 mg/dl   Fingerstick Glucose (POCT)    Collection Time: 04/26/24  4:04 PM   Result Value Ref Range    POC Glucose 306 (H) 65 - 140 mg/dl   Fingerstick Glucose (POCT)    Collection Time: 04/27/24  6:30 AM   Result Value Ref Range    POC Glucose 217 (H) 65 - 140 mg/dl   Fingerstick Glucose (POCT)    Collection Time: 04/27/24 11:10 AM   Result Value Ref Range    POC Glucose 258 (H) 65 - 140 mg/dl   Wheeled Walker    Collection Time: 04/27/24  2:17 PM   Result Value Ref Range    Supplier Name [employer]     Supplier Phone Number [phone]     Order Status Completed     Delivery Note      Delivery Request Date 04/27/2024     Item Description Wheeled Walker, Adult    Shower Chair with Back [$]    Collection Time: 04/27/24  2:24 PM   Result Value Ref Range    Supplier Name [employer] [facility]     Supplier Phone Number [phone]      Order Status Delivery Successful     Delivery Note      Delivery Request Date 04/28/2024     Date Delivered  04/30/2024     Item Description Shower Chair (With Back) [$]             Physical Exam  Constitutional:       General: He is not in acute distress.     Appearance: He is well-developed. He is not diaphoretic.   HENT:      Head: Normocephalic and atraumatic.      Right Ear: External ear normal.      Left Ear: External ear normal.      Nose: Nose normal.      Mouth/Throat:      Mouth: Mucous membranes are moist.      Pharynx: No oropharyngeal exudate or posterior oropharyngeal erythema.   Eyes:      General:         Right eye: No discharge.         Left eye: No discharge.      Conjunctiva/sclera: Conjunctivae normal.      Pupils: Pupils are equal, round, and reactive to light.   Neck:      Thyroid: No thyromegaly.   Cardiovascular:      Rate and Rhythm: Normal rate and regular rhythm.      Heart sounds: Normal heart sounds. No murmur heard.     No friction rub. No gallop.   Pulmonary:      Effort: Pulmonary effort is normal. No respiratory distress.      Breath sounds: Normal breath sounds. No stridor. No wheezing or rales.   Abdominal:      General: Bowel sounds are normal. There is no distension.      Palpations: Abdomen is soft.      Tenderness: There is no abdominal tenderness.   Musculoskeletal:      Cervical back: Normal range of motion and neck supple.   Lymphadenopathy:      Cervical: No cervical adenopathy.   Skin:     General: Skin is warm and dry.      Findings: No erythema or rash.   Neurological:      Mental Status: He is alert and oriented to person, place, and time.   Psychiatric:         Behavior: Behavior normal.         Thought Content: Thought content normal.         Judgment: Judgment normal.

## 2024-05-06 NOTE — ASSESSMENT & PLAN NOTE
Occipital nerve block completed while inpatient with significant relief  He will continue on Topamax and sumatriptan  Advised to try to obtain a sooner appointment with neurology

## 2024-05-07 NOTE — PROGRESS NOTES
Assessment:  1. Cervical spondylosis    2. Chronic pain syndrome    3. Cervical radiculopathy    4. Neck pain    5. S/P cervical spinal fusion    6. Chronic bilateral low back pain without sciatica        Plan:  The patient has been experiencing moderate to severe axial spine pain that is causing functional deficit.  The pain has been present for at least 3 months and is not improving with conservative care.  Currently the patient is not experiencing any radicular features nor neurogenic claudication.  Non-facet pathology has been ruled out on clinical evaluation. We will schedule the patient for right C3-5 medial branch blocks with intention of moving forward towards radiofrequency ablation if there is an appropriate diagnostic response. The initial blocks will be performed with 2% lidocaine and if an appropriate response is obtained upon review of the patient's pain diary, a confirmatory block will be scheduled.  Complete risks and benefits including bleeding, infection, tissue reaction, nerve injury and allergic reaction were discussed. The patient was agreeable and verbalized an understanding  I will order an EMG of the left upper extremity  Medrol Dosepak sent to pharmacy  Patient may continue baclofen and pregabalin as prescribed  Continue to follow with neurology as scheduled  Patient may continue Tylenol as needed  Patient does still complain of axial low back pain although of low priority than neck pain.  Patient encouraged to complete x-ray lumbar spine  Follow-up pending results of blocks or sooner if needed    History of Present Illness:    The patient is a 53 y.o. male with a history of C6-7 ACDF in 2018 last seen on 03/25/2024  who presents for a follow up office visit in regards to chronic right-sided neck pain with associated headaches that radiates into the left upper extremity to the dorsal aspect of the left hand.  He denies bowel or bladder incontinence.  Patient has been experiencing dizziness  and balance issues.  He did present to the emergency room April 23, 2024 and was admitted into the hospital.  Neurology was consulted on his case.  CT of the head, neck and brain were negative for intracranial abnormalities.  MRI of the brain was negative for intracranial abnormality or hemorrhage.  He did receive an occipital nerve block while in the hospital by neurology which she states helped his headache for about a week.  Updated MRI of the cervical spine with and without contrast from April 4, 2024 reveals arthritis from C3-4 through C5-6 with mild central and moderate foraminal narrowing at C4-5.  C6-7 shows adequate decompression without any significant stenosis.    Patient also has a secondary complaint of axial low back pain without radicular symptoms into the lower extremities.  Xray of the lumbar spine was ordered after last office visit however not completed    The patient rates his pain a 7 out of 10 on the numeric pain rating scale.  Pain is constant throughout the day and it is described as dull aching, sharp and pressure-like.  Patient does feel like his pain is worsened secondary to his job as he is constantly doing overhead work.    Current pain medications includes: Pregabalin 150 mg nightly and baclofen 20 mg as needed    I have personally reviewed and/or updated the patient's past medical history, past surgical history, family history, social history, current medications, allergies, and vital signs today.       Review of Systems:    Review of Systems   Respiratory:  Negative for shortness of breath.    Cardiovascular:  Negative for chest pain.   Gastrointestinal:  Negative for constipation, diarrhea, nausea and vomiting.   Musculoskeletal:  Positive for back pain and gait problem. Negative for arthralgias, joint swelling and myalgias.   Skin:  Negative for rash.   Neurological:  Negative for dizziness, seizures and weakness.   All other systems reviewed and are negative.        Past Medical  History:   Diagnosis Date    Achalasia     Aneurysm (HCC)     aortic    Asthma     Childhood    Colon polyp     Diabetes mellitus (HCC)     Esophageal ulcer     Fatty liver     Groin abscess     with I &D    Hyperlipidemia     Hypertension     Plantar fasciitis        Past Surgical History:   Procedure Laterality Date    ANTERIOR CERVICAL DISCECTOMY W/ FUSION      APPENDECTOMY      BICEPS TENDON REPAIR      EGD AND COLONOSCOPY      FL INJECTION LEFT WRIST (ARTHROGRAM)  11/9/2021    FL MYELOGRAM CERVICAL  6/13/2018    MENISCECTOMY      MYOTOMY HELLER LAPAROSCOPIC      ORTHOPEDIC SURGERY         Family History   Problem Relation Age of Onset    No Known Problems Mother     No Known Problems Father        Social History     Occupational History    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   Tobacco Use    Smoking status: Never    Smokeless tobacco: Never   Vaping Use    Vaping status: Never Used   Substance and Sexual Activity    Alcohol use: Not Currently    Drug use: Never    Sexual activity: Yes     Partners: Female         Current Outpatient Medications:     methylPREDNISolone 4 MG tablet therapy pack, Use as directed on package, Disp: 1 each, Rfl: 0    Ascorbic Acid (vitamin C) 1000 MG tablet, Take 1,000 mg by mouth daily  , Disp: , Rfl:     baclofen 20 mg tablet, Take 1 tablet (20 mg total) by mouth daily at bedtime as needed for muscle spasms, Disp: 90 tablet, Rfl: 1    cetirizine (ZyrTEC) 10 mg tablet, Take 1 tablet (10 mg total) by mouth daily, Disp: 90 tablet, Rfl: 0    CINNAMON PO, Take by mouth, Disp: , Rfl:     cyanocobalamin 1,000 mcg/mL, Inject 1 mL (1,000 mcg total) into a muscle every 7 days for 9 doses, Disp: 1 mL, Rfl: 0    diphenhydrAMINE-acetaminophen (TYLENOL PM)  MG TABS, Take 2 tablets by mouth daily at bedtime as needed for sleep 1000 mg in total, Disp: , Rfl:     famotidine (PEPCID) 20 mg tablet, Take 1 tablet (20 mg total) by mouth daily, Disp: 90 tablet, Rfl: 1    fluticasone  (FLONASE) 50 mcg/act nasal spray, 2 sprays into each nostril daily, Disp: 18 mL, Rfl: 1    lisinopril-hydrochlorothiazide (PRINZIDE,ZESTORETIC) 20-12.5 MG per tablet, Take 2 tablets by mouth daily, Disp: 180 tablet, Rfl: 1    meclizine (ANTIVERT) 25 mg tablet, Take 1 tablet (25 mg total) by mouth 3 (three) times a day as needed for dizziness, Disp: 30 tablet, Rfl: 0    metFORMIN (GLUCOPHAGE) 500 mg tablet, Pt takes 1500 mg (3 tablets) by mouth in the morning, and a 1000 mg (2 tablets) in the evening, Disp: 450 tablet, Rfl: 1    Multiple Vitamins-Minerals (CENTRUM SILVER 50+MEN PO), Take by mouth, Disp: , Rfl:     omega-3-acid ethyl esters (LOVAZA) 1 g capsule, Take 1 g by mouth daily, Disp: , Rfl:     omeprazole (PriLOSEC) 40 MG capsule, Take 1 capsule (40 mg total) by mouth daily (Patient taking differently: Take 40 mg by mouth daily Still has to start), Disp: 90 capsule, Rfl: 3    polyethylene glycol (MiraLax) 17 g packet, Take 17 g by mouth daily, Disp: 90 each, Rfl: 5    pregabalin (LYRICA) 75 mg capsule, Take 2 capsules (150 mg total) by mouth daily at bedtime (Patient taking differently: Take 150 mg by mouth daily at bedtime Bedtime), Disp: 540 capsule, Rfl: 0    simvastatin (ZOCOR) 20 mg tablet, Take 1 tablet (20 mg total) by mouth daily, Disp: 90 tablet, Rfl: 1    sitaGLIPtin (JANUVIA) 100 mg tablet, Take 1 tablet (100 mg total) by mouth daily, Disp: 90 tablet, Rfl: 1    SUMAtriptan (IMITREX) 50 mg tablet, Take 1 tablet (50 mg total) by mouth once as needed for migraine for up to 1 dose may repeat in 2 hours if necessary, Disp: 10 tablet, Rfl: 5    topiramate (TOPAMAX) 25 mg tablet, Take 25 mg daily for 7 days starting 04/25/2024  Take 50 mg daily for 7 days starting 05/02/2024  take 75 mg daily for 7 days starting 05/09/2024  Take 100 mg daily starting 05/16/2024 Do not start before April 28, 2024., Disp: 130 tablet, Rfl: 0    Current Facility-Administered Medications:     cyanocobalamin injection 1,000  "mcg, 1,000 mcg, Intramuscular, Q30 Days, Cici Austin MIKHAIL, 1,000 mcg at 04/30/24 0837    No Known Allergies    Physical Exam:    /81   Pulse 60   Ht 6' 1\" (1.854 m)   Wt 119 kg (263 lb)   BMI 34.70 kg/m²     Constitutional:normal, well developed, well nourished, alert, in no distress and non-toxic and no overt pain behavior.  Eyes:anicteric  HEENT:grossly intact  Neck:supple, symmetric, trachea midline and no masses   Pulmonary:even and unlabored  Cardiovascular:No edema or pitting edema present  Skin:Normal without rashes or lesions and well hydrated  Psychiatric:Mood and affect appropriate  Neurologic:Cranial Nerves II-XII grossly intact  Musculoskeletal:antalgic and ambulates with a walker .  Limited range of motion in all planes of the cervical spine other than cervical flexion.  Right cervical paraspinal musculature mildly tender to palpation.  Positive cervical facet loading maneuvers.  Sensation intact C5-T1 dermatomes bilaterally bilateral upper extremity strength 5 out of 5 in all muscle groups.  Negative Spurling's bilaterally.  Negative Tolbert's reflex  Full range of motion of the left shoulder.  Negative empty can and Neer test on the left      Imaging  FL spine and pain procedure    (Results Pending)       Narrative & Impression  MRI CERVICAL SPINE WITH AND WITHOUT CONTRAST     INDICATION: Z98.1: Arthrodesis status  M54.12: Radiculopathy, cervical region.     COMPARISON: Prior MRI October 10, 2021     TECHNIQUE:  Multiplanar, multisequence imaging of the cervical spine was performed before and after gadolinium administration. .        IV Contrast:  12 mL of Gadobutrol injection (SINGLE-DOSE)     IMAGE QUALITY:  Diagnostic.     FINDINGS:     ALIGNMENT: Reversal of the cervical lordosis apex C3-4. Patient status post ACDF at C6-7.     MARROW SIGNAL:  Normal marrow signal is identified within the visualized bony structures.  No discrete marrow lesion.     CERVICAL AND VISUALIZED UPPER " THORACIC CORD:  Normal signal within the visualized cord.     PREVERTEBRAL AND PARASPINAL SOFT TISSUES:  Normal.     VISUALIZED POSTERIOR FOSSA:  The visualized posterior fossa demonstrates no abnormal signal.     CERVICAL DISC SPACES:     C2-C3:  Normal.     C3-C4: Mild annular bulge with small marginal osteophytes results in minimal central stenosis. Foramina patent.     C4-C5: Degenerative disc osteophyte complex with marginal osteophytes bilaterally results in mild central stenosis and moderate bilateral foraminal narrowing.     C5-C6: Degenerative disc osteophyte complex eccentric to the right results in mild central stenosis. Foramina patent.     C6-C7: Status post ACDF. Adequate decompression of the central canal. No significant central or foraminal narrowing.     C7-T1: No central or foraminal narrowing.     UPPER THORACIC DISC SPACES:  Normal.     POSTCONTRAST IMAGING:  Normal.     OTHER FINDINGS:  None.     IMPRESSION:     Status post ACDF at C6-7. No central or foraminal narrowing at the postsurgical level.     Spondylotic degenerative changes are seen resulting in mild central stenosis at C4-5 and C5-6 with moderate foraminal narrowing bilaterally at C4-5. Normal appearance of the cervical cord.    Orders Placed This Encounter   Procedures    FL spine and pain procedure    EMG 1 Limb

## 2024-05-08 ENCOUNTER — OFFICE VISIT (OUTPATIENT)
Dept: PAIN MEDICINE | Facility: CLINIC | Age: 54
End: 2024-05-08
Payer: COMMERCIAL

## 2024-05-08 VITALS
BODY MASS INDEX: 34.85 KG/M2 | HEART RATE: 60 BPM | DIASTOLIC BLOOD PRESSURE: 81 MMHG | SYSTOLIC BLOOD PRESSURE: 144 MMHG | HEIGHT: 73 IN | WEIGHT: 263 LBS

## 2024-05-08 DIAGNOSIS — G89.29 CHRONIC BILATERAL LOW BACK PAIN WITHOUT SCIATICA: ICD-10-CM

## 2024-05-08 DIAGNOSIS — M47.812 CERVICAL SPONDYLOSIS: Primary | ICD-10-CM

## 2024-05-08 DIAGNOSIS — G89.4 CHRONIC PAIN SYNDROME: ICD-10-CM

## 2024-05-08 DIAGNOSIS — M54.2 NECK PAIN: ICD-10-CM

## 2024-05-08 DIAGNOSIS — M54.12 CERVICAL RADICULOPATHY: ICD-10-CM

## 2024-05-08 DIAGNOSIS — M54.50 CHRONIC BILATERAL LOW BACK PAIN WITHOUT SCIATICA: ICD-10-CM

## 2024-05-08 DIAGNOSIS — Z98.1 S/P CERVICAL SPINAL FUSION: ICD-10-CM

## 2024-05-08 PROCEDURE — 99214 OFFICE O/P EST MOD 30 MIN: CPT | Performed by: NURSE PRACTITIONER

## 2024-05-08 RX ORDER — METHYLPREDNISOLONE 4 MG/1
TABLET ORAL
Qty: 1 EACH | Refills: 0 | Status: SHIPPED | OUTPATIENT
Start: 2024-05-08

## 2024-05-10 ENCOUNTER — NURSE TRIAGE (OUTPATIENT)
Age: 54
End: 2024-05-10

## 2024-05-10 ENCOUNTER — OFFICE VISIT (OUTPATIENT)
Dept: PHYSICAL THERAPY | Facility: CLINIC | Age: 54
End: 2024-05-10
Payer: COMMERCIAL

## 2024-05-10 DIAGNOSIS — R42 DIZZINESS: Primary | ICD-10-CM

## 2024-05-10 DIAGNOSIS — Z74.09 IMPAIRED FUNCTIONAL MOBILITY, BALANCE, GAIT, AND ENDURANCE: ICD-10-CM

## 2024-05-10 PROCEDURE — 97112 NEUROMUSCULAR REEDUCATION: CPT

## 2024-05-10 NOTE — PROGRESS NOTES
"Daily Note     Today's date: 5/10/2024  Patient name: Jeremy George  : 1970  MRN: [id number]  Referring provider: Cici Austin CR*  Dx:   Encounter Diagnosis     ICD-10-CM    1. Dizziness  R42       2. Impaired functional mobility, balance, gait, and endurance  Z74.09                          Subjective: woke up with a light headache, took medicine a few minutes ago. States that his head \"feels like it is going to explode\". Reports that he didn't take one of his medications because the pain wasn't too bad.       Objective: See treatment diary below    Assessment: Tolerated treatment fair. Patient would benefit from continued PT. Significant increase in headache symptoms with spot it activity with saccades while sitting. Significant increase in headache also associated with increased dizziness symptoms, which appears to be consistent with possible vestibular migraine. Due to increased headache and dizziness during session, rest of session was completed with attempting EC activities with HT/HN and weight shifting. Reported significant neck pain, especially with looking up and to the R side, however slight improvement with gentle ROM. Educated to attempt to have patient perform gentle neck ROM exercises with eyes closed with increased migraine symptoms and not performing VOR x 1 or oculomotor exercises due to increase in headache symptoms. Continue to progress as tolerated      Plan: Continue per plan of care.      Precautions: HTN. Thoracic aneurysm, asthma, c-spine fusion    POC expires Unit limit Auth Expiration date PT/OT/ST + Visit Limit?    BOMN N/a 90 PT/OT/SLP                           Visit/Unit Tracking  AUTH Status:  Date 4/29 5/1 5/6 5/10          None Used 1 2 3 4           Remaining                   Manuals  5/6 5/10        Coordination testing  Positional testing   Balance testing (TUG, 5xSTS, Kiser) See subjective                                           Neuro Re-Ed           VOR x " "1 seated X 10 horiztonal Seated VOR x 1 30\" x 2 each direction         Imaginary targets X 3 each direction X 5 each direction R/L/up/down         Foam EC   Firm EC HT normal JUAN 30\" x 3, with UE support prn, HN 30\" x 2 reps        Standing circles weight shifting  x 10 each direction EO, EC x 5 each direction with 2-finger support X 10 each direction with EC no UE support        Spot it saccades   Seated horizontal x 6 cards        NBOS  EO 30\"  EC 5\"                    Ther Ex                                                                                                   Ther Activity                                 Gait Training                                 Education Examination findings, use of ice to control nausea symptoms, symptoms of BPPV, vestibular migraine, follow up with neurology Low tyramine diet, headache triggers, keeping a headache journal, balance systems, decreased visual dependence Migraine symptoms, increase with visual symptoms                                   "

## 2024-05-10 NOTE — TELEPHONE ENCOUNTER
Patient should not exceed 200 mg of sumatriptan and a day, he can repeat dose every 2 hours as needed up to 200 mg daily.  If this does not relieve I would recommend an office evaluation or ER evaluation.

## 2024-05-10 NOTE — TELEPHONE ENCOUNTER
"Pt called with c/o a migraine that started this morning and it will not go away with medication. Pt rates the pain 8/10 and says its constant dull pain but does have sharp shooting pain that last 2-3 mins. Pt is asking to speak with Cici ELIZONDO because she knows what's going on and recently just saw pt for this.     Pt is asking if he can take third dose of sumatriptan today. Took 2 doses already today with no relief.     Called office clinical line and clerical line no answer.     Please f/u with pt in 1 hour for further advisement             Reason for Disposition   Patient wants to be seen    Answer Assessment - Initial Assessment Questions  1. LOCATION: \"Where does it hurt?\"       Top forward part of head, across the top   2. ONSET: \"When did the headache start?\" (Minutes, hours or days)       Started this morning, intense dull pain, occasional sharp spikes   3. PATTERN: \"Does the pain come and go, or has it been constant since it started?\"      Constant   4. SEVERITY: \"How bad is the pain?\" and \"What does it keep you from doing?\"  (e.g., Scale 1-10; mild, moderate, or severe)    - MILD (1-3): doesn't interfere with normal activities     - MODERATE (4-7): interferes with normal activities or awakens from sleep     - SEVERE (8-10): excruciating pain, unable to do any normal activities         7/10 right now but the sharp pain comes and last 1-2 mins   5. RECURRENT SYMPTOM: \"Have you ever had headaches before?\" If Yes, ask: \"When was the last time?\" and \"What happened that time?\"       Last Saturday but pt states his medication and Advil and tylenol helped  6. CAUSE: \"What do you think is causing the headache?\"      Migraines   7. MIGRAINE: \"Have you been diagnosed with migraine headaches?\" If Yes, ask: \"Is this headache similar?\"       Yes, Cici is aware   8. HEAD INJURY: \"Has there been any recent injury to the head?\"       Denies   9. OTHER SYMPTOMS: \"Do you have any other symptoms?\" (fever, " stiff neck, eye pain, sore throat, cold symptoms)      Cold, neck hurts (chronic), bothered by light    Protocols used: Headache-ADULT-OH    Nauseas

## 2024-05-13 ENCOUNTER — CLINICAL SUPPORT (OUTPATIENT)
Age: 54
End: 2024-05-13
Payer: COMMERCIAL

## 2024-05-13 ENCOUNTER — TELEPHONE (OUTPATIENT)
Dept: RADIOLOGY | Facility: CLINIC | Age: 54
End: 2024-05-13

## 2024-05-13 ENCOUNTER — APPOINTMENT (OUTPATIENT)
Dept: PHYSICAL THERAPY | Facility: CLINIC | Age: 54
End: 2024-05-13
Payer: COMMERCIAL

## 2024-05-13 DIAGNOSIS — E53.8 B12 DEFICIENCY: Primary | ICD-10-CM

## 2024-05-13 DIAGNOSIS — E78.49 OTHER HYPERLIPIDEMIA: ICD-10-CM

## 2024-05-13 PROCEDURE — 96372 THER/PROPH/DIAG INJ SC/IM: CPT

## 2024-05-13 RX ORDER — SIMVASTATIN 20 MG
20 TABLET ORAL DAILY
Qty: 30 TABLET | Refills: 0 | Status: SHIPPED | OUTPATIENT
Start: 2024-05-13

## 2024-05-13 RX ORDER — SIMVASTATIN 20 MG
20 TABLET ORAL
Qty: 90 TABLET | Refills: 1 | Status: SHIPPED | OUTPATIENT
Start: 2024-05-13 | End: 2024-05-22 | Stop reason: SDUPTHER

## 2024-05-13 RX ADMIN — CYANOCOBALAMIN 1000 MCG: 1000 INJECTION, SOLUTION INTRAMUSCULAR; SUBCUTANEOUS at 09:37

## 2024-05-13 NOTE — TELEPHONE ENCOUNTER
Caller: Patient     Doctor/Office: Summer    Call regarding :  Returning  call      Call was transferred to: Procedure scheduling

## 2024-05-13 NOTE — TELEPHONE ENCOUNTER
Last visit 5/6/24  Next visit 5/20/24    Patient has 2 day supply of Simvastatin.  Needs 30 day to [facility] and 90 day to [facility].

## 2024-05-14 NOTE — TELEPHONE ENCOUNTER
Dewayne (pharmacist with [facility]) connected a call with Patient. Patient was prescribed Nurtec while he was in the hospital and stated Cici Austin would be able to fill this for him while he is waiting to be seen by a neurologist. Patient stated this would be in jenny of Sumatriptan. Dewayne mentioned this would be under review for prior authorization. I do not see Nurtec on medication list.     Patient has been feeling somewhat better since Friday. He has not felt dizziness since Sunday. He described his pain level is now at a 3/10 - which he stated is more from pressure in his head caused by a cough that he has developed. He has been taking mucinex over the counter to help control his cough. He had been taking ibuprofen and that helps keep his pain manageable. I offered patient an appointment with another provider. Patient said he would go to [facility] if he would like to be seen for his cough.

## 2024-05-15 ENCOUNTER — OFFICE VISIT (OUTPATIENT)
Dept: URGENT CARE | Age: 54
End: 2024-05-15
Payer: COMMERCIAL

## 2024-05-15 ENCOUNTER — APPOINTMENT (OUTPATIENT)
Dept: RADIOLOGY | Age: 54
End: 2024-05-15
Payer: COMMERCIAL

## 2024-05-15 ENCOUNTER — AMB VIDEO VISIT (OUTPATIENT)
Dept: OTHER | Facility: HOSPITAL | Age: 54
End: 2024-05-15

## 2024-05-15 VITALS
RESPIRATION RATE: 18 BRPM | SYSTOLIC BLOOD PRESSURE: 131 MMHG | DIASTOLIC BLOOD PRESSURE: 93 MMHG | OXYGEN SATURATION: 97 % | HEART RATE: 77 BPM | TEMPERATURE: 97.9 F

## 2024-05-15 VITALS — HEART RATE: 98 BPM | OXYGEN SATURATION: 96 %

## 2024-05-15 DIAGNOSIS — R05.1 ACUTE COUGH: ICD-10-CM

## 2024-05-15 DIAGNOSIS — J06.9 VIRAL UPPER RESPIRATORY TRACT INFECTION: Primary | ICD-10-CM

## 2024-05-15 DIAGNOSIS — J02.9 SORE THROAT: ICD-10-CM

## 2024-05-15 DIAGNOSIS — J06.9 UPPER RESPIRATORY TRACT INFECTION, UNSPECIFIED TYPE: Primary | ICD-10-CM

## 2024-05-15 LAB — S PYO AG THROAT QL: NEGATIVE

## 2024-05-15 PROCEDURE — G0382 LEV 3 HOSP TYPE B ED VISIT: HCPCS

## 2024-05-15 PROCEDURE — 87070 CULTURE OTHR SPECIMN AEROBIC: CPT

## 2024-05-15 PROCEDURE — S9083 URGENT CARE CENTER GLOBAL: HCPCS

## 2024-05-15 PROCEDURE — ECARE PR SL URGENT CARE VIRTUAL VISIT: Performed by: PHYSICIAN ASSISTANT

## 2024-05-15 PROCEDURE — 71046 X-RAY EXAM CHEST 2 VIEWS: CPT

## 2024-05-15 PROCEDURE — 87636 SARSCOV2 & INF A&B AMP PRB: CPT

## 2024-05-15 PROCEDURE — 87880 STREP A ASSAY W/OPTIC: CPT

## 2024-05-15 RX ORDER — BENZONATATE 100 MG/1
100 CAPSULE ORAL 3 TIMES DAILY PRN
Qty: 20 CAPSULE | Refills: 0 | Status: SHIPPED | OUTPATIENT
Start: 2024-05-15

## 2024-05-15 RX ORDER — BENZONATATE 100 MG/1
100 CAPSULE ORAL 3 TIMES DAILY PRN
Qty: 20 CAPSULE | Refills: 0 | Status: SHIPPED | OUTPATIENT
Start: 2024-05-15 | End: 2024-05-15

## 2024-05-15 RX ORDER — ALBUTEROL SULFATE 90 UG/1
2 AEROSOL, METERED RESPIRATORY (INHALATION) EVERY 6 HOURS PRN
Qty: 8.5 G | Refills: 0 | Status: SHIPPED | OUTPATIENT
Start: 2024-05-15

## 2024-05-15 RX ORDER — ALBUTEROL SULFATE 90 UG/1
2 AEROSOL, METERED RESPIRATORY (INHALATION) EVERY 6 HOURS PRN
Qty: 8.5 G | Refills: 0 | Status: SHIPPED | OUTPATIENT
Start: 2024-05-15 | End: 2024-05-15

## 2024-05-15 NOTE — CARE ANYWHERE EVISITS
Visit Summary for SEAN IZAGUIRRE - Gender: Male - Date of Birth: 1970  Date: [id number] - Duration: 9 minutes  Patient: SEAN IZAGUIRRE  Provider: Jacque Galloway PA-C    Patient Contact Information  Address  [address]  [place]; [place] [address]      Visit Topics    Triage Questions   What is your current physical address in the event of a medical emergency? Answer []  Are you allergic to any medications? Answer []  Are you now or could you be pregnant? Answer []  Do you have any immune system compromise or chronic lung   disease? Answer []  Do you have any vulnerable family members in the home (infant, pregnant, cancer, elderly)? Answer []     Conversation Transcripts  [0A][0A] [Notification] You are connected with Jacque Galloway PA-C, Urgent Care Specialist.[0A][Notification] SEAN IZAGUIRRE is located in [place].[0A][Notification] SEAN IZAGUIRRE has shared health history...[0A]    Diagnosis  Acute upper respiratory infection, unspecified    Procedures  Value: [id number] Code: CPT-4 UNLISTED E&M SERVICE    Medications Prescribed    No prescriptions ordered    Electronically signed by: Jacque Galloway PA-C(NPI [id number])

## 2024-05-15 NOTE — PROGRESS NOTES
Required Documentation:  Encounter provider: Jacque Galloway PA-C    Identify all parties in room with patient during virtual visit:  No one else    The patient was identified by name and date of birth. Jeremy George was informed that this is a telemedicine visit and that the visit is being conducted through the [employer] platform. He agrees to proceed..  My office door was closed. No one else was in the room.  He acknowledged consent and understanding of privacy and security of the video platform. The patient has agreed to participate and understands they can discontinue the visit at any time.    Verification of patient location:  Patient is located at Home in the following state in which I hold an active license [place]    Patient is aware this is a billable service.     Reason for visit is No chief complaint on file.       Subjective  HPI   Patient states that he is coughing and spitting up blood in his mucus.  He is starting to wheeze.  He use to get asthma.  He does not have inhalers at home.  He states it wasn't mostly blood but states it was 20% blood.  He has been controlling the cough with mucinex DM. He is using flonase.  He is already out of work for headaches and dizziness.  He has had a headache for a few days.  He states the coughs do get bad at times.  His ribs are starting to hurt from the coughing.   He did not start a medrol dose pack that he was prescribed due to getting neck injections.  He is to go on the 28th for the neck injection.  He states it is a light wheeze on the upper part of his chest.     Past Medical History:   Diagnosis Date    Achalasia     Aneurysm (HCC)     aortic    Asthma     Childhood    Colon polyp     Diabetes mellitus (HCC)     Esophageal ulcer     Fatty liver     Groin abscess     with I &D    Hyperlipidemia     Hypertension     Plantar fasciitis        Past Surgical History:   Procedure Laterality Date    ANTERIOR CERVICAL DISCECTOMY W/ FUSION      APPENDECTOMY       BICEPS TENDON REPAIR      EGD AND COLONOSCOPY      FL INJECTION LEFT WRIST (ARTHROGRAM)  11/9/2021    FL MYELOGRAM CERVICAL  6/13/2018    MENISCECTOMY      MYOTOMY HELLER LAPAROSCOPIC      ORTHOPEDIC SURGERY          No Known Allergies    Review of Systems   Constitutional: Negative.    HENT:  Positive for congestion.    Respiratory:  Positive for cough, chest tightness and wheezing.    Cardiovascular: Negative.    Gastrointestinal: Negative.    Musculoskeletal: Negative.    Neurological: Negative.    Psychiatric/Behavioral: Negative.         Video Exam    Vitals:    05/15/24 0813   Pulse: 98   SpO2: 96%       Physical Exam  Constitutional:       General: He is not in acute distress.     Appearance: Normal appearance. He is not ill-appearing, toxic-appearing or diaphoretic.   HENT:      Head: Normocephalic and atraumatic.      Nose: Congestion present.   Pulmonary:      Effort: Pulmonary effort is normal. No respiratory distress.      Comments: Audible wheezing heard while coughing.  Skin:     General: Skin is dry.   Neurological:      General: No focal deficit present.      Mental Status: He is alert and oriented to person, place, and time.   Psychiatric:         Mood and Affect: Mood normal.         Behavior: Behavior normal.         Visit Time  Total Visit Duration: 5 minutes    Assessment/Plan:    Diagnoses and all orders for this visit:    Viral upper respiratory tract infection      Due to patient coughing up some blood, audible wheezing- recommend going to Urgent care for inperson exam, vitals, and auscultation.  Pt agreeable.   Patient Instructions   Recommend going to Urgent care for in person exam

## 2024-05-15 NOTE — PROGRESS NOTES
[facility]        NAME: Jeremy George is a 53 y.o. male  : 1970    MRN: [id number]  DATE: May 15, 2024  TIME: 10:50 AM    Assessment and Plan   Upper respiratory tract infection, unspecified type [J06.9]  1. Upper respiratory tract infection, unspecified type  XR chest pa & lateral    albuterol (ProAir HFA) 90 mcg/act inhaler    benzonatate (TESSALON PERLES) 100 mg capsule    COVID/FLU      2. Sore throat  POCT rapid ANTIGEN strepA    Throat culture      Chest x-ray reviewed, no acute abnormality noted, awaiting official read.   Rapid strep performed in office found to be negative, throat culture results pending and should be available in MyChart within 48 hours.  COVID/flu cultures pending, results should be available in MyChart within 24 hours.  Please begin inhaler and Tessalon Perles as directed.   Continue migraine treatment as directed.   Present to ED if symptoms worsen.       Patient Instructions   Upper Respiratory Infection   WHAT YOU NEED TO KNOW:   An upper respiratory infection is also called a cold. It can affect your nose, throat, ears, and sinuses. Cold symptoms are usually worst for the first 3 to 5 days. Most people get better in 7 to 14 days. You may continue to cough for 2 to 3 weeks. Colds are caused by viruses and do not get better with antibiotics.  DISCHARGE INSTRUCTIONS:   Call your local emergency number (911 in the ) if:   You have chest pain or trouble breathing.        Return to the emergency department if:   You have a fever over 102ºF (39ºC).        Call your doctor if:   You have a low fever.     Your sore throat gets worse or you see white or yellow spots in your throat.     Your symptoms get worse after 3 to 5 days or are not better in 14 days.     You have a rash anywhere on your skin.     You have large, tender lumps in your neck.     You have thick, green, or yellow drainage from your nose.     You cough up thick yellow, green, or bloody mucus.     You have a  bad earache.     You have questions or concerns about your condition or care.     Medicines:  You may need any of the following:  Decongestants  help reduce nasal congestion and help you breathe more easily. If you take decongestant pills, they may make you feel restless or cause problems with your sleep. Do not use decongestant sprays for more than a few days.     Cough suppressants  help reduce coughing. Ask your healthcare provider which type of cough medicine is best for you.      NSAIDs , such as ibuprofen, help decrease swelling, pain, and fever. NSAIDs can cause stomach bleeding or kidney problems in certain people. If you take blood thinner medicine, always ask your healthcare provider if NSAIDs are safe for you. Always read the medicine label and follow directions.     Acetaminophen  decreases pain and fever. It is available without a doctor's order. Ask how much to take and how often to take it. Follow directions. Read the labels of all other medicines you are using to see if they also contain acetaminophen, or ask your doctor or pharmacist. Acetaminophen can cause liver damage if not taken correctly.     Take your medicine as directed.  Contact your healthcare provider if you think your medicine is not helping or if you have side effects. Tell your provider if you are allergic to any medicine. Keep a list of the medicines, vitamins, and herbs you take. Include the amounts, and when and why you take them. Bring the list or the pill bottles to follow-up visits. Carry your medicine list with you in case of an emergency.     Self-care:   Rest as much as possible.  Slowly start to do more each day.     Drink more liquids as directed.  Liquids will help thin and loosen mucus so you can cough it up. Liquids will also help prevent dehydration. Liquids that help prevent dehydration include water, fruit juice, and broth. Do not drink liquids that contain caffeine. Caffeine can increase your risk for dehydration.  Ask your healthcare provider how much liquid to drink each day.     Soothe a sore throat.  Gargle with warm salt water. Make salt water by dissolving ¼ teaspoon salt in 1 cup warm water. You may also suck on hard candy or throat lozenges. You may use a sore throat spray.     Use a humidifier or vaporizer.  Use a cool mist humidifier or a vaporizer to increase air moisture in your home. This may make it easier for you to breathe and help decrease your cough.     Use saline nasal drops as directed.  These help relieve congestion.     Apply petroleum-based jelly around the outside of your nostrils.  This can decrease irritation from blowing your nose.     Do not smoke.  Nicotine and other chemicals in cigarettes and cigars can make your symptoms worse. They can also cause infections such as bronchitis or pneumonia. Ask your healthcare provider for information if you currently smoke and need help to quit. E-cigarettes or smokeless tobacco still contain nicotine. Talk to your healthcare provider before you use these products.     Prevent a cold:   Wash your hands often.  Use soap and water every time you wash your hands. Rub your soapy hands together, lacing your fingers. Use the fingers of one hand to scrub under the nails of the other hand. Wash for at least 20 seconds. Rinse with warm, running water for several seconds. Then dry your hands. Use hand  gel if soap and water are not available. Do not touch your eyes or mouth without washing your hands first.          Cover a sneeze or cough.  Use a tissue that covers your mouth and nose. Put the used tissue in the trash right away. Use the bend of your arm if a tissue is not available. Wash your hands well with soap and water or use a hand . Do not stand close to anyone who is sneezing or coughing.     Try to stay away from others while you are sick.  This is especially important during the first 2 to 3 days when the virus is more easily spread. Wait  until a fever, cough, or other symptoms are gone before you return to work or other regular activities.     Do not share items while you are sick.  This includes food, drinks, eating utensils, and dishes.     Follow up with your doctor as directed:  Write down your questions so you remember to ask them during your visits.  © Copyright [employer] 2023 Information is for End User's use only and may not be sold, redistributed or otherwise used for commercial purposes.  The above information is an  only. It is not intended as medical advice for individual conditions or treatments. Talk to your doctor, nurse or pharmacist before following any medical regimen to see if it is safe and effective for you.       Follow up with PCP in 3-5 days.  Proceed to  ER if symptoms worsen.    Chief Complaint     Chief Complaint   Patient presents with    Cough     Cough, congestion since Sunday         History of Present Illness       53 year old male with PMH significant for asthma, diabetes mellitus, and recent diagnosis of and hospitalization for vestibular migraines, who presents for evaluation of cough since Sunday. He reports some blood streaked sputum and chills, and notes that the cough is aggravating his headache. He also notes a sore throat. He took an at home COVID test which was negative. He denies chest pain, palpitations, SOB or SARABIA; although he does endorse wheezing at times. He had a virtual visit today and was diagnosed with a viral URI, and was referred to urgent care due to audible wheezing and recommended to get a chest x-ray per patient report.         Review of Systems   Review of Systems   Constitutional:  Positive for chills. Negative for fatigue and fever.   HENT:  Positive for congestion. Negative for ear discharge, ear pain, postnasal drip, rhinorrhea, sinus pressure, sinus pain, sneezing and sore throat.    Eyes: Negative.  Negative for pain, discharge, redness and itching.   Respiratory:   Positive for cough. Negative for apnea, choking, chest tightness, shortness of breath, wheezing and stridor.    Cardiovascular: Negative.  Negative for chest pain and palpitations.   Gastrointestinal: Negative.  Negative for diarrhea, nausea and vomiting.   Endocrine: Negative.  Negative for polydipsia, polyphagia and polyuria.   Genitourinary: Negative.  Negative for decreased urine volume and flank pain.   Musculoskeletal: Negative.  Negative for arthralgias, back pain, myalgias, neck pain and neck stiffness.   Skin: Negative.  Negative for color change and rash.   Allergic/Immunologic: Negative.  Negative for environmental allergies.   Neurological:  Positive for headaches. Negative for dizziness, facial asymmetry, light-headedness and numbness.   Hematological:  Positive for adenopathy.   Psychiatric/Behavioral: Negative.           Current Medications       Current Outpatient Medications:     albuterol (ProAir HFA) 90 mcg/act inhaler, Inhale 2 puffs every 6 (six) hours as needed for wheezing, Disp: 8.5 g, Rfl: 0    Ascorbic Acid (vitamin C) 1000 MG tablet, Take 1,000 mg by mouth daily  , Disp: , Rfl:     baclofen 20 mg tablet, Take 1 tablet (20 mg total) by mouth daily at bedtime as needed for muscle spasms, Disp: 90 tablet, Rfl: 1    benzonatate (TESSALON PERLES) 100 mg capsule, Take 1 capsule (100 mg total) by mouth 3 (three) times a day as needed for cough, Disp: 20 capsule, Rfl: 0    cetirizine (ZyrTEC) 10 mg tablet, Take 1 tablet (10 mg total) by mouth daily, Disp: 90 tablet, Rfl: 0    cyanocobalamin 1,000 mcg/mL, Inject 1 mL (1,000 mcg total) into a muscle every 7 days for 9 doses, Disp: 1 mL, Rfl: 0    famotidine (PEPCID) 20 mg tablet, Take 1 tablet (20 mg total) by mouth daily, Disp: 90 tablet, Rfl: 1    fluticasone (FLONASE) 50 mcg/act nasal spray, 2 sprays into each nostril daily, Disp: 18 mL, Rfl: 1    lisinopril-hydrochlorothiazide (PRINZIDE,ZESTORETIC) 20-12.5 MG per tablet, Take 2 tablets by mouth  daily, Disp: 180 tablet, Rfl: 1    meclizine (ANTIVERT) 25 mg tablet, Take 1 tablet (25 mg total) by mouth 3 (three) times a day as needed for dizziness, Disp: 30 tablet, Rfl: 0    metFORMIN (GLUCOPHAGE) 500 mg tablet, Pt takes 1500 mg (3 tablets) by mouth in the morning, and a 1000 mg (2 tablets) in the evening, Disp: 450 tablet, Rfl: 1    Multiple Vitamins-Minerals (CENTRUM SILVER 50+MEN PO), Take by mouth, Disp: , Rfl:     simvastatin (ZOCOR) 20 mg tablet, Take 1 tablet (20 mg total) by mouth daily, Disp: 30 tablet, Rfl: 0    sitaGLIPtin (JANUVIA) 100 mg tablet, Take 1 tablet (100 mg total) by mouth daily, Disp: 90 tablet, Rfl: 1    SUMAtriptan (IMITREX) 50 mg tablet, Take 1 tablet (50 mg total) by mouth once as needed for migraine for up to 1 dose may repeat in 2 hours if necessary, Disp: 10 tablet, Rfl: 5    topiramate (TOPAMAX) 25 mg tablet, Take 25 mg daily for 7 days starting 04/25/2024  Take 50 mg daily for 7 days starting 05/02/2024  take 75 mg daily for 7 days starting 05/09/2024  Take 100 mg daily starting 05/16/2024 Do not start before April 28, 2024., Disp: 130 tablet, Rfl: 0    CINNAMON PO, Take by mouth, Disp: , Rfl:     diphenhydrAMINE-acetaminophen (TYLENOL PM)  MG TABS, Take 2 tablets by mouth daily at bedtime as needed for sleep 1000 mg in total (Patient not taking: Reported on 5/15/2024), Disp: , Rfl:     methylPREDNISolone 4 MG tablet therapy pack, Use as directed on package (Patient not taking: Reported on 5/15/2024), Disp: 1 each, Rfl: 0    omega-3-acid ethyl esters (LOVAZA) 1 g capsule, Take 1 g by mouth daily (Patient not taking: Reported on 5/15/2024), Disp: , Rfl:     omeprazole (PriLOSEC) 40 MG capsule, Take 1 capsule (40 mg total) by mouth daily (Patient taking differently: Take 40 mg by mouth daily Still has to start), Disp: 90 capsule, Rfl: 3    polyethylene glycol (MiraLax) 17 g packet, Take 17 g by mouth daily (Patient not taking: Reported on 5/15/2024), Disp: 90 each, Rfl:  5    pregabalin (LYRICA) 75 mg capsule, Take 2 capsules (150 mg total) by mouth daily at bedtime (Patient taking differently: Take 150 mg by mouth daily at bedtime Bedtime), Disp: 540 capsule, Rfl: 0    simvastatin (ZOCOR) 20 mg tablet, Take 1 tablet (20 mg total) by mouth daily at bedtime, Disp: 90 tablet, Rfl: 1    Current Facility-Administered Medications:     cyanocobalamin injection 1,000 mcg, 1,000 mcg, Intramuscular, Q30 Days, MIKHAIL Ha, 1,000 mcg at 05/13/24 0937    Current Allergies     Allergies as of 05/15/2024    (No Known Allergies)            The following portions of the patient's history were reviewed and updated as appropriate: allergies, current medications, past family history, past medical history, past social history, past surgical history and problem list.     Past Medical History:   Diagnosis Date    Achalasia     Aneurysm (HCC)     aortic    Asthma     Childhood    Colon polyp     Diabetes mellitus (HCC)     Esophageal ulcer     Fatty liver     Groin abscess     with I &D    Hyperlipidemia     Hypertension     Plantar fasciitis        Past Surgical History:   Procedure Laterality Date    ANTERIOR CERVICAL DISCECTOMY W/ FUSION      APPENDECTOMY      BICEPS TENDON REPAIR      EGD AND COLONOSCOPY      FL INJECTION LEFT WRIST (ARTHROGRAM)  11/9/2021    FL MYELOGRAM CERVICAL  6/13/2018    MENISCECTOMY      MYOTOMY HELLER LAPAROSCOPIC      ORTHOPEDIC SURGERY         Family History   Problem Relation Age of Onset    No Known Problems Mother     No Known Problems Father          Medications have been verified.        Objective   /93   Pulse 77   Temp 97.9 °F (36.6 °C) (Temporal)   Resp 18   SpO2 97%        Physical Exam     Physical Exam  Vitals reviewed.   Constitutional:       General: He is not in acute distress.     Appearance: Normal appearance. He is not ill-appearing, toxic-appearing or diaphoretic.      Interventions: He is not intubated.  HENT:      Head:  Normocephalic and atraumatic.      Right Ear: Tympanic membrane, ear canal and external ear normal. There is no impacted cerumen.      Left Ear: Tympanic membrane, ear canal and external ear normal. There is no impacted cerumen.      Nose: Nose normal. No congestion or rhinorrhea.      Mouth/Throat:      Mouth: Mucous membranes are moist.      Pharynx: Oropharynx is clear. Uvula midline. No pharyngeal swelling, oropharyngeal exudate, posterior oropharyngeal erythema or uvula swelling.      Tonsils: No tonsillar exudate or tonsillar abscesses. 1+ on the right. 1+ on the left.   Eyes:      Extraocular Movements: Extraocular movements intact.      Conjunctiva/sclera: Conjunctivae normal.      Pupils: Pupils are equal, round, and reactive to light.   Neck:      Thyroid: No thyroid mass, thyromegaly or thyroid tenderness.   Cardiovascular:      Rate and Rhythm: Normal rate and regular rhythm.      Pulses: Normal pulses.      Heart sounds: Normal heart sounds, S1 normal and S2 normal. Heart sounds not distant. No murmur heard.     No friction rub. No gallop.   Pulmonary:      Effort: Pulmonary effort is normal. No tachypnea, bradypnea, accessory muscle usage, prolonged expiration, respiratory distress or retractions. He is not intubated.      Breath sounds: No stridor, decreased air movement or transmitted upper airway sounds. Examination of the left-lower field reveals rales. Rales present. No decreased breath sounds, wheezing or rhonchi.      Comments: Dry, hacking cough on exam  Chest:      Chest wall: No tenderness.   Musculoskeletal:         General: Normal range of motion.      Cervical back: Full passive range of motion without pain, normal range of motion and neck supple. No rigidity or tenderness. No spinous process tenderness or muscular tenderness. Normal range of motion.   Lymphadenopathy:      Cervical: Cervical adenopathy present.      Right cervical: Superficial cervical adenopathy present.      Left  cervical: Superficial cervical adenopathy present.   Skin:     General: Skin is warm and dry.      Capillary Refill: Capillary refill takes less than 2 seconds.      Findings: No erythema.   Neurological:      General: No focal deficit present.      Mental Status: He is alert.   Psychiatric:         Mood and Affect: Mood normal.

## 2024-05-15 NOTE — PATIENT INSTRUCTIONS
Chest x-ray reviewed, no acute abnormality noted, awaiting official read.   Rapid strep performed in office found to be negative, throat culture results pending and should be available in MyChart within 48 hours.  COVID/flu cultures pending, results should be available in MyChart within 24 hours.  Please begin inhaler and Tessalon Perles as directed.   Continue migraine treatment as directed.   Present to ED if symptoms worsen.

## 2024-05-16 ENCOUNTER — OFFICE VISIT (OUTPATIENT)
Dept: PHYSICAL THERAPY | Facility: CLINIC | Age: 54
End: 2024-05-16
Payer: COMMERCIAL

## 2024-05-16 ENCOUNTER — TELEPHONE (OUTPATIENT)
Dept: CARDIOLOGY CLINIC | Facility: CLINIC | Age: 54
End: 2024-05-16

## 2024-05-16 DIAGNOSIS — R42 DIZZINESS: ICD-10-CM

## 2024-05-16 DIAGNOSIS — Z74.09 IMPAIRED FUNCTIONAL MOBILITY, BALANCE, GAIT, AND ENDURANCE: Primary | ICD-10-CM

## 2024-05-16 LAB
FLUAV RNA RESP QL NAA+PROBE: NEGATIVE
FLUBV RNA RESP QL NAA+PROBE: NEGATIVE
SARS-COV-2 RNA RESP QL NAA+PROBE: NEGATIVE

## 2024-05-16 PROCEDURE — 97112 NEUROMUSCULAR REEDUCATION: CPT

## 2024-05-16 NOTE — TELEPHONE ENCOUNTER
He was ordered Nurtec at the hospital, I would prefer him to start that medication not sure if prior auth was ever started, I placed him on Sumatriptan in the meantime.   Also I would suggest that he is evaluated for his cold like symptoms.

## 2024-05-16 NOTE — PROGRESS NOTES
"Daily Note     Today's date: 2024  Patient name: Jeremy George  : 1970  MRN: [id number]  Referring provider: Cici Austin CR*  Dx:   No diagnosis found.                     Subjective: reports he has had a headache since Friday last week. States that starting on  he started having some congestion and cough. Went to urgent care and was diagnosed with an upper respiratory infection. Has not had a fever, and COVID test was negative at home.       Objective: See treatment diary below    Assessment: Tolerated treatment fair. Patient would benefit from continued PT. Arrived to therapy session without FWW and demonstrated significant improvement with balance compared to previous sessions. Session focused on exercises without significant visual stimulation without significant increase in headache symptoms. Demonstrates mild increase in dizziness with exercises during session, and educated on importance of allowing symptoms to return to baseline. Added additional balance exercises with tandem walking and step taps with good control. Tends to lose balance forward with eyes closed situations. Continue to progress as tolerated.      Plan: Continue per plan of care.      Precautions: HTN. Thoracic aneurysm, asthma, c-spine fusion    POC expires Unit limit Auth Expiration date PT/OT/ST + Visit Limit?    BOMN N/a 90 PT/OT/SLP                           Visit/Unit Tracking  AUTH Status:  Date 4/29 5/1 5/6 5/10 5/16         None Used 1 2 3 4 5          Remaining                   Manuals  5/6 5/10 5/16      Coordination testing  Positional testing   Balance testing (TUG, 5xSTS, Kiser) See subjective                                       Neuro Re-Ed          VOR x 1 seated X 10 horiztonal Seated VOR x 1 30\" x 2 each direction        Imaginary targets X 3 each direction X 5 each direction R/L/up/down        Foam EC   Firm EC HT normal JUAN 30\" x 3, with UE support prn, HN 30\" x 2 reps Foam EO 30\" x 3 with " "NBOS    Firm EC HT/HN 30\" x 2 each      Standing circles weight shifting  x 10 each direction EO, EC x 5 each direction with 2-finger support X 10 each direction with EC no UE support X 10 each direction with EO no UE support, EC x 10 each      Spot it saccades   Seated horizontal x 6 cards       NBOS  EO 30\"  EC 5\"        tandem    // bars (short) x 3 laps      Step taps    6-inch x 20 each no UEsupport                          Ther Ex                                                            Ther Activity                              Gait Training                              Education Examination findings, use of ice to control nausea symptoms, symptoms of BPPV, vestibular migraine, follow up with neurology Low tyramine diet, headache triggers, keeping a headache journal, balance systems, decreased visual dependence Migraine symptoms, increase with visual symptoms Migraine symptoms, role of visual symptoms with migraine                               "

## 2024-05-17 ENCOUNTER — HOSPITAL ENCOUNTER (OUTPATIENT)
Dept: RADIOLOGY | Age: 54
Discharge: HOME | End: 2024-05-17
Payer: COMMERCIAL

## 2024-05-17 DIAGNOSIS — R42 VERTIGO: ICD-10-CM

## 2024-05-17 DIAGNOSIS — R42 DIZZINESS: ICD-10-CM

## 2024-05-17 LAB — BACTERIA THROAT CULT: NORMAL

## 2024-05-17 PROCEDURE — 70480 CT ORBIT/EAR/FOSSA W/O DYE: CPT

## 2024-05-20 ENCOUNTER — TELEPHONE (OUTPATIENT)
Age: 54
End: 2024-05-20

## 2024-05-20 ENCOUNTER — OFFICE VISIT (OUTPATIENT)
Age: 54
End: 2024-05-20
Payer: COMMERCIAL

## 2024-05-20 VITALS
SYSTOLIC BLOOD PRESSURE: 150 MMHG | HEART RATE: 80 BPM | OXYGEN SATURATION: 97 % | HEIGHT: 73 IN | BODY MASS INDEX: 34.75 KG/M2 | WEIGHT: 262.2 LBS | DIASTOLIC BLOOD PRESSURE: 80 MMHG | TEMPERATURE: 98.1 F

## 2024-05-20 DIAGNOSIS — J06.9 VIRAL UPPER RESPIRATORY TRACT INFECTION: ICD-10-CM

## 2024-05-20 DIAGNOSIS — E53.8 DEFICIENCY OF OTHER SPECIFIED B GROUP VITAMINS: ICD-10-CM

## 2024-05-20 DIAGNOSIS — I10 HYPERTENSION, ESSENTIAL: ICD-10-CM

## 2024-05-20 DIAGNOSIS — R42 DIZZINESS: Primary | ICD-10-CM

## 2024-05-20 DIAGNOSIS — R51.9 ACUTE NONINTRACTABLE HEADACHE, UNSPECIFIED HEADACHE TYPE: ICD-10-CM

## 2024-05-20 DIAGNOSIS — I71.20 THORACIC AORTIC ANEURYSM WITHOUT RUPTURE, UNSPECIFIED PART (HCC): ICD-10-CM

## 2024-05-20 DIAGNOSIS — M54.12 CERVICAL RADICULOPATHY: ICD-10-CM

## 2024-05-20 PROCEDURE — 99214 OFFICE O/P EST MOD 30 MIN: CPT | Performed by: NURSE PRACTITIONER

## 2024-05-20 PROCEDURE — 96372 THER/PROPH/DIAG INJ SC/IM: CPT

## 2024-05-20 PROCEDURE — 99913: CPT | Performed by: NURSE PRACTITIONER

## 2024-05-20 RX ORDER — PREDNISONE 20 MG/1
40 TABLET ORAL DAILY
Qty: 10 TABLET | Refills: 0 | Status: SHIPPED | OUTPATIENT
Start: 2024-05-20 | End: 2024-05-25

## 2024-05-20 RX ADMIN — CYANOCOBALAMIN 1000 MCG: 1000 INJECTION, SOLUTION INTRAMUSCULAR; SUBCUTANEOUS at 14:55

## 2024-05-20 NOTE — TELEPHONE ENCOUNTER
Prior authorization was submitted on 5/1. Please  call follow up was this medication denied or approved? Do not see letter in media.   negative - No discharge, No redness

## 2024-05-20 NOTE — PROGRESS NOTES
Assessment/Plan:    Hypertension, essential  Well-controlled on current medication  Continue on lisinopril-hydrochlorothiazide    Viral upper respiratory tract infection  -Advised to continue with antihistamine and Flonase  -Will start prednisone  -Continue to follow with ENT    Cervical radiculopathy  -Continue to follow with spine and pain management  -Recommend follow-up with neurosurgery    Headache  Occipital nerve block completed while inpatient with significant relief  He will continue on Topamax and sumatriptan  Advised to try to obtain a sooner appointment with neurology    Dizziness  High suspicion for peripheral vestibulopathy provoked by position change, continue with PT OT try to obtain a sooner appointment continue migraine medications, meclizine as needed, continue Zyrtec and  Flonase  Follow up after neurology consult               Diagnoses and all orders for this visit:    Dizziness  -     Ambulatory Referral to Neurology; Future  -     predniSONE 20 mg tablet; Take 2 tablets (40 mg total) by mouth daily for 5 days    Thoracic aortic aneurysm without rupture, unspecified part (HCC)    Hypertension, essential    Viral upper respiratory tract infection    Cervical radiculopathy    Acute nonintractable headache, unspecified headache type          Subjective:      Patient ID: Jeremy George is a 53 y.o. male.    Patient presents today to follow-up on dizziness and headache.  5/20/24: He reports that he was seen at urgent care on 5/15 for upper respiratory infection he tested negative for flu and COVID.  Patient reports wheezing and a dry cough.  For which she has been taking Mucinex for.  He reports on and off dizzy spells, but dizziness has significantly improved with intermittent bouts, he reports that at times they come on so intense that he feels unsteady.  He has been taking Flonase and antihistamine, and continues with physical therapy.  He also reports new symptoms of taste and smell being off  "since his dizziness spells have started.  He reports that food does not taste great and gets foul-smelling odors.  Disability forms filled out while in office today    In regards to his headaches he reports that he gets a headache approximately 3 times a week at 3 out of 10 pain, controlled with sumatriptan and Topamax      Saw ENT 5/17  Had CT:  OTHER FINDINGS: Visualized brain, globes and orbits, face and neck soft tissues are within normal limits. Paranasal sinuses are well aerated.     IMPRESSION:     Normal appearance of the temporal bones.    5/6/24:  At last office visit patient was started on antihistamine Flonase and as needed meclizine, Nurtec was not covered by insurance and he was started on sumatriptan hand in addition to Topamax  He continues with physical therapy, was unable to obtain a sooner appointment with neurology, has appointment scheduled with ENT on May 9    Headaches: had a headache on Saturday took sumatriptan x2 with no relief, then took ibuprofen and the headache was relieved          Details of last office visit:  Hospital Course as per Dr. Stacy:   \"Jeremy George is a 53 y.o. male patient who originally presented to the hospital on 4/23/2024 due to a stroke alert for persistent dizziness. The patient has a past medical history of HTN,, cervical radiculopathy status post ACDF, suspected unspecified tremors, and migraines.  The patient states they recently traveled to [place] and believes that he may have gotten a bug bite which led to an becoming dizzy.  The patient reports that his dizziness as swaying from side-to-side.  The patient reports this usually occurred while standing.  The patient states that the dizziness can last a few minutes and then it was a the morning of presentation the patient had woken up in his normal state and around 7:20 AM while at work he was stretching his back but attributes to the send he developed severe dizziness described as swaying. Nothing made " the dizziness better, looking to the right mid the dizziness worse.  The patient was unable to ambulate and he fell like he was examined to the right.  He had developed a left frontal/parietal headache and nausea.  Headache was similar to his prior headaches.  The patient did report a history of vertigo 20 years prior which at that time was far worse than what he is experiencing at this time.  Patient was brought to the ED as a stroke alert on the 23rd.  NIH stroke scale is 0, blood pressure was 166/94, CT head was negative for any acute intracranial abnormalities, CTA head and neck was negative for any LVO, dissection, aneurysm or high-grade stenosis.  The patient did have some nystagmus to the right did have 6 polyps with heavy assessment.  No skew deviation.  Patient will not TNK candidate medial initially presented [facility] and higher suspicion for peripheral vestibulopathy.  The patient did develop severe chest pain radiating to his neck and right jaw and he had a cardiac workup while in the ED.  The patient denied any hearing loss tinnitus or recent illnesses.  The patient was admitted to the hospital.  Throughout her stay the patient received migraine cocktails every 8 hours.  The first day he did receive 4 g of magnesium.  Subsequent to the rated 2 days to give him 2 g of magnesium daily.  The patient was continued on migraine cocktail and then Nurtec was also added.  The patient did well on the Nurtec and reported that it helped.  On 4/26/2024 and occipital nerve block; which the patient reported significantly helped him.  Open note the patient did have a vitamin B12 deficiency for which we started him on replenishment via weekly B12 injections.  The patient will continue the weekly B12 injections with his primary care doctor as his wife is going to transport him there weekly.  The patient's headache resolved to a 0-1/10 on 4/27/2024.  The patient's dizziness persisted but he reported that it has significantly  "improved.  The patient was ready for discharge home.  Will discharge the patient home with a follow-up outpatient with physical therapy for vestibular therapy.\"    He reports that his headaches have been intermittent, and has improved     Has been using the walker due to the dizziness     His BP has been in control, he has been monitoring at home -140s    He presents today with his son, as he is having trouble ambulating due to the dizziness             The following portions of the patient's history were reviewed and updated as appropriate: allergies, current medications, past family history, past medical history, past social history, past surgical history, and problem list.    Review of Systems   Constitutional:  Negative for activity change, appetite change, chills, diaphoresis and fever.   HENT:  Positive for congestion. Negative for ear discharge, ear pain, postnasal drip, rhinorrhea, sinus pressure, sinus pain and sore throat.    Eyes:  Negative for pain, discharge, itching and visual disturbance.   Respiratory:  Positive for cough. Negative for chest tightness, shortness of breath and wheezing.    Cardiovascular:  Negative for chest pain, palpitations and leg swelling.   Gastrointestinal:  Negative for abdominal pain, constipation, diarrhea, nausea and vomiting.   Endocrine: Negative for polydipsia, polyphagia and polyuria.   Genitourinary:  Negative for difficulty urinating, dysuria and urgency.   Musculoskeletal:  Negative for arthralgias, back pain and neck pain.   Skin:  Negative for rash and wound.   Neurological:  Positive for dizziness and headaches. Negative for weakness and numbness.         Past Medical History:   Diagnosis Date    Achalasia     Aneurysm (HCC)     aortic    Asthma     Childhood    Colon polyp     Diabetes mellitus (HCC)     Esophageal ulcer     Fatty liver     Groin abscess     with I &D    Hyperlipidemia     Hypertension     Plantar fasciitis          Current Outpatient " Medications:     albuterol (ProAir HFA) 90 mcg/act inhaler, Inhale 2 puffs every 6 (six) hours as needed for wheezing, Disp: 8.5 g, Rfl: 0    Ascorbic Acid (vitamin C) 1000 MG tablet, Take 1,000 mg by mouth daily  , Disp: , Rfl:     baclofen 20 mg tablet, Take 1 tablet (20 mg total) by mouth daily at bedtime as needed for muscle spasms, Disp: 90 tablet, Rfl: 1    benzonatate (TESSALON PERLES) 100 mg capsule, Take 1 capsule (100 mg total) by mouth 3 (three) times a day as needed for cough, Disp: 20 capsule, Rfl: 0    cetirizine (ZyrTEC) 10 mg tablet, Take 1 tablet (10 mg total) by mouth daily, Disp: 90 tablet, Rfl: 0    CINNAMON PO, Take by mouth, Disp: , Rfl:     cyanocobalamin 1,000 mcg/mL, Inject 1 mL (1,000 mcg total) into a muscle every 7 days for 9 doses, Disp: 1 mL, Rfl: 0    famotidine (PEPCID) 20 mg tablet, Take 1 tablet (20 mg total) by mouth daily, Disp: 90 tablet, Rfl: 1    fluticasone (FLONASE) 50 mcg/act nasal spray, 2 sprays into each nostril daily, Disp: 18 mL, Rfl: 1    lisinopril-hydrochlorothiazide (PRINZIDE,ZESTORETIC) 20-12.5 MG per tablet, Take 2 tablets by mouth daily, Disp: 180 tablet, Rfl: 1    meclizine (ANTIVERT) 25 mg tablet, Take 1 tablet (25 mg total) by mouth 3 (three) times a day as needed for dizziness, Disp: 30 tablet, Rfl: 0    metFORMIN (GLUCOPHAGE) 500 mg tablet, Pt takes 1500 mg (3 tablets) by mouth in the morning, and a 1000 mg (2 tablets) in the evening, Disp: 450 tablet, Rfl: 1    Multiple Vitamins-Minerals (CENTRUM SILVER 50+MEN PO), Take by mouth, Disp: , Rfl:     omeprazole (PriLOSEC) 40 MG capsule, Take 1 capsule (40 mg total) by mouth daily (Patient taking differently: Take 40 mg by mouth daily Still has to start), Disp: 90 capsule, Rfl: 3    predniSONE 20 mg tablet, Take 2 tablets (40 mg total) by mouth daily for 5 days, Disp: 10 tablet, Rfl: 0    pregabalin (LYRICA) 75 mg capsule, Take 2 capsules (150 mg total) by mouth daily at bedtime (Patient taking differently: Take  150 mg by mouth daily at bedtime Bedtime), Disp: 540 capsule, Rfl: 0    simvastatin (ZOCOR) 20 mg tablet, Take 1 tablet (20 mg total) by mouth daily, Disp: 30 tablet, Rfl: 0    sitaGLIPtin (JANUVIA) 100 mg tablet, Take 1 tablet (100 mg total) by mouth daily, Disp: 90 tablet, Rfl: 1    SUMAtriptan (IMITREX) 50 mg tablet, Take 1 tablet (50 mg total) by mouth once as needed for migraine for up to 1 dose may repeat in 2 hours if necessary, Disp: 10 tablet, Rfl: 5    topiramate (TOPAMAX) 25 mg tablet, Take 25 mg daily for 7 days starting 04/25/2024  Take 50 mg daily for 7 days starting 05/02/2024  take 75 mg daily for 7 days starting 05/09/2024  Take 100 mg daily starting 05/16/2024 Do not start before April 28, 2024., Disp: 130 tablet, Rfl: 0    diphenhydrAMINE-acetaminophen (TYLENOL PM)  MG TABS, Take 2 tablets by mouth daily at bedtime as needed for sleep 1000 mg in total (Patient not taking: Reported on 5/15/2024), Disp: , Rfl:     omega-3-acid ethyl esters (LOVAZA) 1 g capsule, Take 1 g by mouth daily (Patient not taking: Reported on 5/15/2024), Disp: , Rfl:     polyethylene glycol (MiraLax) 17 g packet, Take 17 g by mouth daily (Patient not taking: Reported on 5/15/2024), Disp: 90 each, Rfl: 5    simvastatin (ZOCOR) 20 mg tablet, Take 1 tablet (20 mg total) by mouth daily at bedtime (Patient not taking: Reported on 5/20/2024), Disp: 90 tablet, Rfl: 1    Current Facility-Administered Medications:     cyanocobalamin injection 1,000 mcg, 1,000 mcg, Intramuscular, Q30 Days, MIKHAIL Ha, 1,000 mcg at 05/20/24 1455    No Known Allergies    Social History   Past Surgical History:   Procedure Laterality Date    ANTERIOR CERVICAL DISCECTOMY W/ FUSION      APPENDECTOMY      BICEPS TENDON REPAIR      EGD AND COLONOSCOPY      FL INJECTION LEFT WRIST (ARTHROGRAM)  11/9/2021    FL MYELOGRAM CERVICAL  6/13/2018    MENISCECTOMY      MYOTOMY HELLER LAPAROSCOPIC      ORTHOPEDIC SURGERY       Family History  "  Problem Relation Age of Onset    No Known Problems Mother     No Known Problems Father        Objective:  /80 (BP Location: Left arm, Patient Position: Sitting, Cuff Size: Standard)   Pulse 80   Temp 98.1 °F (36.7 °C) (Temporal)   Ht 6' 0.84\" (1.85 m)   Wt 119 kg (262 lb 3.2 oz)   SpO2 97%   BMI 34.75 kg/m²     Recent Results (from the past 1344 hour(s))   Fingerstick Glucose (POCT)    Collection Time: 04/23/24  8:27 AM   Result Value Ref Range    POC Glucose 199 (H) 65 - 140 mg/dl   ECG 12 lead    Collection Time: 04/23/24 10:05 AM   Result Value Ref Range    Ventricular Rate 66 BPM    Atrial Rate 66 BPM    NJ Interval 122 ms    QRSD Interval 92 ms    QT Interval 396 ms    QTC Interval 415 ms    P Axis 1 degrees    QRS Axis 244 degrees    T Wave Axis 37 degrees   CBC and differential    Collection Time: 04/23/24 10:23 AM   Result Value Ref Range    WBC 5.94 4.31 - 10.16 Thousand/uL    RBC 4.86 3.88 - 5.62 Million/uL    Hemoglobin 15.0 12.0 - 17.0 g/dL    Hematocrit 44.6 36.5 - 49.3 %    MCV 92 82 - 98 fL    MCH 30.9 26.8 - 34.3 pg    MCHC 33.6 31.4 - 37.4 g/dL    RDW 11.9 11.6 - 15.1 %    MPV 10.7 8.9 - 12.7 fL    Platelets 173 149 - 390 Thousands/uL    nRBC 0 /100 WBCs    Segmented % 50 43 - 75 %    Immature Grans % 1 0 - 2 %    Lymphocytes % 32 14 - 44 %    Monocytes % 14 (H) 4 - 12 %    Eosinophils Relative 2 0 - 6 %    Basophils Relative 1 0 - 1 %    Absolute Neutrophils 3.03 1.85 - 7.62 Thousands/µL    Absolute Immature Grans 0.04 0.00 - 0.20 Thousand/uL    Absolute Lymphocytes 1.92 0.60 - 4.47 Thousands/µL    Absolute Monocytes 0.80 0.17 - 1.22 Thousand/µL    Eosinophils Absolute 0.10 0.00 - 0.61 Thousand/µL    Basophils Absolute 0.05 0.00 - 0.10 Thousands/µL   Comprehensive metabolic panel    Collection Time: 04/23/24 10:23 AM   Result Value Ref Range    Sodium 136 135 - 147 mmol/L    Potassium 4.4 3.5 - 5.3 mmol/L    Chloride 101 96 - 108 mmol/L    CO2 27 21 - 32 mmol/L    ANION GAP 8 4 - 13 " "mmol/L    BUN 16 5 - 25 mg/dL    Creatinine 0.79 0.60 - 1.30 mg/dL    Glucose 138 65 - 140 mg/dL    Calcium 9.5 8.4 - 10.2 mg/dL    AST 34 13 - 39 U/L    ALT 72 (H) 7 - 52 U/L    Alkaline Phosphatase 65 34 - 104 U/L    Total Protein 6.9 6.4 - 8.4 g/dL    Albumin 4.4 3.5 - 5.0 g/dL    Total Bilirubin 0.51 0.20 - 1.00 mg/dL    eGFR 101 ml/min/1.73sq m   HS Troponin 0hr (reflex protocol)    Collection Time: 04/23/24 10:23 AM   Result Value Ref Range    hs TnI 0hr 4 \"Refer to ACS Flowchart\"- see link ng/L   Protime-INR    Collection Time: 04/23/24 10:23 AM   Result Value Ref Range    Protime 12.9 11.6 - 14.5 seconds    INR 0.98 0.84 - 1.19   APTT    Collection Time: 04/23/24 10:23 AM   Result Value Ref Range    PTT 26 23 - 37 seconds   Lyme Total AB W Reflex to IGM/IGG    Collection Time: 04/23/24 10:51 AM   Result Value Ref Range    Lyme Total Antibodies Negative Negative   FLU/RSV/COVID - if FLU/RSV clinically relevant    Collection Time: 04/23/24 11:25 AM    Specimen: Nose; Nares   Result Value Ref Range    SARS-CoV-2 Negative Negative    INFLUENZA A PCR Negative Negative    INFLUENZA B PCR Negative Negative    RSV PCR Negative Negative   ECG 12 lead    Collection Time: 04/23/24 11:33 AM   Result Value Ref Range    Ventricular Rate 68 BPM    Atrial Rate 68 BPM    FL Interval 170 ms    QRSD Interval 100 ms    QT Interval 394 ms    QTC Interval 418 ms    P Axis 45 degrees    QRS Axis 236 degrees    T Wave Ethan 54 degrees   HS Troponin I 2hr    Collection Time: 04/23/24 12:05 PM   Result Value Ref Range    hs TnI 2hr 3 \"Refer to ACS Flowchart\"- see link ng/L    Delta 2hr hsTnI -1 <20 ng/L   Fingerstick Glucose (POCT)    Collection Time: 04/23/24  4:55 PM   Result Value Ref Range    POC Glucose 121 65 - 140 mg/dl   HS Troponin I 4hr    Collection Time: 04/23/24  5:18 PM   Result Value Ref Range    hs TnI 4hr 5 \"Refer to ACS Flowchart\"- see link ng/L    Delta 4hr hsTnI 1 <20 ng/L   Fingerstick Glucose (POCT)    Collection " Time: 04/23/24  8:54 PM   Result Value Ref Range    POC Glucose 177 (H) 65 - 140 mg/dl   Fingerstick Glucose (POCT)    Collection Time: 04/24/24  6:27 AM   Result Value Ref Range    POC Glucose 179 (H) 65 - 140 mg/dl   Vitamin B12    Collection Time: 04/24/24 10:42 AM   Result Value Ref Range    Vitamin B-12 237 180 - 914 pg/mL   Hemoglobin A1C    Collection Time: 04/24/24 10:42 AM   Result Value Ref Range    Hemoglobin A1C 6.8 (H) Normal 4.0-5.6%; PreDiabetic 5.7-6.4%; Diabetic >=6.5%; Glycemic control for adults with diabetes <7.0% %     mg/dl   Fingerstick Glucose (POCT)    Collection Time: 04/24/24 10:55 AM   Result Value Ref Range    POC Glucose 169 (H) 65 - 140 mg/dl   Fingerstick Glucose (POCT)    Collection Time: 04/24/24  4:24 PM   Result Value Ref Range    POC Glucose 216 (H) 65 - 140 mg/dl   Fingerstick Glucose (POCT)    Collection Time: 04/24/24  9:32 PM   Result Value Ref Range    POC Glucose 150 (H) 65 - 140 mg/dl   Fingerstick Glucose (POCT)    Collection Time: 04/25/24  5:59 AM   Result Value Ref Range    POC Glucose 194 (H) 65 - 140 mg/dl   Lipase    Collection Time: 04/25/24  9:50 AM   Result Value Ref Range    Lipase 83 (H) 11 - 82 u/L   CBC and Platelet    Collection Time: 04/25/24  9:50 AM   Result Value Ref Range    WBC 5.44 4.31 - 10.16 Thousand/uL    RBC 4.61 3.88 - 5.62 Million/uL    Hemoglobin 14.3 12.0 - 17.0 g/dL    Hematocrit 42.2 36.5 - 49.3 %    MCV 92 82 - 98 fL    MCH 31.0 26.8 - 34.3 pg    MCHC 33.9 31.4 - 37.4 g/dL    RDW 11.9 11.6 - 15.1 %    Platelets 177 149 - 390 Thousands/uL    MPV 10.6 8.9 - 12.7 fL   Comprehensive metabolic panel    Collection Time: 04/25/24  9:50 AM   Result Value Ref Range    Sodium 136 135 - 147 mmol/L    Potassium 4.1 3.5 - 5.3 mmol/L    Chloride 104 96 - 108 mmol/L    CO2 27 21 - 32 mmol/L    ANION GAP 5 4 - 13 mmol/L    BUN 16 5 - 25 mg/dL    Creatinine 0.79 0.60 - 1.30 mg/dL    Glucose 145 (H) 65 - 140 mg/dL    Calcium 8.8 8.4 - 10.2 mg/dL     AST 36 13 - 39 U/L    ALT 74 (H) 7 - 52 U/L    Alkaline Phosphatase 48 34 - 104 U/L    Total Protein 6.4 6.4 - 8.4 g/dL    Albumin 3.9 3.5 - 5.0 g/dL    Total Bilirubin 0.51 0.20 - 1.00 mg/dL    eGFR 101 ml/min/1.73sq m   Fingerstick Glucose (POCT)    Collection Time: 04/25/24 10:53 AM   Result Value Ref Range    POC Glucose 168 (H) 65 - 140 mg/dl   Fingerstick Glucose (POCT)    Collection Time: 04/25/24  3:17 PM   Result Value Ref Range    POC Glucose 151 (H) 65 - 140 mg/dl   Fingerstick Glucose (POCT)    Collection Time: 04/25/24  9:17 PM   Result Value Ref Range    POC Glucose 288 (H) 65 - 140 mg/dl   Fingerstick Glucose (POCT)    Collection Time: 04/26/24  6:25 AM   Result Value Ref Range    POC Glucose 200 (H) 65 - 140 mg/dl   Fingerstick Glucose (POCT)    Collection Time: 04/26/24  8:48 AM   Result Value Ref Range    POC Glucose 294 (H) 65 - 140 mg/dl   ECG 12 lead    Collection Time: 04/26/24 10:41 AM   Result Value Ref Range    Ventricular Rate 72 BPM    Atrial Rate 72 BPM    MS Interval 170 ms    QRSD Interval 96 ms    QT Interval 404 ms    QTC Interval 442 ms    P Axis 40 degrees    QRS Axis 201 degrees    T Wave Axis 64 degrees   ECG 12 lead    Collection Time: 04/26/24 10:41 AM   Result Value Ref Range    Ventricular Rate 69 BPM    Atrial Rate 69 BPM    MS Interval 174 ms    QRSD Interval 96 ms    QT Interval 406 ms    QTC Interval 435 ms    P Axis 28 degrees    QRS Axis 216 degrees    T Wave Axis 64 degrees   Fingerstick Glucose (POCT)    Collection Time: 04/26/24 11:22 AM   Result Value Ref Range    POC Glucose 209 (H) 65 - 140 mg/dl   Fingerstick Glucose (POCT)    Collection Time: 04/26/24  4:04 PM   Result Value Ref Range    POC Glucose 306 (H) 65 - 140 mg/dl   Fingerstick Glucose (POCT)    Collection Time: 04/27/24  6:30 AM   Result Value Ref Range    POC Glucose 217 (H) 65 - 140 mg/dl   Fingerstick Glucose (POCT)    Collection Time: 04/27/24 11:10 AM   Result Value Ref Range    POC Glucose 258 (H)  65 - 140 mg/dl   Wheeled Walker    Collection Time: 04/27/24  2:17 PM   Result Value Ref Range    Supplier Name [employer]     Supplier Phone Number [phone]     Order Status Delivery Successful     Delivery Note      Delivery Request Date 04/27/2024     Date Delivered  04/27/2024     Item Description Wheeled Walker, Adult    Shower Chair with Back [$]    Collection Time: 04/27/24  2:24 PM   Result Value Ref Range    Supplier Name [employer] [facility]     Supplier Phone Number [phone]     Order Status Delivery Successful     Delivery Note      Delivery Request Date 04/28/2024     Date Delivered  04/30/2024     Item Description Shower Chair (With Back) [$]    POCT rapid ANTIGEN strepA    Collection Time: 05/15/24 11:21 AM   Result Value Ref Range     RAPID STREP A Negative Negative   Throat culture    Collection Time: 05/15/24 12:18 PM    Specimen: Throat   Result Value Ref Range    Throat Culture Negative for beta-hemolytic Streptococcus    COVID/FLU    Collection Time: 05/15/24 12:18 PM    Specimen: Nose; Nares   Result Value Ref Range    SARS-CoV-2 Negative Negative    INFLUENZA A PCR Negative Negative    INFLUENZA B PCR Negative Negative            Physical Exam  Constitutional:       General: He is not in acute distress.     Appearance: He is well-developed. He is not diaphoretic.   HENT:      Head: Normocephalic and atraumatic.      Right Ear: External ear normal. A middle ear effusion is present. Tympanic membrane is not erythematous.      Left Ear: External ear normal. A middle ear effusion is present. Tympanic membrane is not erythematous.      Nose: Nose normal.      Mouth/Throat:      Mouth: Mucous membranes are moist.      Pharynx: No oropharyngeal exudate or posterior oropharyngeal erythema.   Eyes:      General:         Right eye: No discharge.         Left eye: No discharge.      Conjunctiva/sclera: Conjunctivae normal.      Pupils: Pupils are equal,  round, and reactive to light.   Neck:      Thyroid: No thyromegaly.   Cardiovascular:      Rate and Rhythm: Normal rate and regular rhythm.      Pulses: no weak pulses.           Dorsalis pedis pulses are 2+ on the right side and 2+ on the left side.        Posterior tibial pulses are 2+ on the right side and 2+ on the left side.      Heart sounds: Normal heart sounds. No murmur heard.     No friction rub. No gallop.   Pulmonary:      Effort: Pulmonary effort is normal. No respiratory distress.      Breath sounds: Normal breath sounds. No stridor. No wheezing or rales.   Abdominal:      General: Bowel sounds are normal. There is no distension.      Palpations: Abdomen is soft.      Tenderness: There is no abdominal tenderness.   Musculoskeletal:      Cervical back: Normal range of motion and neck supple.   Feet:      Right foot:      Skin integrity: No ulcer, skin breakdown, erythema, warmth, callus or dry skin.      Left foot:      Skin integrity: No ulcer, skin breakdown, erythema, warmth, callus or dry skin.   Lymphadenopathy:      Cervical: No cervical adenopathy.   Skin:     General: Skin is warm and dry.      Findings: No erythema or rash.   Neurological:      Mental Status: He is alert and oriented to person, place, and time.   Psychiatric:         Behavior: Behavior normal.         Thought Content: Thought content normal.         Judgment: Judgment normal.           Diabetic Foot Exam    Patient's shoes and socks removed.    Right Foot/Ankle   Right Foot Inspection  Skin Exam: skin normal and skin intact. No dry skin, no warmth, no callus, no erythema, no maceration, no abnormal color, no pre-ulcer, no ulcer and no callus.     Toe Exam: ROM and strength within normal limits.     Sensory   Monofilament testing: intact    Vascular  Capillary refills: < 3 seconds  The right DP pulse is 2+. The right PT pulse is 2+.     Left Foot/Ankle  Left Foot Inspection  Skin Exam: skin normal and skin intact. No dry skin,  no warmth, no erythema, no maceration, normal color, no pre-ulcer, no ulcer and no callus.     Toe Exam: ROM and strength within normal limits.     Sensory   Monofilament testing: intact    Vascular  Capillary refills: < 3 seconds  The left DP pulse is 2+. The left PT pulse is 2+.     Assign Risk Category  No deformity present  No loss of protective sensation  No weak pulses  Risk: 0

## 2024-05-20 NOTE — LETTER
May 20, 2024     Patient: Jeremy George  YOB: 1970  Date of Visit: 5/20/2024      To Whom it May Concern:    Jeremy George is under my professional care. Jeremy was seen in my office on 5/20/2024. Jeremy needs to remain out of work till he has his neurology appointment, he will call today to get a sooner appointment.     If you have any questions or concerns, please don't hesitate to call.         Sincerely,          MIKHAIL Ha        CC: No Recipients

## 2024-05-21 ENCOUNTER — TELEPHONE (OUTPATIENT)
Age: 54
End: 2024-05-21

## 2024-05-21 ENCOUNTER — OFFICE VISIT (OUTPATIENT)
Dept: NEUROSURGERY | Facility: CLINIC | Age: 54
End: 2024-05-21
Payer: COMMERCIAL

## 2024-05-21 ENCOUNTER — TELEPHONE (OUTPATIENT)
Dept: NEUROLOGY | Facility: CLINIC | Age: 54
End: 2024-05-21

## 2024-05-21 ENCOUNTER — OFFICE VISIT (OUTPATIENT)
Dept: PHYSICAL THERAPY | Facility: CLINIC | Age: 54
End: 2024-05-21
Payer: COMMERCIAL

## 2024-05-21 VITALS
RESPIRATION RATE: 14 BRPM | DIASTOLIC BLOOD PRESSURE: 64 MMHG | TEMPERATURE: 96.7 F | WEIGHT: 262 LBS | SYSTOLIC BLOOD PRESSURE: 102 MMHG | BODY MASS INDEX: 34.72 KG/M2 | HEIGHT: 73 IN | OXYGEN SATURATION: 96 % | HEART RATE: 89 BPM

## 2024-05-21 DIAGNOSIS — Z74.09 IMPAIRED FUNCTIONAL MOBILITY, BALANCE, GAIT, AND ENDURANCE: Primary | ICD-10-CM

## 2024-05-21 DIAGNOSIS — Z98.1 S/P CERVICAL SPINAL FUSION: ICD-10-CM

## 2024-05-21 DIAGNOSIS — R42 DIZZINESS: ICD-10-CM

## 2024-05-21 DIAGNOSIS — M54.12 CERVICAL RADICULOPATHY: ICD-10-CM

## 2024-05-21 PROCEDURE — 97112 NEUROMUSCULAR REEDUCATION: CPT

## 2024-05-21 PROCEDURE — 97110 THERAPEUTIC EXERCISES: CPT

## 2024-05-21 PROCEDURE — 99214 OFFICE O/P EST MOD 30 MIN: CPT | Performed by: NEUROLOGICAL SURGERY

## 2024-05-21 NOTE — ASSESSMENT & PLAN NOTE
-Advised to continue with antihistamine and Flonase  -Will start prednisone  -Continue to follow with ENT

## 2024-05-21 NOTE — TELEPHONE ENCOUNTER
Patient called in requesting earlier appointment due to his ongoing symptoms.  Add on with Dr. Penaloza at 10 am 05/22/24.    Insurance E-Verified

## 2024-05-21 NOTE — PROGRESS NOTES
Review of Systems   Musculoskeletal:  Positive for gait problem (due to dizziness) and neck pain (and Into shoulders and arms into back of neck-rates pain today 5/10).   Neurological:  Positive for dizziness, weakness (dropping items) and headaches.   All other systems reviewed and are negative.         Current Outpatient Medications:     albuterol (ProAir HFA) 90 mcg/act inhaler, Inhale 2 puffs every 6 (six) hours as needed for wheezing, Disp: 8.5 g, Rfl: 0    Ascorbic Acid (vitamin C) 1000 MG tablet, Take 1,000 mg by mouth daily  , Disp: , Rfl:     baclofen 20 mg tablet, Take 1 tablet (20 mg total) by mouth daily at bedtime as needed for muscle spasms, Disp: 90 tablet, Rfl: 1    benzonatate (TESSALON PERLES) 100 mg capsule, Take 1 capsule (100 mg total) by mouth 3 (three) times a day as needed for cough, Disp: 20 capsule, Rfl: 0    cetirizine (ZyrTEC) 10 mg tablet, Take 1 tablet (10 mg total) by mouth daily, Disp: 90 tablet, Rfl: 0    cyanocobalamin 1,000 mcg/mL, Inject 1 mL (1,000 mcg total) into a muscle every 7 days for 9 doses, Disp: 1 mL, Rfl: 0    famotidine (PEPCID) 20 mg tablet, Take 1 tablet (20 mg total) by mouth daily, Disp: 90 tablet, Rfl: 1    fluticasone (FLONASE) 50 mcg/act nasal spray, 2 sprays into each nostril daily, Disp: 18 mL, Rfl: 1    lisinopril-hydrochlorothiazide (PRINZIDE,ZESTORETIC) 20-12.5 MG per tablet, Take 2 tablets by mouth daily, Disp: 180 tablet, Rfl: 1    metFORMIN (GLUCOPHAGE) 500 mg tablet, Pt takes 1500 mg (3 tablets) by mouth in the morning, and a 1000 mg (2 tablets) in the evening, Disp: 450 tablet, Rfl: 1    Multiple Vitamins-Minerals (CENTRUM SILVER 50+MEN PO), Take by mouth, Disp: , Rfl:     omeprazole (PriLOSEC) 40 MG capsule, Take 1 capsule (40 mg total) by mouth daily (Patient taking differently: Take 40 mg by mouth daily Still has to start), Disp: 90 capsule, Rfl: 3    polyethylene glycol (MiraLax) 17 g packet, Take 17 g by mouth daily (Patient taking differently:  Take 17 g by mouth if needed), Disp: 90 each, Rfl: 5    pregabalin (LYRICA) 75 mg capsule, Take 2 capsules (150 mg total) by mouth daily at bedtime (Patient taking differently: Take 150 mg by mouth daily at bedtime Bedtime), Disp: 540 capsule, Rfl: 0    simvastatin (ZOCOR) 20 mg tablet, Take 1 tablet (20 mg total) by mouth daily, Disp: 30 tablet, Rfl: 0    sitaGLIPtin (JANUVIA) 100 mg tablet, Take 1 tablet (100 mg total) by mouth daily, Disp: 90 tablet, Rfl: 1    SUMAtriptan (IMITREX) 50 mg tablet, Take 1 tablet (50 mg total) by mouth once as needed for migraine for up to 1 dose may repeat in 2 hours if necessary, Disp: 10 tablet, Rfl: 5    topiramate (TOPAMAX) 25 mg tablet, Take 25 mg daily for 7 days starting 04/25/2024  Take 50 mg daily for 7 days starting 05/02/2024  take 75 mg daily for 7 days starting 05/09/2024  Take 100 mg daily starting 05/16/2024 Do not start before April 28, 2024., Disp: 130 tablet, Rfl: 0    CINNAMON PO, Take by mouth (Patient not taking: Reported on 5/21/2024), Disp: , Rfl:     diphenhydrAMINE-acetaminophen (TYLENOL PM)  MG TABS, Take 2 tablets by mouth daily at bedtime as needed for sleep 1000 mg in total (Patient not taking: Reported on 5/15/2024), Disp: , Rfl:     meclizine (ANTIVERT) 25 mg tablet, Take 1 tablet (25 mg total) by mouth 3 (three) times a day as needed for dizziness (Patient not taking: Reported on 5/21/2024), Disp: 30 tablet, Rfl: 0    omega-3-acid ethyl esters (LOVAZA) 1 g capsule, Take 1 g by mouth daily (Patient not taking: Reported on 5/15/2024), Disp: , Rfl:     predniSONE 20 mg tablet, Take 2 tablets (40 mg total) by mouth daily for 5 days (Patient not taking: Reported on 5/21/2024), Disp: 10 tablet, Rfl: 0    simvastatin (ZOCOR) 20 mg tablet, Take 1 tablet (20 mg total) by mouth daily at bedtime (Patient not taking: Reported on 5/20/2024), Disp: 90 tablet, Rfl: 1    Current Facility-Administered Medications:     cyanocobalamin injection 1,000 mcg, 1,000  mcg, Intramuscular, Q30 Days, MIKHAIL Ha, 1,000 mcg at 05/20/24 0740

## 2024-05-21 NOTE — PROGRESS NOTES
"Daily Note     Today's date: 2024  Patient name: Jeremy George  : 1970  MRN: [id number]  Referring provider: Cici Austin CR*  Dx:   Encounter Diagnosis     ICD-10-CM    1. Impaired functional mobility, balance, gait, and endurance  Z74.09       2. Dizziness  R42                            Subjective: reports he has been still coughing. The dizziness has been relatively mild and he has not had many bad headaches. He reports he has been having some R ear pain.       Objective: See treatment diary below    Assessment: Tolerated treatment fair. Patient would benefit from continued PT. Arrived to therapy session without FWW and demonstrated significant improvement with balance compared to previous sessions. Tolerated nustep well without any increase in symptoms. Multiple LOB in forward/backwards direction with eyes closed weight shifting. 1 LOB to R side with carioka/cross over stepping with min A for balance. Continues to have sense of movement (usually forward), cues to focus on weight shifting and sensory weighting. Continue to progress as tolerated.      Plan: Continue per plan of care.      Precautions: HTN. Thoracic aneurysm, asthma, c-spine fusion    POC expires Unit limit Auth Expiration date PT/OT/ST + Visit Limit?    BOMN N/a 90 PT/OT/SLP                           Visit/Unit Tracking  AUTH Status:  Date 4/29 5/1 5/6 5/10 5/16 5/21        None Used 1 2 3 4 5 6         Remaining                   Manuals  5/6 5/10 5/16 5/21     Coordination testing  Positional testing   Balance testing (TUG, 5xSTS, Kiser) See subjective                                       Neuro Re-Ed          VOR x 1 seated X 10 horiztonal Seated VOR x 1 30\" x 2 each direction        Imaginary targets X 3 each direction X 5 each direction R/L/up/down        Foam EC   Firm EC HT normal JUAN 30\" x 3, with UE support prn, HN 30\" x 2 reps Foam EO 30\" x 3 with NBOS    Firm EC HT/HN 30\" x 2 each Foam beams in // bars (long) " "sidestepping x 3 laps with 2-finger support     Standing circles weight shifting  x 10 each direction EO, EC x 5 each direction with 2-finger support X 10 each direction with EC no UE support X 10 each direction with EO no UE support, EC x 10 each X 10 each direction with EO, no UE support  EC x 10 each direction     Spot it saccades   Seated horizontal x 6 cards       NBOS  EO 30\"  EC 5\"        tandem    // bars (short) x 3 laps // bars (long) x 3 laps     Step taps    6-inch x 20 each no UEsupport      carioka     // bars (long) x 2 laps               Ther Ex          nustep     L5 for 10 minutes                                             Ther Activity                              Gait Training                              Education Examination findings, use of ice to control nausea symptoms, symptoms of BPPV, vestibular migraine, follow up with neurology Low tyramine diet, headache triggers, keeping a headache journal, balance systems, decreased visual dependence Migraine symptoms, increase with visual symptoms Migraine symptoms, role of visual symptoms with migraine                               "

## 2024-05-21 NOTE — PROGRESS NOTES
"Neurosurgery   Jeremy George 53 y.o. male MRN: [id number]      Assessment & Plan   Neck pain.  No radiculopathy and certainly no myelopathy.  This is similar, per the patient's, to the neck pain that he experienced after he had his anterior cervical discectomy and fusion in 2018.  Said it took a while and pain management intervention before things calm down and improved for him.  He is also having dizziness which is being worked up separately and for which she is getting vestibular therapy.  I think adding physical therapy for his neck may benefit him and may improve some of his pain complaints.  I gladly order that for him.  He may also have a component of occipital neuralgia as the main site of his pain is on the right side at the base of the skull.  He does have degenerative disc disease at multiple levels without any clear nerve root impingement based on his MRI.  This is at levels adjacent to his previous fusion specifically above.  I would certainly exhaust conservative measures before considering surgical intervention again for him.  As he stated, the neck pain after the surgery did not improve with surgical intervention and, in the absence of radiculopathy and myelopathy, I would not recommend to repeat surgery.  I would like to get cervical spine AP lateral and flexion-extension views to check on the hardware and to check on his cervical spine stability and to check on his extent of fusion.  While I do not see any significant issues based on the MRI, I think an x-ray will help me to better visualize for him and rule out any hardware issues.      All questions answered    Chief Complaint:  neck pain    HPI    Headaches last month, dizziness last month  Neck pain for 2 months  Left arm pain, now on the right  Previous cervical fusion 2018.  After surgery injections and therapy helped to \"calm down\" his neck pain  PT - not recently, going for dizziness  Medications: Lyrica, baclofen - helps with sleep  Pain " "management - planning on doing injections next week  Now taking prednisone for asthma.  Starting prednisone today  Incontinence - none  Valsalva leads to no increase in neck or arm pain  Worse at night.  No better with different pillows      CAESAR MAYNARD personally reviewed and updated.  Review of Systems    Vitals:    /64 (BP Location: Right arm, Patient Position: Sitting)   Pulse 89   Temp (!) 96.7 °F (35.9 °C) (Temporal)   Resp 14   Ht 6' 0.84\" (1.85 m)   Wt 119 kg (262 lb) Comment: verbal  SpO2 96%   BMI 34.72 kg/m²     Past Medical History:   Diagnosis Date   • Achalasia    • Aneurysm (HCC)     aortic   • Asthma     Childhood   • Colon polyp    • Diabetes mellitus (HCC)    • Esophageal ulcer    • Fatty liver    • Groin abscess     with I &D   • Hyperlipidemia    • Hypertension    • Plantar fasciitis        Past Surgical History:   Procedure Laterality Date   • ANTERIOR CERVICAL DISCECTOMY W/ FUSION     • APPENDECTOMY     • BICEPS TENDON REPAIR     • EGD AND COLONOSCOPY     • FL INJECTION LEFT WRIST (ARTHROGRAM)  11/9/2021   • FL MYELOGRAM CERVICAL  6/13/2018   • MENISCECTOMY     • MYOTOMY HELLER LAPAROSCOPIC     • ORTHOPEDIC SURGERY           Current Outpatient Medications:   •  albuterol (ProAir HFA) 90 mcg/act inhaler, Inhale 2 puffs every 6 (six) hours as needed for wheezing, Disp: 8.5 g, Rfl: 0  •  Ascorbic Acid (vitamin C) 1000 MG tablet, Take 1,000 mg by mouth daily  , Disp: , Rfl:   •  baclofen 20 mg tablet, Take 1 tablet (20 mg total) by mouth daily at bedtime as needed for muscle spasms, Disp: 90 tablet, Rfl: 1  •  benzonatate (TESSALON PERLES) 100 mg capsule, Take 1 capsule (100 mg total) by mouth 3 (three) times a day as needed for cough, Disp: 20 capsule, Rfl: 0  •  cetirizine (ZyrTEC) 10 mg tablet, Take 1 tablet (10 mg total) by mouth daily, Disp: 90 tablet, Rfl: 0  •  cyanocobalamin 1,000 mcg/mL, Inject 1 mL (1,000 mcg total) into a muscle every 7 days for 9 doses, Disp: 1 mL, Rfl: 0  •  " famotidine (PEPCID) 20 mg tablet, Take 1 tablet (20 mg total) by mouth daily, Disp: 90 tablet, Rfl: 1  •  fluticasone (FLONASE) 50 mcg/act nasal spray, 2 sprays into each nostril daily, Disp: 18 mL, Rfl: 1  •  lisinopril-hydrochlorothiazide (PRINZIDE,ZESTORETIC) 20-12.5 MG per tablet, Take 2 tablets by mouth daily, Disp: 180 tablet, Rfl: 1  •  metFORMIN (GLUCOPHAGE) 500 mg tablet, Pt takes 1500 mg (3 tablets) by mouth in the morning, and a 1000 mg (2 tablets) in the evening, Disp: 450 tablet, Rfl: 1  •  Multiple Vitamins-Minerals (CENTRUM SILVER 50+MEN PO), Take by mouth, Disp: , Rfl:   •  omeprazole (PriLOSEC) 40 MG capsule, Take 1 capsule (40 mg total) by mouth daily (Patient taking differently: Take 40 mg by mouth daily Still has to start), Disp: 90 capsule, Rfl: 3  •  polyethylene glycol (MiraLax) 17 g packet, Take 17 g by mouth daily (Patient taking differently: Take 17 g by mouth if needed), Disp: 90 each, Rfl: 5  •  pregabalin (LYRICA) 75 mg capsule, Take 2 capsules (150 mg total) by mouth daily at bedtime (Patient taking differently: Take 150 mg by mouth daily at bedtime Bedtime), Disp: 540 capsule, Rfl: 0  •  simvastatin (ZOCOR) 20 mg tablet, Take 1 tablet (20 mg total) by mouth daily, Disp: 30 tablet, Rfl: 0  •  sitaGLIPtin (JANUVIA) 100 mg tablet, Take 1 tablet (100 mg total) by mouth daily, Disp: 90 tablet, Rfl: 1  •  SUMAtriptan (IMITREX) 50 mg tablet, Take 1 tablet (50 mg total) by mouth once as needed for migraine for up to 1 dose may repeat in 2 hours if necessary, Disp: 10 tablet, Rfl: 5  •  topiramate (TOPAMAX) 25 mg tablet, Take 25 mg daily for 7 days starting 04/25/2024  Take 50 mg daily for 7 days starting 05/02/2024  take 75 mg daily for 7 days starting 05/09/2024  Take 100 mg daily starting 05/16/2024 Do not start before April 28, 2024., Disp: 130 tablet, Rfl: 0  •  CINNAMON PO, Take by mouth (Patient not taking: Reported on 5/21/2024), Disp: , Rfl:   •  diphenhydrAMINE-acetaminophen  (TYLENOL PM)  MG TABS, Take 2 tablets by mouth daily at bedtime as needed for sleep 1000 mg in total (Patient not taking: Reported on 5/15/2024), Disp: , Rfl:   •  meclizine (ANTIVERT) 25 mg tablet, Take 1 tablet (25 mg total) by mouth 3 (three) times a day as needed for dizziness (Patient not taking: Reported on 5/21/2024), Disp: 30 tablet, Rfl: 0  •  omega-3-acid ethyl esters (LOVAZA) 1 g capsule, Take 1 g by mouth daily (Patient not taking: Reported on 5/15/2024), Disp: , Rfl:   •  predniSONE 20 mg tablet, Take 2 tablets (40 mg total) by mouth daily for 5 days (Patient not taking: Reported on 5/21/2024), Disp: 10 tablet, Rfl: 0  •  simvastatin (ZOCOR) 20 mg tablet, Take 1 tablet (20 mg total) by mouth daily at bedtime (Patient not taking: Reported on 5/20/2024), Disp: 90 tablet, Rfl: 1    Current Facility-Administered Medications:   •  cyanocobalamin injection 1,000 mcg, 1,000 mcg, Intramuscular, Q30 Days, MIKHAIL Ha, 1,000 mcg at 05/20/24 1455      No Known Allergies    Social History     Socioeconomic History   • Marital status: /Civil Union     Spouse name: Not on file   • Number of children: Not on file   • Years of education: Not on file   • Highest education level: Not on file   Occupational History   • 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   Tobacco Use   • Smoking status: Never   • Smokeless tobacco: Never   Vaping Use   • Vaping status: Never Used   Substance and Sexual Activity   • Alcohol use: Not Currently   • Drug use: Never   • Sexual activity: Yes     Partners: Female   Other Topics Concern   • Not on file   Social History Narrative    Lives with spouse     Social Determinants of Health     Financial Resource Strain: Not on file   Food Insecurity: No Food Insecurity (4/24/2024)    Hunger Vital Sign    • Worried About Running Out of Food in the Last Year: Never true    • Ran Out of Food in the Last Year: Never true   Transportation Needs: No Transportation Needs  (4/24/2024)    PRAPARE - Transportation    • Lack of Transportation (Medical): No    • Lack of Transportation (Non-Medical): No   Physical Activity: Not on file   Stress: Not on file   Social Connections: Not on file   Intimate Partner Violence: Not on file   Housing Stability: Low Risk  (4/24/2024)    Housing Stability Vital Sign    • Unable to Pay for Housing in the Last Year: No    • Number of Places Lived in the Last Year: 1    • Unstable Housing in the Last Year: No            Imaging:  MRI Images and Report of the Cervical show previous fusion.  Adjacent level ddd above without nerve root or cord impingement      Physical Exam    Well-developed well-nourished  Appears stated age of 53 y.o.  Awake alert oriented x3  Verbally interactive and appropriate  Adequate fund of knowledge  Cranial nerves II through VIII intact   Motor strength 5 out of 5   No pronator drift  Sensory intact to light touch  No sensory changes on the dorsum of the spine.  with pain to palpation over the Cervical spine and base of skull, particularly on the right  Reflexes 1+and symmetric  Toes: downgoing  Gait: slow, unsteady due to dizziness  Memory: intact  Mood: saddened affect: somewhat blunted  Language: Fluent in English

## 2024-05-21 NOTE — ASSESSMENT & PLAN NOTE
High suspicion for peripheral vestibulopathy provoked by position change, continue with PT OT try to obtain a sooner appointment continue migraine medications, meclizine as needed, continue Zyrtec and  Flonase  Follow up after neurology consult

## 2024-05-21 NOTE — TELEPHONE ENCOUNTER
Patient has presented to the office with disability and leave forms, patient had an appointment on 05/20/2024. 30$ forms fee charge applied, completed copy filled by diane finley has been scanned into chart for review.

## 2024-05-22 ENCOUNTER — OFFICE VISIT (OUTPATIENT)
Dept: NEUROLOGY | Facility: CLINIC | Age: 54
End: 2024-05-22
Payer: COMMERCIAL

## 2024-05-22 VITALS
SYSTOLIC BLOOD PRESSURE: 102 MMHG | WEIGHT: 262.5 LBS | TEMPERATURE: 97.8 F | OXYGEN SATURATION: 92 % | HEART RATE: 88 BPM | DIASTOLIC BLOOD PRESSURE: 70 MMHG | BODY MASS INDEX: 34.79 KG/M2

## 2024-05-22 DIAGNOSIS — G93.2 INCREASED INTRACRANIAL PRESSURE: ICD-10-CM

## 2024-05-22 DIAGNOSIS — I68.0 CEREBRAL AMYLOID ANGIOPATHY  (HCC): ICD-10-CM

## 2024-05-22 DIAGNOSIS — E85.4 CEREBRAL AMYLOID ANGIOPATHY  (HCC): ICD-10-CM

## 2024-05-22 DIAGNOSIS — G43.809 VESTIBULAR MIGRAINE: Primary | ICD-10-CM

## 2024-05-22 DIAGNOSIS — R06.83 SNORING: ICD-10-CM

## 2024-05-22 DIAGNOSIS — R51.9 NONINTRACTABLE HEADACHE, UNSPECIFIED CHRONICITY PATTERN, UNSPECIFIED HEADACHE TYPE: ICD-10-CM

## 2024-05-22 DIAGNOSIS — R51.9 HEADACHE: ICD-10-CM

## 2024-05-22 DIAGNOSIS — G47.33 OSA (OBSTRUCTIVE SLEEP APNEA): ICD-10-CM

## 2024-05-22 DIAGNOSIS — M54.81 OCCIPITAL NEURALGIA: ICD-10-CM

## 2024-05-22 DIAGNOSIS — R42 DIZZINESS: ICD-10-CM

## 2024-05-22 PROCEDURE — 99215 OFFICE O/P EST HI 40 MIN: CPT | Performed by: PSYCHIATRY & NEUROLOGY

## 2024-05-22 RX ORDER — TOPIRAMATE 100 MG/1
100 TABLET, FILM COATED ORAL
Qty: 30 TABLET | Refills: 6 | Status: SHIPPED | OUTPATIENT
Start: 2024-05-22

## 2024-05-22 RX ORDER — LANOLIN ALCOHOL/MO/W.PET/CERES
400 CREAM (GRAM) TOPICAL DAILY
Qty: 90 TABLET | Refills: 0 | Status: SHIPPED | OUTPATIENT
Start: 2024-05-22

## 2024-05-22 RX ORDER — RIMEGEPANT SULFATE 75 MG/75MG
TABLET, ORALLY DISINTEGRATING ORAL
Qty: 10 TABLET | Refills: 0 | Status: SHIPPED | OUTPATIENT
Start: 2024-05-22

## 2024-05-22 NOTE — PATIENT INSTRUCTIONS
I increased the topiramate to 100 mg, updated prescription, take one tablet at bedtime   Take magnesium oxide 400 mg daily. Apply heat to the neck and massage the area to release occipital nerve    Sent for nurtec approval to be taken as needed at headache onset  Referred to sleep medicine to evaluate for obstructive sleep apnea   Reffered to ophthalmology to get a dilated eye exam and visual fields done

## 2024-05-22 NOTE — LETTER
May 22, 2024     Patient: Jeremy George  YOB: 1970  Date of Visit: 5/22/2024      To Whom it May Concern:    Jeremy George is under my professional care. Jeremy was seen in my office on 5/22/2024. Jeremy should remain off of work until follow up evaluation with me .    If you have any questions or concerns, please don't hesitate to call.         Sincerely,          Naty Penaloza MD        CC:   No Recipients

## 2024-05-22 NOTE — PROGRESS NOTES
[facility] General Neurology Consult  PATIENT:  Jeremy George (: 1970)  MRN:  [id number]  DATE OF SERVICE:  6/10/2024    REFERRED BY: Cici Austin CR*  PMD: MIKHAIL Ha    Assessment/Plan:     Jeremy George is a very pleasant right-handed 53 y.o. male with a past medical history that includes cervical spondylosis with radiculopathy s/p cervical spinal fusion, diabetes, hypertension, hyperlipidemia, essential tremor, chronic pain syndrome, groin strain, mild asthma, thoracic aortic aneurysm, lung nodule, chest pain, vitamin B12 deficiency and cerebral amyloid angiopathy referred here for evaluation of headache.    Patient today reports having a history of headaches however these are different and that they are less associated with his right upper extremity tremor.  He started noticing these headaches in 2024. They report having a  retro-orbital pressure followed by sharp pain located on the left side. Associated symptoms includes nausea, neck pain, photophobia, phonophobia, osmophobia, blurry vision, tinnitus and dizziness.  Dizziness is particularly worse when the headaches are severe.  He gets a sudden onset sensation of falling down and loss of balance however this can also happen without the headaches.. They report that headaches are triggered by strong smells and can be worsened by coughing . In a month they can get up to 10-12 headache days. Risk factors include obesity, stress, snoring , sleep disturbances , and muscular tension at the neck and shoulders. Patients have not responded to Tylenol or sumatriptan.  Ibuprofen contraindicated due to esophageal issues.  Triptans overall contraindicated given they can cause vasoconstriction and worsen microhemorrhages due to patients hx of cerebral amyloid angiopathy. Also has thoracic aortic aneurysm.  Physical exam was normal with exception of slowed speech and decreased range of motion at the neck.    Impression: At this time,  based on history, available workup and physical exam,  I believe patients headaches are likely due to potential vestibular migraines.  Need to clarify if he had a history of migraines without the dizziness in the past.  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  I will evaluate them for this and address risk factors with the following plan outlined below.     Plan:  Patient had recent imaging done, reviewed below.  There is evidence of CAA.  Patient to avoid blood thinners is much as possible and keep a blood pressure <130/80  Additional workup    Referral to sleep medicine for evaluation of SIVA  Referral to ophthalmology for dilated eye exam  Given that patient has 10-12 headache days in a month will recommend the following:  For preventative purposes:  Will increase Topamax to 100 mg nightly  Will add magnesium oxide 400 mg daily  For headache  recommend Nurtec given contraindication to triptans and ibuprofen  Discussed medication side effects and advised to call our office if they are unable to tolerate these  Patient advised not to take over-the-counter or prescription pain medications more than 3 days per week to prevent medication overuse/rebound headache  Patient should avoid blood thinners and high blood pressure  Lifestyle recommendations:  Recommend drinking at least 64 oz of water daily   Recommend maintaining a regular sleep schedule 84 7 to 9 hours of quality sleep per night  Recommend avoiding stress  Recommend engaging in regular physical activity  Recommend following a healthy diet and identifying foods second, may trigger headaches such as alcohol, caffeine, chocolate, H cheeses, processed foods and artificial sweeteners  Recommend maintaining a good posture  Advised to keep track of headache patterns including triggers, alleviating/exacerbating factors, duration and response to  medication  Follow-up in 4 weeks      History of Present Illness:   Jeremy George is a  right handed male who presents today for evaluation of headaches.     Current medical illnesses  or past medical history include cervical spondylosis with radiculopathy s/p cervical spinal fusion, diabetes, hypertension, hyperlipidemia, essential tremor, chronic pain syndrome, groin strain, mild asthma, thoracic aortic aneurysm, lung nodule, chest pain, vitamin B12 deficiency and cerebral amyloid angiopathy      Dizziness:  Dizziness is really bad when headaches are bad   Sudden onset sensation of falling down, Loss of balance   Can also happen without headaches   Meclizine was too sedating         Timing and severity:  History of headaches or are these new onset? Has a history of headaches   How are these headaches different than your regular headaches? Less association with RUE tremor   When did they start noticing current headaches (onset or increase in frequency)? 2 month ago  How many headache days do they have in a month?  - as of 6/10/2024: 10-12   What time of the day do the headaches start?  Tend to happen in the later morning, sometime after breakfast   How long do the headaches last? Several hours to all day   Are you ever headache free? Yes  Any changes in medication, medical history or major life stressors associated with headache onset? Right around the same time his neck started acting up, works at [employer], has to look up a lot. ACDF surgery in 2018     Characteristics, aggravating and alleviating factors:  Where is your headache located? L  sided unilateral retro orbital   Description of pain: Pressure and then sharp   What is the intensity of pain? Average: 3-4/10, worst 10/10  Headache triggers:  Strong smells  Do movements make the headache worse?  coughing  What the headache better? Quiet dark room    Associated symptoms:     Aura? without aura    Nausea, Neck pain, Photophobia, Phonophobia, Osmophobia, Blurry  vision, Lightheadedness or dizziness, and Tinnitus      Workup:  Have you seen someone else for headaches or pain? Yes  Have you ever had any Brain imaging? Yes    Prior treatment:         Abortive:   Tylenol 1000 mg- no help   Ibuprofen- C/I due to esophageal issues   Sumatriptan- no help   Triptans- contraindicated given they can cause vasoconstriction and worsen microhemorrhages due to patients hx of cerebral amyloid angiopathy. Also has thoracic aortic aneurysm       Preventative:  Topiramate 75 mg- taking in the morning, headaches not as severe   Pregabalin 150 mg - for neck pain     Injections:  Have you had trigger point injection performed and how often? No  Have you had Botox injection performed and how often? No     Alternative:  Alternative therapies used in the past for headaches? physical therapy    Lifestyle:  -  How do these headaches impact ADLs? On disability     Sleep :  Do you snore while asleep?Yes  Do you wake up with headaches? Yes  Have you ever had a sleep study done? Prior to 2018 results unavailable     Social:    ETOH: No, gets cramping   Work:  On short term disability   Lives with: Wife and kids   Diet: Had lost a lot of weight, was down to 253. Now unable to manage his meals       The following portions of the patient's history were reviewed and updated as appropriate: allergies, current medications, past family history, past medical history, past social history, past surgical history and problem list.    Past Medical History:     Past Medical History:   Diagnosis Date   • Achalasia    • Aneurysm (HCC)     aortic   • Asthma     Childhood   • Colon polyp    • Diabetes mellitus (HCC)    • Esophageal ulcer    • Fatty liver    • Groin abscess     with I &D   • Hyperlipidemia    • Hypertension    • Plantar fasciitis        Patient Active Problem List   Diagnosis   • S/P cervical spinal fusion   • Cervical radiculopathy   • HNP (herniated nucleus pulposus), thoracic   • Cervical spondylosis  without myelopathy   • Diabetes mellitus ([facility])   • Hypertension, essential   • Other hyperlipidemia   • Headache   • Tremor, essential   • Chronic pain syndrome   • Groin strain   • Mild intermittent asthma without complication   • Thoracic aortic aneurysm without rupture, unspecified part ([facility])   • Lung nodule   • Gastroesophageal reflux disease without esophagitis   • Chest pain, atypical   • Abnormal CT scan, esophagus   • Dizziness   • B12 deficiency   • Viral upper respiratory tract infection   • Cerebral amyloid angiopathy  ([facility])   • Vestibular migraine       Medications:      Current Outpatient Medications   Medication Sig Dispense Refill   • albuterol (ProAir HFA) 90 mcg/act inhaler Inhale 2 puffs every 6 (six) hours as needed for wheezing 8.5 g 0   • Ascorbic Acid (vitamin C) 1000 MG tablet Take 1,000 mg by mouth daily       • baclofen 20 mg tablet Take 1 tablet (20 mg total) by mouth daily at bedtime as needed for muscle spasms 90 tablet 1   • benzonatate (TESSALON PERLES) 100 mg capsule Take 1 capsule (100 mg total) by mouth 3 (three) times a day as needed for cough (Patient not taking: Reported on 6/3/2024) 20 capsule 0   • cetirizine (ZyrTEC) 10 mg tablet Take 1 tablet (10 mg total) by mouth daily 90 tablet 0   • cyanocobalamin 1,000 mcg/mL Inject 1 mL (1,000 mcg total) into a muscle every 7 days for 9 doses 1 mL 0   • famotidine (PEPCID) 20 mg tablet Take 1 tablet (20 mg total) by mouth daily 90 tablet 1   • fluticasone (FLONASE) 50 mcg/act nasal spray 2 sprays into each nostril daily 18 mL 1   • magnesium Oxide (MAG-OX) 400 mg TABS Take 1 tablet (400 mg total) by mouth daily 90 tablet 0   • metFORMIN (GLUCOPHAGE) 500 mg tablet Pt takes 1500 mg (3 tablets) by mouth in the morning, and a 1000 mg (2 tablets) in the evening 450 tablet 1   • Multiple Vitamins-Minerals (CENTRUM SILVER 50+MEN PO) Take by mouth     • omeprazole (PriLOSEC) 40 MG capsule Take 1 capsule (40 mg total) by mouth daily (Patient  taking differently: Take 40 mg by mouth daily Still has to start) 90 capsule 3   • polyethylene glycol (MiraLax) 17 g packet Take 17 g by mouth daily 90 each 5   • pregabalin (LYRICA) 75 mg capsule Take 2 capsules (150 mg total) by mouth daily at bedtime (Patient taking differently: Take 150 mg by mouth daily at bedtime Bedtime) 540 capsule 0   • rimegepant sulfate (Nurtec) 75 mg TBDP Take 1 tablet by mouth as needed.  Do not exceed more than 1 dose in 24 hours or 3 doses in one week 10 tablet 0   • simvastatin (ZOCOR) 20 mg tablet Take 1 tablet (20 mg total) by mouth daily 30 tablet 0   • sitaGLIPtin (JANUVIA) 100 mg tablet Take 1 tablet (100 mg total) by mouth daily 90 tablet 1   • topiramate (TOPAMAX) 100 mg tablet Take 1 tablet (100 mg total) by mouth daily at bedtime 30 tablet 6   • CINNAMON PO Take by mouth (Patient not taking: Reported on 5/21/2024)     • diphenhydrAMINE-acetaminophen (TYLENOL PM)  MG TABS Take 2 tablets by mouth daily at bedtime as needed for sleep 1000 mg in total (Patient not taking: Reported on 5/15/2024)     • lisinopril-hydrochlorothiazide (PRINZIDE,ZESTORETIC) 20-12.5 MG per tablet Take 1 tablet by mouth daily 90 tablet 1   • meclizine (ANTIVERT) 25 mg tablet Take 1 tablet (25 mg total) by mouth 3 (three) times a day as needed for dizziness (Patient not taking: Reported on 6/3/2024) 30 tablet 0     Current Facility-Administered Medications   Medication Dose Route Frequency Provider Last Rate Last Admin   • cyanocobalamin injection 1,000 mcg  1,000 mcg Intramuscular Q30 Days MIKHAIL Ha   1,000 mcg at 06/03/24 0942        Allergies:    No Known Allergies    Family History:     Family History   Problem Relation Age of Onset   • No Known Problems Mother    • No Known Problems Father        Social History:       Social History     Socioeconomic History   • Marital status: /Civil Union     Spouse name: Not on file   • Number of children: Not on file   • Years of  education: Not on file   • Highest education level: Not on file   Occupational History   • 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   Tobacco Use   • Smoking status: Never   • Smokeless tobacco: Never   Vaping Use   • Vaping status: Never Used   Substance and Sexual Activity   • Alcohol use: Not Currently   • Drug use: Never   • Sexual activity: Yes     Partners: Female   Other Topics Concern   • Not on file   Social History Narrative    Lives with spouse     Social Determinants of Health     Financial Resource Strain: Not on file   Food Insecurity: No Food Insecurity (4/24/2024)    Hunger Vital Sign    • Worried About Running Out of Food in the Last Year: Never true    • Ran Out of Food in the Last Year: Never true   Transportation Needs: No Transportation Needs (4/24/2024)    PRAPARE - Transportation    • Lack of Transportation (Medical): No    • Lack of Transportation (Non-Medical): No   Physical Activity: Not on file   Stress: Not on file   Social Connections: Not on file   Intimate Partner Violence: Not on file   Housing Stability: Low Risk  (4/24/2024)    Housing Stability Vital Sign    • Unable to Pay for Housing in the Last Year: No    • Number of Times Moved in the Last Year: 1    • Homeless in the Last Year: No         Objective:   Blood pressure 102/70, pulse 88, temperature 97.8 °F (36.6 °C), temperature source Temporal, weight 119 kg (262 lb 8 oz), SpO2 92%.    Physical Exam  Constitutional:       General: He is awake.   Eyes:      General: Lids are normal.      Extraocular Movements: Extraocular movements intact.   Neurological:      Mental Status: He is alert.      Motor: Motor strength is normal.  Psychiatric:         Speech: Speech normal.         Neurological Exam  Mental Status  Awake and alert. Oriented to person, place and time. Speech is normal. Language is fluent with no aphasia. Attention and concentration are normal.  Slowed speech .    Cranial Nerves  CN III, IV, VI: Extraocular  movements intact bilaterally. Normal lids and orbits bilaterally.  CN VII: Full and symmetric facial movement.  CN VIII: Hearing is normal.  CN XII: Tongue midline without atrophy or fasciculations.    Motor   No abnormal involuntary movements. Strength is 5/5 throughout all four extremities.  Decreased range of movement at the neck.    Sensory  Normal proprioception.    Coordination    Normal coordination.    Gait  Casual gait is normal including stance, stride, and arm swing.        Pertinent lab results: Mildly elevated ALT     Imaging:   MRI brain wo contrast (4/24/24): No acute intracranial abnormality. No restricted diffusion to suggest acute ischemia.Mild scattered periventricular and subcortical foci of white matter T2 hyperintensity which are nonspecific and most likely related to chronic small vessel ischemic changes. Other less like etiologies include demyelinating disease, vasculitis, Lyme and migraines.Reidentified and normal punctate foci of susceptibility artifact are seen in the supratentorial brain centrally at the gray-white junction peripherally in distribution most compatible with cerebral amyloid angiopathy.  Chronic microhemorrhage in the  setting of hypertension is less likely with this distribution but not excluded.    MRI Cervical spine  (4/4/24): Status post ACDF at C6-7. No central or foraminal narrowing at the postsurgical level. Spondylotic degenerative changes are seen resulting in mild central stenosis at C4-5 and C5-6 with moderate foraminal narrowing bilaterally at C4-5. Normal appearance of the cervical cord.       Review of Systems:     Review of Systems   Review of Systems   Constitutional:  Negative for appetite change, fatigue and fever.   HENT: Negative.  Negative for hearing loss, tinnitus, trouble swallowing and voice change.    Eyes:  Positive for visual disturbance. Negative for photophobia and pain.   Respiratory: Negative.  Negative for shortness of breath.     Cardiovascular: Negative.  Negative for palpitations.   Gastrointestinal: Negative.  Negative for nausea and vomiting.   Endocrine: Negative.  Negative for cold intolerance.   Genitourinary: Negative.  Negative for dysuria, frequency and urgency.   Musculoskeletal:  Negative for back pain, gait problem, myalgias, neck pain and neck stiffness.   Skin: Negative.  Negative for rash.   Allergic/Immunologic: Negative.    Neurological:  Positive for dizziness and headaches. Negative for tremors, seizures, syncope, facial asymmetry, speech difficulty, weakness, light-headedness and numbness.   Hematological: Negative.  Does not bruise/bleed easily.   Psychiatric/Behavioral: Negative.  Negative for confusion, hallucinations and sleep disturbance.    All other systems reviewed and are negative.        *History obtained from patient as well as available medical record review    Author:  Naty Penaloza M.D. 6/10/2024 9:19 PM

## 2024-05-23 NOTE — TELEPHONE ENCOUNTER
The patient is scheduled for cervical medial branch blocks.  I read neurosurgery note and it does not specify any other type of injections, only that surgery is not indicated.  Would advise to just proceed with scheduled procedure

## 2024-05-24 ENCOUNTER — TELEPHONE (OUTPATIENT)
Dept: NEUROLOGY | Facility: CLINIC | Age: 54
End: 2024-05-24

## 2024-05-24 ENCOUNTER — APPOINTMENT (OUTPATIENT)
Dept: RADIOLOGY | Age: 54
End: 2024-05-24
Payer: COMMERCIAL

## 2024-05-24 ENCOUNTER — TELEPHONE (OUTPATIENT)
Dept: PAIN MEDICINE | Facility: CLINIC | Age: 54
End: 2024-05-24

## 2024-05-24 ENCOUNTER — OFFICE VISIT (OUTPATIENT)
Dept: PHYSICAL THERAPY | Facility: CLINIC | Age: 54
End: 2024-05-24
Payer: COMMERCIAL

## 2024-05-24 DIAGNOSIS — Z98.1 S/P CERVICAL SPINAL FUSION: ICD-10-CM

## 2024-05-24 DIAGNOSIS — Z74.09 IMPAIRED FUNCTIONAL MOBILITY, BALANCE, GAIT, AND ENDURANCE: Primary | ICD-10-CM

## 2024-05-24 DIAGNOSIS — M54.12 CERVICAL RADICULOPATHY: ICD-10-CM

## 2024-05-24 DIAGNOSIS — G89.29 CHRONIC LEFT-SIDED LOW BACK PAIN, UNSPECIFIED WHETHER SCIATICA PRESENT: ICD-10-CM

## 2024-05-24 DIAGNOSIS — M54.50 CHRONIC LEFT-SIDED LOW BACK PAIN, UNSPECIFIED WHETHER SCIATICA PRESENT: ICD-10-CM

## 2024-05-24 DIAGNOSIS — R42 DIZZINESS: ICD-10-CM

## 2024-05-24 PROCEDURE — 97112 NEUROMUSCULAR REEDUCATION: CPT

## 2024-05-24 PROCEDURE — 72050 X-RAY EXAM NECK SPINE 4/5VWS: CPT

## 2024-05-24 PROCEDURE — 97110 THERAPEUTIC EXERCISES: CPT

## 2024-05-24 PROCEDURE — 72100 X-RAY EXAM L-S SPINE 2/3 VWS: CPT

## 2024-05-24 NOTE — TELEPHONE ENCOUNTER
----- Message from MIKHAIL Laboy sent at 5/24/2024 12:57 PM EDT -----  X-ray of the lumbar spine shows arthritis and disc loss at L3-4 L4-5 and L5-S1

## 2024-05-24 NOTE — TELEPHONE ENCOUNTER
Patient came in with [facility] paperwork.  I dropped the charge and collected the $30.00 payment.

## 2024-05-24 NOTE — PROGRESS NOTES
Daily Note     Today's date: 2024  Patient name: Jeremy George  : 1970  MRN: [id number]  Referring provider: Cici Austin CR*  Dx:   Encounter Diagnosis     ICD-10-CM    1. Impaired functional mobility, balance, gait, and endurance  Z74.09       2. Dizziness  R42                              Subjective: reports today is the first day in 3 days that he did not wake up with a headache. He states that his balance feels a little bit off when he is walking at times today. Reports he went to see neurosurgery and they want him to start therapy for his neck.       Objective: See treatment diary below    Assessment: Tolerated treatment fair. Patient would benefit from continued PT. Arrived to therapy session without FWW and demonstrated significant improvement with balance compared to previous sessions. Discussed that during next session will perform progress note and assess neck to see if there is a cervicogenic component to his dizziness, if more orthopedic will have his get therapy at orthopedic clinic. Agreeable to plan. Was able to perform sidestepping with foam beams without UE support, and added tandem walking with foam beams with some difficulty initially, but improved with repetitions. Continues to have difficulty with weight shifting with eyes closed with LOB requiring CGA/min A or UE support in bars with anterior and posterior LOB. Increased pressure in head noted with performing weight shifting and slight headache. Ended session with nustep. Continue to progress as tolerated.      Plan: Continue per plan of care.      Precautions: HTN. Thoracic aneurysm, asthma, c-spine fusion    POC expires Unit limit Auth Expiration date PT/OT/ST + Visit Limit?    BOMN N/a 90 PT/OT/SLP                           Visit/Unit Tracking  AUTH Status:  Date 4/29 5/1 5/6 5/10 5/16 5/21 5/24       None Used 1 2 3 4 5 6 7        Remaining                   Manuals  5/6 5/10 5/16 5/21 5/24    Coordination  "testing  Positional testing   Balance testing (TUG, 5xSTS, Margi) See subjective                                       Neuro Re-Ed          VOR x 1 seated X 10 horiztonal Seated VOR x 1 30\" x 2 each direction        Imaginary targets X 3 each direction X 5 each direction R/L/up/down        Foam EC   Firm EC HT normal JUAN 30\" x 3, with UE support prn, HN 30\" x 2 reps Foam EO 30\" x 3 with NBOS    Firm EC HT/HN 30\" x 2 each Foam beams in // bars (long) sidestepping x 3 laps with 2-finger support Foam beams in // bars (long) sidestepping x 3 laps no UE support    Tandem x 3 laps    Standing circles weight shifting  x 10 each direction EO, EC x 5 each direction with 2-finger support X 10 each direction with EC no UE support X 10 each direction with EO no UE support, EC x 10 each X 10 each direction with EO, no UE support  EC x 10 each direction X 10 each direction with EO, no UE support. EC x 10 each direction    Spot it saccades   Seated horizontal x 6 cards       NBOS  EO 30\"  EC 5\"        tandem    // bars (short) x 3 laps // bars (long) x 3 laps // bars (long) x 3 laps    Step taps    6-inch x 20 each no UEsupport      carioka     // bars (long) x 2 laps               Ther Ex          nustep     L5 for 10 minutes L5 for 10 minutes                                            Ther Activity                              Gait Training                              Education Examination findings, use of ice to control nausea symptoms, symptoms of BPPV, vestibular migraine, follow up with neurology Low tyramine diet, headache triggers, keeping a headache journal, balance systems, decreased visual dependence Migraine symptoms, increase with visual symptoms Migraine symptoms, role of visual symptoms with migraine                               "

## 2024-05-24 NOTE — TELEPHONE ENCOUNTER
S/w pt and advised of lumbar spine xrays. Pt verbalized understanding and appreciation.    Pt advised that he saw PCP on 5/20 and was dx with URI and placed on prednisone, no abx.  Pt reports he is still wheezing but will cb on Tuesday  am with how he is feeling and if he has pain.   Pt scheduled for Cervical MBB on 5/28/24 at 220pm

## 2024-05-28 ENCOUNTER — CLINICAL SUPPORT (OUTPATIENT)
Age: 54
End: 2024-05-28
Payer: COMMERCIAL

## 2024-05-28 ENCOUNTER — TELEPHONE (OUTPATIENT)
Age: 54
End: 2024-05-28

## 2024-05-28 DIAGNOSIS — E53.8 B12 DEFICIENCY: Primary | ICD-10-CM

## 2024-05-28 PROCEDURE — 96372 THER/PROPH/DIAG INJ SC/IM: CPT

## 2024-05-28 RX ADMIN — CYANOCOBALAMIN 1000 MCG: 1000 INJECTION, SOLUTION INTRAMUSCULAR; SUBCUTANEOUS at 11:48

## 2024-05-28 NOTE — TELEPHONE ENCOUNTER
Caller: Heather (Wife)    Doctor/Office: Summer    Call regarding :  Calling to reschedule procedure      Call was transferred to: Procedure scheduling

## 2024-05-29 ENCOUNTER — EVALUATION (OUTPATIENT)
Dept: PHYSICAL THERAPY | Facility: CLINIC | Age: 54
End: 2024-05-29
Payer: COMMERCIAL

## 2024-05-29 DIAGNOSIS — Z74.09 IMPAIRED FUNCTIONAL MOBILITY, BALANCE, GAIT, AND ENDURANCE: Primary | ICD-10-CM

## 2024-05-29 DIAGNOSIS — R42 DIZZINESS: ICD-10-CM

## 2024-05-29 PROCEDURE — 97140 MANUAL THERAPY 1/> REGIONS: CPT

## 2024-05-29 PROCEDURE — 97112 NEUROMUSCULAR REEDUCATION: CPT

## 2024-05-29 NOTE — PROGRESS NOTES
Progress Note     Today's date: 2024  Patient name: Jeremy George  : 1970  MRN: [id number]  Referring provider: Cici Austin CR*  Dx:   Encounter Diagnosis     ICD-10-CM    1. Impaired functional mobility, balance, gait, and endurance  Z74.09       2. Dizziness  R42                           Assessment  24: Jeremy has attended 8 sessions of physical therapy for dizziness with suspected vestibular migraine. Since initial evaluation, Jeremy has made significant progress with outcome measures and mobility tasks, with significant improvements in mobility. He is primarily not needing use of FWW (only required 1 day of use in last 2 weeks). He has met 2/4 short term goals and is making progress with other 2 short term goals at this time. Jeremy did not meet self-selected gait speed goal, however he has progressed from using FWW to no AD at this time. His overall headache severity and dizziness severity appear to be decreasing since starting therapy. Further balance testing was completed during today's progress note, able to tolerate FGA test with score of 16/30 (cut-off of 22/30 for fall risk), self-selected gait speed of 0.54m/s continues to be below cut-off score of 1.0m/s for increased risk of falls. ABC scale significantly improved from 1.25% to 68% since initial evaluation. Further assessed cervical spine during session, with significant restrictions in cervical ROM, posture, and hypertonicity of cervical musculature. Jeremy would benefit from continued physical therapy to decrease dizziness, improve balance, decrease fall risk, improve movement tolerance, improve functional mobility, and return to prior level of function. Continue per current POC 2x/week for 4 weeks    Assessment details: Jeremy George is a 53 year-old male who presents to outpatient physical therapy with persistent dizziness starting about 1 week ago. He was hospitalized from -, with diagnosis of dizziness and gait  instability. During hospitalization, he was treated with migraine cocktail, which appears to have decreased symptoms of headache and dizziness. Upon testing, demonstrated a few beats of R beating nystagmus with R gaze hold, and possible 1-2 beats of L beating nystagmus with L gaze hold (possible direction changing nystagmus, unable to determine). Other HINTS exam (head impulse, test of skew) were negative. Jeremy did have increased dizziness with saccades, with hypometria vertically, which may also be indicative of vestibular migraine. With history of cerebral amyloid angiopathy, his central signs as well as dizziness and mobility difficulties may also be due to these circulatory changes. BPPV testing was not completed during evaluation due to constant symptoms and low likelihood due to subjective complaints of dizziness. Negative head impulse testing with decreased likelihood of peripheral vestibular issue. He is demonstrating significant gait instability noted with decreased gait speed of 0.69m/s, needing use of FWW for mobility (no AD prior to hospitalization), and requiring CGA to min A for mobility due to LOB backwards and to R side. Further testing for coordination, balance, and vestibular testing with MCTSIB to be completed at next session (not completed due to limited time).  Jeremy would benefit from skilled physical therapy to decrease fall risk, improve balance, improve dizziness, improve functional mobility, improve activity tolerance, and return to prior level of function.   Impairments: abnormal gait, activity intolerance, impaired balance, lacks appropriate home exercise program and safety issue  Understanding of Dx/Px/POC: good   Prognosis: good    Goals  ST. Pt will improve gait speed to at least 0.75 m/s with least restrictive device within 4 weeks needed to improve safety with ambulation. NOT MET (less AD)  2. Pt will be able to complete all mobility tasks with SPV and LRAD within 4 weeks  needed to reduce fall risk. ALMOST MET (1 day required assistance)  3. Pt will improve ABC scale to at least 30% within 4 weeks needed to reduce fall risk. MET  4. Pt will demonstrate independence with HEP within 4 weeks. MET    LT. Pt will improve gait speed to at least 0.85m/s with least restrictive device within 8 weeks needed to improve safety with ambulation.  2. Pt will improve ABC scale to at least 50% within 8 weeks needed to reduce fall risk.  3. Pt will improve DHI score to 1 MCID value from 4 week score in 8 weeks.   4. Pt will demonstrate independence with HEP within 8 weeks.        Plan  Patient would benefit from: skilled physical therapy  Planned therapy interventions: balance, neuromuscular re-education, canalith repositioning, patient education, gait training, therapeutic exercise, therapeutic activities and home exercise program  Frequency: 2x week  Duration in weeks: 4  Plan of Care beginning date: 2024  Plan of Care expiration date: 2024  Treatment plan discussed with: patient        Subjective Evaluation    History of Present Illness  24: Reports that he has a bad weekend with headaches, and significant dizziness on Monday. He continues to struggle with balance. Some days are bad and other days are a little better. He states that he has been frustrated that he is stuck in the house most of the time. Having headaches about every day, 6 days out of 7 days. Reports that he is trying to get the neurtek approved to be able to take it. Continues to have pressure and the lights bother him with his headaches. Continues to have neck pain. Feels that it has been contributing to his headaches. Reports that he has a lot of challenge with bending down, causing dizziness and feeling like he is going to fall forward.    Mechanism of injury: Works as a  for [employer], was doing stretching with toe touches, bend down and almost went down head first. Started having lightheadedness,  "headache, and persistent dizziness. Kept increasing with the headaches, states they eliminated that he was having a stroke. Trying to find out if it was the crystals, migraine, or cluster headaches. States that they want him to see an ENT. Was able to move around better on Saturday, Sunday was having a bad day. States that he feels like he is moving, getting nauseous and dizzy. Reports that the dizziness feels more constant, yesterday if he was looking to the R side it felt like he was feeling like he was falling to the R side. Not sure if it was if his eyes were moving or turning his head. Helps to sit still and look straight down and wait for things to settle down, but he continues to feel like he is on a boat that is moving. L side headaches primarily, but can be the whole front. Reports that he feels like everything is moving all the time. Using FWW at this time, did not use prior. Reports that he has been having neck pain in the last 2-2.5 months (has hx of ACDF in 2018).     Reports that he had a shakiness in his hand 2 years ago, and a shaking in his R leg, diagnosed with microhemmorages. States that the symptoms     Per medical chart, \"53 y.o. male with prior vertigo episode 20+ years ago, suspected CAA, headaches, HTN, HLD, DM2, cervical radiculopathy s/p ACDF, tremors, and recent travel to [place] with intermittent dizziness for the past week who presented to hospitals on 4/23/2024 as a stroke alert for persistent dizziness.     Patient has had intermittent dizziness over the past week that only lasted a few minutes. Day of presentation, patient was bending down doing toe touches when he suddenly developed severe/persistent dizziness described as \"swaying\" associated with ambulatory dysfunction and nausea.  Patient also developed a left frontal/parietal headache and chest discomfort.    Result Date: 4/24/2024  Impression: No acute intracranial abnormality. No restricted diffusion to suggest acute ischemia. " Mild scattered periventricular and subcortical foci of white matter T2 hyperintensity which are nonspecific and most likely related to chronic small vessel ischemic changes. Other less like etiologies include demyelinating disease, vasculitis, Lyme and migraines. Reidentified and normal punctate foci of susceptibility artifact are seen in the supratentorial brain centrally at the gray-white junction peripherally in distribution most compatible with cerebral amyloid angiopathy.  Chronic microhemorrhage in the setting of hypertension is less likely with this distribution but not excluded.     Pain  Current pain ratin  At best pain ratin  At worst pain ratin  Location: head  24: headaches 4-5/10 average, best 2/10, worst 7/10        Objective     Concurrent Complaints  Positive for headaches, nausea/motion sickness, tinnitus (pain in R ear, occasional ringing in L ear) and visual change (bluriness). Negative for hearing loss, memory loss and aural fullness  Neuro Exam:     Dizziness  Positive for disequilibrium, vertigo, motion sickness and rocking or swaying.   Negative for oscillopsia, light-headedness and diplopia.     Exacerbating factors  Positive for bending over, turning head and optokinetic movement.   Negative for rolling in bed, looking up (limited due to neck pain), walking and supine to/from sitting.     Symptoms   Intensity at best: 0/10  Intensity at worst: 7/10  Average intensity: 5/10  24: average 3-4/10, best 0/10, worst 7/10    Headaches   Patient reports headaches: Yes.     Oculomotor exam   Oculomotor ROM: WNL  Resting nystagmus: not present   Smooth pursuits: within normal limits  Vertical saccades: normal  Horizontal saccades: normal  Cover test: normal and exo cover from outside  Crossover test: normal    Strength all 5/5 except R hip flexion (pain)  Finger to nose, alt supination, and alt toe taps WNL  VOR Cx WNL, increased dizziness  Tolbert test: negative bilaterally  R  "and L sidelying tests negative, roll test negative bilaterally    Balance Test    Gait speed 0.69m/s with FWW SSGS 0.54m/s no AD and SPV   TU.9 seconds with FWW, CGA min A on turns 20.9 seconds without AD, SPV   FGA: NT    ABC Scale 1.25% 68%   DHI 84 80   3MBWT  10.6 seconds         CERVICAL SPINE  Cervical Palpation: suboccipitals R>L, R upper trap, scalenes  Cervical Posture: rounded shoulders, FHP    Cervical Range of Motion     Flexion WFL    Extension 50% *     Left Right   Lateral Flexion 15 * 15 *   Rotation 45 25 *   More pain R SB> L SB      Upper Cervical Rotation Test: negative bilaterally        Mobility    Sit To Stand Contact Guard Mod I   ambulation CGA-min A due to LOB backwards and to the R side SPV no AD   stairs TBD SPV       Gait Assessment: Decreased gait speed, slow turning, decreased push off, occasional guarded positioning         Precautions: HTN. Thoracic aneurysm, asthma, c-spine fusion      POC expires Unit limit Auth Expiration date PT/OT/ST + Visit Limit?    BOMN N/a 90 PT/OT/SLP                           Visit/Unit Tracking  AUTH Status:  Date 4/29 5/1 5/6 5/10 5/16 5/21 5/24 5/29      None Used 1 2 3 4 5 6 7 8       Remaining                   Manuals  5/6 5/10 5/16 5/21 5/24 5/29   Coordination testing  Positional testing   Balance testing (TUG, 5xSTS, Kiser) See subjective         C-spine       Cervical ROM  Suboccipitals, upper trap                       Neuro Re-Ed       FGA  TUG  3MBWT   VOR x 1 seated X 10 horiztonal Seated VOR x 1 30\" x 2 each direction        Imaginary targets X 3 each direction X 5 each direction R/L/up/down        Foam EC   Firm EC HT normal JUAN 30\" x 3, with UE support prn, HN 30\" x 2 reps Foam EO 30\" x 3 with NBOS    Firm EC HT/HN 30\" x 2 each Foam beams in // bars (long) sidestepping x 3 laps with 2-finger support Foam beams in // bars (long) sidestepping x 3 laps no UE support    Tandem x 3 laps    Standing circles weight " "shifting  x 10 each direction EO, EC x 5 each direction with 2-finger support X 10 each direction with EC no UE support X 10 each direction with EO no UE support, EC x 10 each X 10 each direction with EO, no UE support  EC x 10 each direction X 10 each direction with EO, no UE support. EC x 10 each direction    Spot it saccades   Seated horizontal x 6 cards       NBOS  EO 30\"  EC 5\"        tandem    // bars (short) x 3 laps // bars (long) x 3 laps // bars (long) x 3 laps    Step taps    6-inch x 20 each no UEsupport      carioka     // bars (long) x 2 laps               Ther Ex          nustep     L5 for 10 minutes L5 for 10 minutes                                            Ther Activity                              Gait Training                              Education Examination findings, use of ice to control nausea symptoms, symptoms of BPPV, vestibular migraine, follow up with neurology Low tyramine diet, headache triggers, keeping a headache journal, balance systems, decreased visual dependence Migraine symptoms, increase with visual symptoms Migraine symptoms, role of visual symptoms with migraine   Migraine symptoms, ABC, DHI, progress with therapy, POC                            "

## 2024-05-31 ENCOUNTER — OFFICE VISIT (OUTPATIENT)
Dept: PHYSICAL THERAPY | Facility: CLINIC | Age: 54
End: 2024-05-31
Payer: COMMERCIAL

## 2024-05-31 DIAGNOSIS — R42 DIZZINESS: ICD-10-CM

## 2024-05-31 DIAGNOSIS — Z74.09 IMPAIRED FUNCTIONAL MOBILITY, BALANCE, GAIT, AND ENDURANCE: Primary | ICD-10-CM

## 2024-05-31 PROCEDURE — 97110 THERAPEUTIC EXERCISES: CPT

## 2024-05-31 PROCEDURE — 97140 MANUAL THERAPY 1/> REGIONS: CPT

## 2024-05-31 NOTE — PROGRESS NOTES
"Daily Note     Today's date: 2024  Patient name: Jeremy George  : 1970  MRN: [id number]  Referring provider: Cici Austin CR*  Dx:   Encounter Diagnosis     ICD-10-CM    1. Impaired functional mobility, balance, gait, and endurance  Z74.09       2. Dizziness  R42                      Subjective: reports that he has been having headaches that were intense, but the dizziness wasn't too bad. He states that it felt different than his normal headaches. Reports that his neck was sore like he had a workout. Reports that he put on different sneakers and bent down a few times slowly and didn't get too dizzy.      Objective: See treatment diary below      Assessment: Tolerated treatment fair. Patient demonstrated fatigue post treatment, exhibited good technique with therapeutic exercises, and would benefit from continued PT. Continued to educate on the importance of attempting activities when he is feeling ok and role of fear avoidance with increased sensitivity and increased dizziness. Added c-spine stretching to increase ROM, significant pain with upper trap stretch on L side. Session focused on performing exercises in which patient could perform at home to increase ROM and posture. Continue to progress as tolerated.       Plan: Continue per plan of care.      Precautions: HTN. Thoracic aneurysm, asthma, c-spine fusion    POC expires Unit limit Auth Expiration date PT/OT/ST + Visit Limit?    BOMN N/a 90 PT/OT/SLP                           Visit/Unit Tracking  AUTH Status:  Date 4/29 5/1 5/6 5/10 5/16 5/21 5/24 5/29 5/31     None Used 1 2 3 4 5 6 7 8 9      Remaining                 Manuals 5/6 5/10 5/16 5/21 5/24 5/29 5/31             C-spine      Cervcal ROM  Suboccipitals, upper trap SOR, upper trap, gentle cervical distraction                       Neuro Re-Ed      FGA  TUG  3MBWT Chin tucks 3\" hold x 10 reps   VOR x 1 seated Seated VOR x 1 30\" x 2 each direction         Imaginary targets X 5 " "each direction R/L/up/down         Foam EC  Firm EC HT normal JUAN 30\" x 3, with UE support prn, HN 30\" x 2 reps Foam EO 30\" x 3 with NBOS    Firm EC HT/HN 30\" x 2 each Foam beams in // bars (long) sidestepping x 3 laps with 2-finger support Foam beams in // bars (long) sidestepping x 3 laps no UE support    Tandem x 3 laps     Standing circles weight shifting x 10 each direction EO, EC x 5 each direction with 2-finger support X 10 each direction with EC no UE support X 10 each direction with EO no UE support, EC x 10 each X 10 each direction with EO, no UE support  EC x 10 each direction X 10 each direction with EO, no UE support. EC x 10 each direction     Spot it saccades  Seated horizontal x 6 cards        NBOS EO 30\"  EC 5\"         tandem   // bars (short) x 3 laps // bars (long) x 3 laps // bars (long) x 3 laps     Step taps   6-inch x 20 each no UEsupport       carioka    // bars (long) x 2 laps                Ther Ex          nustep    L5 for 10 minutes L5 for 10 minutes  L6 for 10 minutes   C-spine stretching       Upper trap stretch 30\" x 3 each side    Pec strech supine 30\" x 3 reps     Cervical ROM       Supine rotation x 10 each direction                       Ther Activity                              Gait Training                              Education Low tyramine diet, headache triggers, keeping a headache journal, balance systems, decreased visual dependence Migraine symptoms, increase with visual symptoms Migraine symptoms, role of visual symptoms with migraine   Migraine symptoms, ABC, DHI, progress with therapy, POC                             "

## 2024-06-03 ENCOUNTER — OFFICE VISIT (OUTPATIENT)
Age: 54
End: 2024-06-03
Payer: COMMERCIAL

## 2024-06-03 ENCOUNTER — TELEPHONE (OUTPATIENT)
Dept: NEUROLOGY | Facility: CLINIC | Age: 54
End: 2024-06-03

## 2024-06-03 VITALS
HEART RATE: 67 BPM | DIASTOLIC BLOOD PRESSURE: 60 MMHG | OXYGEN SATURATION: 97 % | HEIGHT: 73 IN | WEIGHT: 265.4 LBS | TEMPERATURE: 97.6 F | SYSTOLIC BLOOD PRESSURE: 116 MMHG | BODY MASS INDEX: 35.17 KG/M2

## 2024-06-03 DIAGNOSIS — R51.9 ACUTE NONINTRACTABLE HEADACHE, UNSPECIFIED HEADACHE TYPE: ICD-10-CM

## 2024-06-03 DIAGNOSIS — R73.01 ELEVATED FASTING GLUCOSE: ICD-10-CM

## 2024-06-03 DIAGNOSIS — I71.20 THORACIC AORTIC ANEURYSM WITHOUT RUPTURE, UNSPECIFIED PART (HCC): ICD-10-CM

## 2024-06-03 DIAGNOSIS — Z13.228 SCREENING FOR METABOLIC DISORDER: Primary | ICD-10-CM

## 2024-06-03 DIAGNOSIS — I10 HYPERTENSION, ESSENTIAL: ICD-10-CM

## 2024-06-03 DIAGNOSIS — E53.8 B12 DEFICIENCY: ICD-10-CM

## 2024-06-03 DIAGNOSIS — E78.5 HYPERLIPIDEMIA, UNSPECIFIED HYPERLIPIDEMIA TYPE: ICD-10-CM

## 2024-06-03 DIAGNOSIS — R42 DIZZINESS: ICD-10-CM

## 2024-06-03 PROCEDURE — 96372 THER/PROPH/DIAG INJ SC/IM: CPT

## 2024-06-03 PROCEDURE — 99214 OFFICE O/P EST MOD 30 MIN: CPT | Performed by: NURSE PRACTITIONER

## 2024-06-03 RX ORDER — LISINOPRIL AND HYDROCHLOROTHIAZIDE 20; 12.5 MG/1; MG/1
1 TABLET ORAL DAILY
Qty: 90 TABLET | Refills: 1 | Status: SHIPPED | OUTPATIENT
Start: 2024-06-03

## 2024-06-03 RX ADMIN — CYANOCOBALAMIN 1000 MCG: 1000 INJECTION, SOLUTION INTRAMUSCULAR; SUBCUTANEOUS at 09:42

## 2024-06-03 NOTE — ASSESSMENT & PLAN NOTE
Continue with weekly B12 injections  We will continue them in office weekly unless transportation becomes an issue, patient has family members who would be able to administer this injection

## 2024-06-03 NOTE — PROGRESS NOTES
Assessment/Plan:    Hypertension, essential  BP on the softer side  To 1 tablet of lisinopril-hydrochlorothiazide daily instead of 2, continue to monitor blood pressures    B12 deficiency  Continue with weekly B12 injections  We will continue them in office weekly unless transportation becomes an issue, patient has family members who would be able to administer this injection    Dizziness  -Continues to work with PT  -Has been evaluated by ENT  -Currently working with neurology and neurosurgery    Headache  Occipital nerve block completed while inpatient with significant relief  He will continue on Topamax and Nurtec  Continue to follow with neurology              Diagnoses and all orders for this visit:    Screening for metabolic disorder  -     Comprehensive metabolic panel; Future  -     CBC and differential; Future  -     Lipid panel; Future    Hypertension, essential  -     lisinopril-hydrochlorothiazide (PRINZIDE,ZESTORETIC) 20-12.5 MG per tablet; Take 1 tablet by mouth daily    Thoracic aortic aneurysm without rupture, unspecified part (HCC)    Elevated fasting glucose  -     Hemoglobin A1C; Future    Hyperlipidemia, unspecified hyperlipidemia type  -     Lipid panel; Future    B12 deficiency  -     Vitamin B12; Future    Dizziness    Acute nonintractable headache, unspecified headache type          Subjective:      Patient ID: Jeremy George is a 53 y.o. male.    Patient presents today to follow-up on dizziness and headache.    6/3/24: He was seen by ENT who felt that this may be migraines with associated vertigo, he was also evaluated by neurology who had increased his topiramate prescription, advised to start taking magnesium oxide, start Nurtec, and to be evaluated by sleep medicine for obstructive sleep apnea and ophthalmology for dilated eye exam  He was also evaluated by neurosurgery who ordered x-ray of his cervical spine    He reports he continues with dizziness on a daily basis  He reports he has  "been having headaches on a daily basis         5/20/24: He reports that he was seen at urgent care on 5/15 for upper respiratory infection he tested negative for flu and COVID.  Patient reports wheezing and a dry cough.  For which she has been taking Mucinex for.  He reports on and off dizzy spells, but dizziness has significantly improved with intermittent bouts, he reports that at times they come on so intense that he feels unsteady.  He has been taking Flonase and antihistamine, and continues with physical therapy.  He also reports new symptoms of taste and smell being off since his dizziness spells have started.  He reports that food does not taste great and gets foul-smelling odors.  Disability forms filled out while in office today    In regards to his headaches he reports that he gets a headache approximately 3 times a week at 3 out of 10 pain, controlled with sumatriptan and Topamax      Saw ENT 5/17  Had CT:  OTHER FINDINGS: Visualized brain, globes and orbits, face and neck soft tissues are within normal limits. Paranasal sinuses are well aerated.     IMPRESSION:     Normal appearance of the temporal bones.    5/6/24:  At last office visit patient was started on antihistamine Flonase and as needed meclizine, Nurtec was not covered by insurance and he was started on sumatriptan hand in addition to Topamax  He continues with physical therapy, was unable to obtain a sooner appointment with neurology, has appointment scheduled with ENT on May 9    Headaches: had a headache on Saturday took sumatriptan x2 with no relief, then took ibuprofen and the headache was relieved          Details of last office visit:  Hospital Course as per Dr. Stacy:   \"Jeremy George is a 53 y.o. male patient who originally presented to the hospital on 4/23/2024 due to a stroke alert for persistent dizziness. The patient has a past medical history of HTN,, cervical radiculopathy status post ACDF, suspected unspecified tremors, and " migraines.  The patient states they recently traveled to [place] and believes that he may have gotten a bug bite which led to an becoming dizzy.  The patient reports that his dizziness as swaying from side-to-side.  The patient reports this usually occurred while standing.  The patient states that the dizziness can last a few minutes and then it was a the morning of presentation the patient had woken up in his normal state and around 7:20 AM while at work he was stretching his back but attributes to the send he developed severe dizziness described as swaying. Nothing made the dizziness better, looking to the right mid the dizziness worse.  The patient was unable to ambulate and he fell like he was examined to the right.  He had developed a left frontal/parietal headache and nausea.  Headache was similar to his prior headaches.  The patient did report a history of vertigo 20 years prior which at that time was far worse than what he is experiencing at this time.  Patient was brought to the ED as a stroke alert on the 23rd.  NIH stroke scale is 0, blood pressure was 166/94, CT head was negative for any acute intracranial abnormalities, CTA head and neck was negative for any LVO, dissection, aneurysm or high-grade stenosis.  The patient did have some nystagmus to the right did have 6 polyps with heavy assessment.  No skew deviation.  Patient will not TNK candidate medial initially presented CAA and higher suspicion for peripheral vestibulopathy.  The patient did develop severe chest pain radiating to his neck and right jaw and he had a cardiac workup while in the ED.  The patient denied any hearing loss tinnitus or recent illnesses.  The patient was admitted to the hospital.  Throughout her stay the patient received migraine cocktails every 8 hours.  The first day he did receive 4 g of magnesium.  Subsequent to the rated 2 days to give him 2 g of magnesium daily.  The patient was continued on migraine cocktail and  "then Nurtec was also added.  The patient did well on the Nurtec and reported that it helped.  On 4/26/2024 and occipital nerve block; which the patient reported significantly helped him.  Open note the patient did have a vitamin B12 deficiency for which we started him on replenishment via weekly B12 injections.  The patient will continue the weekly B12 injections with his primary care doctor as his wife is going to transport him there weekly.  The patient's headache resolved to a 0-1/10 on 4/27/2024.  The patient's dizziness persisted but he reported that it has significantly improved.  The patient was ready for discharge home.  Will discharge the patient home with a follow-up outpatient with physical therapy for vestibular therapy.\"    He reports that his headaches have been intermittent, and has improved     Has been using the walker due to the dizziness     His BP has been in control, he has been monitoring at home -140s    He presents today with his son, as he is having trouble ambulating due to the dizziness             The following portions of the patient's history were reviewed and updated as appropriate: allergies, current medications, past family history, past medical history, past social history, past surgical history, and problem list.    Review of Systems   Constitutional:  Negative for activity change, appetite change, chills, diaphoresis and fever.   HENT:  Negative for congestion, ear discharge, ear pain, postnasal drip, rhinorrhea, sinus pressure, sinus pain and sore throat.    Eyes:  Negative for pain, discharge, itching and visual disturbance.   Respiratory:  Negative for cough, chest tightness, shortness of breath and wheezing.    Cardiovascular:  Negative for chest pain, palpitations and leg swelling.   Gastrointestinal:  Negative for abdominal pain, constipation, diarrhea, nausea and vomiting.   Endocrine: Negative for polydipsia, polyphagia and polyuria.   Genitourinary:  Negative for " difficulty urinating, dysuria and urgency.   Musculoskeletal:  Negative for arthralgias, back pain and neck pain.   Skin:  Negative for rash and wound.   Neurological:  Positive for dizziness and headaches. Negative for weakness and numbness.         Past Medical History:   Diagnosis Date    Achalasia     Aneurysm (HCC)     aortic    Asthma     Childhood    Colon polyp     Diabetes mellitus (HCC)     Esophageal ulcer     Fatty liver     Groin abscess     with I &D    Hyperlipidemia     Hypertension     Plantar fasciitis          Current Outpatient Medications:     albuterol (ProAir HFA) 90 mcg/act inhaler, Inhale 2 puffs every 6 (six) hours as needed for wheezing, Disp: 8.5 g, Rfl: 0    Ascorbic Acid (vitamin C) 1000 MG tablet, Take 1,000 mg by mouth daily  , Disp: , Rfl:     baclofen 20 mg tablet, Take 1 tablet (20 mg total) by mouth daily at bedtime as needed for muscle spasms, Disp: 90 tablet, Rfl: 1    cetirizine (ZyrTEC) 10 mg tablet, Take 1 tablet (10 mg total) by mouth daily, Disp: 90 tablet, Rfl: 0    cyanocobalamin 1,000 mcg/mL, Inject 1 mL (1,000 mcg total) into a muscle every 7 days for 9 doses, Disp: 1 mL, Rfl: 0    famotidine (PEPCID) 20 mg tablet, Take 1 tablet (20 mg total) by mouth daily, Disp: 90 tablet, Rfl: 1    fluticasone (FLONASE) 50 mcg/act nasal spray, 2 sprays into each nostril daily, Disp: 18 mL, Rfl: 1    lisinopril-hydrochlorothiazide (PRINZIDE,ZESTORETIC) 20-12.5 MG per tablet, Take 1 tablet by mouth daily, Disp: 90 tablet, Rfl: 1    magnesium Oxide (MAG-OX) 400 mg TABS, Take 1 tablet (400 mg total) by mouth daily, Disp: 90 tablet, Rfl: 0    metFORMIN (GLUCOPHAGE) 500 mg tablet, Pt takes 1500 mg (3 tablets) by mouth in the morning, and a 1000 mg (2 tablets) in the evening, Disp: 450 tablet, Rfl: 1    Multiple Vitamins-Minerals (CENTRUM SILVER 50+MEN PO), Take by mouth, Disp: , Rfl:     omeprazole (PriLOSEC) 40 MG capsule, Take 1 capsule (40 mg total) by mouth daily (Patient taking  differently: Take 40 mg by mouth daily Still has to start), Disp: 90 capsule, Rfl: 3    polyethylene glycol (MiraLax) 17 g packet, Take 17 g by mouth daily, Disp: 90 each, Rfl: 5    pregabalin (LYRICA) 75 mg capsule, Take 2 capsules (150 mg total) by mouth daily at bedtime (Patient taking differently: Take 150 mg by mouth daily at bedtime Bedtime), Disp: 540 capsule, Rfl: 0    rimegepant sulfate (Nurtec) 75 mg TBDP, Take 1 tablet by mouth as needed.  Do not exceed more than 1 dose in 24 hours or 3 doses in one week, Disp: 10 tablet, Rfl: 0    simvastatin (ZOCOR) 20 mg tablet, Take 1 tablet (20 mg total) by mouth daily, Disp: 30 tablet, Rfl: 0    sitaGLIPtin (JANUVIA) 100 mg tablet, Take 1 tablet (100 mg total) by mouth daily, Disp: 90 tablet, Rfl: 1    topiramate (TOPAMAX) 100 mg tablet, Take 1 tablet (100 mg total) by mouth daily at bedtime, Disp: 30 tablet, Rfl: 6    benzonatate (TESSALON PERLES) 100 mg capsule, Take 1 capsule (100 mg total) by mouth 3 (three) times a day as needed for cough (Patient not taking: Reported on 6/3/2024), Disp: 20 capsule, Rfl: 0    CINNAMON PO, Take by mouth (Patient not taking: Reported on 5/21/2024), Disp: , Rfl:     diphenhydrAMINE-acetaminophen (TYLENOL PM)  MG TABS, Take 2 tablets by mouth daily at bedtime as needed for sleep 1000 mg in total (Patient not taking: Reported on 5/15/2024), Disp: , Rfl:     meclizine (ANTIVERT) 25 mg tablet, Take 1 tablet (25 mg total) by mouth 3 (three) times a day as needed for dizziness (Patient not taking: Reported on 6/3/2024), Disp: 30 tablet, Rfl: 0    Current Facility-Administered Medications:     cyanocobalamin injection 1,000 mcg, 1,000 mcg, Intramuscular, Q30 Days, MIKHAIL Ha, 1,000 mcg at 06/03/24 0942    No Known Allergies    Social History   Past Surgical History:   Procedure Laterality Date    ANTERIOR CERVICAL DISCECTOMY W/ FUSION      APPENDECTOMY      BICEPS TENDON REPAIR      EGD AND COLONOSCOPY      FL  "INJECTION LEFT WRIST (ARTHROGRAM)  11/9/2021    FL MYELOGRAM CERVICAL  6/13/2018    MENISCECTOMY      MYOTOMY HELLER LAPAROSCOPIC      ORTHOPEDIC SURGERY       Family History   Problem Relation Age of Onset    No Known Problems Mother     No Known Problems Father        Objective:  /60 (BP Location: Left arm, Patient Position: Sitting, Cuff Size: Standard)   Pulse 67   Temp 97.6 °F (36.4 °C) (Temporal)   Ht 6' 0.84\" (1.85 m)   Wt 120 kg (265 lb 6.4 oz)   SpO2 97%   BMI 35.17 kg/m²     Recent Results (from the past 1344 hour(s))   Fingerstick Glucose (POCT)    Collection Time: 04/23/24  8:27 AM   Result Value Ref Range    POC Glucose 199 (H) 65 - 140 mg/dl   ECG 12 lead    Collection Time: 04/23/24 10:05 AM   Result Value Ref Range    Ventricular Rate 66 BPM    Atrial Rate 66 BPM    CT Interval 122 ms    QRSD Interval 92 ms    QT Interval 396 ms    QTC Interval 415 ms    P Axis 1 degrees    QRS Axis 244 degrees    T Wave Axis 37 degrees   CBC and differential    Collection Time: 04/23/24 10:23 AM   Result Value Ref Range    WBC 5.94 4.31 - 10.16 Thousand/uL    RBC 4.86 3.88 - 5.62 Million/uL    Hemoglobin 15.0 12.0 - 17.0 g/dL    Hematocrit 44.6 36.5 - 49.3 %    MCV 92 82 - 98 fL    MCH 30.9 26.8 - 34.3 pg    MCHC 33.6 31.4 - 37.4 g/dL    RDW 11.9 11.6 - 15.1 %    MPV 10.7 8.9 - 12.7 fL    Platelets 173 149 - 390 Thousands/uL    nRBC 0 /100 WBCs    Segmented % 50 43 - 75 %    Immature Grans % 1 0 - 2 %    Lymphocytes % 32 14 - 44 %    Monocytes % 14 (H) 4 - 12 %    Eosinophils Relative 2 0 - 6 %    Basophils Relative 1 0 - 1 %    Absolute Neutrophils 3.03 1.85 - 7.62 Thousands/µL    Absolute Immature Grans 0.04 0.00 - 0.20 Thousand/uL    Absolute Lymphocytes 1.92 0.60 - 4.47 Thousands/µL    Absolute Monocytes 0.80 0.17 - 1.22 Thousand/µL    Eosinophils Absolute 0.10 0.00 - 0.61 Thousand/µL    Basophils Absolute 0.05 0.00 - 0.10 Thousands/µL   Comprehensive metabolic panel    Collection Time: 04/23/24 " "10:23 AM   Result Value Ref Range    Sodium 136 135 - 147 mmol/L    Potassium 4.4 3.5 - 5.3 mmol/L    Chloride 101 96 - 108 mmol/L    CO2 27 21 - 32 mmol/L    ANION GAP 8 4 - 13 mmol/L    BUN 16 5 - 25 mg/dL    Creatinine 0.79 0.60 - 1.30 mg/dL    Glucose 138 65 - 140 mg/dL    Calcium 9.5 8.4 - 10.2 mg/dL    AST 34 13 - 39 U/L    ALT 72 (H) 7 - 52 U/L    Alkaline Phosphatase 65 34 - 104 U/L    Total Protein 6.9 6.4 - 8.4 g/dL    Albumin 4.4 3.5 - 5.0 g/dL    Total Bilirubin 0.51 0.20 - 1.00 mg/dL    eGFR 101 ml/min/1.73sq m   HS Troponin 0hr (reflex protocol)    Collection Time: 04/23/24 10:23 AM   Result Value Ref Range    hs TnI 0hr 4 \"Refer to ACS Flowchart\"- see link ng/L   Protime-INR    Collection Time: 04/23/24 10:23 AM   Result Value Ref Range    Protime 12.9 11.6 - 14.5 seconds    INR 0.98 0.84 - 1.19   APTT    Collection Time: 04/23/24 10:23 AM   Result Value Ref Range    PTT 26 23 - 37 seconds   [place] Total AB W Reflex to IGM/IGG    Collection Time: 04/23/24 10:51 AM   Result Value Ref Range    [place] Total Antibodies Negative Negative   FLU/RSV/COVID - if FLU/RSV clinically relevant    Collection Time: 04/23/24 11:25 AM    Specimen: Nose; Nares   Result Value Ref Range    SARS-CoV-2 Negative Negative    INFLUENZA A PCR Negative Negative    INFLUENZA B PCR Negative Negative    RSV PCR Negative Negative   ECG 12 lead    Collection Time: 04/23/24 11:33 AM   Result Value Ref Range    Ventricular Rate 68 BPM    Atrial Rate 68 BPM    SC Interval 170 ms    QRSD Interval 100 ms    QT Interval 394 ms    QTC Interval 418 ms    P Axis 45 degrees    QRS Axis 236 degrees    T Wave [place] 54 degrees   HS Troponin I 2hr    Collection Time: 04/23/24 12:05 PM   Result Value Ref Range    hs TnI 2hr 3 \"Refer to ACS Flowchart\"- see link ng/L    Delta 2hr hsTnI -1 <20 ng/L   Fingerstick Glucose (POCT)    Collection Time: 04/23/24  4:55 PM   Result Value Ref Range    POC Glucose 121 65 - 140 mg/dl   HS Troponin I 4hr    Collection " "Time: 04/23/24  5:18 PM   Result Value Ref Range    hs TnI 4hr 5 \"Refer to ACS Flowchart\"- see link ng/L    Delta 4hr hsTnI 1 <20 ng/L   Fingerstick Glucose (POCT)    Collection Time: 04/23/24  8:54 PM   Result Value Ref Range    POC Glucose 177 (H) 65 - 140 mg/dl   Fingerstick Glucose (POCT)    Collection Time: 04/24/24  6:27 AM   Result Value Ref Range    POC Glucose 179 (H) 65 - 140 mg/dl   Vitamin B12    Collection Time: 04/24/24 10:42 AM   Result Value Ref Range    Vitamin B-12 237 180 - 914 pg/mL   Hemoglobin A1C    Collection Time: 04/24/24 10:42 AM   Result Value Ref Range    Hemoglobin A1C 6.8 (H) Normal 4.0-5.6%; PreDiabetic 5.7-6.4%; Diabetic >=6.5%; Glycemic control for adults with diabetes <7.0% %     mg/dl   Fingerstick Glucose (POCT)    Collection Time: 04/24/24 10:55 AM   Result Value Ref Range    POC Glucose 169 (H) 65 - 140 mg/dl   Fingerstick Glucose (POCT)    Collection Time: 04/24/24  4:24 PM   Result Value Ref Range    POC Glucose 216 (H) 65 - 140 mg/dl   Fingerstick Glucose (POCT)    Collection Time: 04/24/24  9:32 PM   Result Value Ref Range    POC Glucose 150 (H) 65 - 140 mg/dl   Fingerstick Glucose (POCT)    Collection Time: 04/25/24  5:59 AM   Result Value Ref Range    POC Glucose 194 (H) 65 - 140 mg/dl   Lipase    Collection Time: 04/25/24  9:50 AM   Result Value Ref Range    Lipase 83 (H) 11 - 82 u/L   CBC and Platelet    Collection Time: 04/25/24  9:50 AM   Result Value Ref Range    WBC 5.44 4.31 - 10.16 Thousand/uL    RBC 4.61 3.88 - 5.62 Million/uL    Hemoglobin 14.3 12.0 - 17.0 g/dL    Hematocrit 42.2 36.5 - 49.3 %    MCV 92 82 - 98 fL    MCH 31.0 26.8 - 34.3 pg    MCHC 33.9 31.4 - 37.4 g/dL    RDW 11.9 11.6 - 15.1 %    Platelets 177 149 - 390 Thousands/uL    MPV 10.6 8.9 - 12.7 fL   Comprehensive metabolic panel    Collection Time: 04/25/24  9:50 AM   Result Value Ref Range    Sodium 136 135 - 147 mmol/L    Potassium 4.1 3.5 - 5.3 mmol/L    Chloride 104 96 - 108 mmol/L    CO2 " 27 21 - 32 mmol/L    ANION GAP 5 4 - 13 mmol/L    BUN 16 5 - 25 mg/dL    Creatinine 0.79 0.60 - 1.30 mg/dL    Glucose 145 (H) 65 - 140 mg/dL    Calcium 8.8 8.4 - 10.2 mg/dL    AST 36 13 - 39 U/L    ALT 74 (H) 7 - 52 U/L    Alkaline Phosphatase 48 34 - 104 U/L    Total Protein 6.4 6.4 - 8.4 g/dL    Albumin 3.9 3.5 - 5.0 g/dL    Total Bilirubin 0.51 0.20 - 1.00 mg/dL    eGFR 101 ml/min/1.73sq m   Fingerstick Glucose (POCT)    Collection Time: 04/25/24 10:53 AM   Result Value Ref Range    POC Glucose 168 (H) 65 - 140 mg/dl   Fingerstick Glucose (POCT)    Collection Time: 04/25/24  3:17 PM   Result Value Ref Range    POC Glucose 151 (H) 65 - 140 mg/dl   Fingerstick Glucose (POCT)    Collection Time: 04/25/24  9:17 PM   Result Value Ref Range    POC Glucose 288 (H) 65 - 140 mg/dl   Fingerstick Glucose (POCT)    Collection Time: 04/26/24  6:25 AM   Result Value Ref Range    POC Glucose 200 (H) 65 - 140 mg/dl   Fingerstick Glucose (POCT)    Collection Time: 04/26/24  8:48 AM   Result Value Ref Range    POC Glucose 294 (H) 65 - 140 mg/dl   ECG 12 lead    Collection Time: 04/26/24 10:41 AM   Result Value Ref Range    Ventricular Rate 72 BPM    Atrial Rate 72 BPM    [place] Interval 170 ms    QRSD Interval 96 ms    QT Interval 404 ms    QTC Interval 442 ms    P Axis 40 degrees    QRS Axis 201 degrees    T Wave Axis 64 degrees   ECG 12 lead    Collection Time: 04/26/24 10:41 AM   Result Value Ref Range    Ventricular Rate 69 BPM    Atrial Rate 69 BPM    [place] Interval 174 ms    QRSD Interval 96 ms    QT Interval 406 ms    QTC Interval 435 ms    P Axis 28 degrees    QRS Axis 216 degrees    T Wave Axis 64 degrees   Fingerstick Glucose (POCT)    Collection Time: 04/26/24 11:22 AM   Result Value Ref Range    POC Glucose 209 (H) 65 - 140 mg/dl   Fingerstick Glucose (POCT)    Collection Time: 04/26/24  4:04 PM   Result Value Ref Range    POC Glucose 306 (H) 65 - 140 mg/dl   Fingerstick Glucose (POCT)    Collection Time: 04/27/24  6:30 AM    Result Value Ref Range    POC Glucose 217 (H) 65 - 140 mg/dl   Fingerstick Glucose (POCT)    Collection Time: 04/27/24 11:10 AM   Result Value Ref Range    POC Glucose 258 (H) 65 - 140 mg/dl   Wheeled Walker    Collection Time: 04/27/24  2:17 PM   Result Value Ref Range    Supplier Name [employer] [facility]     Supplier Phone Number [phone]     Order Status Delivery Successful     Delivery Note      Delivery Request Date 04/27/2024     Date Delivered  04/27/2024     Item Description Wheeled Walker, Adult    Shower Chair with Back [$]    Collection Time: 04/27/24  2:24 PM   Result Value Ref Range    Supplier Name [employer]     Supplier Phone Number [phone]     Order Status Delivery Successful     Delivery Note      Delivery Request Date 04/28/2024     Date Delivered  04/30/2024     Item Description Shower Chair (With Back) [$]    POCT rapid ANTIGEN strepA    Collection Time: 05/15/24 11:21 AM   Result Value Ref Range     RAPID STREP A Negative Negative   Throat culture    Collection Time: 05/15/24 12:18 PM    Specimen: Throat   Result Value Ref Range    Throat Culture Negative for beta-hemolytic Streptococcus    COVID/FLU    Collection Time: 05/15/24 12:18 PM    Specimen: Nose; Nares   Result Value Ref Range    SARS-CoV-2 Negative Negative    INFLUENZA A PCR Negative Negative    INFLUENZA B PCR Negative Negative            Physical Exam  Constitutional:       General: He is not in acute distress.     Appearance: He is well-developed. He is not diaphoretic.   HENT:      Head: Normocephalic and atraumatic.      Right Ear: External ear normal.      Left Ear: External ear normal.      Nose: Nose normal.      Mouth/Throat:      Mouth: Mucous membranes are moist.      Pharynx: No oropharyngeal exudate or posterior oropharyngeal erythema.   Eyes:      General:         Right eye: No discharge.         Left eye: No discharge.      Conjunctiva/sclera: Conjunctivae normal.       Pupils: Pupils are equal, round, and reactive to light.   Neck:      Thyroid: No thyromegaly.   Cardiovascular:      Rate and Rhythm: Normal rate and regular rhythm.      Heart sounds: Normal heart sounds. No murmur heard.     No friction rub. No gallop.   Pulmonary:      Effort: Pulmonary effort is normal. No respiratory distress.      Breath sounds: Normal breath sounds. No stridor. No wheezing or rales.   Abdominal:      General: Bowel sounds are normal. There is no distension.      Palpations: Abdomen is soft.      Tenderness: There is no abdominal tenderness.   Musculoskeletal:      Cervical back: Normal range of motion and neck supple.   Lymphadenopathy:      Cervical: No cervical adenopathy.   Skin:     General: Skin is warm and dry.      Findings: No erythema or rash.   Neurological:      Mental Status: He is alert and oriented to person, place, and time.   Psychiatric:         Behavior: Behavior normal.         Thought Content: Thought content normal.         Judgment: Judgment normal.

## 2024-06-03 NOTE — ASSESSMENT & PLAN NOTE
-Continues to work with PT  -Has been evaluated by ENT  -Currently working with neurology and neurosurgery

## 2024-06-03 NOTE — ASSESSMENT & PLAN NOTE
Occipital nerve block completed while inpatient with significant relief  He will continue on Topamax and Nurtec  Continue to follow with neurology

## 2024-06-03 NOTE — ASSESSMENT & PLAN NOTE
BP on the softer side  To 1 tablet of lisinopril-hydrochlorothiazide daily instead of 2, continue to monitor blood pressures

## 2024-06-04 ENCOUNTER — TELEPHONE (OUTPATIENT)
Dept: NEUROLOGY | Facility: CLINIC | Age: 54
End: 2024-06-04

## 2024-06-04 NOTE — TELEPHONE ENCOUNTER
06/03/24 and 6/4/24 (status inquires).  Asked Princess Gibson  about the status of the Disability and Leave Providers statement and gave her the 3 page copy.  Jessy Fletcher has a copy of the form which is due 6/26/24.

## 2024-06-04 NOTE — TELEPHONE ENCOUNTER
6/3/24, 3:56    Brianna RN w/ [employer] left a vm req PA be initiated asap through [employer] [phone]. Cb [phone]    Urgent PA initiated through [facility]. Key: [id number]  CaseId:[id number];Status:Approved;Review Type:Prior Auth;Coverage Start Date:05/05/2024;Coverage End Date:06/04/2025;     Called [employer] and left a detailed message on their answering machine letting them know of the approval and to contact pt when the med is ready for . Cb if any questions.

## 2024-06-05 ENCOUNTER — TELEPHONE (OUTPATIENT)
Age: 54
End: 2024-06-05

## 2024-06-05 ENCOUNTER — OFFICE VISIT (OUTPATIENT)
Dept: PHYSICAL THERAPY | Facility: CLINIC | Age: 54
End: 2024-06-05
Payer: COMMERCIAL

## 2024-06-05 DIAGNOSIS — Z74.09 IMPAIRED FUNCTIONAL MOBILITY, BALANCE, GAIT, AND ENDURANCE: Primary | ICD-10-CM

## 2024-06-05 DIAGNOSIS — R42 DIZZINESS: ICD-10-CM

## 2024-06-05 PROCEDURE — 97140 MANUAL THERAPY 1/> REGIONS: CPT

## 2024-06-05 PROCEDURE — 97110 THERAPEUTIC EXERCISES: CPT

## 2024-06-05 PROCEDURE — 97112 NEUROMUSCULAR REEDUCATION: CPT

## 2024-06-05 NOTE — PROGRESS NOTES
"Daily Note     Today's date: 2024  Patient name: Jeremy George  : 1970  MRN: [id number]  Referring provider: Cici Austin CR*  Dx:   Encounter Diagnosis     ICD-10-CM    1. Impaired functional mobility, balance, gait, and endurance  Z74.09       2. Dizziness  R42                        Subjective: reports that the dizziness has not been as constant, has been more coming and going. Reports it has not been as constant. The headaches have been worse. Yesterday was a good day. Has been massaging the back of his neck and has been stretching and has been having muscle soreness. He reports that doing the neck exercises are \"doing something\". States the neck is feeling more like a tenderness from a workout and not as much of the stabbing pain.       Objective: See treatment diary below      Assessment: Tolerated treatment fair. Patient demonstrated fatigue post treatment, exhibited good technique with therapeutic exercises, and would benefit from continued PT. Pain with lev scap stretch on R side with stretching on L side, discontinued. Manual therapy with increased hypertonicity at R suboccipitals, R upper trap, and R scalenes, slightly decreased post manual therapy. Demonstrated improvement with weight shifting with EO, EC with difficulty going in counterclockwise direction. Continue to progress as tolerated.      Plan: Continue per plan of care.      Precautions: HTN. Thoracic aneurysm, asthma, c-spine fusion    POC expires Unit limit Auth Expiration date PT/OT/ST + Visit Limit?    BOMN N/a 90 PT/OT/SLP                           Visit/Unit Tracking  AUTH Status:  Date 4/29 5/1 5/6 5/10 5/16 5/21 5/24 5/29 5/31 6    None Used 1 2 3 4 5 6 7 8 9 10     Remaining                 Manuals 5/10 5/16 5/21 5/24 5/29 5/31 6             C-spine     Cervcal ROM  Suboccipitals, upper trap SOR, upper trap, gentle cervical distraction SOR, upper trap, R 1st rib inferior mob                       Neuro Re-Ed " "    FGA  TUG  3MBWT Chin tucks 3\" hold x 10 reps Chin tucks 3\" hold x 10 reps    Chin tuck with BP cuff 20mmHg to 22mmHg 5\" hold x 10 reps   VOR x 1 seated          Imaginary targets          Foam EC Firm EC HT normal JUAN 30\" x 3, with UE support prn, HN 30\" x 2 reps Foam EO 30\" x 3 with NBOS    Firm EC HT/HN 30\" x 2 each Foam beams in // bars (long) sidestepping x 3 laps with 2-finger support Foam beams in // bars (long) sidestepping x 3 laps no UE support    Tandem x 3 laps      Standing circles weight shifting X 10 each direction with EC no UE support X 10 each direction with EO no UE support, EC x 10 each X 10 each direction with EO, no UE support  EC x 10 each direction X 10 each direction with EO, no UE support. EC x 10 each direction   X 5 each direction with EO, x 10 each direction with EC   Spot it saccades Seated horizontal x 6 cards         NBOS          tandem  // bars (short) x 3 laps // bars (long) x 3 laps // bars (long) x 3 laps      Step taps  6-inch x 20 each no UEsupport        carioka   // bars (long) x 2 laps                 Ther Ex          nustep   L5 for 10 minutes L5 for 10 minutes  L6 for 10 minutes L6 for 10 minutes   C-spine stretching      Upper trap stretch 30\" x 3 each side    Pec strech supine 30\" x 3 reps   Lev scap stretch 30\" x 2 on R, x 1 on L    Upper thoracic stretch 5\" hold x 10 reps   Cervical ROM      Supine rotation x 10 each direction                        Ther Activity                              Gait Training                              Education Migraine symptoms, increase with visual symptoms Migraine symptoms, role of visual symptoms with migraine   Migraine symptoms, ABC, DHI, progress with therapy, POC                              "

## 2024-06-06 ENCOUNTER — NEW PATIENT (OUTPATIENT)
Dept: URBAN - METROPOLITAN AREA CLINIC 6 | Facility: CLINIC | Age: 54
End: 2024-06-06

## 2024-06-06 ENCOUNTER — VBI (OUTPATIENT)
Dept: ADMINISTRATIVE | Facility: OTHER | Age: 54
End: 2024-06-06

## 2024-06-06 DIAGNOSIS — E10.9: ICD-10-CM

## 2024-06-06 DIAGNOSIS — G43.909: ICD-10-CM

## 2024-06-06 DIAGNOSIS — R42: ICD-10-CM

## 2024-06-06 LAB
LEFT EYE DIABETIC RETINOPATHY: NORMAL
RIGHT EYE DIABETIC RETINOPATHY: NORMAL

## 2024-06-06 PROCEDURE — 99204 OFFICE O/P NEW MOD 45 MIN: CPT

## 2024-06-06 PROCEDURE — 92083 EXTENDED VISUAL FIELD XM: CPT

## 2024-06-06 PROCEDURE — 92134 CPTRZ OPH DX IMG PST SGM RTA: CPT | Mod: NC

## 2024-06-06 ASSESSMENT — TONOMETRY
OS_IOP_MMHG: 21
OD_IOP_MMHG: 20

## 2024-06-06 ASSESSMENT — VISUAL ACUITY
OD_SC: 20/20-1
OS_SC: 20/25

## 2024-06-07 ENCOUNTER — OFFICE VISIT (OUTPATIENT)
Dept: PHYSICAL THERAPY | Facility: CLINIC | Age: 54
End: 2024-06-07
Payer: COMMERCIAL

## 2024-06-07 DIAGNOSIS — Z74.09 IMPAIRED FUNCTIONAL MOBILITY, BALANCE, GAIT, AND ENDURANCE: Primary | ICD-10-CM

## 2024-06-07 DIAGNOSIS — R42 DIZZINESS: ICD-10-CM

## 2024-06-07 PROCEDURE — 97112 NEUROMUSCULAR REEDUCATION: CPT

## 2024-06-07 PROCEDURE — 97110 THERAPEUTIC EXERCISES: CPT

## 2024-06-07 NOTE — PROGRESS NOTES
"Daily Note     Today's date: 2024  Patient name: Jeremy George  : 1970  MRN: [id number]  Referring provider: Cici Austin CR*  Dx:   Encounter Diagnosis     ICD-10-CM    1. Impaired functional mobility, balance, gait, and endurance  Z74.09       2. Dizziness  R42                          Subjective: reports that yesterday he had a lot of muscle soreness, headache, and some dizziness. States yesterday he took the neurtec and ibuprofen and it changed his headache to just the L side but he had a bad headache all day. Today things aren't too bad, woke up with mild dizziness.       Objective: See treatment diary below      Assessment: Tolerated treatment fair. Patient demonstrated fatigue post treatment, exhibited good technique with therapeutic exercises, and would benefit from continued PT. Was able to tolerate increased resistance on nustep, stating that \"I feel like I'm getting a workout\". Continues to have difficulty with backwards LOB during weight shifting with eyes closed, however improved with slight bend in the knees. Significantly improved balance with foam beams compared to last time attempted. Performed joint position error testing during session, scored 100% accuracy with L and up directions, with difficulty with R and down scoring 1/3 correct. Some dizziness post joint position error testing. Continue to progress as tolerated.      Plan: Continue per plan of care.      Precautions: HTN. Thoracic aneurysm, asthma, c-spine fusion    POC expires Unit limit Auth Expiration date PT/OT/ST + Visit Limit?    BOMN N/a 90 PT/OT/SLP                           Visit/Unit Tracking  AUTH Status:  Date 4/29 5/1 5/6 5/10 5/16 5/21 5/24 5/29 5/31 6 6/   None Used 1 2 3 4 5 6 7 8 9 10 11    Remaining                 Manuals  6 6            C-spine   Cervcal ROM  Suboccipitals, upper trap SOR, upper trap, gentle cervical distraction SOR, upper trap, R 1st rib inferior mob     " "                 Neuro Re-Ed   FGA  TUG  3MBWT Chin tucks 3\" hold x 10 reps Chin tucks 3\" hold x 10 reps    Chin tuck with BP cuff 20mmHg to 22mmHg 5\" hold x 10 reps    VOR x 1 seated         Imaginary targets         Foam EC Foam beams in // bars (long) sidestepping x 3 laps with 2-finger support Foam beams in // bars (long) sidestepping x 3 laps no UE support    Tandem x 3 laps    Foam beams in // bars (long) sidestepping x 3 laps    Tandem x 3 laps   Standing circles weight shifting X 10 each direction with EO, no UE support  EC x 10 each direction X 10 each direction with EO, no UE support. EC x 10 each direction   X 5 each direction with EO, x 10 each direction with EC X 10 each direction with EO, x 10 each direction with EC   Spot it saccades         NBOS         tandem // bars (long) x 3 laps // bars (long) x 3 laps    // bars (long) fwd/backwards tandem x 3 laps   hurdles      12-inch (6) step over step pattern forward x 3 laps    Lateral x 3 laps   carioka // bars (long) x 2 laps        Joint position error      R 1/3 correct  L 3/3 correct  Up 3/3 correct  Down 1/3 correct   Ther Ex         nustep L5 for 10 minutes L5 for 10 minutes  L6 for 10 minutes L6 for 10 minutes L7 for 10 minutes   C-spine stretching    Upper trap stretch 30\" x 3 each side    Pec strech supine 30\" x 3 reps   Lev scap stretch 30\" x 2 on R, x 1 on L    Upper thoracic stretch 5\" hold x 10 reps    Cervical ROM    Supine rotation x 10 each direction                       Ther Activity                           Gait Training                           Education   Migraine symptoms, ABC, DHI, progress with therapy, POC                             "

## 2024-06-10 ENCOUNTER — CLINICAL SUPPORT (OUTPATIENT)
Age: 54
End: 2024-06-10
Payer: COMMERCIAL

## 2024-06-10 ENCOUNTER — OFFICE VISIT (OUTPATIENT)
Dept: PHYSICAL THERAPY | Facility: CLINIC | Age: 54
End: 2024-06-10
Payer: COMMERCIAL

## 2024-06-10 DIAGNOSIS — E53.8 B12 DEFICIENCY: Primary | ICD-10-CM

## 2024-06-10 DIAGNOSIS — R42 DIZZINESS: ICD-10-CM

## 2024-06-10 DIAGNOSIS — Z74.09 IMPAIRED FUNCTIONAL MOBILITY, BALANCE, GAIT, AND ENDURANCE: Primary | ICD-10-CM

## 2024-06-10 PROBLEM — G43.809 VESTIBULAR MIGRAINE: Status: ACTIVE | Noted: 2024-06-10

## 2024-06-10 PROBLEM — I68.0 CEREBRAL AMYLOID ANGIOPATHY  (HCC): Status: ACTIVE | Noted: 2024-06-10

## 2024-06-10 PROBLEM — E85.4 CEREBRAL AMYLOID ANGIOPATHY  (HCC): Status: ACTIVE | Noted: 2024-06-10

## 2024-06-10 PROCEDURE — 97110 THERAPEUTIC EXERCISES: CPT

## 2024-06-10 PROCEDURE — 96372 THER/PROPH/DIAG INJ SC/IM: CPT

## 2024-06-10 PROCEDURE — 97112 NEUROMUSCULAR REEDUCATION: CPT

## 2024-06-10 RX ADMIN — CYANOCOBALAMIN 1000 MCG: 1000 INJECTION, SOLUTION INTRAMUSCULAR; SUBCUTANEOUS at 12:55

## 2024-06-10 NOTE — PROGRESS NOTES
Daily Note     Today's date: 6/10/2024  Patient name: Jeremy George  : 1970  MRN: [id number]  Referring provider: Cici Austin CR*  Dx:   Encounter Diagnosis     ICD-10-CM    1. Impaired functional mobility, balance, gait, and endurance  Z74.09       2. Dizziness  R42                            Subjective: reports that he has had a headache the last 4 days. He states that the headache and dizziness was bad last night. He states that he went out to eat and then went to the store and the headache got a lot worse. States that he took the neurtek and it didn't help him, took ibuprofen and it helped. States that his neck didn't hurt as much after last visit, but the exercises are helpful. Woke up with a headache this morning, didn't sleep much.       Objective: See treatment diary below      Assessment: Tolerated treatment fair. Patient demonstrated fatigue post treatment, exhibited good technique with therapeutic exercises, and would benefit from continued PT. Weight shifting with significant improvement compared to previous sessions, only 1 LOB backwards with EO. Cues to decrease speed with EC on backwards direction, continues to have significant difficulty with backwards maintaining balance in counterclockwise> clockwise direction. Demonstrated good balance with hurdles, slight instability with turning around. Some nausea during session. Added DNF endurance during session with slight pain, fatigue with repetitions. Continue to progress as tolerated.      Plan: Continue per plan of care.      Precautions: HTN. Thoracic aneurysm, asthma, c-spine fusion    POC expires Unit limit Auth Expiration date PT/OT/ST + Visit Limit?    BOMN N/a 90 PT/OT/SLP                           Visit/Unit Tracking  AUTH Status:  Date 5/6 5/10 5/16 5/21 5/24 5/29 5/31 6/5 6/7 6/10   None Used 3 4 5 6 7 8 9 10 11 12    Remaining                Manuals 5/24 5/29 5/31 6/5 6/7 6/10            C-spine  Cervcal  "ROM  Suboccipitals, upper trap SOR, upper trap, gentle cervical distraction SOR, upper trap, R 1st rib inferior mob                       Neuro Re-Ed  FGA  TUG  3MBWT Chin tucks 3\" hold x 10 reps Chin tucks 3\" hold x 10 reps    Chin tuck with BP cuff 20mmHg to 22mmHg 5\" hold x 10 reps  Chick tuck with BP cuff  20mmHg to 22mmHg 5\" hold x 10 reps    DNF endurance 3\" hold x 10 reps   VOR x 1 seated         Imaginary targets         Foam EC Foam beams in // bars (long) sidestepping x 3 laps no UE support    Tandem x 3 laps    Foam beams in // bars (long) sidestepping x 3 laps    Tandem x 3 laps    Standing circles weight shifting X 10 each direction with EO, no UE support. EC x 10 each direction   X 5 each direction with EO, x 10 each direction with EC X 10 each direction with EO, x 10 each direction with EC X 10 each direction with EO, x 10 each direction EC   Spot it saccades         NBOS         tandem // bars (long) x 3 laps    // bars (long) fwd/backwards tandem x 3 laps // bars (long) fwd/backward tandem x 3 laps   hurdles     12-inch (6) step over step pattern forward x 3 laps    Lateral x 3 laps 12-inch (6) step over step pattern forward x 3 laps    Lateral x 3 laps   carioka         Joint position error     R 1/3 correct  L 3/3 correct  Up 3/3 correct  Down 1/3 correct    Ther Ex         nustep L5 for 10 minutes  L6 for 10 minutes L6 for 10 minutes L7 for 10 minutes L7 for 10 minutes   C-spine stretching   Upper trap stretch 30\" x 3 each side    Pec strech supine 30\" x 3 reps   Lev scap stretch 30\" x 2 on R, x 1 on L    Upper thoracic stretch 5\" hold x 10 reps     Cervical ROM   Supine rotation x 10 each direction                        Ther Activity                           Gait Training                           Education  Migraine symptoms, ABC, DHI, progress with therapy, POC                              "

## 2024-06-11 ENCOUNTER — OFFICE VISIT (OUTPATIENT)
Dept: SLEEP CENTER | Facility: CLINIC | Age: 54
End: 2024-06-11
Payer: COMMERCIAL

## 2024-06-11 ENCOUNTER — HOSPITAL ENCOUNTER (OUTPATIENT)
Dept: RADIOLOGY | Facility: HOSPITAL | Age: 54
Discharge: HOME | End: 2024-06-11
Attending: NEUROLOGICAL SURGERY

## 2024-06-11 ENCOUNTER — OFFICE VISIT (OUTPATIENT)
Dept: NEUROSURGERY | Facility: CLINIC | Age: 54
End: 2024-06-11
Payer: COMMERCIAL

## 2024-06-11 VITALS
BODY MASS INDEX: 35.62 KG/M2 | HEIGHT: 72 IN | HEART RATE: 64 BPM | SYSTOLIC BLOOD PRESSURE: 139 MMHG | DIASTOLIC BLOOD PRESSURE: 81 MMHG | WEIGHT: 263 LBS

## 2024-06-11 DIAGNOSIS — M54.2 CERVICAL SPINE PAIN: ICD-10-CM

## 2024-06-11 DIAGNOSIS — R06.83 SNORING: ICD-10-CM

## 2024-06-11 DIAGNOSIS — Z09 FOLLOW-UP EXAMINATION FOLLOWING SURGERY: ICD-10-CM

## 2024-06-11 DIAGNOSIS — G47.19 EXCESSIVE DAYTIME SLEEPINESS: ICD-10-CM

## 2024-06-11 DIAGNOSIS — G47.33 OSA (OBSTRUCTIVE SLEEP APNEA): Primary | ICD-10-CM

## 2024-06-11 DIAGNOSIS — Z98.1 S/P CERVICAL SPINAL FUSION: Primary | ICD-10-CM

## 2024-06-11 PROBLEM — N50.819 TESTICULAR DISCOMFORT: Status: ACTIVE | Noted: 2017-03-29

## 2024-06-11 PROBLEM — K22.0 ACHALASIA: Status: ACTIVE | Noted: 2017-02-23

## 2024-06-11 PROBLEM — M79.603 ARM PAIN: Status: ACTIVE | Noted: 2017-02-23

## 2024-06-11 PROCEDURE — 99244 OFF/OP CNSLTJ NEW/EST MOD 40: CPT | Performed by: PHYSICIAN ASSISTANT

## 2024-06-11 PROCEDURE — 99213 OFFICE O/P EST LOW 20 MIN: CPT | Performed by: NEUROLOGICAL SURGERY

## 2024-06-11 NOTE — ASSESSMENT & PLAN NOTE
Patient has a history of obstructive sleep apnea dating back to approximately 2012.  We do not have access to any of his prior sleep studies.  He has not used a CPAP since 2012.  I ordered a new home sleep study today to reassess his sleep apnea.  If he is still found to have sleep apnea, CPAP will be ordered.  He will then schedule follow-up in 31 to 90 days.  If his sleep study is inconclusive, I would recommend completing an in lab sleep study at that time.

## 2024-06-11 NOTE — PATIENT INSTRUCTIONS
I placed an order today for a home sleep study.  Please schedule this as soon as possible.  Once your study is completed, a nurse will call you to review the results.  If you are found to have sleep apnea, I would recommend restarting CPAP.  If your study is inconclusive, I would recommend completing an in lab sleep study.      Nursing Support:  When: Monday through Friday 7A-5PM except holidays  Where: Our direct line is [phone].    If you are having a true emergency please call 911.  In the event that the line is busy or it is after hours please leave a voice message and we will return your call.  Please speak clearly, leaving your full name, birth date, best number to reach you and the reason for your call.   Medication refills: We will need the name of the medication, the dosage, the ordering provider, whether you get a 30 or 90 day refill, and the pharmacy name and address.  Medications will be ordered by the provider only.  Nurses cannot call in prescriptions.  Please allow 7 days for medication refills.  Physician requested updates: If your provider requested that you call with an update after starting medication, please be ready to provide us the medication and dosage, what time you take your medication, the time you attempt to fall asleep, time you fall asleep, when you wake up, and what time you get out of bed.  Sleep Study Results: We will contact you with sleep study results and/or next steps after the physician has reviewed your testing.

## 2024-06-11 NOTE — PROGRESS NOTES
Therapy has helped Mr. George some.  He is unable to uncoupled the dizziness from his headaches.  He does get headaches at times but they do not always lead to dizziness.  He has seen ENT and they feel that the workup is complete there regarding the dizziness.  He is on a new medication for the headaches which helps but does not improve things entirely.  Given his esophageal issues he was told not to take ibuprofen but he sometimes does and this does help the headaches as well.    I reviewed the images of his cervical spine.  I reviewed the report prior to the flexion-extension views being performed.  Unfortunately they did obliques instead of flexion-extension.  I sent him down for completion of the flexion-extension series.  I apologized for the delay in his care in this regard.  In reviewing those films, I do not see any gross instability.  There is certainly degenerative change above the level of the previous fusion with anterior step-off of C4 on C5 at that level.  This does not move in an unstable way on flexion or and extension.  At the level of the surgery the hardware appears intact with good bone growt behind the construct.    I think it unlikely that his dizziness is in any way related to his cervical spine degenerative issues.  Certainly the entity of cervical induced headache exists.  I am reluctant to pursue surgical intervention again on his cervical spine, since the results were not beneficial the first time.  I would certainly reserve that as an option in the distant future if he is unable to find any significant relief in other ways.  He understands that.  I answered all of his questions to the best of my abilities and in layman's terms.    I spent 20 minutes in the care of this patient, reviewing previously performed x-rays and the report.,  Discussing the need for flexion-extension views with the radiology department, and face-to-face time.

## 2024-06-11 NOTE — PROGRESS NOTES
Review of Systems   Constitutional: Negative.    HENT: Negative.     Eyes: Negative.    Respiratory: Negative.     Cardiovascular: Negative.    Gastrointestinal: Negative.    Endocrine: Negative.    Genitourinary: Negative.    Musculoskeletal:  Positive for myalgias (right shoulder), neck pain and neck stiffness (Decrease ROM, worse when looking to the right).        Cervical Spondylosis- MRI on 4/4/24   C/S X-ray on 5/24/24    C/o (R) sided neck pain radiates to arm/hand x   w/ associates  headaches daily, lasting and intermittent dizziness usually w/ standing/bending.  + N/T/W on RUE,   Gait instability  Denies any Left sided symptoms.  Denies any B/B issue    Pain 7/10-- constant, sharp   Meds: Lyrica, Baclofen provide mild relief  PT Eval on 5/29/24  Sees neurology- ordered EMG- sched. On 9/9/24  Occipital nerve block @ ER on 4/2024, helped w/ headaches   H/o C6/7 ACDF @  in 2018 by Dr Rodríguez  H/o Diabetes A1C 6.8 on 4/24/24  No AC/ non-smoker   Skin: Negative.    Allergic/Immunologic: Negative.    Neurological:  Positive for dizziness, weakness, numbness and headaches.   Hematological: Negative.    Psychiatric/Behavioral:  Positive for sleep disturbance (due to pain).    All other systems reviewed and are negative.         Current Outpatient Medications:     albuterol (ProAir HFA) 90 mcg/act inhaler, Inhale 2 puffs every 6 (six) hours as needed for wheezing, Disp: 8.5 g, Rfl: 0    Ascorbic Acid (vitamin C) 1000 MG tablet, Take 1,000 mg by mouth daily  , Disp: , Rfl:     baclofen 20 mg tablet, Take 1 tablet (20 mg total) by mouth daily at bedtime as needed for muscle spasms, Disp: 90 tablet, Rfl: 1    benzonatate (TESSALON PERLES) 100 mg capsule, Take 1 capsule (100 mg total) by mouth 3 (three) times a day as needed for cough (Patient not taking: Reported on 6/3/2024), Disp: 20 capsule, Rfl: 0    cetirizine (ZyrTEC) 10 mg tablet, Take 1 tablet (10 mg total) by mouth daily, Disp: 90 tablet, Rfl: 0    CINNAMON  PO, Take by mouth (Patient not taking: Reported on 5/21/2024), Disp: , Rfl:     cyanocobalamin 1,000 mcg/mL, Inject 1 mL (1,000 mcg total) into a muscle every 7 days for 9 doses, Disp: 1 mL, Rfl: 0    diphenhydrAMINE-acetaminophen (TYLENOL PM)  MG TABS, Take 2 tablets by mouth daily at bedtime as needed for sleep 1000 mg in total (Patient not taking: Reported on 5/15/2024), Disp: , Rfl:     famotidine (PEPCID) 20 mg tablet, Take 1 tablet (20 mg total) by mouth daily, Disp: 90 tablet, Rfl: 1    fluticasone (FLONASE) 50 mcg/act nasal spray, 2 sprays into each nostril daily, Disp: 18 mL, Rfl: 1    lisinopril-hydrochlorothiazide (PRINZIDE,ZESTORETIC) 20-12.5 MG per tablet, Take 1 tablet by mouth daily, Disp: 90 tablet, Rfl: 1    magnesium Oxide (MAG-OX) 400 mg TABS, Take 1 tablet (400 mg total) by mouth daily, Disp: 90 tablet, Rfl: 0    meclizine (ANTIVERT) 25 mg tablet, Take 1 tablet (25 mg total) by mouth 3 (three) times a day as needed for dizziness (Patient not taking: Reported on 6/3/2024), Disp: 30 tablet, Rfl: 0    metFORMIN (GLUCOPHAGE) 500 mg tablet, Pt takes 1500 mg (3 tablets) by mouth in the morning, and a 1000 mg (2 tablets) in the evening, Disp: 450 tablet, Rfl: 1    Multiple Vitamins-Minerals (CENTRUM SILVER 50+MEN PO), Take by mouth, Disp: , Rfl:     omeprazole (PriLOSEC) 40 MG capsule, Take 1 capsule (40 mg total) by mouth daily (Patient taking differently: Take 40 mg by mouth daily Still has to start), Disp: 90 capsule, Rfl: 3    polyethylene glycol (MiraLax) 17 g packet, Take 17 g by mouth daily, Disp: 90 each, Rfl: 5    pregabalin (LYRICA) 75 mg capsule, Take 2 capsules (150 mg total) by mouth daily at bedtime (Patient taking differently: Take 150 mg by mouth daily at bedtime Bedtime), Disp: 540 capsule, Rfl: 0    rimegepant sulfate (Nurtec) 75 mg TBDP, Take 1 tablet by mouth as needed.  Do not exceed more than 1 dose in 24 hours or 3 doses in one week, Disp: 10 tablet, Rfl: 0    simvastatin  (ZOCOR) 20 mg tablet, Take 1 tablet (20 mg total) by mouth daily, Disp: 30 tablet, Rfl: 0    sitaGLIPtin (JANUVIA) 100 mg tablet, Take 1 tablet (100 mg total) by mouth daily, Disp: 90 tablet, Rfl: 1    topiramate (TOPAMAX) 100 mg tablet, Take 1 tablet (100 mg total) by mouth daily at bedtime, Disp: 30 tablet, Rfl: 6    Current Facility-Administered Medications:     cyanocobalamin injection 1,000 mcg, 1,000 mcg, Intramuscular, Q30 Days, MIKHAIL Ha, 1,000 mcg at 06/03/24 0916

## 2024-06-11 NOTE — PROGRESS NOTES
Consultation - [facility]  Jeremy George, 1970, MRN: [id number]    6/11/2024        Reason for Consult / Principal Problem:    Suspected Obstructive Sleep Apnea  Obesity     Thank you for the opportunity of participating in the evaluation and care of this patient in the Sleep Clinic at [facility].      Subjective:     HPI: Jeremy George is a 53 y.o. year old male who presents today upon referral of his neurologist Dr. North for concerns of suspected obstructive sleep apnea.  Patient states he began developing intermittent left-sided headaches in April, which then progressed to every day headaches with dizziness.  He states his neurologist was concerned that sleep apnea could be playing a role with his chronic headaches.  Patient states he completed a sleep study in 2012 at a weight of approximately 315 pounds and was noted to have very loud snoring.  He states his sleep study did not show sleep apnea, but the doctor recommended using CPAP for treatment.  He states eventually the insurance company did not pay for the CPAP, and it was returned.  He states he then completed a home sleep study in 2018 prior to having a neck surgery.  He states he is unaware of the results.  He would like to revisit treating his sleep apnea if necessary.    Comorbid conditions:  Diabetes, hypertension, hyperlipidemia, chronic pain syndrome, mild asthma, chronic headaches, vestibular migraine    Recent Labs:  CO2 of 27, CBC normal, Ferritin 152    Sleep Study Results: Patient had 2 prior studies.  We do not have the results of either of these studies.  According to the patient, they were negative for sleep apnea. 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      Sleep Schedule:       Bedtime:  9:30-10 PM all days       Latency:  30 minutes       Wakeup time:  5:30-6 AM     Awakenings:       Frequency:  4-5 times per night      Causes:  neck pain, urinate       Duration: Typically brief    Daytime Sleepiness / Inappropriate Sleep:       Most severe:  noon        Naps :  no napping        Inappropriate drowsiness / sleep:  could doze if sedentary and bored     Snoring:  loud snoring     Apnea:  not witnessed     Change in Weight:  lost 60 pounds over last several years    Restless Leg Syndrome:  + clinical symptoms consistent with this diagnosis, taking Lyrica nightly      Other Complaints:   No reports of sleep walking, sleep talking, sleep paralysis or hallucinations surrounding sleep. + waking up with headaches since April, +bruxism, and +dry mouth.     Social History:      Caffeine:  1 cup per day        Tobacco:   reports that he has never smoked. He has never used smokeless tobacco.     E-cig/Vaping:    E-Cigarette/Vaping    E-Cigarette Use Never User       E-Cigarette/Vaping Substances    Nicotine No     THC No     CBD No     Flavoring No     Other No     Unknown No          Alcohol:   reports that he does not currently use alcohol.       Drugs:   reports no history of drug use.       The review of systems and following portions of the patient's history were reviewed and updated as appropriate: allergies, current medications, past family history, past medical history, past social history, past surgical history and problem list.        Objective:       Vitals:    06/11/24 1015   BP: 139/81   BP Location: Left arm   Patient Position: Sitting   Cuff Size: Large   Pulse: 64   Weight: 119 kg (263 lb)   Height: 6' (1.829 m)     Body mass index is 35.67 kg/m².  Neck Circumference: 18.5  Douglas Sleepiness Scale: Total score: 12      Current Outpatient Medications:     albuterol (ProAir HFA) 90 mcg/act  inhaler, Inhale 2 puffs every 6 (six) hours as needed for wheezing, Disp: 8.5 g, Rfl: 0    Ascorbic Acid (vitamin C) 1000 MG tablet, Take 1,000 mg by mouth daily  , Disp: , Rfl:     baclofen 20 mg tablet, Take 1 tablet (20 mg total) by mouth daily at bedtime as needed for muscle spasms, Disp: 90 tablet, Rfl: 1    cetirizine (ZyrTEC) 10 mg tablet, Take 1 tablet (10 mg total) by mouth daily, Disp: 90 tablet, Rfl: 0    cyanocobalamin 1,000 mcg/mL, Inject 1 mL (1,000 mcg total) into a muscle every 7 days for 9 doses, Disp: 1 mL, Rfl: 0    famotidine (PEPCID) 20 mg tablet, Take 1 tablet (20 mg total) by mouth daily, Disp: 90 tablet, Rfl: 1    fluticasone (FLONASE) 50 mcg/act nasal spray, 2 sprays into each nostril daily, Disp: 18 mL, Rfl: 1    lisinopril-hydrochlorothiazide (PRINZIDE,ZESTORETIC) 20-12.5 MG per tablet, Take 1 tablet by mouth daily, Disp: 90 tablet, Rfl: 1    magnesium Oxide (MAG-OX) 400 mg TABS, Take 1 tablet (400 mg total) by mouth daily, Disp: 90 tablet, Rfl: 0    metFORMIN (GLUCOPHAGE) 500 mg tablet, Pt takes 1500 mg (3 tablets) by mouth in the morning, and a 1000 mg (2 tablets) in the evening, Disp: 450 tablet, Rfl: 1    Multiple Vitamins-Minerals (CENTRUM SILVER 50+MEN PO), Take by mouth, Disp: , Rfl:     omeprazole (PriLOSEC) 40 MG capsule, Take 1 capsule (40 mg total) by mouth daily (Patient taking differently: Take 40 mg by mouth daily Still has to start), Disp: 90 capsule, Rfl: 3    polyethylene glycol (MiraLax) 17 g packet, Take 17 g by mouth daily, Disp: 90 each, Rfl: 5    pregabalin (LYRICA) 75 mg capsule, Take 2 capsules (150 mg total) by mouth daily at bedtime (Patient taking differently: Take 150 mg by mouth daily at bedtime Bedtime), Disp: 540 capsule, Rfl: 0    rimegepant sulfate (Nurtec) 75 mg TBDP, Take 1 tablet by mouth as needed.  Do not exceed more than 1 dose in 24 hours or 3 doses in one week, Disp: 10 tablet, Rfl: 0    simvastatin (ZOCOR) 20 mg tablet, Take 1 tablet (20 mg total)  by mouth daily, Disp: 30 tablet, Rfl: 0    sitaGLIPtin (JANUVIA) 100 mg tablet, Take 1 tablet (100 mg total) by mouth daily, Disp: 90 tablet, Rfl: 1    topiramate (TOPAMAX) 100 mg tablet, Take 1 tablet (100 mg total) by mouth daily at bedtime, Disp: 30 tablet, Rfl: 6    benzonatate (TESSALON PERLES) 100 mg capsule, Take 1 capsule (100 mg total) by mouth 3 (three) times a day as needed for cough (Patient not taking: Reported on 6/3/2024), Disp: 20 capsule, Rfl: 0    CINNAMON PO, Take by mouth (Patient not taking: Reported on 5/21/2024), Disp: , Rfl:     diphenhydrAMINE-acetaminophen (TYLENOL PM)  MG TABS, Take 2 tablets by mouth daily at bedtime as needed for sleep 1000 mg in total (Patient not taking: Reported on 5/15/2024), Disp: , Rfl:     meclizine (ANTIVERT) 25 mg tablet, Take 1 tablet (25 mg total) by mouth 3 (three) times a day as needed for dizziness (Patient not taking: Reported on 6/3/2024), Disp: 30 tablet, Rfl: 0    Current Facility-Administered Medications:     cyanocobalamin injection 1,000 mcg, 1,000 mcg, Intramuscular, Q30 Days, MIKHAIL Ha, 1,000 mcg at 06/03/24 0942    Physical Exam  General Appearance:   Alert, cooperative, no distress, appears stated age, obese     Head:   Normocephalic, without obvious abnormality, atraumatic     Eyes:   PERRL, conjunctiva/corneas clear          Nose:  Nares normal, septum midline, mucosa normal, no drainage or sinus tenderness           Throat:  Lips, teeth and gums normal; tongue thick in size with mild scalloping and midline in position; mucosa moist, low-lying soft palate, uvula long, tonsils not visualized, Mallampati class 3     Neck:  Supple, symmetrical, trachea midline, no adenopathy; no thyromegaly noted     Lungs:      Clear to auscultation bilaterally, respirations unlabored     Heart:   Regular rate and rhythm, no murmur       Extremities:  Extremities normal, atraumatic, no cyanosis or edema       Skin:  Skin color, texture,  turgor normal, no rashes or lesions       Neurologic:  No focal deficits noted.          ASSESSMENT / PLAN     1. SIVA (obstructive sleep apnea)  Assessment & Plan:  Patient has a history of obstructive sleep apnea dating back to approximately 2012.  We do not have access to any of his prior sleep studies.  He has not used a CPAP since 2012.  I ordered a new home sleep study today to reassess his sleep apnea.  If he is still found to have sleep apnea, CPAP will be ordered.  He will then schedule follow-up in 31 to 90 days.  If his sleep study is inconclusive, I would recommend completing an in lab sleep study at that time.  Orders:  -     Ambulatory Referral to Sleep Medicine  -     Home Study; Future; Expected date: 06/11/2024  2. Snoring  -     Ambulatory Referral to Sleep Medicine  -     Home Study; Future; Expected date: 06/11/2024  3. Excessive daytime sleepiness  -     Home Study; Future; Expected date: 06/11/2024         Counseling / Coordination of Care    I have spent a total time of 65 minutes on 06/11/24 in caring for this patient including Risks and benefits of tx options, Instructions for management, Patient and family education, Importance of tx compliance, Risk factor reductions, Impressions, Counseling / Coordination of care, Documenting in the medical record, Reviewing / ordering tests, medicine, procedures  , and Obtaining or reviewing history  .    The following instructions have been given to the patient today:    Patient Instructions   I placed an order today for a home sleep study.  Please schedule this as soon as possible.  Once your study is completed, a nurse will call you to review the results.  If you are found to have sleep apnea, I would recommend restarting CPAP.  If your study is inconclusive, I would recommend completing an in lab sleep study.      Nursing Support:  When: Monday through Friday 7A-5PM except holidays  Where: Our direct line is [phone].    If you are having a true  emergency please call 911.  In the event that the line is busy or it is after hours please leave a voice message and we will return your call.  Please speak clearly, leaving your full name, birth date, best number to reach you and the reason for your call.   Medication refills: We will need the name of the medication, the dosage, the ordering provider, whether you get a 30 or 90 day refill, and the pharmacy name and address.  Medications will be ordered by the provider only.  Nurses cannot call in prescriptions.  Please allow 7 days for medication refills.  Physician requested updates: If your provider requested that you call with an update after starting medication, please be ready to provide us the medication and dosage, what time you take your medication, the time you attempt to fall asleep, time you fall asleep, when you wake up, and what time you get out of bed.  Sleep Study Results: We will contact you with sleep study results and/or next steps after the physician has reviewed your testing.           Sandra Chua PA-C  [facility]

## 2024-06-12 ENCOUNTER — OFFICE VISIT (OUTPATIENT)
Dept: PHYSICAL THERAPY | Facility: CLINIC | Age: 54
End: 2024-06-12
Payer: COMMERCIAL

## 2024-06-12 ENCOUNTER — TELEPHONE (OUTPATIENT)
Age: 54
End: 2024-06-12

## 2024-06-12 DIAGNOSIS — R42 DIZZINESS: ICD-10-CM

## 2024-06-12 DIAGNOSIS — Z74.09 IMPAIRED FUNCTIONAL MOBILITY, BALANCE, GAIT, AND ENDURANCE: Primary | ICD-10-CM

## 2024-06-12 PROCEDURE — 97112 NEUROMUSCULAR REEDUCATION: CPT

## 2024-06-12 PROCEDURE — 97110 THERAPEUTIC EXERCISES: CPT

## 2024-06-12 NOTE — TELEPHONE ENCOUNTER
called to confirm pt compliancy with appts. Confirmed last and upcoming appts. Also inquired about the pt's Nurtec and if we can put the refill through so that the pt can receive an estimate of how much it will cost. Advised pt has to go through Neurology for refill.

## 2024-06-12 NOTE — PROGRESS NOTES
"Daily Note     Today's date: 2024  Patient name: Jeremy George  : 1970  MRN: [id number]  Referring provider: Cici Austin CR*  Dx:   Encounter Diagnosis     ICD-10-CM    1. Impaired functional mobility, balance, gait, and endurance  Z74.09       2. Dizziness  R42                            Subjective: reports that he had a rough night. States that he has a headache and a toothache today. Reports he downloaded an albertina for migraine tracking.      Objective: See treatment diary below      Assessment: Tolerated treatment fair. Patient demonstrated fatigue post treatment, exhibited good technique with therapeutic exercises, and would benefit from continued PT. Sessions focused on balance due to increased headache and neck pain after having x-ray yesterday. Demonstrating improving balance with hurdles and foam beams compared to previous sessions. Added standing on foam with HT/HN with good balance, most difficulty with looking up. Continue to progress as tolerated.      Plan: Continue per plan of care.      Precautions: HTN. Thoracic aneurysm, asthma, c-spine fusion    POC expires Unit limit Auth Expiration date PT/OT/ST + Visit Limit?    BOMN N/a 90 PT/OT/SLP                           Visit/Unit Tracking  AUTH Status:  Date 5/6 5/10 5/16 5/21 5/24 5/29 5/31 6/5 6/7 6/10 6/12   None Used 3 4 5 6 7 8 9 10 11 12 13    Remaining                 Manuals 5/29 5/31 6/5 6/7 6/10 6/12            C-spine Cervcal ROM  Suboccipitals, upper trap SOR, upper trap, gentle cervical distraction SOR, upper trap, R 1st rib inferior mob                        Neuro Re-Ed FGA  TUG  3MBWT Chin tucks 3\" hold x 10 reps Chin tucks 3\" hold x 10 reps    Chin tuck with BP cuff 20mmHg to 22mmHg 5\" hold x 10 reps  Chick tuck with BP cuff  20mmHg to 22mmHg 5\" hold x 10 reps    DNF endurance 3\" hold x 10 reps    VOR x 1 seated         Imaginary targets         Foam EC    Foam beams in // bars (long) sidestepping x 3 " "laps    Tandem x 3 laps  Foam beams in // bars (long) sidestepping x 3 laps    Tandem x 3 laps    Foam EO HT/HN 30\" x 3 each   Standing circles weight shifting   X 5 each direction with EO, x 10 each direction with EC X 10 each direction with EO, x 10 each direction with EC X 10 each direction with EO, x 10 each direction EC    Spot it saccades         NBOS         tandem    // bars (long) fwd/backwards tandem x 3 laps // bars (long) fwd/backward tandem x 3 laps // bars (long) fwd/backward tandem x 3 laps   hurdles    12-inch (6) step over step pattern forward x 3 laps    Lateral x 3 laps 12-inch (6) step over step pattern forward x 3 laps    Lateral x 3 laps 12-inch 6) step over step pattern forward x 3 laps    Lateral x 3 laps   carioka      // bars (long) carioka stepping x 3 laps   Joint position error    R 1/3 correct  L 3/3 correct  Up 3/3 correct  Down 1/3 correct     Ther Ex         nustep  L6 for 10 minutes L6 for 10 minutes L7 for 10 minutes L7 for 10 minutes L7 for 10 minutes   C-spine stretching  Upper trap stretch 30\" x 3 each side    Pec strech supine 30\" x 3 reps   Lev scap stretch 30\" x 2 on R, x 1 on L    Upper thoracic stretch 5\" hold x 10 reps      Cervical ROM  Supine rotation x 10 each direction                         Ther Activity                           Gait Training                           Education Migraine symptoms, ABC, DHI, progress with therapy, POC                               "

## 2024-06-13 ENCOUNTER — OFFICE VISIT (OUTPATIENT)
Age: 54
End: 2024-06-13

## 2024-06-13 VITALS
DIASTOLIC BLOOD PRESSURE: 76 MMHG | HEART RATE: 70 BPM | WEIGHT: 260.8 LBS | HEIGHT: 72 IN | BODY MASS INDEX: 35.33 KG/M2 | SYSTOLIC BLOOD PRESSURE: 130 MMHG | TEMPERATURE: 97.4 F

## 2024-06-13 DIAGNOSIS — K76.0 FATTY LIVER: ICD-10-CM

## 2024-06-13 NOTE — PROGRESS NOTES
Assessment/Plan:  Jeremy was seen today for consult.    Diagnoses and all orders for this visit:    Fatty liver  -     Ambulatory Referral to Weight Management       - Discussed options of HealthyCORE-Intensive Lifestyle Intervention Program, Very Low Calorie Diet-VLCD, and Conservative Program and the role of weight loss medications.  - Explained the importance of making lifestyle changes first before starting anti-obesity medications.  - Patient should demonstrate lifestyle changes first before anti-obesity medication initiated.   - Patient is interested in pursuing Conservative Program  - Initial weight loss goal of 5-10% weight loss for improved health as studies have shown this is where we see the greatest impact on improving health and decreasing risk of obesity related conditions.  - Weight loss can improve patient's co-morbid conditions and/or prevent weight-related complications.  - Labs reviewed: As below.      General Recommendations:  Nutrition:  Eat breakfast daily.  Do not skip meals.     Food log (ie.) www.myfitnesspal.com, sparkpeople.com, loseit.com, calorieking.com, etc.    Practice mindful eating.  Be sure to set aside time to eat, eat slowly, and savor your food.    Hydration:    At least 64oz of water daily.  No sugar sweetened beverages.  No juice (eat the fruit instead).    Exercise:  Studies have shown that the ideal exercise goal is somewhere between 150 to 300 minutes of moderate intensity exercise a week.  Start with exercising 10 minutes every other day and gradually increase physical activity with a goal of at least 150 minutes of moderate intensity exercise a week, divided over at least 3 days a week.  An example of this would be exercising 30 minutes a day, 5 days a week.  Resistance training can increase muscle mass and increase our resting metabolic rate.   FULL BODY resistance training is recommended 2-3 times a week.  Do not do this on consecutive days to allow for muscle  recovery.    Aim for a bare minimum 5000 steps, even on days you do not exercise.    Monitoring:   Weigh yourself daily.  If this causes undue stress, then just weigh yourself once a week.  Weigh yourself the same time of the day with the same amount of clothing on.  Preferably this should be done after waking up, before you eat, and with no clothing or minimal clothing on.    Specific Goals:  No sugary beverages. At least 64oz of water daily.  Gradually increase physical activity to a goal of 5 days per week for 30 minutes of MODERATE intensity PLUS 2 days per week of FULL BODY resistance training  Goal protein intake of 60-80 grams per day    Calorie goal:  2000 santa (Provided with meal plan to follow).    Return visit:    Calorie trackign/deficit  Physical activity goals  AOM - recently started on topamax  RTC in 3-4 months         ______________________________________________________________________        Subjective:   Chief Complaint   Patient presents with    Consult     Mwm consult       HPI: Jeremy George  is a 53 y.o. male with history of migraines, thoracic aneurysm, HTN, SIVA, GERD, B12 deficiency, type 2 diabetes and excess weight, here to pursue weight loss management.  Previous notes and records have been reviewed.    Type 2 diabetes - Hemoglobin A1c 6.8 (4/24/24) - metformin, januvia,  Migraines - topamax 100mg QHS (started 4/2024)  HLD - simvastatin  HTN - lisinopril-HCTZ  GERD - famotidine    Upcoming sleep study for evaluation of sleep apnea.     TSH WNL    Recently started on topamax for migraines. He thinks this has helped with some appetite suppression.   Headaches - decreased physical activity     Recently bought smaller portioned plates.     HPI  Wt Readings from Last 20 Encounters:   06/26/24 119 kg (262 lb)   06/19/24 120 kg (265 lb 1.6 oz)   06/13/24 118 kg (260 lb 12.8 oz)   06/11/24 119 kg (263 lb)   06/03/24 120 kg (265 lb 6.4 oz)   05/31/24 119 kg (262 lb 8 oz)   05/22/24 119 kg (262  lb 8 oz)   05/21/24 119 kg (262 lb)   05/20/24 119 kg (262 lb 3.2 oz)   05/09/24 119 kg (263 lb)   05/08/24 119 kg (263 lb)   05/06/24 118 kg (260 lb)   04/30/24 119 kg (263 lb 1.6 oz)   04/23/24 119 kg (263 lb)   04/23/24 119 kg (263 lb)   03/25/24 120 kg (264 lb)   03/06/24 118 kg (261 lb)   01/12/24 120 kg (264 lb 3.2 oz)   11/28/23 122 kg (269 lb)   11/21/23 120 kg (265 lb)     Excess Weight:  Highest weight: 317 lbs ( 11/2013) when diagnosed diabetes  Current weight: 260 lbs   Goal Weight: 240lbs STG, LTG 230lbs  What has been tried: Diet and Exercise      Food logging:  B: 2 slices of wheat toast + meat lovers omelete + homefries (eats a small amount)  S: skip  L: skip  S: skip  D: *wife cooks *rice + beans, orders out pizza, chicken Sandhu,   S:  skiip       Dining out: 4-5 days/week  Hydration:  OJ    Water - 40-60 oz    Coffeee     Hibiscus tea/greentea/jarrod tea+ honey  Alcohol: none  Exercise: PT - lyptical 2x/weeks      Past Medical History:   Diagnosis Date    Achalasia     Aneurysm (HCC)     aortic    Asthma     Childhood    Colon polyp     Diabetes mellitus (HCC)     Esophageal ulcer     Fatty liver     Groin abscess     with I &D    Hyperlipidemia     Hypertension     Plantar fasciitis      Patient denies personal and family history of  pancreatitis, thyroid cancer, MEN-2 tumors.  Denies any hx of glaucoma, seizures, kidney stones, gallstones.  Denies Hx of CAD, PAD, palpitations, arrhythmia.   Denies uncontrolled anxiety or depression, suicidal behavior or thinking , insomnia or sleep disturbance.   Past Surgical History:   Procedure Laterality Date    ANTERIOR CERVICAL DISCECTOMY W/ FUSION      APPENDECTOMY      BICEPS TENDON REPAIR      EGD AND COLONOSCOPY      FL INJECTION LEFT WRIST (ARTHROGRAM)  11/9/2021    FL MYELOGRAM CERVICAL  6/13/2018    MENISCECTOMY      MYOTOMY HELLER LAPAROSCOPIC      ORTHOPEDIC SURGERY       The following portions of the patient's history were reviewed and updated  "as appropriate: allergies, current medications, past family history, past medical history, past social history, past surgical history, and problem list.    Review Of Systems:  Review of Systems   Constitutional:  Negative for fatigue and fever.   HENT:  Negative for sore throat, trouble swallowing and voice change.    Respiratory:  Negative for shortness of breath.    Cardiovascular:  Negative for chest pain.   Gastrointestinal:  Positive for constipation (fiber supplements). Negative for abdominal pain, diarrhea, nausea and vomiting.   Endocrine: Negative for cold intolerance and heat intolerance.   Genitourinary:  Negative for difficulty urinating.   Musculoskeletal:  Negative for arthralgias and back pain.   Psychiatric/Behavioral:  Negative for suicidal ideas. The patient is not nervous/anxious.    All other systems reviewed and are negative.      Objective:  /76   Pulse 70   Temp (!) 97.4 °F (36.3 °C) (Tympanic)   Ht 5' 11.69\" (1.821 m)   Wt 118 kg (260 lb 12.8 oz)   BMI 35.67 kg/m²   Physical Exam  Vitals and nursing note reviewed.   Constitutional:       General: He is not in acute distress.     Appearance: Normal appearance. He is obese. He is not ill-appearing, toxic-appearing or diaphoretic.   HENT:      Head: Normocephalic and atraumatic.   Eyes:      General:         Right eye: No discharge.         Left eye: No discharge.      Conjunctiva/sclera: Conjunctivae normal.   Pulmonary:      Effort: Pulmonary effort is normal. No respiratory distress.   Musculoskeletal:         General: Normal range of motion.      Cervical back: Normal range of motion. No rigidity.      Right lower leg: No edema.      Left lower leg: No edema.   Skin:     Coloration: Skin is not pale.      Findings: No erythema or rash.   Neurological:      General: No focal deficit present.      Mental Status: He is alert.   Psychiatric:         Mood and Affect: Mood normal.         Labs and Imaging  Recent labs and imaging have " been personally reviewed.  Lab Results   Component Value Date    WBC 5.44 04/25/2024    HGB 14.3 04/25/2024    HCT 42.2 04/25/2024    MCV 92 04/25/2024     04/25/2024     Lab Results   Component Value Date     01/16/2015    SODIUM 136 04/25/2024    K 4.1 04/25/2024     04/25/2024    CO2 27 04/25/2024    ANIONGAP 8 01/16/2015    AGAP 5 04/25/2024    BUN 16 04/25/2024    CREATININE 0.79 04/25/2024    GLUC 145 (H) 04/25/2024    GLUF 119 (H) 12/27/2023    CALCIUM 8.8 04/25/2024    AST 36 04/25/2024    ALT 74 (H) 04/25/2024    ALKPHOS 48 04/25/2024    PROT 7.9 01/16/2015    TP 6.4 04/25/2024    BILITOT 0.56 01/16/2015    TBILI 0.51 04/25/2024    EGFR 101 04/25/2024     Lab Results   Component Value Date    HGBA1C 6.8 (H) 04/24/2024     Lab Results   Component Value Date    DFS1RMHSZVCR 1.343 03/18/2024    TSH 0.88 12/07/2019     Lab Results   Component Value Date    CHOLESTEROL 168 12/27/2023     Lab Results   Component Value Date    HDL 43 12/27/2023     Lab Results   Component Value Date    TRIG 200 (H) 12/27/2023     Lab Results   Component Value Date    LDLCALC 85 12/27/2023

## 2024-06-14 PROBLEM — J06.9 VIRAL UPPER RESPIRATORY TRACT INFECTION: Status: RESOLVED | Noted: 2024-05-15 | Resolved: 2024-06-14

## 2024-06-18 ENCOUNTER — CLINICAL SUPPORT (OUTPATIENT)
Age: 54
End: 2024-06-18
Payer: COMMERCIAL

## 2024-06-18 ENCOUNTER — HOSPITAL ENCOUNTER (OUTPATIENT)
Dept: SLEEP CENTER | Facility: CLINIC | Age: 54
Discharge: HOME | End: 2024-06-18
Payer: COMMERCIAL

## 2024-06-18 DIAGNOSIS — E53.8 B12 DEFICIENCY: Primary | ICD-10-CM

## 2024-06-18 DIAGNOSIS — G47.19 EXCESSIVE DAYTIME SLEEPINESS: ICD-10-CM

## 2024-06-18 DIAGNOSIS — G47.33 OSA (OBSTRUCTIVE SLEEP APNEA): ICD-10-CM

## 2024-06-18 DIAGNOSIS — R06.83 SNORING: ICD-10-CM

## 2024-06-18 PROCEDURE — G0399 HOME SLEEP TEST/TYPE 3 PORTA: HCPCS

## 2024-06-18 PROCEDURE — 96372 THER/PROPH/DIAG INJ SC/IM: CPT

## 2024-06-18 RX ADMIN — CYANOCOBALAMIN 1000 MCG: 1000 INJECTION, SOLUTION INTRAMUSCULAR; SUBCUTANEOUS at 09:05

## 2024-06-19 ENCOUNTER — OFFICE VISIT (OUTPATIENT)
Dept: NEUROLOGY | Facility: CLINIC | Age: 54
End: 2024-06-19
Payer: COMMERCIAL

## 2024-06-19 ENCOUNTER — HOSPITAL ENCOUNTER (OUTPATIENT)
Dept: RADIOLOGY | Facility: CLINIC | Age: 54
Discharge: HOME | End: 2024-06-19
Payer: COMMERCIAL

## 2024-06-19 ENCOUNTER — OFFICE VISIT (OUTPATIENT)
Dept: PHYSICAL THERAPY | Facility: CLINIC | Age: 54
End: 2024-06-19
Payer: COMMERCIAL

## 2024-06-19 VITALS
HEIGHT: 71 IN | DIASTOLIC BLOOD PRESSURE: 77 MMHG | HEART RATE: 75 BPM | TEMPERATURE: 98.2 F | BODY MASS INDEX: 37.11 KG/M2 | SYSTOLIC BLOOD PRESSURE: 144 MMHG | OXYGEN SATURATION: 99 % | WEIGHT: 265.1 LBS

## 2024-06-19 VITALS
HEART RATE: 66 BPM | TEMPERATURE: 98.2 F | OXYGEN SATURATION: 97 % | SYSTOLIC BLOOD PRESSURE: 126 MMHG | DIASTOLIC BLOOD PRESSURE: 82 MMHG | RESPIRATION RATE: 18 BRPM

## 2024-06-19 DIAGNOSIS — M47.812 CERVICAL SPONDYLOSIS: ICD-10-CM

## 2024-06-19 DIAGNOSIS — G43.809 VESTIBULAR MIGRAINE: ICD-10-CM

## 2024-06-19 DIAGNOSIS — R42 DIZZINESS: ICD-10-CM

## 2024-06-19 DIAGNOSIS — G24.3 CERVICAL DYSTONIA: Primary | ICD-10-CM

## 2024-06-19 DIAGNOSIS — G43.011 INTRACTABLE MIGRAINE WITHOUT AURA AND WITH STATUS MIGRAINOSUS: ICD-10-CM

## 2024-06-19 DIAGNOSIS — Z74.09 IMPAIRED FUNCTIONAL MOBILITY, BALANCE, GAIT, AND ENDURANCE: Primary | ICD-10-CM

## 2024-06-19 PROCEDURE — 64490 INJ PARAVERT F JNT C/T 1 LEV: CPT | Performed by: ANESTHESIOLOGY

## 2024-06-19 PROCEDURE — 97110 THERAPEUTIC EXERCISES: CPT

## 2024-06-19 PROCEDURE — 64491 INJ PARAVERT F JNT C/T 2 LEV: CPT | Performed by: ANESTHESIOLOGY

## 2024-06-19 PROCEDURE — 97112 NEUROMUSCULAR REEDUCATION: CPT

## 2024-06-19 PROCEDURE — 99214 OFFICE O/P EST MOD 30 MIN: CPT | Performed by: PSYCHIATRY & NEUROLOGY

## 2024-06-19 PROCEDURE — 97140 MANUAL THERAPY 1/> REGIONS: CPT

## 2024-06-19 RX ORDER — LIDOCAINE HYDROCHLORIDE 10 MG/ML
3 INJECTION, SOLUTION EPIDURAL; INFILTRATION; INTRACAUDAL; PERINEURAL ONCE
Status: COMPLETED | OUTPATIENT
Start: 2024-06-19 | End: 2024-06-19

## 2024-06-19 RX ORDER — DEXAMETHASONE 2 MG/1
2 TABLET ORAL
Qty: 5 TABLET | Refills: 0 | Status: SHIPPED | OUTPATIENT
Start: 2024-06-19

## 2024-06-19 RX ORDER — METHOCARBAMOL 500 MG/1
500 TABLET, FILM COATED ORAL 3 TIMES DAILY PRN
Qty: 90 TABLET | Refills: 3 | Status: SHIPPED | OUTPATIENT
Start: 2024-06-19

## 2024-06-19 RX ADMIN — LIDOCAINE HYDROCHLORIDE 1.5 ML: 20 INJECTION, SOLUTION EPIDURAL; INFILTRATION; INTRACAUDAL; PERINEURAL at 14:56

## 2024-06-19 RX ADMIN — LIDOCAINE HYDROCHLORIDE 3 ML: 10 INJECTION, SOLUTION EPIDURAL; INFILTRATION; INTRACAUDAL; PERINEURAL at 14:55

## 2024-06-19 NOTE — PATIENT INSTRUCTIONS
[facility]- with Keith Adams PT referral sent  Robaxin - take up to 3 times a day. Try to do it at least once at night, after you take the medication and allow for half hour to an hour to allow your body to absorb it.  Then apply a warm heating pad or a towel soaked in hot/warm temperature (be careful not to burn yourself) and apply over your trapezius muscle (where you feel the tension).  This will allow for blood vessel dilation and your blood carrying the muscle relaxant to more easily perfuse the area.  Perform gentle massages and muscle stretches of the neck prior to going to bed.  If the nerve block does not help today start Decadron 2 mg x 5 days with breakfast in the morning to break current headache cycle  Taper off topirmate   Emgality sent for prior authorization. Headache approval team will help

## 2024-06-19 NOTE — DISCHARGE INSTRUCTIONS

## 2024-06-19 NOTE — PROGRESS NOTES
Home Sleep Study Documentation    HOME STUDY DEVICE: Noxturnal yes                                           Dea G3 no      Pre-Sleep Home Study:    Set-up and instructions performed by:  CADE Bacon    Technician performed demonstration for Patient: yes    Return demonstration performed by Patient: yes    Written instructions provided to Patient: yes    Patient signed consent form: yes        Post-Sleep Home Study:    Additional comments by Patient: pending    Home Sleep Study Failed:pending    Failure reason: pending    Reported or Detected: pending    Scored by: pending

## 2024-06-19 NOTE — PROGRESS NOTES
"Daily Note     Today's date: 2024  Patient name: Jeremy George  : 1970  MRN: [id number]  Referring provider: Cici Austin CR*  Dx:   Encounter Diagnosis     ICD-10-CM    1. Impaired functional mobility, balance, gait, and endurance  Z74.09       2. Dizziness  R42                              Subjective: reports that he started getting acupuncture (has had 2 sessions). Didn't sleep well last night, had his sleep study.  Reports that the really bad dizziness hasn't come on in about 2 weeks.       Objective: See treatment diary below      Assessment: Tolerated treatment fair. Patient demonstrated fatigue post treatment, exhibited good technique with therapeutic exercises, and would benefit from continued PT. Continued MFR to suboccipitals and c-spine, reported no dizziness in supine position. Added postural exercises with rows and low rows and given band for HEP. Improved weight shifting during session compared to previous sessions, no grabbing of // bars with EO, only 2x with eyes closed, however increased instability with repetitions. Continue to progress as tolerated.      Plan: Continue per plan of care.      Precautions: HTN. Thoracic aneurysm, asthma, c-spine fusion    POC expires Unit limit Auth Expiration date PT/OT/ST + Visit Limit?    BOMN N/a 90 PT/OT/SLP                           Visit/Unit Tracking  AUTH Status:  Date 5/10 5/16 5/21 5/24 5/29 5/31 6/5 6/7 6/10 6/12 6/19   None Used 4 5 6 7 8 9 10 11 12 13 14    Remaining                 Manuals 5/31 6/5 6/7 6/10 6/12 6/19            C-spine SOR, upper trap, gentle cervical distraction SOR, upper trap, R 1st rib inferior mob    SOR, R 1st rib, R upper trap gentle MFR                     Neuro Re-Ed Chin tucks 3\" hold x 10 reps Chin tucks 3\" hold x 10 reps    Chin tuck with BP cuff 20mmHg to 22mmHg 5\" hold x 10 reps  Chick tuck with BP cuff  20mmHg to 22mmHg 5\" hold x 10 reps    DNF endurance 3\" hold x 10 reps  Chin tuck with BP " "cuff 20mmHg to 22mmHg 5\" hold x 10 reps    DNF endurance 3\" hold x10 reps   VOR x 1 seated         posture      Standing green TB rows x 20 reps  Low rows x 20 reps   Imaginary targets         Foam EC   Foam beams in // bars (long) sidestepping x 3 laps    Tandem x 3 laps  Foam beams in // bars (long) sidestepping x 3 laps    Tandem x 3 laps    Foam EO HT/HN 30\" x 3 each    Standing circles weight shifting  X 5 each direction with EO, x 10 each direction with EC X 10 each direction with EO, x 10 each direction with EC X 10 each direction with EO, x 10 each direction EC  X 10 each direction with EO, x 10 each direction EC   Spot it saccades         NBOS         tandem   // bars (long) fwd/backwards tandem x 3 laps // bars (long) fwd/backward tandem x 3 laps // bars (long) fwd/backward tandem x 3 laps    hurdles   12-inch (6) step over step pattern forward x 3 laps    Lateral x 3 laps 12-inch (6) step over step pattern forward x 3 laps    Lateral x 3 laps 12-inch 6) step over step pattern forward x 3 laps    Lateral x 3 laps    carioka     // bars (long) carioka stepping x 3 laps    Joint position error   R 1/3 correct  L 3/3 correct  Up 3/3 correct  Down 1/3 correct      Ther Ex         nustep L6 for 10 minutes L6 for 10 minutes L7 for 10 minutes L7 for 10 minutes L7 for 10 minutes L7 for 10 minutes   C-spine stretching Upper trap stretch 30\" x 3 each side    Pec strech supine 30\" x 3 reps   Lev scap stretch 30\" x 2 on R, x 1 on L    Upper thoracic stretch 5\" hold x 10 reps    Upper trap 30\" x 2 each side   Cervical ROM Supine rotation x 10 each direction                          Ther Activity                           Gait Training                           Education                                "

## 2024-06-19 NOTE — PROGRESS NOTES
"Neurology Ambulatory Visit  Name: Jeremy George       : 1970       MRN: [id number]   Encounter Provider: Naty Penaloza MD   Encounter Date: 2024  Encounter department: [facility]    Assessment and Plan  1. Cervical dystonia  -     methocarbamol (ROBAXIN) 500 mg tablet; Take 1 tablet (500 mg total) by mouth 3 (three) times a day as needed for muscle spasms  -     Ambulatory Referral to Physical Therapy; Future  -     dexamethasone (DECADRON) 2 mg tablet; Take 1 tablet (2 mg total) by mouth daily with breakfast  2. Intractable migraine without aura and with status migrainosus  -     Galcanezumab-gnlm 120 MG/ML SOAJ; INJECT 1 ML SUBCUTANEOUSLY IN RIGHT THIGH AND 1 ML IN LEFT THIGH AT THE SAME TIME FOR LOADING DOSE  3. Vestibular migraine  Assessment & Plan:  Jeremy George is a very pleasant right-handed 53 y.o. male with a past medical history that includes cervical spondylosis with radiculopathy s/p cervical spinal fusion, diabetes, hypertension, hyperlipidemia, essential tremor, chronic pain syndrome, groin strain, mild asthma, thoracic aortic aneurysm, lung nodule, chest pain, vitamin B12 deficiency and cerebral amyloid angiopathy who presents for headache follow-up.    To review last visit: \"atient today reports having a history of headaches however these are different and that they are less associated with his right upper extremity tremor.  He started noticing these headaches in 2024. They report having a  retro-orbital pressure followed by sharp pain located on the left side. Associated symptoms includes nausea, neck pain, photophobia, phonophobia, osmophobia, blurry vision, tinnitus and dizziness.  Dizziness is particularly worse when the headaches are severe.  He gets a sudden onset sensation of falling down and loss of balance however this can also happen without the headaches.. They report that headaches are triggered by strong smells and can be " "worsened by coughing . In a month they can get up to 10-12 headache days. Risk factors include obesity, stress, snoring , sleep disturbances , and muscular tension at the neck and shoulders. Patients have not responded to Tylenol or sumatriptan.  Ibuprofen contraindicated due to esophageal issues.  Triptans overall contraindicated given they can cause vasoconstriction and worsen microhemorrhages due to patients hx of cerebral amyloid angiopathy. Also has thoracic aortic aneurysm.  Physical exam was normal with exception of slowed speech and decreased range of motion at the neck.     Impression: At this time, based on history, available workup and physical exam,  I believe patients headaches are likely due to potential vestibular migraines.  Need to clarify if he had a history of migraines without the dizziness in the past.  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  I referred him to ophthalmology for dilated eye exam.  For his headaches I increase his Topamax to 100 mg nightly.  I added magnesium oxide 400 mg daily.  For headache  I [facility] given contraindications to triptans and ibuprofen.    Today patient reports some mild improvements in his symptoms.  He has been tracking his headaches through a migraine albertina.  Patient is very sensitive to lights.  Dizziness mildly improved with cervical traction however then recurred.  He has been taking Topamax 100 mg nightly and his migraines have become milder and his headaches have still persisted moderate to severe.  He was seen by ophthalmology.  No optic nerve edema on dilated eye exam.  He was seen by sleep medicine plan for home sleep study.  He is being followed by pain medicine and he is going to get a cervical medial branch block this afternoon to see if he is a candidate for " radiofrequency ablation.    Plan:  Recommend massage therapy for cervical dystonia noted on exam  New PT referral sent for cervical dystonia  Patient given the below instructions:  Robaxin - take up to 3 times a day. Try to do it at least once at night, after you take the medication and allow for half hour to an hour to allow your body to absorb it.  Then apply a warm heating pad or a towel soaked in hot/warm temperature (be careful not to burn yourself) and apply over your trapezius muscle (where you feel the tension).  This will allow for blood vessel dilation and your blood carrying the muscle relaxant to more easily perfuse the area.  Perform gentle massages and muscle stretches of the neck prior to going to bed.  If the nerve block does not help today start Decadron 2 mg x 5 days with breakfast in the morning to break current headache cycle  Follow-up with sleep study  Taper off topirmate   Emgality sent for prior authorization. Headache approval team will help   Will complete FMLA paperwork  Has follow-up in for August already in place    He will Return for Recheck. Has follow up scheduled .    History of Present Illness     HPI   Expand All Collapse All    [facility] General Neurology Consult  PATIENT:  Jeremy George (: 1970)  MRN:  [id number]  DATE OF SERVICE:  6/10/2024     REFERRED BY: Cici Austin CR*  PMD: MIKHAIL Ha       Assessment/Plan:     Jeremy George is a very pleasant right-handed 53 y.o. male with a past medical history that includes cervical spondylosis with radiculopathy s/p cervical spinal fusion, diabetes, hypertension, hyperlipidemia, essential tremor, chronic pain syndrome, groin strain, mild asthma, thoracic aortic aneurysm, lung nodule, chest pain, vitamin B12 deficiency and cerebral amyloid angiopathy who presents for headache follow-up.    Initial visit:  Patient today reports having a history of headaches however these are different and that they are  less associated with his right upper extremity tremor.  He started noticing these headaches in 2024. They report having a  retro-orbital pressure followed by sharp pain located on the left side. Associated symptoms includes nausea, neck pain, photophobia, phonophobia, osmophobia, blurry vision, tinnitus and dizziness.  Dizziness is particularly worse when the headaches are severe.  He gets a sudden onset sensation of falling down and loss of balance however this can also happen without the headaches.. They report that headaches are triggered by strong smells and can be worsened by coughing . In a month they can get up to 10-12 headache days. Risk factors include obesity, stress, snoring , sleep disturbances , and muscular tension at the neck and shoulders. Patients have not responded to Tylenol or sumatriptan.  Ibuprofen contraindicated due to esophageal issues.  Triptans overall contraindicated given they can cause vasoconstriction and worsen microhemorrhages due to patients hx of cerebral amyloid angiopathy. Also has thoracic aortic aneurysm.  Physical exam was normal with exception of slowed speech and decreased range of motion at the neck.     Impression: At this time, based on history, available workup and physical exam,  I believe patients headaches are likely due to potential vestibular migraines.  Need to clarify if he had a history of migraines without the dizziness in the past.  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  I will evaluate them for this and address risk factors with the following plan outlined below.     I referred him to sleep medicine for evaluation of SIVA.  I referred him to ophthalmology for dilated eye exam.  For his headaches I increase his Topamax to 100 mg nightly.  I added magnesium oxide 400 mg daily.  [facility] headache  [facility] given contraindications to  triptans and ibuprofen.    Prior treatment:     Abortive:   Tylenol 1000 mg- no help   Ibuprofen- C/I due to esophageal issues   Sumatriptan- no help   Triptans- contraindicated given they can cause vasoconstriction and worsen microhemorrhages due to patients hx of cerebral amyloid angiopathy. Also has thoracic aortic aneurysm   Nurtec 75 mg      Preventative:  Topiramate 75 mg- taking in the morning, headaches not as severe   Pregabalin 150 mg - for neck pain     Interval history:  Dizziness changed after cervical traction. Became less intense  Getting nerve block today to see if he gets a RFA  Taking topamax 100 mg at night now   Migraines milder  Headaches moderate to severe  Using migraine tracker albertina   Saw sleep medicine   No optic nerve edema on dilated eye exam   Headache triggered by lights   cervical medial branch blocks.   Has been taking BP in the 130s      Review of Systems   Constitutional:  Negative for appetite change, fatigue and fever.   HENT: Negative.  Negative for hearing loss, tinnitus, trouble swallowing and voice change.    Eyes: Negative.  Negative for photophobia, pain and visual disturbance.   Respiratory: Negative.  Negative for shortness of breath.    Cardiovascular: Negative.  Negative for palpitations.   Gastrointestinal: Negative.  Negative for nausea and vomiting.   Endocrine: Negative.  Negative for cold intolerance.   Genitourinary: Negative.  Negative for dysuria, frequency and urgency.   Musculoskeletal:  Negative for back pain, gait problem, myalgias, neck pain and neck stiffness.   Skin: Negative.  Negative for rash.   Allergic/Immunologic: Negative.    Neurological: Negative.  Negative for dizziness, tremors, seizures, syncope, facial asymmetry, speech difficulty, weakness, light-headedness, numbness and headaches.   Hematological: Negative.  Does not bruise/bleed easily.   Psychiatric/Behavioral: Negative.  Negative for confusion, hallucinations and sleep disturbance.    All  other systems reviewed and are negative.    Current Outpatient Medications on File Prior to Visit   Medication Sig Dispense Refill   • albuterol (ProAir HFA) 90 mcg/act inhaler Inhale 2 puffs every 6 (six) hours as needed for wheezing 8.5 g 0   • Ascorbic Acid (vitamin C) 1000 MG tablet Take 1,000 mg by mouth daily       • cetirizine (ZyrTEC) 10 mg tablet Take 1 tablet (10 mg total) by mouth daily 90 tablet 0   • famotidine (PEPCID) 20 mg tablet Take 1 tablet (20 mg total) by mouth daily 90 tablet 1   • fluticasone (FLONASE) 50 mcg/act nasal spray 2 sprays into each nostril daily 18 mL 1   • lisinopril-hydrochlorothiazide (PRINZIDE,ZESTORETIC) 20-12.5 MG per tablet Take 1 tablet by mouth daily 90 tablet 1   • magnesium Oxide (MAG-OX) 400 mg TABS Take 1 tablet (400 mg total) by mouth daily 90 tablet 0   • metFORMIN (GLUCOPHAGE) 500 mg tablet Pt takes 1500 mg (3 tablets) by mouth in the morning, and a 1000 mg (2 tablets) in the evening 450 tablet 1   • Multiple Vitamins-Minerals (CENTRUM SILVER 50+MEN PO) Take by mouth     • omeprazole (PriLOSEC) 40 MG capsule Take 1 capsule (40 mg total) by mouth daily (Patient taking differently: Take 40 mg by mouth daily Still has to start) 90 capsule 3   • polyethylene glycol (MiraLax) 17 g packet Take 17 g by mouth daily 90 each 5   • pregabalin (LYRICA) 75 mg capsule Take 2 capsules (150 mg total) by mouth daily at bedtime (Patient taking differently: Take 150 mg by mouth daily at bedtime Bedtime) 540 capsule 0   • rimegepant sulfate (Nurtec) 75 mg TBDP Take 1 tablet by mouth as needed.  Do not exceed more than 1 dose in 24 hours or 3 doses in one week 10 tablet 0   • simvastatin (ZOCOR) 20 mg tablet Take 1 tablet (20 mg total) by mouth daily 30 tablet 0   • sitaGLIPtin (JANUVIA) 100 mg tablet Take 1 tablet (100 mg total) by mouth daily 90 tablet 1   • topiramate (TOPAMAX) 100 mg tablet Take 1 tablet (100 mg total) by mouth daily at bedtime 30 tablet 6   • benzonatate (TESSALON  "PERLES) 100 mg capsule Take 1 capsule (100 mg total) by mouth 3 (three) times a day as needed for cough (Patient not taking: Reported on 6/3/2024) 20 capsule 0   • CINNAMON PO Take by mouth (Patient not taking: Reported on 5/21/2024)     • cyanocobalamin 1,000 mcg/mL Inject 1 mL (1,000 mcg total) into a muscle every 7 days for 9 doses 1 mL 0   • diphenhydrAMINE-acetaminophen (TYLENOL PM)  MG TABS Take 2 tablets by mouth daily at bedtime as needed for sleep 1000 mg in total (Patient not taking: Reported on 5/15/2024)     • meclizine (ANTIVERT) 25 mg tablet Take 1 tablet (25 mg total) by mouth 3 (three) times a day as needed for dizziness (Patient not taking: Reported on 6/3/2024) 30 tablet 0     Current Facility-Administered Medications on File Prior to Visit   Medication Dose Route Frequency Provider Last Rate Last Admin   • cyanocobalamin injection 1,000 mcg  1,000 mcg Intramuscular Q30 Days MIKHAIL Ha   1,000 mcg at 06/18/24 0905        Objective     /77 (BP Location: Left arm, Patient Position: Sitting, Cuff Size: Adult)   Pulse 75   Temp 98.2 °F (36.8 °C) (Temporal)   Ht 5' 11\" (1.803 m)   Wt 120 kg (265 lb 1.6 oz)   SpO2 99%   BMI 36.97 kg/m²    Physical Exam  Neurologic Exam    Administrative Statements   I have spent a total time of 35 minutes on 6/19/2024 In caring for this patient including Diagnostic results, Prognosis, Risks and benefits of tx options, Instructions for management, Patient and family education, Importance of tx compliance, Risk factor reductions, Impressions, Counseling / Coordination of care, Documenting in the medical record, Reviewing / ordering tests, medicine, procedures  , and Obtaining or reviewing history  .  "

## 2024-06-20 ENCOUNTER — PATIENT MESSAGE (OUTPATIENT)
Dept: PAIN MEDICINE | Facility: CLINIC | Age: 54
End: 2024-06-20

## 2024-06-20 ENCOUNTER — TELEPHONE (OUTPATIENT)
Dept: NEUROLOGY | Facility: CLINIC | Age: 54
End: 2024-06-20

## 2024-06-20 NOTE — PATIENT COMMUNICATION
The patient did not have a favorable result to cervical medial branch blocks.  Please call the patient to schedule follow-up office visit for reevaluation

## 2024-06-20 NOTE — TELEPHONE ENCOUNTER
----- Message from Naty North MD sent at 6/19/2024 12:49 PM EDT -----  Dante Crawford!    Can you help with authorization of emgality for Jeremy?    Thank you!   M

## 2024-06-21 ENCOUNTER — OFFICE VISIT (OUTPATIENT)
Dept: PHYSICAL THERAPY | Facility: CLINIC | Age: 54
End: 2024-06-21
Payer: COMMERCIAL

## 2024-06-21 DIAGNOSIS — Z74.09 IMPAIRED FUNCTIONAL MOBILITY, BALANCE, GAIT, AND ENDURANCE: Primary | ICD-10-CM

## 2024-06-21 DIAGNOSIS — R42 DIZZINESS: ICD-10-CM

## 2024-06-21 PROCEDURE — 97140 MANUAL THERAPY 1/> REGIONS: CPT

## 2024-06-21 PROCEDURE — 97110 THERAPEUTIC EXERCISES: CPT

## 2024-06-21 PROCEDURE — 97112 NEUROMUSCULAR REEDUCATION: CPT

## 2024-06-21 NOTE — PROGRESS NOTES
"Daily Note     Today's date: 2024  Patient name: Jeremy George  : 1970  MRN: [id number]  Referring provider: Cici Austin CR*  Dx:   Encounter Diagnosis     ICD-10-CM    1. Impaired functional mobility, balance, gait, and endurance  Z74.09       2. Dizziness  R42                                Subjective: reports he got the injections in his neck and had an hour and 1/2 of relief. He states that he had significant neck pain yesterday, headaches have been mild. Not sure if the neck pain was due to therapy, neck injections, or acupuncture since last session.       Objective: See treatment diary below      Assessment: Tolerated treatment fair. Patient demonstrated fatigue post treatment, exhibited good technique with therapeutic exercises, and would benefit from continued PT. Appears to have improved balance with hurdles and sidestepping on foam beams, some difficulty with tandem beams. Decreased neck pain with suboccipital release and upper trap stretch. MET for upper c-spine with chin tucks performed post. Continue to progress as tolerated.    Plan: Continue per plan of care.      Precautions: HTN. Thoracic aneurysm, asthma, c-spine fusion    POC expires Unit limit Auth Expiration date PT/OT/ST + Visit Limit?    BOMN N/a 90 PT/OT/SLP                           Visit/Unit Tracking  AUTH Status:  Date 5/16 5/21 5/24 5/29 5/31 6/5 6/7 6/10 6/12 6/19 6/21   None Used 5 6 7 8 9 10 11 12 13 14 15    Remaining                 Manuals 6/5 6/7 6/10 6/12 6/19 6/21            C-spine SOR, upper trap, R 1st rib inferior mob    SOR, R 1st rib, R upper trap gentle MFR SOR, upper trap stretch         Muscle energy O-A upper cervical flexion            Neuro Re-Ed Chin tucks 3\" hold x 10 reps    Chin tuck with BP cuff 20mmHg to 22mmHg 5\" hold x 10 reps  Chick tuck with BP cuff  20mmHg to 22mmHg 5\" hold x 10 reps    DNF endurance 3\" hold x 10 reps  Chin tuck with BP cuff 20mmHg to 22mmHg 5\" hold x 10 " "reps    DNF endurance 3\" hold x10 reps Chin tuck 3\" hold x 10 reps   VOR x 1 seated         posture     Standing green TB rows x 20 reps  Low rows x 20 reps    Imaginary targets         Foam EC  Foam beams in // bars (long) sidestepping x 3 laps    Tandem x 3 laps  Foam beams in // bars (long) sidestepping x 3 laps    Tandem x 3 laps    Foam EO HT/HN 30\" x 3 each  Foam beams in // bars (long) sidestepping x 3 laps    Tandem x 3 laps    Foam EO HT/HN 30\" x 2 each   Standing circles weight shifting X 5 each direction with EO, x 10 each direction with EC X 10 each direction with EO, x 10 each direction with EC X 10 each direction with EO, x 10 each direction EC  X 10 each direction with EO, x 10 each direction EC    Spot it saccades         NBOS         tandem  // bars (long) fwd/backwards tandem x 3 laps // bars (long) fwd/backward tandem x 3 laps // bars (long) fwd/backward tandem x 3 laps  // bars (short) fwd/backwards tandem x 4 laps   hurdles  12-inch (6) step over step pattern forward x 3 laps    Lateral x 3 laps 12-inch (6) step over step pattern forward x 3 laps    Lateral x 3 laps 12-inch 6) step over step pattern forward x 3 laps    Lateral x 3 laps  12-inch (4) step over step pattern forward x 4 laps    Lateral x 4 laps   carioka    // bars (long) carioka stepping x 3 laps     Joint position error  R 1/3 correct  L 3/3 correct  Up 3/3 correct  Down 1/3 correct       Ther Ex         nustep L6 for 10 minutes L7 for 10 minutes L7 for 10 minutes L7 for 10 minutes L7 for 10 minutes L7 for 10 minutes   C-spine stretching Lev scap stretch 30\" x 2 on R, x 1 on L    Upper thoracic stretch 5\" hold x 10 reps    Upper trap 30\" x 2 each side    Cervical ROM                           Ther Activity                           Gait Training                           Education                                "

## 2024-06-24 ENCOUNTER — TELEPHONE (OUTPATIENT)
Dept: NEUROLOGY | Facility: CLINIC | Age: 54
End: 2024-06-24

## 2024-06-24 ENCOUNTER — CLINICAL SUPPORT (OUTPATIENT)
Age: 54
End: 2024-06-24
Payer: COMMERCIAL

## 2024-06-24 DIAGNOSIS — E53.8 B12 DEFICIENCY: Primary | ICD-10-CM

## 2024-06-24 PROCEDURE — 96372 THER/PROPH/DIAG INJ SC/IM: CPT

## 2024-06-24 RX ADMIN — CYANOCOBALAMIN 1000 MCG: 1000 INJECTION, SOLUTION INTRAMUSCULAR; SUBCUTANEOUS at 11:32

## 2024-06-24 NOTE — ASSESSMENT & PLAN NOTE
"Jeremy George is a very pleasant right-handed 53 y.o. male with a past medical history that includes cervical spondylosis with radiculopathy s/p cervical spinal fusion, diabetes, hypertension, hyperlipidemia, essential tremor, chronic pain syndrome, groin strain, mild asthma, thoracic aortic aneurysm, lung nodule, chest pain, vitamin B12 deficiency and cerebral amyloid angiopathy who presents for headache follow-up.    To review last visit: \"atient today reports having a history of headaches however these are different and that they are less associated with his right upper extremity tremor.  He started noticing these headaches in March 2024. They report having a  retro-orbital pressure followed by sharp pain located on the left side. Associated symptoms includes nausea, neck pain, photophobia, phonophobia, osmophobia, blurry vision, tinnitus and dizziness.  Dizziness is particularly worse when the headaches are severe.  He gets a sudden onset sensation of falling down and loss of balance however this can also happen without the headaches.. They report that headaches are triggered by strong smells and can be worsened by coughing . In a month they can get up to 10-12 headache days. Risk factors include obesity, stress, snoring , sleep disturbances , and muscular tension at the neck and shoulders. Patients have not responded to Tylenol or sumatriptan.  Ibuprofen contraindicated due to esophageal issues.  Triptans overall contraindicated given they can cause vasoconstriction and worsen microhemorrhages due to patients hx of cerebral amyloid angiopathy. Also has thoracic aortic aneurysm.  Physical exam was normal with exception of slowed speech and decreased range of motion at the neck.     Impression: At this time, based on history, available workup and physical exam,  I believe patients headaches are likely due to potential vestibular migraines.  Need to clarify if he had a history of migraines without the dizziness " "in the past.  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  I referred him to ophthalmology for dilated eye exam.  For his headaches I increase his Topamax to 100 mg nightly.  I added magnesium oxide 400 mg daily.  For headache  I recommended Nurtec given contraindications to triptans and ibuprofen.    Today patient reports some mild improvements in his symptoms.  He has been tracking his headaches through a migraine albertina.  Patient is very sensitive to lights.  Dizziness mildly improved with cervical traction however then recurred.  He has been taking Topamax 100 mg nightly and his migraines have become milder and his headaches have still persisted moderate to severe.  He was seen by ophthalmology.  No optic nerve edema on dilated eye exam.  He was seen by sleep medicine plan for home sleep study.  He is being followed by pain medicine and he is going to get a cervical medial branch block this afternoon to see if he is a candidate for radiofrequency ablation.    Plan:  Recommend massage therapy for cervical dystonia noted on exam  New PT referral sent for cervical dystonia  Patient given the below instructions:  Robaxin - take up to 3 times a day. Try to do it at least once at night, after you take the medication and allow for half hour to an hour to allow your body to absorb it.  Then apply a warm heating pad or a towel soaked in hot/warm temperature (be careful not to burn yourself) and apply over your trapezius muscle (where you feel the tension).  This will allow for blood vessel dilation and your blood carrying the muscle relaxant to more easily perfuse the area.  Perform gentle massages and muscle stretches of the neck prior to going to bed.  If the nerve block does not help today start Decadron 2 mg x 5 days with breakfast in the morning to break " current headache cycle  Follow-up with sleep study  Taper off topirmate   [employer] sent for prior authorization. Headache approval team will help   Will complete FMLA paperwork  Has follow-up in for August already in place

## 2024-06-24 NOTE — TELEPHONE ENCOUNTER
Spoke with patient to let him that his fmla forms have been updated in [employer] for him and was sent in a message. If he were to have any questions to let us know.

## 2024-06-25 NOTE — PROGRESS NOTES
Assessment:  1. Neck pain    2. Myofascial pain syndrome        Plan:  Continue to follow with neurology and physical therapy as scheduled  Continue pregabalin as prescribed.  This medication was refilled today  Patient may continue methocarbamol as prescribed  Follow-up in 2 months or sooner if needed    History of Present Illness:    The patient is a 53 y.o. male with a history of C6-7 ACDF in 2018 last seen on 05/08/2024  who presents for a follow up office visit in regards to chronic right-sided neck pain that radiates toward the trapezius musculature.  He recently had cervical medial branch blocks without relief.  He has not had any relief with cervical epidural steroid injections.  He did find some mild relief with occipital nerve blocks which were performed by neurology in the hospital.  He states he recently had an adjustment of his migraine medication as well as an addition of methocarbamol and has been participating in physical therapy which has significantly reduced his dizziness and pain.  He had 2 evaluations with neurosurgery since last office visit, both of which advised against surgical intervention and recommended ongoing conservative treatment.    Patient rates his pain a 2 out of 10 on the numeric pain rating scale.  Pain is intermittent at night and is described as dull aching and sharp.  He continues on pregabalin 150 mg nightly, and methocarbamol 500 mg as needed    I have personally reviewed and/or updated the patient's past medical history, past surgical history, family history, social history, current medications, allergies, and vital signs today.       Review of Systems:    Review of Systems      Past Medical History:   Diagnosis Date    Achalasia     Aneurysm (HCC)     aortic    Asthma     Childhood    Colon polyp     Diabetes mellitus (HCC)     Esophageal ulcer     Fatty liver     Groin abscess     with I &D    Hyperlipidemia     Hypertension     Plantar fasciitis        Past Surgical  History:   Procedure Laterality Date    ANTERIOR CERVICAL DISCECTOMY W/ FUSION      APPENDECTOMY      BICEPS TENDON REPAIR      EGD AND COLONOSCOPY      FL INJECTION LEFT WRIST (ARTHROGRAM)  11/9/2021    FL MYELOGRAM CERVICAL  6/13/2018    MENISCECTOMY      MYOTOMY HELLER LAPAROSCOPIC      ORTHOPEDIC SURGERY         Family History   Problem Relation Age of Onset    No Known Problems Mother     No Known Problems Father        Social History     Occupational History    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   Tobacco Use    Smoking status: Never    Smokeless tobacco: Never   Vaping Use    Vaping status: Never Used   Substance and Sexual Activity    Alcohol use: Not Currently    Drug use: Never    Sexual activity: Yes     Partners: Female         Current Outpatient Medications:     albuterol (ProAir HFA) 90 mcg/act inhaler, Inhale 2 puffs every 6 (six) hours as needed for wheezing, Disp: 8.5 g, Rfl: 0    Ascorbic Acid (vitamin C) 1000 MG tablet, Take 1,000 mg by mouth daily  , Disp: , Rfl:     cetirizine (ZyrTEC) 10 mg tablet, Take 1 tablet (10 mg total) by mouth daily, Disp: 90 tablet, Rfl: 0    cyanocobalamin 1,000 mcg/mL, Inject 1 mL (1,000 mcg total) into a muscle every 7 days for 9 doses, Disp: 1 mL, Rfl: 0    dexamethasone (DECADRON) 2 mg tablet, Take 1 tablet (2 mg total) by mouth daily with breakfast, Disp: 5 tablet, Rfl: 0    famotidine (PEPCID) 20 mg tablet, Take 1 tablet (20 mg total) by mouth daily, Disp: 90 tablet, Rfl: 1    fluticasone (FLONASE) 50 mcg/act nasal spray, 2 sprays into each nostril daily, Disp: 18 mL, Rfl: 1    Galcanezumab-gnlm 120 MG/ML SOAJ, INJECT 1 ML SUBCUTANEOUSLY IN RIGHT THIGH AND 1 ML IN LEFT THIGH AT THE SAME TIME FOR LOADING DOSE, Disp: 2 mL, Rfl: 0    lisinopril-hydrochlorothiazide (PRINZIDE,ZESTORETIC) 20-12.5 MG per tablet, Take 1 tablet by mouth daily, Disp: 90 tablet, Rfl: 1    magnesium Oxide (MAG-OX) 400 mg TABS, Take 1 tablet (400 mg total) by mouth daily, Disp: 90  tablet, Rfl: 0    metFORMIN (GLUCOPHAGE) 500 mg tablet, Pt takes 1500 mg (3 tablets) by mouth in the morning, and a 1000 mg (2 tablets) in the evening, Disp: 450 tablet, Rfl: 1    methocarbamol (ROBAXIN) 500 mg tablet, Take 1 tablet (500 mg total) by mouth 3 (three) times a day as needed for muscle spasms, Disp: 90 tablet, Rfl: 3    Multiple Vitamins-Minerals (CENTRUM SILVER 50+MEN PO), Take by mouth, Disp: , Rfl:     omeprazole (PriLOSEC) 40 MG capsule, Take 1 capsule (40 mg total) by mouth daily (Patient taking differently: Take 40 mg by mouth daily Still has to start), Disp: 90 capsule, Rfl: 3    polyethylene glycol (MiraLax) 17 g packet, Take 17 g by mouth daily, Disp: 90 each, Rfl: 5    pregabalin (LYRICA) 150 mg capsule, Take 1 capsule (150 mg total) by mouth daily at bedtime, Disp: 90 capsule, Rfl: 2    rimegepant sulfate (Nurtec) 75 mg TBDP, Take 1 tablet by mouth as needed.  Do not exceed more than 1 dose in 24 hours or 3 doses in one week, Disp: 10 tablet, Rfl: 0    simvastatin (ZOCOR) 20 mg tablet, Take 1 tablet (20 mg total) by mouth daily, Disp: 30 tablet, Rfl: 0    sitaGLIPtin (JANUVIA) 100 mg tablet, Take 1 tablet (100 mg total) by mouth daily, Disp: 90 tablet, Rfl: 1    topiramate (TOPAMAX) 100 mg tablet, Take 1 tablet (100 mg total) by mouth daily at bedtime, Disp: 30 tablet, Rfl: 6    benzonatate (TESSALON PERLES) 100 mg capsule, Take 1 capsule (100 mg total) by mouth 3 (three) times a day as needed for cough (Patient not taking: Reported on 6/3/2024), Disp: 20 capsule, Rfl: 0    CINNAMON PO, Take by mouth (Patient not taking: Reported on 5/21/2024), Disp: , Rfl:     diphenhydrAMINE-acetaminophen (TYLENOL PM)  MG TABS, Take 2 tablets by mouth daily at bedtime as needed for sleep 1000 mg in total (Patient not taking: Reported on 5/15/2024), Disp: , Rfl:     meclizine (ANTIVERT) 25 mg tablet, Take 1 tablet (25 mg total) by mouth 3 (three) times a day as needed for dizziness (Patient not taking:  "Reported on 6/3/2024), Disp: 30 tablet, Rfl: 0    Current Facility-Administered Medications:     cyanocobalamin injection 1,000 mcg, 1,000 mcg, Intramuscular, Q30 Days, MIKHAIL Ha, 1,000 mcg at 06/24/24 1132    No Known Allergies    Physical Exam:    /84   Ht 5' 11\" (1.803 m)   Wt 119 kg (262 lb)   BMI 36.54 kg/m²     Constitutional:normal, well developed, well nourished, alert, in no distress and non-toxic and no overt pain behavior.  Eyes:anicteric  HEENT:grossly intact  Neck:supple, symmetric, trachea midline and no masses   Pulmonary:even and unlabored  Cardiovascular:No edema or pitting edema present  Skin:Normal without rashes or lesions and well hydrated  Psychiatric:Mood and affect appropriate  Neurologic:Cranial Nerves II-XII grossly intact  Musculoskeletal:normalI gait      Imaging  No orders to display         No orders of the defined types were placed in this encounter.      "

## 2024-06-26 ENCOUNTER — EVALUATION (OUTPATIENT)
Dept: PHYSICAL THERAPY | Facility: CLINIC | Age: 54
End: 2024-06-26
Payer: COMMERCIAL

## 2024-06-26 ENCOUNTER — OFFICE VISIT (OUTPATIENT)
Dept: PAIN MEDICINE | Facility: CLINIC | Age: 54
End: 2024-06-26
Payer: COMMERCIAL

## 2024-06-26 VITALS
BODY MASS INDEX: 36.68 KG/M2 | DIASTOLIC BLOOD PRESSURE: 84 MMHG | HEIGHT: 71 IN | WEIGHT: 262 LBS | SYSTOLIC BLOOD PRESSURE: 129 MMHG

## 2024-06-26 DIAGNOSIS — Z74.09 IMPAIRED FUNCTIONAL MOBILITY, BALANCE, GAIT, AND ENDURANCE: Primary | ICD-10-CM

## 2024-06-26 DIAGNOSIS — R42 DIZZINESS: ICD-10-CM

## 2024-06-26 DIAGNOSIS — M54.2 NECK PAIN: Primary | ICD-10-CM

## 2024-06-26 DIAGNOSIS — M79.18 MYOFASCIAL PAIN SYNDROME: ICD-10-CM

## 2024-06-26 PROCEDURE — 99214 OFFICE O/P EST MOD 30 MIN: CPT | Performed by: NURSE PRACTITIONER

## 2024-06-26 PROCEDURE — 97110 THERAPEUTIC EXERCISES: CPT

## 2024-06-26 PROCEDURE — 97112 NEUROMUSCULAR REEDUCATION: CPT

## 2024-06-26 RX ORDER — PREGABALIN 150 MG/1
150 CAPSULE ORAL
Qty: 90 CAPSULE | Refills: 2 | Status: SHIPPED | OUTPATIENT
Start: 2024-06-26

## 2024-06-26 NOTE — TELEPHONE ENCOUNTER
CaseId:[id number];Status:Approved;Review Type:Prior Auth;Coverage Start Date:05/21/2024;Coverage End   Date:06/20/2025;;  CaseId:[id number];Status:Approved;Review Type:Qty;Coverage Start Date:05/21/2024;Coverage End Date:07/20/2024;. Authorization Expiration Date: June 20, 2025.    Sent patient [place] message

## 2024-06-26 NOTE — PROGRESS NOTES
Please review report. Re-Evaluation    Today's date: 2024  Patient name: Jeremy George  : 1970  MRN: [id number]  Referring provider: Cici Austin CR*  Dx:   Encounter Diagnosis     ICD-10-CM    1. Impaired functional mobility, balance, gait, and endurance  Z74.09       2. Dizziness  R42             Start Time: 0850  Stop Time: 0935  Total time in clinic (min): 45 minutes         Assessment  24: Jeremy has attended 15 sessions of physical therapy for dizziness with suspected vestibular migraine. Since last progress note, he is making significant improvements in all outcome measures. Self-selected gait speed improved from 0.54m/s to 0.94m/s, TUG improved from 20.9 seconds to 10.5 seconds, FGA improved from 16/30 to 22/30, ABC improved from 68% to 91%, DHI improved from 80 points to 28 points, and 3MBWT improved from 10.6 seconds to 7.3 seconds. He is demonstrating some improvement with cervical ROM, however continues to be significantly limited. He has received a prescription from physician for PT for cervical dystonia, and will be evaluated at another clinic in the future for this. Jeremy continues to improve with therapy, and is making significant improvements recently due to improved medical management for headaches. Jeremy continues to be at increased risk of falls from 3MBWT and self-selected gait speed, and is below age matched normative value for FGA (and is at 22/30 points, which is cut-off score for falls). He is still functioning below baseline, and would benefit from continued physical therapy to decrease fall risk, improve balance, decrease visual dependence for balance, improve movement tolerance, improve functional mobility, return to work, and return to prior level of function. Continue 2x/week for 8 weeks.       Assessment details: Jeremy George is a 53 year-old male who presents to outpatient physical therapy with persistent dizziness starting about 1 week ago. He was hospitalized from -,  with diagnosis of dizziness and gait instability. During hospitalization, he was treated with migraine cocktail, which appears to have decreased symptoms of headache and dizziness. Upon testing, demonstrated a few beats of R beating nystagmus with R gaze hold, and possible 1-2 beats of L beating nystagmus with L gaze hold (possible direction changing nystagmus, unable to determine). Other HINTS exam (head impulse, test of skew) were negative. Jeremy did have increased dizziness with saccades, with hypometria vertically, which may also be indicative of vestibular migraine. With history of cerebral amyloid angiopathy, his central signs as well as dizziness and mobility difficulties may also be due to these circulatory changes. BPPV testing was not completed during evaluation due to constant symptoms and low likelihood due to subjective complaints of dizziness. Negative head impulse testing with decreased likelihood of peripheral vestibular issue. He is demonstrating significant gait instability noted with decreased gait speed of 0.69m/s, needing use of FWW for mobility (no AD prior to hospitalization), and requiring CGA to min A for mobility due to LOB backwards and to R side. Further testing for coordination, balance, and vestibular testing with MCTSIB to be completed at next session (not completed due to limited time).  Jeremy would benefit from skilled physical therapy to decrease fall risk, improve balance, improve dizziness, improve functional mobility, improve activity tolerance, and return to prior level of function.   Impairments: abnormal gait, activity intolerance, impaired balance, lacks appropriate home exercise program and safety issue  Understanding of Dx/Px/POC: good   Prognosis: good    Goals  ST. Pt will improve gait speed to at least 0.75 m/s with least restrictive device within 4 weeks needed to improve safety with ambulation. NOT MET (less AD)  2. Pt will be able to complete all mobility tasks  with SPV and LRAD within 4 weeks needed to reduce fall risk. ALMOST MET (1 day required assistance)  3. Pt will improve ABC scale to at least 30% within 4 weeks needed to reduce fall risk. MET  4. Pt will demonstrate independence with HEP within 4 weeks. MET    LT. Pt will improve gait speed to at least 0.85m/s with least restrictive device within 8 weeks needed to improve safety with ambulation. MET  2. Pt will improve ABC scale to at least 50% within 8 weeks needed to reduce fall risk. MET  3. Pt will improve DHI score to 1 MCID value from 4 week score in 8 weeks. MET  4. Pt will demonstrate independence with HEP within 8 weeks.  MET     NEW GOALS  Pt will improve self-selected gait speed to at least 1.05m/s in 4 weeks to decrease fall risk and improve functional mobility  Pt will improve DHI score to at least 17 points in 4 weeks to decrease dizziness and improve function  Pt will improve FGA score to at least 25/30 points in 4 weeks to improve balance and decrease fall risk  Complete miniBEST to further assess balance      Pt will improve self-selected gait speed to at least 1.2m/s in 8 weeks to improve functional mobility and normalize gait speed  Pt will improve mini-BEST by at least 4 points from initial score in 8 weeks  Pt will improve 3MBWT to <4.5 seconds to decrease fall risk  Pt will be independent with HEP in 8 weeks    Plan  Patient would benefit from: skilled physical therapy  Planned therapy interventions: balance, neuromuscular re-education, canalith repositioning, patient education, gait training, therapeutic exercise, therapeutic activities and home exercise program  Frequency: 2x week  Duration in weeks: 8  Plan of Care beginning date: 2024  Plan of Care expiration date: 2024  Treatment plan discussed with: patient        Subjective Evaluation    History of Present Illness  24: Reports that he has a little bit of a headache this morning. New medications are helping a lot.  Minimal dizziness, occasionally pokes into his day for a few minutes and then goes away. Reports that he can't pinpoint what is helping, but he is getting better. Reports he has not been using the walker since he had a really bad episode on Memorial Day. Had a couple close calls but did not fall, was able to catch himself. Overall improvement 70-90%. Reports he feels more awareness of when he needs to stop and take a break. He reports that Thursday last week was bad, but since then it has been much better. Continues to have neck pain and on and off sharp pain at the top of his head that lasts about 10 seconds.     5/29/24: Reports that he has a bad weekend with headaches, and significant dizziness on Monday. He continues to struggle with balance. Some days are bad and other days are a little better. He states that he has been frustrated that he is stuck in the house most of the time. Having headaches about every day, 6 days out of 7 days. Reports that he is trying to get the neurtek approved to be able to take it. Continues to have pressure and the lights bother him with his headaches. Continues to have neck pain. Feels that it has been contributing to his headaches. Reports that he has a lot of challenge with bending down, causing dizziness and feeling like he is going to fall forward.    Mechanism of injury: Works as a  for [employer], was doing stretching with toe touches, bend down and almost went down head first. Started having lightheadedness, headache, and persistent dizziness. Kept increasing with the headaches, states they eliminated that he was having a stroke. Trying to find out if it was the crystals, migraine, or cluster headaches. States that they want him to see an ENT. Was able to move around better on Saturday, Sunday was having a bad day. States that he feels like he is moving, getting nauseous and dizzy. Reports that the dizziness feels more constant, yesterday if he was looking to the R  "side it felt like he was feeling like he was falling to the R side. Not sure if it was if his eyes were moving or turning his head. Helps to sit still and look straight down and wait for things to settle down, but he continues to feel like he is on a boat that is moving. L side headaches primarily, but can be the whole front. Reports that he feels like everything is moving all the time. Using FWW at this time, did not use prior. Reports that he has been having neck pain in the last 2-2.5 months (has hx of ACDF in 2018).     Reports that he had a shakiness in his hand 2 years ago, and a shaking in his R leg, diagnosed with microhemmorages. States that the symptoms     Per medical chart, \"53 y.o. male with prior vertigo episode 20+ years ago, suspected CAA, headaches, HTN, HLD, DM2, cervical radiculopathy s/p ACDF, tremors, and recent travel to [place] with intermittent dizziness for the past week who presented to [facility] on 4/23/2024 as a stroke alert for persistent dizziness.     Patient has had intermittent dizziness over the past week that only lasted a few minutes. Day of presentation, patient was bending down doing toe touches when he suddenly developed severe/persistent dizziness described as \"swaying\" associated with ambulatory dysfunction and nausea.  Patient also developed a left frontal/parietal headache and chest discomfort.    Result Date: 4/24/2024  Impression: No acute intracranial abnormality. No restricted diffusion to suggest acute ischemia. Mild scattered periventricular and subcortical foci of white matter T2 hyperintensity which are nonspecific and most likely related to chronic small vessel ischemic changes. Other less like etiologies include demyelinating disease, vasculitis, Lyme and migraines. Reidentified and normal punctate foci of susceptibility artifact are seen in the supratentorial brain centrally at the gray-white junction peripherally in distribution most compatible with cerebral " amyloid angiopathy.  Chronic microhemorrhage in the setting of hypertension is less likely with this distribution but not excluded.     Pain  Current pain ratin  At best pain ratin  At worst pain ratin  Location: head  24: headaches 4-5/10 average, best 2/10, worst 7/10  24: headaches worst 7-8/10, best 0/10        Objective     Concurrent Complaints  Positive for headaches, nausea/motion sickness, tinnitus (pain in R ear, occasional ringing in L ear) and visual change (bluriness). Negative for hearing loss, memory loss and aural fullness  Neuro Exam:     Dizziness  Positive for disequilibrium, vertigo, motion sickness and rocking or swaying.   Negative for oscillopsia, light-headedness and diplopia.     Exacerbating factors  Positive for bending over, turning head and optokinetic movement.   Negative for rolling in bed, looking up (limited due to neck pain), walking and supine to/from sitting.     Symptoms   Intensity at best: 0/10  Intensity at worst: 7/10  Average intensity: 5/10  24: average 3-4/10, best 0/10, worst 7/10  24: worst 6-7/10, best 0/10    Headaches   Patient reports headaches: Yes.       Balance Test    Gait speed 0.69m/s with FWW SSGS 0.54m/s no AD and SPV 0.94m/s SSGS   TU.9 seconds with FWW, CGA min A on turns 20.9 seconds without AD, SPV 10.5 seconds without AD, SPV   FGA: NT    ABC Scale 1.25% 68% 91%   DHI 84 80 28 points   3MBWT  10.6 seconds 7.3 seconds         CERVICAL SPINE  Cervical Palpation: suboccipitals R>L, R upper trap, scalenes  Cervical Posture: rounded shoulders, FHP    Cervical Range of Motion     Flexion WFL    Extension 50% *     Left Right   Lateral Flexion 15 * 15 *   Rotation 45 25 *   More pain R SB> L SB    24:  Flexion  Extension:  R SB 23*  L SB 13*  R rotation 36*  L rotation 42*    Upper Cervical Rotation Test: negative bilaterally        Mobility    Sit To Stand Contact Guard Mod I  "  ambulation CGA-min A due to LOB backwards and to the R side SPV no AD   stairs TBD SPV       Gait Assessment: Decreased gait speed, slow turning, decreased push off, occasional guarded positioning         Precautions: HTN. Thoracic aneurysm, asthma, c-spine fusion      POC expires Unit limit Auth Expiration date PT/OT/ST + Visit Limit?   6/24 BOMN N/a 90 PT/OT/SLP                             Visit/Unit Tracking  AUTH Status:  Date 5/29 5/31 6/5 6/7 6/10 6/12 6/19 6/21 6/26   None Used 8 9 10 11 12 13 14 15 16    Remaining               Manuals 6/7 6/10 6/12 6/19 6/21 6/26            C-spine    SOR, R 1st rib, R upper trap gentle MFR SOR, upper trap stretch         Muscle energy O-A upper cervical flexion             Neuro Re-Ed  Chick tuck with BP cuff  20mmHg to 22mmHg 5\" hold x 10 reps    DNF endurance 3\" hold x 10 reps  Chin tuck with BP cuff 20mmHg to 22mmHg 5\" hold x 10 reps    DNF endurance 3\" hold x10 reps Chin tuck 3\" hold x 10 reps FGA  DHI  ABC  TUG  SSGS   VOR x 1 seated         posture    Standing green TB rows x 20 reps  Low rows x 20 reps     Imaginary targets         Foam EC Foam beams in // bars (long) sidestepping x 3 laps    Tandem x 3 laps  Foam beams in // bars (long) sidestepping x 3 laps    Tandem x 3 laps    Foam EO HT/HN 30\" x 3 each  Foam beams in // bars (long) sidestepping x 3 laps    Tandem x 3 laps    Foam EO HT/HN 30\" x 2 each    Standing circles weight shifting X 10 each direction with EO, x 10 each direction with EC X 10 each direction with EO, x 10 each direction EC  X 10 each direction with EO, x 10 each direction EC     Spot it saccades         NBOS         tandem // bars (long) fwd/backwards tandem x 3 laps // bars (long) fwd/backward tandem x 3 laps // bars (long) fwd/backward tandem x 3 laps  // bars (short) fwd/backwards tandem x 4 laps    hurdles 12-inch (6) step over step pattern forward x 3 laps    Lateral x 3 laps 12-inch (6) step over step pattern forward x 3 " "laps    Lateral x 3 laps 12-inch 6) step over step pattern forward x 3 laps    Lateral x 3 laps  12-inch (4) step over step pattern forward x 4 laps    Lateral x 4 laps    carioka   // bars (long) carioka stepping x 3 laps      Joint position error R 1/3 correct  L 3/3 correct  Up 3/3 correct  Down 1/3 correct        Ther Ex      C-spine evaluation, ROM   nustep L7 for 10 minutes L7 for 10 minutes L7 for 10 minutes L7 for 10 minutes L7 for 10 minutes    C-spine stretching    Upper trap 30\" x 2 each side     Cervical ROM                           Ther Activity                           Gait Training                           Education      Progress with therapy, POC, balance                        "

## 2024-06-28 ENCOUNTER — OFFICE VISIT (OUTPATIENT)
Dept: PHYSICAL THERAPY | Facility: CLINIC | Age: 54
End: 2024-06-28
Payer: COMMERCIAL

## 2024-06-28 DIAGNOSIS — R42 DIZZINESS: ICD-10-CM

## 2024-06-28 DIAGNOSIS — Z74.09 IMPAIRED FUNCTIONAL MOBILITY, BALANCE, GAIT, AND ENDURANCE: Primary | ICD-10-CM

## 2024-06-28 PROCEDURE — 97112 NEUROMUSCULAR REEDUCATION: CPT

## 2024-06-28 PROCEDURE — 97110 THERAPEUTIC EXERCISES: CPT

## 2024-06-28 NOTE — PROGRESS NOTES
"Daily Note     Today's date: 2024  Patient name: Jeremy George  : 1970  MRN: [id number]  Referring provider: Cici Austin CR*  Dx:   Encounter Diagnosis     ICD-10-CM    1. Impaired functional mobility, balance, gait, and endurance  Z74.09       2. Dizziness  R42                      Subjective: reports he has a bad headache today, no dizziness. Yesterday it was bad.      Objective: See treatment diary below      Assessment: Tolerated treatment fair. Patient demonstrated fatigue post treatment, exhibited good technique with therapeutic exercises, and would benefit from continued PT. Cues to decrease downward gaze during hurdles with good stability noted. Balance exercises performed in SOLO harness today for increased challenge and decreasing ability to maintain balance with Ue's. Continue to progress as tolerated.      Plan: Continue per plan of care.  Red mat with obstacles and decreased visual input during balance exercises next visit.     Precautions: HTN. Thoracic aneurysm, asthma, c-spine fusion    POC expires Unit limit Auth Expiration date PT/OT/ST + Visit Limit?    BOMN N/a 90 PT/OT/SLP                           Visit/Unit Tracking  AUTH Status:  Date 5/24 5/29 5/31 6/5 6/7 6/10 6/12 6/19 6/21 6/26 6/28   None Used 7 8 9 10 11 12 13 14 15 16 17    Remaining                   Manuals 6/10 6/12 6/19 6/21 6/26 6/28            C-spine   SOR, R 1st rib, R upper trap gentle MFR SOR, upper trap stretch         Muscle energy O-A upper cervical flexion              Neuro Re-Ed Chick tuck with BP cuff  20mmHg to 22mmHg 5\" hold x 10 reps    DNF endurance 3\" hold x 10 reps  Chin tuck with BP cuff 20mmHg to 22mmHg 5\" hold x 10 reps    DNF endurance 3\" hold x10 reps Chin tuck 3\" hold x 10 reps FGA  DHI  ABC  TUG  SSGS    VOR x 1 seated         posture   Standing green TB rows x 20 reps  Low rows x 20 reps      Imaginary targets         Foam EC  Foam beams in // bars (long) sidestepping x 3 " "laps    Tandem x 3 laps    Foam EO HT/HN 30\" x 3 each  Foam beams in // bars (long) sidestepping x 3 laps    Tandem x 3 laps    Foam EO HT/HN 30\" x 2 each  SOLO  Foam beams sidestepping x 3 laps    Tandem x 3 laps    3 laps each direction holding 10# tidal tank   Standing circles weight shifting X 10 each direction with EO, x 10 each direction EC  X 10 each direction with EO, x 10 each direction EC      Spot it saccades         NBOS         tandem // bars (long) fwd/backward tandem x 3 laps // bars (long) fwd/backward tandem x 3 laps  // bars (short) fwd/backwards tandem x 4 laps  SOLO  Fwd tandem x 2 laps    hurdles 12-inch (6) step over step pattern forward x 3 laps    Lateral x 3 laps 12-inch 6) step over step pattern forward x 3 laps    Lateral x 3 laps  12-inch (4) step over step pattern forward x 4 laps    Lateral x 4 laps  12-inch (6) step over step pattern forward x 3 laps    Lateral x 3 laps     carioka  // bars (long) carioka stepping x 3 laps    SOLO  Carioka x 3 laps   River rocks      SOLO  Small and medium forward x 4 laps    Lateral x 4 laps   Joint position error         Ther Ex     C-spine evaluation, ROM    nustep L7 for 10 minutes L7 for 10 minutes L7 for 10 minutes L7 for 10 minutes  L7 for 10 minutes   C-spine stretching   Upper trap 30\" x 2 each side      Cervical ROM                           Ther Activity                           Gait Training                           Education     Progress with therapy, POC, balance                          "

## 2024-07-01 ENCOUNTER — OFFICE VISIT (OUTPATIENT)
Dept: PHYSICAL THERAPY | Facility: CLINIC | Age: 54
End: 2024-07-01
Payer: COMMERCIAL

## 2024-07-01 ENCOUNTER — CLINICAL SUPPORT (OUTPATIENT)
Age: 54
End: 2024-07-01
Payer: COMMERCIAL

## 2024-07-01 DIAGNOSIS — E53.8 B12 DEFICIENCY: Primary | ICD-10-CM

## 2024-07-01 DIAGNOSIS — G47.19 EXCESSIVE DAYTIME SLEEPINESS: Primary | ICD-10-CM

## 2024-07-01 DIAGNOSIS — R06.83 SNORING: ICD-10-CM

## 2024-07-01 DIAGNOSIS — Z74.09 IMPAIRED FUNCTIONAL MOBILITY, BALANCE, GAIT, AND ENDURANCE: Primary | ICD-10-CM

## 2024-07-01 DIAGNOSIS — R42 DIZZINESS: ICD-10-CM

## 2024-07-01 PROCEDURE — 97110 THERAPEUTIC EXERCISES: CPT

## 2024-07-01 PROCEDURE — 96372 THER/PROPH/DIAG INJ SC/IM: CPT

## 2024-07-01 PROCEDURE — 97112 NEUROMUSCULAR REEDUCATION: CPT

## 2024-07-01 RX ADMIN — CYANOCOBALAMIN 1000 MCG: 1000 INJECTION, SOLUTION INTRAMUSCULAR; SUBCUTANEOUS at 11:26

## 2024-07-01 NOTE — PROGRESS NOTES
"Daily Note     Today's date: 2024  Patient name: Jeremy George  : 1970  MRN: [id number]  Referring provider: Cici Austin CR*  Dx:   Encounter Diagnosis     ICD-10-CM    1. Impaired functional mobility, balance, gait, and endurance  Z74.09       2. Dizziness  R42                        Subjective: feels alright so far today. Dizziness came back on Saturday and , maybe about 20 minutes. Reports the headaches have been pretty bad. Headache has been most of the day, going from mild to intense, dizziness came out of nowhere.       Objective: See treatment diary below      Assessment: Tolerated treatment fair. Patient demonstrated fatigue post treatment, exhibited good technique with therapeutic exercises, and would benefit from continued PT. TM was unavailable during session, will attempt NV if able. Added ambulation on red mat to replicate uneven surfaces, increased instability with lateral and backwards. Was able to take quick reactive steps in lateral and backwards direction. Some dizziness replicated with balance exercises. Continue to progress as tolerated.      Plan: Continue per plan of care.       Precautions: HTN. Thoracic aneurysm, asthma, c-spine fusion    POC expires Unit limit Auth Expiration date PT/OT/ST + Visit Limit?    BOMN N/a 90 PT/OT/SLP                           Visit/Unit Tracking  AUTH Status:  Date 5/31 6/5 6/7 6/10 6/12 6/19 6/21 6/26 6/28 7/1   None Used 9 10 11 12 13 14 15 16 17 18    Remaining                  Manuals             C-spine  SOR, R 1st rib, R upper trap gentle MFR SOR, upper trap stretch         Muscle energy O-A upper cervical flexion               Neuro Re-Ed  Chin tuck with BP cuff 20mmHg to 22mmHg 5\" hold x 10 reps    DNF endurance 3\" hold x10 reps Chin tuck 3\" hold x 10 reps FGA  DHI  ABC  TUG  SSGS     VOR x 1 seated         posture  Standing green TB rows x 20 reps  Low rows x 20 reps       Imaginary targets      " "   Foam EC Foam beams in // bars (long) sidestepping x 3 laps    Tandem x 3 laps    Foam EO HT/HN 30\" x 3 each  Foam beams in // bars (long) sidestepping x 3 laps    Tandem x 3 laps    Foam EO HT/HN 30\" x 2 each  SOLO  Foam beams sidestepping x 3 laps    Tandem x 3 laps    3 laps each direction holding 10# tidal tank SOLO  Foam beams sidestepping x 3 laps with 10# tidal tank    Tandem x 3 laps with 10# tidal tank   Standing circles weight shifting  X 10 each direction with EO, x 10 each direction EC       Spot it saccades         NBOS         tandem // bars (long) fwd/backward tandem x 3 laps  // bars (short) fwd/backwards tandem x 4 laps  SOLO  Fwd tandem x 2 laps  SOLO  Fwd/back tandem x 3 laps   hurdles 12-inch 6) step over step pattern forward x 3 laps    Lateral x 3 laps  12-inch (4) step over step pattern forward x 4 laps    Lateral x 4 laps  12-inch (6) step over step pattern forward x 3 laps    Lateral x 3 laps   12-inch (6) step over step pattern forward x 3 laps    Lateral x 3 laps   carioka // bars (long) carioka stepping x 3 laps    SOLO  Carioka x 3 laps SOLO  Carioka x 3 laps   River rocks     SOLO  Small and medium forward x 4 laps    Lateral x 4 laps    Red mat with obstacles      Red mat with obstacles underneath forward x 5 laps  Lateral x 5 laps  Fwd/back x 5 laps   Joint position error         Ther Ex    C-spine evaluation, ROM     nustep L7 for 10 minutes L7 for 10 minutes L7 for 10 minutes  L7 for 10 minutes L7 for 10 minutes   C-spine stretching  Upper trap 30\" x 2 each side       Cervical ROM                           Ther Activity                           Gait Training                           Education    Progress with therapy, POC, balance                           "

## 2024-07-03 ENCOUNTER — OFFICE VISIT (OUTPATIENT)
Dept: PHYSICAL THERAPY | Facility: CLINIC | Age: 54
End: 2024-07-03
Payer: COMMERCIAL

## 2024-07-03 DIAGNOSIS — R42 DIZZINESS: ICD-10-CM

## 2024-07-03 DIAGNOSIS — Z74.09 IMPAIRED FUNCTIONAL MOBILITY, BALANCE, GAIT, AND ENDURANCE: Primary | ICD-10-CM

## 2024-07-03 PROCEDURE — 97112 NEUROMUSCULAR REEDUCATION: CPT

## 2024-07-03 PROCEDURE — 97110 THERAPEUTIC EXERCISES: CPT

## 2024-07-03 NOTE — PROGRESS NOTES
"Daily Note     Today's date: 7/3/2024  Patient name: Jeremy George  : 1970  MRN: [id number]  Referring provider: Cici Austin CR*  Dx:   Encounter Diagnosis     ICD-10-CM    1. Impaired functional mobility, balance, gait, and endurance  Z74.09       2. Dizziness  R42               Start Time: 0850  Stop Time: 0930  Total time in clinic (min): 40 minutes    Subjective: Patient reports that his headaches have been good today.      Objective: See treatment diary below      Assessment: Tolerated treatment fair. Patient demonstrated fatigue post treatment, exhibited good technique with therapeutic exercises, and would benefit from continued PT. TM was introduced at patient self-selected speed, patient tolerated well without the use of UE support. Patient demonstrated improvement in ability to navigate uneven surfaces, patient challenged with dual task over red mat and had no bouts of LOB. Patient reports that his dizziness was relatively constant during today's session but was mild enough to push through without issue. Continue to progress as tolerated.      Plan: Continue per plan of care.       Precautions: HTN. Thoracic aneurysm, asthma, c-spine fusion    POC expires Unit limit Auth Expiration date PT/OT/ST + Visit Limit?    BOMN N/a 90 PT/OT/SLP                           Visit/Unit Tracking  AUTH Status:  Date 6/5 6/7 6/10 6/12 6/19 6/21 6/26 6/28 7/1 7/3   None Used 10 11 12 13 14 15 16 17 18 20    Remaining                  Manuals             C-spine SOR, R 1st rib, R upper trap gentle MFR SOR, upper trap stretch         Muscle energy O-A upper cervical flexion                Neuro Re-Ed Chin tuck with BP cuff 20mmHg to 22mmHg 5\" hold x 10 reps    DNF endurance 3\" hold x10 reps Chin tuck 3\" hold x 10 reps FGA  DHI  ABC  TUG  SSGS      VOR x 1 seated         posture Standing green TB rows x 20 reps  Low rows x 20 reps        Imaginary targets         Foam EC  Foam " "beams in // bars (long) sidestepping x 3 laps    Tandem x 3 laps    Foam EO HT/HN 30\" x 2 each  SOLO  Foam beams sidestepping x 3 laps    Tandem x 3 laps    3 laps each direction holding 10# tidal tank SOLO  Foam beams sidestepping x 3 laps with 10# tidal tank    Tandem x 3 laps with 10# tidal tank SOLO  Foam beams sidestepping x 3 laps   X1 lap with 10# tidal tank    Tandem x 3 laps with 10# tidal tank   Standing circles weight shifting X 10 each direction with EO, x 10 each direction EC        Spot it saccades         NBOS         tandem  // bars (short) fwd/backwards tandem x 4 laps  SOLO  Fwd tandem x 2 laps  SOLO  Fwd/back tandem x 3 laps SOLO  Fwd tandem x 3 laps   hurdles  12-inch (4) step over step pattern forward x 4 laps    Lateral x 4 laps  12-inch (6) step over step pattern forward x 3 laps    Lateral x 3 laps   12-inch (6) step over step pattern forward x 3 laps    Lateral x 3 laps 12-inch (6) step over step pattern forward x 4 laps    Lateral x 4 laps   carioka    SOLO  Carioka x 3 laps SOLO  Carioka x 3 laps SOLO  Carioka x 3 laps   River rocks    SOLO  Small and medium forward x 4 laps    Lateral x 4 laps     Red mat with obstacles     Red mat with obstacles underneath forward x 5 laps  Lateral x 5 laps  Fwd/back x 5 laps Red mat with obstacles underneath   Lateral x 5 laps counting down form 100 by 3    Fwd/back x 5 laps calling number held by therapist   Joint position error         Ther Ex   C-spine evaluation, ROM      nustep L7 for 10 minutes L7 for 10 minutes  L7 for 10 minutes L7 for 10 minutes    C-spine stretching Upper trap 30\" x 2 each side        Cervical ROM         TM      1.4 mph x10 min            Ther Activity                           Gait Training                           Education   Progress with therapy, POC, balance                            "

## 2024-07-08 ENCOUNTER — APPOINTMENT (OUTPATIENT)
Dept: PHYSICAL THERAPY | Facility: CLINIC | Age: 54
End: 2024-07-08
Payer: COMMERCIAL

## 2024-07-08 ENCOUNTER — CLINICAL SUPPORT (OUTPATIENT)
Age: 54
End: 2024-07-08
Payer: COMMERCIAL

## 2024-07-08 DIAGNOSIS — E11.9 TYPE 2 DIABETES MELLITUS WITHOUT COMPLICATION, WITHOUT LONG-TERM CURRENT USE OF INSULIN (HCC): ICD-10-CM

## 2024-07-08 DIAGNOSIS — E53.8 B12 DEFICIENCY: Primary | ICD-10-CM

## 2024-07-08 PROCEDURE — 96372 THER/PROPH/DIAG INJ SC/IM: CPT

## 2024-07-08 RX ADMIN — CYANOCOBALAMIN 1000 MCG: 1000 INJECTION, SOLUTION INTRAMUSCULAR; SUBCUTANEOUS at 16:40

## 2024-07-10 ENCOUNTER — TELEPHONE (OUTPATIENT)
Dept: SLEEP CENTER | Facility: CLINIC | Age: 54
End: 2024-07-10

## 2024-07-10 ENCOUNTER — OFFICE VISIT (OUTPATIENT)
Dept: PHYSICAL THERAPY | Facility: CLINIC | Age: 54
End: 2024-07-10
Payer: COMMERCIAL

## 2024-07-10 DIAGNOSIS — Z74.09 IMPAIRED FUNCTIONAL MOBILITY, BALANCE, GAIT, AND ENDURANCE: Primary | ICD-10-CM

## 2024-07-10 DIAGNOSIS — R42 DIZZINESS: ICD-10-CM

## 2024-07-10 PROCEDURE — 97112 NEUROMUSCULAR REEDUCATION: CPT

## 2024-07-10 PROCEDURE — 97110 THERAPEUTIC EXERCISES: CPT

## 2024-07-10 NOTE — PROGRESS NOTES
Daily Note     Today's date: 7/10/2024  Patient name: Jeremy George  : 1970  MRN: [id number]  Referring provider: Cici Austin CR*  Dx:   Encounter Diagnosis     ICD-10-CM    1. Impaired functional mobility, balance, gait, and endurance  Z74.09       2. Dizziness  R42                 Start Time: 0845  Stop Time: 0950  Total time in clinic (min): 65 minutes    Subjective: Patient reports that he has only been experiencing mild headaches lately.      Objective: See treatment diary below      Assessment: Tolerated treatment well. Patient demonstrated fatigue post treatment, exhibited good technique with therapeutic exercises, and would benefit from continued PT. Patient challenged with addition of tidal tank for balance activities, resulted in decreased speed of movement for navigating hurdles and river rocks.  Patient introduced to backwards tandem walking, utilized appropriate ankle strategy to maintain balance, had few instances of stepping off line.  Jeremy experienced 1 LOB requiring CGA and multiple reactive steps to recover when performing lateral stepping over river rocks. Patient also required reactive stepping to maintain balance while performing weight shifts with eyes closed on foam pad.  Discussed possible return to work with patient (was going to neurology to discuss) due to FMLA coming to end. Discussed that patient's balance is improving, and that main concern would be to tolerance to increased activities required during work as well as option for rescue medication if he does have increased symptoms at work. Encouraged to speak with neurologist further. Continue to progress patient as tolerated.         Plan: Continue per plan of care.       Precautions: HTN. Thoracic aneurysm, asthma, c-spine fusion    POC expires Unit limit Auth Expiration date PT/OT/ST + Visit Limit?    BOMN N/a 90 PT/OT/SLP                           Visit/Unit Tracking  AUTH Status:  Date 6/5 6/7 6/10 6/12 6/19  "6/21 6/26 6/28 7/1 7/3 7/10   None Used 10 11 12 13 14 15 16 17 18 20 21    Remaining                   Manuals 6/21 6/26 6/28 7/1 7/3 7/10            C-spine SOR, upper trap stretch         Muscle energy O-A upper cervical flexion                 Neuro Re-Ed Chin tuck 3\" hold x 10 reps FGA  DHI  ABC  TUG  SSGS       VOR x 1 seated         posture         Imaginary targets         Foam EC Foam beams in // bars (long) sidestepping x 3 laps    Tandem x 3 laps    Foam EO HT/HN 30\" x 2 each  SOLO  Foam beams sidestepping x 3 laps    Tandem x 3 laps    3 laps each direction holding 10# tidal tank SOLO  Foam beams sidestepping x 3 laps with 10# tidal tank    Tandem x 3 laps with 10# tidal tank SOLO  Foam beams sidestepping x 3 laps   X1 lap with 10# tidal tank    Tandem x 3 laps with 10# tidal tank SOLO   EC on airex    Weightshift x20 ea   Foam Beams      SOLO  Foam beams sidestepping   X4 lap with 10# tidal tank    forward x 4 laps with 10# tidal tank   Standing circles weight shifting         Spot it saccades         NBOS         tandem // bars (short) fwd/backwards tandem x 4 laps  SOLO  Fwd tandem x 2 laps  SOLO  Fwd/back tandem x 3 laps SOLO  Fwd tandem x 3 laps Solo Fwd/Bwd tandem x3 laps   hurdles 12-inch (4) step over step pattern forward x 4 laps    Lateral x 4 laps  12-inch (6) step over step pattern forward x 3 laps    Lateral x 3 laps   12-inch (6) step over step pattern forward x 3 laps    Lateral x 3 laps 12-inch (6) step over step pattern forward x 4 laps    Lateral x 4 laps 12-inch (6) step over step pattern carrying 10# tidal tank   forward x 4 laps    Lateral x 4 laps   carioka   SOLO  Carioka x 3 laps SOLO  Carioka x 3 laps SOLO  Carioka x 3 laps SOLO  Carioka x 3 laps   River rocks   SOLO  Small and medium forward x 4 laps    Lateral x 4 laps   2 lg, 2 md, 5 sm     Fwd x2 laps  Fwd carrying 10# tidal tank x2 laps    Lat x4 laps   Red mat with obstacles    Red mat with obstacles underneath forward x 5 " laps  Lateral x 5 laps  Fwd/back x 5 laps Red mat with obstacles underneath   Lateral x 5 laps counting down form 100 by 3    Fwd/back x 5 laps calling number held by therapist Red mat with obstacles underneath    Fwd x5 laps w/ HN    Lat x5 laps w/ HN   Joint position error         Ther Ex  C-spine evaluation, ROM       nustep L7 for 10 minutes  L7 for 10 minutes L7 for 10 minutes     C-spine stretching         Cervical ROM         TM     1.4 mph x10 min 1.4 mph x10 min            Ther Activity                           Gait Training                           Education  Progress with therapy, POC, balance    Return to work,

## 2024-07-10 NOTE — TELEPHONE ENCOUNTER
----- Message from Sandra Chua PA-C sent at 7/1/2024  1:48 PM EDT -----  Home sleep study did not demonstrate sleep apnea, GEOFFREY 3.2. A home study can underestimate disease severity. I would recommend an in lab study if the patient is agreeable. Order placed.

## 2024-07-10 NOTE — TELEPHONE ENCOUNTER
Received call from patient. Advised of sleep study results.    Scheduled in-lab sleep study 8/15/24 in [place].  Instructions provided.  Verbalized understanding.  Added to wait list.

## 2024-07-11 ENCOUNTER — TELEPHONE (OUTPATIENT)
Age: 54
End: 2024-07-11

## 2024-07-11 ENCOUNTER — OFFICE VISIT (OUTPATIENT)
Dept: PHYSICAL THERAPY | Facility: CLINIC | Age: 54
End: 2024-07-11
Payer: COMMERCIAL

## 2024-07-11 DIAGNOSIS — Z74.09 IMPAIRED FUNCTIONAL MOBILITY, BALANCE, GAIT, AND ENDURANCE: Primary | ICD-10-CM

## 2024-07-11 DIAGNOSIS — R42 DIZZINESS: ICD-10-CM

## 2024-07-11 PROCEDURE — 97110 THERAPEUTIC EXERCISES: CPT

## 2024-07-11 PROCEDURE — 97112 NEUROMUSCULAR REEDUCATION: CPT

## 2024-07-11 NOTE — PROGRESS NOTES
"Daily Note     Today's date: 2024  Patient name: Jeremy George  : 1970  MRN: [id number]  Referring provider: Cici Austin CR*  Dx:   Encounter Diagnosis     ICD-10-CM    1. Impaired functional mobility, balance, gait, and endurance  Z74.09       2. Dizziness  R42                   Start Time: 1515  Stop Time: 1600  Total time in clinic (min): 45 minutes    Subjective: Jeremy reports that he has had a headache that has been bothering him all day.        Objective: See treatment diary below      Assessment: Tolerated treatment well. Patient demonstrated fatigue post treatment, exhibited good technique with therapeutic exercises, and would benefit from continued PT.  Jeremy progressed during today's session, patient had difficulty maintaining balance with performing carioka on foam pads. Patient was able to prevent a fall with proper reactive stepping.  Jeremy experienced 1 LOB requiring Jeanne from Solo harness and large reactive steps to recover with navigating rd foam pad with obstacles underneath. .  Jeremy also experienced 1 LOB requiring assistance from the parallel bars and reactive stepping while performing weight shifting on foam pads, continue to challenge patient dynamic and static balance.  Continue to progress patient as tolerated.       Plan: Continue per plan of care.       Precautions: HTN. Thoracic aneurysm, asthma, c-spine fusion    POC expires Unit limit Auth Expiration date PT/OT/ST + Visit Limit?    BOMN N/a 90 PT/OT/SLP                           Visit/Unit Tracking  AUTH Status:  Date 6/5 6/7 6/10 6/12 6/19 6/21 6/26 6/28 7/1 7/3 7/10 7/11   None Used 10 11 12 13 14 15 16 17 18 20 21 22    Remaining                    Manuals 6/21 6/26 6/28 7/1 7/3 7/10 7/11             C-spine SOR, upper trap stretch          Muscle energy O-A upper cervical flexion                   Neuro Re-Ed Chin tuck 3\" hold x 10 reps FGA  DHI  ABC  TUG  SSGS        VOR x 1 seated          posture        " "  Imaginary targets          Foam EC Foam beams in // bars (long) sidestepping x 3 laps    Tandem x 3 laps    Foam EO HT/HN 30\" x 2 each  SOLO  Foam beams sidestepping x 3 laps    Tandem x 3 laps    3 laps each direction holding 10# tidal tank SOLO  Foam beams sidestepping x 3 laps with 10# tidal tank    Tandem x 3 laps with 10# tidal tank SOLO  Foam beams sidestepping x 3 laps   X1 lap with 10# tidal tank    Tandem x 3 laps with 10# tidal tank SOLO   EC on airex    Weightshift x20 ea //bar (long)   EC on airex    Weightshift x20 ea    HN 3x45 sec  rounds   Foam Beams      SOLO  Foam beams sidestepping   X4 lap with 10# tidal tank    forward x 4 laps with 10# tidal tank    Standing circles weight shifting          Spot it saccades          NBOS          tandem // bars (short) fwd/backwards tandem x 4 laps  SOLO  Fwd tandem x 2 laps  SOLO  Fwd/back tandem x 3 laps SOLO  Fwd tandem x 3 laps Solo Fwd/Bwd tandem x3 laps Solo BWD  Tandem x5 laps   hurdles 12-inch (4) step over step pattern forward x 4 laps    Lateral x 4 laps  12-inch (6) step over step pattern forward x 3 laps    Lateral x 3 laps   12-inch (6) step over step pattern forward x 3 laps    Lateral x 3 laps 12-inch (6) step over step pattern forward x 4 laps    Lateral x 4 laps 12-inch (6) step over step pattern carrying 10# tidal tank   forward x 4 laps    Lateral x 4 laps 12-inch (6) step over step pattern carrying 10# tidal tank   forward x 2 laps    Lateral x 2 laps   carioka   SOLO  Carioka x 3 laps SOLO  Carioka x 3 laps SOLO  Carioka x 3 laps SOLO  Carioka x 3 laps Solo    pushed together foam pads x3 laps   River rocks   SOLO  Small and medium forward x 4 laps    Lateral x 4 laps   2 lg, 2 md, 5 sm     Fwd x2 laps  Fwd carrying 10# tidal tank x2 laps    Lat x4 laps    Red mat with obstacles    Red mat with obstacles underneath forward x 5 laps  Lateral x 5 laps  Fwd/back x 5 laps Red mat with obstacles underneath   Lateral x 5 laps counting down form " 100 by 3    Fwd/back x 5 laps calling number held by therapist Red mat with obstacles underneath    Fwd x5 laps w/ HN    Lat x5 laps w/ HN Red mat with obstacles underneath    Fwd x5 laps w/ HN    Bwd x 3 laps w/ HN    Lat x5 laps w/ HN   Joint position error          Ther Ex  C-spine evaluation, ROM        nustep L7 for 10 minutes  L7 for 10 minutes L7 for 10 minutes      C-spine stretching          Cervical ROM          TM     1.4 mph x10 min 1.4 mph x10 min 1.4 mph x 6 min  1.4 mph, 3% incline x 4min             Ther Activity                              Gait Training                              Education  Progress with therapy, POC, balance    Return to work,

## 2024-07-15 ENCOUNTER — TELEPHONE (OUTPATIENT)
Dept: NEUROLOGY | Facility: CLINIC | Age: 54
End: 2024-07-15

## 2024-07-15 ENCOUNTER — OFFICE VISIT (OUTPATIENT)
Dept: PHYSICAL THERAPY | Facility: CLINIC | Age: 54
End: 2024-07-15
Payer: COMMERCIAL

## 2024-07-15 ENCOUNTER — CLINICAL SUPPORT (OUTPATIENT)
Age: 54
End: 2024-07-15
Payer: COMMERCIAL

## 2024-07-15 DIAGNOSIS — Z74.09 IMPAIRED FUNCTIONAL MOBILITY, BALANCE, GAIT, AND ENDURANCE: Primary | ICD-10-CM

## 2024-07-15 DIAGNOSIS — E53.8 B12 DEFICIENCY: Primary | ICD-10-CM

## 2024-07-15 DIAGNOSIS — M54.81 OCCIPITAL NEURALGIA: ICD-10-CM

## 2024-07-15 DIAGNOSIS — R42 DIZZINESS: ICD-10-CM

## 2024-07-15 PROCEDURE — 97110 THERAPEUTIC EXERCISES: CPT

## 2024-07-15 PROCEDURE — 96372 THER/PROPH/DIAG INJ SC/IM: CPT

## 2024-07-15 PROCEDURE — 97112 NEUROMUSCULAR REEDUCATION: CPT

## 2024-07-15 RX ADMIN — CYANOCOBALAMIN 1000 MCG: 1000 INJECTION, SOLUTION INTRAMUSCULAR; SUBCUTANEOUS at 12:51

## 2024-07-15 NOTE — TELEPHONE ENCOUNTER
Received inbound call from patient. Reviewed chart with pt. Informed patient that the office was reaching out to inform pt that there are no available EMG openings before 9/9/24, the day pt is originally scheduled for.     Patient asked that he be placed on the cancellation list if possible. Stated that he would need an appt later in the afternoon if possible due to working in the [employer] and needing to get back to work.      Pt had no further questions at this time.

## 2024-07-15 NOTE — TELEPHONE ENCOUNTER
Attempted to contact patient to inform them that there are no available EMG openings before 9/9/24 which is when patient is scheduled for EMG.  Unable to leave a message.

## 2024-07-15 NOTE — PROGRESS NOTES
Daily Note     Today's date: 7/15/2024  Patient name: Jeremy George  : 1970  MRN: [id number]  Referring provider: Cici Austin CR*  Dx:   Encounter Diagnosis     ICD-10-CM    1. Impaired functional mobility, balance, gait, and endurance  Z74.09       2. Dizziness  R42                                Subjective: Reports that he woke up this morning without a headache for the first time in awhile. Reports that he feels like he veers to the side when looking to the R side, but not to the L side.       Objective: See treatment diary below      Assessment: Tolerated treatment well. Patient demonstrated fatigue post treatment, exhibited good technique with therapeutic exercises, and would benefit from continued PT.  Increased TM speed with good tolerance. Continued to add increased head turns during river rocks and foam beams. Increased distance between river rocks with good tolerance, multiple LOB with good demonstration of reactive steps, not requiring assistance from SOLO harness. Appeared to have increased difficulty with hurdles with 180 degree turn to the R>L. Continue to progress patient as tolerated.       Plan: Continue per plan of care.       Precautions: HTN. Thoracic aneurysm, asthma, c-spine fusion    POC expires Unit limit Auth Expiration date PT/OT/ST + Visit Limit?    BOMN N/a 90 PT/OT/SLP                           Visit/Unit Tracking  AUTH Status:  Date 6/10 6/12 6/19 6/21 6/26 6/28 7/1 7/3 7/10 7/11 7/15   None Used 12 13 14 15 16 17 18 20 21 22 23    Remaining                   Manuals 6/28 7/1 7/3 7/10 7/11 7/15            C-spine                           Neuro Re-Ed         VOR x 1 seated         posture         Imaginary targets         Foam EC SOLO  Foam beams sidestepping x 3 laps    Tandem x 3 laps    3 laps each direction holding 10# tidal tank SOLO  Foam beams sidestepping x 3 laps with 10# tidal tank    Tandem x 3 laps with 10# tidal tank SOLO  Foam beams sidestepping x 3  laps   X1 lap with 10# tidal tank    Tandem x 3 laps with 10# tidal tank SOLO   EC on airex    Weightshift x20 ea //bar (long)   EC on airex    Weightshift x20 ea    HN 3x45 sec  rounds    Foam Beams    SOLO  Foam beams sidestepping   X4 lap with 10# tidal tank    forward x 4 laps with 10# tidal tank  SOLO  Foam beams sidestepping x 4 laps with 10# tidal tank, then 3 laps with HT    Tandem x 4 laps with 10# tidal tank, then x 3 laps with HN   Standing circles weight shifting         Spot it saccades         NBOS         tandem SOLO  Fwd tandem x 2 laps  SOLO  Fwd/back tandem x 3 laps SOLO  Fwd tandem x 3 laps Solo Fwd/Bwd tandem x3 laps Solo BWD  Tandem x5 laps    hurdles 12-inch (6) step over step pattern forward x 3 laps    Lateral x 3 laps   12-inch (6) step over step pattern forward x 3 laps    Lateral x 3 laps 12-inch (6) step over step pattern forward x 4 laps    Lateral x 4 laps 12-inch (6) step over step pattern carrying 10# tidal tank   forward x 4 laps    Lateral x 4 laps 12-inch (6) step over step pattern carrying 10# tidal tank   forward x 2 laps    Lateral x 2 laps SOLO  12-inch (6) sidways with 180 degree turn x 2 laps   carioka SOLO  Carioka x 3 laps SOLO  Carioka x 3 laps SOLO  Carioka x 3 laps SOLO  Carioka x 3 laps Solo    pushed together foam pads x3 laps SOLO  Pushed together foam pads (5) x 4 laps   River rocks SOLO  Small and medium forward x 4 laps    Lateral x 4 laps   2 lg, 2 md, 5 sm     Fwd x2 laps  Fwd carrying 10# tidal tank x2 laps    Lat x4 laps  SOLO  1 large, 4 medium, 4 small, spaced with large gaps, with HN x 4 laps forward    Lateral x 4 laps   Red mat with obstacles  Red mat with obstacles underneath forward x 5 laps  Lateral x 5 laps  Fwd/back x 5 laps Red mat with obstacles underneath   Lateral x 5 laps counting down form 100 by 3    Fwd/back x 5 laps calling number held by therapist Red mat with obstacles underneath    Fwd x5 laps w/ HN    Lat x5 laps w/ HN Red mat with  obstacles underneath    Fwd x5 laps w/ HN    Bwd x 3 laps w/ HN    Lat x5 laps w/ HN    Joint position error         Ther Ex         nustep L7 for 10 minutes L7 for 10 minutes       C-spine stretching         Cervical ROM         TM   1.4 mph x10 min 1.4 mph x10 min 1.4 mph x 6 min  1.4 mph, 3% incline x 4min 1.6mph x 8 minutes no UE support            Ther Activity                           Gait Training                           Education    Return to work,

## 2024-07-15 NOTE — TELEPHONE ENCOUNTER
Pt came in office to discuss paperwork for short term disability and or a letter to return back to work. Pt would like to know if he can get a letter stating he may return back to work. If his employer does not accept the it, pt would need the short term disability forms filled out. Can you please assist?

## 2024-07-16 ENCOUNTER — EVALUATION (OUTPATIENT)
Dept: PHYSICAL THERAPY | Facility: REHABILITATION | Age: 54
End: 2024-07-16
Payer: COMMERCIAL

## 2024-07-16 DIAGNOSIS — M54.2 NECK PAIN: ICD-10-CM

## 2024-07-16 DIAGNOSIS — G24.3 CERVICAL DYSTONIA: Primary | ICD-10-CM

## 2024-07-16 PROCEDURE — 97112 NEUROMUSCULAR REEDUCATION: CPT

## 2024-07-16 PROCEDURE — 97162 PT EVAL MOD COMPLEX 30 MIN: CPT

## 2024-07-16 PROCEDURE — 97110 THERAPEUTIC EXERCISES: CPT

## 2024-07-16 NOTE — PROGRESS NOTES
PT Evaluation     Today's date: 2024  Patient name: Jeremy George  : 1970  MRN: [id number]  Referring provider: Naty Andrea,*  Dx:   Encounter Diagnosis     ICD-10-CM    1. Cervical dystonia  G24.3 Ambulatory Referral to Physical Therapy      2. Neck pain  M54.2           Start Time: 1530  Stop Time: 1630  Total time in clinic (min): 60 minutes    Assessment  Impairments: abnormal muscle firing, abnormal muscle tone, abnormal or restricted ROM, abnormal movement, activity intolerance, impaired physical strength, lacks appropriate home exercise program, pain with function, poor posture , poor body mechanics, unable to perform ADL, participation limitations, activity limitations and endurance  Symptom irritability: moderate    Assessment details: Jeremy George is a 53 y.o. male referred to physical therapy for cervical dystonia and neck pain. Primary impairments include increased pain with functional activities, decreased  strength, cervical AROM dysfunction, DNF and scapular motor control dysfunction, and impaired static/dynamic balance which is limiting his ability to perform ADLs and recreational activities without pain or functional restrictions. Pt displayed positive 3/4 with cervical radiculopathy cluster which support initial dx. He responded well to MT focused on suboccipital release and cervical PROM as he noted slight decrease in symptoms following.   Pt was provided with a basic HEP which will be reviewed in the upcoming session. Pt was educated on anatomy and physiology of diagnosis and demonstrated verbal understanding. Pt would benefit from skilled PT interventions to increase functional DNF endurance/strength, increase pain free ROM, and facilitate return to recreational activities and ADL management/independence with less limitations and pain. 1:1 with Florentino Richter DPT entirety of tx.      Barriers to therapy: None  Understanding of Dx/Px/POC: excellent     Prognosis:  good    Goals  Short term goals:   Short Term Goals, to be met in 3-4 weeks:  1.  Increase cervical ROM by 5 degrees or more in each direction to improve functional ROM.  2.  Decrease subjective report of pain by 25% or more to improve QoL.   3.  DNF strength to a 10-15 hold with no breaks in cervical flexion.  4.  Independent with basic HEP.    Long term goals:  Long Term Goals, to be met by DC:  1.  Have little to no pain with ADL's.  2.  Demonstrate improved posture with dynamic lifting activities to increase overall functional mobility.  3.  Return to recreational activities with little to no difficulty and good mechanics/posture.  4.  Independent in detailed HEP.  5.  Increase FOTO to predicted value by DC.    Plan  Patient would benefit from: skilled physical therapy and PT eval  Referral necessary: No    Planned therapy interventions: joint mobilization, manual therapy, body mechanics training, functional ROM exercises, home exercise program, neuromuscular re-education, patient education, postural training, therapeutic activities, therapeutic exercise, strengthening, stretching, self care, graded motor, graded exercise, graded activity, behavior modification, IASTM, nerve gliding, massage and flexibility    Frequency: 1x week  Duration in weeks: 10  Plan of Care beginning date: 7/16/2024  Plan of Care expiration date: 9/24/2024  Treatment plan discussed with: patient        Subjective  HPI: Pt referred to physical therapy for cervical dystonia and neck pain. He mentioned that he had cervical surgery in 2018 due to radiating symptoms down his L arm. His symptoms started in his R arm after the surgery but this was managed with medications. 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 He notes that his symtpoms affect his quality of sleep due to increased pain. His headaches started in April 2024 which worsened with time  "due to an onset of dizziness symptoms. He was being seen by Physical therapy for his vestibular/dizziness symptoms which had been helping decrease symptom frequency and severity. He mentioned that he continues to have problems with sleeping with longer periods of time (>3 hours) due to increased pain at the R neck and shoulder. He notes that he can have pain and numbness in the LUE in a radial distribution as well as radiating symptoms at the RUE with an ulnar distribution. He notes that he has headaches every day that can get better and worse depending on the day. He mentioned that his headaches usually hurt the most from his L orbital area to the superior/posterior aspect of the L side of the head. He keeps a headache journal to track his symptoms. He mentioned that lights can affect the severity of his headache.   Pain Location: R Neck and R shoulder/RUE  Pain Intensity: Current: 3/10, Worst: 8/10, Best: 0/10  GERALD: Insidious, hx of cervical spine surgery in 2018  DOI: Chronic  Aggravating Factors: Neck motions, lifting motions, sleeping positions  Alleviating Factors: Rest  Dominant Hand: R hand  Goals: To sleep better and have more neck ROM.   PLOF: Sedentary    Objective  Review of Systems:     Vitals: BP - 138/86       Reflexes  R/L   Biceps (C5-C6) Normal   Brachioradialis (C5-C6) Normal   Triceps (C7-C8) Normal   Patellar (L3-L4) Normal   Achilles (S1-S2) Normal   Tolbert's Neg   Clonus Neg     Postural Findings:              Head Position  Protracted X Neutral  Retracted   Scapular Position X Protracted  Neutral  Retracted   Thoracic Spine X Inc Kyphosis  Neutral     Lumbar Spine  Inc Lordosis X Neutral  Dec Lordosis   Pelvis  Anterior Tilt X Neutral  Posterior Tilt   Iliac Crest  L elevated X Neutral  R elevated   Scoliotic Curvature  \"C\" Curve  \"S\" Curve     Lateral Shift  Right  Left X None       Strength and ROM evaluated B from a regional biomechanical perspective and values relevant to this episode " recorded in tables below    Range of Motion measurements for the Cervical Spine (in degrees)     Joint Motion Right: 2024 Left: 2024   Flexion 30 (p!)    Extension 20 (p!)    Rotation 35 (p!) 45   Lateral Flexion 10 (p!) 15   Protraction WLF no pain    Retraction 25% limited, p!      UE Myotomes:  Nerve Root Test Action RIGHT: 2024 LEFT: 2024   C3 Neck side flexion WFL (p!) WFL   C4 Shoulder elevation WFL WFL   C5 Shoulder abduction WFL WFL   C6 Elbow Flexion and wrist extension WFL WFL   C7 Elbow extension and wrist flexion WFL WFL   C8 Thumb extension and ulnar deviation WFL WFL   T1 Hand intrinsics WFL WFL     UE Dermatomes Notes:   WFL BL UEs    Manual Muscle Testing:   Strength: MMT revealed the following findings.  Joint Motion Right: 2024 Left: 2024   Sh. Flexion 4/5 4/5   Sh. Abduction 4/5 4/5   Sh. ER 4/5 4/5   Sh. IR 4/5 4/5   Shoulder extension 4/5 4/5   Middle Trapezius 3+/5 3+/5   Lower Trapezius 3+/5 3+/5     Palpation: Increased soreness at the R neck and RUT  Directional Preference: Increased pain with RSBing and Rotation   Joint Mobility: Decreased ROM in all ranges especially with cervical ext and R rotation    Cervical Vascular Screenin-D's   Dizziness (Y)   Diploplia (N)   Drop Attacks (N)   Dysphagia (N)   Dysarthria (N)  3-N's   Nausea (Y depending on dizziness)   Nystagmus (N)   Numbness (Y)    Upper Cervical Ligament Testing:   Transverse ligament stress test   Alar Ligament stress test   Sharp-Jake   *The above were all Negative    Cervical Radiculopathy Test Item Cluster (Wainner RS et al. SPINE, 2003)  Any 3 above + = + LR of 6.1  Any 4 above + = + LR of 30.3  Test / Measure  2024   Upper Limb Tension Test A Pos RUE   Spurling's A Neg   Distraction Pos   C-Rotation < 60 ipsilateral side Pos     Dural Mobility:   Median Nerve: No change in symptoms at RUE  Radial Nerve: No change in symptoms at RUE  Ulnar Nerve: Increased symptoms at the  BILL    Access Code: [email or web address]  URL: [email or web address]  Date: 07/16/2024  Prepared by: Florentino Richter    Exercises  - Cervical Extension AROM with Strap  - 1 x daily - 7 x weekly - 2 sets - 10 reps  - Mid-Lower Cervical Extension SNAG with Strap  - 1 x daily - 7 x weekly - 2 sets - 10 reps  - Seated Assisted Cervical Rotation with Towel  - 1 x daily - 7 x weekly - 1 sets - 5 reps - 5-10 sec hold  - Seated Scapular Retraction  - 1 x daily - 7 x weekly - 3 sets - 10 reps  - Seated Cervical Sidebending Stretch  - 1 x daily - 7 x weekly - 1 sets - 10 reps - 10 sec hold     Precautions: PMH includes aneurysm, DM, HTN, plantar fasciitis, cervical spondylosis with radiculopathy s/p cervical spinal fusion, headaches, dizziness      POC expires Unit limit Auth Expiration date PT/OT + Visit Limit?   9/24/24 BOMN TBD 90 PCY      17 visits used as of 7/16   Visit/Unit Tracking  AUTH Status:  Date 7/16        TBD Used 1         Remaining             Pertinent Findings:      POC End Date: 9/24/24                                                                                          Test / Measure  7/16/2024   FOTO (Predicted 57) 48   Cervical ROM Decreased and painful all ranges   Scapular Strength  3+/5     Visit Number:  1            Manuals 7/16            Sub-Occipital Release MC            C-Spine PROM (Distraction + SB) MC            UT Mobilization                                       Neuro Re-Ed             Cervical SNAG 5x ea            Cervical Ext w/ Towel 10x            Cervical Nod w/ towel 10x            Supine Chin Tuck             Wall Slides             No Money             Shoulder Flex w/ TB                                       Ther Ex             HEP Review + Pt Edu 10 min            Scapular Retraction 10x            Cervical SBing 10x            Eccentric Shrugs             BL Rows             Uni Rows             TB Shoulder Ext             Shoulder Clocks                                                     Ther Activity             Manzano's Carry             90/90 Iso Hold                                       Gait Training                                       Modalities

## 2024-07-17 ENCOUNTER — OFFICE VISIT (OUTPATIENT)
Dept: PHYSICAL THERAPY | Facility: CLINIC | Age: 54
End: 2024-07-17
Payer: COMMERCIAL

## 2024-07-17 DIAGNOSIS — R42 DIZZINESS: ICD-10-CM

## 2024-07-17 DIAGNOSIS — Z74.09 IMPAIRED FUNCTIONAL MOBILITY, BALANCE, GAIT, AND ENDURANCE: Primary | ICD-10-CM

## 2024-07-17 PROCEDURE — 97110 THERAPEUTIC EXERCISES: CPT

## 2024-07-17 PROCEDURE — 97112 NEUROMUSCULAR REEDUCATION: CPT

## 2024-07-17 NOTE — PROGRESS NOTES
Daily Note     Today's date: 2024  Patient name: Jeremy George  : 1970  MRN: [id number]  Referring provider: Cici Austin CR*  Dx:   Encounter Diagnosis     ICD-10-CM    1. Impaired functional mobility, balance, gait, and endurance  Z74.09       2. Dizziness  R42               Start Time: 0845  Stop Time: 09  Total time in clinic (min): 45 minutes    Subjective: Reports that he has a headache today that is worse than what he has been having. Patient reports that he feels like he is drifting to the R when he turns his head to the left.        Objective: See treatment diary below      Assessment: Tolerated treatment well. Patient demonstrated fatigue post treatment, exhibited good technique with therapeutic exercises, and would benefit from continued PT. Jeremy continued to demonstrate improved tolerance with TM, requesting to increase speed during today's session.  Due to patient report of increased imbalance with turning to the L, continued to incorporate various head movements into each exercise.  Trial VORx1 and VORx2 next visit to attempt to provoke symptoms. Jeremy was able to tolerate backward tandem walking with good technique, had 2 minor LOB requiring reactive stepping but no assistance from solo harness or therapist. Continue to progress patient as tolerated.       Plan: Continue per plan of care.       Precautions: HTN. Thoracic aneurysm, asthma, c-spine fusion    POC expires Unit limit Auth Expiration date PT/OT/ST + Visit Limit?    BOMN N/a 90 PT/OT/SLP                           Visit/Unit Tracking  AUTH Status:  Date 6/10 6/12 6/19 6/21 6/26 6/28 7/1 7/3 7/10 7/11 7/15 7/17   None Used 12 13 14 15 16 17 18 20 21 22 23 24    Remaining                    Manuals 7/1 7/3 7/10 7/11 7/15 7/17            C-spine                           Neuro Re-Ed         VOR x 1 seated         posture         Imaginary targets         Foam EC SOLO  Foam beams sidestepping x 3 laps with 10# tidal  tank    Tandem x 3 laps with 10# tidal tank SOLO  Foam beams sidestepping x 3 laps   X1 lap with 10# tidal tank    Tandem x 3 laps with 10# tidal tank SOLO   EC on airex    Weightshift x20 ea //bar (long)   EC on airex    Weightshift x20 ea    HN 3x45 sec  rounds  SOLO   EC on airex    Weightshift x20 ea   Foam Beams   SOLO  Foam beams sidestepping   X4 lap with 10# tidal tank    forward x 4 laps with 10# tidal tank  SOLO  Foam beams sidestepping x 4 laps with 10# tidal tank, then 3 laps with HT    Tandem x 4 laps with 10# tidal tank, then x 3 laps with HN SOLO  Foam beams sidestepping x 4 laps with 10# tidal tank, then 3 laps with HT    Tandem x 4 laps with 10# tidal tank, then x 3 laps with HN       Standing circles weight shifting         Spot it saccades         NBOS         tandem SOLO  Fwd/back tandem x 3 laps SOLO  Fwd tandem x 3 laps Solo Fwd/Bwd tandem x3 laps Solo BWD  Tandem x5 laps     hurdles 12-inch (6) step over step pattern forward x 3 laps    Lateral x 3 laps 12-inch (6) step over step pattern forward x 4 laps    Lateral x 4 laps 12-inch (6) step over step pattern carrying 10# tidal tank   forward x 4 laps    Lateral x 4 laps 12-inch (6) step over step pattern carrying 10# tidal tank   forward x 2 laps    Lateral x 2 laps SOLO  12-inch (6) sidways with 180 degree turn x 2 laps    carioka SOLO  [place] x 3 laps SOLO  [place] x 3 laps SOLO  [place] x 3 laps Solo    pushed together foam pads x3 laps SOLO  Pushed together foam pads (5) x 4 laps SOLO  Pushed together foam pads (5) x 4 laps    BWD tandem x4 laps   [place]   2 lg, 2 md, 5 sm     Fwd x2 laps  Fwd carrying 10# tidal tank x2 laps    Lat x4 laps  SOLO  1 large, 4 medium, 4 small, spaced with large gaps, with HN x 4 laps forward    Lateral x 4 laps SOLO  1 large, 4 medium, 4 small, blk therapad, spaced with large gaps    FWD x2 laps  FWD HT x2 laps  FWD HN x2 laps    Lat x3 laps   Red mat with obstacles Red mat with obstacles underneath  forward x 5 laps  Lateral x 5 laps  Fwd/back x 5 laps Red mat with obstacles underneath   Lateral x 5 laps counting down form 100 by 3    Fwd/back x 5 laps calling number held by therapist Red mat with obstacles underneath    Fwd x5 laps w/ HN    Lat x5 laps w/ HN Red mat with obstacles underneath    Fwd x5 laps w/ HN    Bwd x 3 laps w/ HN    Lat x5 laps w/ HN     Joint position error         Ther Ex         nustep L7 for 10 minutes        C-spine stretching         Cervical ROM         TM  1.4 mph x10 min 1.4 mph x10 min 1.4 mph x 6 min  1.4 mph, 3% incline x 4min 1.6mph x 8 minutes no UE support 1.7mph x 10 min no UE support            Ther Activity                           Gait Training                           Education   Return to work,

## 2024-07-17 NOTE — TELEPHONE ENCOUNTER
Spoke to Jeremy and inquired about his symptoms. He did stated that he has been having daily migraines but the dizziness has gotten a lot better.  In regards to the medical release form he stated that he can wait till he see's you before completing form.     STEVE Penaloza.   Form scan under media tab

## 2024-07-18 ENCOUNTER — HOSPITAL ENCOUNTER (OUTPATIENT)
Dept: SLEEP CENTER | Facility: CLINIC | Age: 54
Discharge: HOME | End: 2024-07-18
Payer: COMMERCIAL

## 2024-07-18 DIAGNOSIS — G47.19 EXCESSIVE DAYTIME SLEEPINESS: ICD-10-CM

## 2024-07-18 DIAGNOSIS — R06.83 SNORING: ICD-10-CM

## 2024-07-18 PROCEDURE — 95810 POLYSOM 6/> YRS 4/> PARAM: CPT | Performed by: INTERNAL MEDICINE

## 2024-07-18 PROCEDURE — 95810 POLYSOM 6/> YRS 4/> PARAM: CPT

## 2024-07-19 RX ORDER — LANOLIN ALCOHOL/MO/W.PET/CERES
400 CREAM (GRAM) TOPICAL DAILY
Qty: 90 TABLET | Refills: 0 | Status: SHIPPED | OUTPATIENT
Start: 2024-07-19

## 2024-07-19 NOTE — PROGRESS NOTES
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  Caffeine use:NO    Alcohol:Dayshift workers after 5PM: Alcohol use:NO    Typical day for patient:YES       Study Documentation    Sleep Study Indications: The patient is here for BMI>30, loud snoring, and excessive daytime sleepiness that interfers with ADLs.     Sleep Study: Diagnostic   Snore:Moderate  Supplemental O2: no    O2 flow rate (L/min) range N/A  O2 flow rate (L/min) final N/A  Minimum SaO2 87  Baseline SaO2 94    Mode of Therapy:N/A    EKG abnormalities: no     EEG abnormalities: no    Were abnormal behaviors in sleep observed:NO    Is Total Sleep Study Recording Time < 2 hours: N/A    Is Total Sleep Study Recording Time > 2 hours but study is incomplete: N/A    Is Total Sleep Study Recording Time 6 hours or more but sleep was not obtained: NO    Patient classification: employed       Post-Sleep Study    Medication used at bedtime or during sleep study:YES over the counter sleep aid    Patient reports time it took to fall asleep:20 to 30 minutes    Patient reports waking up during study:1 to 2 times.  Patient reports returning to sleep in 10 to 30 minutes.    Patient reports sleeping 4 to 6 hours without dreaming.    Does the Patient feel this is a typical night of sleep:typical    Patient rated sleepiness: Somewhat sleepy or tired    PAP treatment:no.

## 2024-07-19 NOTE — TELEPHONE ENCOUNTER
Dr. Yan it looks like  will be working on it that's why she requested to be forwarded to her email.  When I did speak to the patient he was still having migraine symptoms but his intentions was to go back to work,

## 2024-07-19 NOTE — TELEPHONE ENCOUNTER
I spoke to Jeremy who stated he does want to return to work without restrictions. He is requesting his return to work date is 7/30/2024.  Once completed, he would like to pick form up in the [place] office or scan to [facility]. He is requesting that paper work not be faxed he needs to hand it to the office.     In regards to his headaches. He does get them on a daily bases. But they have been milder since his last visit with . He does take an occasional Motrin Migraine when he has a migraine which he needed to do on 7/1,7/11 and 7/17. He did state that the Robaxin and magnesium is helping. Vestibular Therapy is also helping .    He is also requesting a refill on his Magnesium to be sent to [facility] in [place].    I did pend Mag order for your review    Patient appt has been R/S to 10/22  and he is aware to call us if worsening symptoms.

## 2024-07-20 DIAGNOSIS — I10 HYPERTENSION, UNSPECIFIED TYPE: ICD-10-CM

## 2024-07-20 DIAGNOSIS — G47.33 OSA (OBSTRUCTIVE SLEEP APNEA): Primary | ICD-10-CM

## 2024-07-22 ENCOUNTER — APPOINTMENT (OUTPATIENT)
Dept: PHYSICAL THERAPY | Facility: CLINIC | Age: 54
End: 2024-07-22
Payer: COMMERCIAL

## 2024-07-22 ENCOUNTER — CLINICAL SUPPORT (OUTPATIENT)
Age: 54
End: 2024-07-22
Payer: COMMERCIAL

## 2024-07-22 DIAGNOSIS — E53.8 B12 DEFICIENCY: Primary | ICD-10-CM

## 2024-07-22 DIAGNOSIS — G43.011 INTRACTABLE MIGRAINE WITHOUT AURA AND WITH STATUS MIGRAINOSUS: ICD-10-CM

## 2024-07-22 PROCEDURE — 96372 THER/PROPH/DIAG INJ SC/IM: CPT

## 2024-07-22 RX ORDER — GALCANEZUMAB 120 MG/ML
INJECTION, SOLUTION SUBCUTANEOUS
Qty: 2 ML | Refills: 0 | Status: SHIPPED | OUTPATIENT
Start: 2024-07-22

## 2024-07-22 RX ADMIN — CYANOCOBALAMIN 1000 MCG: 1000 INJECTION, SOLUTION INTRAMUSCULAR; SUBCUTANEOUS at 14:14

## 2024-07-23 ENCOUNTER — OFFICE VISIT (OUTPATIENT)
Dept: PHYSICAL THERAPY | Facility: REHABILITATION | Age: 54
End: 2024-07-23
Payer: COMMERCIAL

## 2024-07-23 DIAGNOSIS — M54.2 NECK PAIN: ICD-10-CM

## 2024-07-23 DIAGNOSIS — G24.3 CERVICAL DYSTONIA: Primary | ICD-10-CM

## 2024-07-23 PROCEDURE — 97110 THERAPEUTIC EXERCISES: CPT

## 2024-07-23 PROCEDURE — 97140 MANUAL THERAPY 1/> REGIONS: CPT

## 2024-07-23 PROCEDURE — 97112 NEUROMUSCULAR REEDUCATION: CPT

## 2024-07-23 NOTE — PROGRESS NOTES
Daily Note     Today's date: 2024  Patient name: Jeremy George  : 1970  MRN: [id number]  Referring provider: Naty Andrea,*  Dx:   Encounter Diagnosis     ICD-10-CM    1. Cervical dystonia  G24.3       2. Neck pain  M54.2                      Subjective: Pt mentioned that his neck started bothering him more this past week which he thinks was due to how he was sleeping. He mentioned he was unable to do many of his exercises as he did not want to exacerbate the pain in his neck. He mentioned that turning his head to the right is the most challenging this past week due to pain. He reports no significant headaches or dizziness recently.       Objective: See treatment diary below      Assessment: Tolerated treatment fair. Patient would benefit from continued PT. Pt responded well to cervical PROM and distraction as he noted slight pain relief following especially with focus on LSBing and L rotation. He was also introduced to further functional ROM and postural endurance exercises and responded well without excessive increase in pain or soreness. He continues to display limitations especially into R rotation and RSBing. He responded well to Vcs for cervico-thoracic posture during stretches and UE movements and showed improved technique with cueing. 1:1 with Florentino Richter DPT entirety of tx.      Plan: Continue per plan of care.      Precautions: PMH includes aneurysm, DM, HTN, plantar fasciitis, cervical spondylosis with radiculopathy s/p cervical spinal fusion, headaches, dizziness      POC expires Unit limit Auth Expiration date PT/OT + Visit Limit?   24 BOMN TBD 90 PCY      17 visits used as of    Visit/Unit Tracking  AUTH Status:  Date        Approved Used 1 2        Remaining             Pertinent Findings:      POC End Date: 24                                                                                          Test / Measure  2024   FOTO (Predicted 57) 48    Cervical ROM Decreased and painful all ranges   Scapular Strength  3+/5     Visit Number:  1 2           Manuals 7/16 7/23           Sub-Occipital Release [facility]           C-Spine PROM (Distraction + SB) [facility]           UT Mobilization                                       Neuro Re-Ed             Cervical SNAG 5x ea 10x, 5s           Cervical Ext w/ Towel 10x 20x           Cervical Nod w/ towel 10x            Supine Chin Tuck  10x           Serratus Slides  10x, 3s hold           No Money             Shoulder Flex w/ TB             SL Open Book  5x ea, 5s hold                        Ther Ex             HEP Review + Pt Edu 10 min            UBE/NS  NS 6' lvl 5           Scapular Retraction 10x            Cervical SBing 10x 3x, 10s hold LSB only           Eccentric Shrugs  8#, 2x10           BL Rows  17.5#, 3x8           Uni Rows             TB Shoulder Ext             Shoulder Clocks                                                    Ther Activity             Manzano's Carry             90/90 Iso Hold                                       Gait Training                                       Modalities

## 2024-07-24 ENCOUNTER — OFFICE VISIT (OUTPATIENT)
Dept: PHYSICAL THERAPY | Facility: CLINIC | Age: 54
End: 2024-07-24
Payer: COMMERCIAL

## 2024-07-24 DIAGNOSIS — R42 DIZZINESS: ICD-10-CM

## 2024-07-24 DIAGNOSIS — Z74.09 IMPAIRED FUNCTIONAL MOBILITY, BALANCE, GAIT, AND ENDURANCE: ICD-10-CM

## 2024-07-24 DIAGNOSIS — M54.2 NECK PAIN: ICD-10-CM

## 2024-07-24 DIAGNOSIS — G24.3 CERVICAL DYSTONIA: Primary | ICD-10-CM

## 2024-07-24 PROCEDURE — 97112 NEUROMUSCULAR REEDUCATION: CPT

## 2024-07-24 PROCEDURE — 97110 THERAPEUTIC EXERCISES: CPT

## 2024-07-24 NOTE — PROGRESS NOTES
Daily Note     Today's date: 2024  Patient name: Jeremy George  : 1970  MRN: [id number]  Referring provider: Cici Austin CR*  Dx:   Encounter Diagnosis     ICD-10-CM    1. Cervical dystonia  G24.3       2. Neck pain  M54.2       3. Impaired functional mobility, balance, gait, and endurance  Z74.09       4. Dizziness  R42                          Subjective: Reports that he has not had a headache since last session.  Jeremy reports he will be returning to work .        Objective: See treatment diary below      Assessment: Tolerated treatment well. Patient demonstrated fatigue post treatment, exhibited good technique with therapeutic exercises, and would benefit from continued PT. Jeremy continued to demonstrate improved tolerance with TM, tolerating increased incline with no difficulty.  Jeremy was challenged with progressing carioka and BWD tandem walking to foam beams. Jeremy tolerated progression well with noted difficulty, experiencing 2 near LOB but able to catch balance independently with ankle and hip strategy.  More imbalance was noted throughout sessio with HT, especially when performing river rocks.  Continue to progress Jeremy as tolerated.       Plan: Continue per plan of care.  Trial hurdles with foam beams NV.     Precautions: HTN. Thoracic aneurysm, asthma, c-spine fusion    POC expires Unit limit Auth Expiration date PT/OT/ST + Visit Limit?    BOMN N/a 90 PT/OT/SLP                           Visit/Unit Tracking  AUTH Status:  Date 6/10 6/12 6/19 6/21 6/26 6/28 7/1 7/3 7/10 7/11 7/15 7/17 7/24   None Used 12 13 14 15 16 17 18 20 21 22 23 24 25    Remaining                     Manuals 7/3 7/10 7/11 7/15 7/17 7/24            C-spine                           Neuro Re-Ed         VOR x 1 seated         posture         Imaginary targets         Foam EC SOLO  Foam beams sidestepping x 3 laps   X1 lap with 10# tidal tank    Tandem x 3 laps with 10# tidal tank SOLO   EC on  airex    Weightshift x20 ea //bar (long)   EC on airex    Weightshift x20 ea    HN 3x45 sec  rounds  SOLO   EC on airex    Weightshift x20 ea SOLO   EC on airex    Weightshift x20 ea    CW x3  CCW x3   Foam Beams  SOLO  Foam beams sidestepping   X4 lap with 10# tidal tank    forward x 4 laps with 10# tidal tank  SOLO  Foam beams sidestepping x 4 laps with 10# tidal tank, then 3 laps with HT    Tandem x 4 laps with 10# tidal tank, then x 3 laps with HN SOLO  Foam beams sidestepping x 4 laps with 10# tidal tank, then 3 laps with HT    Tandem x 4 laps with 10# tidal tank, then x 3 laps with HN     Solo    Carioka x3 laps    BWD Tandem x3 laps    Sidestepping with HN x4 laps    Tandem with HT x4 laps   Standing circles weight shifting         Spot it saccades         NBOS         tandem SOLO  Fwd tandem x 3 laps Solo Fwd/Bwd tandem x3 laps Solo BWD  Tandem x5 laps      hurdles 12-inch (6) step over step pattern forward x 4 laps    Lateral x 4 laps 12-inch (6) step over step pattern carrying 10# tidal tank   forward x 4 laps    Lateral x 4 laps 12-inch (6) step over step pattern carrying 10# tidal tank   forward x 2 laps    Lateral x 2 laps SOLO  12-inch (6) sidways with 180 degree turn x 2 laps  12-inch (6) step over step pattern carrying 10# tidal tank on shoulders    fwd x4 laps    Lat x4 laps   carioka SOLO  Carioka x 3 laps SOLO  Carioka x 3 laps Solo    pushed together foam pads x3 laps SOLO  Pushed together foam pads (5) x 4 laps SOLO  Pushed together foam pads (5) x 4 laps    BWD tandem x4 laps    River rocks  2 lg, 2 md, 5 sm     Fwd x2 laps  Fwd carrying 10# tidal tank x2 laps    Lat x4 laps  SOLO  1 large, 4 medium, 4 small, spaced with large gaps, with HN x 4 laps forward    Lateral x 4 laps SOLO  1 large, 4 medium, 4 small, blk therapad, spaced with large gaps    FWD x2 laps  FWD HT x2 laps  FWD HN x2 laps    Lat x3 laps SOLO  2 lg, 4 md, 5 sm spaced wide    Fwd x2 laps  Fwd w/ HT x3 laps    Lat x2 laps  Lat  w/ HN x3 laps   Red mat with obstacles Red mat with obstacles underneath   Lateral x 5 laps counting down form 100 by 3    Fwd/back x 5 laps calling number held by therapist Red mat with obstacles underneath    Fwd x5 laps w/ HN    Lat x5 laps w/ HN Red mat with obstacles underneath    Fwd x5 laps w/ HN    Bwd x 3 laps w/ HN    Lat x5 laps w/ HN      Joint position error         Ther Ex         nustep         C-spine stretching         Cervical ROM         TM 1.4 mph x10 min 1.4 mph x10 min 1.4 mph x 6 min  1.4 mph, 3% incline x 4min 1.6mph x 8 minutes no UE support 1.7mph x 10 min no UE support 1.5 mph, 5% incline, 10 min no UE support            Ther Activity                           Gait Training                           Education  Return to work,

## 2024-07-25 ENCOUNTER — APPOINTMENT (OUTPATIENT)
Dept: PHYSICAL THERAPY | Facility: REHABILITATION | Age: 54
End: 2024-07-25
Payer: COMMERCIAL

## 2024-07-29 ENCOUNTER — EVALUATION (OUTPATIENT)
Dept: PHYSICAL THERAPY | Facility: CLINIC | Age: 54
End: 2024-07-29
Payer: COMMERCIAL

## 2024-07-29 DIAGNOSIS — Z74.09 IMPAIRED FUNCTIONAL MOBILITY, BALANCE, GAIT, AND ENDURANCE: Primary | ICD-10-CM

## 2024-07-29 DIAGNOSIS — R42 DIZZINESS: ICD-10-CM

## 2024-07-29 PROCEDURE — 97112 NEUROMUSCULAR REEDUCATION: CPT

## 2024-07-29 NOTE — PROGRESS NOTES
Progress Note    Today's date: 2024  Patient name: Jeremy George  : 1970  MRN: [id number]  Referring provider: Cici Austin CR*  Dx:   Encounter Diagnosis     ICD-10-CM    1. Impaired functional mobility, balance, gait, and endurance  Z74.09       2. Dizziness  R42                               Assessment  24: Jeremy has attended 25 sessions of physical therapy for dizziness with suspected vestibular migraine. Since last progress note, he is making significant improvements in balance outcome measures and overall mobility. Since last progress note, he has made significant improvements with TUG time, FGA, and DHI scores. TUG improved from 10.5 seconds to 5.9 seconds, FGA  improved from 22/30 to 26/30 points, and DHI improved from 28 points to 4 points. He continues to make improvement with 3MBWT and self-selected gait speed. Self-selected gait speed demonstrated slight improvement from 0.94m/s to 1.01m/s, now at/slightly above cut-off of 1.0 m/s for fall risk, however continues to be below normal gait speed of 1.2-1.4m/s. 3MBWT improved from 7.3 seconds to 6.4 seconds, continues to be above cut-off score of 4.5 seconds for increased risk of falls.  He is planning to return to work on 24, where he needs to be able to walk on uneven surfaces and perform ambulation with significant head movements to track power lines. He has met 4/4 short term goals at this time. Discussed continuing therapy if patient reports functional limitations with return to work, or placing on 30 day hold if work is not making any symptoms worse. Continue 2x/week for 4 weeks if needed, to follow up once he returns to work.       Assessment details: Jeremy George is a 53 year-old male who presents to outpatient physical therapy with persistent dizziness starting about 1 week ago. He was hospitalized from -, with diagnosis of dizziness and gait instability. During hospitalization, he was treated with migraine  cocktail, which appears to have decreased symptoms of headache and dizziness. Upon testing, demonstrated a few beats of R beating nystagmus with R gaze hold, and possible 1-2 beats of L beating nystagmus with L gaze hold (possible direction changing nystagmus, unable to determine). Other HINTS exam (head impulse, test of skew) were negative. Jeremy did have increased dizziness with saccades, with hypometria vertically, which may also be indicative of vestibular migraine. With history of cerebral amyloid angiopathy, his central signs as well as dizziness and mobility difficulties may also be due to these circulatory changes. BPPV testing was not completed during evaluation due to constant symptoms and low likelihood due to subjective complaints of dizziness. Negative head impulse testing with decreased likelihood of peripheral vestibular issue. He is demonstrating significant gait instability noted with decreased gait speed of 0.69m/s, needing use of FWW for mobility (no AD prior to hospitalization), and requiring CGA to min A for mobility due to LOB backwards and to R side. Further testing for coordination, balance, and vestibular testing with MCTSIB to be completed at next session (not completed due to limited time).  Jeremy would benefit from skilled physical therapy to decrease fall risk, improve balance, improve dizziness, improve functional mobility, improve activity tolerance, and return to prior level of function.   Impairments: abnormal gait, activity intolerance, impaired balance, lacks appropriate home exercise program and safety issue  Understanding of Dx/Px/POC: good   Prognosis: good      NEW GOALS  Pt will improve self-selected gait speed to at least 1.05m/s in 4 weeks to decrease fall risk and improve functional mobility ALMOST MET  Pt will improve DHI score to at least 17 points in 4 weeks to decrease dizziness and improve function MET  Pt will improve FGA score to at least 25/30 points in 4 weeks to  "improve balance and decrease fall risk MET  Complete miniBEST to further assess balance MET      Pt will improve self-selected gait speed to at least 1.2m/s in 8 weeks to improve functional mobility and normalize gait speed  Pt will improve mini-BEST by at least 4 points from initial score in 8 weeks DISCONTINUED- scored 25/28 points  Pt will improve 3MBWT to <4.5 seconds to decrease fall risk  Pt will be independent with HEP in 8 weeks    Plan  Patient would benefit from: skilled physical therapy  Planned therapy interventions: balance, neuromuscular re-education, canalith repositioning, patient education, gait training, therapeutic exercise, therapeutic activities and home exercise program  Frequency: 2x week  Duration in weeks: 4  Plan of Care beginning date: 6/26/2024  Plan of Care expiration date: 8/21/2024  Treatment plan discussed with: patient        Subjective Evaluation    History of Present Illness  7/29/24: Reports that he is going to see the eye doctor today, starting having a floater that feels like a bug in his vision. Headaches on and off, the dizziness has been better since he started the new medications. Dizziness at worst 6-7/10, after taking medications to calm down the headache went to bed and it was gone. Started therapy for his neck. Balance has been pretty good, continues to feel \"wonky\" when doing exercises with moving his head. Reports it has been since 7/17 since he had more of a bad episode with his dizziness. This past weekend was the first time he took a long drive.     6/26/24: Reports that he has a little bit of a headache this morning. New medications are helping a lot. Minimal dizziness, occasionally pokes into his day for a few minutes and then goes away. Reports that he can't pinpoint what is helping, but he is getting better. Reports he has not been using the walker since he had a really bad episode on Memorial Day. Had a couple close calls but did not fall, was able to catch " himself. Overall improvement 70-90%. Reports he feels more awareness of when he needs to stop and take a break. He reports that Thursday last week was bad, but since then it has been much better. Continues to have neck pain and on and off sharp pain at the top of his head that lasts about 10 seconds.     5/29/24: Reports that he has a bad weekend with headaches, and significant dizziness on Monday. He continues to struggle with balance. Some days are bad and other days are a little better. He states that he has been frustrated that he is stuck in the house most of the time. Having headaches about every day, 6 days out of 7 days. Reports that he is trying to get the neurtek approved to be able to take it. Continues to have pressure and the lights bother him with his headaches. Continues to have neck pain. Feels that it has been contributing to his headaches. Reports that he has a lot of challenge with bending down, causing dizziness and feeling like he is going to fall forward.    Mechanism of injury: Works as a  for [employer], was doing stretching with toe touches, bend down and almost went down head first. Started having lightheadedness, headache, and persistent dizziness. Kept increasing with the headaches, states they eliminated that he was having a stroke. Trying to find out if it was the crystals, migraine, or cluster headaches. States that they want him to see an ENT. Was able to move around better on Saturday, Sunday was having a bad day. States that he feels like he is moving, getting nauseous and dizzy. Reports that the dizziness feels more constant, yesterday if he was looking to the R side it felt like he was feeling like he was falling to the R side. Not sure if it was if his eyes were moving or turning his head. Helps to sit still and look straight down and wait for things to settle down, but he continues to feel like he is on a boat that is moving. L side headaches primarily, but can be the  "whole front. Reports that he feels like everything is moving all the time. Using FWW at this time, did not use prior. Reports that he has been having neck pain in the last 2-2.5 months (has hx of ACDF in 2018).     Reports that he had a shakiness in his hand 2 years ago, and a shaking in his R leg, diagnosed with microhemmorages. States that the symptoms     Per medical chart, \"53 y.o. male with prior vertigo episode 20+ years ago, suspected CAA, headaches, HTN, HLD, DM2, cervical radiculopathy s/p ACDF, tremors, and recent travel to [place] with intermittent dizziness for the past week who presented to [facility] on 2024 as a stroke alert for persistent dizziness.     Patient has had intermittent dizziness over the past week that only lasted a few minutes. Day of presentation, patient was bending down doing toe touches when he suddenly developed severe/persistent dizziness described as \"swaying\" associated with ambulatory dysfunction and nausea.  Patient also developed a left frontal/parietal headache and chest discomfort.    Result Date: 2024  Impression: No acute intracranial abnormality. No restricted diffusion to suggest acute ischemia. Mild scattered periventricular and subcortical foci of white matter T2 hyperintensity which are nonspecific and most likely related to chronic small vessel ischemic changes. Other less like etiologies include demyelinating disease, vasculitis, Lyme and migraines. Reidentified and normal punctate foci of susceptibility artifact are seen in the supratentorial brain centrally at the gray-white junction peripherally in distribution most compatible with cerebral amyloid angiopathy.  Chronic microhemorrhage in the setting of hypertension is less likely with this distribution but not excluded.     Pain  Current pain ratin  At best pain ratin  At worst pain ratin  Location: head  24: headaches 4-5/10 average, best 2/10, worst 7/10  24: headaches worst " 7-8/10, best 0/10        Objective     Concurrent Complaints  Positive for headaches, nausea/motion sickness, tinnitus (pain in R ear, occasional ringing in L ear) and visual change (bluriness). Negative for hearing loss, memory loss and aural fullness  Neuro Exam:     Dizziness  Positive for disequilibrium, vertigo, motion sickness and rocking or swaying.   Negative for oscillopsia, light-headedness and diplopia.     Exacerbating factors  Positive for bending over, turning head and optokinetic movement.   Negative for rolling in bed, looking up (limited due to neck pain), walking and supine to/from sitting.     Symptoms   Intensity at best: 0/10  Intensity at worst: 7/10  Average intensity: 5/10  24: average 3-4/10, best 0/10, worst 7/10  24: worst 6-7/10, best 0/10  24: worst 6-7/10, best 0/10    Headaches   Patient reports headaches: Yes.       Balance Test    Gait speed 0.69m/s with FWW SSGS 0.54m/s no AD and SPV 0.94m/s SSGS 1.01m/s SSGS   TU.9 seconds with FWW, CGA min A on turns 20.9 seconds without AD, SPV 10.5 seconds without AD, SPV 5.9 seconds mod I  Cog TU.63 seconds (11%)   FGA: NT    ABC Scale 1.25% 68% 91% 98.7%   DHI 84 80 28 points 4 points   3MBWT  10.6 seconds 7.3 seconds 6.4 seconds   miniBEST      SLS L: >1 minute  R: >1 minute         Gait Assessment: Decreased gait speed, slow turning, decreased push off, occasional guarded positioning         Precautions: HTN. Thoracic aneurysm, asthma, c-spine fusion    POC expires Unit limit Auth Expiration date PT/OT/ST + Visit Limit?    BOMN N/a 90 PT/OT/SLP                           Visit/Unit Tracking  AUTH Status:  Date  7 7/3 7/10 7/11 7/15 7/17 7/24 7/29   None Used 14 15 16 17 18 20 21 22 23 24 25 26    Remaining                    Manuals 7/3 7/10 7/11 7/15 7/17 7/24 7/29             C-spine                              Neuro Re-Ed        miniBEST  FGA  3MBWT  SLS each side  ABC  DHI   VOR x 1 seated          posture          Imaginary targets          Foam EC SOLO  Foam beams sidestepping x 3 laps   X1 lap with 10# tidal tank    Tandem x 3 laps with 10# tidal tank SOLO   EC on airex    Weightshift x20 ea //bar (long)   EC on airex    Weightshift x20 ea    HN 3x45 sec  rounds  SOLO   EC on airex    Weightshift x20 ea SOLO   EC on airex    Weightshift x20 ea    CW x3  CCW x3    Foam Beams  SOLO  Foam beams sidestepping   X4 lap with 10# tidal tank    forward x 4 laps with 10# tidal tank  SOLO  Foam beams sidestepping x 4 laps with 10# tidal tank, then 3 laps with HT    Tandem x 4 laps with 10# tidal tank, then x 3 laps with HN SOLO  Foam beams sidestepping x 4 laps with 10# tidal tank, then 3 laps with HT    Tandem x 4 laps with 10# tidal tank, then x 3 laps with HN     Solo    Carioka x3 laps    BWD Tandem x3 laps    Sidestepping with HN x4 laps    Tandem with HT x4 laps    Standing circles weight shifting          Spot it saccades          NBOS          tandem SOLO  Fwd tandem x 3 laps Solo Fwd/Bwd tandem x3 laps Solo BWD  Tandem x5 laps       hurdles 12-inch (6) step over step pattern forward x 4 laps    Lateral x 4 laps 12-inch (6) step over step pattern carrying 10# tidal tank   forward x 4 laps    Lateral x 4 laps 12-inch (6) step over step pattern carrying 10# tidal tank   forward x 2 laps    Lateral x 2 laps SOLO  12-inch (6) sidways with 180 degree turn x 2 laps  12-inch (6) step over step pattern carrying 10# tidal tank on shoulders    fwd x4 laps    Lat x4 laps    carioka SOLO  Carioka x 3 laps SOLO  Carioka x 3 laps Solo    pushed together foam pads x3 laps SOLO  Pushed together foam pads (5) x 4 laps SOLO  Pushed together foam pads (5) x 4 laps    BWD tandem x4 laps     River rocks  2 lg, 2 md, 5 sm     Fwd x2 laps  Fwd carrying 10# tidal tank x2 laps    Lat x4 laps  SOLO  1 large, 4 medium, 4 small, spaced with large gaps, with HN x 4  laps forward    Lateral x 4 laps SOLO  1 large, 4 medium, 4 small, blk therapad, spaced with large gaps    FWD x2 laps  FWD HT x2 laps  FWD HN x2 laps    Lat x3 laps SOLO  2 lg, 4 md, 5 sm spaced wide    Fwd x2 laps  Fwd w/ HT x3 laps    Lat x2 laps  Lat w/ HN x3 laps    Red mat with obstacles Red mat with obstacles underneath   Lateral x 5 laps counting down form 100 by 3    Fwd/back x 5 laps calling number held by therapist Red mat with obstacles underneath    Fwd x5 laps w/ HN    Lat x5 laps w/ HN Red mat with obstacles underneath    Fwd x5 laps w/ HN    Bwd x 3 laps w/ HN    Lat x5 laps w/ HN       Joint position error          Ther Ex          nustep          C-spine stretching          Cervical ROM          TM 1.4 mph x10 min 1.4 mph x10 min 1.4 mph x 6 min  1.4 mph, 3% incline x 4min 1.6mph x 8 minutes no UE support 1.7mph x 10 min no UE support 1.5 mph, 5% incline, 10 min no UE support              Ther Activity                              Gait Training                              Education  Return to work,      Progress with therapy, POC, outcome measures

## 2024-07-30 ENCOUNTER — APPOINTMENT (OUTPATIENT)
Dept: PHYSICAL THERAPY | Facility: REHABILITATION | Age: 54
End: 2024-07-30
Payer: COMMERCIAL

## 2024-07-31 ENCOUNTER — APPOINTMENT (OUTPATIENT)
Dept: PHYSICAL THERAPY | Facility: CLINIC | Age: 54
End: 2024-07-31
Payer: COMMERCIAL

## 2024-07-31 ENCOUNTER — OFFICE VISIT (OUTPATIENT)
Dept: PHYSICAL THERAPY | Facility: REHABILITATION | Age: 54
End: 2024-07-31
Payer: COMMERCIAL

## 2024-07-31 DIAGNOSIS — G24.3 CERVICAL DYSTONIA: Primary | ICD-10-CM

## 2024-07-31 DIAGNOSIS — M54.2 NECK PAIN: ICD-10-CM

## 2024-07-31 PROCEDURE — 97112 NEUROMUSCULAR REEDUCATION: CPT

## 2024-07-31 PROCEDURE — 97140 MANUAL THERAPY 1/> REGIONS: CPT

## 2024-07-31 PROCEDURE — 97110 THERAPEUTIC EXERCISES: CPT

## 2024-07-31 NOTE — PROGRESS NOTES
Daily Note     Today's date: 2024  Patient name: Jeremy George  : 1970  MRN: [id number]  Referring provider: Naty Andrea,*  Dx:   Encounter Diagnosis     ICD-10-CM    1. Cervical dystonia  G24.3       2. Neck pain  M54.2           Start Time: 1710  Stop Time: 1800  Total time in clinic (min): 50 minutes    Subjective: Pt notes that his neck has been feeling okay since last PT session. He mentioned that he is continuing to do his exercises at home and these have been going well. No significant increase in dizziness symptoms or headaches recently.       Objective: See treatment diary below      Assessment: Tolerated treatment well. Patient would benefit from continued PT. Pt was able to tolerate increased resistance with BL rows and shrugs as BL scapular and cervico-thoracic endurance is improving. He was also introduced to shoulder ext and shoulder flex with tband to improve cervico-thoracic neuromuscular control during dynamic UE movements and showed good technique especially with Vcs for posture and hand/elbow positioning. He continues to respond well to MT focused on LSBing and distraction as he notes decreased pain at the R neck following. Continue to progress as tolerated, 1:1 with Florentino Richter DPT entirety of tx.       Plan: Continue per plan of care.      Precautions: PMH includes aneurysm, DM, HTN, plantar fasciitis, cervical spondylosis with radiculopathy s/p cervical spinal fusion, headaches, dizziness      POC expires Unit limit Auth Expiration date PT/OT + Visit Limit?   24 BOMN 24 90 PCY      17 visits used as of    Visit/Unit Tracking  AUTH Status:  Date       Approved Used 1 2 3       Remaining             Pertinent Findings:      POC End Date: 24                                                                                          Test / Measure  2024   FOTO (Predicted 57) 48   Cervical ROM Decreased and painful all ranges    Scapular Strength  3+/5     Visit Number:  1 2 3          Manuals 7/16 7/23 7/31          Sub-Occipital Release [facility]          C-Spine PROM (Distraction + SB) [facility]          UT Mobilization  [facility]                                    Neuro Re-Ed             Cervical SNAG 5x ea 10x, 5s 10x, 5s          Cervical Ext w/ Towel 10x 20x 20x          Cervical Nod w/ towel 10x            Supine Chin Tuck  10x 10x          Serratus Slides  10x, 3s hold 10x, 3s hold          No Money             Shoulder Flex w/ TB   YTB, 2x10 @ wall          SL Open Book  5x ea, 5s hold 5x ea, 5s hold                       Ther Ex             HEP Review + Pt Edu 10 min            UBE/NS  NS 6' lvl 5 NS 6' lvl 5                                                                  Scapular Retraction 10x            Cervical SBing 10x 3x, 10s hold LSB only 3x, 10s hold LSB only                                                                                                Eccentric Shrugs  8#, 2x10 10#, 2x10          BL Rows  17.5#, 3x8 24#, 3x8          Uni Rows             TB Shoulder Ext   10#, 2x10          Shoulder Clocks                                                    Ther Activity             Manzano's Carry             90/90 Iso Hold                                       Gait Training                                       Modalities

## 2024-08-01 ENCOUNTER — OFFICE VISIT (OUTPATIENT)
Dept: PHYSICAL THERAPY | Facility: CLINIC | Age: 54
End: 2024-08-01
Payer: COMMERCIAL

## 2024-08-01 DIAGNOSIS — Z74.09 IMPAIRED FUNCTIONAL MOBILITY, BALANCE, GAIT, AND ENDURANCE: Primary | ICD-10-CM

## 2024-08-01 DIAGNOSIS — R42 DIZZINESS: ICD-10-CM

## 2024-08-01 PROCEDURE — 97112 NEUROMUSCULAR REEDUCATION: CPT

## 2024-08-01 PROCEDURE — 97110 THERAPEUTIC EXERCISES: CPT

## 2024-08-01 NOTE — PROGRESS NOTES
Daily Note    Today's date: 2024  Patient name: Jeremy George  : 1970  MRN: [id number]  Referring provider: Cici Austin CR*  Dx:   Encounter Diagnosis     ICD-10-CM    1. Impaired functional mobility, balance, gait, and endurance  Z74.09       2. Dizziness  R42           Start Time:   Stop Time: 0  Total time in clinic (min): 45 minutes       Subjective: Jeremy reports that work has been going great all week.  Today he spent a few hours in the field, walking over uneven terrain, states that he did way more than he should've today, had HA of 5-6/10 while at work. Patient was able to minimize HA without use of rescue medications.  Jeremy denies any headache and dizziness at start of session.        Objective: See treatment diary below.      Assessment: Tolerated treatment well. Patient demonstrated fatigue post treatment, exhibited good technique with therapeutic exercises, and would benefit from continued PT. Jeremy demonstrated good tolerance of today's plan, did not experience any bouts of dizziness or LOB throughout entire session.  Jeremy continues to have some difficulty with rotations with NBOS on foam, continue to provide as performance has improved since first trial. Jeremy tolerated red mad with obstacles well, able to navigate without single LOB performing spins while walking over.  Continue to progress patient as possible.        Plan: Continue per plan of care.  Progress treatment as tolerated.         Precautions: HTN. Thoracic aneurysm, asthma, c-spine fusion    POC expires Unit limit Auth Expiration date PT/OT/ST + Visit Limit?    BOMN N/a 90 PT/OT/SLP                           Visit/Unit Tracking  AUTH Status:  Date 6/19 6/21 6/26 6/28 7/1 7/3 7/10 7/11 7/15 7/17 7/24 7/29 8/1   None Used 14 15 16 17 18 20 21 22 23 24 25 26 27    Remaining                     Manuals 7/11 7/15 7/17 7/24 7/29 8/1            C-spine                           Neuro Re-Ed      "miniBEST  FGA  3MBWT  SLS each side  ABC  DHI    VOR x 1 seated         posture         Imaginary targets         Foam EC //bar (long)   EC on airex    Weightshift x20 ea    HN 3x45 sec  rounds  SOLO   EC on airex    Weightshift x20 ea SOLO   EC on airex    Weightshift x20 ea    CW x3  CCW x3  // bars    Weightshift x20 ea    CW x5  CCW x5   Foam Beams  SOLO  Foam beams sidestepping x 4 laps with 10# tidal tank, then 3 laps with HT    Tandem x 4 laps with 10# tidal tank, then x 3 laps with HN SOLO  Foam beams sidestepping x 4 laps with 10# tidal tank, then 3 laps with HT    Tandem x 4 laps with 10# tidal tank, then x 3 laps with HN     Solo    [place] x3 laps    BWD Tandem x3 laps    Sidestepping with HN x4 laps    Tandem with HT x4 laps  Solo    [place] x3 laps    Sidestepping with HN x4 laps    Tandem with HT x4 laps    12\" sania (6) step over step pattern carrying 10# tidal tank   fwd x4 laps  Lat x4 laps   Standing circles weight shifting         Spot it saccades         NBOS         tandem Solo BWD  Tandem x5 laps        hurdles 12-inch (6) step over step pattern carrying 10# tidal tank   forward x 2 laps    Lateral x 2 laps SOLO  12-inch (6) sidways with 180 degree turn x 2 laps  12-inch (6) step over step pattern carrying 10# tidal tank on shoulders    fwd x4 laps    Lat x4 laps     carioka Solo    pushed together foam pads x3 laps SOLO  Pushed together foam pads (5) x 4 laps SOLO  Pushed together foam pads (5) x 4 laps    BWD tandem x4 laps      [place]  SOLO  1 large, 4 medium, 4 small, spaced with large gaps, with HN x 4 laps forward    Lateral x 4 laps SOLO  1 large, 4 medium, 4 small, blk therapad, spaced with large gaps    FWD x2 laps  FWD HT x2 laps  FWD HN x2 laps    Lat x3 laps SOLO  2 lg, 4 md, 5 sm spaced wide    Fwd x2 laps  Fwd w/ HT x3 laps    Lat x2 laps  Lat w/ HN x3 laps     Red mat with obstacles Red mat with obstacles underneath    Fwd x5 laps w/ HN    Bwd x 3 laps w/ HN    Lat x5 laps " w/ HN     Red mat with obstacles underneath carrying tidal tank    Fwd x5 laps  Bwd x5 laps  Lat x5 laps    With 360 deg turns x5 laps   Joint position error         Ther Ex         nustep         C-spine stretching         Cervical ROM         TM 1.4 mph x 6 min  1.4 mph, 3% incline x 4min 1.6mph x 8 minutes no UE support 1.7mph x 10 min no UE support 1.5 mph, 5% incline, 10 min no UE support  1.7 mph, 5% x10 min no UE support            Ther Activity                           Gait Training                           Education     Progress with therapy, POC, outcome measures

## 2024-08-05 ENCOUNTER — TELEPHONE (OUTPATIENT)
Dept: NEUROLOGY | Facility: CLINIC | Age: 54
End: 2024-08-05

## 2024-08-06 ENCOUNTER — OFFICE VISIT (OUTPATIENT)
Dept: PHYSICAL THERAPY | Facility: CLINIC | Age: 54
End: 2024-08-06
Payer: COMMERCIAL

## 2024-08-06 ENCOUNTER — HOSPITAL ENCOUNTER (OUTPATIENT)
Dept: NEUROLOGY | Facility: CLINIC | Age: 54
Discharge: HOME | End: 2024-08-06
Payer: COMMERCIAL

## 2024-08-06 DIAGNOSIS — R42 DIZZINESS: ICD-10-CM

## 2024-08-06 DIAGNOSIS — G24.3 CERVICAL DYSTONIA: ICD-10-CM

## 2024-08-06 DIAGNOSIS — Z74.09 IMPAIRED FUNCTIONAL MOBILITY, BALANCE, GAIT, AND ENDURANCE: Primary | ICD-10-CM

## 2024-08-06 DIAGNOSIS — M54.12 CERVICAL RADICULOPATHY: ICD-10-CM

## 2024-08-06 PROBLEM — G56.01 CARPAL TUNNEL SYNDROME OF RIGHT WRIST: Status: ACTIVE | Noted: 2024-08-06

## 2024-08-06 PROCEDURE — 95911 NRV CNDJ TEST 9-10 STUDIES: CPT | Performed by: PSYCHIATRY & NEUROLOGY

## 2024-08-06 PROCEDURE — 97112 NEUROMUSCULAR REEDUCATION: CPT | Performed by: PHYSICAL THERAPIST

## 2024-08-06 PROCEDURE — 95886 MUSC TEST DONE W/N TEST COMP: CPT | Performed by: PSYCHIATRY & NEUROLOGY

## 2024-08-06 PROCEDURE — 97150 GROUP THERAPEUTIC PROCEDURES: CPT | Performed by: PHYSICAL THERAPIST

## 2024-08-06 NOTE — PROGRESS NOTES
"Daily Note     Today's date: 2024  Patient name: Jeremy George  : 1970  MRN: [id number]  Referring provider: Cici Austin CR*  Dx: No diagnosis found.               Subjective: Has been having on and off headaches      Objective: See treatment diary below      Assessment: Patient performed well through session. Was unable to hold balance with eyes close don foam in tandem or SLS moment.       Plan: Continue per plan of care.      Precautions: PMH includes aneurysm, DM, HTN, plantar fasciitis, cervical spondylosis with radiculopathy s/p cervical spinal fusion, headaches, dizziness    Manuals                  C-spine                                         Neuro Re-Ed       miniBEST  FGA  3MBWT  SLS each side  ABC  DHI     VOR x 1 seated             posture             Imaginary targets             Foam EC EC - 30 sec x 2    Tandem EC - attempted 5x    SLS - attempted 5x    HT/FT - 30 sec x 2  SOLO   EC on airex     Weightshift x20 ea SOLO   EC on airex     Weightshift x20 ea     CW x3  CCW x3   // bars     Weightshift x20 ea     CW x5  CCW x5   Foam Beams Solo     [place] x3 laps     Sidestepping with HN/HT x4 laps ea     Tandem with HT with tidal tank x4 laps    Tandem 4 laps HT     12\" sania (6) step over step pattern carrying 10# tidal tank   fwd x4 laps  Lat x4 laps  SOLO  Foam beams sidestepping x 4 laps with 10# tidal tank, then 3 laps with HT     Tandem x 4 laps with 10# tidal tank, then x 3 laps with HN       Solo     [place] x3 laps     BWD Tandem x3 laps     Sidestepping with HN x4 laps     Tandem with HT x4 laps   Solo     [place] x3 laps     Sidestepping with HN x4 laps     Tandem with HT x4 laps     12\" sania (6) step over step pattern carrying 10# tidal tank   fwd x4 laps  Lat x4 laps   Standing circles weight shifting             Spot it saccades             NBOS             tandem             hurdles     12-inch (6) step over step pattern carrying 10# " tidal tank on shoulders     fwd x4 laps     Lat x4 laps       ann-marie   SOLO  Pushed together foam pads (5) x 4 laps     BWD tandem x4 laps         [place]   SOLO  1 large, 4 medium, 4 small, blk therapad, spaced with large gaps     FWD x2 laps  FWD HT x2 laps  FWD HN x2 laps     Lat x3 laps SOLO  2 lg, 4 md, 5 sm spaced wide     Fwd x2 laps  Fwd w/ HT x3 laps     Lat x2 laps  Lat w/ HN x3 laps       Red mat with obstacles         Red mat with obstacles underneath carrying tidal tank     Fwd x5 laps  Bwd x5 laps  Lat x5 laps     With 360 deg turns x5 laps   Joint position error             Ther Ex             nustep             C-spine stretching             Cervical ROM             TM 2.0 mph 10 min no UE support  1.7mph x 10 min no UE support 1.5 mph, 5% incline, 10 min no UE support   1.7 mph, 5% x10 min no UE support                 Ther Activity                                         Gait Training                                         Education       Progress with therapy, POC, outcome measures

## 2024-08-07 ENCOUNTER — TELEPHONE (OUTPATIENT)
Dept: PAIN MEDICINE | Facility: CLINIC | Age: 54
End: 2024-08-07

## 2024-08-07 ENCOUNTER — OFFICE VISIT (OUTPATIENT)
Dept: PHYSICAL THERAPY | Facility: REHABILITATION | Age: 54
End: 2024-08-07
Payer: COMMERCIAL

## 2024-08-07 DIAGNOSIS — G24.3 CERVICAL DYSTONIA: Primary | ICD-10-CM

## 2024-08-07 DIAGNOSIS — G56.21 CUBITAL TUNNEL SYNDROME ON RIGHT: ICD-10-CM

## 2024-08-07 DIAGNOSIS — M54.2 NECK PAIN: ICD-10-CM

## 2024-08-07 DIAGNOSIS — G56.01 CARPAL TUNNEL SYNDROME OF RIGHT WRIST: Primary | ICD-10-CM

## 2024-08-07 PROCEDURE — 97140 MANUAL THERAPY 1/> REGIONS: CPT

## 2024-08-07 PROCEDURE — 97110 THERAPEUTIC EXERCISES: CPT

## 2024-08-07 PROCEDURE — 97112 NEUROMUSCULAR REEDUCATION: CPT

## 2024-08-07 NOTE — PROGRESS NOTES
Daily Note     Today's date: 2024  Patient name: Jeremy George  : 1970  MRN: [id number]  Referring provider: Naty Andrea,*  Dx:   Encounter Diagnosis     ICD-10-CM    1. Cervical dystonia  G24.3       2. Neck pain  M54.2           Start Time: 1625  Stop Time: 1713  Total time in clinic (min): 48 minutes    Subjective: Pt notes that his neck has been feeling okay, he continues to have some pain when rotating towards the right but dizziness symptoms have not bother him recently. He reports increased migraines this week but no significant changes since last visit.       Objective: See treatment diary below      Assessment: Tolerated treatment well. Patient would benefit from continued PT. Pt continues to respond well to MT focused on cervical PROM and distraction as he displays improved mobility following. He noted slight increase in dizziness symptoms during cervical retraction related exercises today so volume was decreased. He was able to tolerate increased volume with shrugs and shoulder ext as BL UE strength and postural endurance is slowly improving. He continues to be challenged with Tband shoulder press up due to decreased strength/endurance. Continue to progress as tolerated. Supervised by Crys Goode PTA, from [id number], 1:1 with Florentino Richter DPT entirety rest of tx.      Plan: Continue per plan of care.      Precautions: PMH includes aneurysm, DM, HTN, plantar fasciitis, cervical spondylosis with radiculopathy s/p cervical spinal fusion, headaches, dizziness      POC expires Unit limit Auth Expiration date PT/OT + Visit Limit?   24 BOMN 24 90 PCY      17 visits used as of    Visit/Unit Tracking  AUTH Status:  Date      Approved Used 1 2 3 4      Remaining             Pertinent Findings:      POC End Date: 24                                                                                          Test / Measure  2024   FOTO (Predicted  57) 48   Cervical ROM Decreased and painful all ranges   Scapular Strength  3+/5     Visit Number:  1 2 3 4         Manuals 7/16 7/23 7/31 8/7         Sub-Occipital Release [facility]         C-Spine PROM (Distraction + SB) [facility]         UT Mobilization  [facility]                                   Neuro Re-Ed             Cervical SNAG 5x ea 10x, 5s 10x, 5s 10x, 5s         Cervical Ext w/ Towel 10x 20x 20x 20x         Cervical Nod w/ towel 10x            Supine Chin Tuck  10x 10x 10x         Serratus Slides  10x, 3s hold 10x, 3s hold 10x, 3s hold         No Money             Shoulder Flex w/ TB   YTB, 2x10 @ wall YTB, 2x10 @ wall         SL Open Book  5x ea, 5s hold 5x ea, 5s hold 5x ea, 5s hold                      Ther Ex             HEP Review + Pt Edu 10 min            UBE/NS  NS 6' lvl 5 NS 6' lvl 5                                                         NS 6' lvl 10          Scapular Retraction 10x            Cervical SBing 10x 3x, 10s hold LSB only 3x, 10s hold LSB only                                                                                       3x, 10s hold LSB only           Eccentric Shrugs  8#, 2x10 10#, 2x10 10#, 3x10         BL Rows  17.5#, 3x8 24#, 3x8 24#, 3x10         Uni Rows             TB Shoulder Ext   10#, 2x10 10#, 3x10         Shoulder Clocks                                                    Ther Activity             Manzano's Carry             90/90 Iso Hold                                       Gait Training                                       Modalities

## 2024-08-08 ENCOUNTER — APPOINTMENT (OUTPATIENT)
Dept: PHYSICAL THERAPY | Facility: REHABILITATION | Age: 54
End: 2024-08-08
Payer: COMMERCIAL

## 2024-08-08 ENCOUNTER — TELEPHONE (OUTPATIENT)
Dept: SLEEP CENTER | Facility: CLINIC | Age: 54
End: 2024-08-08

## 2024-08-08 ENCOUNTER — OFFICE VISIT (OUTPATIENT)
Dept: PHYSICAL THERAPY | Facility: CLINIC | Age: 54
End: 2024-08-08
Payer: COMMERCIAL

## 2024-08-08 DIAGNOSIS — R42 DIZZINESS: ICD-10-CM

## 2024-08-08 DIAGNOSIS — Z74.09 IMPAIRED FUNCTIONAL MOBILITY, BALANCE, GAIT, AND ENDURANCE: Primary | ICD-10-CM

## 2024-08-08 PROCEDURE — 97112 NEUROMUSCULAR REEDUCATION: CPT

## 2024-08-08 PROCEDURE — 97110 THERAPEUTIC EXERCISES: CPT

## 2024-08-08 NOTE — PROGRESS NOTES
Daily Note     Today's date: 2024  Patient name: Lucita George  : 1970  MRN: [id number]  Referring provider: Cici Austin CR*  Dx:   Encounter Diagnosis     ICD-10-CM    1. Impaired functional mobility, balance, gait, and endurance  Z74.09       2. Dizziness  R42           Start Time:   Stop Time: 1900  Total time in clinic (min): 45 minutes    Subjective: Lucita states that he has been feeling good, worst headache since last session, 3/10.        Objective: See treatment diary below      Assessment: Lucita tolerated session well. Increased duration and incline at start of today's session, lucita able to tolerate without difficulty, did require short seated rest break post. Challenged patient with tandem walking on foam, patient was unable. Regressed activity to NBOS walking fwd and patient was able to perform with moderate imbalance. Additionally challenged patient with performing HT and HN with feet together on foam, patient was able to maintain position for about 5-10 sec before needing UE support for balance. Continue to incorporate EC exercises as tolerated.       Plan: Continue per plan of care.      Precautions: PMH includes aneurysm, DM, HTN, plantar fasciitis, cervical spondylosis with radiculopathy s/p cervical spinal fusion, headaches, dizziness    POC expires Unit limit Auth Expiration date PT/OT/ST + Visit Limit?    BOMN N/a 90 PT/OT/SLP                           Visit/Unit Tracking  AUTH Status:  Date 7/15 7/17 7/24 7/29 8/1 8/6 8/8   None Used 23 24 25 26 27 28 29    Remaining               Manuals                  C-spine                                         Neuro Re-Ed       miniBEST  FGA  3MBWT  SLS each side  ABC  DHI     VOR x 1 seated             posture             Imaginary targets             Foam EC EC - 30 sec x 2    Tandem EC - attempted 5x    SLS - attempted 5x    HT/FT - 30 sec x 2 EC - 30 sec x 2    Tandem EC x4 laps    Sidestep EC  "x4 laps    HT/HN with FT 30 sec x3 ea SOLO   EC on airex     Weightshift x20 ea SOLO   EC on airex     Weightshift x20 ea     CW x3  CCW x3   // bars     Weightshift x20 ea     CW x5  CCW x5   Foam Beams Solo     [place] x3 laps     Sidestepping with HN x4 laps ea     Tandem with HT with tidal tank x4 laps    Tandem 4 laps HT     12\" sania (6) step over step pattern carrying 10# tidal tank   fwd x4 laps  Lat x4 laps Solo     [place] x3 laps     Sidestepping with HN with tidal tank x4 laps     Tandem with HT with tidal tank x4 laps    12\" sania (6) step over step pattern carrying 10# tidal tank   fwd x4 laps  Lat x4 laps SOLO  Foam beams sidestepping x 4 laps with 10# tidal tank, then 3 laps with HT     Tandem x 4 laps with 10# tidal tank, then x 3 laps with HN       Solo     [place] x3 laps     BWD Tandem x3 laps     Sidestepping with HN x4 laps     Tandem with HT x4 laps   Solo     [place] x3 laps     Sidestepping with HN x4 laps     Tandem with HT x4 laps     12\" sania (6) step over step pattern carrying 10# tidal tank   fwd x4 laps  Lat x4 laps   Standing circles weight shifting             Spot it saccades             NBOS             tandem             hurdles     12-inch (6) step over step pattern carrying 10# tidal tank on shoulders     fwd x4 laps     Lat x4 laps       c[place]   SOLO  Pushed together foam pads (5) x 4 laps     BWD tandem x4 laps         [place]   SOLO  1 large, 4 medium, 4 small, blk therapad, spaced with large gaps     FWD x2 laps  FWD HT x2 laps  FWD HN x2 laps     Lat x3 laps SOLO  2 lg, 4 md, 5 sm spaced wide     Fwd x2 laps  Fwd w/ HT x3 laps     Lat x2 laps  Lat w/ HN x3 laps       Red mat with obstacles         Red mat with obstacles underneath carrying tidal tank     Fwd x5 laps  Bwd x5 laps  Lat x5 laps     With 360 deg turns x5 laps   Joint position error             Ther Ex             nustep             C-spine stretching             Cervical ROM             TM 2.0 mph 10 " min no UE support 2.0 mph, 5% incline x 12 min no UE support 1.7mph x 10 min no UE support 1.5 mph, 5% incline, 10 min no UE support   1.7 mph, 5% x10 min no UE support                 Ther Activity                                         Gait Training                                         Education       Progress with therapy, POC, outcome measures

## 2024-08-09 LAB
DME PARACHUTE DELIVERY DATE REQUESTED: NORMAL
DME PARACHUTE ITEM DESCRIPTION: NORMAL
DME PARACHUTE ORDER STATUS: NORMAL
DME PARACHUTE SUPPLIER NAME: NORMAL
DME PARACHUTE SUPPLIER PHONE: NORMAL

## 2024-08-13 ENCOUNTER — APPOINTMENT (OUTPATIENT)
Dept: PHYSICAL THERAPY | Facility: CLINIC | Age: 54
End: 2024-08-13
Payer: COMMERCIAL

## 2024-08-14 ENCOUNTER — APPOINTMENT (OUTPATIENT)
Dept: PHYSICAL THERAPY | Facility: REHABILITATION | Age: 54
End: 2024-08-14
Payer: COMMERCIAL

## 2024-08-15 ENCOUNTER — OFFICE VISIT (OUTPATIENT)
Dept: PHYSICAL THERAPY | Facility: CLINIC | Age: 54
End: 2024-08-15
Payer: COMMERCIAL

## 2024-08-15 DIAGNOSIS — R42 DIZZINESS: ICD-10-CM

## 2024-08-15 DIAGNOSIS — Z74.09 IMPAIRED FUNCTIONAL MOBILITY, BALANCE, GAIT, AND ENDURANCE: Primary | ICD-10-CM

## 2024-08-15 PROCEDURE — 97110 THERAPEUTIC EXERCISES: CPT

## 2024-08-15 PROCEDURE — 97112 NEUROMUSCULAR REEDUCATION: CPT

## 2024-08-15 NOTE — PROGRESS NOTES
Daily Note     Today's date: 8/15/2024  Patient name: Jeremy George  : 1970  MRN: [id number]  Referring provider: Cici Austin CR*  Dx:   Encounter Diagnosis     ICD-10-CM    1. Impaired functional mobility, balance, gait, and endurance  Z74.09       2. Dizziness  R42           Start Time: 1645  Stop Time: 1730  Total time in clinic (min): 45 minutes    Subjective: Jeremy worked at night over last weekend, his medications got wet and was unable to take them.  Jeremy reports that his headaches got really bad  and Monday,  started to feel better on Tuesday. States that today is a good day, had minor headache this morning but nothing at time of appointment. Jeremy states that he feels he is ready to be discharged from therapy.       Objective: See treatment diary below      Assessment: Tolerated treatment well. Patient exhibited good technique with therapeutic exercises and would benefit from continued PT.  Jeremy able to perform all exercise without difficulty.  Jeremy reports increase of dizziness to 4/10 with repeated 360 degree turns on foam pads.  Due to Jeremy's subjective report of feeling ready to be discharged as well as his performance within sessions, testing will be completed [place] and patient will be placed on 30 day hold to trial returning to work without therapy.   Jeremy provided education on 30 day hold process and discharge planning, expressed good verbal understanding and is in agreement with current plan moving forward.        Plan: Potential discharge next visit.      Precautions: PMH includes aneurysm, DM, HTN, plantar fasciitis, cervical spondylosis with radiculopathy s/p cervical spinal fusion, headaches, dizziness    POC expires Unit limit Auth Expiration date PT/OT/ST + Visit Limit?    BOMN N/a 90 PT/OT/SLP                           Visit/Unit Tracking  AUTH Status:  Date 7/15 7/17 7/24 7/29 8/1 8/6 8/8 8/15   None Used 23 24 25 26 27 28 29 30    Remaining           "      Manuals 8/6 8/8 8/15 7/24 7/29 8/1                C-spine                                      Neuro Re-Ed      miniBEST  FGA  3MBWT  SLS each side  ABC  DHI     VOR x 1 seated            posture            Imaginary targets            Foam EC EC - 30 sec x 2    Tandem EC - attempted 5x    SLS - attempted 5x    HT/FT - 30 sec x 2 EC - 30 sec x 2    Tandem EC x4 laps    Sidestep EC x4 laps    HT/HN with FT 30 sec x3 ea Solo     4 airex    Steping on, 360 deg turn, stepping off  Fwd X3 laps    EC on airex  External perturbations  2X10 multidirectional SOLO   EC on airex     Weightshift x20 ea     CW x3  CCW x3   // bars     Weightshift x20 ea     CW x5  CCW x5   Foam Beams Solo     [place] x3 laps     Sidestepping with HN x4 laps ea     Tandem with HT with tidal tank x4 laps    Tandem 4 laps HT     12\" sania (6) step over step pattern carrying 10# tidal tank   fwd x4 laps  Lat x4 laps Solo     [place] x3 laps     Sidestepping with HN with tidal tank x4 laps     Tandem with HT with tidal tank x4 laps    12\" sania (6) step over step pattern carrying 10# tidal tank   fwd x4 laps  Lat x4 laps Solo holding 10# tidal tank on shoulders    [place] x3 laps     Sidestepping with HN x4 laps     Tandem with HT x4 laps    12\" sania (6) step over step pattern    fwd x4 laps  Lat x4 laps Solo     [place] x3 laps     BWD Tandem x3 laps     Sidestepping with HN x4 laps     Tandem with HT x4 laps   Solo     [place] x3 laps     Sidestepping with HN x4 laps     Tandem with HT x4 laps     12\" sania (6) step over step pattern carrying 10# tidal tank   fwd x4 laps  Lat x4 laps   Standing circles weight shifting            Spot it saccades            NBOS            tandem            hurdles    12-inch (6) step over step pattern carrying 10# tidal tank on shoulders     fwd x4 laps     Lat x4 laps       carioka            [place]    SOLO  2 lg, 4 md, 5 sm spaced wide     Fwd x2 laps  Fwd w/ HT x3 laps     Lat x2 " laps  Lat w/ HN x3 laps       Red mat with obstacles        Red mat with obstacles underneath carrying tidal tank     Fwd x5 laps  Bwd x5 laps  Lat x5 laps     With 360 deg turns x5 laps   Joint position error            Ther Ex            nustep            C-spine stretching            Cervical ROM            TM 2.0 mph 10 min no UE support 2.0 mph, 5% incline x 12 min no UE support 2.0 mph, 5% incline x 10 min no UE support 1.5 mph, 5% incline, 10 min no UE support   1.7 mph, 5% x10 min no UE support                Ther Activity                                      Gait Training                                      Education      Progress with therapy, POC, outcome measures

## 2024-08-16 LAB

## 2024-08-19 RX ORDER — PREGABALIN 150 MG/1
150 CAPSULE ORAL
Qty: 90 CAPSULE | Refills: 2 | Status: SHIPPED | OUTPATIENT
Start: 2024-08-19

## 2024-08-20 ENCOUNTER — EVALUATION (OUTPATIENT)
Dept: PHYSICAL THERAPY | Facility: CLINIC | Age: 54
End: 2024-08-20
Payer: COMMERCIAL

## 2024-08-20 DIAGNOSIS — R42 DIZZINESS: ICD-10-CM

## 2024-08-20 DIAGNOSIS — Z74.09 IMPAIRED FUNCTIONAL MOBILITY, BALANCE, GAIT, AND ENDURANCE: Primary | ICD-10-CM

## 2024-08-20 PROCEDURE — 97112 NEUROMUSCULAR REEDUCATION: CPT

## 2024-08-20 NOTE — PROGRESS NOTES
Progress Note    Today's date: 2024  Patient name: Jeremy George  : 1970  MRN: [id number]  Referring provider: Cici Austin CR*  Dx:   Encounter Diagnosis     ICD-10-CM    1. Impaired functional mobility, balance, gait, and endurance  Z74.09       2. Dizziness  R42                                 Assessment  24: Jeremy has attended 31 sessions of physical therapy for dizziness with suspected vestibular migraine. Jeremy has met all of his long term goals at this time, and reports that his balance is just about back to normal. He has returned to work and is performing all job tasks without increase in dizziness. All balance testing demonstrate that he is not at risk for falls at this time. Plan to place patient on 30 day hold, patient to return if he has any increase in symptoms or need for additional therapy. Patient agreeable to plan at this time.      Assessment details: Jeremy George is a 53 year-old male who presents to outpatient physical therapy with persistent dizziness starting about 1 week ago. He was hospitalized from -, with diagnosis of dizziness and gait instability. During hospitalization, he was treated with migraine cocktail, which appears to have decreased symptoms of headache and dizziness. Upon testing, demonstrated a few beats of R beating nystagmus with R gaze hold, and possible 1-2 beats of L beating nystagmus with L gaze hold (possible direction changing nystagmus, unable to determine). Other HINTS exam (head impulse, test of skew) were negative. Jeremy did have increased dizziness with saccades, with hypometria vertically, which may also be indicative of vestibular migraine. With history of cerebral amyloid angiopathy, his central signs as well as dizziness and mobility difficulties may also be due to these circulatory changes. BPPV testing was not completed during evaluation due to constant symptoms and low likelihood due to subjective complaints of dizziness.  Negative head impulse testing with decreased likelihood of peripheral vestibular issue. He is demonstrating significant gait instability noted with decreased gait speed of 0.69m/s, needing use of FWW for mobility (no AD prior to hospitalization), and requiring CGA to min A for mobility due to LOB backwards and to R side. Further testing for coordination, balance, and vestibular testing with MCTSIB to be completed at next session (not completed due to limited time).  Jeremy would benefit from skilled physical therapy to decrease fall risk, improve balance, improve dizziness, improve functional mobility, improve activity tolerance, and return to prior level of function.   Impairments: abnormal gait, activity intolerance, impaired balance, lacks appropriate home exercise program and safety issue  Understanding of Dx/Px/POC: good   Prognosis: good      NEW GOALS  Pt will improve self-selected gait speed to at least 1.05m/s in 4 weeks to decrease fall risk and improve functional mobility ALMOST MET  Pt will improve DHI score to at least 17 points in 4 weeks to decrease dizziness and improve function MET  Pt will improve FGA score to at least 25/30 points in 4 weeks to improve balance and decrease fall risk MET  Complete miniBEST to further assess balance MET      Pt will improve self-selected gait speed to at least 1.2m/s in 8 weeks to improve functional mobility and normalize gait speed MET  Pt will improve mini-BEST by at least 4 points from initial score in 8 weeks DISCONTINUED- scored 25/28 points  Pt will improve 3MBWT to <4.5 seconds to decrease fall risk MET  Pt will be independent with HEP in 8 weeks MET    Plan  30 day hold, with discharge to [facility].        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 Dizziness has been good, nothing in  "the last week. Headaches had some the week before last, dizziness hasn't been hitting with them. Balance feels that it is for the most part back to normal, about 95%. Reports only slippery situations and is able to catch himself.     Mechanism of injury: Works as a  for [employer], was doing stretching with toe touches, bend down and almost went down head first. Started having lightheadedness, headache, and persistent dizziness. Kept increasing with the headaches, states they eliminated that he was having a stroke. Trying to find out if it was the crystals, migraine, or cluster headaches. States that they want him to see an ENT. Was able to move around better on Saturday, Sunday was having a bad day. States that he feels like he is moving, getting nauseous and dizzy. Reports that the dizziness feels more constant, yesterday if he was looking to the R side it felt like he was feeling like he was falling to the R side. Not sure if it was if his eyes were moving or turning his head. Helps to sit still and look straight down and wait for things to settle down, but he continues to feel like he is on a boat that is moving. L side headaches primarily, but can be the whole front. Reports that he feels like everything is moving all the time. Using FWW at this time, did not use prior. Reports that he has been having neck pain in the last 2-2.5 months (has hx of ACDF in 2018).     Reports that he had a shakiness in his hand 2 years ago, and a shaking in his R leg, diagnosed with microhemmorages. States that the symptoms     Per medical chart, \"53 y.o. male with prior vertigo episode 20+ years ago, suspected CAA, headaches, HTN, HLD, DM2, cervical radiculopathy s/p ACDF, tremors, and recent travel to [place] with intermittent dizziness for the past week who presented to [facility] on 4/23/2024 as a stroke alert for persistent dizziness.     Patient has had intermittent dizziness over the past week that only lasted a few " "minutes. Day of presentation, patient was bending down doing toe touches when he suddenly developed severe/persistent dizziness described as \"swaying\" associated with ambulatory dysfunction and nausea.  Patient also developed a left frontal/parietal headache and chest discomfort.    Result Date: 2024  Impression: No acute intracranial abnormality. No restricted diffusion to suggest acute ischemia. Mild scattered periventricular and subcortical foci of white matter T2 hyperintensity which are nonspecific and most likely related to chronic small vessel ischemic changes. Other less like etiologies include demyelinating disease, vasculitis, Lyme and migraines. Reidentified and normal punctate foci of susceptibility artifact are seen in the supratentorial brain centrally at the gray-white junction peripherally in distribution most compatible with cerebral amyloid angiopathy.  Chronic microhemorrhage in the setting of hypertension is less likely with this distribution but not excluded.     Pain  Current pain ratin  At best pain ratin  At worst pain ratin  Location: head  24: headaches 4-5/10 average, best 2/10, worst 7/10  24: headaches worst 7-8/10, best 0/10  24: headaches 3/10 at worst in the last week, best 0/10        Objective     Concurrent Complaints  Positive for headaches, nausea/motion sickness, tinnitus (pain in R ear, occasional ringing in L ear) and visual change (bluriness). Negative for hearing loss, memory loss and aural fullness  Neuro Exam:     Dizziness  Positive for disequilibrium, vertigo, motion sickness and rocking or swaying.   Negative for oscillopsia, light-headedness and diplopia.     Exacerbating factors  Positive for bending over, turning head and optokinetic movement.   Negative for rolling in bed, looking up (limited due to neck pain), walking and supine to/from sitting.     Symptoms   Intensity at best: 0/10  Intensity at worst: 7/10  Average intensity: " "5/10  5/29/24: average 3-4/10, best 0/10, worst 7/10  24: worst 6-7/10, best 0/10  24: worst 6-7/10, best 0/10  24: none    Headaches   Patient reports headaches: Yes.       Balance Test    Gait speed 0.69m/s with FWW SSGS 0.54m/s no AD and SPV 0.94m/s SSGS 1.01m/s SSGS 1.32m/s  SSGS   TU.9 seconds with FWW, CGA min A on turns 20.9 seconds without AD, SPV 10.5 seconds without AD, SPV 5.9 seconds mod I  Cog TU.63 seconds (11%) 4.9 seconds   Cog TU.6 seconds    FGA: NT    ABC Scale 1.25% 68% 91% 98.7% 99.3%   DHI 84 80 28 points 4 points 0 points   3MBWT  10.6 seconds 7.3 seconds 6.4 seconds 3.6 seconds   miniBEST      SLS L: >1 minute  R: >1 minute          Gait Assessment: Decreased gait speed, slow turning, decreased push off, occasional guarded positioning         Precautions: HTN. Thoracic aneurysm, asthma, c-spine fusion    POC expires Unit limit Auth Expiration date PT/OT/ST + Visit Limit?    BOMN N/a 90 PT/OT/SLP                           Visit/Unit Tracking  AUTH Status:  Date 7/15 7/17 7/24 7/29 8/1 8/6 8/8 8/15 8/20   None Used 23 24 25 26 27 28 29 30 31    Remaining                 Manuals 8/6 8/8 8/15 8/20           C-spine                       Neuro Re-Ed    miniBEST  FGA  3MBWT  SLS each side  ABC  DHI   VOR x 1 seated       posture       Imaginary targets       Foam EC EC - 30 sec x 2    Tandem EC - attempted 5x    SLS - attempted 5x    HT/FT - 30 sec x 2 EC - 30 sec x 2    Tandem EC x4 laps    Sidestep EC x4 laps    HT/HN with FT 30 sec x3 ea Solo     4 airex    Steping on, 360 deg turn, stepping off  Fwd X3 laps    EC on airex  External perturbations  2X10 multidirectional    Foam Beams Solo     [place] x3 laps     Sidestepping with HN x4 laps ea     Tandem with HT with tidal tank x4 laps    Tandem 4 laps HT     12\" sania (6) step over step pattern carrying 10# tidal tank   fwd x4 laps  Lat x4 laps " "Solo     [place] x3 laps     Sidestepping with HN with tidal tank x4 laps     Tandem with HT with tidal tank x4 laps    12\" sania (6) step over step pattern carrying 10# tidal tank   fwd x4 laps  Lat x4 laps Solo holding 10# tidal tank on shoulders    [place] x3 laps     Sidestepping with HN x4 laps     Tandem with HT x4 laps    12\" sania (6) step over step pattern    fwd x4 laps  Lat x4 laps    Standing circles weight shifting       Spot it saccades       NBOS       tandem       hurdles       [employer]       River rocks       Red mat with obstacles       Joint position error       Ther Ex       nustep       C-spine stretching       Cervical ROM       TM 2.0 mph 10 min no UE support 2.0 mph, 5% incline x 12 min no UE support 2.0 mph, 5% incline x 10 min no UE support            Ther Activity                       Gait Training                       Education    Outcome measures, progress with therapy, fall risk (none), maintaining balance exercises, update to HEP                                "

## 2024-08-20 NOTE — TELEPHONE ENCOUNTER
Caller: suzan Luna     Doctor: Sheyla     Reason for call: pt would like to know if he still needs to come in today for his OV pt stated he already knows EMG results. Pt would like a call back so he knows if he needs to come in or not.     Call back#: [phone]  
EMG reveals a mild right carpal tunnel and cubital tunnel syndrome.  There is no evidence of a cervical radiculopathy  
Last SOVS 6/26/24 and takes Lyrica.    Pt has SOVS today at 2:15, he already knows EMG results and asking if he needs to come?    
No not if he's doing okay - I would just inquire if he needs refills of his pregabalin? We can reschedule out for another 3 months or so if he wishes.
Pt scheduled 11/12 @ 7am   
Refills sent to pharmacy  
S/w pt and advised of same. Pt verbalized understanding and appreciation.   
S/w pt and advised of same. Pt verbalized understanding and appreciation.     Pt is attending PT for neck muscle issue and vestibular therapy.   Pt is having numbness and pain in the pinky and ring finger on the right hand and on the ulnar side of his wrist has intense pain and pressure and has difficulty lifting and holding a coffee cup.   Should pt see ortho or PT for this issue?  Please advise  
S/w the patient and he stated he is doing good. He would like a refill on the Lyrica. I cancelled his appointment for today.  Clerical : Please schedule 3 month F/U OVS. Thanks  
Yes, referral to Dr. Logan placed in chart.
18-Nov-2020 12:25

## 2024-08-22 ENCOUNTER — APPOINTMENT (OUTPATIENT)
Dept: PHYSICAL THERAPY | Facility: CLINIC | Age: 54
End: 2024-08-22
Payer: COMMERCIAL

## 2024-08-26 ENCOUNTER — OFFICE VISIT (OUTPATIENT)
Dept: OBGYN CLINIC | Facility: MEDICAL CENTER | Age: 54
End: 2024-08-26
Payer: COMMERCIAL

## 2024-08-26 VITALS
HEIGHT: 71 IN | DIASTOLIC BLOOD PRESSURE: 73 MMHG | BODY MASS INDEX: 38.92 KG/M2 | WEIGHT: 278 LBS | RESPIRATION RATE: 18 BRPM | HEART RATE: 73 BPM | SYSTOLIC BLOOD PRESSURE: 117 MMHG

## 2024-08-26 DIAGNOSIS — S63.501A RIGHT WRIST SPRAIN, INITIAL ENCOUNTER: Primary | ICD-10-CM

## 2024-08-26 PROCEDURE — 99213 OFFICE O/P EST LOW 20 MIN: CPT | Performed by: ORTHOPAEDIC SURGERY

## 2024-08-26 RX ORDER — ACETAMINOPHEN 500 MG
500 TABLET ORAL EVERY 6 HOURS PRN
COMMUNITY

## 2024-08-26 NOTE — PROGRESS NOTES
[facility]  Jeremy George (53 y.o. male)   : 1970   MRN: [id number]  Encounter Date: 2024    Assessment / Plan    Right Wrist Sprain      Right removable wrist splint provided at today's visit  Referral to hand surgery provided at today's visit  Discussed modifying activity as needed and letting pain be a guide to activity  Ice, heat and OTC medication as needed  Follow-up:  Return if symptoms worsen or fail to improve.      Chief Complaint / Date of Onset  Right wrist pain - 2 months ago  Injury Mechanism / Date  None  Surgery / Date  None    History of Present Illness   Jeremy George is a 53 y.o. male who presents for initial evaluation of right wrist. He has tenderness to palpation over the TFCC. He does not remember any specific inciting injury. He has pain with wrist flexion, extension and pronation, supination. He has a history of TFCC sprain of the left wrist in  and was seen by Dr. Medeiros.    Treatment Summary  Medications / Modalities  ibuprofen  Bracing / Immobilization  None  Physical Therapy  None  Injections  None  Prior Surgeries  None  Other Treatments  None    Employment / Current Status  Employeed    Sport / Organization / Current Status  None      Review of Systems  Pertinent items are noted in HPI.  All other systems were reviewed and are negative.      Physical Exam  /73   Pulse 73   Resp 18   Ht 5' 11\" (1.803 m)   Wt 126 kg (278 lb)   BMI 38.77 kg/m²   Cons: Appears well.  No apparent distress.  Psych: Alert. Oriented x3.  Mood and affect normal.  Eyes: PERRLA, EOMI  Resp: Normal effort.  No audible wheezing or stridor.  CV: Palpable pulse.  No discernable arrhythmia.  No LE edema.  Lymph:  No palpable cervical, axillary, or inguinal lymphadenopathy.  Skin: Warm.  No palpable masses.  No visible lesions.  Neuro: Normal muscle tone.  Normal and symmetric DTR's.     Right Hand & Wrist Exam  Alignment:  Normal resting hand " posture.  Inspection:   mild swelling.  Palpation:   mild tenderness at TFCC.  ROM:  Normal wrist ROM.  Strength:  5/5 wrist flexors and wrist extensors. 5/5 pronation and supination.  Stability:  No objective hand or wrist instability.  Tests:  No pertinent positive or negative tests.  Neurovascular:  Sensation intact in Ax/R/M/U nerve distributions. 2+ radial pulse.       Studies Reviewed  EMG of right upper - Median nerve entrapment consistent with carpal tunnel syndrome      Procedures  No procedures today.    Medical, Surgical, Family, and Social History  The patient's medical history, family history, and social history, were reviewed and updated as appropriate.    Past Medical History:   Diagnosis Date    Achalasia     Aneurysm (HCC)     aortic    Asthma     Childhood    Colon polyp     Diabetes mellitus (HCC)     Esophageal ulcer     Fatty liver     Groin abscess     with I &D    Hyperlipidemia     Hypertension     Plantar fasciitis        Past Surgical History:   Procedure Laterality Date    ANTERIOR CERVICAL DISCECTOMY W/ FUSION      APPENDECTOMY      BICEPS TENDON REPAIR      EGD AND COLONOSCOPY      FL INJECTION LEFT WRIST (ARTHROGRAM)  11/9/2021    FL MYELOGRAM CERVICAL  6/13/2018    MENISCECTOMY      MYOTOMY HELLER LAPAROSCOPIC      ORTHOPEDIC SURGERY         Family History   Problem Relation Age of Onset    No Known Problems Mother     No Known Problems Father        Social History     Occupational History    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   Tobacco Use    Smoking status: Never    Smokeless tobacco: Never   Vaping Use    Vaping status: Never Used   Substance and Sexual Activity    Alcohol use: Not Currently    Drug use: Never    Sexual activity: Yes     Partners: Female       No Known Allergies      Current Outpatient Medications:     acetaminophen (TYLENOL) 500 mg tablet, Take 500 mg by mouth every 6 (six) hours as needed for mild pain, Disp: , Rfl:     albuterol (ProAir HFA) 90 mcg/act  inhaler, Inhale 2 puffs every 6 (six) hours as needed for wheezing, Disp: 8.5 g, Rfl: 0    Ascorbic Acid (vitamin C) 1000 MG tablet, Take 1,000 mg by mouth daily  , Disp: , Rfl:     cetirizine (ZyrTEC) 10 mg tablet, Take 1 tablet (10 mg total) by mouth daily, Disp: 90 tablet, Rfl: 0    fluticasone (FLONASE) 50 mcg/act nasal spray, 2 sprays into each nostril daily, Disp: 18 mL, Rfl: 1    lisinopril-hydrochlorothiazide (PRINZIDE,ZESTORETIC) 20-12.5 MG per tablet, Take 1 tablet by mouth daily, Disp: 90 tablet, Rfl: 1    magnesium Oxide (MAG-OX) 400 mg TABS, Take 1 tablet (400 mg total) by mouth daily, Disp: 90 tablet, Rfl: 0    metFORMIN (GLUCOPHAGE) 500 mg tablet, Pt takes 1500 mg (3 tablets) by mouth in the morning, and a 1000 mg (2 tablets) in the evening, Disp: 450 tablet, Rfl: 1    methocarbamol (ROBAXIN) 500 mg tablet, Take 1 tablet (500 mg total) by mouth 3 (three) times a day as needed for muscle spasms, Disp: 90 tablet, Rfl: 3    Multiple Vitamins-Minerals (CENTRUM SILVER 50+MEN PO), Take by mouth, Disp: , Rfl:     omeprazole (PriLOSEC) 40 MG capsule, Take 1 capsule (40 mg total) by mouth daily (Patient taking differently: Take 40 mg by mouth daily Still has to start), Disp: 90 capsule, Rfl: 3    pregabalin (LYRICA) 150 mg capsule, Take 1 capsule (150 mg total) by mouth daily at bedtime, Disp: 90 capsule, Rfl: 2    simvastatin (ZOCOR) 20 mg tablet, Take 1 tablet (20 mg total) by mouth daily, Disp: 30 tablet, Rfl: 0    sitaGLIPtin (JANUVIA) 100 mg tablet, Take 1 tablet (100 mg total) by mouth daily, Disp: 90 tablet, Rfl: 1    benzonatate (TESSALON PERLES) 100 mg capsule, Take 1 capsule (100 mg total) by mouth 3 (three) times a day as needed for cough (Patient not taking: Reported on 6/3/2024), Disp: 20 capsule, Rfl: 0    CINNAMON PO, Take by mouth (Patient not taking: Reported on 5/21/2024), Disp: , Rfl:     cyanocobalamin 1,000 mcg/mL, Inject 1 mL (1,000 mcg total) into a muscle every 7 days for 9 doses, Disp:  1 mL, Rfl: 0    dexamethasone (DECADRON) 2 mg tablet, Take 1 tablet (2 mg total) by mouth daily with breakfast (Patient not taking: Reported on 8/26/2024), Disp: 5 tablet, Rfl: 0    diphenhydrAMINE-acetaminophen (TYLENOL PM)  MG TABS, Take 2 tablets by mouth daily at bedtime as needed for sleep 1000 mg in total (Patient not taking: Reported on 5/15/2024), Disp: , Rfl:     Emgality 120 MG/ML SOAJ, INJECT 1 ML IN THE RIGHT THIGH AND 1 ML IN THE LEFT THIGH AT THE SAME TIME FOR LOADING DOSE (Patient not taking: Reported on 8/26/2024), Disp: 2 mL, Rfl: 0    famotidine (PEPCID) 20 mg tablet, Take 1 tablet (20 mg total) by mouth daily (Patient not taking: Reported on 8/26/2024), Disp: 90 tablet, Rfl: 1    meclizine (ANTIVERT) 25 mg tablet, Take 1 tablet (25 mg total) by mouth 3 (three) times a day as needed for dizziness (Patient not taking: Reported on 6/3/2024), Disp: 30 tablet, Rfl: 0    polyethylene glycol (MiraLax) 17 g packet, Take 17 g by mouth daily (Patient not taking: Reported on 8/26/2024), Disp: 90 each, Rfl: 5    rimegepant sulfate (Nurtec) 75 mg TBDP, Take 1 tablet by mouth as needed.  Do not exceed more than 1 dose in 24 hours or 3 doses in one week (Patient not taking: Reported on 8/26/2024), Disp: 10 tablet, Rfl: 0    topiramate (TOPAMAX) 100 mg tablet, Take 1 tablet (100 mg total) by mouth daily at bedtime (Patient not taking: Reported on 8/26/2024), Disp: 30 tablet, Rfl: 6    Current Facility-Administered Medications:     cyanocobalamin injection 1,000 mcg, 1,000 mcg, Intramuscular, Q30 Days, MIKHAIL Ha, 1,000 mcg at 07/22/24 1414      Chris Oseguera    Scribe Attestation      I,:  Chris Oseguera am acting as a scribe while in the presence of the attending physician.:       I,:  Quinten Cameron MD personally performed the services described in this documentation    as scribed in my presence.:

## 2024-08-27 ENCOUNTER — APPOINTMENT (OUTPATIENT)
Dept: PHYSICAL THERAPY | Facility: CLINIC | Age: 54
End: 2024-08-27
Payer: COMMERCIAL

## 2024-08-27 ENCOUNTER — OFFICE VISIT (OUTPATIENT)
Age: 54
End: 2024-08-27
Payer: COMMERCIAL

## 2024-08-27 VITALS
WEIGHT: 274 LBS | HEART RATE: 73 BPM | HEIGHT: 74 IN | OXYGEN SATURATION: 97 % | DIASTOLIC BLOOD PRESSURE: 70 MMHG | SYSTOLIC BLOOD PRESSURE: 118 MMHG | TEMPERATURE: 97.8 F | BODY MASS INDEX: 35.16 KG/M2

## 2024-08-27 DIAGNOSIS — E53.8 B12 DEFICIENCY: ICD-10-CM

## 2024-08-27 DIAGNOSIS — R10.9 FLANK PAIN: ICD-10-CM

## 2024-08-27 DIAGNOSIS — G24.3 CERVICAL DYSTONIA: ICD-10-CM

## 2024-08-27 DIAGNOSIS — S39.012A STRAIN OF LUMBAR REGION, INITIAL ENCOUNTER: Primary | ICD-10-CM

## 2024-08-27 LAB
SL AMB  POCT GLUCOSE, UA: NORMAL
SL AMB LEUKOCYTE ESTERASE,UA: NORMAL
SL AMB POCT BILIRUBIN,UA: NORMAL
SL AMB POCT BLOOD,UA: NORMAL
SL AMB POCT CLARITY,UA: CLEAR
SL AMB POCT COLOR,UA: YELLOW
SL AMB POCT KETONES,UA: NORMAL
SL AMB POCT NITRITE,UA: NORMAL
SL AMB POCT PH,UA: 6.5
SL AMB POCT SPECIFIC GRAVITY,UA: 1.02
SL AMB POCT URINE PROTEIN: NORMAL
SL AMB POCT UROBILINOGEN: 0.2

## 2024-08-27 PROCEDURE — 81003 URINALYSIS AUTO W/O SCOPE: CPT | Performed by: INTERNAL MEDICINE

## 2024-08-27 PROCEDURE — 96372 THER/PROPH/DIAG INJ SC/IM: CPT

## 2024-08-27 PROCEDURE — 99214 OFFICE O/P EST MOD 30 MIN: CPT | Performed by: INTERNAL MEDICINE

## 2024-08-27 RX ORDER — METHOCARBAMOL 500 MG/1
500 TABLET, FILM COATED ORAL 3 TIMES DAILY PRN
Qty: 90 TABLET | Refills: 3 | Status: CANCELLED | OUTPATIENT
Start: 2024-08-27

## 2024-08-27 RX ADMIN — CYANOCOBALAMIN 1000 MCG: 1000 INJECTION, SOLUTION INTRAMUSCULAR; SUBCUTANEOUS at 15:53

## 2024-08-27 NOTE — PROGRESS NOTES
Ambulatory Visit  Name: Jeremy George      : 1970      MRN: [id number]  Encounter Provider: Shari Birmingham MD  Encounter Date: 2024   Encounter department: [facility]    Assessment & Plan   1. Strain of lumbar region, initial encounter  Assessment & Plan:  Recommend lumbar support.  Muscle relaxant, as needed.  Anti-inflammatory for pain.  2. B12 deficiency  -     Methylmalonic acid, serum; Future  3. Cervical dystonia     Chief Complaint   Patient presents with   • Flank Pain     Patient c/o intermittent left sided flank pain x 4 days.            History of Present Illness     Back Pain  This is a new (came on acutely when he lifted his arms above his head) problem. Episode onset: last 4 days. The problem occurs constantly. The problem is unchanged. Pain location: left lower back. Quality: sharp. The pain does not radiate. The symptoms are aggravated by position, coughing and bending. Pertinent negatives include no leg pain, pelvic pain, tingling or weakness. He has tried analgesics for the symptoms. The treatment provided no relief.     Does have a history of lumbar radiculopathy however she that pain is in the lower back.  Additionally he does have herniated disks both in his neck, thoracic spine and upper back.  This however feels more muscular in nature and pain seems to be reproduced with movement.  Does not have a history of kidney stones but does give a remote history of hematuria in the past  Review of Systems   Genitourinary:  Negative for pelvic pain.   Musculoskeletal:  Positive for back pain.   Neurological:  Negative for tingling and weakness.     Past Medical History:   Diagnosis Date   • Achalasia    • Aneurysm (HCC)     aortic   • Asthma     Childhood   • Colon polyp    • Diabetes mellitus (HCC)    • Esophageal ulcer    • Fatty liver    • Groin abscess     with I &D   • Hyperlipidemia    • Hypertension    • Plantar fasciitis      Past Surgical  History:   Procedure Laterality Date   • ANTERIOR CERVICAL DISCECTOMY W/ FUSION     • APPENDECTOMY     • BICEPS TENDON REPAIR     • EGD AND COLONOSCOPY     • FL INJECTION LEFT WRIST (ARTHROGRAM)  11/9/2021   • FL MYELOGRAM CERVICAL  6/13/2018   • MENISCECTOMY     • MYOTOMY HELLER LAPAROSCOPIC     • ORTHOPEDIC SURGERY       Family History   Problem Relation Age of Onset   • No Known Problems Mother    • No Known Problems Father      Social History     Tobacco Use   • Smoking status: Never   • Smokeless tobacco: Never   Vaping Use   • Vaping status: Never Used   Substance and Sexual Activity   • Alcohol use: Not Currently   • Drug use: Never   • Sexual activity: Yes     Partners: Female     Current Outpatient Medications on File Prior to Visit   Medication Sig   • acetaminophen (TYLENOL) 500 mg tablet Take 500 mg by mouth every 6 (six) hours as needed for mild pain   • albuterol (ProAir HFA) 90 mcg/act inhaler Inhale 2 puffs every 6 (six) hours as needed for wheezing   • Ascorbic Acid (vitamin C) 1000 MG tablet Take 1,000 mg by mouth daily     • cetirizine (ZyrTEC) 10 mg tablet Take 1 tablet (10 mg total) by mouth daily   • cyanocobalamin 1,000 mcg/mL Inject 1 mL (1,000 mcg total) into a muscle every 7 days for 9 doses   • diphenhydrAMINE-acetaminophen (TYLENOL PM)  MG TABS Take 2 tablets by mouth daily at bedtime as needed for sleep 1000 mg in total   • FIBER ADULT GUMMIES PO Take 3 gums by mouth in the morning   • fluticasone (FLONASE) 50 mcg/act nasal spray 2 sprays into each nostril daily   • lisinopril-hydrochlorothiazide (PRINZIDE,ZESTORETIC) 20-12.5 MG per tablet Take 1 tablet by mouth daily   • magnesium Oxide (MAG-OX) 400 mg TABS Take 1 tablet (400 mg total) by mouth daily   • metFORMIN (GLUCOPHAGE) 500 mg tablet Pt takes 1500 mg (3 tablets) by mouth in the morning, and a 1000 mg (2 tablets) in the evening   • methocarbamol (ROBAXIN) 500 mg tablet Take 1 tablet (500 mg total) by mouth 3 (three) times a  day as needed for muscle spasms   • Multiple Vitamins-Minerals (CENTRUM SILVER 50+MEN PO) Take by mouth   • omeprazole (PriLOSEC) 40 MG capsule Take 1 capsule (40 mg total) by mouth daily   • pregabalin (LYRICA) 150 mg capsule Take 1 capsule (150 mg total) by mouth daily at bedtime   • simvastatin (ZOCOR) 20 mg tablet Take 1 tablet (20 mg total) by mouth daily   • sitaGLIPtin (JANUVIA) 100 mg tablet Take 1 tablet (100 mg total) by mouth daily   • CINNAMON PO Take by mouth   • Emgality 120 MG/ML SOAJ INJECT 1 ML IN THE RIGHT THIGH AND 1 ML IN THE LEFT THIGH AT THE SAME TIME FOR LOADING DOSE (Patient not taking: Reported on 8/26/2024)   • [DISCONTINUED] benzonatate (TESSALON PERLES) 100 mg capsule Take 1 capsule (100 mg total) by mouth 3 (three) times a day as needed for cough   • [DISCONTINUED] dexamethasone (DECADRON) 2 mg tablet Take 1 tablet (2 mg total) by mouth daily with breakfast   • [DISCONTINUED] famotidine (PEPCID) 20 mg tablet Take 1 tablet (20 mg total) by mouth daily   • [DISCONTINUED] meclizine (ANTIVERT) 25 mg tablet Take 1 tablet (25 mg total) by mouth 3 (three) times a day as needed for dizziness   • [DISCONTINUED] polyethylene glycol (MiraLax) 17 g packet Take 17 g by mouth daily   • [DISCONTINUED] rimegepant sulfate (Nurtec) 75 mg TBDP Take 1 tablet by mouth as needed.  Do not exceed more than 1 dose in 24 hours or 3 doses in one week   • [DISCONTINUED] topiramate (TOPAMAX) 100 mg tablet Take 1 tablet (100 mg total) by mouth daily at bedtime     No Known Allergies  Immunization History   Administered Date(s) Administered   • COVID-19 MODERNA VACC 0.5 ML IM 03/19/2021, 04/19/2021, 11/23/2021, 06/13/2022   • INFLUENZA 01/26/2018, 11/07/2020, 11/23/2021, 10/26/2023   • Influenza, injectable, quadrivalent, preservative free 0.5 mL 10/26/2023   • Tdap 02/22/2009   • Zoster Vaccine Recombinant 01/12/2024     Objective     /70 (BP Location: Left arm, Patient Position: Sitting, Cuff Size: Large)    "Pulse 73   Temp 97.8 °F (36.6 °C) (Temporal)   Ht 6' 1.94\" (1.878 m)   Wt 124 kg (274 lb)   SpO2 97%   BMI 35.24 kg/m²     Physical Exam    Gen: NAD  Heent: atraumatic, normocephalic  Mouth: is moist  Neck: is supple, no JVD, no carotid bruits.   Heart: is regular Normal s1, s2,  Lungs: are clear to auscultation  Abd: soft, non tender  Back. Reproducible tenderness with movement along lower rib area.  Ext: no edema, distal pulses normal  Skin: no rashes, ulcers  Mood: normal affect  Neuro: AAOx3   UA : dipstick was negative for RBC, WBC or bacteria.    Suspect acute muscular spasm which can be treated symptomatically at this time.  Recommend methocarbamol 1 tablet 500 mg 3 times a day as needed.  Patient is already on it at a lower dosage.  Lumbar support and ice to area of tenderness  If symptoms continue or worsen may need to visualize spine better and referred to pain management for possible .    B!2 IM 1000mcg was given as injectable therapy for history of chronic B12 deficiency      "

## 2024-08-29 ENCOUNTER — APPOINTMENT (OUTPATIENT)
Dept: PHYSICAL THERAPY | Facility: CLINIC | Age: 54
End: 2024-08-29
Payer: COMMERCIAL

## 2024-09-04 ENCOUNTER — APPOINTMENT (OUTPATIENT)
Dept: LAB | Facility: MEDICAL CENTER | Age: 54
End: 2024-09-04
Payer: COMMERCIAL

## 2024-09-04 DIAGNOSIS — Z13.228 SCREENING FOR METABOLIC DISORDER: ICD-10-CM

## 2024-09-04 DIAGNOSIS — E53.8 B12 DEFICIENCY: ICD-10-CM

## 2024-09-04 DIAGNOSIS — R73.01 ELEVATED FASTING GLUCOSE: ICD-10-CM

## 2024-09-04 DIAGNOSIS — E78.5 HYPERLIPIDEMIA, UNSPECIFIED HYPERLIPIDEMIA TYPE: ICD-10-CM

## 2024-09-04 DIAGNOSIS — R42 DIZZINESS: ICD-10-CM

## 2024-09-04 DIAGNOSIS — R42 VERTIGO: ICD-10-CM

## 2024-09-04 LAB
ALBUMIN SERPL BCG-MCNC: 4.3 G/DL (ref 3.5–5)
ALP SERPL-CCNC: 54 U/L (ref 34–104)
ALT SERPL W P-5'-P-CCNC: 86 U/L (ref 7–52)
ANION GAP SERPL CALCULATED.3IONS-SCNC: 9 MMOL/L (ref 4–13)
AST SERPL W P-5'-P-CCNC: 49 U/L (ref 13–39)
BASOPHILS # BLD AUTO: 0.05 THOUSANDS/ÂΜL (ref 0–0.1)
BASOPHILS NFR BLD AUTO: 1 % (ref 0–1)
BILIRUB SERPL-MCNC: 0.61 MG/DL (ref 0.2–1)
BUN SERPL-MCNC: 13 MG/DL (ref 5–25)
CALCIUM SERPL-MCNC: 9.1 MG/DL (ref 8.4–10.2)
CHLORIDE SERPL-SCNC: 100 MMOL/L (ref 96–108)
CHOLEST SERPL-MCNC: 184 MG/DL
CO2 SERPL-SCNC: 27 MMOL/L (ref 21–32)
CREAT SERPL-MCNC: 0.84 MG/DL (ref 0.6–1.3)
EOSINOPHIL # BLD AUTO: 0.26 THOUSAND/ÂΜL (ref 0–0.61)
EOSINOPHIL NFR BLD AUTO: 6 % (ref 0–6)
ERYTHROCYTE [DISTWIDTH] IN BLOOD BY AUTOMATED COUNT: 12.1 % (ref 11.6–15.1)
EST. AVERAGE GLUCOSE BLD GHB EST-MCNC: 148 MG/DL
GFR SERPL CREATININE-BSD FRML MDRD: 99 ML/MIN/1.73SQ M
GLUCOSE P FAST SERPL-MCNC: 133 MG/DL (ref 65–99)
HBA1C MFR BLD: 6.8 %
HCT VFR BLD AUTO: 42.5 % (ref 36.5–49.3)
HDLC SERPL-MCNC: 40 MG/DL
HGB BLD-MCNC: 14 G/DL (ref 12–17)
IMM GRANULOCYTES # BLD AUTO: 0.03 THOUSAND/UL (ref 0–0.2)
IMM GRANULOCYTES NFR BLD AUTO: 1 % (ref 0–2)
LDLC SERPL CALC-MCNC: 99 MG/DL (ref 0–100)
LYMPHOCYTES # BLD AUTO: 1.62 THOUSANDS/ÂΜL (ref 0.6–4.47)
LYMPHOCYTES NFR BLD AUTO: 34 % (ref 14–44)
MCH RBC QN AUTO: 30.4 PG (ref 26.8–34.3)
MCHC RBC AUTO-ENTMCNC: 32.9 G/DL (ref 31.4–37.4)
MCV RBC AUTO: 92 FL (ref 82–98)
MONOCYTES # BLD AUTO: 0.62 THOUSAND/ÂΜL (ref 0.17–1.22)
MONOCYTES NFR BLD AUTO: 13 % (ref 4–12)
NEUTROPHILS # BLD AUTO: 2.13 THOUSANDS/ÂΜL (ref 1.85–7.62)
NEUTS SEG NFR BLD AUTO: 45 % (ref 43–75)
NONHDLC SERPL-MCNC: 144 MG/DL
NRBC BLD AUTO-RTO: 0 /100 WBCS
PLATELET # BLD AUTO: 179 THOUSANDS/UL (ref 149–390)
PMV BLD AUTO: 11.2 FL (ref 8.9–12.7)
POTASSIUM SERPL-SCNC: 4.2 MMOL/L (ref 3.5–5.3)
PROT SERPL-MCNC: 6.8 G/DL (ref 6.4–8.4)
RBC # BLD AUTO: 4.6 MILLION/UL (ref 3.88–5.62)
SODIUM SERPL-SCNC: 136 MMOL/L (ref 135–147)
TRIGL SERPL-MCNC: 224 MG/DL
VIT B12 SERPL-MCNC: 424 PG/ML (ref 180–914)
WBC # BLD AUTO: 4.71 THOUSAND/UL (ref 4.31–10.16)

## 2024-09-04 PROCEDURE — 80061 LIPID PANEL: CPT

## 2024-09-04 PROCEDURE — 36415 COLL VENOUS BLD VENIPUNCTURE: CPT

## 2024-09-04 PROCEDURE — 83918 ORGANIC ACIDS TOTAL QUANT: CPT

## 2024-09-04 PROCEDURE — 82607 VITAMIN B-12: CPT

## 2024-09-04 PROCEDURE — 83036 HEMOGLOBIN GLYCOSYLATED A1C: CPT

## 2024-09-04 PROCEDURE — 85025 COMPLETE CBC W/AUTO DIFF WBC: CPT

## 2024-09-04 PROCEDURE — 3044F HG A1C LEVEL LT 7.0%: CPT | Performed by: INTERNAL MEDICINE

## 2024-09-04 PROCEDURE — 80053 COMPREHEN METABOLIC PANEL: CPT

## 2024-09-06 ENCOUNTER — OFFICE VISIT (OUTPATIENT)
Dept: OBGYN CLINIC | Facility: CLINIC | Age: 54
End: 2024-09-06
Payer: COMMERCIAL

## 2024-09-06 VITALS — HEIGHT: 74 IN | WEIGHT: 274 LBS | BODY MASS INDEX: 35.16 KG/M2

## 2024-09-06 DIAGNOSIS — R20.2 NUMBNESS AND TINGLING IN RIGHT HAND: ICD-10-CM

## 2024-09-06 DIAGNOSIS — R20.0 NUMBNESS AND TINGLING IN RIGHT HAND: ICD-10-CM

## 2024-09-06 DIAGNOSIS — S69.81XA INJURY OF TRIANGULAR FIBROCARTILAGE COMPLEX (TFCC) OF RIGHT WRIST, INITIAL ENCOUNTER: Primary | ICD-10-CM

## 2024-09-06 DIAGNOSIS — S63.501A RIGHT WRIST SPRAIN, INITIAL ENCOUNTER: ICD-10-CM

## 2024-09-06 PROCEDURE — 99214 OFFICE O/P EST MOD 30 MIN: CPT | Performed by: ORTHOPAEDIC SURGERY

## 2024-09-06 NOTE — PROGRESS NOTES
Physician Progress Note      PATIENT:               Nicole Kilpatrick  CSN #:                  [id number]  :                       1961  ADMIT DATE:       2021 5:29 PM  [address] [place] DATE:  RESPONDING  PROVIDER #:        Rajinder Good MD          QUERY TEXT:    Pt has CHF documented. If possible, please document in progress notes and   discharge summary further specificity regarding the type and acuity of CHF:    The medical record reflects the following:  Risk Factors: age, dm, cad, htn, HLD, ckd, obesity  Clinical Indicators: echo on 21:  The left ventricular systolic function   is moderately reduced with an  ejection fraction of 35-40 %. There is akinesis of the mid anteroseptal,   apical septal and apical anterior  wall. There is mild concentric left ventricular hypertrophy. Grade I diastolic   dysfunction with normal left ventricular filling pressure. Treatment: continue Entresto, Coreg, bnp    Thank you for your assistance,  Simon Dias RN,BSN,CCDS,CRCR  Options provided:  -- Chronic Systolic CHF/HFrEF  -- Chronic Diastolic CHF/HFpEF  -- Chronic Systolic and Diastolic CHF  -- Other - I will add my own diagnosis  -- Disagree - Not applicable / Not valid  -- Disagree - Clinically unable to determine / Unknown  -- Refer to Clinical Documentation Reviewer    PROVIDER RESPONSE TEXT:    This patient has chronic systolic CHF/HFrEF.     Query created by: Monica Ortega on 2021 2:01 PM      Electronically signed by:  Rajinder Good MD 2021 2:32 PM The HAND [facility] UPP[facility]TREM[facility] OFFICE VISIT   Referred By:  Joe Laboy  [address] [facility]  [address]  [place] [address]      Chief Complaint:     Right wrist pain and hand numbness    History of Present Illness:   53 y.o., Right hand dominant male presents with Right wrist pain and hand numbness    He states his chief complaint is ulnar wrist pain and pain with wrist rotation. He states he has been dealing with the pain on off past 2-3 months but last night he irritated it taking the garbage out. He states the pain can get severe and radiate into his hand.  He denies any injuries. He was seen by Dr Cameron for a wrist sprain and he was provided a wrist brace. He was provided the wrong side and has not been wearing it  He states his numbness and tingling is occasional in his Right ring and small fingers but does not really affect him like the wrist pain    Had a ACDF in the past. Past history of TCFF injury on left wrist    ADLs: Community ambulation  Smoke: denies ETOH: denies   Drugs:  denies Job: employed       Past Medical History:  Past Medical History:   Diagnosis Date    Achalasia     Aneurysm (HCC)     aortic    Asthma     Childhood    Colon polyp     Diabetes mellitus (HCC)     Esophageal ulcer     Fatty liver     Groin abscess     with I &D    Hyperlipidemia     Hypertension     Plantar fasciitis      Past Surgical History:   Procedure Laterality Date    ANTERIOR CERVICAL DISCECTOMY W/ FUSION      APPENDECTOMY      BICEPS TENDON REPAIR      EGD AND COLONOSCOPY      FL INJECTION LEFT WRIST (ARTHROGRAM)  11/9/2021    FL MYELOGRAM CERVICAL  6/13/2018    MENISCECTOMY      MYOTOMY HELLER LAPAROSCOPIC      ORTHOPEDIC SURGERY       Family History   Problem Relation Age of Onset    No Known Problems Mother     No Known Problems Father      Social History     Socioeconomic History    Marital status: /Civil Union     Spouse name: Not on file    Number of children: Not on file    Years of education: Not on  "file    Highest education level: Not on file   Occupational History    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   Tobacco Use    Smoking status: Never    Smokeless tobacco: Never   Vaping Use    Vaping status: Never Used   Substance and Sexual Activity    Alcohol use: Not Currently    Drug use: Never    Sexual activity: Yes     Partners: Female   Other Topics Concern    Not on file   Social History Narrative    Lives with spouse     Social Determinants of Health     Financial Resource Strain: Not on file   Food Insecurity: No Food Insecurity (4/24/2024)    Hunger Vital Sign     Worried About Running Out of Food in the Last Year: Never true     Ran Out of Food in the Last Year: Never true   Transportation Needs: No Transportation Needs (4/24/2024)    PRAPARE - Transportation     Lack of Transportation (Medical): No     Lack of Transportation (Non-Medical): No   Physical Activity: Not on file   Stress: Not on file   Social Connections: Not on file   Intimate Partner Violence: Not on file   Housing Stability: Low Risk  (4/24/2024)    Housing Stability Vital Sign     Unable to Pay for Housing in the Last Year: No     Number of Times Moved in the Last Year: 1     Homeless in the Last Year: No     Scheduled Meds:  Current Facility-Administered Medications   Medication Dose Route Frequency Provider Last Rate    cyanocobalamin  1,000 mcg Intramuscular Q30 Days MIKHAIL Ha       Continuous Infusions:   PRN Meds:.  No Known Allergies        Physical Examination:    Ht 6' 1.94\" (1.878 m)   Wt 124 kg (274 lb)   BMI 35.24 kg/m²     Gen: A&Ox3, NAD  Cardiac: regular rate  Chest: non labored breathing  Abdomen: Non-distended        Right Upper Extremity:  Skin CDI  No obvious deformity of the shoulder, arm, elbow, forearm, wrist, hand  Composite fist  Sensation intact to light touch in the axillary median, ulnar, and radial nerve distributions   motor intact  2+RP  Non tender ECU, FCU, radial wrist  TTP TFCC " fovea  Negative synergy test  Pain with shuck but DRUJ stable  Negative Tinel's at the elbow  Negative elbow flexion compression test    Left Upper Extremity:  Skin CDI  No obvious deformity of the shoulder, arm, elbow, forearm, wrist, hand  Composite fist  Sensation intact to light touch in the axillary median, ulnar, and radial nerve distributions  motor intact  2+RP  Similar stability to Right wrist of DRUJ but nonpainful      Studies:  Radiographs: I personally reviewed and independently interpreted the available radiographs.  9/6/2024: Radiographs of the Right wrist, multiple views, demonstrate 2.5 mm ulnar positive variance.  No significant changes in the lunate or triquetrum.  No significant degenerative changes.  No fractures or dislocations..       Assessment and Plan:  1. 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 Numbness and tingling in right hand  Ambulatory referral to Orthopedic Surgery      3. Right wrist sprain, initial encounter  Ambulatory Referral to Orthopedic Surgery    MRI arthrogram right wrist    XR wrist 3+ vw right          53 y.o. male presents with signs and symptoms consistent with the above diagnosis.  We discussed the natural history of this condition and its pathogenesis.  We discussed operative and nonoperative treatment options.    Clinically seems mostly to have a TFCC injury to his right wrist and there is no obvious signs of ulnar impaction syndrome aside from his ulnar positive variance.  Without a known traumatic event this may be a degenerative tear related to the increased load across his ulnar wrist.  I recommend we get an MRI of his Right wrist. He was provided a Right wrist brace he can wear for comfort    The occasional numbness and tingling is getting his ring and small finger may be related to cubital tunnel syndrome but his  symptoms are very mild and very intermittent.  Will continue with observation for now.  Discussed nighttime bracing.    It is recommended they Return for after MRI. We can discuss further treatment options    he expressed understanding of the plan and agreed. We encouraged them to contact our office with any questions or concerns.         Louis Up MD  Hand and Upper Extremity Surgery        *This note was dictated using Dragon voice recognition software. Please excuse any word substitutions or errors.*

## 2024-09-07 LAB — METHYLMALONATE SERPL-SCNC: 99 NMOL/L (ref 0–378)

## 2024-09-10 ENCOUNTER — OFFICE VISIT (OUTPATIENT)
Age: 54
End: 2024-09-10
Payer: COMMERCIAL

## 2024-09-10 VITALS
WEIGHT: 268.6 LBS | HEIGHT: 74 IN | OXYGEN SATURATION: 98 % | BODY MASS INDEX: 34.47 KG/M2 | TEMPERATURE: 98.6 F | SYSTOLIC BLOOD PRESSURE: 130 MMHG | DIASTOLIC BLOOD PRESSURE: 76 MMHG | HEART RATE: 75 BPM

## 2024-09-10 DIAGNOSIS — E53.8 B12 DEFICIENCY: ICD-10-CM

## 2024-09-10 DIAGNOSIS — Z13.228 SCREENING FOR METABOLIC DISORDER: ICD-10-CM

## 2024-09-10 DIAGNOSIS — Z23 ENCOUNTER FOR IMMUNIZATION: ICD-10-CM

## 2024-09-10 DIAGNOSIS — Z12.12 ENCOUNTER FOR COLORECTAL CANCER SCREENING: Primary | ICD-10-CM

## 2024-09-10 DIAGNOSIS — E78.5 HYPERLIPIDEMIA, UNSPECIFIED HYPERLIPIDEMIA TYPE: ICD-10-CM

## 2024-09-10 DIAGNOSIS — E11.9 TYPE 2 DIABETES MELLITUS WITHOUT COMPLICATION, WITHOUT LONG-TERM CURRENT USE OF INSULIN (HCC): ICD-10-CM

## 2024-09-10 DIAGNOSIS — R73.01 ELEVATED FASTING GLUCOSE: ICD-10-CM

## 2024-09-10 DIAGNOSIS — M54.12 CERVICAL RADICULOPATHY: ICD-10-CM

## 2024-09-10 DIAGNOSIS — G47.33 OSA (OBSTRUCTIVE SLEEP APNEA): ICD-10-CM

## 2024-09-10 DIAGNOSIS — G43.809 VESTIBULAR MIGRAINE: ICD-10-CM

## 2024-09-10 DIAGNOSIS — E78.49 OTHER HYPERLIPIDEMIA: ICD-10-CM

## 2024-09-10 DIAGNOSIS — K21.9 GASTROESOPHAGEAL REFLUX DISEASE WITHOUT ESOPHAGITIS: ICD-10-CM

## 2024-09-10 DIAGNOSIS — R91.1 LUNG NODULE: ICD-10-CM

## 2024-09-10 DIAGNOSIS — Z12.5 SCREENING FOR PROSTATE CANCER: ICD-10-CM

## 2024-09-10 DIAGNOSIS — R74.8 ELEVATED LIVER ENZYMES: ICD-10-CM

## 2024-09-10 DIAGNOSIS — I10 HYPERTENSION, ESSENTIAL: ICD-10-CM

## 2024-09-10 DIAGNOSIS — Z12.11 ENCOUNTER FOR COLORECTAL CANCER SCREENING: Primary | ICD-10-CM

## 2024-09-10 PROCEDURE — 99215 OFFICE O/P EST HI 40 MIN: CPT | Performed by: NURSE PRACTITIONER

## 2024-09-10 PROCEDURE — 90471 IMMUNIZATION ADMIN: CPT

## 2024-09-10 PROCEDURE — 90750 HZV VACC RECOMBINANT IM: CPT

## 2024-09-10 RX ORDER — SIMVASTATIN 40 MG
40 TABLET ORAL DAILY
Qty: 90 TABLET | Refills: 0 | Status: SHIPPED | OUTPATIENT
Start: 2024-09-10

## 2024-09-10 NOTE — PROGRESS NOTES
Assessment/Plan:    Vestibular migraine  -Continue to follow with neurology    Hypertension, essential  -Blood pressure well-controlled on lisinopril-hydrochlorothiazide    SIVA (obstructive sleep apnea)  -Recently started on CPAP machine    Lung nodule  Seen on CT in 2022    Gastroesophageal reflux disease without esophagitis  -currently following GI    Diabetes mellitus (HCC)    Lab Results   Component Value Date    HGBA1C 6.8 (H) 09/04/2024   -Continue metformin 1500 mg in the morning and 1000mg in the evening  -Continue Januvia       Cervical radiculopathy  -Continue to follow with spine and pain management      Other hyperlipidemia  -Increase dose of simvastatin to 40 mg tablet daily  -ASCVD risk score 12.7%    B12 deficiency  -Continue with supplementation              Diagnoses and all orders for this visit:    Encounter for colorectal cancer screening  -     Ambulatory Referral to Gastroenterology; Future    Other hyperlipidemia  -     simvastatin (ZOCOR) 40 mg tablet; Take 1 tablet (40 mg total) by mouth daily    Encounter for immunization  -     Zoster Vaccine Recombinant IM    Elevated liver enzymes  -     Comprehensive metabolic panel; Future    Screening for metabolic disorder  -     Comprehensive metabolic panel; Future  -     CBC and differential; Future  -     Lipid panel; Future    Hyperlipidemia, unspecified hyperlipidemia type  -     Lipid panel; Future    Elevated fasting glucose  -     Hemoglobin A1C; Future    Type 2 diabetes mellitus without complication, without long-term current use of insulin (HCC)  -     Albumin / creatinine urine ratio; Future    Screening for prostate cancer  -     PSA, Total Screen; Future    Vestibular migraine    Hypertension, essential    SIVA (obstructive sleep apnea)    Lung nodule    Gastroesophageal reflux disease without esophagitis    Cervical radiculopathy    B12 deficiency          Subjective:      Patient ID: Jeremy George is a 53 y.o. male.    Patient  "presents today for follow up and to review blood work.    S/P cervical spinal fusion- currently follows neurosurgery     Diabetes mellitus (HCC)- HBA1C 6.8, continues on metformin and  Januvia, he denies side effects, denies hypo or hyperglycemic events  Fasting glucose 133       Hypertension-blood pressure well-controlled on lisinopril-hydrochlorothiazide, denies lightheadedness or dizziness, denies side effects with this medication    Hyperlipidemia- controlled on statin, denies side effects with medication  ASCVD 10.8%  Cholesterol 184, triglycerides 224, HDL 40, LDL 99    Vestibular migraines-currently following neurology plan as stated below:  · \"Recommend massage therapy for cervical dystonia noted on exam  · New PT referral sent for cervical dystonia  · Patient given the below instructions:  º Robaxin - take up to 3 times a day. Try to do it at least once at night, after you take the medication and allow for half hour to an hour to allow your body to absorb it.  Then apply a warm heating pad or a towel soaked in hot/warm temperature (be careful not to burn yourself) and apply over your trapezius muscle (where you feel the tension).  This will allow for blood vessel dilation and your blood carrying the muscle relaxant to more easily perfuse the area.  Perform gentle massages and muscle stretches of the neck prior to going to bed.  · If the nerve block does not help today start Decadron 2 mg x 5 days with breakfast in the morning to break current headache cycle  · Follow-up with sleep study  · Taper off topirmate   · [employer] sent for prior authorization. Headache approval team will help   · Will complete FMLA paperwork  · Has follow-up in for August already in place\"    SIVA-mild, was started on CPAP machine     B12 level-424          The following portions of the patient's history were reviewed and updated as appropriate: allergies, current medications, past family history, past medical history, past social " history, past surgical history, and problem list.    Review of Systems   Constitutional:  Negative for activity change, appetite change, chills, diaphoresis and fever.   HENT:  Negative for congestion, ear discharge, ear pain, postnasal drip, rhinorrhea, sinus pressure, sinus pain and sore throat.    Eyes:  Negative for pain, discharge, itching and visual disturbance.   Respiratory:  Negative for cough, chest tightness, shortness of breath and wheezing.    Cardiovascular:  Negative for chest pain, palpitations and leg swelling.   Gastrointestinal:  Negative for abdominal pain, constipation, diarrhea, nausea and vomiting.   Endocrine: Negative for polydipsia, polyphagia and polyuria.   Genitourinary:  Negative for difficulty urinating, dysuria and urgency.   Musculoskeletal:  Negative for arthralgias, back pain and neck pain.   Skin:  Negative for rash and wound.   Neurological:  Negative for dizziness, weakness, numbness and headaches.         Past Medical History:   Diagnosis Date    Achalasia     Aneurysm (HCC)     aortic    Asthma     Childhood    Colon polyp     Diabetes mellitus (HCC)     Esophageal ulcer     Fatty liver     Groin abscess     with I &D    Hyperlipidemia     Hypertension     Plantar fasciitis     S/P cervical spinal fusion 01/03/2020         Current Outpatient Medications:     acetaminophen (TYLENOL) 500 mg tablet, Take 500 mg by mouth every 6 (six) hours as needed for mild pain, Disp: , Rfl:     albuterol (ProAir HFA) 90 mcg/act inhaler, Inhale 2 puffs every 6 (six) hours as needed for wheezing, Disp: 8.5 g, Rfl: 0    Ascorbic Acid (vitamin C) 1000 MG tablet, Take 1,000 mg by mouth daily  , Disp: , Rfl:     cetirizine (ZyrTEC) 10 mg tablet, Take 1 tablet (10 mg total) by mouth daily, Disp: 90 tablet, Rfl: 0    CINNAMON PO, Take by mouth, Disp: , Rfl:     cyanocobalamin 1,000 mcg/mL, Inject 1 mL (1,000 mcg total) into a muscle every 7 days for 9 doses, Disp: 1 mL, Rfl: 0     diphenhydrAMINE-acetaminophen (TYLENOL PM)  MG TABS, Take 2 tablets by mouth daily at bedtime as needed for sleep 1000 mg in total, Disp: , Rfl:     FIBER ADULT GUMMIES PO, Take 3 gums by mouth in the morning, Disp: , Rfl:     fluticasone (FLONASE) 50 mcg/act nasal spray, 2 sprays into each nostril daily, Disp: 18 mL, Rfl: 1    lisinopril-hydrochlorothiazide (PRINZIDE,ZESTORETIC) 20-12.5 MG per tablet, Take 1 tablet by mouth daily, Disp: 90 tablet, Rfl: 1    magnesium Oxide (MAG-OX) 400 mg TABS, Take 1 tablet (400 mg total) by mouth daily, Disp: 90 tablet, Rfl: 0    metFORMIN (GLUCOPHAGE) 500 mg tablet, Pt takes 1500 mg (3 tablets) by mouth in the morning, and a 1000 mg (2 tablets) in the evening, Disp: 450 tablet, Rfl: 1    methocarbamol (ROBAXIN) 500 mg tablet, Take 1 tablet (500 mg total) by mouth 3 (three) times a day as needed for muscle spasms, Disp: 90 tablet, Rfl: 3    Multiple Vitamins-Minerals (CENTRUM SILVER 50+MEN PO), Take by mouth, Disp: , Rfl:     omeprazole (PriLOSEC) 40 MG capsule, Take 1 capsule (40 mg total) by mouth daily, Disp: 90 capsule, Rfl: 3    pregabalin (LYRICA) 150 mg capsule, Take 1 capsule (150 mg total) by mouth daily at bedtime, Disp: 90 capsule, Rfl: 2    simvastatin (ZOCOR) 40 mg tablet, Take 1 tablet (40 mg total) by mouth daily, Disp: 90 tablet, Rfl: 0    sitaGLIPtin (JANUVIA) 100 mg tablet, Take 1 tablet (100 mg total) by mouth daily, Disp: 90 tablet, Rfl: 1    Emgality 120 MG/ML SOAJ, INJECT 1 ML IN THE RIGHT THIGH AND 1 ML IN THE LEFT THIGH AT THE SAME TIME FOR LOADING DOSE (Patient not taking: Reported on 8/26/2024), Disp: 2 mL, Rfl: 0    Current Facility-Administered Medications:     cyanocobalamin injection 1,000 mcg, 1,000 mcg, Intramuscular, Q30 Days, MIKHAIL Ha, 1,000 mcg at 08/27/24 1553    No Known Allergies    Social History   Past Surgical History:   Procedure Laterality Date    ANTERIOR CERVICAL DISCECTOMY W/ FUSION      APPENDECTOMY      BICEPS  "TENDON REPAIR      EGD AND COLONOSCOPY      FL INJECTION LEFT WRIST (ARTHROGRAM)  11/9/2021    FL MYELOGRAM CERVICAL  6/13/2018    MENISCECTOMY      MYOTOMY HELLER LAPAROSCOPIC      ORTHOPEDIC SURGERY       Family History   Problem Relation Age of Onset    No Known Problems Mother     No Known Problems Father        Objective:  /76 (BP Location: Left arm, Patient Position: Sitting, Cuff Size: Standard)   Pulse 75   Temp 98.6 °F (37 °C) (Temporal)   Ht 6' 1.94\" (1.878 m)   Wt 122 kg (268 lb 9.6 oz)   SpO2 98%   BMI 34.54 kg/m²              Physical Exam  Constitutional:       General: He is not in acute distress.     Appearance: He is well-developed. He is not diaphoretic.   HENT:      Head: Normocephalic and atraumatic.      Right Ear: External ear normal.      Left Ear: External ear normal.      Nose: Nose normal.      Mouth/Throat:      Mouth: Mucous membranes are moist.      Pharynx: No oropharyngeal exudate or posterior oropharyngeal erythema.   Eyes:      General:         Right eye: No discharge.         Left eye: No discharge.      Conjunctiva/sclera: Conjunctivae normal.      Pupils: Pupils are equal, round, and reactive to light.   Neck:      Thyroid: No thyromegaly.   Cardiovascular:      Rate and Rhythm: Normal rate and regular rhythm.      Heart sounds: Normal heart sounds. No murmur heard.     No friction rub. No gallop.   Pulmonary:      Effort: Pulmonary effort is normal. No respiratory distress.      Breath sounds: Normal breath sounds. No stridor. No wheezing or rales.   Abdominal:      General: Bowel sounds are normal. There is no distension.      Palpations: Abdomen is soft.      Tenderness: There is no abdominal tenderness.   Musculoskeletal:      Cervical back: Normal range of motion and neck supple.   Lymphadenopathy:      Cervical: No cervical adenopathy.   Skin:     General: Skin is warm and dry.      Findings: No erythema or rash.   Neurological:      Mental Status: He is alert " and oriented to person, place, and time.   Psychiatric:         Behavior: Behavior normal.         Thought Content: Thought content normal.         Judgment: Judgment normal.

## 2024-09-11 PROBLEM — R07.89 CHEST PAIN, ATYPICAL: Status: RESOLVED | Noted: 2023-10-04 | Resolved: 2024-09-11

## 2024-09-11 PROBLEM — Z98.1 S/P CERVICAL SPINAL FUSION: Status: RESOLVED | Noted: 2020-01-03 | Resolved: 2024-09-11

## 2024-09-11 PROBLEM — S63.501A RIGHT WRIST SPRAIN, INITIAL ENCOUNTER: Status: RESOLVED | Noted: 2024-08-26 | Resolved: 2024-09-11

## 2024-09-11 PROBLEM — R51.9 HEADACHE: Status: RESOLVED | Noted: 2021-08-09 | Resolved: 2024-09-11

## 2024-09-11 PROBLEM — S76.219A GROIN STRAIN: Status: RESOLVED | Noted: 2023-02-17 | Resolved: 2024-09-11

## 2024-09-11 PROBLEM — M51.24 HNP (HERNIATED NUCLEUS PULPOSUS), THORACIC: Status: RESOLVED | Noted: 2020-01-03 | Resolved: 2024-09-11

## 2024-09-11 PROBLEM — M47.812 CERVICAL SPONDYLOSIS: Status: RESOLVED | Noted: 2020-11-16 | Resolved: 2024-09-11

## 2024-09-11 PROBLEM — S69.81XA INJURY OF TRIANGULAR FIBROCARTILAGE COMPLEX (TFCC) OF RIGHT WRIST, INITIAL ENCOUNTER: Status: RESOLVED | Noted: 2024-09-06 | Resolved: 2024-09-11

## 2024-09-11 PROBLEM — S39.012A STRAIN OF LUMBAR REGION: Status: RESOLVED | Noted: 2024-08-27 | Resolved: 2024-09-11

## 2024-09-11 PROBLEM — N50.819 TESTICULAR DISCOMFORT: Status: RESOLVED | Noted: 2017-03-29 | Resolved: 2024-09-11

## 2024-09-11 PROBLEM — I10 HYPERTENSION, UNSPECIFIED TYPE: Status: RESOLVED | Noted: 2024-07-20 | Resolved: 2024-09-11

## 2024-09-11 PROBLEM — M79.603 ARM PAIN: Status: RESOLVED | Noted: 2017-02-23 | Resolved: 2024-09-11

## 2024-09-11 PROBLEM — R42 DIZZINESS: Status: RESOLVED | Noted: 2024-04-23 | Resolved: 2024-09-11

## 2024-09-11 PROBLEM — G89.4 CHRONIC PAIN SYNDROME: Status: RESOLVED | Noted: 2022-12-05 | Resolved: 2024-09-11

## 2024-09-11 NOTE — ASSESSMENT & PLAN NOTE
Lab Results   Component Value Date    HGBA1C 6.8 (H) 09/04/2024   -Continue metformin 1500 mg in the morning and 1000mg in the evening  -Continue Januvia

## 2024-09-17 ENCOUNTER — TELEPHONE (OUTPATIENT)
Age: 54
End: 2024-09-17

## 2024-09-17 DIAGNOSIS — M54.2 NECK PAIN: Primary | ICD-10-CM

## 2024-09-17 DIAGNOSIS — M54.12 CERVICAL RADICULOPATHY: ICD-10-CM

## 2024-09-17 NOTE — TELEPHONE ENCOUNTER
Caller: gilmar Quick    Doctor: Summer     Reason for call: pt's wife calling about a possible neck injections.  Please call the pt back at the number listed below    Call back#: [phone]

## 2024-09-18 RX ORDER — PREDNISONE 5 MG/1
TABLET ORAL
Qty: 21 TABLET | Refills: 0 | Status: SHIPPED | OUTPATIENT
Start: 2024-09-18

## 2024-09-18 NOTE — TELEPHONE ENCOUNTER
S/w pt and advised of same. Pt does not recall if it helped but would like to try an oral steroid. Please send to [facility] on file.   Pt advised no NSAIDS while taking steroid.

## 2024-09-18 NOTE — TELEPHONE ENCOUNTER
S/w pt who reports the he hit his head with his hard hat on on 9/16/24 while getting into the car and has had right neck shoulder and arm pain that is sharp and aching and constant. Pt has been alternating tylenol and Ibuprofen with minimal relief, now #8-9/10.    Pt advised to have eval of injury with er or urgent care.   Pt has not had relief from MBB or cervical injections in the past.   Pt advised to f/u with [facility] by ortho.    Please advise

## 2024-09-18 NOTE — TELEPHONE ENCOUNTER
Has he ever tried an oral steroid before? If so, has it provided relief? If he's had relief in the past with oral steroids, we can try an oral taper.

## 2024-10-03 ENCOUNTER — TELEPHONE (OUTPATIENT)
Dept: OBGYN CLINIC | Facility: CLINIC | Age: 54
End: 2024-10-03

## 2024-10-03 ENCOUNTER — HOSPITAL ENCOUNTER (OUTPATIENT)
Dept: RADIOLOGY | Facility: HOSPITAL | Age: 54
Discharge: HOME | End: 2024-10-03
Attending: ORTHOPAEDIC SURGERY

## 2024-10-03 NOTE — TELEPHONE ENCOUNTER
Spoke with patient to cancel appt since he doesn't have mri completed. Patient is in [place] due to work and when he comes back he's going to reschedule mri and then appt with dr groves

## 2024-10-21 ENCOUNTER — TELEPHONE (OUTPATIENT)
Dept: NEUROLOGY | Facility: CLINIC | Age: 54
End: 2024-10-21

## 2024-10-21 NOTE — TELEPHONE ENCOUNTER
Called pt and lm to r/s appt with angela on 10/21. IF SCHEDULING WITH [facility] APPT NEEDS TO BE MADE IN JANUARY OR LATER. If pt does not want to wait that long may also be scheduled in resident clinic in [place]. IF PT IS NOT HAPPY WITH THOSE OPTIONS EXPLAIN A DION AND FORWARD TO MYSELF OR LAILA AND WE WILL SEND DION REQUEST ACCORDINGLY JUST ADVISED PT TO GIVE US A FEW DAYS TO GET A RESPONSE BACK FROM THE [facility]. THANK YOU

## 2024-10-21 NOTE — TELEPHONE ENCOUNTER
Inbound call received from patient.    Patient stated that he missed a call from the Neurology office and was calling back to see what the office needed.    RN reviewed chart and informed pt of the message below. Pt at this time is agreeable to do a DION.  Pt stated he would like to get in ASAP/efore January to discuss medications and next steps. Stated that he already had to R/S appt. Pt aware on how the DION process works and is aware the office will notify him to schedule/complete the DION.

## 2024-10-30 ENCOUNTER — OFFICE VISIT (OUTPATIENT)
Dept: SLEEP CENTER | Facility: CLINIC | Age: 54
End: 2024-10-30
Payer: COMMERCIAL

## 2024-10-30 VITALS
DIASTOLIC BLOOD PRESSURE: 78 MMHG | HEIGHT: 74 IN | BODY MASS INDEX: 35.29 KG/M2 | OXYGEN SATURATION: 98 % | RESPIRATION RATE: 16 BRPM | SYSTOLIC BLOOD PRESSURE: 130 MMHG | WEIGHT: 275 LBS | HEART RATE: 80 BPM

## 2024-10-30 DIAGNOSIS — K11.7 XEROSTOMIA: ICD-10-CM

## 2024-10-30 DIAGNOSIS — G47.19 EXCESSIVE DAYTIME SLEEPINESS: ICD-10-CM

## 2024-10-30 DIAGNOSIS — I10 HYPERTENSION, UNSPECIFIED TYPE: ICD-10-CM

## 2024-10-30 DIAGNOSIS — G47.33 OSA (OBSTRUCTIVE SLEEP APNEA): Primary | ICD-10-CM

## 2024-10-30 PROCEDURE — 99214 OFFICE O/P EST MOD 30 MIN: CPT | Performed by: STUDENT IN AN ORGANIZED HEALTH CARE EDUCATION/TRAINING PROGRAM

## 2024-10-30 NOTE — PATIENT INSTRUCTIONS
Continue PAP Therapy  Continue AutoPAP at adjusted settings of 10-15 cmH2O  Remember to clean your mask and equipment regularly, as directed.  I am ordering a formal mask fitting appointment; this is an appointment with the DME to ensure that you have the optimal mask and fit for your face structure    You should be eligible for new supplies approximately every 3-6 months, depending on your insurance coverage. Contact your Durable Medical Equipment (DME) company for new supplies as needed.  Practice good Sleep Hygiene, as outlined below.  For rainout: could try either increasing tube temperature or reducing humidity. Could discuss this at the mask fitting appointment as well.   Follow up in 6 months.      Care and Maintenance  Headgear should be washed as needed. Daily inspection and weekly washings are recommended. Do not disassemble the straps. Machine wash in warm water, making sure to attach Velcro hooks and tabs before washing. Line dry or machine dry on a low setting.  Masks should be washed every day. Daily inspection is recommended. Leave the mask and tubing attached. Gently wash the mask with a soft cloth using warm water and mild detergent, concentrating on the mask cushion flaps. DO NOT use alcohol or bleach. Rinse thoroughly and air dry.  Tubing and headgear should be washed weekly. Daily inspection is recommended. Wash in warm water and mild detergent and rinse thoroughly. Hook the tubing to the machine and blow until dry.  Humidifier should be washed daily and filled with DISTILLED water before use. Wash with warm water and mild detergent. Disinfect weekly by soaking with a solution of 1 part white vinegar and 3 parts water for 30 minutes. Rinse thoroughly and air dry.  Disposable filters should be replaced once a month. Wash reusable foam filters with warm water and mild detergent at least once a month. Rinse thoroughly and dry with paper towels.  Avoid  that contain fragrance or conditioners,  as these will leave a residue.  NEVER iron any soft goods.      CMS Requirements    Your insurance requires a face-to-face follow up visit within a 31-90 day period after starting CPAP.  Your insurance requires compliance with CPAP, which is at least 4 hours per night for 70% of the time. This must be done over a 30 day period and must occur within the initial 31-90 day period after starting CPAP.  Your insurance also requires at least yearly follow ups to continue to pay for CPAP supplies.       PAP Supply Guidelines    Below are the guidelines for reordering your supplies. You will be responsible for your deductible, co payments, and out of pocket expenses.    Item Frequency   Nasal Mask (no headgear) 1 every 3 months   Nasal Mask Cushion 1 every 2 weeks   Full Face Mask (no headgear) 1 every 3 months   Full Face Mask Cushion 1 every month   Nasal Pillows 1 every 2 weeks   Headgear 1 every 6 months   hin Strap 1 every 6 months   gissel 1 every 3 months   Filters: Reusable 1 every 6 months   Filters: Disposable 1 every 2 weeks   Humidifier Chamber(disposable) 1 every 6 months         Good Sleep Hygiene    Wake up at the same time every day, even on the weekends.  Use your bed for sleep and intimacy only.  If you have been in bed awake for 30 minutes, get up and leave the bedroom. Choose a dull activity not involving a blue screen (TV, computer, handheld devices). Go back to bed when you feel sleepy.  Avoid caffeine, nicotine and alcohol before you go to bed.  Avoid large meals before you go to bed.  Avoid using screens (computers, tablets, smartphones, etc.) for at least 1 hour before bedtime  Exercise regularly, but do not exercise right before you go to bed.  Avoid daytime naps. If you do take a nap, sleep for 20-40 minutes, and not after dinner.       How to Stop CPAP Dry Mouth  CPAP users can take a number of steps to alleviate dry mouth. The best solution depends on the main cause of the problem.  -- Keep the  CPAP Airflow Moist: One step that may reduce dry mouth is moistening the airflow through the CPAP by using a humidifier. Humidification can also help if the nose gets dried out. Ensure your humidification is at the max level and not on auto.  -- Keep the Mouth Closed: A frequent cause of dry mouth for CPAP users is mouth breathing, in which the mouth is open during sleep. Keeping the mouth closed with a chin strap or adhesive strips can reduce mouth breathing and relieve dry mouth.  --Chin strap: Chin straps encircle the head and gently cradle the chin to keep it closed. Research shows that chin straps increase people's willingness to continue using CPAP  -- Adhesive strips: CPAP users may also use disposable adhesive strips applied over the mouth to encourage sleeping with the mouth closed when using CPAP  -- Find the Best CPAP Mask: A CPAP mask that fits poorly or seals improperly can cause air leaks and dry mouth. You can contact your medical equipment company to discuss this  -- Consider using a product to improve dry mouth:   -A Biotene product used before you put your CPAP on can help improve dry mouth. You can find products at the following link. While all products work well, the gel tends to last a little longer than others  -- https://www.biotene.com/dry-mouth-products/moisturizing-gel/   -Some patients have had luck with a product called Xylimelts. These are disks that adhere strongly to the teeth and gums and slowly melt during the night, releasing a natural sweetener called xylitol to stimulate saliva production

## 2024-10-30 NOTE — PROGRESS NOTES
[facility]  Sleep Medicine Follow up/ Established Patient Visit      Assessment/Plan:  1. SIVA (obstructive sleep apnea)  Mask fitting only    PAP DME Pressure Change    PAP DME Resupply/Reorder      2. Excessive daytime sleepiness  Mask fitting only    PAP DME Pressure Change    PAP DME Resupply/Reorder      3. Hypertension, unspecified type  Mask fitting only    PAP DME Pressure Change    PAP DME Resupply/Reorder      4. Xerostomia  Mask fitting only    PAP DME Pressure Change    PAP DME Resupply/Reorder          Jeremy is a very pleasant 53-year-old gentleman  with a PMHx of vestibular migraine, HTN, CAA, asthma, GERD, diabetes mellitus, cervical radiculopathy, B12 deficiency, HLD who presents in follow up for SIVA (AHI 6.3, supine AHI 12.2, O2 geremias 87% 7/2024).he is overall doing very well with PAP therapy, endorsing substantial benefit; this is further supported upon review of his compliance report.  However, he is endorsing some side effects, namely rainout within the mask yet with occasional xerostomia (possibly from opening his mouth while sleeping; in addition, he endorses some difficulties with feeling as though he is suffocating/not getting enough pressure when he puts it back on the middle of the night, which I suspect may be due to a low minimum pressure.    Continue AutoPAP at adjusted settings of 10-15 cmH2O  Prescription for new supplies ordered today  Reviewed CMS/insurance requirements and resupply guidelines  Information provided on the above as well as general maintenance steps  Recommended maintaining good Sleep Hygiene  Order placed for a formal mask fitting  Reviewed that he could trial increasing the temperature settings or reducing humidity setting to help with rainout  Also provided dry mouth with CPAP recommendations in his AVS.    He will Return in about 6 months (around  4/30/2025).      ________________________________________________________________________________________________    Per Last Visit Note (Date: 6/11/2024):  ASSESSMENT / PLAN     1. SIVA (obstructive sleep apnea)  Assessment & Plan:  Patient has a history of obstructive sleep apnea dating back to approximately 2012.  We do not have access to any of his prior sleep studies.  He has not used a CPAP since 2012.  I ordered a new home sleep study today to reassess his sleep apnea.  If he is still found to have sleep apnea, CPAP will be ordered.  He will then schedule follow-up in 31 to 90 days.  If his sleep study is inconclusive, I would recommend completing an in lab sleep study at that time.  Orders:  -     Ambulatory Referral to Sleep Medicine  -     Home Study; Future; Expected date: 06/11/2024  2. Snoring  -     Ambulatory Referral to Sleep Medicine  -     Home Study; Future; Expected date: 06/11/2024  3. Excessive daytime sleepiness  -     Home Study; Future; Expected date: 06/11/2024      Sleep Studies:  -HSAT 6/24/2024: TRT: 7 hours 59 minutes, GEOFFREY: 3.2, O2 geremias: 87%     -PSG 7/19/2024: BMI: 36.5. TST: 312 minutes, Sleep efficiency: 68.9%. Sleep Onset Latency: 20.5 minutes, REM Onset Latency: 149.5 minutes. AHI: 6.3, Supine AHI: 12.2, REM AHI: 4.1. O2 geremias: 87%, Time below 90%: 3.1 minutes. PLM Index: 0, PLM Arousal Index: 0.        ________________________________________________________________________________________________      Interval History: Jeremy George is a 53 y.o. male with a PMHx of vestibular migraine, HTN, CAA, asthma, GERD, diabetes mellitus, cervical radiculopathy, B12 deficiency, HLD who presents in follow up for SIVA (AHI 6.3, supine AHI 12.2, O2 geremias 87% 7/2024).    SDB:  -Current experience with PAP Therapy: Does endorse significant benefit. Better night's sleep, headaches have calmed down both in frequency and severity.   -Mask type: Nasal   -Difficulties with mask: Thinks he keeps  "opening his mouth at night - wakes with a really dry mouth  -Device: ResMed AirSense 11; received 2024.  -Difficulties with device: \"too much moisture;\" wakes up inhaling water. Tried to turn off temp, turned back to automatic.  -DME: [employer]  -Compliance:          [place] Sleepiness Scale:  What are your chances of dozing?   0= no chance  1= slight chance  2= moderate chance  3= high chance    Sitting and readin   Watching TV: 2  Sitting, inactive in a public place (e.g. a theatre or a meeting):1  As a passenger in a car for an hour without a break: 1  Lying down to rest in the afternoon when circumstances permit: 2   Sitting and talking to someone: 0  Sitting quietly after a lunch without alcohol: 1  In a car, while stopped for a few minutes in the traffic: 0       TOTAL    Greater or equal to 10 is positive for excessive daytime sleepiness        SLEEP HYGIENE QUESTIONS:  Bedtime: 2100/2200   Time it takes to fall sleep: <15 minutes  Wake up Time: 0530/0630   Number of times patient wakes up per night: 2-3  Reason (s) why patient wakes up during the night: Neck pain, rainout     Estimated total sleep time ( in a 24 hour period of time): ~6 hours       Changes to PMH, PSH, SH: See above     SLEEP RELATED ROS  Review of Systems  No Known Allergies    CURRENT MEDICATIONS:  Current Outpatient Medications   Medication Instructions    acetaminophen (TYLENOL) 500 mg, Oral, Every 6 hours PRN    albuterol (ProAir HFA) 90 mcg/act inhaler 2 puffs, Inhalation, Every 6 hours PRN    cetirizine (ZYRTEC) 10 mg, Oral, Daily    CINNAMON PO Oral    cyanocobalamin 1,000 mcg, Intramuscular, Every 7 days    diphenhydrAMINE-acetaminophen (TYLENOL PM)  MG TABS 2 tablets, Oral, Daily at bedtime PRN, 1000 mg in total     Emgality 120 MG/ML SOAJ INJECT 1 ML IN THE RIGHT THIGH AND 1 ML IN THE LEFT THIGH AT THE SAME TIME FOR LOADING DOSE    FIBER ADULT GUMMIES PO 3 gums, Oral, Daily    fluticasone (FLONASE) 50 mcg/act " "nasal spray 2 sprays, Nasal, Daily    lisinopril-hydrochlorothiazide (PRINZIDE,ZESTORETIC) 20-12.5 MG per tablet 1 tablet, Oral, Daily    magnesium Oxide (MAG-OX) 400 mg, Oral, Daily    metFORMIN (GLUCOPHAGE) 500 mg tablet Pt takes 1500 mg (3 tablets) by mouth in the morning, and a 1000 mg (2 tablets) in the evening    methocarbamol (ROBAXIN) 500 mg, Oral, 3 times daily PRN    Multiple Vitamins-Minerals (CENTRUM SILVER 50+MEN PO) Oral    omeprazole (PRILOSEC) 40 mg, Oral, Daily    predniSONE 5 mg tablet Take 2 PO TID on day 1, then decrease by 1 PO daily until finished    pregabalin (LYRICA) 150 mg, Oral, Daily at bedtime    simvastatin (ZOCOR) 40 mg, Oral, Daily    sitaGLIPtin (JANUVIA) 100 mg, Oral, Daily    vitamin C 1,000 mg, Oral, Daily           PHYSICAL EXAMINATION:  Vital Signs: /78   Pulse 80   Resp 16   Ht 6' 1.94\" (1.878 m)   Wt 125 kg (275 lb)   SpO2 98%   BMI 35.37 kg/m²     Constitutional: NAD, well appearing   Mental Status: AAOx3  Skin: Warm, dry, no rashes noted   Eyes: PERRL, normal conjunctiva  ENT: Nasal congestion absent  Posterior Airspace:   Bryson Tongue Position: 4  Retrognathia: absent  Overbite: absent  High Arched Palate: present  Tongue Scalloping/Ridging: present  Uvula: normal  Chest: No evidence of respiratory distress, no accessory muscle use; no evidence of peripheral cyanosis  Abdomen: Soft, NT/ND  Extremities: No digital clubbing or pedal edema  Neuro: Strength 5/5 throughout, sensation grossly intact          Electronically signed by:    Nomi Burnham DO  Board-Certified Neurology and Sleep Medicine  [facility]  10/30/24      "

## 2024-10-31 ENCOUNTER — OFFICE VISIT (OUTPATIENT)
Dept: PODIATRY | Facility: CLINIC | Age: 54
End: 2024-10-31
Payer: COMMERCIAL

## 2024-10-31 ENCOUNTER — TELEPHONE (OUTPATIENT)
Dept: SLEEP CENTER | Facility: CLINIC | Age: 54
End: 2024-10-31

## 2024-10-31 VITALS
HEIGHT: 73 IN | BODY MASS INDEX: 36.05 KG/M2 | RESPIRATION RATE: 18 BRPM | WEIGHT: 272 LBS | DIASTOLIC BLOOD PRESSURE: 75 MMHG | HEART RATE: 75 BPM | SYSTOLIC BLOOD PRESSURE: 130 MMHG

## 2024-10-31 DIAGNOSIS — M76.72 PERONEAL TENDINITIS OF LEFT LOWER EXTREMITY: Primary | ICD-10-CM

## 2024-10-31 PROCEDURE — 20551 NJX 1 TENDON ORIGIN/INSJ: CPT | Performed by: PODIATRIST

## 2024-10-31 PROCEDURE — RECHECK: Performed by: PODIATRIST

## 2024-10-31 RX ORDER — LIDOCAINE HYDROCHLORIDE 10 MG/ML
1 INJECTION, SOLUTION INFILTRATION; PERINEURAL
Status: SHIPPED | OUTPATIENT
Start: 2024-10-31

## 2024-10-31 RX ORDER — TRIAMCINOLONE ACETONIDE 40 MG/ML
20 INJECTION, SUSPENSION INTRA-ARTICULAR; INTRAMUSCULAR
Status: SHIPPED | OUTPATIENT
Start: 2024-10-31

## 2024-10-31 RX ADMIN — TRIAMCINOLONE ACETONIDE 20 MG: 40 INJECTION, SUSPENSION INTRA-ARTICULAR; INTRAMUSCULAR at 17:15

## 2024-10-31 RX ADMIN — LIDOCAINE HYDROCHLORIDE 1 ML: 10 INJECTION, SOLUTION INFILTRATION; PERINEURAL at 17:15

## 2024-10-31 NOTE — PROGRESS NOTES
Patient presents with recurrence of pain at the base of the left fifth metatarsal secondary to peroneal brevis tendinitis.  Patient last had this problem approximately 1 year ago and responded well to cortisone injection.  Cortisone injection desired this date.    On exam, pain with palpation fifth metatarsal base left foot.    Explained to patient that symptoms are consistent with recurrence of peroneal brevis tendinitis.  Injected site with 0.5 cc Kenalog 40 along with 1 cc 1% Xylocaine.  Reappoint 6 weeks.    Foot/lower extremity injection    Performed by: Mark Munoz DPM  Authorized by: Mark Munoz DPM    Procedure:     Other Assisting Provider: No      Verbal consent obtained?: Yes      Risks and benefits: Risks, benefits and alternatives were discussed      Consent given by:  Patient    Patient states understanding of procedure being performed: Yes      Patient identity confirmed:  Verbally with patient    Supporting Documentation:     Indications:  Pain    Procedure Details:                Ethyl Chloride was applied      Needle size: 25 G G    Ultrasound Guidance: no      Approach:  Lateral    Laterality:  Left    Cyst Aspiration/Injection: No      Location: tendon insertion      Tendon Structures: Peroneus Brevis      Injection Information:       Medications:  1 mL lidocaine 1 %; 20 mg triamcinolone acetonide 40 mg/mL

## 2024-11-01 LAB

## 2024-11-05 ENCOUNTER — OFFICE VISIT (OUTPATIENT)
Age: 54
End: 2024-11-05
Payer: COMMERCIAL

## 2024-11-05 ENCOUNTER — TELEPHONE (OUTPATIENT)
Age: 54
End: 2024-11-05

## 2024-11-05 VITALS
WEIGHT: 271.4 LBS | TEMPERATURE: 97.4 F | HEART RATE: 73 BPM | OXYGEN SATURATION: 96 % | HEIGHT: 74 IN | SYSTOLIC BLOOD PRESSURE: 120 MMHG | DIASTOLIC BLOOD PRESSURE: 70 MMHG | BODY MASS INDEX: 34.83 KG/M2

## 2024-11-05 DIAGNOSIS — J01.00 ACUTE NON-RECURRENT MAXILLARY SINUSITIS: Primary | ICD-10-CM

## 2024-11-05 DIAGNOSIS — I71.20 THORACIC AORTIC ANEURYSM (TAA), UNSPECIFIED PART, UNSPECIFIED WHETHER RUPTURED (HCC): ICD-10-CM

## 2024-11-05 LAB
SARS-COV-2 AG UPPER RESP QL IA: NEGATIVE
SL AMB POCT RAPID FLU A: NEGATIVE
SL AMB POCT RAPID FLU B: NEGATIVE
VALID CONTROL: NORMAL

## 2024-11-05 PROCEDURE — 87804 INFLUENZA ASSAY W/OPTIC: CPT | Performed by: NURSE PRACTITIONER

## 2024-11-05 PROCEDURE — 87811 SARS-COV-2 COVID19 W/OPTIC: CPT | Performed by: NURSE PRACTITIONER

## 2024-11-05 PROCEDURE — 99213 OFFICE O/P EST LOW 20 MIN: CPT | Performed by: NURSE PRACTITIONER

## 2024-11-05 NOTE — TELEPHONE ENCOUNTER
11/05/24  Screened by: Manasa Stanley    Referring Provider     Pre- Screening:     There is no height or weight on file to calculate BMI.  Has patient been referred for a routine screening Colonoscopy? yes  Is the patient between 45-75 years old? yes      Previous Colonoscopy yes   If yes:    Date: 2021    Facility:     Reason:       Does the patient want to see a Gastroenterologist prior to their procedure OR are they having any GI symptoms? no    Has the patient been hospitalized or had abdominal surgery in the past 6 months? no    Does the patient use supplemental oxygen? no    Does the patient take Coumadin, Lovenox, Plavix, Elliquis, Xarelto, or other blood thinning medication? no    Has the patient had a stroke, cardiac event, or stent placed in the past year? no    If patient is between 45yrs - 49yrs, please advise patient that we will have to confirm benefits & coverage with their insurance company for a routine screening colonoscopy.

## 2024-11-05 NOTE — TELEPHONE ENCOUNTER
Scheduled date of colonoscopy (as of today): 12/23/24  Physician performing colonoscopy: VIRA  Location of colonoscopy: [facility]  Bowel prep reviewed with patient: CRYSTAL / DUL / DIABETIC  Instructions reviewed with patient by: KUNAL  Clearances: N/A

## 2024-11-05 NOTE — ASSESSMENT & PLAN NOTE
Start Augmentin  Continue Mucinex continue Zyrtec and Flonase  Rest and fluids advised.   Educated that the course of this illness could be 2-4 weeks.   Discussed symptomatic relief, such as warm steam inhalations, tylenol/ibuprofen for fevers and body aches, rest, and drink plenty of fluids.  Warm salt gargles for sore throat.   Discussed red flag signs to go to the ER, such as chest pain or shortness of breath.   Return to the office for reevaluation if symptoms worsen or do not improve in 1-2 weeks       Orders:    POCT Rapid Covid Ag    POCT rapid flu A and B    amoxicillin-clavulanate (AUGMENTIN) 875-125 mg per tablet; Take 1 tablet by mouth every 12 (twelve) hours for 7 days

## 2024-11-05 NOTE — PROGRESS NOTES
Ambulatory Visit  Name: Jeremy George      : 1970      MRN: [id number]  Encounter Provider: MIKHAIL Ha  Encounter Date: 2024   Encounter department: [facility]    Assessment & Plan  Acute non-recurrent maxillary sinusitis  Start Augmentin  Continue Mucinex continue Zyrtec and Flonase  Rest and fluids advised.   Educated that the course of this illness could be 2-4 weeks.   Discussed symptomatic relief, such as warm steam inhalations, tylenol/ibuprofen for fevers and body aches, rest, and drink plenty of fluids.  Warm salt gargles for sore throat.   Discussed red flag signs to go to the ER, such as chest pain or shortness of breath.   Return to the office for reevaluation if symptoms worsen or do not improve in 1-2 weeks       Orders:    POCT Rapid Covid Ag    POCT rapid flu A and B    amoxicillin-clavulanate (AUGMENTIN) 875-125 mg per tablet; Take 1 tablet by mouth every 12 (twelve) hours for 7 days    Thoracic aortic aneurysm (TAA), unspecified part, unspecified whether ruptured (HCC)            History of Present Illness     Patient presents today with URI like symptoms.     Reports that his wife was sick as well    Did not have flu shot this year     URI   This is a new problem. Episode onset: Friday. The problem has been unchanged. There has been no fever. Associated symptoms include congestion, coughing (productive, yellow mucous with a tinge of blood), ear pain (right side, mild), headaches, sneezing, a sore throat (scratchy) and wheezing. Pertinent negatives include no abdominal pain, chest pain, diarrhea, dysuria, nausea, neck pain, rash, rhinorrhea, sinus pain or vomiting. Treatments tried: mucinex. The treatment provided no relief.       History obtained from : patient  Review of Systems   Constitutional:  Positive for chills, diaphoresis and fatigue. Negative for activity change, appetite change and fever.   HENT:  Positive for  "congestion, ear pain (right side, mild), postnasal drip, sneezing and sore throat (scratchy). Negative for ear discharge, rhinorrhea, sinus pressure and sinus pain.    Eyes:  Negative for pain, discharge, itching and visual disturbance.   Respiratory:  Positive for cough (productive, yellow mucous with a tinge of blood) and wheezing. Negative for chest tightness and shortness of breath.    Cardiovascular:  Negative for chest pain, palpitations and leg swelling.   Gastrointestinal:  Negative for abdominal pain, constipation, diarrhea, nausea and vomiting.   Endocrine: Negative for polydipsia, polyphagia and polyuria.   Genitourinary:  Negative for difficulty urinating, dysuria and urgency.   Musculoskeletal:  Negative for arthralgias, back pain and neck pain.   Skin:  Negative for rash and wound.   Neurological:  Positive for headaches. Negative for dizziness, weakness and numbness.         Objective     /70 (BP Location: Left arm, Patient Position: Sitting, Cuff Size: Large)   Pulse 73   Temp (!) 97.4 °F (36.3 °C) (Temporal)   Ht 6' 1.62\" (1.87 m)   Wt 123 kg (271 lb 6.4 oz)   SpO2 96%   BMI 35.20 kg/m²     Physical Exam  Constitutional:       General: He is not in acute distress.     Appearance: He is well-developed. He is not diaphoretic.   HENT:      Head: Normocephalic and atraumatic.      Right Ear: External ear normal. A middle ear effusion is present. Tympanic membrane is not erythematous.      Left Ear: External ear normal. A middle ear effusion is present. Tympanic membrane is not erythematous.      Nose: Nose normal.      Mouth/Throat:      Mouth: Mucous membranes are moist.      Pharynx: Posterior oropharyngeal erythema present. No oropharyngeal exudate.   Eyes:      General:         Right eye: No discharge.         Left eye: No discharge.      Conjunctiva/sclera: Conjunctivae normal.      Pupils: Pupils are equal, round, and reactive to light.   Neck:      Thyroid: No thyromegaly. "   Cardiovascular:      Rate and Rhythm: Normal rate and regular rhythm.      Heart sounds: Normal heart sounds. No murmur heard.     No friction rub. No gallop.   Pulmonary:      Effort: Pulmonary effort is normal. No respiratory distress.      Breath sounds: Normal breath sounds. No stridor. No wheezing or rales.   Abdominal:      General: Bowel sounds are normal. There is no distension.      Palpations: Abdomen is soft.      Tenderness: There is no abdominal tenderness.   Musculoskeletal:      Cervical back: Normal range of motion and neck supple.   Lymphadenopathy:      Head:      Right side of head: Tonsillar adenopathy present.      Left side of head: Tonsillar adenopathy present.      Cervical: No cervical adenopathy.   Skin:     General: Skin is warm and dry.      Findings: No erythema or rash.   Neurological:      Mental Status: He is alert and oriented to person, place, and time.   Psychiatric:         Behavior: Behavior normal.         Thought Content: Thought content normal.         Judgment: Judgment normal.

## 2024-11-11 ENCOUNTER — TELEPHONE (OUTPATIENT)
Dept: OTHER | Facility: HOSPITAL | Age: 54
End: 2024-11-11

## 2024-11-11 DIAGNOSIS — J06.9 UPPER RESPIRATORY TRACT INFECTION, UNSPECIFIED TYPE: ICD-10-CM

## 2024-11-11 RX ORDER — ALBUTEROL SULFATE 90 UG/1
2 INHALANT RESPIRATORY (INHALATION) EVERY 6 HOURS PRN
Qty: 8.5 G | Refills: 5 | Status: SHIPPED | OUTPATIENT
Start: 2024-11-11

## 2024-11-11 NOTE — TELEPHONE ENCOUNTER
Wife following up-Patient given antibiotics last week. Antibiotics finished however, patient is having continued cough and wife states there is a wheeze to it. They are inquiring if prednisone and an inhaler can be sent into pharmacy. Please follow up with wife one this has been reviewed.

## 2024-11-11 NOTE — TELEPHONE ENCOUNTER
Please call and notify patient that I would like him to be reevaluated in the office, he should have albuterol on hand if refill is needed let me know.

## 2024-11-11 NOTE — TELEPHONE ENCOUNTER
Called patient's wife and left a message to schedule an appointment for re-evaluation.  He needs to use the Albuterol inhaler,  call back if refill is needed.

## 2024-11-11 NOTE — TELEPHONE ENCOUNTER
Medication: albuterol (ProAir HFA) 90 mcg/act inhaler     Dose/Frequency: Inhale 2 puffs every 6 (six) hours as needed for wheezing     Quantity: Dispense: 8.5 g     Pharmacy: [id number]  [place], [place] - [address]     Office:   [x] PCP/Provider - Cici Austin  [] Speciality/Provider -     Does the patient have enough for 3 days?   [] Yes   [x] No - Send as HP to POD lost/can't find the previous inhaler

## 2024-11-11 NOTE — PROGRESS NOTES
Assessment:  1. Neck pain    2. Cervical radiculopathy    3. Myofascial pain        Plan:  Patient expresses desire to attempt to wean off pregabalin. Weaning RX was provided today  Patient may continue methocarbamol as prescribed by neurology  Patient may continue Tylenol as needed  Continue with home exercise program with [facility]  Continue to follow with neurology and orthopedics as scheduled  Follow-up in 3 months or sooner if needed    History of Present Illness:    The patient is a 53 y.o. male with a history of C6-7 ACDF in 2018 last seen on 06/26/2024  who presents for a follow up office visit in regards to chronic neck pain that intermittently will radiate toward the left upper extremity.  He denies bowel or bladder incontinence or balance issues.  He unfortunately has not had much relief with cervical medial branch blocks or cervical epidural steroid injections.  He does continue to follow with neurology for migraine management and finds relief with methocarbamol that they are prescribing.  He does continue pregabalin 150 mg nightly and states he is interested in attempting to wean off of this medication    The patient rates his pain a 5 out of 10 on the numeric pain rating scale.  Pain is constant in the morning and at night and is described as dull aching, sharp and shooting    I have personally reviewed and/or updated the patient's past medical history, past surgical history, family history, social history, current medications, allergies, and vital signs today.       Review of Systems:    Review of Systems   Respiratory:  Negative for shortness of breath.    Cardiovascular:  Negative for chest pain.   Gastrointestinal:  Negative for constipation, diarrhea, nausea and vomiting.   Musculoskeletal:  Positive for gait problem. Negative for arthralgias, joint swelling and myalgias.   Skin:  Negative for rash.   Neurological:  Negative for dizziness, seizures and weakness.   All other systems  reviewed and are negative.        Past Medical History:   Diagnosis Date    Achalasia     Aneurysm (HCC)     aortic    Asthma     Childhood    Colon polyp     Diabetes mellitus (HCC)     Esophageal ulcer     Fatty liver     Groin abscess     with I &D    Hyperlipidemia     Hypertension     Plantar fasciitis     S/P cervical spinal fusion 01/03/2020       Past Surgical History:   Procedure Laterality Date    ANTERIOR CERVICAL DISCECTOMY W/ FUSION      APPENDECTOMY      BICEPS TENDON REPAIR      EGD AND COLONOSCOPY      FL INJECTION LEFT WRIST (ARTHROGRAM)  11/9/2021    FL MYELOGRAM CERVICAL  6/13/2018    MENISCECTOMY      MYOTOMY HELLER LAPAROSCOPIC      ORTHOPEDIC SURGERY         Family History   Problem Relation Age of Onset    No Known Problems Mother     No Known Problems Father        Social History     Occupational History    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   Tobacco Use    Smoking status: Never    Smokeless tobacco: Never   Vaping Use    Vaping status: Never Used   Substance and Sexual Activity    Alcohol use: Not Currently    Drug use: Never    Sexual activity: Yes     Partners: Female         Current Outpatient Medications:     acetaminophen (TYLENOL) 500 mg tablet, Take 500 mg by mouth every 6 (six) hours as needed for mild pain, Disp: , Rfl:     albuterol (ProAir HFA) 90 mcg/act inhaler, Inhale 2 puffs every 6 (six) hours as needed for wheezing, Disp: 8.5 g, Rfl: 5    amoxicillin-clavulanate (AUGMENTIN) 875-125 mg per tablet, Take 1 tablet by mouth every 12 (twelve) hours for 7 days, Disp: 14 tablet, Rfl: 0    Ascorbic Acid (vitamin C) 1000 MG tablet, Take 1,000 mg by mouth daily  , Disp: , Rfl:     cetirizine (ZyrTEC) 10 mg tablet, Take 1 tablet (10 mg total) by mouth daily, Disp: 90 tablet, Rfl: 0    CINNAMON PO, Take by mouth, Disp: , Rfl:     diphenhydrAMINE-acetaminophen (TYLENOL PM)  MG TABS, Take 2 tablets by mouth daily at bedtime as needed for sleep 1000 mg in total, Disp: ,  Rfl:     FIBER ADULT GUMMIES PO, Take 3 gums by mouth in the morning, Disp: , Rfl:     fluticasone (FLONASE) 50 mcg/act nasal spray, 2 sprays into each nostril daily, Disp: 18 mL, Rfl: 1    lisinopril-hydrochlorothiazide (PRINZIDE,ZESTORETIC) 20-12.5 MG per tablet, Take 1 tablet by mouth daily, Disp: 90 tablet, Rfl: 1    magnesium Oxide (MAG-OX) 400 mg TABS, Take 1 tablet (400 mg total) by mouth daily, Disp: 90 tablet, Rfl: 0    metFORMIN (GLUCOPHAGE) 500 mg tablet, Pt takes 1500 mg (3 tablets) by mouth in the morning, and a 1000 mg (2 tablets) in the evening, Disp: 450 tablet, Rfl: 1    methocarbamol (ROBAXIN) 500 mg tablet, Take 1 tablet (500 mg total) by mouth 3 (three) times a day as needed for muscle spasms, Disp: 90 tablet, Rfl: 3    Multiple Vitamins-Minerals (CENTRUM SILVER 50+MEN PO), Take by mouth, Disp: , Rfl:     omeprazole (PriLOSEC) 40 MG capsule, Take 1 capsule (40 mg total) by mouth daily, Disp: 90 capsule, Rfl: 3    pregabalin (LYRICA) 50 mg capsule, Take 2 PO HS x 1 week, then 1 PO HS x 1 week, then stop, Disp: 30 capsule, Rfl: 0    simvastatin (ZOCOR) 40 mg tablet, Take 1 tablet (40 mg total) by mouth daily, Disp: 90 tablet, Rfl: 0    sitaGLIPtin (JANUVIA) 100 mg tablet, Take 1 tablet (100 mg total) by mouth daily, Disp: 90 tablet, Rfl: 1    cyanocobalamin 1,000 mcg/mL, Inject 1 mL (1,000 mcg total) into a muscle every 7 days for 9 doses, Disp: 1 mL, Rfl: 0    Emgality 120 MG/ML SOAJ, INJECT 1 ML IN THE RIGHT THIGH AND 1 ML IN THE LEFT THIGH AT THE SAME TIME FOR LOADING DOSE (Patient not taking: Reported on 8/26/2024), Disp: 2 mL, Rfl: 0    predniSONE 5 mg tablet, Take 2 PO TID on day 1, then decrease by 1 PO daily until finished (Patient not taking: Reported on 11/5/2024), Disp: 21 tablet, Rfl: 0    Current Facility-Administered Medications:     cyanocobalamin injection 1,000 mcg, 1,000 mcg, Intramuscular, Q30 Days, MIKHAIL Ha, 1,000 mcg at 08/27/24 8524    lidocaine (XYLOCAINE) 1 %  "injection 1 mL, 1 mL, Infiltration, , , 1 mL at 10/31/24 1715    triamcinolone acetonide (KENALOG-40) 40 mg/mL injection 20 mg, 20 mg, Infiltration, , , 20 mg at 10/31/24 1715    No Known Allergies    Physical Exam:    /81   Pulse 67   Ht 6' 1\" (1.854 m)   Wt 123 kg (271 lb)   BMI 35.75 kg/m²     Constitutional:normal, well developed, well nourished, alert, in no distress and non-toxic and no overt pain behavior.  Eyes:anicteric  HEENT:grossly intact  Neck:supple, symmetric, trachea midline and no masses   Pulmonary:even and unlabored  Cardiovascular:No edema or pitting edema present  Skin:Normal without rashes or lesions and well hydrated  Psychiatric:Mood and affect appropriate  Neurologic:Cranial Nerves II-XII grossly intact  Musculoskeletal:normal gait.  Cervical paraspinal and trapezius musculature tender to palpation      Imaging  No orders to display         No orders of the defined types were placed in this encounter.      "

## 2024-11-12 ENCOUNTER — OFFICE VISIT (OUTPATIENT)
Dept: PAIN MEDICINE | Facility: CLINIC | Age: 54
End: 2024-11-12
Payer: COMMERCIAL

## 2024-11-12 ENCOUNTER — OFFICE VISIT (OUTPATIENT)
Age: 54
End: 2024-11-12
Payer: COMMERCIAL

## 2024-11-12 VITALS
TEMPERATURE: 97.5 F | BODY MASS INDEX: 35.6 KG/M2 | WEIGHT: 277.4 LBS | OXYGEN SATURATION: 98 % | HEART RATE: 72 BPM | SYSTOLIC BLOOD PRESSURE: 130 MMHG | HEIGHT: 74 IN | DIASTOLIC BLOOD PRESSURE: 70 MMHG

## 2024-11-12 VITALS
HEART RATE: 67 BPM | HEIGHT: 73 IN | DIASTOLIC BLOOD PRESSURE: 81 MMHG | BODY MASS INDEX: 35.92 KG/M2 | SYSTOLIC BLOOD PRESSURE: 130 MMHG | WEIGHT: 271 LBS

## 2024-11-12 DIAGNOSIS — M79.18 MYOFASCIAL PAIN: ICD-10-CM

## 2024-11-12 DIAGNOSIS — J45.20 MILD INTERMITTENT ASTHMA WITHOUT COMPLICATION: Primary | ICD-10-CM

## 2024-11-12 DIAGNOSIS — I71.20 THORACIC AORTIC ANEURYSM (TAA), UNSPECIFIED PART, UNSPECIFIED WHETHER RUPTURED (HCC): ICD-10-CM

## 2024-11-12 DIAGNOSIS — M54.12 CERVICAL RADICULOPATHY: ICD-10-CM

## 2024-11-12 DIAGNOSIS — M54.2 NECK PAIN: Primary | ICD-10-CM

## 2024-11-12 PROCEDURE — 99214 OFFICE O/P EST MOD 30 MIN: CPT | Performed by: NURSE PRACTITIONER

## 2024-11-12 PROCEDURE — 99213 OFFICE O/P EST LOW 20 MIN: CPT | Performed by: NURSE PRACTITIONER

## 2024-11-12 RX ORDER — PREGABALIN 50 MG/1
CAPSULE ORAL
Qty: 30 CAPSULE | Refills: 0 | Status: SHIPPED | OUTPATIENT
Start: 2024-11-12

## 2024-11-12 RX ORDER — PREDNISONE 20 MG/1
40 TABLET ORAL DAILY
Qty: 10 TABLET | Refills: 0 | Status: SHIPPED | OUTPATIENT
Start: 2024-11-12 | End: 2024-11-17

## 2024-11-12 RX ORDER — FLUTICASONE PROPIONATE AND SALMETEROL 100; 50 UG/1; UG/1
1 POWDER RESPIRATORY (INHALATION) 2 TIMES DAILY
Qty: 60 BLISTER | Refills: 1 | Status: SHIPPED | OUTPATIENT
Start: 2024-11-12

## 2024-11-12 NOTE — PROGRESS NOTES
Ambulatory Visit  Name: Jeremy George      : 1970      MRN: [id number]  Encounter Provider: MIKHAIL Ha  Encounter Date: 2024   Encounter department: [facility]    Assessment & Plan  Mild intermittent asthma without complication  -start Adviar   -continue albuterol as needed   -complete course of Augmentin   -Rest and fluids advised.   Educated that the course of this illness could be 2-4 weeks.   Discussed symptomatic relief, such as warm steam inhalations, tylenol/ibuprofen for fevers and body aches, rest, and drink plenty of fluids.  Warm salt gargles for sore throat.   Discussed red flag signs to go to the ER, such as chest pain or shortness of breath.   Return to the office for reevaluation if symptoms worsen or do not improve in 1-2 weeks       Orders:    Fluticasone-Salmeterol (Advair Diskus) 100-50 mcg/dose inhaler; Inhale 1 puff 2 (two) times a day Rinse mouth after use.    predniSONE 20 mg tablet; Take 2 tablets (40 mg total) by mouth daily for 5 days    Thoracic aortic aneurysm (TAA), unspecified part, unspecified whether ruptured (HCC)            History of Present Illness     Patient presents today with ongoing URI-like symptoms.  He reports overall his other symptoms have improved but he continues with a cough mainly in the evening when he lays down, cough remains productive but is now with clear mucus.  He has been taking Augmentin antihistamine and Flonase  Started albuterol inhaler yesterday which helped with wheezing and his cough    Note from last office visit:  Patient presents today with URI like symptoms.     Reports that his wife was sick as well    Did not have flu shot this year     URI   This is a new problem. Episode onset: Friday. The problem has been unchanged. There has been no fever. Associated symptoms include congestion, coughing (productive, yellow mucous with a tinge of blood), ear pain (right side, mild),  "headaches, sneezing, a sore throat (scratchy) and wheezing. Pertinent negatives include no abdominal pain, chest pain, diarrhea, dysuria, nausea, neck pain, rash, rhinorrhea, sinus pain or vomiting. Treatments tried: mucinex. The treatment provided no relief.         History obtained from : patient  Review of Systems   Constitutional:  Negative for activity change, appetite change, chills, diaphoresis and fever.   HENT:  Negative for congestion, ear discharge, ear pain, postnasal drip, rhinorrhea, sinus pressure, sinus pain and sore throat.    Eyes:  Negative for pain, discharge, itching and visual disturbance.   Respiratory:  Positive for cough. Negative for chest tightness, shortness of breath and wheezing.    Cardiovascular:  Negative for chest pain, palpitations and leg swelling.   Gastrointestinal:  Negative for abdominal pain, constipation, diarrhea, nausea and vomiting.   Endocrine: Negative for polydipsia, polyphagia and polyuria.   Genitourinary:  Negative for difficulty urinating, dysuria and urgency.   Musculoskeletal:  Negative for arthralgias, back pain and neck pain.   Skin:  Negative for rash and wound.   Neurological:  Negative for dizziness, weakness, numbness and headaches.         Objective     /70 (BP Location: Left arm, Patient Position: Sitting, Cuff Size: Large)   Pulse 72   Temp 97.5 °F (36.4 °C) (Temporal)   Ht 6' 1.7\" (1.872 m)   Wt 126 kg (277 lb 6.4 oz)   SpO2 98%   BMI 35.91 kg/m²     Physical Exam  Constitutional:       General: He is not in acute distress.     Appearance: He is well-developed. He is not diaphoretic.   HENT:      Head: Normocephalic and atraumatic.      Right Ear: External ear normal.      Left Ear: External ear normal.      Nose: Nose normal.      Mouth/Throat:      Mouth: Mucous membranes are moist.      Pharynx: No oropharyngeal exudate or posterior oropharyngeal erythema.   Eyes:      General:         Right eye: No discharge.         Left eye: No " discharge.      Conjunctiva/sclera: Conjunctivae normal.      Pupils: Pupils are equal, round, and reactive to light.   Neck:      Thyroid: No thyromegaly.   Cardiovascular:      Rate and Rhythm: Normal rate and regular rhythm.      Heart sounds: Normal heart sounds. No murmur heard.     No friction rub. No gallop.   Pulmonary:      Effort: Pulmonary effort is normal. No respiratory distress.      Breath sounds: No stridor. Wheezing present. No rales.   Abdominal:      General: Bowel sounds are normal. There is no distension.      Palpations: Abdomen is soft.      Tenderness: There is no abdominal tenderness.   Musculoskeletal:      Cervical back: Normal range of motion and neck supple.   Lymphadenopathy:      Cervical: No cervical adenopathy.   Skin:     General: Skin is warm and dry.      Findings: No erythema or rash.   Neurological:      Mental Status: He is alert and oriented to person, place, and time.   Psychiatric:         Behavior: Behavior normal.         Thought Content: Thought content normal.         Judgment: Judgment normal.

## 2024-11-12 NOTE — ASSESSMENT & PLAN NOTE
-start Adviar   -continue albuterol as needed   -complete course of Augmentin   -Rest and fluids advised.   Educated that the course of this illness could be 2-4 weeks.   Discussed symptomatic relief, such as warm steam inhalations, tylenol/ibuprofen for fevers and body aches, rest, and drink plenty of fluids.  Warm salt gargles for sore throat.   Discussed red flag signs to go to the ER, such as chest pain or shortness of breath.   Return to the office for reevaluation if symptoms worsen or do not improve in 1-2 weeks       Orders:    Fluticasone-Salmeterol (Advair Diskus) 100-50 mcg/dose inhaler; Inhale 1 puff 2 (two) times a day Rinse mouth after use.    predniSONE 20 mg tablet; Take 2 tablets (40 mg total) by mouth daily for 5 days

## 2024-11-13 ENCOUNTER — OFFICE VISIT (OUTPATIENT)
Dept: GASTROENTEROLOGY | Facility: CLINIC | Age: 54
End: 2024-11-13
Payer: COMMERCIAL

## 2024-11-13 VITALS
BODY MASS INDEX: 33.75 KG/M2 | HEIGHT: 74 IN | SYSTOLIC BLOOD PRESSURE: 118 MMHG | WEIGHT: 263 LBS | DIASTOLIC BLOOD PRESSURE: 76 MMHG | TEMPERATURE: 97.6 F

## 2024-11-13 DIAGNOSIS — K21.9 GASTROESOPHAGEAL REFLUX DISEASE WITHOUT ESOPHAGITIS: ICD-10-CM

## 2024-11-13 DIAGNOSIS — K59.00 CONSTIPATION, UNSPECIFIED CONSTIPATION TYPE: ICD-10-CM

## 2024-11-13 DIAGNOSIS — K76.0 FATTY LIVER: ICD-10-CM

## 2024-11-13 DIAGNOSIS — Z86.0100 HISTORY OF COLON POLYPS: Primary | ICD-10-CM

## 2024-11-13 PROCEDURE — 99214 OFFICE O/P EST MOD 30 MIN: CPT | Performed by: INTERNAL MEDICINE

## 2024-11-13 RX ORDER — BISACODYL 5 MG/1
10 TABLET, DELAYED RELEASE ORAL ONCE
Qty: 2 TABLET | Refills: 0 | Status: SHIPPED | OUTPATIENT
Start: 2024-11-13 | End: 2024-11-13

## 2024-11-13 RX ORDER — OMEPRAZOLE 40 MG/1
40 CAPSULE, DELAYED RELEASE ORAL DAILY
Qty: 90 CAPSULE | Refills: 3 | Status: SHIPPED | OUTPATIENT
Start: 2024-11-13

## 2024-11-13 NOTE — PROGRESS NOTES
[facility] - Outpatient Follow-up Note  Jeremy George 53 y.o. male MRN: [id number]  Encounter: [id number]          ASSESSMENT AND PLAN:    Jeremy George is a 53M with h/o achalasia s/p Heller myotomy, DM2, fatty liver, esophageal ulcer, HLD, HTN who presents for follow up. Concerns today include constipation. Scheduled for surveillance colon for h/o polyps.     1. History of colon polyps (Primary)  2. Constipation, unspecified constipation type  Scheduled for surveillance colonoscopy; with constipation will give GoLytely prep  Discussed scheduling bathroom breaks, going when he has the urge, increasing dietary fiber and fluids. Plans to discuss with neurologist about weaning some of his medications which may be contributing such as Lyrica and muscle relaxant      3. Gastroesophageal reflux disease without esophagitis  Having no breakthrough sx when taking medication regularly. Due for refill which was provided today     4. Fatty liver  - discussed lifestyle modifications  - will repeat elastography and CMP in 6m    RTC 1 year for follow up   ______________________________________________________________________    SUBJECTIVE:    Jeremy George is a 53M with h/o achalasia s/p Heller myotomy, DM2, fatty liver, esophageal ulcer, HLD, HTN who presents for follow up. Concerns today include constipation.     Taking a lot of different medications. Takes fiber gels to help maintain regularlity. Doesn't want to use laxatives that can lead to dependence. Has a hard time finding bathroom at work thus withholds at times.     H/o achalasia s/p heller myotomy. Very occasionally needs water to pass things but otherwise does well without dysphagia or choking episodes.     Manometry with pH testing last year which showed findings consistent with previous myotomy and acid exposure time and symptom correlation not significant. DeMeester 0.8.    Takes tylenol to help him sleep. 1/2 500mg or whole 500mg.  If in a lot of pain takes a higher dose of 1000mg if he's in pain. LFT slight elevation, attributed to fatty liver. A1AT negative. Normal ferritin. Negative hepatitis panel, previous false positive Hep C in 2009. No etoh use because it causes very painful muscle spasms. Elastography 03/2024 F0/F1 fibrosis, S3 steatosis.     Reflux only when ran out of PPI.     Drinks 6 bottles of water per day.         REVIEW OF SYSTEMS IS OTHERWISE NEGATIVE.      Historical Information   Past Medical History:   Diagnosis Date    Achalasia     Aneurysm (HCC)     aortic    Asthma     Childhood    Colon polyp     Diabetes mellitus (HCC)     Esophageal ulcer     Fatty liver     Groin abscess     with I &D    Hyperlipidemia     Hypertension     Plantar fasciitis     S/P cervical spinal fusion 01/03/2020     Past Surgical History:   Procedure Laterality Date    ANTERIOR CERVICAL DISCECTOMY W/ FUSION      APPENDECTOMY      BICEPS TENDON REPAIR      EGD AND COLONOSCOPY      FL INJECTION LEFT WRIST (ARTHROGRAM)  11/9/2021    FL MYELOGRAM CERVICAL  6/13/2018    MENISCECTOMY      MYOTOMY HELLER LAPAROSCOPIC      ORTHOPEDIC SURGERY       Social History   Social History     Substance and Sexual Activity   Alcohol Use Not Currently     Social History     Substance and Sexual Activity   Drug Use Never     Social History     Tobacco Use   Smoking Status Never   Smokeless Tobacco Never     Family History   Problem Relation Age of Onset    No Known Problems Mother     No Known Problems Father        Meds/Allergies       Current Outpatient Medications:     acetaminophen (TYLENOL) 500 mg tablet    albuterol (ProAir HFA) 90 mcg/act inhaler    Ascorbic Acid (vitamin C) 1000 MG tablet    cetirizine (ZyrTEC) 10 mg tablet    CINNAMON PO    Cyanocobalamin (VITAMIN B 12 PO)    diphenhydrAMINE-acetaminophen (TYLENOL PM)  MG TABS    FIBER ADULT GUMMIES PO    fluticasone (FLONASE) 50 mcg/act nasal spray    Fluticasone-Salmeterol (Advair Diskus) 100-50  "mcg/dose inhaler    lisinopril-hydrochlorothiazide (PRINZIDE,ZESTORETIC) 20-12.5 MG per tablet    magnesium Oxide (MAG-OX) 400 mg TABS    metFORMIN (GLUCOPHAGE) 500 mg tablet    methocarbamol (ROBAXIN) 500 mg tablet    Multiple Vitamins-Minerals (CENTRUM SILVER 50+MEN PO)    omeprazole (PriLOSEC) 40 MG capsule    predniSONE 20 mg tablet    pregabalin (LYRICA) 50 mg capsule    simvastatin (ZOCOR) 40 mg tablet    sitaGLIPtin (JANUVIA) 100 mg tablet    cyanocobalamin 1,000 mcg/mL    Emgality 120 MG/ML SOAJ    Current Facility-Administered Medications:     cyanocobalamin injection 1,000 mcg, 1,000 mcg, Intramuscular, Q30 Days, 1,000 mcg at 08/27/24 1553    lidocaine (XYLOCAINE) 1 % injection 1 mL, 1 mL, Infiltration, , 1 mL at 10/31/24 1715    triamcinolone acetonide (KENALOG-40) 40 mg/mL injection 20 mg, 20 mg, Infiltration, , 20 mg at 10/31/24 1715    No Known Allergies        Objective     Blood pressure 118/76, temperature 97.6 °F (36.4 °C), temperature source Tympanic, height 6' 1.7\" (1.872 m), weight 119 kg (263 lb). Body mass index is 34.04 kg/m².      PHYSICAL EXAM:      General Appearance:   Alert, cooperative, no distress   HEENT:   Normocephalic, atraumatic, anicteric.     Neck:  Supple, symmetrical, trachea midline   Lungs:   Respirations unlabored    Heart::   Regular rate and rhythm   Abdomen:   Soft, non-tender, non-distended   Genitalia:   Deferred    Rectal:   Deferred    Extremities:  No cyanosis, clubbing or edema    Pulses:  2+ and symmetric    Skin:  No jaundice, rashes, or lesions    Lymph nodes:  No palpable cervical lymphadenopathy        Lab Results:   No visits with results within 1 Day(s) from this visit.   Latest known visit with results is:   Office Visit on 11/05/2024   Component Date Value    POCT SARS-CoV-2 Ag 11/05/2024 Negative     VALID CONTROL 11/05/2024 Valid     RAPID FLU A 11/05/2024 Negative     RAPID FLU B 11/05/2024 Negative          Radiology Results:   No results found.    "

## 2024-11-14 NOTE — PATIENT INSTRUCTIONS
Patient recall for 1 year follow up  with Dr. DIAZ    Patient is going to call for Elastography in 6 months and do labs in 6 months as well

## 2024-11-18 ENCOUNTER — HOSPITAL ENCOUNTER (OUTPATIENT)
Dept: RADIOLOGY | Facility: HOSPITAL | Age: 54
Discharge: HOME | End: 2024-11-18
Attending: ORTHOPAEDIC SURGERY
Payer: COMMERCIAL

## 2024-11-18 ENCOUNTER — OFFICE VISIT (OUTPATIENT)
Dept: NEUROLOGY | Facility: CLINIC | Age: 54
End: 2024-11-18
Payer: COMMERCIAL

## 2024-11-18 VITALS
DIASTOLIC BLOOD PRESSURE: 80 MMHG | HEIGHT: 74 IN | WEIGHT: 267.3 LBS | HEART RATE: 59 BPM | SYSTOLIC BLOOD PRESSURE: 138 MMHG | BODY MASS INDEX: 34.31 KG/M2

## 2024-11-18 DIAGNOSIS — S69.81XA INJURY OF TRIANGULAR FIBROCARTILAGE COMPLEX (TFCC) OF RIGHT WRIST, INITIAL ENCOUNTER: ICD-10-CM

## 2024-11-18 DIAGNOSIS — S63.501A RIGHT WRIST SPRAIN, INITIAL ENCOUNTER: ICD-10-CM

## 2024-11-18 DIAGNOSIS — G43.809 VESTIBULAR MIGRAINE: Primary | ICD-10-CM

## 2024-11-18 DIAGNOSIS — H93.19 TINNITUS: ICD-10-CM

## 2024-11-18 PROCEDURE — 73222 MRI JOINT UPR EXTREM W/DYE: CPT

## 2024-11-18 PROCEDURE — 77002 NEEDLE LOCALIZATION BY XRAY: CPT

## 2024-11-18 PROCEDURE — 25246 INJECTION FOR WRIST X-RAY: CPT

## 2024-11-18 PROCEDURE — 99214 OFFICE O/P EST MOD 30 MIN: CPT | Performed by: PSYCHIATRY & NEUROLOGY

## 2024-11-18 PROCEDURE — A9585 GADOBUTROL INJECTION: HCPCS | Performed by: ORTHOPAEDIC SURGERY

## 2024-11-18 RX ORDER — LIDOCAINE HYDROCHLORIDE 10 MG/ML
5 INJECTION, SOLUTION EPIDURAL; INFILTRATION; INTRACAUDAL; PERINEURAL
Status: COMPLETED | OUTPATIENT
Start: 2024-11-18 | End: 2024-11-18

## 2024-11-18 RX ORDER — GADOBUTROL 604.72 MG/ML
0.2 INJECTION INTRAVENOUS
Status: COMPLETED | OUTPATIENT
Start: 2024-11-18 | End: 2024-11-18

## 2024-11-18 RX ORDER — SODIUM CHLORIDE 9 MG/ML
5 INJECTION INTRAVENOUS
Status: DISCONTINUED | OUTPATIENT
Start: 2024-11-18 | End: 2024-11-19 | Stop reason: HOSPADM

## 2024-11-18 RX ORDER — SODIUM CHLORIDE 9 MG/ML
5 INJECTION INTRAVENOUS
Status: COMPLETED | OUTPATIENT
Start: 2024-11-18 | End: 2024-11-18

## 2024-11-18 RX ADMIN — SODIUM CHLORIDE 2 ML: 9 INJECTION, SOLUTION INTRAMUSCULAR; INTRAVENOUS; SUBCUTANEOUS at 14:46

## 2024-11-18 RX ADMIN — GADOBUTROL 0.2 ML: 604.72 INJECTION INTRAVENOUS at 14:46

## 2024-11-18 RX ADMIN — LIDOCAINE HYDROCHLORIDE 2 ML: 10 INJECTION, SOLUTION EPIDURAL; INFILTRATION; INTRACAUDAL; PERINEURAL at 15:16

## 2024-11-18 RX ADMIN — IOHEXOL 0.5 ML: 350 INJECTION, SOLUTION INTRAVENOUS at 14:46

## 2024-11-18 NOTE — ASSESSMENT & PLAN NOTE
Patient with headache starting in March 2024.  He describes having a retro-orbital pressure followed by sharp pain located on the left side associated with nausea, neck pain, photophobia, phonophobia, osmophobia, blurry vision, tinnitus and dizziness.  Dizziness is particularly worse when the headaches are severe.  He gets a sudden onset sensation of falling down and loss of balance however this can also happen without the headaches.  Headaches are triggered by strong smells and can be worsened by coughing.  Risk factors included obesity, stress, snoring, sleep disturbances and muscular tension at the neck and shoulders.  Patient is being followed by sleep medicine.  Sleep study showed 7 apneic episodes per night. He reports CPAP compliance.    Patient did not respond to Tylenol.  Triptans contraindicated given microhemorrhages on imaging due to CAA.  Ibuprofen contraindicated due to esophageal issues.  Patient did not respond to Topamax.     Today patient reports that he still gets daily headaches however not as intense as before and is able to function at work.  He has not had any dizziness episodes since July 17.  Patient had a nerve block without any help.  He has not started Emgality.    Plan:  Patient still has loading dose of Emgality.  Will send for subsequent months.  Okay to discontinue topiramate  Continue magnesium oxide 400 mg  Continue metocarbamol 500 mg tid prn  Continue following with pain medicine   Encouraged CPAP compliance     Orders:    Galcanezumab-gnlm 120 MG/ML SOAJ; 30 days after loading dose, inject 1 pen subq every 30 days.

## 2024-11-18 NOTE — PROGRESS NOTES
Name: Jeremy George      : 1970      MRN: [id number]  Encounter Provider: Naty Penaloza MD  Encounter Date: 2024   [facility]    :  Assessment & Plan  Vestibular migraine  Patient with headache starting in 2024.  He describes having a retro-orbital pressure followed by sharp pain located on the left side associated with nausea, neck pain, photophobia, phonophobia, osmophobia, blurry vision, tinnitus and dizziness.  Dizziness is particularly worse when the headaches are severe.  He gets a sudden onset sensation of falling down and loss of balance however this can also happen without the headaches.  Headaches are triggered by strong smells and can be worsened by coughing.  Risk factors included obesity, stress, snoring, sleep disturbances and muscular tension at the neck and shoulders.  Patient is being followed by sleep medicine.  Sleep study showed 7 apneic episodes per night. He reports CPAP compliance.    Patient did not respond to Tylenol.  Triptans contraindicated given microhemorrhages on imaging due to CAA.  Ibuprofen contraindicated due to esophageal issues.  Patient did not respond to Topamax.     Today patient reports that he still gets daily headaches however not as intense as before and is able to function at work.  He has not had any dizziness episodes since .  Patient had a nerve block without any help.  He has not started Emgality.    Plan:  Patient still has loading dose of Emgality.  Will send for subsequent months.  Okay to discontinue topiramate  Continue magnesium oxide 400 mg  Continue metocarbamol 500 mg tid prn  Continue following with pain medicine   Encouraged CPAP compliance     Orders:    Galcanezumab-gnlm 120 MG/ML SOAJ; 30 days after loading dose, inject 1 pen subq every 30 days.    Tinnitus  Patient with ringing of the left ear that started 2 to 3 weeks ago.  He reports it happens 2-3 times a week.   He denies having any exposures to loud sounds at work.  He denies having any hearing loss.  No specific triggers he can recall.    At this time, we agreed on monitoring his symptoms.  If they do not resolve or if they worsen we will consider a referral to ENT.           History of Present Illness   HPI  Jeremy George is a very pleasant right-handed 53 y.o. male with a past medical history that includes cervical spondylosis with radiculopathy s/p cervical spinal fusion, diabetes, hypertension, hyperlipidemia, essential tremor, chronic pain syndrome, groin strain, mild asthma, thoracic aortic aneurysm, lung nodule, chest pain, vitamin B12 deficiency and cerebral amyloid angiopathy who presents for headache follow-up.     Initial visit (5/22/24):  Patient today reports having a history of headaches however these are different and that they are less associated with his right upper extremity tremor.  He started noticing these headaches in March 2024. They report having a  retro-orbital pressure followed by sharp pain located on the left side. Associated symptoms includes nausea, neck pain, photophobia, phonophobia, osmophobia, blurry vision, tinnitus and dizziness.  Dizziness is particularly worse when the headaches are severe.  He gets a sudden onset sensation of falling down and loss of balance however this can also happen without the headaches.. They report that headaches are triggered by strong smells and can be worsened by coughing . In a month they can get up to 10-12 headache days. Risk factors include obesity, stress, snoring , sleep disturbances , and muscular tension at the neck and shoulders. Patients have not responded to Tylenol or sumatriptan.  Ibuprofen contraindicated due to esophageal issues.  Triptans overall contraindicated given they can cause vasoconstriction and worsen microhemorrhages due to patients hx of cerebral amyloid angiopathy. Also has thoracic aortic aneurysm.  Physical exam was normal  with exception of slowed speech and decreased range of motion at the neck.     Impression: At this time, based on history, available workup and physical exam,  I believe patients headaches are likely due to potential vestibular migraines.  Need to clarify if he had a history of migraines without the dizziness in the past.  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  I will evaluate them for this and address risk factors with the following plan outlined below.      I referred him to sleep medicine for evaluation of SIVA.  I referred him to ophthalmology for dilated eye exam.  For his headaches I increase his Topamax to 100 mg nightly.  I added magnesium oxide 400 mg daily.  [facility] headache  [facility] contraindications to triptans and ibuprofen.     Prior encounters:    2024:Today patient reports some mild improvements in his symptoms.  He has been tracking his headaches through a migraine albertina.  Patient is very sensitive to lights.  Dizziness mildly improved with cervical traction however then recurred.  He has been taking Topamax 100 mg nightly and his migraines have become milder and his headaches have still persisted moderate to severe.  He was seen by ophthalmology.  No optic nerve edema on dilated eye exam.  He was seen by sleep medicine plan for home sleep study.  He is being followed by pain medicine and he is going to get a cervical medial branch block this afternoon to see if he is a candidate for radiofrequency ablation.    Workup:    MRI brain without contrast (24):  No acute intracranial abnormality. No restricted diffusion to suggest acute ischemia. Mild scattered periventricular and subcortical foci of white matter T2 hyperintensity which are nonspecific and most likely related to chronic small vessel ischemic changes. Other less like etiologies include demyelinating  disease, vasculitis, Lyme and migraines. Reidentified and normal punctate foci of susceptibility artifact are seen in the supratentorial brain centrally at the gray-white junction peripherally in distribution most compatible with cerebral amyloid angiopathy.  Chronic microhemorrhage in the setting of hypertension is less likely with this distribution but not excluded.    Sleep study (7/19/24):  Sleep efficiency was decreased.  Sleep latency was normal but REM latency was prolonged.  There was decreased N3 but a normal percentage of REM sleep.  Mild obstructive sleep apnea was observed with overall AHI of 6.3.  The first cluster of sleep apnea was mostly likely occurring in REM supine sleep (mislabeled as N2 sleep) resulting in more severe desaturations compared to other events.  Sleep apnea resulted in mild to moderate oxygen desaturations  Baseline oxygenation was normal  Degree of sleep apnea may have been underestimated due to decreased supine sleep.    No significant periodic limb movements  EKG demonstrates normal sinus rhythm     Prior treatment:     Abortive:   Tylenol 1000 mg- no help   Ibuprofen- C/I due to esophageal issues   Sumatriptan- no help   Triptans- contraindicated given they can cause vasoconstriction and worsen microhemorrhages due to patients hx of cerebral amyloid angiopathy. Also has thoracic aortic aneurysm   Nurtec 75 mg      Preventative:  Topiramate 75 mg- taking in the morning, headaches not as severe. No help   Pregabalin 150 mg - for neck pain      Interval history:  Has not had dizzy episodes since July 17th   Still gets the headaches daily however not as intense as before   Has been doing massages at the base of skull which do help and the HA are not as severe     Robaxin?   PT for cervical dystonia ? Yes     Nerve block? No help   Decadron?  Off topiramate? Stopped   Emgality? Has the supply, has not taken it   Sleep study? Following with Dr. Burnham. Apneic episodes 7 per night Using  "CPAP nightly, better rest.     Tinnitus L ear.   No exposure to loud sounds at work   Started 2-3 weeks ago, happens 2-3 times week, high pitch sounds   No hearing loss   Consider ENT referral     Review of Systems    Review of Systems   Constitutional:  Negative for appetite change, fatigue and fever.   HENT: Negative.  Negative for hearing loss, tinnitus, trouble swallowing and voice change.    Eyes: Negative.  Negative for photophobia, pain and visual disturbance.   Respiratory: Negative.  Negative for shortness of breath.    Cardiovascular: Negative.  Negative for palpitations.   Gastrointestinal: Negative.  Negative for nausea and vomiting.   Endocrine: Negative.  Negative for cold intolerance.   Genitourinary: Negative.  Negative for dysuria, frequency and urgency.   Musculoskeletal:  Negative for back pain, gait problem, myalgias, neck pain and neck stiffness.   Skin: Negative.  Negative for rash.   Allergic/Immunologic: Negative.    Neurological: Negative.  Negative for dizziness, tremors, seizures, syncope, facial asymmetry, speech difficulty, weakness, light-headedness, numbness and headaches.   Hematological: Negative.  Does not bruise/bleed easily.   Psychiatric/Behavioral: Negative.  Negative for confusion, hallucinations and sleep disturbance.      I have personally reviewed the MA's review of systems and made changes as necessary.         Objective   /80 (BP Location: Right arm, Patient Position: Sitting, Cuff Size: Standard)   Pulse 59   Ht 6' 1.7\" (1.872 m)   Wt 121 kg (267 lb 4.8 oz)   BMI 34.60 kg/m²     Physical Exam  Neurologic Exam            "

## 2024-11-26 ENCOUNTER — OFFICE VISIT (OUTPATIENT)
Dept: OBGYN CLINIC | Facility: MEDICAL CENTER | Age: 54
End: 2024-11-26
Payer: COMMERCIAL

## 2024-11-26 VITALS
WEIGHT: 267 LBS | SYSTOLIC BLOOD PRESSURE: 132 MMHG | DIASTOLIC BLOOD PRESSURE: 81 MMHG | BODY MASS INDEX: 34.27 KG/M2 | HEIGHT: 74 IN | HEART RATE: 62 BPM

## 2024-11-26 DIAGNOSIS — M24.131 DEGENERATIVE TFCC TEAR, RIGHT: Primary | ICD-10-CM

## 2024-11-26 DIAGNOSIS — S69.81XD INJURY OF TRIANGULAR FIBROCARTILAGE COMPLEX (TFCC) OF RIGHT WRIST, SUBSEQUENT ENCOUNTER: ICD-10-CM

## 2024-11-26 PROCEDURE — 99213 OFFICE O/P EST LOW 20 MIN: CPT | Performed by: ORTHOPAEDIC SURGERY

## 2024-11-26 PROCEDURE — 20605 DRAIN/INJ JOINT/BURSA W/O US: CPT | Performed by: ORTHOPAEDIC SURGERY

## 2024-11-26 RX ORDER — ROPIVACAINE HYDROCHLORIDE 2 MG/ML
1 INJECTION, SOLUTION EPIDURAL; INFILTRATION; PERINEURAL
Status: COMPLETED | OUTPATIENT
Start: 2024-11-26 | End: 2024-11-26

## 2024-11-26 RX ORDER — BETAMETHASONE SODIUM PHOSPHATE AND BETAMETHASONE ACETATE 3; 3 MG/ML; MG/ML
6 INJECTION, SUSPENSION INTRA-ARTICULAR; INTRALESIONAL; INTRAMUSCULAR; SOFT TISSUE
Status: COMPLETED | OUTPATIENT
Start: 2024-11-26 | End: 2024-11-26

## 2024-11-26 RX ADMIN — BETAMETHASONE SODIUM PHOSPHATE AND BETAMETHASONE ACETATE 6 MG: 3; 3 INJECTION, SUSPENSION INTRA-ARTICULAR; INTRALESIONAL; INTRAMUSCULAR; SOFT TISSUE at 15:15

## 2024-11-26 RX ADMIN — ROPIVACAINE HYDROCHLORIDE 1 ML: 2 INJECTION, SOLUTION EPIDURAL; INFILTRATION; PERINEURAL at 15:15

## 2024-11-26 NOTE — PROGRESS NOTES
HAND & UPPER EXTREMITY OFFICE VISIT   Referred By:  No referring provider defined for this encounter.      Chief Complaint:     Right wrist pain     Previous History:   Jeremy was seen in the office on 11/18/24 at which time a MRI arthrogram of his right wrist was ordered. He was provided with a wrist brace.     Interval History:  Jeremy is a 53 y.o. male who presents to the office for a follow up regarding his right wrist. He is here to review MRI arthrogram results. Overall Jeremy is doing well. He feels his constant ulnar sided wrist pain is now intermittent. Pain will worsen with ulnar deviation. He was wearing a wrist brace most of the time but has since been able to wean from the brace. He notes the brace is most beneficial for him at night. He will use a Lidocaine patch over the area on an as needed basis.      Past Medical History:  Past Medical History:   Diagnosis Date    Achalasia     Aneurysm (HCC)     aortic    Asthma     Childhood    Colon polyp     Diabetes mellitus (HCC)     Esophageal ulcer     Fatty liver     Groin abscess     with I &D    Hyperlipidemia     Hypertension     Plantar fasciitis     S/P cervical spinal fusion 01/03/2020     Past Surgical History:   Procedure Laterality Date    ANTERIOR CERVICAL DISCECTOMY W/ FUSION      APPENDECTOMY      BICEPS TENDON REPAIR      EGD AND COLONOSCOPY      FL INJECTION LEFT WRIST (ARTHROGRAM)  11/9/2021    FL INJECTION RIGHT WRIST (ARTHROGRAM)  11/18/2024    FL MYELOGRAM CERVICAL  6/13/2018    MENISCECTOMY      MYOTOMY HELLER LAPAROSCOPIC      ORTHOPEDIC SURGERY       Family History   Problem Relation Age of Onset    No Known Problems Mother     No Known Problems Father      Social History     Socioeconomic History    Marital status: /Civil Union     Spouse name: Not on file    Number of children: Not on file    Years of education: Not on file    Highest education level: Not on file   Occupational History    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   Tobacco Use    Smoking status: Never    Smokeless tobacco: Never   Vaping Use    Vaping status: Never Used   Substance and Sexual Activity    Alcohol use: Not Currently    Drug use: Never    Sexual activity: Yes     Partners: Female   Other Topics Concern    Not on file   Social History Narrative    Lives with spouse     Social Drivers of Health     Financial Resource Strain: Not on file   Food Insecurity: No Food Insecurity (4/24/2024)    Nursing - Inadequate Food Risk Classification     Worried About Running Out of Food in the Last Year: Never true     Ran Out of Food in the Last Year: Never true     Ran Out of Food in the Last Year: Not on file   Transportation Needs: No Transportation Needs (4/24/2024)    PRAPARE - Transportation     Lack of Transportation (Medical): No     Lack of Transportation (Non-Medical): No   Physical Activity: Not on file   Stress: Not on file   Social Connections: Not on file   Intimate Partner Violence: Not on file   Housing Stability: Low Risk  (4/24/2024)    Housing Stability Vital Sign     Unable to Pay for Housing in the Last Year: No     Number of Times Moved in the Last Year: 1     Homeless in the Last Year: No     Scheduled Meds:  Current Facility-Administered Medications   Medication Dose Route Frequency Provider Last Rate    cyanocobalamin  1,000 mcg Intramuscular Q30 Days MIKHAIL Ha      lidocaine  1 mL Infiltration        triamcinolone acetonide  20 mg Infiltration         Continuous Infusions:   PRN Meds:.  lidocaine    triamcinolone acetonide  No Known Allergies    Physical Examination:    /81   Pulse 62   Ht 6' 1.7\" (1.872 m)   Wt 121 kg (267 lb)   BMI 34.56 kg/m²     Gen: A&Ox3, NAD  Cardiac: regular rate  Chest: non labored breathing  Abdomen: Non-distended    Right Upper Extremity:  Skin CDI  No obvious deformity of the shoulder, arm, elbow, forearm, wrist, hand  Full digital extension   Full composite fist   Sensation intact to " light touch in the axillary median, ulnar, and radial nerve distributions  TFCC fovea tender to palpation   Pain with ulnar deviation of the wrist  Negative tinel's at the elbow   Negative elbow flexion compression test   2+RP      Studies:  Radiographs: I personally reviewed and independently interpreted the available radiographs.   11/18/24: MRI arthrogram of the right wrist  demonstrate a central TFCC tear.     Assessment and Plan:  1. Degenerative TFCC tear, right  Hand/upper extremity injection      2. Injury of triangular fibrocartilage complex (TFCC) of right wrist, subsequent encounter  Hand/upper extremity injection          53 y.o. male presents in follow up for the above diagnosis. MRI arthrogram results were reviewed demonstrating a central TFCC tear. It was discussed with Lucita that on his x-ray he did appear to have some ulnar positive variance, which was likely positional as this is not demonstrated on his MRI. It was discussed with Lucita that he likely has a degenerative tear as he did not have a lucita injury.  We discussed treatment options including continued activity modification/home exercises and bracing, physical therapy, corticosteroid injection or surgical intervention.  He got good relief from a left side a corticosteroid injection for TFCC tear in the past and would like to try an injection on the right side today.      Risks, benefits and alternative treatments were discussed with the patient. The risks of injection include but are not limited to: bleeding, infection, damage to nerves, vessels or tendons, allergic reaction to agents, possible increase in pain, tendon or ligament rupture, weakening of bone or soft tissues, and/or elevation in blood sugar. Patient understands and would like to proceed with the proposed procedure. Right wrist TFCC injection was tolerated well. Please see procedure note for details. May take NSAIDs/Tylenol or apply ice to the area as needed for post-injection  discomfort. He may continue to wear the wrist brace on an as needed basis. I will see him back in the office on an as needed basis if symptoms worsen or fail to improve.       he expressed understanding of the plan and agreed. We encouraged them to contact our office with any questions or concerns.       Medium joint arthrocentesis: R ulnocarpal  Haskell Protocol:  procedure performed by consultantConsent: Verbal consent obtained.  Risks and benefits: risks, benefits and alternatives were discussed  Consent given by: patient  Patient identity confirmed: verbally with patient  Supporting Documentation  Indications: pain   Procedure Details  Location: wrist - R ulnocarpal  Preparation: Patient was prepped and draped in the usual sterile fashion  Needle size: 25 G  Ultrasound guidance: no  Approach: dorsal  Medications administered: 6 mg betamethasone acetate-betamethasone sodium phosphate 6 (3-3) mg/mL; 1 mL ropivacaine 0.2 %    Patient tolerance: patient tolerated the procedure well with no immediate complications  Dressing:  Sterile dressing applied        Louis Up MD  Hand and Upper Extremity Surgery      *This note was dictated using Dragon voice recognition software. Please excuse any word substitutions or errors.*      Scribe Attestation      I,:  Keyonna Negron MA am acting as a scribe while in the presence of the attending physician.:       I,:  Louis Up MD personally performed the services described in this documentation    as scribed in my presence.:

## 2024-12-05 PROBLEM — J01.00 ACUTE NON-RECURRENT MAXILLARY SINUSITIS: Status: RESOLVED | Noted: 2024-11-05 | Resolved: 2024-12-05

## 2024-12-09 ENCOUNTER — ANESTHESIA EVENT (OUTPATIENT)
Dept: ANESTHESIOLOGY | Facility: HOSPITAL | Age: 54
End: 2024-12-09

## 2024-12-09 ENCOUNTER — ANESTHESIA (OUTPATIENT)
Dept: ANESTHESIOLOGY | Facility: HOSPITAL | Age: 54
End: 2024-12-09

## 2024-12-12 ENCOUNTER — OFFICE VISIT (OUTPATIENT)
Dept: PODIATRY | Facility: CLINIC | Age: 54
End: 2024-12-12
Payer: COMMERCIAL

## 2024-12-12 VITALS
SYSTOLIC BLOOD PRESSURE: 122 MMHG | WEIGHT: 271.6 LBS | HEIGHT: 74 IN | DIASTOLIC BLOOD PRESSURE: 75 MMHG | BODY MASS INDEX: 34.86 KG/M2 | HEART RATE: 84 BPM

## 2024-12-12 DIAGNOSIS — M76.72 PERONEAL TENDINITIS OF LEFT LOWER EXTREMITY: ICD-10-CM

## 2024-12-12 DIAGNOSIS — M20.42 HAMMER TOE OF LEFT FOOT: Primary | ICD-10-CM

## 2024-12-12 PROCEDURE — 99213 OFFICE O/P EST LOW 20 MIN: CPT | Performed by: PODIATRIST

## 2024-12-12 NOTE — PROGRESS NOTES
Patient presents for assessment of left foot.  At last visit he was treated for peroneal brevis tendinitis with cortisone injection.  He responded well and has no pain at this location.    However, over the past few weeks he has developed a soft corn on the medial aspect of the left fourth toe.  The left fourth toe is a mallet toe deformity and is being irritated by the third toe.  He denies ever having this problem before.  He denies wearing any new shoes.  He works for [employer] now and is generally in work boots.    On exam, left fourth toe is deviated beneath the third toe with the apex of the deformity at the DIPJ.  Soft corn present at the medial aspect of the DIPJ.    I personally viewed x-rays of the toes of the left foot taken today.  They reveal a hammertoe deformity of the left fourth toe.    Treatment: Trimmed soft corn left fourth toe and dispensed foam pads to reduce pressure on the digit.  We will reassess in 2 months.  Derotational DIPJ arthroplasty needed for correction.

## 2024-12-13 ENCOUNTER — APPOINTMENT (OUTPATIENT)
Dept: LAB | Age: 54
End: 2024-12-13
Payer: COMMERCIAL

## 2024-12-13 DIAGNOSIS — E78.5 HYPERLIPIDEMIA, UNSPECIFIED HYPERLIPIDEMIA TYPE: ICD-10-CM

## 2024-12-13 DIAGNOSIS — Z13.228 SCREENING FOR METABOLIC DISORDER: ICD-10-CM

## 2024-12-13 DIAGNOSIS — R74.8 ELEVATED LIVER ENZYMES: ICD-10-CM

## 2024-12-13 DIAGNOSIS — R73.01 ELEVATED FASTING GLUCOSE: ICD-10-CM

## 2024-12-13 DIAGNOSIS — Z12.5 SCREENING FOR PROSTATE CANCER: ICD-10-CM

## 2024-12-13 DIAGNOSIS — E11.9 TYPE 2 DIABETES MELLITUS WITHOUT COMPLICATION, WITHOUT LONG-TERM CURRENT USE OF INSULIN (HCC): ICD-10-CM

## 2024-12-13 LAB
ALBUMIN SERPL BCG-MCNC: 4.2 G/DL (ref 3.5–5)
ALP SERPL-CCNC: 65 U/L (ref 34–104)
ALT SERPL W P-5'-P-CCNC: 75 U/L (ref 7–52)
ANION GAP SERPL CALCULATED.3IONS-SCNC: 8 MMOL/L (ref 4–13)
AST SERPL W P-5'-P-CCNC: 46 U/L (ref 13–39)
BASOPHILS # BLD AUTO: 0.03 THOUSANDS/ÂΜL (ref 0–0.1)
BASOPHILS NFR BLD AUTO: 1 % (ref 0–1)
BILIRUB SERPL-MCNC: 0.59 MG/DL (ref 0.2–1)
BUN SERPL-MCNC: 16 MG/DL (ref 5–25)
CALCIUM SERPL-MCNC: 8.9 MG/DL (ref 8.4–10.2)
CHLORIDE SERPL-SCNC: 103 MMOL/L (ref 96–108)
CHOLEST SERPL-MCNC: 93 MG/DL (ref ?–200)
CO2 SERPL-SCNC: 28 MMOL/L (ref 21–32)
CREAT SERPL-MCNC: 0.86 MG/DL (ref 0.6–1.3)
CREAT UR-MCNC: 183.3 MG/DL
EOSINOPHIL # BLD AUTO: 0.12 THOUSAND/ÂΜL (ref 0–0.61)
EOSINOPHIL NFR BLD AUTO: 2 % (ref 0–6)
ERYTHROCYTE [DISTWIDTH] IN BLOOD BY AUTOMATED COUNT: 12.1 % (ref 11.6–15.1)
EST. AVERAGE GLUCOSE BLD GHB EST-MCNC: 174 MG/DL
GFR SERPL CREATININE-BSD FRML MDRD: 97 ML/MIN/1.73SQ M
GLUCOSE P FAST SERPL-MCNC: 118 MG/DL (ref 65–99)
HBA1C MFR BLD: 7.7 %
HCT VFR BLD AUTO: 41.4 % (ref 36.5–49.3)
HDLC SERPL-MCNC: 29 MG/DL
HGB BLD-MCNC: 13.6 G/DL (ref 12–17)
IMM GRANULOCYTES # BLD AUTO: 0.05 THOUSAND/UL (ref 0–0.2)
IMM GRANULOCYTES NFR BLD AUTO: 1 % (ref 0–2)
LDLC SERPL CALC-MCNC: 44 MG/DL (ref 0–100)
LYMPHOCYTES # BLD AUTO: 2.21 THOUSANDS/ÂΜL (ref 0.6–4.47)
LYMPHOCYTES NFR BLD AUTO: 38 % (ref 14–44)
MCH RBC QN AUTO: 30.6 PG (ref 26.8–34.3)
MCHC RBC AUTO-ENTMCNC: 32.9 G/DL (ref 31.4–37.4)
MCV RBC AUTO: 93 FL (ref 82–98)
MICROALBUMIN UR-MCNC: <7 MG/L
MONOCYTES # BLD AUTO: 0.73 THOUSAND/ÂΜL (ref 0.17–1.22)
MONOCYTES NFR BLD AUTO: 13 % (ref 4–12)
NEUTROPHILS # BLD AUTO: 2.72 THOUSANDS/ÂΜL (ref 1.85–7.62)
NEUTS SEG NFR BLD AUTO: 45 % (ref 43–75)
NONHDLC SERPL-MCNC: 64 MG/DL
NRBC BLD AUTO-RTO: 0 /100 WBCS
PLATELET # BLD AUTO: 191 THOUSANDS/UL (ref 149–390)
PMV BLD AUTO: 11.1 FL (ref 8.9–12.7)
POTASSIUM SERPL-SCNC: 4.1 MMOL/L (ref 3.5–5.3)
PROT SERPL-MCNC: 6.4 G/DL (ref 6.4–8.4)
PSA SERPL-MCNC: 0.29 NG/ML (ref 0–4)
RBC # BLD AUTO: 4.45 MILLION/UL (ref 3.88–5.62)
SODIUM SERPL-SCNC: 139 MMOL/L (ref 135–147)
TRIGL SERPL-MCNC: 100 MG/DL (ref ?–150)
WBC # BLD AUTO: 5.86 THOUSAND/UL (ref 4.31–10.16)

## 2024-12-13 PROCEDURE — 36415 COLL VENOUS BLD VENIPUNCTURE: CPT

## 2024-12-13 PROCEDURE — 80053 COMPREHEN METABOLIC PANEL: CPT

## 2024-12-13 PROCEDURE — 82043 UR ALBUMIN QUANTITATIVE: CPT

## 2024-12-13 PROCEDURE — 85025 COMPLETE CBC W/AUTO DIFF WBC: CPT

## 2024-12-13 PROCEDURE — 83036 HEMOGLOBIN GLYCOSYLATED A1C: CPT

## 2024-12-13 PROCEDURE — 82570 ASSAY OF URINE CREATININE: CPT

## 2024-12-13 PROCEDURE — 80061 LIPID PANEL: CPT

## 2024-12-13 PROCEDURE — G0103 PSA SCREENING: HCPCS

## 2024-12-22 ENCOUNTER — OFFICE VISIT (OUTPATIENT)
Dept: URGENT CARE | Age: 54
End: 2024-12-22
Payer: COMMERCIAL

## 2024-12-22 VITALS
HEART RATE: 68 BPM | OXYGEN SATURATION: 98 % | DIASTOLIC BLOOD PRESSURE: 88 MMHG | SYSTOLIC BLOOD PRESSURE: 130 MMHG | BODY MASS INDEX: 33.65 KG/M2 | TEMPERATURE: 96.5 F | RESPIRATION RATE: 16 BRPM | WEIGHT: 260 LBS

## 2024-12-22 DIAGNOSIS — S05.01XA ABRASION OF RIGHT CORNEA, INITIAL ENCOUNTER: Primary | ICD-10-CM

## 2024-12-22 PROCEDURE — G0383 LEV 4 HOSP TYPE B ED VISIT: HCPCS | Performed by: STUDENT IN AN ORGANIZED HEALTH CARE EDUCATION/TRAINING PROGRAM

## 2024-12-22 PROCEDURE — S9083 URGENT CARE CENTER GLOBAL: HCPCS | Performed by: STUDENT IN AN ORGANIZED HEALTH CARE EDUCATION/TRAINING PROGRAM

## 2024-12-22 RX ORDER — ERYTHROMYCIN 5 MG/G
0.5 OINTMENT OPHTHALMIC EVERY 12 HOURS SCHEDULED
Qty: 3.5 G | Refills: 0 | Status: SHIPPED | OUTPATIENT
Start: 2024-12-22 | End: 2024-12-27

## 2024-12-22 NOTE — PROGRESS NOTES
[facility]        NAME: Jeremy George is a 54 y.o. male  : 1970    MRN: [id number]  DATE: 2024  TIME: 6:13 PM    Assessment and Plan   Abrasion of right cornea, initial encounter [S05.01XA]  1. Abrasion of right cornea, initial encounter  erythromycin (ILOTYCIN) ophthalmic ointment    Ambulatory Referral to Ophthalmology        Foreseen exam positive for a small corneal abrasion to the right eye.  Erythromycin ointment 3 times daily to the right eye.  Referral ophthalmology.  Strict ER precautions.    Fluorescein Exam  Risks and benefits discussed at length. Pt provides verbal consent to procedure.   Tetracaine drop applied to right eye.   Once adequate anesthesia is achieved, fluorescein strip wet with second tetracaine drop is applied to lower eyelid of the same eye.   Pt instructed to blink and eye appears orange in color, consistent with successful staining.   Eye is viewed under backlight lamp with noted over the right cornea at 5 O'clock.  Pt tolerated procedure well with no immediate complications.   Patient Instructions       Follow up with PCP in 3-5 days.  Proceed to  ER if symptoms worsen.    If tests have been performed at [facility], our office will contact you with results if changes need to be made to the care plan discussed with you at the visit.  [facility]    Chief Complaint     Chief Complaint   Patient presents with   • Foreign Body in Eye     Pt states he got the dust particles from [employer] in his eye. Attempted eye wash thoroughly at home. Eye is red and irritated; watery. Pt has held medications today for scheduled procedure tomorrow.          History of Present Illness       Patient is a 54-year-old male presenting with right eye pain after foreign body sensation.  Reports he was working with [employer], when he feels he got dust particles in his eye.  He has rubbed his eye causing more pain.  He attempted to irrigate  the eye with continued pain.  He denies loss of vision, changes in his vision, blurred vision.  He does not wear contact lenses.  He wears reading glasses as needed.  He has not taken anything for his pain or symptoms.        Review of Systems   Review of Systems   Eyes:  Positive for photophobia, pain and redness. Negative for discharge, itching and visual disturbance.   Neurological:  Negative for dizziness and headaches.         Current Medications       Current Outpatient Medications:   •  Ascorbic Acid (vitamin C) 1000 MG tablet, Take 1,000 mg by mouth daily  , Disp: , Rfl:   •  CINNAMON PO, Take by mouth, Disp: , Rfl:   •  Cyanocobalamin (VITAMIN B 12 PO), Take by mouth in the morning, Disp: , Rfl:   •  erythromycin (ILOTYCIN) ophthalmic ointment, Administer 0.5 inches to the right eye every 12 (twelve) hours for 5 days, Disp: 3.5 g, Rfl: 0  •  lisinopril-hydrochlorothiazide (PRINZIDE,ZESTORETIC) 20-12.5 MG per tablet, Take 1 tablet by mouth daily, Disp: 90 tablet, Rfl: 1  •  magnesium Oxide (MAG-OX) 400 mg TABS, Take 1 tablet (400 mg total) by mouth daily, Disp: 90 tablet, Rfl: 0  •  metFORMIN (GLUCOPHAGE) 500 mg tablet, Pt takes 1500 mg (3 tablets) by mouth in the morning, and a 1000 mg (2 tablets) in the evening, Disp: 450 tablet, Rfl: 1  •  Multiple Vitamins-Minerals (CENTRUM SILVER 50+MEN PO), Take by mouth, Disp: , Rfl:   •  omeprazole (PriLOSEC) 40 MG capsule, Take 1 capsule (40 mg total) by mouth daily, Disp: 90 capsule, Rfl: 3  •  pregabalin (LYRICA) 50 mg capsule, Take 2 PO HS x 1 week, then 1 PO HS x 1 week, then stop, Disp: 30 capsule, Rfl: 0  •  simvastatin (ZOCOR) 40 mg tablet, Take 1 tablet (40 mg total) by mouth daily, Disp: 90 tablet, Rfl: 0  •  sitaGLIPtin (JANUVIA) 100 mg tablet, Take 1 tablet (100 mg total) by mouth daily, Disp: 90 tablet, Rfl: 1  •  acetaminophen (TYLENOL) 500 mg tablet, Take 500 mg by mouth every 6 (six) hours as needed for mild pain (Patient not taking: Reported on  12/12/2024), Disp: , Rfl:   •  albuterol (ProAir HFA) 90 mcg/act inhaler, Inhale 2 puffs every 6 (six) hours as needed for wheezing (Patient not taking: Reported on 12/12/2024), Disp: 8.5 g, Rfl: 5  •  bisacodyl (DULCOLAX) 5 mg EC tablet, Take 2 tablets (10 mg total) by mouth once for 1 dose For colonoscopy, Disp: 2 tablet, Rfl: 0  •  cetirizine (ZyrTEC) 10 mg tablet, Take 1 tablet (10 mg total) by mouth daily (Patient not taking: Reported on 12/11/2024), Disp: 90 tablet, Rfl: 0  •  cyanocobalamin 1,000 mcg/mL, Inject 1 mL (1,000 mcg total) into a muscle every 7 days for 9 doses (Patient not taking: Reported on 11/12/2024), Disp: 1 mL, Rfl: 0  •  diphenhydrAMINE-acetaminophen (TYLENOL PM)  MG TABS, Take 2 tablets by mouth daily at bedtime as needed for sleep 1000 mg in total, Disp: , Rfl:   •  FIBER ADULT GUMMIES PO, Take 3 gums by mouth in the morning (Patient not taking: Reported on 12/12/2024), Disp: , Rfl:   •  fluticasone (FLONASE) 50 mcg/act nasal spray, 2 sprays into each nostril daily (Patient not taking: Reported on 12/12/2024), Disp: 18 mL, Rfl: 1  •  Fluticasone-Salmeterol (Advair Diskus) 100-50 mcg/dose inhaler, Inhale 1 puff 2 (two) times a day Rinse mouth after use. (Patient not taking: Reported on 12/12/2024), Disp: 60 blister, Rfl: 1  •  Galcanezumab-gnlm 120 MG/ML SOAJ, 30 days after loading dose, inject 1 pen subq every 30 days. (Patient not taking: Reported on 12/12/2024), Disp: 3 mL, Rfl: 3  •  methocarbamol (ROBAXIN) 500 mg tablet, Take 1 tablet (500 mg total) by mouth 3 (three) times a day as needed for muscle spasms, Disp: 90 tablet, Rfl: 3  •  polyethylene glycol (GOLYTELY) 4000 mL solution, Take 4,000 mL by mouth once for 1 dose, Disp: 4000 mL, Rfl: 0    Current Facility-Administered Medications:   •  cyanocobalamin injection 1,000 mcg, 1,000 mcg, Intramuscular, Q30 Days, MIKHAIL Ha, 1,000 mcg at 08/27/24 1553  •  lidocaine (XYLOCAINE) 1 % injection 1 mL, 1 mL,  Infiltration, , , 1 mL at 10/31/24 1715  •  triamcinolone acetonide (KENALOG-40) 40 mg/mL injection 20 mg, 20 mg, Infiltration, , , 20 mg at 10/31/24 1715    Current Allergies     Allergies as of 12/22/2024   • (No Known Allergies)            The following portions of the patient's history were reviewed and updated as appropriate: allergies, current medications, past family history, past medical history, past social history, past surgical history and problem list.     Past Medical History:   Diagnosis Date   • Achalasia    • Aneurysm (HCC)     aortic   • Asthma     Childhood   • Colon polyp    • Diabetes mellitus (HCC)    • Esophageal ulcer    • Fatty liver    • Groin abscess     with I &D   • Hyperlipidemia    • Hypertension    • Plantar fasciitis    • S/P cervical spinal fusion 01/03/2020       Past Surgical History:   Procedure Laterality Date   • ANTERIOR CERVICAL DISCECTOMY W/ FUSION     • APPENDECTOMY     • BICEPS TENDON REPAIR     • EGD AND COLONOSCOPY     • FL INJECTION LEFT WRIST (ARTHROGRAM)  11/9/2021   • FL INJECTION RIGHT WRIST (ARTHROGRAM)  11/18/2024   • FL MYELOGRAM CERVICAL  6/13/2018   • MENISCECTOMY     • MYOTOMY HELLER LAPAROSCOPIC     • ORTHOPEDIC SURGERY         Family History   Problem Relation Age of Onset   • No Known Problems Mother    • No Known Problems Father          Medications have been verified.        Objective   /88   Pulse 68   Temp (!) 96.5 °F (35.8 °C)   Resp 16   Wt 118 kg (260 lb)   SpO2 98%   BMI 33.65 kg/m²   No LMP for male patient.       Physical Exam     Physical Exam  Vitals and nursing note reviewed.   Constitutional:       General: He is not in acute distress.     Appearance: Normal appearance. He is normal weight. He is not ill-appearing.   Eyes:      General: Lids are normal. Lids are everted, no foreign bodies appreciated. Vision grossly intact. Gaze aligned appropriately.         Right eye: No foreign body, discharge or hordeolum.      Extraocular  Movements:      Right eye: Normal extraocular motion and no nystagmus.      Conjunctiva/sclera:      Right eye: Right conjunctiva is injected.      Pupils:      Right eye: Corneal abrasion and fluorescein uptake present.     Cardiovascular:      Rate and Rhythm: Normal rate.      Pulses: Normal pulses.   Pulmonary:      Effort: Pulmonary effort is normal.   Musculoskeletal:      Cervical back: Normal range of motion.   Neurological:      Mental Status: He is alert.

## 2024-12-22 NOTE — PATIENT INSTRUCTIONS
There is a small abrasion to your right eye.  Eye ointment as directed.    Artifical tears or gel may provide symptomatic relief  Wear eye protection during high-risk activities when appropriate  Tylenol/Ibuprofen for pain/discomfort  Avoid eye patching    Follow up with Ophthalmology within 24 hours    Follow up with PCP in 3-5 days.  Proceed to the ER if symptoms worsen.

## 2024-12-23 ENCOUNTER — ANESTHESIA (OUTPATIENT)
Dept: GASTROENTEROLOGY | Facility: AMBULARY SURGERY CENTER | Age: 54
End: 2024-12-23
Payer: COMMERCIAL

## 2024-12-23 ENCOUNTER — HOSPITAL ENCOUNTER (OUTPATIENT)
Dept: GASTROENTEROLOGY | Facility: AMBULARY SURGERY CENTER | Age: 54
Setting detail: OUTPATIENT SURGERY
Discharge: HOME | End: 2024-12-23
Attending: INTERNAL MEDICINE
Payer: COMMERCIAL

## 2024-12-23 VITALS
RESPIRATION RATE: 20 BRPM | HEART RATE: 58 BPM | OXYGEN SATURATION: 97 % | TEMPERATURE: 96.6 F | DIASTOLIC BLOOD PRESSURE: 69 MMHG | SYSTOLIC BLOOD PRESSURE: 126 MMHG

## 2024-12-23 DIAGNOSIS — Z86.0100 HISTORY OF COLON POLYPS: ICD-10-CM

## 2024-12-23 LAB — GLUCOSE SERPL-MCNC: 129 MG/DL (ref 65–140)

## 2024-12-23 PROCEDURE — 82948 REAGENT STRIP/BLOOD GLUCOSE: CPT

## 2024-12-23 PROCEDURE — 88305 TISSUE EXAM BY PATHOLOGIST: CPT | Performed by: PATHOLOGY

## 2024-12-23 PROCEDURE — 45385 COLONOSCOPY W/LESION REMOVAL: CPT | Performed by: INTERNAL MEDICINE

## 2024-12-23 RX ORDER — PROPOFOL 10 MG/ML
INJECTION, EMULSION INTRAVENOUS CONTINUOUS PRN
Status: DISCONTINUED | OUTPATIENT
Start: 2024-12-23 | End: 2024-12-23

## 2024-12-23 RX ORDER — SODIUM CHLORIDE, SODIUM LACTATE, POTASSIUM CHLORIDE, CALCIUM CHLORIDE 600; 310; 30; 20 MG/100ML; MG/100ML; MG/100ML; MG/100ML
INJECTION, SOLUTION INTRAVENOUS CONTINUOUS PRN
Status: DISCONTINUED | OUTPATIENT
Start: 2024-12-23 | End: 2024-12-23

## 2024-12-23 RX ORDER — PROPOFOL 10 MG/ML
INJECTION, EMULSION INTRAVENOUS AS NEEDED
Status: DISCONTINUED | OUTPATIENT
Start: 2024-12-23 | End: 2024-12-23

## 2024-12-23 RX ADMIN — Medication 40 MG: at 07:36

## 2024-12-23 RX ADMIN — PROPOFOL 100 MCG/KG/MIN: 10 INJECTION, EMULSION INTRAVENOUS at 07:31

## 2024-12-23 RX ADMIN — SODIUM CHLORIDE, SODIUM LACTATE, POTASSIUM CHLORIDE, AND CALCIUM CHLORIDE: .6; .31; .03; .02 INJECTION, SOLUTION INTRAVENOUS at 07:28

## 2024-12-23 RX ADMIN — PROPOFOL 100 MG: 10 INJECTION, EMULSION INTRAVENOUS at 07:31

## 2024-12-23 NOTE — ANESTHESIA POSTPROCEDURE EVALUATION
Post-Op Assessment Note    CV Status:  Stable  Pain Score: 0    Pain management: adequate       Mental Status:  Alert and sleepy   Hydration Status:  Euvolemic   PONV Controlled:  Controlled   Airway Patency:  Patent     Post Op Vitals Reviewed: Yes    No anethesia notable event occurred.    Staff: CRNA           Last Filed PACU Vitals:  Vitals Value Taken Time   Temp 97    Pulse 71    /70    Resp 18    SpO2 99        Modified Radha:  Activity: 0 (12/23/2024  7:57 AM)  Respiration: 2 (12/23/2024  7:57 AM)  Circulation: 2 (12/23/2024  7:57 AM)  Consciousness: 0 (12/23/2024  7:57 AM)  Oxygen Saturation: 2 (12/23/2024  7:57 AM)  Modified Radha Score: 6 (12/23/2024  7:57 AM)

## 2024-12-23 NOTE — ANESTHESIA PREPROCEDURE EVALUATION
Procedure:  COLONOSCOPY    Relevant Problems   ANESTHESIA (within normal limits)      CARDIO   (+) Hypertension, essential   (+) Hypertension, unspecified type   (+) Other hyperlipidemia   (+) Thoracic aortic aneurysm (TAA), unspecified part, unspecified whether ruptured (HCC)   (+) Vestibular migraine      ENDO (within normal limits)      GI/HEPATIC   (+) Gastroesophageal reflux disease without esophagitis      /RENAL (within normal limits)      GYN (within normal limits)      HEMATOLOGY (within normal limits)      MUSCULOSKELETAL (within normal limits)      NEURO/PSYCH   (+) Vestibular migraine      PULMONARY   (+) Mild intermittent asthma without complication   (+) SIVA (obstructive sleep apnea)      Gritty feeling in R eyes because of particles getting in to R eye from work yesterday.       Physical Exam    Airway    Mallampati score: III  TM Distance: >3 FB  Neck ROM: full     Dental   No notable dental hx     Cardiovascular  Rhythm: regular, Rate: normal, Cardiovascular exam normal    Pulmonary  Pulmonary exam normal     Other Findings        Anesthesia Plan  ASA Score- 3     Anesthesia Type- IV sedation with anesthesia with ASA Monitors.         Additional Monitors:     Airway Plan:            Plan Factors-Exercise tolerance (METS): >4 METS.    Chart reviewed.   Existing labs reviewed. Patient summary reviewed.          Obstructive sleep apnea risk education given perioperatively.        Induction- intravenous.    Postoperative Plan-     Perioperative Resuscitation Plan - Level 1 - Full Code.       Informed Consent- Anesthetic plan and risks discussed with patient.  I personally reviewed this patient with the CRNA. Discussed and agreed on the Anesthesia Plan with the CRNA..

## 2024-12-23 NOTE — H&P
History and Physical -  [facility]  Jeremy George 54 y.o. male MRN: [id number]                  HPI: Jeremy George is a 54 y.o. year old male who presents for colonoscopy      REVIEW OF SYSTEMS: Per the HPI, and otherwise unremarkable.    Historical Information   Past Medical History:   Diagnosis Date    Achalasia     Aneurysm (HCC)     aortic    Asthma     Childhood    Colon polyp     Diabetes mellitus (HCC)     Esophageal ulcer     Fatty liver     Groin abscess     with I &D    Hyperlipidemia     Hypertension     Plantar fasciitis     S/P cervical spinal fusion 01/03/2020     Past Surgical History:   Procedure Laterality Date    ANTERIOR CERVICAL DISCECTOMY W/ FUSION      APPENDECTOMY      BICEPS TENDON REPAIR      EGD AND COLONOSCOPY      FL INJECTION LEFT WRIST (ARTHROGRAM)  11/9/2021    FL INJECTION RIGHT WRIST (ARTHROGRAM)  11/18/2024    FL MYELOGRAM CERVICAL  6/13/2018    MENISCECTOMY      MYOTOMY HELLER LAPAROSCOPIC      ORTHOPEDIC SURGERY       Social History   Social History     Substance and Sexual Activity   Alcohol Use Not Currently     Social History     Substance and Sexual Activity   Drug Use Never     Social History     Tobacco Use   Smoking Status Never   Smokeless Tobacco Never     Family History   Problem Relation Age of Onset    No Known Problems Mother     No Known Problems Father        Meds/Allergies       Current Outpatient Medications:     acetaminophen (TYLENOL) 500 mg tablet    albuterol (ProAir HFA) 90 mcg/act inhaler    Ascorbic Acid (vitamin C) 1000 MG tablet    cetirizine (ZyrTEC) 10 mg tablet    CINNAMON PO    Cyanocobalamin (VITAMIN B 12 PO)    diphenhydrAMINE-acetaminophen (TYLENOL PM)  MG TABS    erythromycin (ILOTYCIN) ophthalmic ointment    FIBER ADULT GUMMIES PO    fluticasone (FLONASE) 50 mcg/act nasal spray    Fluticasone-Salmeterol (Advair Diskus) 100-50 mcg/dose inhaler    Galcanezumab-gnlm 120 MG/ML SOAJ    lisinopril-hydrochlorothiazide  (PRINZIDE,ZESTORETIC) 20-12.5 MG per tablet    magnesium Oxide (MAG-OX) 400 mg TABS    metFORMIN (GLUCOPHAGE) 500 mg tablet    methocarbamol (ROBAXIN) 500 mg tablet    Multiple Vitamins-Minerals (CENTRUM SILVER 50+MEN PO)    omeprazole (PriLOSEC) 40 MG capsule    pregabalin (LYRICA) 50 mg capsule    simvastatin (ZOCOR) 40 mg tablet    sitaGLIPtin (JANUVIA) 100 mg tablet    bisacodyl (DULCOLAX) 5 mg EC tablet    cyanocobalamin 1,000 mcg/mL    polyethylene glycol (GOLYTELY) 4000 mL solution    Current Facility-Administered Medications:     cyanocobalamin injection 1,000 mcg, 1,000 mcg, Intramuscular, Q30 Days, 1,000 mcg at 08/27/24 1553    lidocaine (XYLOCAINE) 1 % injection 1 mL, 1 mL, Infiltration, , 1 mL at 10/31/24 1715    triamcinolone acetonide (KENALOG-40) 40 mg/mL injection 20 mg, 20 mg, Infiltration, , 20 mg at 10/31/24 1715    No Known Allergies    Objective     /72   Pulse 62   Temp (!) 97.2 °F (36.2 °C) (Temporal)   Resp 13   SpO2 97%       PHYSICAL EXAM    Gen: NAD  Head: NCAT  CV: RRR  CHEST: Clear  ABD: soft, NT/ND  EXT: no edema      ASSESSMENT/PLAN:  This is a 54 y.o. year old male here for colonoscopy, and he is stable and optimized for his procedure.

## 2024-12-26 PROCEDURE — 88305 TISSUE EXAM BY PATHOLOGIST: CPT | Performed by: PATHOLOGY

## 2024-12-27 ENCOUNTER — RESULTS FOLLOW-UP (OUTPATIENT)
Dept: GASTROENTEROLOGY | Facility: CLINIC | Age: 54
End: 2024-12-27

## 2024-12-27 NOTE — TELEPHONE ENCOUNTER
----- Message from Nerissa Reddy DO sent at 12/27/2024  2:27 PM EST -----  Please set reminder for recall colonoscopy for  7 years

## 2025-01-02 DIAGNOSIS — I10 HYPERTENSION, ESSENTIAL: ICD-10-CM

## 2025-01-02 NOTE — TELEPHONE ENCOUNTER
Reason for call:   [x] Refill   [] Prior Auth  [] Other:     Office:   [x] PCP/Provider -   [] Specialty/Provider -     Medication: Lisinopril-hydrochlorothiazide    Dose/Frequency: 20-12.5 mg    Quantity: 90 tablets    Pharmacy:   [id number]  [place] - [id number] [phone]     Does the patient have enough for 3 days?   [x] Yes   [] No - Send as HP to POD

## 2025-01-03 RX ORDER — LISINOPRIL AND HYDROCHLOROTHIAZIDE 12.5; 2 MG/1; MG/1
1 TABLET ORAL DAILY
Qty: 90 TABLET | Refills: 1 | Status: SHIPPED | OUTPATIENT
Start: 2025-01-03

## 2025-01-08 LAB

## 2025-01-14 ENCOUNTER — OFFICE VISIT (OUTPATIENT)
Age: 55
End: 2025-01-14
Payer: COMMERCIAL

## 2025-01-14 VITALS
OXYGEN SATURATION: 98 % | BODY MASS INDEX: 34.85 KG/M2 | SYSTOLIC BLOOD PRESSURE: 130 MMHG | TEMPERATURE: 97.4 F | WEIGHT: 263 LBS | DIASTOLIC BLOOD PRESSURE: 80 MMHG | HEART RATE: 71 BPM | HEIGHT: 73 IN

## 2025-01-14 DIAGNOSIS — M54.12 CERVICAL RADICULOPATHY: ICD-10-CM

## 2025-01-14 DIAGNOSIS — E11.9 TYPE 2 DIABETES MELLITUS WITHOUT COMPLICATION, WITHOUT LONG-TERM CURRENT USE OF INSULIN (HCC): ICD-10-CM

## 2025-01-14 DIAGNOSIS — R73.01 ELEVATED FASTING GLUCOSE: ICD-10-CM

## 2025-01-14 DIAGNOSIS — E85.4 CEREBRAL AMYLOID ANGIOPATHY  (HCC): ICD-10-CM

## 2025-01-14 DIAGNOSIS — G43.809 VESTIBULAR MIGRAINE: ICD-10-CM

## 2025-01-14 DIAGNOSIS — E78.5 HYPERLIPIDEMIA, UNSPECIFIED HYPERLIPIDEMIA TYPE: ICD-10-CM

## 2025-01-14 DIAGNOSIS — I68.0 CEREBRAL AMYLOID ANGIOPATHY  (HCC): ICD-10-CM

## 2025-01-14 DIAGNOSIS — K21.9 GASTROESOPHAGEAL REFLUX DISEASE WITHOUT ESOPHAGITIS: ICD-10-CM

## 2025-01-14 DIAGNOSIS — G47.33 OSA (OBSTRUCTIVE SLEEP APNEA): ICD-10-CM

## 2025-01-14 DIAGNOSIS — I10 HYPERTENSION, ESSENTIAL: ICD-10-CM

## 2025-01-14 DIAGNOSIS — R91.1 LUNG NODULE: ICD-10-CM

## 2025-01-14 DIAGNOSIS — Z13.228 SCREENING FOR METABOLIC DISORDER: ICD-10-CM

## 2025-01-14 DIAGNOSIS — E53.8 B12 DEFICIENCY: ICD-10-CM

## 2025-01-14 DIAGNOSIS — E78.49 OTHER HYPERLIPIDEMIA: ICD-10-CM

## 2025-01-14 DIAGNOSIS — J45.20 MILD INTERMITTENT ASTHMA WITHOUT COMPLICATION: ICD-10-CM

## 2025-01-14 DIAGNOSIS — E08.49 DIABETES DUE TO UNDRL CONDITION W OTH DIABETIC NEURO COMP (HCC): Primary | ICD-10-CM

## 2025-01-14 DIAGNOSIS — R74.8 ELEVATED LIVER ENZYMES: ICD-10-CM

## 2025-01-14 PROCEDURE — 99214 OFFICE O/P EST MOD 30 MIN: CPT | Performed by: NURSE PRACTITIONER

## 2025-01-14 RX ORDER — GLIPIZIDE 2.5 MG/1
2.5 TABLET, EXTENDED RELEASE ORAL DAILY
Qty: 30 TABLET | Refills: 5 | Status: SHIPPED | OUTPATIENT
Start: 2025-01-14 | End: 2025-07-13

## 2025-01-14 NOTE — PROGRESS NOTES
Name: Jeremy George      : 1970      MRN: [id number]  Encounter Provider: MIKHAIL Ha  Encounter Date: 2025   [facility]  :  Assessment & Plan  Type 2 diabetes mellitus without complication, without long-term current use of insulin (HCC)    Lab Results   Component Value Date    HGBA1C 7.7 (H) 2024   -Worsening  -Continue metformin 1500 mg in the morning and thousand in the evening  -Continue Januvia  -Start glipizide 2.5 mg tablet daily    Orders:  •  metFORMIN (GLUCOPHAGE) 500 mg tablet; Pt takes 1500 mg (3 tablets) by mouth in the morning, and a 1000 mg (2 tablets) in the evening  •  sitaGLIPtin (JANUVIA) 100 mg tablet; Take 1 tablet (100 mg total) by mouth daily  •  glipiZIDE (GLUCOTROL XL) 2.5 mg 24 hr tablet; Take 1 tablet (2.5 mg total) by mouth daily    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 (HCC)    Lab Results   Component Value Date    HGBA1C 7.7 (H) 2024            Cerebral amyloid angiopathy  (HCC)         Mild intermittent asthma without complication  -No recent exacerbations, continue with Advair and as needed albuterol         Screening for metabolic disorder    Orders:  •  Comprehensive metabolic panel; Future  •  CBC and differential  •  Lipid panel    Elevated fasting glucose    Orders:  •  Hemoglobin A1C    Hyperlipidemia, unspecified hyperlipidemia type    Orders:  •  Lipid panel    Vestibular migraine  -Continue to follow with neurology         Hypertension, essential  -Blood pressure well-controlled on lisinopril-hydrochlorothiazide         SIVA (obstructive sleep apnea)  -Recently started on CPAP machine         Lung nodule  Seen on CT in          Gastroesophageal reflux disease without esophagitis  -currently following GI         Cervical radiculopathy  -Continue to follow with spine and pain management           Other hyperlipidemia  -Continue with simvastatin 40 mg          B12 deficiency  -Continue with supplementation         Elevated liver enzymes  -Advised to follow up with GI   -Has history of fatty liver   -repeat CMP   -consider trialing off of statin however statin therapy did not previously affect the liver enzymes           BMI Counseling: Body mass index is 34.59 kg/m². The BMI is above normal. Nutrition recommendations include reducing portion sizes, decreasing overall calorie intake, 3-5 servings of fruits/vegetables daily, and reducing fast food intake. Exercise recommendations include moderate aerobic physical activity for 150 minutes/week.       History of Present Illness     Patient presents today for follow up and to review blood work.    S/P cervical spinal fusion- currently follows neurosurgery     Diabetes mellitus (HCC)- HBA1C 6.8-->7.7, continues on metformin and  Januvia, he denies side effects, denies hypo or hyperglycemic events  Fasting glucose 118       Hypertension-blood pressure well-controlled on lisinopril-hydrochlorothiazide, denies lightheadedness or dizziness, denies side effects with this medication    Hyperlipidemia- controlled on statin, denies side effects with medication  Simvastatin was increased at last office visit, noted better control  Cholesterol 93, triglycerides 100, HDL 29, LDL 44    Vestibular migraines-currently following neurology plan as stated below    SIVA-mild, was started on CPAP machine     B12 level-424    Elevated liver enzymes-AST 46, ALT 75      Review of Systems   Constitutional:  Negative for activity change, appetite change, chills, diaphoresis and fever.   HENT:  Negative for congestion, ear discharge, ear pain, postnasal drip, rhinorrhea, sinus pressure, sinus pain and sore throat.    Eyes:  Negative for pain, discharge, itching and visual disturbance.   Respiratory:  Negative for cough, chest tightness, shortness of breath and wheezing.    Cardiovascular:  Negative for chest pain, palpitations and leg swelling.  "  Gastrointestinal:  Negative for abdominal pain, constipation, diarrhea, nausea and vomiting.   Endocrine: Negative for polydipsia, polyphagia and polyuria.   Genitourinary:  Negative for difficulty urinating, dysuria and urgency.   Musculoskeletal:  Negative for arthralgias, back pain and neck pain.   Skin:  Negative for rash and wound.   Neurological:  Negative for dizziness, weakness, numbness and headaches.       Objective   /80 (BP Location: Left arm, Patient Position: Sitting, Cuff Size: Large)   Pulse 71   Temp (!) 97.4 °F (36.3 °C) (Temporal)   Ht 6' 1.11\" (1.857 m)   Wt 119 kg (263 lb)   SpO2 98%   BMI 34.59 kg/m²      Physical Exam  Constitutional:       General: He is not in acute distress.     Appearance: He is well-developed. He is not diaphoretic.   HENT:      Head: Normocephalic and atraumatic.      Right Ear: External ear normal.      Left Ear: External ear normal.      Nose: Nose normal.      Mouth/Throat:      Mouth: Mucous membranes are moist.      Pharynx: No oropharyngeal exudate or posterior oropharyngeal erythema.   Eyes:      General:         Right eye: No discharge.         Left eye: No discharge.      Conjunctiva/sclera: Conjunctivae normal.      Pupils: Pupils are equal, round, and reactive to light.   Neck:      Thyroid: No thyromegaly.   Cardiovascular:      Rate and Rhythm: Normal rate and regular rhythm.      Heart sounds: Normal heart sounds. No murmur heard.     No friction rub. No gallop.   Pulmonary:      Effort: Pulmonary effort is normal. No respiratory distress.      Breath sounds: Normal breath sounds. No stridor. No wheezing or rales.   Abdominal:      General: Bowel sounds are normal. There is no distension.      Palpations: Abdomen is soft.      Tenderness: There is no abdominal tenderness.   Musculoskeletal:      Cervical back: Normal range of motion and neck supple.   Lymphadenopathy:      Cervical: No cervical adenopathy.   Skin:     General: Skin is warm and " dry.      Findings: No erythema or rash.   Neurological:      Mental Status: He is alert and oriented to person, place, and time.   Psychiatric:         Behavior: Behavior normal.         Thought Content: Thought content normal.         Judgment: Judgment normal.

## 2025-01-15 PROBLEM — I10 HYPERTENSION, UNSPECIFIED TYPE: Status: RESOLVED | Noted: 2024-10-30 | Resolved: 2025-01-15

## 2025-01-15 PROBLEM — R74.8 ELEVATED LIVER ENZYMES: Status: ACTIVE | Noted: 2025-01-15

## 2025-01-15 NOTE — ASSESSMENT & PLAN NOTE
-Advised to follow up with GI   -Has history of fatty liver   -repeat CMP   -consider trialing off of statin however statin therapy did not previously affect the liver enzymes

## 2025-01-15 NOTE — TELEPHONE ENCOUNTER
Problem: Breathing Pattern Ineffective  Goal: Air exchange is effective, demonstrated by Sp02 sat of greater then or = 92% (or as ordered)  Outcome: Monitoring/Evaluating progress  Goal: Respiratory pattern is quiet and regular without report of SOB  Outcome: Monitoring/Evaluating progress  Goal: Breathing pattern demonstrates minimal apnea during sleep with appropriate use of airway pressure support devices  Outcome: Monitoring/Evaluating progress  Goal: Verbalizes/demonstrates effective breathing management strategies  Description: Document education using the patient education activity.   Outcome: Monitoring/Evaluating progress  Goal: Minimize respiratory effort related to dyspnea/shortness of breath (Hospice)  Outcome: Monitoring/Evaluating progress     Problem: Pain  Goal: Acceptable pain level achieved/maintained at rest using appropriate pain scale for the patient  Outcome: Monitoring/Evaluating progress  Goal: Acceptable pain level achieved/maintained with activity using appropriate pain scale for the patient  Outcome: Monitoring/Evaluating progress  Goal: Acceptable pain level achieved/maintained without oversedation  Outcome: Monitoring/Evaluating progress     Problem: At Risk for Falls  Goal: Patient does not fall  Outcome: Monitoring/Evaluating progress  Goal: Patient takes action to control fall-related risks  Outcome: Monitoring/Evaluating progress     Problem: VTE (Actual)  Goal: Patient maintains mobility and remains free from complications of VTE  Outcome: Monitoring/Evaluating progress      Caller: Ynes pt's wife    Doctor: Summer    Reason for call: calling to schedule a neck injection.  Pt's wife stated neurosurgery ordered the injection for the pt.  Pt is scheduled on 5/28 for a MBB #1 right C3-5    Call back#: [phone] or [phone]

## 2025-01-15 NOTE — ASSESSMENT & PLAN NOTE
Lab Results   Component Value Date    HGBA1C 7.7 (H) 12/13/2024   -Worsening  -Continue metformin 1500 mg in the morning and thousand in the evening  -Continue Januvia  -Start glipizide 2.5 mg tablet daily    Orders:    metFORMIN (GLUCOPHAGE) 500 mg tablet; Pt takes 1500 mg (3 tablets) by mouth in the morning, and a 1000 mg (2 tablets) in the evening    sitaGLIPtin (JANUVIA) 100 mg tablet; Take 1 tablet (100 mg total) by mouth daily    glipiZIDE (GLUCOTROL XL) 2.5 mg 24 hr tablet; Take 1 tablet (2.5 mg total) by mouth daily

## 2025-02-03 NOTE — PROGRESS NOTES
Assessment:  1. Neck pain    2. Cervical dystonia        Plan:  Patient may continue methocarbamol as prescribed.  This medication was refilled today  Continue with physical therapy  Follow-up as needed    History of Present Illness:    The patient is a 54 y.o. male with a history of C6-7 ACDF in 2018 last seen on 11/12/2024 who presents for a follow up office visit in regards to chronic right sided neck pain that will radiate toward the trapezii musculature.  He denies bowel or bladder incontinence or balance issues.  Patient states that he has been doing well since his last office visit.  He states that his job offers massage and physical therapy which she attends once a week which has provided relief.  He unfortunately has not had much relief with cervical medial branch blocks or cervical epidural steroid injections.  He has been taking methocarbamol on a as needed basis with some relief.  He has weaned off of pregabalin without any worsening of his pain    The patient rates his pain a 2 out of 10 on the numeric pain rating scale.  Pain is intermittent and is described as sharp    I have personally reviewed and/or updated the patient's past medical history, past surgical history, family history, social history, current medications, allergies, and vital signs today.       Review of Systems:    Review of Systems   Respiratory:  Negative for shortness of breath.    Cardiovascular:  Negative for chest pain.   Gastrointestinal:  Negative for constipation, diarrhea, nausea and vomiting.   Musculoskeletal:  Positive for back pain and gait problem. Negative for arthralgias, joint swelling and myalgias.   Skin:  Negative for rash.   Neurological:  Negative for dizziness, seizures and weakness.   All other systems reviewed and are negative.        Past Medical History:   Diagnosis Date    Achalasia     Aneurysm (HCC)     aortic    Asthma     Childhood    Colon polyp     Diabetes mellitus (HCC)     Esophageal ulcer      Fatty liver     Groin abscess     with I &D    Hyperlipidemia     Hypertension     Plantar fasciitis     S/P cervical spinal fusion 01/03/2020       Past Surgical History:   Procedure Laterality Date    ANTERIOR CERVICAL DISCECTOMY W/ FUSION      APPENDECTOMY      BICEPS TENDON REPAIR      EGD AND COLONOSCOPY      FL INJECTION LEFT WRIST (ARTHROGRAM)  11/9/2021    FL INJECTION RIGHT WRIST (ARTHROGRAM)  11/18/2024    FL MYELOGRAM CERVICAL  6/13/2018    MENISCECTOMY      MYOTOMY HELLER LAPAROSCOPIC      ORTHOPEDIC SURGERY         Family History   Problem Relation Age of Onset    No Known Problems Mother     No Known Problems Father        Social History     Occupational History    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   Tobacco Use    Smoking status: Never    Smokeless tobacco: Never   Vaping Use    Vaping status: Never Used   Substance and Sexual Activity    Alcohol use: Not Currently    Drug use: Never    Sexual activity: Yes     Partners: Female         Current Outpatient Medications:     acetaminophen (TYLENOL) 500 mg tablet, Take 500 mg by mouth every 6 (six) hours as needed for mild pain, Disp: , Rfl:     albuterol (ProAir HFA) 90 mcg/act inhaler, Inhale 2 puffs every 6 (six) hours as needed for wheezing, Disp: 8.5 g, Rfl: 5    Ascorbic Acid (vitamin C) 1000 MG tablet, Take 1,000 mg by mouth daily  , Disp: , Rfl:     diphenhydrAMINE-acetaminophen (TYLENOL PM)  MG TABS, Take 2 tablets by mouth daily at bedtime as needed for sleep 1000 mg in total, Disp: , Rfl:     FIBER ADULT GUMMIES PO, Take 3 gums by mouth in the morning, Disp: , Rfl:     fluticasone (FLONASE) 50 mcg/act nasal spray, 2 sprays into each nostril daily, Disp: 18 mL, Rfl: 1    Fluticasone-Salmeterol (Advair Diskus) 100-50 mcg/dose inhaler, Inhale 1 puff 2 (two) times a day Rinse mouth after use., Disp: 60 blister, Rfl: 1    Galcanezumab-gnlm 120 MG/ML SOAJ, 30 days after loading dose, inject 1 pen subq every 30 days., Disp: 3 mL, Rfl:  3    glipiZIDE (GLUCOTROL XL) 2.5 mg 24 hr tablet, Take 1 tablet (2.5 mg total) by mouth daily, Disp: 30 tablet, Rfl: 5    lisinopril-hydrochlorothiazide (PRINZIDE,ZESTORETIC) 20-12.5 MG per tablet, Take 1 tablet by mouth daily, Disp: 90 tablet, Rfl: 1    magnesium Oxide (MAG-OX) 400 mg TABS, Take 1 tablet (400 mg total) by mouth daily, Disp: 90 tablet, Rfl: 0    metFORMIN (GLUCOPHAGE) 500 mg tablet, Pt takes 1500 mg (3 tablets) by mouth in the morning, and a 1000 mg (2 tablets) in the evening, Disp: 450 tablet, Rfl: 1    methocarbamol (ROBAXIN) 500 mg tablet, Take 1 tablet (500 mg total) by mouth daily at bedtime as needed for muscle spasms, Disp: 90 tablet, Rfl: 0    Multiple Vitamins-Minerals (CENTRUM SILVER 50+MEN PO), Take by mouth, Disp: , Rfl:     omeprazole (PriLOSEC) 40 MG capsule, Take 1 capsule (40 mg total) by mouth daily, Disp: 90 capsule, Rfl: 3    simvastatin (ZOCOR) 40 mg tablet, Take 1 tablet (40 mg total) by mouth daily, Disp: 90 tablet, Rfl: 0    sitaGLIPtin (JANUVIA) 100 mg tablet, Take 1 tablet (100 mg total) by mouth daily, Disp: 90 tablet, Rfl: 1    bisacodyl (DULCOLAX) 5 mg EC tablet, Take 2 tablets (10 mg total) by mouth once for 1 dose For colonoscopy (Patient not taking: Reported on 1/14/2025), Disp: 2 tablet, Rfl: 0    cetirizine (ZyrTEC) 10 mg tablet, Take 1 tablet (10 mg total) by mouth daily, Disp: 90 tablet, Rfl: 0    CINNAMON PO, Take by mouth, Disp: , Rfl:     Cyanocobalamin (VITAMIN B 12 PO), Take by mouth in the morning (Patient not taking: Reported on 1/14/2025), Disp: , Rfl:     cyanocobalamin 1,000 mcg/mL, Inject 1 mL (1,000 mcg total) into a muscle every 7 days for 9 doses (Patient not taking: Reported on 11/12/2024), Disp: 1 mL, Rfl: 0    Current Facility-Administered Medications:     cyanocobalamin injection 1,000 mcg, 1,000 mcg, Intramuscular, Q30 Days, MIKHAIL Ha, 1,000 mcg at 08/27/24 1553    lidocaine (XYLOCAINE) 1 % injection 1 mL, 1 mL, Infiltration, , ,  "1 mL at 10/31/24 1715    triamcinolone acetonide (KENALOG-40) 40 mg/mL injection 20 mg, 20 mg, Infiltration, , , 20 mg at 10/31/24 1715    No Known Allergies    Physical Exam:    Ht 6' 1.11\" (1.857 m)   Wt 119 kg (263 lb)   BMI 34.59 kg/m²     Constitutional:normal, well developed, well nourished, alert, in no distress and non-toxic and no overt pain behavior.  Eyes:anicteric  HEENT:grossly intact  Neck:supple, symmetric, trachea midline and no masses   Pulmonary:even and unlabored  Cardiovascular:No edema or pitting edema present  Skin:Normal without rashes or lesions and well hydrated  Psychiatric:Mood and affect appropriate  Neurologic:Cranial Nerves II-XII grossly intact  Musculoskeletal:normal gait      Imaging  No orders to display         No orders of the defined types were placed in this encounter.      "

## 2025-02-04 ENCOUNTER — OFFICE VISIT (OUTPATIENT)
Dept: PAIN MEDICINE | Facility: CLINIC | Age: 55
End: 2025-02-04
Payer: COMMERCIAL

## 2025-02-04 VITALS — BODY MASS INDEX: 34.85 KG/M2 | WEIGHT: 263 LBS | HEIGHT: 73 IN

## 2025-02-04 DIAGNOSIS — G24.3 CERVICAL DYSTONIA: ICD-10-CM

## 2025-02-04 DIAGNOSIS — M54.2 NECK PAIN: Primary | ICD-10-CM

## 2025-02-04 PROCEDURE — 99214 OFFICE O/P EST MOD 30 MIN: CPT | Performed by: NURSE PRACTITIONER

## 2025-02-04 RX ORDER — METHOCARBAMOL 500 MG/1
500 TABLET, FILM COATED ORAL
Qty: 90 TABLET | Refills: 0 | Status: SHIPPED | OUTPATIENT
Start: 2025-02-04

## 2025-02-19 ENCOUNTER — OFFICE VISIT (OUTPATIENT)
Dept: NEUROLOGY | Facility: CLINIC | Age: 55
End: 2025-02-19
Payer: COMMERCIAL

## 2025-02-19 VITALS
BODY MASS INDEX: 35.12 KG/M2 | OXYGEN SATURATION: 98 % | TEMPERATURE: 98 F | DIASTOLIC BLOOD PRESSURE: 68 MMHG | HEIGHT: 73 IN | HEART RATE: 79 BPM | SYSTOLIC BLOOD PRESSURE: 126 MMHG | WEIGHT: 265 LBS

## 2025-02-19 DIAGNOSIS — M54.81 OCCIPITAL NEURALGIA: ICD-10-CM

## 2025-02-19 DIAGNOSIS — G47.33 OSA (OBSTRUCTIVE SLEEP APNEA): Primary | ICD-10-CM

## 2025-02-19 PROCEDURE — 99213 OFFICE O/P EST LOW 20 MIN: CPT | Performed by: PSYCHIATRY & NEUROLOGY

## 2025-02-19 RX ORDER — LANOLIN ALCOHOL/MO/W.PET/CERES
400 CREAM (GRAM) TOPICAL DAILY
Qty: 90 TABLET | Refills: 0 | Status: SHIPPED | OUTPATIENT
Start: 2025-02-19

## 2025-02-19 RX ORDER — POLYMYXIN B SULFATE AND TRIMETHOPRIM 1; 10000 MG/ML; [USP'U]/ML
SOLUTION OPHTHALMIC
COMMUNITY
Start: 2024-12-23

## 2025-02-19 NOTE — PROGRESS NOTES
Name: Jeremy George      : 1970      MRN: [id number]  Encounter Provider: Naty Penaloza MD  Encounter Date: 2025   Encounter department: [facility]  :  Assessment & Plan  Occipital neuralgia  Patient reports having a shooting pain that is triggered mostly when looking to the right. Pain originates in his right occipital area and shoots into his right shoulder. He has been doing PT at work.     Plan:  Will get a vitamin B 12 level  Recommend starting magnesium oxide 400 mg daily   Start Tizanidine 4 mg q 8 H prn   Continue PT exercises   Follow up in 3 months     Orders:    Vitamin B12; Future    magnesium Oxide (MAG-OX) 400 mg TABS; Take 1 tablet (400 mg total) by mouth daily    tiZANidine (ZANAFLEX) 4 mg tablet; Take 1 tablet (4 mg total) by mouth every 8 (eight) hours as needed for muscle spasms    SIVA (obstructive sleep apnea)  Reports compliance with CPAP              History of Present Illness   HPI   Jeremy George is a very pleasant right-handed 53 y.o. male with a past medical history that includes cervical spondylosis with radiculopathy s/p cervical spinal fusion, diabetes, hypertension, hyperlipidemia, essential tremor, chronic pain syndrome, groin strain, mild asthma, thoracic aortic aneurysm, lung nodule, chest pain, vitamin B12 deficiency and cerebral amyloid angiopathy who presents for headache follow-up.     Initial visit (24):  Patient today reports having a history of headaches however these are different and that they are less associated with his right upper extremity tremor.  He started noticing these headaches in 2024. They report having a  retro-orbital pressure followed by sharp pain located on the left side. Associated symptoms includes nausea, neck pain, photophobia, phonophobia, osmophobia, blurry vision, tinnitus and dizziness.  Dizziness is particularly worse when the headaches are severe.  He gets a sudden onset sensation  of falling down and loss of balance however this can also happen without the headaches.. They report that headaches are triggered by strong smells and can be worsened by coughing . In a month they can get up to 10-12 headache days. Risk factors include obesity, stress, snoring , sleep disturbances , and muscular tension at the neck and shoulders. Patients have not responded to Tylenol or sumatriptan.  Ibuprofen contraindicated due to esophageal issues.  Triptans overall contraindicated given they can cause vasoconstriction and worsen microhemorrhages due to patients hx of cerebral amyloid angiopathy. Also has thoracic aortic aneurysm.  Physical exam was normal with exception of slowed speech and decreased range of motion at the neck.     Impression: At this time, based on history, available workup and physical exam,  I believe patients headaches are likely due to potential vestibular migraines.  Need to clarify if he had a history of migraines without the dizziness in the past.  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  I will evaluate them for this and address risk factors with the following plan outlined below.      I referred him to sleep medicine for evaluation of SIVA.  I referred him to ophthalmology for dilated eye exam.  For his headaches I increase his Topamax to 100 mg nightly.  I added magnesium oxide 400 mg daily.  For headache  I recommended [facility] given contraindications to triptans and ibuprofen.      Prior encounters:     2024:Today patient reports some mild improvements in his symptoms.  He has been tracking his headaches through a migraine albertina.  Patient is very sensitive to lights.  Dizziness mildly improved with cervical traction however then recurred.  He has been taking Topamax 100 mg nightly and his migraines have become milder and his headaches have still persisted moderate to  severe.  He was seen by ophthalmology.  No optic nerve edema on dilated eye exam.  He was seen by sleep medicine plan for home sleep study.  He is being followed by pain medicine and he is going to get a cervical medial branch block this afternoon to see if he is a candidate for radiofrequency ablation.     11/18/2024: Today patient reports that he still gets daily headaches however not as intense as before and is able to function at work.  He has not had any dizziness episodes since July 17.  Patient had a nerve block without any help.  He has not started Emgality.       Workup:   B12 424     MRI brain without contrast (4/24/24):  No acute intracranial abnormality. No restricted diffusion to suggest acute ischemia. Mild scattered periventricular and subcortical foci of white matter T2 hyperintensity which are nonspecific and most likely related to chronic small vessel ischemic changes. Other less like etiologies include demyelinating disease, vasculitis, Lyme and migraines. Reidentified and normal punctate foci of susceptibility artifact are seen in the supratentorial brain centrally at the gray-white junction peripherally in distribution most compatible with cerebral amyloid angiopathy.  Chronic microhemorrhage in the setting of hypertension is less likely with this distribution but not excluded.     Sleep study (7/19/24):  Sleep efficiency was decreased.  Sleep latency was normal but REM latency was prolonged.  There was decreased N3 but a normal percentage of REM sleep.  Mild obstructive sleep apnea was observed with overall AHI of 6.3.  The first cluster of sleep apnea was mostly likely occurring in REM supine sleep (mislabeled as N2 sleep) resulting in more severe desaturations compared to other events.  Sleep apnea resulted in mild to moderate oxygen desaturations  Baseline oxygenation was normal  Degree of sleep apnea may have been underestimated due to decreased supine sleep.    No significant periodic limb  movements  EKG demonstrates normal sinus rhythm      Prior treatment:     Abortive:   Tylenol 1000 mg- no help   Ibuprofen- C/I due to esophageal issues   Sumatriptan- no help   Triptans- contraindicated given they can cause vasoconstriction and worsen microhemorrhages due to patients hx of cerebral amyloid angiopathy. Also has thoracic aortic aneurysm   Nurtec 75 mg      Preventative:  Topiramate 75 mg- taking in the morning, headaches not as severe. No help   Pregabalin 150 mg - for neck pain     Other:  Methocarbamol- helps      Interval history:     Reports having a shooting pain that is triggered when looking to the right   Shoots into the right shoulder   Doing PT at work   Does not need pregabalin anymore     Has been becoming more forgetful with short term memory   Mother with dementia with depression     Has SIVA is compliant with CPAP  Is taking tylenol pm every day       Review of Systems   Constitutional:  Negative for appetite change, fatigue and fever.   HENT: Negative.  Negative for hearing loss, tinnitus, trouble swallowing and voice change.    Eyes: Negative.  Negative for photophobia, pain and visual disturbance.   Respiratory: Negative.  Negative for shortness of breath.    Cardiovascular: Negative.  Negative for palpitations.   Gastrointestinal: Negative.  Negative for nausea and vomiting.   Endocrine: Negative.  Negative for cold intolerance.   Genitourinary: Negative.  Negative for dysuria, frequency and urgency.   Musculoskeletal:  Positive for neck pain (Back right area, base of neck. Sharp pain). Negative for back pain, gait problem, myalgias and neck stiffness.   Skin: Negative.  Negative for rash.   Allergic/Immunologic: Negative.    Neurological: Negative.  Negative for dizziness, tremors, seizures, syncope, facial asymmetry, speech difficulty, weakness, light-headedness, numbness and headaches.   Hematological: Negative.  Does not bruise/bleed easily.   Psychiatric/Behavioral: Negative.   "Negative for confusion, hallucinations and sleep disturbance.    All other systems reviewed and are negative.   I have personally reviewed the MA's review of systems and made changes as necessary.         Objective   Ht 6' 1.11\" (1.857 m)   BMI 34.59 kg/m²     Physical Exam  Neurological Exam          "

## 2025-03-28 DIAGNOSIS — E78.49 OTHER HYPERLIPIDEMIA: ICD-10-CM

## 2025-03-28 RX ORDER — SIMVASTATIN 40 MG
40 TABLET ORAL DAILY
Qty: 90 TABLET | Refills: 1 | Status: SHIPPED | OUTPATIENT
Start: 2025-03-28

## 2025-03-28 NOTE — TELEPHONE ENCOUNTER
Reason for call:   [x] Refill   [] Prior Auth  [] Other:     Office:   [x] PCP/Provider - MIKHAIL Ha   [] Specialty/Provider -     Medication: simvastatin 40 mg    Dose/Frequency: 1 tab daily    Quantity: 90 tabs    Pharmacy: [id number] [place], [place] - [address] [facility]   Does the patient have enough for 3 days?   [] Yes   [x] No - Send as HP to POD    Mail Away Pharmacy   Does the patient have enough for 10 days?   [] Yes   [] No - Send as HP to POD

## 2025-04-09 ENCOUNTER — TELEPHONE (OUTPATIENT)
Age: 55
End: 2025-04-09

## 2025-04-09 DIAGNOSIS — E11.9 TYPE 2 DIABETES MELLITUS WITHOUT COMPLICATION, WITHOUT LONG-TERM CURRENT USE OF INSULIN (HCC): ICD-10-CM

## 2025-04-09 DIAGNOSIS — E08.49 DIABETES DUE TO UNDRL CONDITION W OTH DIABETIC NEURO COMP (HCC): Primary | ICD-10-CM

## 2025-04-09 NOTE — TELEPHONE ENCOUNTER
Reason for call: Patient state he have new insurance and he would need accu check meter/lancets and test strips - Not on Active Medication List    [x] Refill   [] Prior Auth  [] Other:     Office:   [x] PCP/Provider -   [] Specialty/Provider -     Medication:   Accu Chek Guide Glucose Meter  Accu Chek Test Strips - Test TID  Accu Chek Lancets - TID     Dose/Frequency: see above     Quantity: 90 D supply     Pharmacy: [facility]     Mail Away Pharmacy   Does the patient have enough for 10 days?   [x] Yes   [] No - Send as HP to POD

## 2025-04-10 DIAGNOSIS — E11.9 TYPE 2 DIABETES MELLITUS WITHOUT COMPLICATION, WITHOUT LONG-TERM CURRENT USE OF INSULIN (HCC): Primary | ICD-10-CM

## 2025-04-10 RX ORDER — BLOOD SUGAR DIAGNOSTIC
1 STRIP MISCELLANEOUS 3 TIMES DAILY
COMMUNITY
End: 2025-04-10 | Stop reason: SDUPTHER

## 2025-04-10 RX ORDER — LANCETS
EACH MISCELLANEOUS 3 TIMES DAILY
COMMUNITY
End: 2025-04-10 | Stop reason: SDUPTHER

## 2025-04-10 RX ORDER — BLOOD-GLUCOSE METER
EACH MISCELLANEOUS
COMMUNITY
End: 2025-04-10 | Stop reason: SDUPTHER

## 2025-04-11 RX ORDER — BLOOD SUGAR DIAGNOSTIC
1 STRIP MISCELLANEOUS 3 TIMES DAILY
Qty: 270 EACH | Refills: 1 | Status: SHIPPED | OUTPATIENT
Start: 2025-04-11

## 2025-04-11 RX ORDER — LANCETS
EACH MISCELLANEOUS 3 TIMES DAILY
Qty: 200 EACH | Refills: 1 | Status: SHIPPED | OUTPATIENT
Start: 2025-04-11

## 2025-04-11 RX ORDER — BLOOD-GLUCOSE METER
EACH MISCELLANEOUS 3 TIMES DAILY
Qty: 1 KIT | Refills: 0 | Status: SHIPPED | OUTPATIENT
Start: 2025-04-11

## 2025-04-14 RX ORDER — LANCETS 28 GAUGE
EACH MISCELLANEOUS
Qty: 300 EACH | Refills: 1 | Status: SHIPPED | OUTPATIENT
Start: 2025-04-14

## 2025-04-14 RX ORDER — BLOOD-GLUCOSE METER
KIT MISCELLANEOUS
Qty: 300 STRIP | Refills: 1 | Status: SHIPPED | OUTPATIENT
Start: 2025-04-14

## 2025-04-14 RX ORDER — BLOOD-GLUCOSE METER
KIT MISCELLANEOUS
Qty: 300 EACH | Refills: 1 | Status: SHIPPED | OUTPATIENT
Start: 2025-04-14

## 2025-04-14 NOTE — TELEPHONE ENCOUNTER
Aster [place] from [employer] called.  Pts plan doesn't cover the Accu Check guide strips.  She was trying to see if we could get it switched to a preferred alternative of the freestyle light strips 100s or one touch strips 100.  Since the patient plan doesn't cover the certain test strips and if they're not using a [place] monitor system or an insulin pump that requires the specific test strip.  We would be able to get the freestyle light test strips or the one touch test strips.      Freestyle is mnaufactured by [employer] and one touches manufactured by [employer].  glucose blood (Accu-Chek Guide Test) test strip 100    The patient would be able to obtain a new blood glucose monitor at no additional charge either by us sending it to you or a coupon that they'd be able to use at pharmacy.    [phone] ref #[id number] 9am-5:30pm EST Time sensitive     [facility]

## 2025-04-24 ENCOUNTER — TELEPHONE (OUTPATIENT)
Dept: SLEEP CENTER | Facility: CLINIC | Age: 55
End: 2025-04-24

## 2025-04-29 ENCOUNTER — TELEPHONE (OUTPATIENT)
Age: 55
End: 2025-04-29

## 2025-04-29 DIAGNOSIS — G43.809 VESTIBULAR MIGRAINE: Primary | ICD-10-CM

## 2025-04-29 RX ORDER — DEXAMETHASONE 2 MG/1
2 TABLET ORAL
Qty: 5 TABLET | Refills: 0 | Status: SHIPPED | OUTPATIENT
Start: 2025-04-29

## 2025-04-29 NOTE — TELEPHONE ENCOUNTER
"FOLLOW UP: Please review and advise. Requiring call back.   LOV 2/19/25    REASON FOR CONVERSATION: Migraine    SYMPTOMS: migraine, shoulder/neck pain, nausea/dry heaves    OTHER: Pt reported having a migraine that started on Saturday as mild/\"annoying\" but progressed has progressed behind his left eye and increased pressure in head. Pt has been taking prescribed medications for migraines and OTC meds with no relief.     Pt reported his BP was elevated last evening (158/92), rechecked before bed and it was 144/80. Advised pt to monitor his BP and to contact PCP if BP continues to be elevated.     Pt had Dexamethasone 5 day course back in 6/24 that was effective and is requesting a script be sent to [facility] to help break current migraine cycle.       DISPOSITION: Discuss with Provider and Call Back Patient      When did migraine start? x 4 days    Location/Description: unilateral in the left occipital area    Associated symptoms:nausea, dry heaves    Precipitating factors: no particular thing    Alleviating factors:  nothing is helping    What medications have you tried for this migraine headache? lying in a darkened room, sleep, and unable to obtain relief with OTC medications     Current migraine medications are confirmed as:  -- Magnesium oxide 400 mg daily   -- Methocarbamol 500 mg prn HS  -- Tizanidine 4 mg Q8H    Medications tried in the past?  -- Dexamethasone: last given 2/19/25; was effective.     "

## 2025-04-30 ENCOUNTER — OFFICE VISIT (OUTPATIENT)
Dept: SLEEP CENTER | Facility: CLINIC | Age: 55
End: 2025-04-30
Payer: COMMERCIAL

## 2025-04-30 VITALS
HEIGHT: 73 IN | SYSTOLIC BLOOD PRESSURE: 126 MMHG | WEIGHT: 264.6 LBS | DIASTOLIC BLOOD PRESSURE: 56 MMHG | BODY MASS INDEX: 35.07 KG/M2

## 2025-04-30 DIAGNOSIS — I10 HYPERTENSION, UNSPECIFIED TYPE: ICD-10-CM

## 2025-04-30 DIAGNOSIS — G47.33 OSA (OBSTRUCTIVE SLEEP APNEA): Primary | ICD-10-CM

## 2025-04-30 PROCEDURE — 99214 OFFICE O/P EST MOD 30 MIN: CPT | Performed by: STUDENT IN AN ORGANIZED HEALTH CARE EDUCATION/TRAINING PROGRAM

## 2025-04-30 NOTE — ASSESSMENT & PLAN NOTE
Jeremy is a very pleasant 54-year-old gentleman  with a PMHx of vestibular migraine, HTN, CAA, asthma, GERD, diabetes mellitus, cervical radiculopathy, B12 deficiency, HLD who presents in follow up for SIVA (AHI 6.3, supine AHI 12.2, O2 geremias 87% 7/2024).  He is doing very well overall with the device, endorsing ongoing significant benefit, particularly after the pressure change at his last visit.  He is still having difficulties with mask discomfort, however.    Compliance report reviewed and analyzed  Continue AutoPAP at settings of 10-15 cmH2O  Order placed for a formal mask fitting   Reviewed that he could also trial the chin strap.  Prescription for new supplies ordered today  Reviewed CMS/insurance requirements and resupply guidelines  Information provided on the above as well as general maintenance steps  Recommended maintaining good Sleep Hygiene    Orders:    Mask fitting only; Future    PAP DME Resupply/Reorder

## 2025-04-30 NOTE — PROGRESS NOTES
Name: Jeremy George      : 1970      MRN: [id number]  Encounter Provider: Nomi Burnham DO  Encounter Date: 2025   Encounter department: [facility] [place]  :  Assessment & Plan  SIVA (obstructive sleep apnea)  Jeremy is a very pleasant 54-year-old gentleman  with a PMHx of vestibular migraine, HTN, CAA, asthma, GERD, diabetes mellitus, cervical radiculopathy, B12 deficiency, HLD who presents in follow up for SIVA (AHI 6.3, supine AHI 12.2, O2 geremias 87% 2024).  He is doing very well overall with the device, endorsing ongoing significant benefit, particularly after the pressure change at his last visit.  He is still having difficulties with mask discomfort, however.    Compliance report reviewed and analyzed  Continue AutoPAP at settings of  10-15 cmH2O  Order placed for a formal mask fitting   Reviewed that he could also trial the chin strap.  Prescription for new supplies ordered today  Reviewed CMS/insurance requirements and resupply guidelines  Information provided on the above as well as general maintenance steps  Recommended maintaining good Sleep Hygiene    Orders:    Mask fitting only; Future    PAP DME Resupply/Reorder    Hypertension, unspecified type    Reviewed the importance of treating SDB on cardiovascular risk reduction (including but not limited to impaired BP control, risk of heart attack, CHF, and both initial occurrence of A-fib and recurrence of A-fib following ablation or similar intervention)  Defer primary management per patient's cardiologist and/or PCP  Orders:    Mask fitting only; Future    PAP DME Resupply/Reorder      Follow-up:  He will Return in about 6 months (around 10/30/2025).      ________________________________________________________________________________________________    Per Last Visit Note (Date: 10/30/2024):  Jeremy is a very pleasant 53-year-old gentleman  with a PMHx of vestibular migraine, HTN, CAA, asthma, GERD, diabetes mellitus,  cervical radiculopathy, B12 deficiency, HLD who presents in follow up for SIVA (AHI 6.3, supine AHI 12.2, O2 geremias 87% 7/2024).he is overall doing very well with PAP therapy, endorsing substantial benefit; this is further supported upon review of his compliance report.  However, he is endorsing some side effects, namely rainout within the mask yet with occasional xerostomia (possibly from opening his mouth while sleeping; in addition, he endorses some difficulties with feeling as though he is suffocating/not getting enough pressure when he puts it back on the middle of the night, which I suspect may be due to a low minimum pressure.     Continue AutoPAP at adjusted settings of 10-15 cmH2O  Prescription for new supplies ordered today  Reviewed CMS/insurance requirements and resupply guidelines  Information provided on the above as well as general maintenance steps  Recommended maintaining good Sleep Hygiene  Order placed for a formal mask fitting  Reviewed that he could trial increasing the temperature settings or reducing humidity setting to help with rainout  Also provided dry mouth with CPAP recommendations in his AVS.    He will Return in about 6 months (around 4/30/2025).         Sleep Studies:  -HSAT 6/24/2024: TRT: 7 hours 59 minutes, GEOFFREY: 3.2, O2 geremias: 87%     -PSG 7/19/2024: BMI: 36.5. TST: 312 minutes, Sleep efficiency: 68.9%. Sleep Onset Latency: 20.5 minutes, REM Onset Latency: 149.5 minutes. AHI: 6.3, Supine AHI: 12.2, REM AHI: 4.1. O2 geremias: 87%, Time below 90%: 3.1 minutes. PLM Index: 0, PLM Arousal Index: 0.        ________________________________________________________________________________________________      Interval History: Jeremy George is a 54 y.o. male with a PMHx of vestibular migraine, HTN, CAA, asthma, GERD, diabetes mellitus, cervical radiculopathy, B12 deficiency, HLD who presents in follow up for SIVA (AHI 6.3, supine AHI 12.2, O2 geremias 87% 7/2024).    SDB:  -Current experience with  "PAP Therapy: Definitely very beneficial; period of lack of use was due to an odd taste, which he eventually found was due to mold at a gasket; he has since cleaned this and the taste resolved.   -Notes a substantial benefit from the pressure adjustment at his last appointment.   -Mask type: Nasal   -Difficulties with mask: Tried a \"hybrid\" mask and got frustrated, because it was hurting the bridge of his nose from pressing against the nose. Now using old nasal mask again. Occasional dry mouth.  -Device: ResMed AirSense 11; received 8/16/2024.  -Difficulties with device: Denies  -DME: [facility]  -Compliance:                SLEEP SCHEDULE:  Bedtime: 2130   Time it takes to fall sleep: 31-45 minutes; meds have changed to methocarbamol, tizanidine, mg oxide. Also wife watches TV in bed.    -A lot of neck pain.   Wake up Time: 0515/0530 during week, ~0615 on weekends     Estimated total sleep time ( in a 24 hour period of time): ~6-7       Changes to PMH, PSH, SH: See above         Sitting and reading: Slight chance of dozing  Watching TV: Slight chance of dozing  Sitting, inactive in a public place (e.g. a theatre or a meeting): Slight chance of dozing  As a passenger in a car for an hour without a break: Would never doze  Lying down to rest in the afternoon when circumstances permit: Slight chance of dozing  Sitting and talking to someone: Would never doze  Sitting quietly after a lunch without alcohol: Slight chance of dozing  In a car, while stopped for a few minutes in traffic: Would never doze  Total score: 5     Review of Systems  Pertinent positives/negatives included in HPI and also as noted:     Current Outpatient Medications on File Prior to Visit   Medication Sig Dispense Refill    Accu-Chek FastClix Lancets MISC Use 3 (three) times a day 200 each 1    acetaminophen (TYLENOL) 500 mg tablet Take 500 mg by mouth every 6 (six) hours as needed for mild pain      albuterol (ProAir HFA) 90 mcg/act inhaler Inhale " 2 puffs every 6 (six) hours as needed for wheezing 8.5 g 5    Ascorbic Acid (vitamin C) 1000 MG tablet Take 1,000 mg by mouth daily        Blood Glucose Monitoring Suppl (Accu-Chek Guide) w/Device KIT Use 3 (three) times a day 1 kit 0    Blood Glucose Monitoring Suppl (FreeStyle Lite) GREGORIA TEST BLOOD GLUCOSE THREE TIMES DAILY OR AS DIRECTED BY PROVIDER 300 each 1    cetirizine (ZyrTEC) 10 mg tablet Take 1 tablet (10 mg total) by mouth daily 90 tablet 0    CINNAMON PO Take by mouth      dexamethasone (DECADRON) 2 mg tablet Take 1 tablet (2 mg total) by mouth daily with breakfast 5 tablet 0    diphenhydrAMINE-acetaminophen (TYLENOL PM)  MG TABS Take 2 tablets by mouth daily at bedtime as needed for sleep 1000 mg in total      FIBER ADULT GUMMIES PO Take 3 gums by mouth in the morning      fluticasone (FLONASE) 50 mcg/act nasal spray 2 sprays into each nostril daily 18 mL 1    Fluticasone-Salmeterol (Advair Diskus) 100-50 mcg/dose inhaler Inhale 1 puff 2 (two) times a day Rinse mouth after use. 60 blister 1    Galcanezumab-gnlm 120 MG/ML SOAJ 30 days after loading dose, inject 1 pen subq every 30 days. 3 mL 3    glipiZIDE (GLUCOTROL XL) 2.5 mg 24 hr tablet Take 1 tablet (2.5 mg total) by mouth daily 30 tablet 5    glucose blood (Accu-Chek Guide Test) test strip Use 1 each 3 (three) times a day 270 each 1    glucose blood (FREESTYLE LITE) test strip TEST BLOOD GLUCOSE THREE TIMES DAILY OR AS DIRECTED BY PROVIDER 300 strip 1    Lancets (freestyle) lancets TEST BLOOD GLUCOSE THREE TIMES DAILY OR AS DIRECTED BY PROVIDER 300 each 1    lisinopril-hydrochlorothiazide (PRINZIDE,ZESTORETIC) 20-12.5 MG per tablet Take 1 tablet by mouth daily 90 tablet 1    magnesium Oxide (MAG-OX) 400 mg TABS Take 1 tablet (400 mg total) by mouth daily 90 tablet 0    metFORMIN (GLUCOPHAGE) 500 mg tablet Pt takes 1500 mg (3 tablets) by mouth in the morning, and a 1000 mg (2 tablets) in the evening 450 tablet 1    methocarbamol (ROBAXIN) 500  "mg tablet Take 1 tablet (500 mg total) by mouth daily at bedtime as needed for muscle spasms 90 tablet 0    Multiple Vitamins-Minerals (CENTRUM SILVER 50+MEN PO) Take by mouth      omeprazole (PriLOSEC) 40 MG capsule Take 1 capsule (40 mg total) by mouth daily 90 capsule 3    polymyxin b-trimethoprim (POLYTRIM) ophthalmic solution INSTILL 1 DROP IN THE RIGHT EYE FOUR TIMES DAILY FOR 1 WEEK      simvastatin (ZOCOR) 40 mg tablet Take 1 tablet (40 mg total) by mouth daily 90 tablet 1    sitaGLIPtin (JANUVIA) 100 mg tablet Take 1 tablet (100 mg total) by mouth daily 90 tablet 1    tiZANidine (ZANAFLEX) 4 mg tablet Take 1 tablet (4 mg total) by mouth every 8 (eight) hours as needed for muscle spasms 30 tablet 6    bisacodyl (DULCOLAX) 5 mg EC tablet Take 2 tablets (10 mg total) by mouth once for 1 dose For colonoscopy (Patient not taking: Reported on 2/19/2025) 2 tablet 0    Cyanocobalamin (VITAMIN B 12 PO) Take by mouth in the morning (Patient not taking: Reported on 1/14/2025)      cyanocobalamin 1,000 mcg/mL Inject 1 mL (1,000 mcg total) into a muscle every 7 days for 9 doses (Patient not taking: Reported on 11/12/2024) 1 mL 0     Current Facility-Administered Medications on File Prior to Visit   Medication Dose Route Frequency Provider Last Rate Last Admin    cyanocobalamin injection 1,000 mcg  1,000 mcg Intramuscular Q30 Days MIKHAIL Ha   1,000 mcg at 08/27/24 1553    lidocaine (XYLOCAINE) 1 % injection 1 mL  1 mL Infiltration     1 mL at 10/31/24 1715    triamcinolone acetonide (KENALOG-40) 40 mg/mL injection 20 mg  20 mg Infiltration     20 mg at 10/31/24 1715      Objective   /56   Ht 6' 1.11\" (1.857 m)   Wt 120 kg (264 lb 9.6 oz)   BMI 34.80 kg/m²        Physical Exam  PHYSICAL EXAMINATION:  Vital Signs: /56   Ht 6' 1.11\" (1.857 m)   Wt 120 kg (264 lb 9.6 oz)   BMI 34.80 kg/m²     Constitutional: NAD, well appearing   Mental Status: AAOx3  Skin: Warm, dry, no rashes noted   Eyes: " PERRL, normal conjunctiva  Chest: No evidence of respiratory distress, no accessory muscle use; no evidence of peripheral cyanosis  Abdomen: Soft, NT/ND  Extremities: No digital clubbing or pedal edema  Neuro: Strength 5/5 throughout, sensation grossly intact    Data  Lab Results   Component Value Date    HGB 13.6 12/13/2024    HCT 41.4 12/13/2024    MCV 93 12/13/2024      Lab Results   Component Value Date    GLUCOSE 113 01/16/2015    CALCIUM 8.9 12/13/2024     01/16/2015    K 4.1 12/13/2024    CO2 28 12/13/2024     12/13/2024    BUN 16 12/13/2024    CREATININE 0.86 12/13/2024     Lab Results   Component Value Date    IRON 145 03/18/2024    TIBC 339 03/18/2024    FERRITIN 152 03/18/2024     Lab Results   Component Value Date    AST 46 (H) 12/13/2024    ALT 75 (H) 12/13/2024         Electronically signed by:    Nomi Burnham DO  Board-Certified Neurology and Sleep Medicine  [facility]  04/30/25

## 2025-04-30 NOTE — PATIENT INSTRUCTIONS
Plan:  Continue AutoPAP at settings of  10-15 cmH2O  Remember to clean your mask and equipment regularly, as directed.  You should be eligible for new supplies approximately every 3-6 months, depending on your insurance coverage. Contact your Durable Medical Equipment (DME) company for new supplies as needed.  Practice good Sleep Hygiene, as outlined below.  I am ordering a formal mask fitting appointment; this is an appointment with your DME (Durable Medical Equipment company) to ensure that you have the optimal mask and fit for your face structure. This appointment can also serve as a face-to-face meeting with a DME representative to discuss technical concerns you may be having.    Follow up in 6 months.      General PAP Information:  Care and Maintenance  Headgear should be washed as needed. Daily inspection and weekly washings are recommended. Do not disassemble the straps. Machine wash in warm water, making sure to attach Velcro hooks and tabs before washing. Line dry or machine dry on a low setting.  Masks should be washed every day. Daily inspection is recommended. Leave the mask and tubing attached. Gently wash the mask with a soft cloth using warm water and mild detergent, concentrating on the mask cushion flaps. DO NOT use alcohol or bleach. Rinse thoroughly and air dry.  Tubing and headgear should be washed weekly. Daily inspection is recommended. Wash in warm water and mild detergent and rinse thoroughly. Hook the tubing to the machine and blow until dry.  Humidifier should be washed daily and filled with DISTILLED water before use. Wash with warm water and mild detergent. Rinse thoroughly and air dry.  Disposable filters should be replaced once a month. Wash reusable foam filters with warm water and mild detergent at least once a month. Rinse thoroughly and dry with paper towels.  Avoid  that contain fragrance or conditioners, as these will leave a residue.  NEVER iron any soft goods.      CMS  Requirements    Your insurance requires a face-to-face follow up visit within a 31-90 day period after starting CPAP.  Your insurance requires compliance with CPAP, which is at least 4 hours per night for 70% of the time. This must be done over a 30 day period and must occur within the initial 31-90 day period after starting CPAP.  Your insurance also requires at least yearly follow ups to continue to pay for CPAP supplies.       PAP Supply Guidelines    Below are the guidelines for reordering your supplies. You will be responsible for your deductible, co payments, and out of pocket expenses.    Item Frequency   Nasal Mask (no headgear) 1 every 3 months   Nasal Mask Cushion 1 every 2 weeks   Full Face Mask (no headgear) 1 every 3 months   Full Face Mask Cushion 1 every month   Nasal Pillows 1 every 2 weeks   Headgear 1 every 6 months   hin Strap 1 every 6 months   gissel 1 every 3 months   Filters: Reusable 1 every 6 months   Filters: Disposable 1 every 2 weeks   Humidifier Chamber(disposable) 1 every 6 months           Good Sleep Hygiene  Wake up at the same time every day, even on the weekends.  Use your bed for sleep and intimacy only.  If you have been in bed awake for 30 minutes, get up and leave the bedroom. Choose a dull activity not involving a blue screen (TV, computer, handheld devices). Go back to bed when you feel sleepy.  Avoid caffeine, nicotine and alcohol before you go to bed.  Avoid large meals before you go to bed.  Avoid using screens (computers, tablets, smartphones, etc.) for at least 1 hour before bedtime  Exercise regularly, but do not exercise right before you go to bed.  Avoid daytime naps. If you do take a nap, sleep for 20-40 minutes, and not after 2pm.

## 2025-04-30 NOTE — TELEPHONE ENCOUNTER
Outbound call made to [facility] and they confirmed Patient picked up the prescription.

## 2025-04-30 NOTE — ASSESSMENT & PLAN NOTE
Reviewed the importance of treating SDB on cardiovascular risk reduction (including but not limited to impaired BP control, risk of heart attack, CHF, and both initial occurrence of A-fib and recurrence of A-fib following ablation or similar intervention)  Defer primary management per patient's cardiologist and/or PCP  Orders:    Mask fitting only; Future    PAP DME Resupply/Reorder

## 2025-05-01 ENCOUNTER — TELEPHONE (OUTPATIENT)
Dept: SLEEP CENTER | Facility: CLINIC | Age: 55
End: 2025-05-01

## 2025-05-07 ENCOUNTER — TELEPHONE (OUTPATIENT)
Dept: NEUROLOGY | Facility: CLINIC | Age: 55
End: 2025-05-07

## 2025-05-11 ENCOUNTER — APPOINTMENT (OUTPATIENT)
Dept: LAB | Age: 55
End: 2025-05-11
Payer: COMMERCIAL

## 2025-05-11 DIAGNOSIS — Z13.228 SCREENING FOR METABOLIC DISORDER: ICD-10-CM

## 2025-05-11 DIAGNOSIS — K76.0 FATTY LIVER: ICD-10-CM

## 2025-05-11 DIAGNOSIS — M54.81 OCCIPITAL NEURALGIA: ICD-10-CM

## 2025-05-11 LAB
ALBUMIN SERPL BCG-MCNC: 3.9 G/DL (ref 3.5–5)
ALP SERPL-CCNC: 63 U/L (ref 34–104)
ALT SERPL W P-5'-P-CCNC: 56 U/L (ref 7–52)
ANION GAP SERPL CALCULATED.3IONS-SCNC: 7 MMOL/L (ref 4–13)
AST SERPL W P-5'-P-CCNC: 28 U/L (ref 13–39)
BASOPHILS # BLD AUTO: 0.04 THOUSANDS/ÂΜL (ref 0–0.1)
BASOPHILS NFR BLD AUTO: 1 % (ref 0–1)
BILIRUB SERPL-MCNC: 0.77 MG/DL (ref 0.2–1)
BUN SERPL-MCNC: 16 MG/DL (ref 5–25)
CALCIUM SERPL-MCNC: 9 MG/DL (ref 8.4–10.2)
CHLORIDE SERPL-SCNC: 103 MMOL/L (ref 96–108)
CHOLEST SERPL-MCNC: 155 MG/DL (ref ?–200)
CO2 SERPL-SCNC: 27 MMOL/L (ref 21–32)
CREAT SERPL-MCNC: 0.84 MG/DL (ref 0.6–1.3)
EOSINOPHIL # BLD AUTO: 0.15 THOUSAND/ÂΜL (ref 0–0.61)
EOSINOPHIL NFR BLD AUTO: 3 % (ref 0–6)
ERYTHROCYTE [DISTWIDTH] IN BLOOD BY AUTOMATED COUNT: 12 % (ref 11.6–15.1)
EST. AVERAGE GLUCOSE BLD GHB EST-MCNC: 134 MG/DL
GFR SERPL CREATININE-BSD FRML MDRD: 98 ML/MIN/1.73SQ M
GLUCOSE P FAST SERPL-MCNC: 110 MG/DL (ref 65–99)
HBA1C MFR BLD: 6.3 %
HCT VFR BLD AUTO: 43.2 % (ref 36.5–49.3)
HDLC SERPL-MCNC: 42 MG/DL
HGB BLD-MCNC: 14.1 G/DL (ref 12–17)
IMM GRANULOCYTES # BLD AUTO: 0.04 THOUSAND/UL (ref 0–0.2)
IMM GRANULOCYTES NFR BLD AUTO: 1 % (ref 0–2)
LDLC SERPL CALC-MCNC: 88 MG/DL (ref 0–100)
LYMPHOCYTES # BLD AUTO: 1.99 THOUSANDS/ÂΜL (ref 0.6–4.47)
LYMPHOCYTES NFR BLD AUTO: 37 % (ref 14–44)
MCH RBC QN AUTO: 30.9 PG (ref 26.8–34.3)
MCHC RBC AUTO-ENTMCNC: 32.6 G/DL (ref 31.4–37.4)
MCV RBC AUTO: 95 FL (ref 82–98)
MONOCYTES # BLD AUTO: 0.74 THOUSAND/ÂΜL (ref 0.17–1.22)
MONOCYTES NFR BLD AUTO: 14 % (ref 4–12)
NEUTROPHILS # BLD AUTO: 2.49 THOUSANDS/ÂΜL (ref 1.85–7.62)
NEUTS SEG NFR BLD AUTO: 44 % (ref 43–75)
NONHDLC SERPL-MCNC: 113 MG/DL
NRBC BLD AUTO-RTO: 0 /100 WBCS
PLATELET # BLD AUTO: 165 THOUSANDS/UL (ref 149–390)
PMV BLD AUTO: 10.6 FL (ref 8.9–12.7)
POTASSIUM SERPL-SCNC: 4.2 MMOL/L (ref 3.5–5.3)
PROT SERPL-MCNC: 6.2 G/DL (ref 6.4–8.4)
RBC # BLD AUTO: 4.57 MILLION/UL (ref 3.88–5.62)
SODIUM SERPL-SCNC: 137 MMOL/L (ref 135–147)
TRIGL SERPL-MCNC: 127 MG/DL (ref ?–150)
VIT B12 SERPL-MCNC: 386 PG/ML (ref 180–914)
WBC # BLD AUTO: 5.45 THOUSAND/UL (ref 4.31–10.16)

## 2025-05-11 PROCEDURE — 82607 VITAMIN B-12: CPT

## 2025-05-11 PROCEDURE — 36415 COLL VENOUS BLD VENIPUNCTURE: CPT

## 2025-05-11 PROCEDURE — 80053 COMPREHEN METABOLIC PANEL: CPT

## 2025-05-12 ENCOUNTER — RESULTS FOLLOW-UP (OUTPATIENT)
Dept: GASTROENTEROLOGY | Facility: CLINIC | Age: 55
End: 2025-05-12

## 2025-05-14 NOTE — TELEPHONE ENCOUNTER
Per encounter on 6/4/24:     Nurtec approved through 6/4/2025    Will submit closer to expiration date    Key: [id number]

## 2025-05-29 ENCOUNTER — HOSPITAL ENCOUNTER (OUTPATIENT)
Dept: RADIOLOGY | Age: 55
Discharge: HOME | End: 2025-05-29
Attending: INTERNAL MEDICINE
Payer: COMMERCIAL

## 2025-05-29 DIAGNOSIS — K76.0 FATTY LIVER: ICD-10-CM

## 2025-05-29 PROCEDURE — 76981 USE PARENCHYMA: CPT

## 2025-05-30 ENCOUNTER — TELEPHONE (OUTPATIENT)
Dept: NEUROLOGY | Facility: CLINIC | Age: 55
End: 2025-05-30

## 2025-05-30 ENCOUNTER — OFFICE VISIT (OUTPATIENT)
Age: 55
End: 2025-05-30
Payer: COMMERCIAL

## 2025-05-30 VITALS
HEIGHT: 73 IN | DIASTOLIC BLOOD PRESSURE: 70 MMHG | OXYGEN SATURATION: 97 % | HEART RATE: 72 BPM | SYSTOLIC BLOOD PRESSURE: 120 MMHG | BODY MASS INDEX: 34.96 KG/M2 | WEIGHT: 263.8 LBS | TEMPERATURE: 97.1 F

## 2025-05-30 DIAGNOSIS — I68.0 CEREBRAL AMYLOID ANGIOPATHY  (HCC): ICD-10-CM

## 2025-05-30 DIAGNOSIS — E78.49 OTHER HYPERLIPIDEMIA: ICD-10-CM

## 2025-05-30 DIAGNOSIS — G43.809 VESTIBULAR MIGRAINE: ICD-10-CM

## 2025-05-30 DIAGNOSIS — E08.49 DIABETES DUE TO UNDRL CONDITION W OTH DIABETIC NEURO COMP (HCC): ICD-10-CM

## 2025-05-30 DIAGNOSIS — Z23 ENCOUNTER FOR IMMUNIZATION: ICD-10-CM

## 2025-05-30 DIAGNOSIS — I71.20 THORACIC AORTIC ANEURYSM (TAA), UNSPECIFIED PART, UNSPECIFIED WHETHER RUPTURED (HCC): ICD-10-CM

## 2025-05-30 DIAGNOSIS — E78.5 HYPERLIPIDEMIA, UNSPECIFIED HYPERLIPIDEMIA TYPE: ICD-10-CM

## 2025-05-30 DIAGNOSIS — R73.01 ELEVATED FASTING GLUCOSE: ICD-10-CM

## 2025-05-30 DIAGNOSIS — G47.33 OSA (OBSTRUCTIVE SLEEP APNEA): ICD-10-CM

## 2025-05-30 DIAGNOSIS — Z00.00 ANNUAL PHYSICAL EXAM: Primary | ICD-10-CM

## 2025-05-30 DIAGNOSIS — E53.8 B12 DEFICIENCY: ICD-10-CM

## 2025-05-30 DIAGNOSIS — J45.20 MILD INTERMITTENT ASTHMA WITHOUT COMPLICATION: ICD-10-CM

## 2025-05-30 DIAGNOSIS — E85.4 CEREBRAL AMYLOID ANGIOPATHY  (HCC): ICD-10-CM

## 2025-05-30 DIAGNOSIS — Z12.5 SCREENING FOR PROSTATE CANCER: ICD-10-CM

## 2025-05-30 DIAGNOSIS — Z13.228 SCREENING FOR METABOLIC DISORDER: ICD-10-CM

## 2025-05-30 DIAGNOSIS — E11.9 TYPE 2 DIABETES MELLITUS WITHOUT COMPLICATION, WITHOUT LONG-TERM CURRENT USE OF INSULIN (HCC): ICD-10-CM

## 2025-05-30 DIAGNOSIS — I10 HYPERTENSION, ESSENTIAL: ICD-10-CM

## 2025-05-30 DIAGNOSIS — K21.9 GASTROESOPHAGEAL REFLUX DISEASE WITHOUT ESOPHAGITIS: ICD-10-CM

## 2025-05-30 PROBLEM — G47.19 EXCESSIVE DAYTIME SLEEPINESS: Status: RESOLVED | Noted: 2024-06-11 | Resolved: 2025-05-30

## 2025-05-30 PROBLEM — G56.01 CARPAL TUNNEL SYNDROME OF RIGHT WRIST: Status: RESOLVED | Noted: 2024-08-06 | Resolved: 2025-05-30

## 2025-05-30 PROCEDURE — 90715 TDAP VACCINE 7 YRS/> IM: CPT

## 2025-05-30 PROCEDURE — 90471 IMMUNIZATION ADMIN: CPT

## 2025-05-30 PROCEDURE — 99396 PREV VISIT EST AGE 40-64: CPT | Performed by: NURSE PRACTITIONER

## 2025-05-30 PROCEDURE — 99214 OFFICE O/P EST MOD 30 MIN: CPT | Performed by: NURSE PRACTITIONER

## 2025-05-30 NOTE — ASSESSMENT & PLAN NOTE
- up to date with fasting blood work  -Continue with well-balanced diet, encouraged daily exercise  - He is up-to-date with prostate and colon cancer screening  -Received Tdap vaccination while in office today  -Follow-up in 1 year for annual physical or sooner if needed

## 2025-05-30 NOTE — ASSESSMENT & PLAN NOTE
Lab Results   Component Value Date    HGBA1C 6.3 (H) 05/11/2025   - Improving  - Reduce metformin to 1000 mg in the morning and thousand in the evening  -Continue Januvia  - Continue glipizide 2.5 mg tablet daily    Orders:    metFORMIN (GLUCOPHAGE) 500 mg tablet; Take 2 tablets (1,000 mg total) by mouth 2 (two) times a day with meals    Albumin / creatinine urine ratio

## 2025-05-30 NOTE — PROGRESS NOTES
Diabetic Foot Exam    Patient's shoes and socks removed.    Right Foot/Ankle   Right Foot Inspection  Skin Exam: skin normal and skin intact. No dry skin, no warmth, no callus, no erythema, no maceration, no abnormal color, no pre-ulcer, no ulcer and no callus.     Toe Exam: ROM and strength within normal limits.     Sensory   Monofilament testing: intact    Vascular  Capillary refills: < 3 seconds  The right DP pulse is 2+.     Left Foot/Ankle  Left Foot Inspection  Skin Exam: skin normal and skin intact. No dry skin, no warmth, no erythema, no maceration, normal color, no pre-ulcer, no ulcer and no callus.     Toe Exam: ROM and strength within normal limits.     Sensory   Monofilament testing: intact    Vascular  Capillary refills: < 3 seconds  The left DP pulse is 2+.     Assign Risk Category  No deformity present  No loss of protective sensation  No weak pulses  Risk: 0  Adult Annual Physical  Name: Jeremy George      : 1970      MRN: [id number]  Encounter Provider: MIKHAIL Ha  Encounter Date: 2025   Encounter department: [facility]    :  Assessment & Plan  Annual physical exam  - up to date with fasting blood work  -Continue with well-balanced diet, encouraged daily exercise  - He is up-to-date with prostate and colon cancer screening  -Received Tdap vaccination while in office today  -Follow-up in 1 year for annual physical or sooner if needed           Type 2 diabetes mellitus without complication, without long-term current use of insulin (Formerly [facility])    Lab Results   Component Value Date    HGBA1C 6.3 (H) 2025   - Improving  - Reduce metformin to 1000 mg in the morning and thousand in the evening  -Continue Januvia  - Continue glipizide 2.5 mg tablet daily    Orders:    metFORMIN (GLUCOPHAGE) 500 mg tablet; Take 2 tablets (1,000 mg total) by mouth 2 (two) times a day with meals    Albumin / creatinine urine ratio      Screening for  metabolic disorder    Orders:    Comprehensive metabolic panel    CBC and differential    Lipid panel    Hyperlipidemia, unspecified hyperlipidemia type    Orders:    Lipid panel    Elevated fasting glucose    Orders:    Hemoglobin A1C    Screening for prostate cancer    Orders:    PSA, Total Screen; Future    Vestibular migraine  -Continue to follow with neurology           Thoracic aortic aneurysm (TAA), unspecified part, unspecified whether ruptured (HCC)         Hypertension, essential  -Blood pressure well-controlled on lisinopril-hydrochlorothiazide           Cerebral amyloid angiopathy  (HCC)         SIVA (obstructive sleep apnea)  -Recently started on CPAP machine           Mild intermittent asthma without complication  -No recent exacerbations, continue with Advair and as needed albuterol           Gastroesophageal reflux disease without esophagitis  -currently following GI           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 (HCC)    Lab Results   Component Value Date    HGBA1C 6.3 (H) 05/11/2025            Other hyperlipidemia  -Continue with simvastatin 40 mg           Encounter for immunization    Orders:    TDAP VACCINE GREATER THAN OR EQUAL TO 8YO IM    B12 deficiency  -Continue with supplementation               Preventive Screenings:  - Diabetes Screening: screening not indicated, has diabetes and risks/benefits discussed  - Cholesterol Screening: screening not indicated, has hyperlipidemia and risks/benefits discussed   - Hepatitis C screening: screening up-to-date   - HIV screening: risks/benefits discussed   - Colon cancer screening: screening up-to-date   - Lung cancer screening: screening not indicated   - Prostate cancer screening: screening up-to-date     Immunizations:  - Immunizations due: Prevnar 20 and Tdap    Counseling/Anticipatory Guidance:  - Alcohol: discussed moderation in alcohol intake and recommendations for healthy alcohol use.   - Drug use: discussed harms of  illicit drug use and how it can negatively impact mental/physical health.   - Tobacco use: discussed harms of tobacco use and management options for quitting.   - Dental health: discussed importance of regular tooth brushing, flossing, and dental visits.   - Sexual health: discussed sexually transmitted diseases, partner selection, use of condoms, avoidance of unintended pregnancy, and contraceptive alternatives.   - Diet: discussed recommendations for a healthy/well-balanced diet.   - Exercise: the importance of regular exercise/physical activity was discussed. Recommend exercise 3-5 times per week for at least 30 minutes.   - Injury prevention: discussed safety/seat belts, safety helmets, smoke detectors, carbon monoxide detectors, and smoking near bedding or upholstery.       Depression Screening and Follow-up Plan: Patient was screened for depression during today's encounter. They screened negative with a PHQ-2 score of 0.          History of Present Illness     Adult Annual Physical:  Patient presents for annual physical. Patient presents today for follow up and annual physical.  Reviewed blood work while in office today     S/P cervical spinal fusion- currently follows neurosurgery      Diabetes mellitus (HCC)- HBA1C 6.8-->7.7-->6.3, continues on metformin and  Januvia, he denies side effects, denies hypo or hyperglycemic events  Fasting glucose 110  Was started on glipizide at last office visit       Hypertension-blood pressure well-controlled on lisinopril-hydrochlorothiazide, denies lightheadedness or dizziness, denies side effects with this medication     Hyperlipidemia- controlled on statin, denies side effects with medication  Simvastatin was increased at last office visit, noted better control  ASCVD rescore 9.6%, cholesterol 155 triglycerides 127, HDL 42, LDL 88     Vestibular migraines-currently following neurology plan as stated below     SIVA-mild, was started on CPAP machine      B12 level-424    "  Elevated liver enzymes-AST 46, ALT 75  .     Diet and Physical Activity:  - Diet/Nutrition: portion control, limited junk food, consuming 3-5 servings of fruits/vegetables daily and adequate fiber intake.  - Exercise: walking, 30-60 minutes on average and 3-4 times a week on average. 4-5 times weekly    Depression Screening:  - PHQ-2 Score: 0    General Health:  - Sleep: uses CPAP machine, sleeps well and snores loudly. 5 to 7 hours of sleep  - Hearing: normal hearing bilateral ears.  - Vision: wears glasses and most recent eye exam > 1 year ago.  - Dental: regular dental visits, brushes teeth twice daily and floss regularly.     Health:  - History of STDs: no.   - Urinary symptoms: none.     Advanced Care Planning:  - Has an advanced directive?: no    - Has a durable medical POA?: no      Review of Systems   Constitutional:  Negative for activity change, appetite change, chills, diaphoresis and fever.   HENT:  Negative for congestion, ear discharge, ear pain, postnasal drip, rhinorrhea, sinus pressure, sinus pain and sore throat.    Eyes:  Negative for pain, discharge, itching and visual disturbance.   Respiratory:  Negative for cough, chest tightness, shortness of breath and wheezing.    Cardiovascular:  Negative for chest pain, palpitations and leg swelling.   Gastrointestinal:  Negative for abdominal pain, constipation, diarrhea, nausea and vomiting.   Endocrine: Negative for polydipsia, polyphagia and polyuria.   Genitourinary:  Negative for difficulty urinating, dysuria and urgency.   Musculoskeletal:  Negative for arthralgias, back pain and neck pain.   Skin:  Negative for rash and wound.   Neurological:  Negative for dizziness, weakness, numbness and headaches.         Objective   /70 (BP Location: Left arm, Patient Position: Sitting, Cuff Size: Large)   Pulse 72   Temp (!) 97.1 °F (36.2 °C) (Temporal)   Ht 6' 1.11\" (1.857 m)   Wt 120 kg (263 lb 12.8 oz)   SpO2 97%   BMI 34.70 kg/m² "     Physical Exam  Constitutional:       General: He is not in acute distress.     Appearance: He is well-developed. He is not diaphoretic.   HENT:      Head: Normocephalic and atraumatic.      Right Ear: External ear normal.      Left Ear: External ear normal.      Nose: Nose normal.      Mouth/Throat:      Mouth: Mucous membranes are moist.      Pharynx: No oropharyngeal exudate or posterior oropharyngeal erythema.     Eyes:      General:         Right eye: No discharge.         Left eye: No discharge.      Conjunctiva/sclera: Conjunctivae normal.      Pupils: Pupils are equal, round, and reactive to light.     Neck:      Thyroid: No thyromegaly.     Cardiovascular:      Rate and Rhythm: Normal rate and regular rhythm.      Pulses: no weak pulses.           Dorsalis pedis pulses are 2+ on the right side and 2+ on the left side.      Heart sounds: Normal heart sounds. No murmur heard.     No friction rub. No gallop.   Pulmonary:      Effort: Pulmonary effort is normal. No respiratory distress.      Breath sounds: Normal breath sounds. No stridor. No wheezing or rales.   Abdominal:      General: Bowel sounds are normal. There is no distension.      Palpations: Abdomen is soft.      Tenderness: There is no abdominal tenderness.     Musculoskeletal:      Cervical back: Normal range of motion and neck supple.   Feet:      Right foot:      Skin integrity: No ulcer, skin breakdown, erythema, warmth, callus or dry skin.      Left foot:      Skin integrity: No ulcer, skin breakdown, erythema, warmth, callus or dry skin.   Lymphadenopathy:      Cervical: No cervical adenopathy.     Skin:     General: Skin is warm and dry.      Findings: No erythema or rash.     Neurological:      Mental Status: He is alert and oriented to person, place, and time.     Psychiatric:         Behavior: Behavior normal.         Thought Content: Thought content normal.         Judgment: Judgment normal.

## 2025-05-30 NOTE — PATIENT INSTRUCTIONS
"Patient Education     Routine physical for adults   The Basics   Written by the doctors and editors at [facility]   What is a physical? -- A physical is a routine visit, or \"check-up,\" with your doctor. You might also hear it called a \"wellness visit\" or \"preventive visit.\"  During each visit, the doctor will:   Ask about your physical and mental health   Ask about your habits, behaviors, and lifestyle   Do an exam   Give you vaccines if needed   Talk to you about any medicines you take   Give advice about your health   Answer your questions  Getting regular check-ups is an important part of taking care of your health. It can help your doctor find and treat any problems you have. But it's also important for preventing health problems.  A routine physical is different from a \"sick visit.\" A sick visit is when you see a doctor because of a health concern or problem. Since physicals are scheduled ahead of time, you can think about what you want to ask the doctor.  How often should I get a physical? -- It depends on your age and health. In general, for people age 21 years and older:   If you are younger than 50 years, you might be able to get a physical every 3 years.   If you are 50 years or older, your doctor might recommend a physical every year.  If you have an ongoing health condition, like diabetes or high blood pressure, your doctor will probably want to see you more often.  What happens during a physical? -- In general, each visit will include:   Physical exam - The doctor or nurse will check your height, weight, heart rate, and blood pressure. They will also look at your eyes and ears. They will ask about how you are feeling and whether you have any symptoms that bother you.   Medicines - It's a good idea to bring a list of all the medicines you take to each doctor visit. Your doctor will talk to you about your medicines and answer any questions. Tell them if you are having any side effects that bother you. You " "should also tell them if you are having trouble paying for any of your medicines.   Habits and behaviors - This includes:   Your diet   Your exercise habits   Whether you smoke, drink alcohol, or use drugs   Whether you are sexually active   Whether you feel safe at home  Your doctor will talk to you about things you can do to improve your health and lower your risk of health problems. They will also offer help and support. For example, if you want to quit smoking, they can give you advice and might prescribe medicines. If you want to improve your diet or get more physical activity, they can help you with this, too.   Lab tests, if needed - The tests you get will depend on your age and situation. For example, your doctor might want to check your:   Cholesterol   Blood sugar   Iron level   Vaccines - The recommended vaccines will depend on your age, health, and what vaccines you already had. Vaccines are very important because they can prevent certain serious or deadly infections.   Discussion of screening - \"Screening\" means checking for diseases or other health problems before they cause symptoms. Your doctor can recommend screening based on your age, risk, and preferences. This might include tests to check for:   Cancer, such as breast, prostate, cervical, ovarian, colorectal, prostate, lung, or skin cancer   Sexually transmitted infections, such as chlamydia and gonorrhea   Mental health conditions like depression and anxiety  Your doctor will talk to you about the different types of screening tests. They can help you decide which screenings to have. They can also explain what the results might mean.   Answering questions - The physical is a good time to ask the doctor or nurse questions about your health. If needed, they can refer you to other doctors or specialists, too.  Adults older than 65 years often need other care, too. As you get older, your doctor will talk to you about:   How to prevent falling at " home   Hearing or vision tests   Memory testing   How to take your medicines safely   Making sure that you have the help and support you need at home  All topics are updated as new evidence becomes available and our peer review process is complete.  This topic retrieved from [employer] on: May 02, 2024.  Topic 182186 Version 1.0  Release: 32.4.3 - C32.122  © 2024 UpToDate, Inc. and/or its affiliates. All rights reserved.  Consumer Information Use and Disclaimer   Disclaimer: This generalized information is a limited summary of diagnosis, treatment, and/or medication information. It is not meant to be comprehensive and should be used as a tool to help the user understand and/or assess potential diagnostic and treatment options. It does NOT include all information about conditions, treatments, medications, side effects, or risks that may apply to a specific patient. It is not intended to be medical advice or a substitute for the medical advice, diagnosis, or treatment of a health care provider based on the health care provider's examination and assessment of a patient's specific and unique circumstances. Patients must speak with a health care provider for complete information about their health, medical questions, and treatment options, including any risks or benefits regarding use of medications. This information does not endorse any treatments or medications as safe, effective, or approved for treating a specific patient. UpToDate, Inc. and its affiliates disclaim any warranty or liability relating to this information or the use thereof.The use of this information is governed by the Terms of Use, available at https://www.w[employer]ters[employer] Mediauwer.com/en/know/clinical-effectiveness-terms. 2024© UpToDate, Inc. and its affiliates and/or licensors. All rights reserved.  Copyright   © 2024 UpToDate, Inc. and/or its affiliates. All rights reserved.

## 2025-06-09 DIAGNOSIS — I10 HYPERTENSION, ESSENTIAL: ICD-10-CM

## 2025-06-09 DIAGNOSIS — E11.9 TYPE 2 DIABETES MELLITUS WITHOUT COMPLICATION, WITHOUT LONG-TERM CURRENT USE OF INSULIN (HCC): ICD-10-CM

## 2025-06-10 RX ORDER — GLIPIZIDE 2.5 MG/1
2.5 TABLET, EXTENDED RELEASE ORAL DAILY
Qty: 30 TABLET | Refills: 5 | Status: SHIPPED | OUTPATIENT
Start: 2025-06-10 | End: 2025-12-07

## 2025-06-10 RX ORDER — LISINOPRIL AND HYDROCHLOROTHIAZIDE 12.5; 2 MG/1; MG/1
1 TABLET ORAL DAILY
Qty: 90 TABLET | Refills: 1 | Status: SHIPPED | OUTPATIENT
Start: 2025-06-10

## 2025-06-11 ENCOUNTER — OFFICE VISIT (OUTPATIENT)
Dept: NEUROLOGY | Facility: CLINIC | Age: 55
End: 2025-06-11
Payer: COMMERCIAL

## 2025-06-11 VITALS
BODY MASS INDEX: 34.54 KG/M2 | WEIGHT: 262.6 LBS | HEART RATE: 70 BPM | TEMPERATURE: 96.2 F | SYSTOLIC BLOOD PRESSURE: 142 MMHG | DIASTOLIC BLOOD PRESSURE: 72 MMHG | OXYGEN SATURATION: 97 %

## 2025-06-11 DIAGNOSIS — G43.809 VESTIBULAR MIGRAINE: Primary | ICD-10-CM

## 2025-06-11 PROCEDURE — 99212 OFFICE O/P EST SF 10 MIN: CPT | Performed by: PSYCHIATRY & NEUROLOGY

## 2025-06-11 NOTE — PROGRESS NOTES
Name: Jeremy George      : 1970      MRN: [id number]  Encounter Provider: Naty Penaloza MD  Encounter Date: 2025   Encounter [facility]  :  Assessment & Plan  Vestibular migraine  Patient with history of migraines which had good response to emgality. Recently stopped the Emgality as his headaches have remained under control. This was followed by 4 days of migraines. These then resolved with steroids. Since then they have been under control. Follow up on B 12 level. Continue current management. Will continue to monitor.              History of Present Illness   HPI   Jeremy George is a very pleasant right-handed 53 y.o. male with a past medical history that includes cervical spondylosis with radiculopathy s/p cervical spinal fusion, diabetes, hypertension, hyperlipidemia, essential tremor, chronic pain syndrome, groin strain, mild asthma, thoracic aortic aneurysm, lung nodule, chest pain, vitamin B12 deficiency and cerebral amyloid angiopathy who presents for headache follow-up.     Initial visit (24):  Patient today reports having a history of headaches however these are different and that they are less associated with his right upper extremity tremor.  He started noticing these headaches in 2024. They report having a  retro-orbital pressure followed by sharp pain located on the left side. Associated symptoms includes nausea, neck pain, photophobia, phonophobia, osmophobia, blurry vision, tinnitus and dizziness.  Dizziness is particularly worse when the headaches are severe.  He gets a sudden onset sensation of falling down and loss of balance however this can also happen without the headaches.. They report that headaches are triggered by strong smells and can be worsened by coughing . In a month they can get up to 10-12 headache days. Risk factors include obesity, stress, snoring , sleep disturbances , and muscular tension at the neck  and shoulders. Patients have not responded to Tylenol or sumatriptan.  Ibuprofen contraindicated due to esophageal issues.  Triptans overall contraindicated given they can cause vasoconstriction and worsen microhemorrhages due to patients hx of cerebral amyloid angiopathy. Also has thoracic aortic aneurysm.  Physical exam was normal with exception of slowed speech and decreased range of motion at the neck.     Impression: At this time, based on history, available workup and physical exam,  I believe patients headaches are likely due to potential vestibular migraines.  Need to clarify if he had a history of migraines without the dizziness in the past.  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  I will evaluate them for this and address risk factors with the following plan outlined below.      I referred him to sleep medicine for evaluation of SIVA.  I referred him to ophthalmology for dilated eye exam.  For his headaches I increase his Topamax to 100 mg nightly.  I added magnesium oxide 400 mg daily.  For headache  I recommended Nurtec given contraindications to triptans and ibuprofen.      Prior encounters:     2024:Today patient reports some mild improvements in his symptoms.  He has been tracking his headaches through a migraine albertina.  Patient is very sensitive to lights.  Dizziness mildly improved with cervical traction however then recurred.  He has been taking Topamax 100 mg nightly and his migraines have become milder and his headaches have still persisted moderate to severe.  He was seen by ophthalmology.  No optic nerve edema on dilated eye exam.  He was seen by sleep medicine plan for home sleep study.  He is being followed by pain medicine and he is going to get a cervical medial branch block this afternoon to see if he is a candidate for radiofrequency ablation.     2024: Today patient  reports that he still gets daily headaches however not as intense as before and is able to function at work.  He has not had any dizziness episodes since July 17.  Patient had a nerve block without any help.  He has not started Emgality.     2/19/2025: Patient reports having a shooting pain that is triggered mostly when looking to the right. Pain originates in his right occipital area and shoots into his right shoulder. He has been doing PT at work.         Workup:   B12 424      MRI brain without contrast (4/24/24):  No acute intracranial abnormality. No restricted diffusion to suggest acute ischemia. Mild scattered periventricular and subcortical foci of white matter T2 hyperintensity which are nonspecific and most likely related to chronic small vessel ischemic changes. Other less like etiologies include demyelinating disease, vasculitis, Lyme and migraines. Reidentified and normal punctate foci of susceptibility artifact are seen in the supratentorial brain centrally at the gray-white junction peripherally in distribution most compatible with cerebral amyloid angiopathy.  Chronic microhemorrhage in the setting of hypertension is less likely with this distribution but not excluded.     Sleep study (7/19/24):  Sleep efficiency was decreased.  Sleep latency was normal but REM latency was prolonged.  There was decreased N3 but a normal percentage of REM sleep.  Mild obstructive sleep apnea was observed with overall AHI of 6.3.  The first cluster of sleep apnea was mostly likely occurring in REM supine sleep (mislabeled as N2 sleep) resulting in more severe desaturations compared to other events.  Sleep apnea resulted in mild to moderate oxygen desaturations  Baseline oxygenation was normal  Degree of sleep apnea may have been underestimated due to decreased supine sleep.    No significant periodic limb movements  EKG demonstrates normal sinus rhythm      Prior treatment:     Abortive:   Tylenol 1000 mg- no help    Ibuprofen- C/I due to esophageal issues   Sumatriptan- no help   Triptans- contraindicated given they can cause vasoconstriction and worsen microhemorrhages due to patients hx of cerebral amyloid angiopathy. Also has thoracic aortic aneurysm   Nurtec 75 mg      Preventative:  Topiramate 75 mg- taking in the morning, headaches not as severe. No help   Pregabalin 150 mg - for neck pain      Other:  Methocarbamol- helps      Interval history:  Patient with headaches 2-3 times in a week.   Patient with 4 days in a row of migraines   Metformin was giving him headaches   Started on glipizide   Has been having episodes of chest pain   Stopped the Emgality as headaches had not been bad enough, then had a 4 day headache. Took steroids and then headache stayed at bay   Memory has been much better, taking tizanidine methocarbamol and magensium, was taking the tylenol pm   May need to sign FMLA    Review of Systems   Constitutional:  Negative for appetite change, fatigue and fever.   HENT: Negative.  Negative for hearing loss, tinnitus, trouble swallowing and voice change.    Eyes: Negative.  Negative for photophobia, pain and visual disturbance.   Respiratory: Negative.  Negative for shortness of breath.    Cardiovascular: Negative.  Negative for palpitations.   Gastrointestinal: Negative.  Negative for nausea and vomiting.   Endocrine: Negative.  Negative for cold intolerance.   Genitourinary: Negative.  Negative for dysuria, frequency and urgency.   Musculoskeletal:  Negative for back pain, gait problem, myalgias, neck pain and neck stiffness.   Skin: Negative.  Negative for rash.   Allergic/Immunologic: Negative.    Neurological:  Negative for dizziness, tremors, seizures, syncope, facial asymmetry, speech difficulty, weakness, light-headedness, numbness and headaches.        Pt stated that he had one 4 day flare up of his occipital neuralgia since last visit. States that he has HA 2-3 times a week.No new symptoms or  concerns.   Hematological: Negative.  Does not bruise/bleed easily.   Psychiatric/Behavioral: Negative.  Negative for confusion, hallucinations and sleep disturbance.    All other systems reviewed and are negative.   I have personally reviewed the MA's review of systems and made changes as necessary.         Objective   /72 (BP Location: Right arm, Patient Position: Sitting, Cuff Size: Large)   Pulse 70   Temp (!) 96.2 °F (35.7 °C) (Temporal)   Wt 119 kg (262 lb 9.6 oz)   SpO2 97%   BMI 34.54 kg/m²     Physical Exam  Neurological Exam

## 2025-06-24 DIAGNOSIS — M54.81 OCCIPITAL NEURALGIA: ICD-10-CM

## 2025-07-04 NOTE — ASSESSMENT & PLAN NOTE
Patient with history of migraines which had good response to emgality. Recently stopped the Emgality as his headaches have remained under control. This was followed by 4 days of migraines. These then resolved with steroids. Since then they have been under control. Follow up on B 12 level. Continue current management. Will continue to monitor.

## 2025-07-10 ENCOUNTER — APPOINTMENT (OUTPATIENT)
Dept: RADIOLOGY | Age: 55
End: 2025-07-10
Attending: STUDENT IN AN ORGANIZED HEALTH CARE EDUCATION/TRAINING PROGRAM
Payer: OTHER MISCELLANEOUS

## 2025-07-10 ENCOUNTER — OCCMED (OUTPATIENT)
Dept: URGENT CARE | Age: 55
End: 2025-07-10
Payer: OTHER MISCELLANEOUS

## 2025-07-10 ENCOUNTER — TELEPHONE (OUTPATIENT)
Dept: GASTROENTEROLOGY | Facility: CLINIC | Age: 55
End: 2025-07-10

## 2025-07-10 DIAGNOSIS — M25.551 RIGHT HIP PAIN: ICD-10-CM

## 2025-07-10 DIAGNOSIS — M25.551 RIGHT HIP PAIN: Primary | ICD-10-CM

## 2025-07-10 PROCEDURE — 73502 X-RAY EXAM HIP UNI 2-3 VIEWS: CPT

## 2025-07-10 PROCEDURE — G0382 LEV 3 HOSP TYPE B ED VISIT: HCPCS | Performed by: STUDENT IN AN ORGANIZED HEALTH CARE EDUCATION/TRAINING PROGRAM

## 2025-07-10 PROCEDURE — 99283 EMERGENCY DEPT VISIT LOW MDM: CPT | Performed by: STUDENT IN AN ORGANIZED HEALTH CARE EDUCATION/TRAINING PROGRAM

## 2025-07-17 ENCOUNTER — APPOINTMENT (OUTPATIENT)
Dept: URGENT CARE | Age: 55
End: 2025-07-17
Payer: OTHER MISCELLANEOUS

## 2025-07-17 PROCEDURE — 99214 OFFICE O/P EST MOD 30 MIN: CPT | Performed by: PHYSICIAN ASSISTANT

## 2025-07-21 DIAGNOSIS — G24.3 CERVICAL DYSTONIA: ICD-10-CM

## 2025-07-22 RX ORDER — METHOCARBAMOL 500 MG/1
TABLET, FILM COATED ORAL
Qty: 90 TABLET | Refills: 0 | OUTPATIENT
Start: 2025-07-22

## 2025-08-07 ENCOUNTER — APPOINTMENT (OUTPATIENT)
Dept: URGENT CARE | Age: 55
End: 2025-08-07
Payer: OTHER MISCELLANEOUS